# Patient Record
Sex: MALE | Race: WHITE | Employment: OTHER | URBAN - METROPOLITAN AREA
[De-identification: names, ages, dates, MRNs, and addresses within clinical notes are randomized per-mention and may not be internally consistent; named-entity substitution may affect disease eponyms.]

---

## 2017-01-23 PROBLEM — R06.09 DYSPNEA ON EXERTION: Status: ACTIVE | Noted: 2017-01-23

## 2017-01-23 PROBLEM — R07.81 PLEURITIC CHEST PAIN: Status: ACTIVE | Noted: 2017-01-23

## 2017-06-29 PROBLEM — Z91.14 H/O MEDICATION NONCOMPLIANCE: Status: ACTIVE | Noted: 2017-06-29

## 2017-06-29 PROBLEM — I10 ESSENTIAL HYPERTENSION: Status: ACTIVE | Noted: 2017-06-29

## 2017-06-29 PROBLEM — Z91.148 H/O MEDICATION NONCOMPLIANCE: Status: ACTIVE | Noted: 2017-06-29

## 2017-06-29 PROBLEM — I20.0 UNSTABLE ANGINA (HCC): Status: ACTIVE | Noted: 2017-06-29

## 2018-01-12 ENCOUNTER — CARE COORDINATION (OUTPATIENT)
Dept: CARE COORDINATION | Age: 53
End: 2018-01-12

## 2018-01-12 NOTE — CARE COORDINATION
Attempted to reach patient for ed follow up, detailed message left with instructions to return call to care coordination at [phone]

## 2018-04-04 ENCOUNTER — NURSE ONLY (OUTPATIENT)
Dept: NON INVASIVE DIAGNOSTICS | Age: 53
End: 2018-04-04
Payer: MEDICARE

## 2018-04-04 DIAGNOSIS — I47.29 NSVT (NONSUSTAINED VENTRICULAR TACHYCARDIA): Chronic | ICD-10-CM

## 2018-04-04 DIAGNOSIS — Z95.810 DUAL IMPLANTABLE CARDIOVERTER-DEFIBRILLATOR IN SITU: Primary | Chronic | ICD-10-CM

## 2018-04-04 PROCEDURE — 93296 REM INTERROG EVL PM/IDS: CPT | Performed by: INTERNAL MEDICINE

## 2018-04-04 PROCEDURE — 93295 DEV INTERROG REMOTE 1/2/MLT: CPT | Performed by: INTERNAL MEDICINE

## 2018-04-06 ENCOUNTER — TELEPHONE (OUTPATIENT)
Dept: NON INVASIVE DIAGNOSTICS | Age: 53
End: 2018-04-06

## 2018-04-17 ENCOUNTER — HOSPITAL ENCOUNTER (INPATIENT)
Age: 53
LOS: 2 days | Discharge: HOME | DRG: 291 | End: 2018-04-19
Attending: EMERGENCY MEDICINE | Admitting: FAMILY MEDICINE
Payer: MEDICARE

## 2018-04-17 ENCOUNTER — APPOINTMENT (OUTPATIENT)
Dept: GENERAL RADIOLOGY | Age: 53
DRG: 291 | End: 2018-04-17
Payer: MEDICARE

## 2018-04-17 DIAGNOSIS — R07.9 CHEST PAIN, UNSPECIFIED TYPE: Primary | ICD-10-CM

## 2018-04-17 DIAGNOSIS — R06.02 SHORTNESS OF BREATH: ICD-10-CM

## 2018-04-17 DIAGNOSIS — I10 HYPERTENSION, UNSPECIFIED TYPE: ICD-10-CM

## 2018-04-17 LAB
ALBUMIN SERPL-MCNC: 4.4 G/DL (ref 3.5–5.2)
ALP BLD-CCNC: 98 U/L (ref 40–129)
ALT SERPL-CCNC: 28 U/L (ref 0–40)
ANION GAP SERPL CALCULATED.3IONS-SCNC: 13 MMOL/L (ref 7–16)
AST SERPL-CCNC: 24 U/L (ref 0–39)
BASOPHILS ABSOLUTE: 0.06 E9/L (ref 0–0.2)
BASOPHILS RELATIVE PERCENT: 0.8 % (ref 0–2)
BILIRUB SERPL-MCNC: 0.5 MG/DL (ref 0–1.2)
BUN BLDV-MCNC: 17 MG/DL (ref 6–20)
CALCIUM SERPL-MCNC: 8.9 MG/DL (ref 8.6–10.2)
CHLORIDE BLD-SCNC: 100 MMOL/L (ref 98–107)
CO2: 27 MMOL/L (ref 22–29)
CREAT SERPL-MCNC: 1.3 MG/DL (ref 0.7–1.2)
EOSINOPHILS ABSOLUTE: 0.26 E9/L (ref 0.05–0.5)
EOSINOPHILS RELATIVE PERCENT: 3.4 % (ref 0–6)
GFR AFRICAN AMERICAN: >60
GFR NON-AFRICAN AMERICAN: 58 ML/MIN/1.73
GLUCOSE BLD-MCNC: 117 MG/DL (ref 74–109)
HCT VFR BLD CALC: 45.5 % (ref 37–54)
HEMOGLOBIN: 14.3 G/DL (ref 12.5–16.5)
IMMATURE GRANULOCYTES #: 0.04 E9/L
IMMATURE GRANULOCYTES %: 0.5 % (ref 0–5)
LYMPHOCYTES ABSOLUTE: 1.56 E9/L (ref 1.5–4)
LYMPHOCYTES RELATIVE PERCENT: 20.3 % (ref 20–42)
MCH RBC QN AUTO: 25 PG (ref 26–35)
MCHC RBC AUTO-ENTMCNC: 31.4 % (ref 32–34.5)
MCV RBC AUTO: 79.4 FL (ref 80–99.9)
MONOCYTES ABSOLUTE: 0.5 E9/L (ref 0.1–0.95)
MONOCYTES RELATIVE PERCENT: 6.5 % (ref 2–12)
NEUTROPHILS ABSOLUTE: 5.27 E9/L (ref 1.8–7.3)
NEUTROPHILS RELATIVE PERCENT: 68.5 % (ref 43–80)
PDW BLD-RTO: 14 FL (ref 11.5–15)
PLATELET # BLD: 200 E9/L (ref 130–450)
PMV BLD AUTO: 11 FL (ref 7–12)
POTASSIUM SERPL-SCNC: 4.1 MMOL/L (ref 3.5–5)
PRO-BNP: 1312 PG/ML (ref 0–125)
RBC # BLD: 5.73 E12/L (ref 3.8–5.8)
SODIUM BLD-SCNC: 140 MMOL/L (ref 132–146)
TOTAL PROTEIN: 7.4 G/DL (ref 6.4–8.3)
TROPONIN: <0.01 NG/ML (ref 0–0.03)
WBC # BLD: 7.7 E9/L (ref 4.5–11.5)

## 2018-04-17 PROCEDURE — 93005 ELECTROCARDIOGRAM TRACING: CPT | Performed by: NURSE PRACTITIONER

## 2018-04-17 PROCEDURE — 84484 ASSAY OF TROPONIN QUANT: CPT

## 2018-04-17 PROCEDURE — 36415 COLL VENOUS BLD VENIPUNCTURE: CPT

## 2018-04-17 PROCEDURE — 99285 EMERGENCY DEPT VISIT HI MDM: CPT

## 2018-04-17 PROCEDURE — 6360000002 HC RX W HCPCS: Performed by: STUDENT IN AN ORGANIZED HEALTH CARE EDUCATION/TRAINING PROGRAM

## 2018-04-17 PROCEDURE — 85025 COMPLETE CBC W/AUTO DIFF WBC: CPT

## 2018-04-17 PROCEDURE — 80053 COMPREHEN METABOLIC PANEL: CPT

## 2018-04-17 PROCEDURE — 6370000000 HC RX 637 (ALT 250 FOR IP): Performed by: STUDENT IN AN ORGANIZED HEALTH CARE EDUCATION/TRAINING PROGRAM

## 2018-04-17 PROCEDURE — 6370000000 HC RX 637 (ALT 250 FOR IP): Performed by: EMERGENCY MEDICINE

## 2018-04-17 PROCEDURE — 71045 X-RAY EXAM CHEST 1 VIEW: CPT

## 2018-04-17 PROCEDURE — 2140000000 HC CCU INTERMEDIATE R&B

## 2018-04-17 PROCEDURE — 93005 ELECTROCARDIOGRAM TRACING: CPT | Performed by: STUDENT IN AN ORGANIZED HEALTH CARE EDUCATION/TRAINING PROGRAM

## 2018-04-17 PROCEDURE — 2500000003 HC RX 250 WO HCPCS: Performed by: STUDENT IN AN ORGANIZED HEALTH CARE EDUCATION/TRAINING PROGRAM

## 2018-04-17 PROCEDURE — 83880 ASSAY OF NATRIURETIC PEPTIDE: CPT

## 2018-04-17 RX ORDER — FUROSEMIDE 10 MG/ML
40 INJECTION INTRAMUSCULAR; INTRAVENOUS ONCE
Status: COMPLETED | OUTPATIENT
Start: 2018-04-17 | End: 2018-04-17

## 2018-04-17 RX ORDER — METOPROLOL TARTRATE 5 MG/5ML
5 INJECTION INTRAVENOUS ONCE
Status: COMPLETED | OUTPATIENT
Start: 2018-04-17 | End: 2018-04-17

## 2018-04-17 RX ORDER — ASPIRIN 81 MG/1
324 TABLET, CHEWABLE ORAL ONCE
Status: COMPLETED | OUTPATIENT
Start: 2018-04-17 | End: 2018-04-17

## 2018-04-17 RX ORDER — NITROGLYCERIN 0.4 MG/1
0.4 TABLET SUBLINGUAL EVERY 5 MIN PRN
Status: DISCONTINUED | OUTPATIENT
Start: 2018-04-17 | End: 2018-04-19 | Stop reason: HOSPADM

## 2018-04-17 RX ORDER — METOPROLOL TARTRATE 5 MG/5ML
5 INJECTION INTRAVENOUS EVERY 6 HOURS
Status: DISCONTINUED | OUTPATIENT
Start: 2018-04-17 | End: 2018-04-17

## 2018-04-17 RX ADMIN — NITROGLYCERIN 0.5 INCH: 20 OINTMENT TOPICAL at 21:54

## 2018-04-17 RX ADMIN — FUROSEMIDE 40 MG: 10 INJECTION, SOLUTION INTRAMUSCULAR; INTRAVENOUS at 22:02

## 2018-04-17 RX ADMIN — METOROPROLOL TARTRATE 5 MG: 5 INJECTION, SOLUTION INTRAVENOUS at 22:47

## 2018-04-17 RX ADMIN — METOROPROLOL TARTRATE 5 MG: 5 INJECTION, SOLUTION INTRAVENOUS at 23:22

## 2018-04-17 RX ADMIN — NITROGLYCERIN 0.4 MG: 0.4 TABLET SUBLINGUAL at 21:59

## 2018-04-17 RX ADMIN — NITROGLYCERIN 0.4 MG: 0.4 TABLET SUBLINGUAL at 21:54

## 2018-04-17 RX ADMIN — ASPIRIN 81 MG 162 MG: 81 TABLET ORAL at 21:53

## 2018-04-17 ASSESSMENT — ENCOUNTER SYMPTOMS
ABDOMINAL DISTENTION: 0
NAUSEA: 0
COUGH: 0
ABDOMINAL PAIN: 0
CONSTIPATION: 0
CHEST TIGHTNESS: 0
SHORTNESS OF BREATH: 1
VOMITING: 0
BLOOD IN STOOL: 0
DIARRHEA: 0
EYE REDNESS: 0
RHINORRHEA: 0
EYE PAIN: 0
TROUBLE SWALLOWING: 0
BACK PAIN: 0
SINUS PRESSURE: 0
WHEEZING: 0
PHOTOPHOBIA: 0
SORE THROAT: 0

## 2018-04-17 ASSESSMENT — PAIN SCALES - GENERAL
PAINLEVEL_OUTOF10: 5

## 2018-04-17 ASSESSMENT — PAIN DESCRIPTION - FREQUENCY: FREQUENCY: CONTINUOUS

## 2018-04-17 ASSESSMENT — PAIN DESCRIPTION - LOCATION
LOCATION: CHEST
LOCATION: CHEST

## 2018-04-17 ASSESSMENT — PAIN DESCRIPTION - PAIN TYPE
TYPE: ACUTE PAIN

## 2018-04-17 ASSESSMENT — PAIN DESCRIPTION - DESCRIPTORS: DESCRIPTORS: PRESSURE

## 2018-04-17 ASSESSMENT — PAIN DESCRIPTION - ORIENTATION: ORIENTATION: MID;LEFT

## 2018-04-18 PROBLEM — R06.02 SHORTNESS OF BREATH: Status: ACTIVE | Noted: 2017-01-23

## 2018-04-18 LAB
ANION GAP SERPL CALCULATED.3IONS-SCNC: 16 MMOL/L (ref 7–16)
BILIRUBIN URINE: ABNORMAL
BLOOD, URINE: NEGATIVE
BUN BLDV-MCNC: 18 MG/DL (ref 6–20)
CALCIUM SERPL-MCNC: 9 MG/DL (ref 8.6–10.2)
CHLORIDE BLD-SCNC: 99 MMOL/L (ref 98–107)
CLARITY: CLEAR
CO2: 25 MMOL/L (ref 22–29)
COLOR: YELLOW
CREAT SERPL-MCNC: 1.3 MG/DL (ref 0.7–1.2)
GFR AFRICAN AMERICAN: >60
GFR NON-AFRICAN AMERICAN: 58 ML/MIN/1.73
GLUCOSE BLD-MCNC: 97 MG/DL (ref 74–109)
GLUCOSE URINE: NEGATIVE MG/DL
HCT VFR BLD CALC: 43.4 % (ref 37–54)
HEMOGLOBIN: 14.2 G/DL (ref 12.5–16.5)
KETONES, URINE: NEGATIVE MG/DL
LEUKOCYTE ESTERASE, URINE: NEGATIVE
LV EF: 30 %
LVEF MODALITY: NORMAL
MAGNESIUM: 2.2 MG/DL (ref 1.6–2.6)
MCH RBC QN AUTO: 25.5 PG (ref 26–35)
MCHC RBC AUTO-ENTMCNC: 32.7 % (ref 32–34.5)
MCV RBC AUTO: 78.1 FL (ref 80–99.9)
NITRITE, URINE: NEGATIVE
PDW BLD-RTO: 14.2 FL (ref 11.5–15)
PH UA: 5.5 (ref 5–9)
PLATELET # BLD: 196 E9/L (ref 130–450)
PMV BLD AUTO: 11.1 FL (ref 7–12)
POTASSIUM REFLEX MAGNESIUM: 3.5 MMOL/L (ref 3.5–5)
PROTEIN UA: NEGATIVE MG/DL
RBC # BLD: 5.56 E12/L (ref 3.8–5.8)
SODIUM BLD-SCNC: 140 MMOL/L (ref 132–146)
SPECIFIC GRAVITY UA: 1.02 (ref 1–1.03)
TROPONIN: <0.01 NG/ML (ref 0–0.03)
TROPONIN: <0.01 NG/ML (ref 0–0.03)
UROBILINOGEN, URINE: 4 E.U./DL
WBC # BLD: 7.7 E9/L (ref 4.5–11.5)

## 2018-04-18 PROCEDURE — 6360000004 HC RX CONTRAST MEDICATION: Performed by: FAMILY MEDICINE

## 2018-04-18 PROCEDURE — 6360000002 HC RX W HCPCS: Performed by: FAMILY MEDICINE

## 2018-04-18 PROCEDURE — 83735 ASSAY OF MAGNESIUM: CPT

## 2018-04-18 PROCEDURE — 84484 ASSAY OF TROPONIN QUANT: CPT

## 2018-04-18 PROCEDURE — APPSS60 APP SPLIT SHARED TIME 46-60 MINUTES: Performed by: NURSE PRACTITIONER

## 2018-04-18 PROCEDURE — 93005 ELECTROCARDIOGRAM TRACING: CPT | Performed by: FAMILY MEDICINE

## 2018-04-18 PROCEDURE — 99222 1ST HOSP IP/OBS MODERATE 55: CPT | Performed by: INTERNAL MEDICINE

## 2018-04-18 PROCEDURE — 99222 1ST HOSP IP/OBS MODERATE 55: CPT | Performed by: FAMILY MEDICINE

## 2018-04-18 PROCEDURE — 81003 URINALYSIS AUTO W/O SCOPE: CPT

## 2018-04-18 PROCEDURE — 2140000000 HC CCU INTERMEDIATE R&B

## 2018-04-18 PROCEDURE — 2580000003 HC RX 258: Performed by: FAMILY MEDICINE

## 2018-04-18 PROCEDURE — 87088 URINE BACTERIA CULTURE: CPT

## 2018-04-18 PROCEDURE — 85027 COMPLETE CBC AUTOMATED: CPT

## 2018-04-18 PROCEDURE — 36415 COLL VENOUS BLD VENIPUNCTURE: CPT

## 2018-04-18 PROCEDURE — 80048 BASIC METABOLIC PNL TOTAL CA: CPT

## 2018-04-18 PROCEDURE — 93306 TTE W/DOPPLER COMPLETE: CPT

## 2018-04-18 PROCEDURE — 6370000000 HC RX 637 (ALT 250 FOR IP): Performed by: FAMILY MEDICINE

## 2018-04-18 RX ORDER — SODIUM CHLORIDE 0.9 % (FLUSH) 0.9 %
10 SYRINGE (ML) INJECTION PRN
Status: DISCONTINUED | OUTPATIENT
Start: 2018-04-18 | End: 2018-04-19 | Stop reason: HOSPADM

## 2018-04-18 RX ORDER — FLUOXETINE HYDROCHLORIDE 20 MG/1
20 CAPSULE ORAL DAILY
Status: DISCONTINUED | OUTPATIENT
Start: 2018-04-18 | End: 2018-04-19 | Stop reason: HOSPADM

## 2018-04-18 RX ORDER — PANTOPRAZOLE SODIUM 40 MG/1
40 TABLET, DELAYED RELEASE ORAL
Status: DISCONTINUED | OUTPATIENT
Start: 2018-04-18 | End: 2018-04-19 | Stop reason: HOSPADM

## 2018-04-18 RX ORDER — CLOPIDOGREL BISULFATE 75 MG/1
75 TABLET ORAL DAILY
Status: DISCONTINUED | OUTPATIENT
Start: 2018-04-18 | End: 2018-04-19 | Stop reason: HOSPADM

## 2018-04-18 RX ORDER — LISINOPRIL 20 MG/1
40 TABLET ORAL DAILY
Status: DISCONTINUED | OUTPATIENT
Start: 2018-04-18 | End: 2018-04-19 | Stop reason: HOSPADM

## 2018-04-18 RX ORDER — ISOSORBIDE MONONITRATE 30 MG/1
30 TABLET, EXTENDED RELEASE ORAL DAILY
Status: DISCONTINUED | OUTPATIENT
Start: 2018-04-18 | End: 2018-04-19 | Stop reason: HOSPADM

## 2018-04-18 RX ORDER — RANOLAZINE 500 MG/1
500 TABLET, EXTENDED RELEASE ORAL 2 TIMES DAILY
Status: DISCONTINUED | OUTPATIENT
Start: 2018-04-18 | End: 2018-04-19 | Stop reason: HOSPADM

## 2018-04-18 RX ORDER — ATORVASTATIN CALCIUM 40 MG/1
80 TABLET, FILM COATED ORAL DAILY
Status: DISCONTINUED | OUTPATIENT
Start: 2018-04-18 | End: 2018-04-19 | Stop reason: HOSPADM

## 2018-04-18 RX ORDER — TAMSULOSIN HYDROCHLORIDE 0.4 MG/1
0.4 CAPSULE ORAL DAILY
Status: DISCONTINUED | OUTPATIENT
Start: 2018-04-18 | End: 2018-04-19 | Stop reason: HOSPADM

## 2018-04-18 RX ORDER — ACETAMINOPHEN 325 MG/1
650 TABLET ORAL EVERY 4 HOURS PRN
Status: DISCONTINUED | OUTPATIENT
Start: 2018-04-18 | End: 2018-04-19 | Stop reason: HOSPADM

## 2018-04-18 RX ORDER — FUROSEMIDE 10 MG/ML
20 INJECTION INTRAMUSCULAR; INTRAVENOUS DAILY
Status: DISCONTINUED | OUTPATIENT
Start: 2018-04-18 | End: 2018-04-19

## 2018-04-18 RX ORDER — SODIUM CHLORIDE 0.9 % (FLUSH) 0.9 %
10 SYRINGE (ML) INJECTION EVERY 12 HOURS SCHEDULED
Status: DISCONTINUED | OUTPATIENT
Start: 2018-04-18 | End: 2018-04-19 | Stop reason: HOSPADM

## 2018-04-18 RX ORDER — METOPROLOL SUCCINATE 50 MG/1
50 TABLET, EXTENDED RELEASE ORAL 2 TIMES DAILY
Status: DISCONTINUED | OUTPATIENT
Start: 2018-04-18 | End: 2018-04-19 | Stop reason: HOSPADM

## 2018-04-18 RX ORDER — ASPIRIN 81 MG/1
81 TABLET, CHEWABLE ORAL DAILY
Status: DISCONTINUED | OUTPATIENT
Start: 2018-04-18 | End: 2018-04-19 | Stop reason: HOSPADM

## 2018-04-18 RX ADMIN — METOPROLOL SUCCINATE 50 MG: 50 TABLET, FILM COATED, EXTENDED RELEASE ORAL at 08:55

## 2018-04-18 RX ADMIN — FUROSEMIDE 20 MG: 10 INJECTION, SOLUTION INTRAVENOUS at 08:54

## 2018-04-18 RX ADMIN — CLOPIDOGREL 75 MG: 75 TABLET, FILM COATED ORAL at 08:53

## 2018-04-18 RX ADMIN — RANOLAZINE 500 MG: 500 TABLET, FILM COATED, EXTENDED RELEASE ORAL at 08:52

## 2018-04-18 RX ADMIN — METOPROLOL SUCCINATE 50 MG: 50 TABLET, FILM COATED, EXTENDED RELEASE ORAL at 02:24

## 2018-04-18 RX ADMIN — ISOSORBIDE MONONITRATE 30 MG: 30 TABLET ORAL at 08:52

## 2018-04-18 RX ADMIN — ASPIRIN 81 MG 81 MG: 81 TABLET ORAL at 08:53

## 2018-04-18 RX ADMIN — FLUOXETINE HYDROCHLORIDE 20 MG: 20 CAPSULE ORAL at 08:53

## 2018-04-18 RX ADMIN — Medication 10 ML: at 20:36

## 2018-04-18 RX ADMIN — DESMOPRESSIN ACETATE 80 MG: 0.2 TABLET ORAL at 08:53

## 2018-04-18 RX ADMIN — LISINOPRIL 40 MG: 20 TABLET ORAL at 08:54

## 2018-04-18 RX ADMIN — PERFLUTREN 1.65 MG: 6.52 INJECTION, SUSPENSION INTRAVENOUS at 12:10

## 2018-04-18 RX ADMIN — ENOXAPARIN SODIUM 40 MG: 40 INJECTION SUBCUTANEOUS at 08:52

## 2018-04-18 RX ADMIN — RANOLAZINE 500 MG: 500 TABLET, FILM COATED, EXTENDED RELEASE ORAL at 20:35

## 2018-04-18 RX ADMIN — Medication 10 ML: at 08:52

## 2018-04-18 RX ADMIN — METOPROLOL SUCCINATE 50 MG: 50 TABLET, FILM COATED, EXTENDED RELEASE ORAL at 20:35

## 2018-04-18 RX ADMIN — PANTOPRAZOLE SODIUM 40 MG: 40 TABLET, DELAYED RELEASE ORAL at 07:11

## 2018-04-18 RX ADMIN — TAMSULOSIN HYDROCHLORIDE 0.4 MG: 0.4 CAPSULE ORAL at 08:52

## 2018-04-18 RX ADMIN — RANOLAZINE 500 MG: 500 TABLET, FILM COATED, EXTENDED RELEASE ORAL at 02:24

## 2018-04-18 RX ADMIN — METFORMIN HYDROCHLORIDE 1000 MG: 1000 TABLET ORAL at 08:52

## 2018-04-18 ASSESSMENT — PAIN SCALES - GENERAL
PAINLEVEL_OUTOF10: 4
PAINLEVEL_OUTOF10: 2
PAINLEVEL_OUTOF10: 0

## 2018-04-18 ASSESSMENT — PAIN DESCRIPTION - PROGRESSION
CLINICAL_PROGRESSION: GRADUALLY IMPROVING

## 2018-04-18 ASSESSMENT — PAIN DESCRIPTION - FREQUENCY
FREQUENCY: INTERMITTENT
FREQUENCY: INTERMITTENT

## 2018-04-18 ASSESSMENT — ENCOUNTER SYMPTOMS
HEARTBURN: 1
BACK PAIN: 0
ORTHOPNEA: 0
VOMITING: 0
COUGH: 0
SORE THROAT: 0
BLURRED VISION: 0
NAUSEA: 0
ABDOMINAL PAIN: 0
DIARRHEA: 1
SHORTNESS OF BREATH: 1

## 2018-04-18 ASSESSMENT — PAIN DESCRIPTION - LOCATION
LOCATION: CHEST
LOCATION: CHEST

## 2018-04-18 ASSESSMENT — PAIN DESCRIPTION - ORIENTATION
ORIENTATION: LEFT
ORIENTATION: MID

## 2018-04-18 ASSESSMENT — PAIN DESCRIPTION - DESCRIPTORS
DESCRIPTORS: ACHING;DISCOMFORT
DESCRIPTORS: ACHING

## 2018-04-18 ASSESSMENT — PAIN DESCRIPTION - PAIN TYPE
TYPE: ACUTE PAIN
TYPE: ACUTE PAIN

## 2018-04-19 VITALS
HEIGHT: 69 IN | BODY MASS INDEX: 33.31 KG/M2 | DIASTOLIC BLOOD PRESSURE: 75 MMHG | RESPIRATION RATE: 18 BRPM | WEIGHT: 224.9 LBS | OXYGEN SATURATION: 96 % | TEMPERATURE: 96 F | HEART RATE: 65 BPM | SYSTOLIC BLOOD PRESSURE: 138 MMHG

## 2018-04-19 PROBLEM — R06.02 SHORTNESS OF BREATH: Status: RESOLVED | Noted: 2017-01-23 | Resolved: 2018-04-19

## 2018-04-19 LAB
ANION GAP SERPL CALCULATED.3IONS-SCNC: 18 MMOL/L (ref 7–16)
BUN BLDV-MCNC: 22 MG/DL (ref 6–20)
CALCIUM SERPL-MCNC: 9 MG/DL (ref 8.6–10.2)
CHLORIDE BLD-SCNC: 100 MMOL/L (ref 98–107)
CO2: 25 MMOL/L (ref 22–29)
CREAT SERPL-MCNC: 1.5 MG/DL (ref 0.7–1.2)
GFR AFRICAN AMERICAN: 59
GFR NON-AFRICAN AMERICAN: 49 ML/MIN/1.73
GLUCOSE BLD-MCNC: 100 MG/DL (ref 74–109)
HCT VFR BLD CALC: 43.8 % (ref 37–54)
HEMOGLOBIN: 14 G/DL (ref 12.5–16.5)
LACTIC ACID: 1.2 MMOL/L (ref 0.5–2.2)
MCH RBC QN AUTO: 25 PG (ref 26–35)
MCHC RBC AUTO-ENTMCNC: 32 % (ref 32–34.5)
MCV RBC AUTO: 78.4 FL (ref 80–99.9)
PDW BLD-RTO: 14.1 FL (ref 11.5–15)
PLATELET # BLD: 178 E9/L (ref 130–450)
PMV BLD AUTO: 11.1 FL (ref 7–12)
POTASSIUM REFLEX MAGNESIUM: 3.9 MMOL/L (ref 3.5–5)
RBC # BLD: 5.59 E12/L (ref 3.8–5.8)
SODIUM BLD-SCNC: 143 MMOL/L (ref 132–146)
WBC # BLD: 7.3 E9/L (ref 4.5–11.5)

## 2018-04-19 PROCEDURE — 99238 HOSP IP/OBS DSCHRG MGMT 30/<: CPT | Performed by: FAMILY MEDICINE

## 2018-04-19 PROCEDURE — APPSS30 APP SPLIT SHARED TIME 16-30 MINUTES: Performed by: NURSE PRACTITIONER

## 2018-04-19 PROCEDURE — 99232 SBSQ HOSP IP/OBS MODERATE 35: CPT | Performed by: INTERNAL MEDICINE

## 2018-04-19 PROCEDURE — 6370000000 HC RX 637 (ALT 250 FOR IP): Performed by: FAMILY MEDICINE

## 2018-04-19 PROCEDURE — 36415 COLL VENOUS BLD VENIPUNCTURE: CPT

## 2018-04-19 PROCEDURE — 85027 COMPLETE CBC AUTOMATED: CPT

## 2018-04-19 PROCEDURE — 2580000003 HC RX 258: Performed by: FAMILY MEDICINE

## 2018-04-19 PROCEDURE — 83605 ASSAY OF LACTIC ACID: CPT

## 2018-04-19 PROCEDURE — 6360000002 HC RX W HCPCS: Performed by: FAMILY MEDICINE

## 2018-04-19 PROCEDURE — 80048 BASIC METABOLIC PNL TOTAL CA: CPT

## 2018-04-19 RX ORDER — FUROSEMIDE 20 MG/1
20 TABLET ORAL DAILY
Qty: 60 TABLET | Refills: 3 | Status: SHIPPED | OUTPATIENT
Start: 2018-04-20 | End: 2018-06-05 | Stop reason: SDUPTHER

## 2018-04-19 RX ORDER — FUROSEMIDE 20 MG/1
20 TABLET ORAL DAILY
Status: DISCONTINUED | OUTPATIENT
Start: 2018-04-20 | End: 2018-04-19 | Stop reason: HOSPADM

## 2018-04-19 RX ADMIN — ISOSORBIDE MONONITRATE 30 MG: 30 TABLET ORAL at 08:48

## 2018-04-19 RX ADMIN — FLUOXETINE HYDROCHLORIDE 20 MG: 20 CAPSULE ORAL at 08:48

## 2018-04-19 RX ADMIN — FUROSEMIDE 20 MG: 10 INJECTION, SOLUTION INTRAVENOUS at 08:48

## 2018-04-19 RX ADMIN — METFORMIN HYDROCHLORIDE 1000 MG: 1000 TABLET ORAL at 08:48

## 2018-04-19 RX ADMIN — Medication 10 ML: at 08:48

## 2018-04-19 RX ADMIN — RANOLAZINE 500 MG: 500 TABLET, FILM COATED, EXTENDED RELEASE ORAL at 08:48

## 2018-04-19 RX ADMIN — PANTOPRAZOLE SODIUM 40 MG: 40 TABLET, DELAYED RELEASE ORAL at 05:52

## 2018-04-19 RX ADMIN — DESMOPRESSIN ACETATE 80 MG: 0.2 TABLET ORAL at 08:48

## 2018-04-19 RX ADMIN — TAMSULOSIN HYDROCHLORIDE 0.4 MG: 0.4 CAPSULE ORAL at 08:48

## 2018-04-19 RX ADMIN — METOPROLOL SUCCINATE 50 MG: 50 TABLET, FILM COATED, EXTENDED RELEASE ORAL at 08:48

## 2018-04-19 RX ADMIN — CLOPIDOGREL 75 MG: 75 TABLET, FILM COATED ORAL at 08:48

## 2018-04-19 RX ADMIN — ASPIRIN 81 MG 81 MG: 81 TABLET ORAL at 08:48

## 2018-04-19 RX ADMIN — LISINOPRIL 40 MG: 20 TABLET ORAL at 08:48

## 2018-04-19 ASSESSMENT — PAIN SCALES - GENERAL: PAINLEVEL_OUTOF10: 0

## 2018-04-20 ENCOUNTER — CARE COORDINATION (OUTPATIENT)
Dept: CASE MANAGEMENT | Age: 53
End: 2018-04-20

## 2018-04-20 LAB
EKG ATRIAL RATE: 71 BPM
EKG P AXIS: 59 DEGREES
EKG P-R INTERVAL: 208 MS
EKG Q-T INTERVAL: 436 MS
EKG QRS DURATION: 106 MS
EKG QTC CALCULATION (BAZETT): 473 MS
EKG R AXIS: 106 DEGREES
EKG T AXIS: 114 DEGREES
EKG VENTRICULAR RATE: 71 BPM
URINE CULTURE, ROUTINE: NORMAL

## 2018-04-20 PROCEDURE — 93010 ELECTROCARDIOGRAM REPORT: CPT | Performed by: FAMILY MEDICINE

## 2018-04-23 ENCOUNTER — HOSPITAL ENCOUNTER (OUTPATIENT)
Age: 53
Discharge: HOME | End: 2018-04-25
Payer: MEDICARE

## 2018-04-23 ENCOUNTER — OFFICE VISIT (OUTPATIENT)
Dept: FAMILY MEDICINE CLINIC | Age: 53
End: 2018-04-23
Payer: MEDICARE

## 2018-04-23 VITALS
BODY MASS INDEX: 33.18 KG/M2 | WEIGHT: 224 LBS | RESPIRATION RATE: 18 BRPM | OXYGEN SATURATION: 96 % | TEMPERATURE: 98.7 F | SYSTOLIC BLOOD PRESSURE: 126 MMHG | DIASTOLIC BLOOD PRESSURE: 70 MMHG | HEART RATE: 88 BPM | HEIGHT: 69 IN

## 2018-04-23 DIAGNOSIS — S39.011A STRAIN OF ABDOMINAL MUSCLE, INITIAL ENCOUNTER: ICD-10-CM

## 2018-04-23 DIAGNOSIS — E11.9 TYPE 2 DIABETES MELLITUS WITHOUT COMPLICATION, WITHOUT LONG-TERM CURRENT USE OF INSULIN (HCC): ICD-10-CM

## 2018-04-23 DIAGNOSIS — I50.22 CHRONIC SYSTOLIC CHF (CONGESTIVE HEART FAILURE) (HCC): Primary | Chronic | ICD-10-CM

## 2018-04-23 DIAGNOSIS — Z91.14 H/O MEDICATION NONCOMPLIANCE: ICD-10-CM

## 2018-04-23 DIAGNOSIS — G47.9 SLEEP DISTURBANCE: ICD-10-CM

## 2018-04-23 LAB
ANION GAP SERPL CALCULATED.3IONS-SCNC: 17 MMOL/L (ref 7–16)
BUN BLDV-MCNC: 19 MG/DL (ref 6–20)
CALCIUM SERPL-MCNC: 9.2 MG/DL (ref 8.6–10.2)
CHLORIDE BLD-SCNC: 99 MMOL/L (ref 98–107)
CO2: 24 MMOL/L (ref 22–29)
CREAT SERPL-MCNC: 1.4 MG/DL (ref 0.7–1.2)
GFR AFRICAN AMERICAN: >60
GFR NON-AFRICAN AMERICAN: 53 ML/MIN/1.73
GLUCOSE BLD-MCNC: 135 MG/DL (ref 74–109)
HBA1C MFR BLD: 6.2 % (ref 4.8–5.9)
POTASSIUM SERPL-SCNC: 4.1 MMOL/L (ref 3.5–5)
SODIUM BLD-SCNC: 140 MMOL/L (ref 132–146)

## 2018-04-23 PROCEDURE — 36415 COLL VENOUS BLD VENIPUNCTURE: CPT

## 2018-04-23 PROCEDURE — 99495 TRANSJ CARE MGMT MOD F2F 14D: CPT | Performed by: FAMILY MEDICINE

## 2018-04-23 PROCEDURE — 80048 BASIC METABOLIC PNL TOTAL CA: CPT

## 2018-04-23 PROCEDURE — 83036 HEMOGLOBIN GLYCOSYLATED A1C: CPT

## 2018-04-23 PROCEDURE — 99212 OFFICE O/P EST SF 10 MIN: CPT | Performed by: FAMILY MEDICINE

## 2018-04-23 PROCEDURE — 1111F DSCHRG MED/CURRENT MED MERGE: CPT | Performed by: FAMILY MEDICINE

## 2018-04-23 ASSESSMENT — PATIENT HEALTH QUESTIONNAIRE - PHQ9
SUM OF ALL RESPONSES TO PHQ QUESTIONS 1-9: 1
2. FEELING DOWN, DEPRESSED OR HOPELESS: 0
1. LITTLE INTEREST OR PLEASURE IN DOING THINGS: 1
SUM OF ALL RESPONSES TO PHQ9 QUESTIONS 1 & 2: 1

## 2018-04-24 ENCOUNTER — TELEPHONE (OUTPATIENT)
Dept: CARDIOLOGY CLINIC | Age: 53
End: 2018-04-24

## 2018-04-24 ENCOUNTER — TELEPHONE (OUTPATIENT)
Dept: NON INVASIVE DIAGNOSTICS | Age: 53
End: 2018-04-24

## 2018-04-26 ENCOUNTER — OFFICE VISIT (OUTPATIENT)
Dept: CARDIOLOGY CLINIC | Age: 53
End: 2018-04-26
Payer: MEDICARE

## 2018-04-26 VITALS
DIASTOLIC BLOOD PRESSURE: 88 MMHG | HEIGHT: 69 IN | BODY MASS INDEX: 33.18 KG/M2 | RESPIRATION RATE: 12 BRPM | WEIGHT: 224 LBS | HEART RATE: 74 BPM | SYSTOLIC BLOOD PRESSURE: 132 MMHG

## 2018-04-26 DIAGNOSIS — I38 VHD (VALVULAR HEART DISEASE): ICD-10-CM

## 2018-04-26 DIAGNOSIS — I25.10 CORONARY ARTERY DISEASE INVOLVING NATIVE CORONARY ARTERY OF NATIVE HEART WITHOUT ANGINA PECTORIS: Chronic | ICD-10-CM

## 2018-04-26 DIAGNOSIS — Z95.810 DUAL IMPLANTABLE CARDIOVERTER-DEFIBRILLATOR IN SITU: Chronic | ICD-10-CM

## 2018-04-26 DIAGNOSIS — I10 ESSENTIAL HYPERTENSION: Chronic | ICD-10-CM

## 2018-04-26 DIAGNOSIS — E78.2 MIXED HYPERLIPIDEMIA: Chronic | ICD-10-CM

## 2018-04-26 DIAGNOSIS — I50.42 CHRONIC COMBINED SYSTOLIC AND DIASTOLIC CONGESTIVE HEART FAILURE (HCC): Primary | ICD-10-CM

## 2018-04-26 DIAGNOSIS — I47.29 NSVT (NONSUSTAINED VENTRICULAR TACHYCARDIA): Chronic | ICD-10-CM

## 2018-04-26 PROCEDURE — 93000 ELECTROCARDIOGRAM COMPLETE: CPT | Performed by: INTERNAL MEDICINE

## 2018-04-26 PROCEDURE — G8598 ASA/ANTIPLAT THER USED: HCPCS | Performed by: PHYSICIAN ASSISTANT

## 2018-04-26 PROCEDURE — G8417 CALC BMI ABV UP PARAM F/U: HCPCS | Performed by: PHYSICIAN ASSISTANT

## 2018-04-26 PROCEDURE — 99214 OFFICE O/P EST MOD 30 MIN: CPT | Performed by: PHYSICIAN ASSISTANT

## 2018-04-26 PROCEDURE — 4004F PT TOBACCO SCREEN RCVD TLK: CPT | Performed by: PHYSICIAN ASSISTANT

## 2018-04-26 PROCEDURE — 1111F DSCHRG MED/CURRENT MED MERGE: CPT | Performed by: PHYSICIAN ASSISTANT

## 2018-04-26 PROCEDURE — 3017F COLORECTAL CA SCREEN DOC REV: CPT | Performed by: PHYSICIAN ASSISTANT

## 2018-04-26 PROCEDURE — G8427 DOCREV CUR MEDS BY ELIG CLIN: HCPCS | Performed by: PHYSICIAN ASSISTANT

## 2018-04-29 LAB
EKG ATRIAL RATE: 86 BPM
EKG ATRIAL RATE: 96 BPM
EKG P AXIS: 59 DEGREES
EKG P AXIS: 60 DEGREES
EKG P-R INTERVAL: 198 MS
EKG P-R INTERVAL: 200 MS
EKG Q-T INTERVAL: 356 MS
EKG Q-T INTERVAL: 372 MS
EKG QRS DURATION: 104 MS
EKG QRS DURATION: 98 MS
EKG QTC CALCULATION (BAZETT): 445 MS
EKG QTC CALCULATION (BAZETT): 449 MS
EKG R AXIS: -94 DEGREES
EKG R AXIS: 120 DEGREES
EKG T AXIS: 100 DEGREES
EKG T AXIS: 91 DEGREES
EKG VENTRICULAR RATE: 86 BPM
EKG VENTRICULAR RATE: 96 BPM

## 2018-06-05 ENCOUNTER — OFFICE VISIT (OUTPATIENT)
Dept: CARDIOLOGY CLINIC | Age: 53
End: 2018-06-05
Payer: MEDICARE

## 2018-06-05 VITALS
WEIGHT: 221 LBS | HEIGHT: 69 IN | RESPIRATION RATE: 14 BRPM | SYSTOLIC BLOOD PRESSURE: 118 MMHG | BODY MASS INDEX: 32.73 KG/M2 | HEART RATE: 66 BPM | DIASTOLIC BLOOD PRESSURE: 78 MMHG

## 2018-06-05 DIAGNOSIS — Z95.810 DUAL IMPLANTABLE CARDIOVERTER-DEFIBRILLATOR IN SITU: Chronic | ICD-10-CM

## 2018-06-05 DIAGNOSIS — I10 ESSENTIAL HYPERTENSION: Chronic | ICD-10-CM

## 2018-06-05 DIAGNOSIS — I25.10 CORONARY ARTERY DISEASE INVOLVING NATIVE CORONARY ARTERY OF NATIVE HEART WITHOUT ANGINA PECTORIS: Chronic | ICD-10-CM

## 2018-06-05 DIAGNOSIS — I50.42 CHRONIC COMBINED SYSTOLIC AND DIASTOLIC CONGESTIVE HEART FAILURE (HCC): Primary | ICD-10-CM

## 2018-06-05 PROBLEM — J41.0 SIMPLE CHRONIC BRONCHITIS (HCC): Chronic | Status: ACTIVE | Noted: 2018-06-05

## 2018-06-05 PROCEDURE — G8598 ASA/ANTIPLAT THER USED: HCPCS | Performed by: PHYSICIAN ASSISTANT

## 2018-06-05 PROCEDURE — 4004F PT TOBACCO SCREEN RCVD TLK: CPT | Performed by: PHYSICIAN ASSISTANT

## 2018-06-05 PROCEDURE — 93000 ELECTROCARDIOGRAM COMPLETE: CPT | Performed by: INTERNAL MEDICINE

## 2018-06-05 PROCEDURE — G8427 DOCREV CUR MEDS BY ELIG CLIN: HCPCS | Performed by: PHYSICIAN ASSISTANT

## 2018-06-05 PROCEDURE — 3017F COLORECTAL CA SCREEN DOC REV: CPT | Performed by: PHYSICIAN ASSISTANT

## 2018-06-05 PROCEDURE — 99213 OFFICE O/P EST LOW 20 MIN: CPT | Performed by: PHYSICIAN ASSISTANT

## 2018-06-05 PROCEDURE — G8417 CALC BMI ABV UP PARAM F/U: HCPCS | Performed by: PHYSICIAN ASSISTANT

## 2018-06-05 RX ORDER — FUROSEMIDE 20 MG/1
20 TABLET ORAL DAILY
Qty: 90 TABLET | Refills: 3 | Status: SHIPPED | OUTPATIENT
Start: 2018-06-05 | End: 2019-01-15 | Stop reason: DRUGHIGH

## 2018-06-06 ENCOUNTER — TELEPHONE (OUTPATIENT)
Dept: FAMILY MEDICINE CLINIC | Age: 53
End: 2018-06-06

## 2018-06-06 DIAGNOSIS — K21.9 GASTROESOPHAGEAL REFLUX DISEASE WITHOUT ESOPHAGITIS: Chronic | ICD-10-CM

## 2018-06-06 DIAGNOSIS — F32.A DEPRESSION, UNSPECIFIED DEPRESSION TYPE: Chronic | ICD-10-CM

## 2018-06-06 DIAGNOSIS — I25.119 CORONARY ARTERY DISEASE INVOLVING NATIVE CORONARY ARTERY OF NATIVE HEART WITH ANGINA PECTORIS (HCC): ICD-10-CM

## 2018-06-06 DIAGNOSIS — E11.9 TYPE 2 DIABETES MELLITUS WITHOUT COMPLICATION, WITHOUT LONG-TERM CURRENT USE OF INSULIN (HCC): Chronic | ICD-10-CM

## 2018-06-06 DIAGNOSIS — I25.10 CORONARY ARTERY DISEASE INVOLVING NATIVE CORONARY ARTERY OF NATIVE HEART WITHOUT ANGINA PECTORIS: Chronic | ICD-10-CM

## 2018-06-06 DIAGNOSIS — I10 ESSENTIAL HYPERTENSION: ICD-10-CM

## 2018-06-06 DIAGNOSIS — E78.1 HYPERTRIGLYCERIDEMIA: ICD-10-CM

## 2018-06-06 RX ORDER — RANOLAZINE 500 MG/1
500 TABLET, EXTENDED RELEASE ORAL 2 TIMES DAILY
Qty: 180 TABLET | Refills: 1 | Status: SHIPPED | OUTPATIENT
Start: 2018-06-06 | End: 2019-01-15 | Stop reason: SDUPTHER

## 2018-06-06 RX ORDER — LISINOPRIL 40 MG/1
40 TABLET ORAL DAILY
Qty: 90 TABLET | Refills: 1 | Status: SHIPPED | OUTPATIENT
Start: 2018-06-06 | End: 2019-01-15 | Stop reason: SDUPTHER

## 2018-06-06 RX ORDER — CLOPIDOGREL BISULFATE 75 MG/1
75 TABLET ORAL DAILY
Qty: 90 TABLET | Refills: 1 | Status: SHIPPED | OUTPATIENT
Start: 2018-06-06 | End: 2019-01-15 | Stop reason: SDUPTHER

## 2018-06-06 RX ORDER — TAMSULOSIN HYDROCHLORIDE 0.4 MG/1
0.4 CAPSULE ORAL DAILY
Qty: 90 CAPSULE | Refills: 1 | Status: SHIPPED | OUTPATIENT
Start: 2018-06-06 | End: 2019-01-15 | Stop reason: SDUPTHER

## 2018-06-06 RX ORDER — FLUOXETINE HYDROCHLORIDE 20 MG/1
1 CAPSULE ORAL DAILY
Qty: 90 CAPSULE | Refills: 1 | Status: SHIPPED | OUTPATIENT
Start: 2018-06-06 | End: 2018-06-12 | Stop reason: CLARIF

## 2018-06-06 RX ORDER — ATORVASTATIN CALCIUM 80 MG/1
80 TABLET, FILM COATED ORAL DAILY
Qty: 90 TABLET | Refills: 1 | Status: SHIPPED | OUTPATIENT
Start: 2018-06-06 | End: 2019-01-15 | Stop reason: SDUPTHER

## 2018-06-06 RX ORDER — METOPROLOL SUCCINATE 50 MG/1
50 TABLET, EXTENDED RELEASE ORAL 2 TIMES DAILY
Qty: 180 TABLET | Refills: 1 | Status: SHIPPED | OUTPATIENT
Start: 2018-06-06 | End: 2019-01-15 | Stop reason: SDUPTHER

## 2018-06-06 RX ORDER — PANTOPRAZOLE SODIUM 40 MG/1
40 TABLET, DELAYED RELEASE ORAL
Qty: 90 TABLET | Refills: 1 | Status: SHIPPED | OUTPATIENT
Start: 2018-06-06 | End: 2019-01-15 | Stop reason: SDUPTHER

## 2018-06-06 RX ORDER — ISOSORBIDE MONONITRATE 30 MG/1
30 TABLET, EXTENDED RELEASE ORAL DAILY
Qty: 90 TABLET | Refills: 1 | Status: SHIPPED | OUTPATIENT
Start: 2018-06-06 | End: 2019-01-15 | Stop reason: SDUPTHER

## 2018-06-12 ENCOUNTER — OFFICE VISIT (OUTPATIENT)
Dept: NON INVASIVE DIAGNOSTICS | Age: 53
End: 2018-06-12
Payer: MEDICARE

## 2018-06-12 VITALS
HEIGHT: 69 IN | BODY MASS INDEX: 33.36 KG/M2 | RESPIRATION RATE: 18 BRPM | HEART RATE: 64 BPM | SYSTOLIC BLOOD PRESSURE: 136 MMHG | WEIGHT: 225.2 LBS | DIASTOLIC BLOOD PRESSURE: 82 MMHG

## 2018-06-12 DIAGNOSIS — Z95.810 DUAL IMPLANTABLE CARDIOVERTER-DEFIBRILLATOR IN SITU: Chronic | ICD-10-CM

## 2018-06-12 DIAGNOSIS — I50.22 CHRONIC SYSTOLIC CHF (CONGESTIVE HEART FAILURE) (HCC): Chronic | ICD-10-CM

## 2018-06-12 DIAGNOSIS — F32.A DEPRESSION, UNSPECIFIED DEPRESSION TYPE: Primary | Chronic | ICD-10-CM

## 2018-06-12 DIAGNOSIS — I10 ESSENTIAL HYPERTENSION: Chronic | ICD-10-CM

## 2018-06-12 DIAGNOSIS — I47.29 NSVT (NONSUSTAINED VENTRICULAR TACHYCARDIA): Primary | Chronic | ICD-10-CM

## 2018-06-12 PROCEDURE — G8427 DOCREV CUR MEDS BY ELIG CLIN: HCPCS | Performed by: INTERNAL MEDICINE

## 2018-06-12 PROCEDURE — 93283 PRGRMG EVAL IMPLANTABLE DFB: CPT | Performed by: INTERNAL MEDICINE

## 2018-06-12 PROCEDURE — 99213 OFFICE O/P EST LOW 20 MIN: CPT | Performed by: INTERNAL MEDICINE

## 2018-06-12 PROCEDURE — 3017F COLORECTAL CA SCREEN DOC REV: CPT | Performed by: INTERNAL MEDICINE

## 2018-06-12 PROCEDURE — G8598 ASA/ANTIPLAT THER USED: HCPCS | Performed by: INTERNAL MEDICINE

## 2018-06-12 PROCEDURE — G8417 CALC BMI ABV UP PARAM F/U: HCPCS | Performed by: INTERNAL MEDICINE

## 2018-06-12 PROCEDURE — 4004F PT TOBACCO SCREEN RCVD TLK: CPT | Performed by: INTERNAL MEDICINE

## 2018-06-12 RX ORDER — FLUOXETINE 20 MG/1
20 TABLET, FILM COATED ORAL DAILY
Qty: 90 TABLET | Refills: 1 | Status: SHIPPED | OUTPATIENT
Start: 2018-06-12 | End: 2018-08-27 | Stop reason: ALTCHOICE

## 2018-06-12 NOTE — PROGRESS NOTES
overall  LV systolic dysfunction. Ejection [id number]%.      RIGHT FEMORAL ARTERY ANGIOGRAM: There is a 40% stenosis proximal to the end  of the sheath. Good distal runoff. Complete TTE 3/2015:   Findings      Left Ventricle   Mildly dilated left ventricle   Severely reduced left ventricular systolic function   Indeterminate diastolic function   Mid & distal anteroseptal & apical akinesis   Ejection fraction is visually estimated at 30%.  difficult visualization.  contrast used.      Right Ventricle   Normal right ventricular size and function.      Left Atrium   The left atrium is severely dilated.      Right Atrium   Normal right atrium size.      Mitral Valve   Mild mitral annular calcification   Mildly calcified mitral valve leaflets   Trace mitral regurgitation      Tricuspid Valve   Trace tricuspid regurgitation.   Unable to estimate pulmonary systolic pressure.      Aortic Valve   Aortic valve Not well visualized   No hemodynamically significant aortic stenosis is present.   Moderate aortic regurgitation      Pulmonic Valve   The pulmonic valve was not well visualized.      Pericardial Effusion   No evidence of pericardial effusion.      Aorta   Aortic root dimension within normal limits.   Miscellaneous   Inferior Vena Cava not well visualized      Conclusions      Summary   Mildly dilated left ventricle   Severely reduced left ventricular systolic function   The left atrium is severely dilated.   Trace mitral regurgitation   Moderate aortic regurgitation      Signature      ----------------------------------------------------------------   Electronically signed by Nicole Monte DO (Interpreting   physician) on 03/30/2015 10:58 PM   ----------------------------------------------------------------     M-Mode/2D Measurements & Calculations      LV Diastolic         LV Systolic         AV Cusp Separation: 1.9 cmAO Root   Dimension: 5.4 cm    Dimension: 4.5 cm   Dimension: 3.4 cm   LV FS:16.7 %   LV PW , his cardiologist    3. CAD/Chest pain  - history of MI with stents 12/14 and heart cath In 3/15 as above, showed patent stent  - per cardiology; Dr Yassine Boyer   - stress in 2/2017 showed infarct only, no ischemia  - no recent pain    4. History of stroke and FRANCI thrombus  - was on coumadin from 2012 until about 9 months ago- August 2014 TORRES showed no thrombus  - no history of afib that he is aware of or in chart   - no atrial events noted on his ICD at this time  - will monitor closely with LOW threshold to consider [address]     5. HTN  - controlled  - continue current medications     6. Diabetes   Lab Results   Component Value Date    LABA1C 6.2 (H) 04/23/2018     No results found for: EAG  - per PCP     Plan:   1. Continue medications as previously prescribed   2. Continue remote ICD follow up every 91d  3. Follow-up in office in 1 year . Advised patient to call the office regarding any questions or concerns.       Olivia Barrios MD, [facility]  Cardiac Electrophysiology  [address] [facility]  [facility] [place]: [place] [facility]  12:16 PM  6/12/2018

## 2018-08-27 ENCOUNTER — HOSPITAL ENCOUNTER (OUTPATIENT)
Age: 53
Discharge: HOME | End: 2018-08-29
Payer: MEDICARE

## 2018-08-27 ENCOUNTER — OFFICE VISIT (OUTPATIENT)
Dept: FAMILY MEDICINE CLINIC | Age: 53
End: 2018-08-27
Payer: MEDICARE

## 2018-08-27 VITALS
BODY MASS INDEX: 33.67 KG/M2 | WEIGHT: 228 LBS | TEMPERATURE: 97.4 F | HEART RATE: 69 BPM | SYSTOLIC BLOOD PRESSURE: 136 MMHG | OXYGEN SATURATION: 96 % | DIASTOLIC BLOOD PRESSURE: 78 MMHG | RESPIRATION RATE: 16 BRPM

## 2018-08-27 DIAGNOSIS — I25.10 CORONARY ARTERY DISEASE INVOLVING NATIVE CORONARY ARTERY OF NATIVE HEART WITHOUT ANGINA PECTORIS: Chronic | ICD-10-CM

## 2018-08-27 DIAGNOSIS — F33.1 MODERATE EPISODE OF RECURRENT MAJOR DEPRESSIVE DISORDER (HCC): ICD-10-CM

## 2018-08-27 DIAGNOSIS — E11.9 TYPE 2 DIABETES MELLITUS WITHOUT COMPLICATION, WITHOUT LONG-TERM CURRENT USE OF INSULIN (HCC): ICD-10-CM

## 2018-08-27 DIAGNOSIS — E11.9 TYPE 2 DIABETES MELLITUS WITHOUT COMPLICATION, WITHOUT LONG-TERM CURRENT USE OF INSULIN (HCC): Primary | ICD-10-CM

## 2018-08-27 DIAGNOSIS — B35.3 TINEA PEDIS OF BOTH FEET: ICD-10-CM

## 2018-08-27 LAB
CREATININE URINE: 29 MG/DL (ref 40–278)
HBA1C MFR BLD: 5.9 %
MICROALBUMIN UR-MCNC: <12 MG/L
MICROALBUMIN/CREAT UR-RTO: ABNORMAL (ref 0–30)

## 2018-08-27 PROCEDURE — 83036 HEMOGLOBIN GLYCOSYLATED A1C: CPT | Performed by: FAMILY MEDICINE

## 2018-08-27 PROCEDURE — G8417 CALC BMI ABV UP PARAM F/U: HCPCS | Performed by: FAMILY MEDICINE

## 2018-08-27 PROCEDURE — G8598 ASA/ANTIPLAT THER USED: HCPCS | Performed by: FAMILY MEDICINE

## 2018-08-27 PROCEDURE — 2022F DILAT RTA XM EVC RTNOPTHY: CPT | Performed by: FAMILY MEDICINE

## 2018-08-27 PROCEDURE — 99213 OFFICE O/P EST LOW 20 MIN: CPT | Performed by: FAMILY MEDICINE

## 2018-08-27 PROCEDURE — 3044F HG A1C LEVEL LT 7.0%: CPT | Performed by: FAMILY MEDICINE

## 2018-08-27 PROCEDURE — 99212 OFFICE O/P EST SF 10 MIN: CPT | Performed by: FAMILY MEDICINE

## 2018-08-27 PROCEDURE — G0444 DEPRESSION SCREEN ANNUAL: HCPCS | Performed by: FAMILY MEDICINE

## 2018-08-27 PROCEDURE — G8427 DOCREV CUR MEDS BY ELIG CLIN: HCPCS | Performed by: FAMILY MEDICINE

## 2018-08-27 PROCEDURE — 3017F COLORECTAL CA SCREEN DOC REV: CPT | Performed by: FAMILY MEDICINE

## 2018-08-27 PROCEDURE — 4004F PT TOBACCO SCREEN RCVD TLK: CPT | Performed by: FAMILY MEDICINE

## 2018-08-27 PROCEDURE — 82570 ASSAY OF URINE CREATININE: CPT

## 2018-08-27 PROCEDURE — 82044 UR ALBUMIN SEMIQUANTITATIVE: CPT

## 2018-08-27 RX ORDER — CLOTRIMAZOLE 1 %
CREAM (GRAM) TOPICAL
Qty: 60 G | Refills: 1 | Status: SHIPPED | OUTPATIENT
Start: 2018-08-27 | End: 2018-09-03

## 2018-08-27 RX ORDER — ESCITALOPRAM OXALATE 10 MG/1
10 TABLET ORAL DAILY
Qty: 30 TABLET | Refills: 1 | Status: SHIPPED | OUTPATIENT
Start: 2018-08-27 | End: 2018-10-02 | Stop reason: SDUPTHER

## 2018-08-27 ASSESSMENT — PATIENT HEALTH QUESTIONNAIRE - PHQ9
5. POOR APPETITE OR OVEREATING: 3
8. MOVING OR SPEAKING SO SLOWLY THAT OTHER PEOPLE COULD HAVE NOTICED. OR THE OPPOSITE, BEING SO FIGETY OR RESTLESS THAT YOU HAVE BEEN MOVING AROUND A LOT MORE THAN USUAL: 3
6. FEELING BAD ABOUT YOURSELF - OR THAT YOU ARE A FAILURE OR HAVE LET YOURSELF OR YOUR FAMILY DOWN: 1
9. THOUGHTS THAT YOU WOULD BE BETTER OFF DEAD, OR OF HURTING YOURSELF: 0
10. IF YOU CHECKED OFF ANY PROBLEMS, HOW DIFFICULT HAVE THESE PROBLEMS MADE IT FOR YOU TO DO YOUR WORK, TAKE CARE OF THINGS AT HOME, OR GET ALONG WITH OTHER PEOPLE: 1
SUM OF ALL RESPONSES TO PHQ9 QUESTIONS 1 & 2: 6
7. TROUBLE CONCENTRATING ON THINGS, SUCH AS READING THE NEWSPAPER OR WATCHING TELEVISION: 2
2. FEELING DOWN, DEPRESSED OR HOPELESS: 3
SUM OF ALL RESPONSES TO PHQ QUESTIONS 1-9: 21
1. LITTLE INTEREST OR PLEASURE IN DOING THINGS: 3
4. FEELING TIRED OR HAVING LITTLE ENERGY: 3
3. TROUBLE FALLING OR STAYING ASLEEP: 3
SUM OF ALL RESPONSES TO PHQ QUESTIONS 1-9: 21

## 2018-08-27 NOTE — PROGRESS NOTES
CC:  Follow up DM, HTN, CAD, HFrEF     HPI:  47 y.o. male presents for follow up. DM, HF, HTN, CAD. No symptoms or concerns. Taking medications more compliantly. No new dyspnea. No orthopnea. Lip had swollen up a few days ago, but this quickly resolved. Has taken all medications since without recurrence. No new symptoms or concerns. Depression, symptoms are not well controlled on current medication. No SI or HI. No gun in house. Reports flashbacks of [place], especially with loud noises, PTSD symptoms. Mood has been poor, so he has been drinking a lot of alcohol, 5-12 drinks per day, but not every day. Knows he should not be doing this, and believes he can stop, but he feels depressed. Patient Active Problem List    Diagnosis Date Noted    [facility] 06/05/2018    Medical non-compliance     Obesity (BMI 30.0-34. 9)     Ischemic cardiomyopathy     Accelerated hypertension 04/17/2018    Type 2 diabetes mellitus without complication, without long-term current use of insulin ([facility]     Unstable angina ([facility] 06/29/2017    Essential hypertension 06/29/2017    H/O medication noncompliance 06/29/2017    T wave inversion in EKG     Pleuritic chest pain 01/23/2017    Cerebral infarction ([facility] 12/14/2016    Precordial pain     Gastritis and duodenitis 08/07/2015    Reflux esophagitis 08/07/2015    Diverticulosis of colon 08/07/2015    History of MI (myocardial infarction) 08/07/2015    Chronic systolic CHF (congestive heart failure) (HCC) 08/07/2015    Rectal bleeding     Rectal polyp     Gastric mass     NSVT (nonsustained ventricular tachycardia) ([facility] 07/31/2015    Dual implantable cardioverter-defibrillator in situ 04/02/2015    CAD (coronary artery disease) 12/06/2014    Depression 12/10/2013    Cerebrovascular accident (CVA) ([facility] 07/23/2013    ED (erectile dysfunction) 03/26/2012    COPD (chronic obstructive pulmonary disease)     Hyperlipemia     Types: Chew      Comment: occ chewing tobacco, trying to quit    Alcohol use Yes      Comment: occ. ROS:   Review of Systems - as above     Physical Exam:    VS:  Blood pressure 136/78, pulse 69, temperature 97.4 °F (36.3 °C), temperature source Oral, resp. rate 16, weight 228 lb (103.4 kg), SpO2 96 %. General Appearance:  awake, alert, oriented, in no acute distress and well developed, well nourished  Head/face:  NCAT  Eyes:  EOMI and Sclera nonicteric  Neck:  neck- supple, no mass, non-tender  Lungs:  Normal expansion. Clear to auscultation. No rales, rhonchi, or wheezing. Heart:  Heart regular rate and rhythm, soft systolic murmur   Abdomen:  Soft, NT, ND   Extremities: Extremities warm to touch, pink, with no edema. , pulses present in all extremities. Foot exam showed normal sensation to monofilament, equal bilaterally. Good pulses and capillary refill. Skin warm and dry, in good condition overall, but with some scaling and dryness, worse left heel. No lesions or ulcerations. No significant deformity or callus formation. Nails thickened in places, some discoloration, nails long. Psych: appropriate affect, coherent thought processes, no flight of ideas, no delusions or hallucinations apparent, speech not pressured and no suicidal or homicidal ideation or intent. Insight somewhat poor, judgment somewhat poor. Most recent labs and imaging reviewed. Findings include:     Lab Results   Component Value Date    LABA1C 5.9 08/27/2018     No results found for: EAG    Assessments:      Diagnosis Orders   1. Type 2 diabetes mellitus without complication, without long-term current use of insulin ([facility])  POCT glycosylated hemoglobin (Hb A1C)     DIABETES FOOT EXAM    Microalbumin / Creatinine Urine Ratio    CBC    Comprehensive Metabolic Panel    TSH without Reflex    Lipid Panel    EKG 12 Lead   2.  Moderate episode of recurrent major depressive disorder (HCC)  escitalopram (LEXAPRO) 10 MG

## 2018-09-04 ENCOUNTER — HOSPITAL ENCOUNTER (OUTPATIENT)
Age: 53
Discharge: HOME | End: 2018-09-06
Payer: MEDICARE

## 2018-09-04 ENCOUNTER — NURSE ONLY (OUTPATIENT)
Dept: FAMILY MEDICINE CLINIC | Age: 53
End: 2018-09-04
Payer: MEDICARE

## 2018-09-04 DIAGNOSIS — E11.9 TYPE 2 DIABETES MELLITUS WITHOUT COMPLICATION, WITHOUT LONG-TERM CURRENT USE OF INSULIN (HCC): ICD-10-CM

## 2018-09-04 DIAGNOSIS — I10 ESSENTIAL HYPERTENSION: Primary | Chronic | ICD-10-CM

## 2018-09-04 LAB
ALBUMIN SERPL-MCNC: 4.2 G/DL (ref 3.5–5.2)
ALP BLD-CCNC: 90 U/L (ref 40–129)
ALT SERPL-CCNC: 19 U/L (ref 0–40)
ANION GAP SERPL CALCULATED.3IONS-SCNC: 20 MMOL/L (ref 7–16)
AST SERPL-CCNC: 25 U/L (ref 0–39)
BILIRUB SERPL-MCNC: 0.7 MG/DL (ref 0–1.2)
BUN BLDV-MCNC: 17 MG/DL (ref 6–20)
CALCIUM SERPL-MCNC: 9.1 MG/DL (ref 8.6–10.2)
CHLORIDE BLD-SCNC: 102 MMOL/L (ref 98–107)
CHOLESTEROL, TOTAL: 264 MG/DL (ref 0–199)
CO2: 22 MMOL/L (ref 22–29)
CREAT SERPL-MCNC: 1.4 MG/DL (ref 0.7–1.2)
GFR AFRICAN AMERICAN: >60
GFR NON-AFRICAN AMERICAN: 53 ML/MIN/1.73
GLUCOSE BLD-MCNC: 133 MG/DL (ref 74–109)
HCT VFR BLD CALC: 48.9 % (ref 37–54)
HDLC SERPL-MCNC: 36 MG/DL
HEMOGLOBIN: 15.5 G/DL (ref 12.5–16.5)
LDL CHOLESTEROL CALCULATED: 181 MG/DL (ref 0–99)
MCH RBC QN AUTO: 26.1 PG (ref 26–35)
MCHC RBC AUTO-ENTMCNC: 31.7 % (ref 32–34.5)
MCV RBC AUTO: 82.3 FL (ref 80–99.9)
PDW BLD-RTO: 15 FL (ref 11.5–15)
PLATELET # BLD: 172 E9/L (ref 130–450)
PMV BLD AUTO: 11.9 FL (ref 7–12)
POTASSIUM SERPL-SCNC: 4.4 MMOL/L (ref 3.5–5)
RBC # BLD: 5.94 E12/L (ref 3.8–5.8)
SODIUM BLD-SCNC: 144 MMOL/L (ref 132–146)
TOTAL PROTEIN: 7.6 G/DL (ref 6.4–8.3)
TRIGL SERPL-MCNC: 237 MG/DL (ref 0–149)
TSH SERPL DL<=0.05 MIU/L-ACNC: 1.83 UIU/ML (ref 0.27–4.2)
VLDLC SERPL CALC-MCNC: 47 MG/DL
WBC # BLD: 7.6 E9/L (ref 4.5–11.5)

## 2018-09-04 PROCEDURE — 93010 ELECTROCARDIOGRAM REPORT: CPT | Performed by: FAMILY MEDICINE

## 2018-09-04 PROCEDURE — 93005 ELECTROCARDIOGRAM TRACING: CPT | Performed by: FAMILY MEDICINE

## 2018-09-04 PROCEDURE — 85027 COMPLETE CBC AUTOMATED: CPT

## 2018-09-04 PROCEDURE — 84443 ASSAY THYROID STIM HORMONE: CPT

## 2018-09-04 PROCEDURE — 80061 LIPID PANEL: CPT

## 2018-09-04 PROCEDURE — 80053 COMPREHEN METABOLIC PANEL: CPT

## 2018-09-04 PROCEDURE — 36415 COLL VENOUS BLD VENIPUNCTURE: CPT | Performed by: FAMILY MEDICINE

## 2018-09-11 ENCOUNTER — NURSE ONLY (OUTPATIENT)
Dept: NON INVASIVE DIAGNOSTICS | Age: 53
End: 2018-09-11
Payer: MEDICARE

## 2018-09-11 DIAGNOSIS — I25.5 ISCHEMIC CARDIOMYOPATHY: ICD-10-CM

## 2018-09-11 DIAGNOSIS — Z95.810 DUAL IMPLANTABLE CARDIOVERTER-DEFIBRILLATOR IN SITU: Primary | Chronic | ICD-10-CM

## 2018-09-11 DIAGNOSIS — I47.29 NSVT (NONSUSTAINED VENTRICULAR TACHYCARDIA): Chronic | ICD-10-CM

## 2018-09-11 PROCEDURE — 93296 REM INTERROG EVL PM/IDS: CPT | Performed by: INTERNAL MEDICINE

## 2018-09-11 PROCEDURE — 93295 DEV INTERROG REMOTE 1/2/MLT: CPT | Performed by: INTERNAL MEDICINE

## 2018-09-13 ENCOUNTER — OFFICE VISIT (OUTPATIENT)
Dept: CARDIOLOGY CLINIC | Age: 53
End: 2018-09-13
Payer: MEDICARE

## 2018-09-13 VITALS
HEIGHT: 69 IN | DIASTOLIC BLOOD PRESSURE: 82 MMHG | HEART RATE: 89 BPM | WEIGHT: 231.2 LBS | BODY MASS INDEX: 34.24 KG/M2 | SYSTOLIC BLOOD PRESSURE: 154 MMHG | RESPIRATION RATE: 16 BRPM

## 2018-09-13 DIAGNOSIS — I25.5 ISCHEMIC CARDIOMYOPATHY: ICD-10-CM

## 2018-09-13 DIAGNOSIS — I10 HYPERTENSION, UNSPECIFIED TYPE: Primary | Chronic | ICD-10-CM

## 2018-09-13 DIAGNOSIS — I50.22 CHRONIC SYSTOLIC CHF (CONGESTIVE HEART FAILURE) (HCC): ICD-10-CM

## 2018-09-13 PROCEDURE — G8417 CALC BMI ABV UP PARAM F/U: HCPCS | Performed by: INTERNAL MEDICINE

## 2018-09-13 PROCEDURE — G8598 ASA/ANTIPLAT THER USED: HCPCS | Performed by: INTERNAL MEDICINE

## 2018-09-13 PROCEDURE — G8427 DOCREV CUR MEDS BY ELIG CLIN: HCPCS | Performed by: INTERNAL MEDICINE

## 2018-09-13 PROCEDURE — 3017F COLORECTAL CA SCREEN DOC REV: CPT | Performed by: INTERNAL MEDICINE

## 2018-09-13 PROCEDURE — 93000 ELECTROCARDIOGRAM COMPLETE: CPT | Performed by: INTERNAL MEDICINE

## 2018-09-13 PROCEDURE — 99214 OFFICE O/P EST MOD 30 MIN: CPT | Performed by: INTERNAL MEDICINE

## 2018-09-13 PROCEDURE — 4004F PT TOBACCO SCREEN RCVD TLK: CPT | Performed by: INTERNAL MEDICINE

## 2018-09-13 NOTE — PROGRESS NOTES
vessel ischemia and chronic lacunar infarct in the left putamen. CTA of head and neck showed no significant cervical cerebral stenosis. Echo showed no intra-atrial shunt and no evidence for clot. 18. Brief episode of syncope, 05/2013, circa 30 seconds without injury or prodrome. Subsequent Holter monitor showed no significant arrhythmia (Dr. Mary Jane Damon). 19. Probable SARAH. Noncompliant with recommendations for sleep study. 20. Video swallow study. No evidence of aspiration or penetration, 07/2013. 21. TTE, 07/18/2013. LV mildly dilated. Normal EF. No WMA. Normal diastolic function. No atrial dilatation. No hemodynamically significant valvular abnormality. No pericardial effusion. RVSP 26.   22. TORRES, 07/22/2013, [facility]. No hemodynamically significant valvular abnormality. LV normal. Negative bubble study for ASD/PFO. Small thrombus LA appendage demonstrated. [employer] MPS, 10/21/2013. No diagnostic EKG changes. Normal perfusion, EF 72%. No ischemia. Low risk exam.  24. TORRES, 08/26/2014. Normal study, no LA or FRANCI thrombus, no valvulopathy. 25. [facility] admission, 12/06/2014 with acute anterior STEMI. Emergent catheterization demonstrated subtotal occlusion of LAD, treated with 3.5 x 33 mm Alpine XIENCE JOLENE. Residual disease found in distal OM branch of CX with YOLANDA-III flow which will be treated medically. EF 40% with anteroapical WMA. Peak Tn 7.63.  26. Echo, 12/16/2014. Large akinetic segment of apex and adjacent segments with myocardial thinning consistent with scar. ~EF 35-40%. RVSP 27. Tissue Doppler consistent with elevated LA pressure. No hemodynamically significant valvular abnormalities. [facility] visit for pneumonia, 02/02/2015. Treated with antibiotics. 28. Hospitalization with chest pain, 02/09/2015. MI ruled out. [employer] MPS, 02/11/2015. Anteroseptal MI without any reversible ischemia. EF 42%. Medical therapy. 30. Cardiac catheterization, 03/30/2015. [facility] no significant stenosis.  LAD mid vessel stent widely patent. 75% apical stenosis (<2 mm vessel). D1 ostial 40% stenosis. D2 diffuse 50% stenosis. CX mild lumen irregularities. OM1 small vessel. OM2 large bifurcating vessel with anterior branch <2 mm and 20-30% diffuse stenosis. Lateral OM <2 mm with proximal 80% stenosis. RCA mild lumen irregularities. PDA ostial 40-50% stenosis. Medical Rx.  31. Echo, 03/30/2015. Mildly dilated LV, severely reduced LV systolic function. EF 30%. Severe LAE. Trace MR, moderate AR.  32. Insertion of dual chamber implantable cardioverted defibrillator, 04/01/2015 (Edwige Mcdonnell), Dr. Gino Gil. 33. Endoscopy and colonoscopy, 08/07/2015, severe duodenitis, mild to moderate gastritis, small 8-10 mm submucosal mass in the body of the stomach and mild reflux esophagitis. The mass was felt to be benign. Colonoscopy showed rectal polyp 14 cm from the anus, which was resected. 34. Hospitalization, 09/03/2015 with chest pain, MI ruled out. 35. Lexiscan MPS, 09/03/2015:  Dilated LV with estimated EF 41%. Akinesia of the cardiac apex with global hypokinesis of the other walls. Fixed anterior distal inferior and lateral defect. No TID mentioned. Patient managed medically. 39. [facility] myocardial perfusion study, 02/02/2017, dilated left ventricle with ejection fraction 43%, akinesis of the apex with fixed anterior septal and apical defect. No reversible ischemia. No transient cavity dilation.   Medical management continued.       Review of Systems:  HEENT: negative for acute visual and auditory problems  Constitutional: negative for fever and chills, positive for chronic fatigue and exertional dyspnea, and episodic diaphoresis   Respiratory: negative for cough and hemoptysis but patient does note worsening dyspnea and fatigue in the last month  Cardiovascular: see above - patient does have pleuritic chest pain as well as a burning pain in his chest that radiates to his neck  Gastrointestinal: negative for abdominal

## 2018-09-19 ENCOUNTER — OFFICE VISIT (OUTPATIENT)
Dept: PSYCHOLOGY | Age: 53
End: 2018-09-19
Payer: MEDICARE

## 2018-09-19 DIAGNOSIS — F41.9 ANXIETY: ICD-10-CM

## 2018-09-19 DIAGNOSIS — F33.1 MODERATE EPISODE OF RECURRENT MAJOR DEPRESSIVE DISORDER (HCC): Primary | ICD-10-CM

## 2018-09-19 PROCEDURE — 90791 PSYCH DIAGNOSTIC EVALUATION: CPT | Performed by: PSYCHOLOGIST

## 2018-09-19 ASSESSMENT — ANXIETY QUESTIONNAIRES
5. BEING SO RESTLESS THAT IT IS HARD TO SIT STILL: 2-OVER HALF THE DAYS
7. FEELING AFRAID AS IF SOMETHING AWFUL MIGHT HAPPEN: 3-NEARLY EVERY DAY
GAD7 TOTAL SCORE: 18
3. WORRYING TOO MUCH ABOUT DIFFERENT THINGS: 3-NEARLY EVERY DAY
4. TROUBLE RELAXING: 3-NEARLY EVERY DAY
2. NOT BEING ABLE TO STOP OR CONTROL WORRYING: 2-OVER HALF THE DAYS
1. FEELING NERVOUS, ANXIOUS, OR ON EDGE: 2-OVER HALF THE DAYS
6. BECOMING EASILY ANNOYED OR IRRITABLE: 3-NEARLY EVERY DAY

## 2018-09-19 ASSESSMENT — PATIENT HEALTH QUESTIONNAIRE - PHQ9
SUM OF ALL RESPONSES TO PHQ QUESTIONS 1-9: 17
9. THOUGHTS THAT YOU WOULD BE BETTER OFF DEAD, OR OF HURTING YOURSELF: 0
3. TROUBLE FALLING OR STAYING ASLEEP: 2
7. TROUBLE CONCENTRATING ON THINGS, SUCH AS READING THE NEWSPAPER OR WATCHING TELEVISION: 2
5. POOR APPETITE OR OVEREATING: 2
SUM OF ALL RESPONSES TO PHQ QUESTIONS 1-9: 17
6. FEELING BAD ABOUT YOURSELF - OR THAT YOU ARE A FAILURE OR HAVE LET YOURSELF OR YOUR FAMILY DOWN: 2
2. FEELING DOWN, DEPRESSED OR HOPELESS: 3
8. MOVING OR SPEAKING SO SLOWLY THAT OTHER PEOPLE COULD HAVE NOTICED. OR THE OPPOSITE, BEING SO FIGETY OR RESTLESS THAT YOU HAVE BEEN MOVING AROUND A LOT MORE THAN USUAL: 2
1. LITTLE INTEREST OR PLEASURE IN DOING THINGS: 2
SUM OF ALL RESPONSES TO PHQ9 QUESTIONS 1 & 2: 5
4. FEELING TIRED OR HAVING LITTLE ENERGY: 2

## 2018-09-19 NOTE — PROGRESS NOTES
to actual/threatened death, serious injury, or sexual violence: yes  B. Intrusion symptoms characterized by at least one of the followin. Recurrent, involuntary, intrusive distressing memories of event: yes   2. Recurrent distressing dreams related to event: yes   3. Dissociative reactions (e.g., flashbacks): no   4. Intense/prolonged distress when exposed to cues of event: no   5. Marked physiological reactions to cues that represent event: yes  Criteria B met: yes  C. Avoidance symptoms characterized by at least one of followin. Avoidance of distressing memories/thoughts/feelings of event: no   2. Avoidance of reminders that arouse number 1: no  Criteria C met: no  D. Mood and cognition symptoms characterized by at least 2 of the followin. Inability to remember important aspect of event: yno   2. Persistent exaggerated negative beliefs: yes   3. Persistent distorted cognitions about cause or consequent or event: no   4. Persistent negative emotional state: yes   5. Diminished interest/participation in significant activities: yes   6. Detachment or estrangement from others: no   7. Persistent inability to experience positive emotions: no  Criteria D met: yes  E. Arousal/reactivity alterations characterized by at least 2 of the followin. Irritable behavior/angry outbursts: no   2. Reckless or self destructive behavior: no   3. Hypervigilance: yno   4. Exaggerated startle response: yes   5. Problems with concentration: yes   6. Sleep disturbance: yes  Criteria E met: yes/no  F. Duration at least 1 month: yes  G. Significant distress/impairment in functioning: yes  H.  Not attributed to substance or another medical condition: yes  Patient meets all criteria: no          Objective:   Appearance    alert, cooperative  Appetite abnormal: decreased  Sleep disturbance Yes  Fatigue Yes  Loss of pleasure Yes  Impulsive behavior No  Speech    normal rate and normal volume  Mood behavioral health treatment and patient agreed to participate. Diagnosis:  Major depressive disorder; recurrent and moderate  Anxiety (assess panic vs JAS)      Diagnosis Date    Allergic rhinitis     Anticoagulant long-term use     Anxiety     [facility]     CAD (coronary artery disease)     Carotid artery stenosis     CHF (congestive heart failure) ([facility])     COPD (chronic obstructive pulmonary disease) ([facility])     CVA (cerebral infarction) 11/19/2012    Depression     Diverticulitis 2010    Sigmoid abscess, s/p colectomy    Erectile dysfunction     GERD (gastroesophageal reflux disease)     Hyperlipemia     Hypertension     Hyperthyroidism     Ischemic cardiomyopathy     Left atrial thrombus     Obesity     Type 2 diabetes mellitus without complication, without long-term current use of insulin ([facility])     Type II or unspecified type diabetes mellitus without mention of complication, not stated as uncontrolled      Problems related to the social environment      PHQ Scores 8/27/2018 4/23/2018   PHQ2 Score 6 1   PHQ9 Score 21 1     Interpretation of Total Score Depression Severity: 1-4 = Minimal depression, 5-9 = Mild depression, 10-14 = Moderate depression, 15-19 = Moderately severe depression, 20-27 = Severe depression    No flowsheet data found.   JAS-7 TESTING                    Interpretation of Total Score Anxiety Severity: 0-4 Subclinical anxiety, 5-9 = Mild anxiety, 10-14 = Moderate anxiety, 15-21 = Severe anxiety        Current outpatient prescriptions:   Current Outpatient Prescriptions   Medication Sig Dispense Refill    escitalopram (LEXAPRO) 10 MG tablet Take 1 tablet by mouth daily 30 tablet 1    atorvastatin (LIPITOR) 80 MG tablet Take 1 tablet by mouth daily 90 tablet 1    clopidogrel (PLAVIX) 75 MG tablet Take 1 tablet by mouth daily 90 tablet 1    isosorbide mononitrate (IMDUR) 30 MG extended release tablet Take 1 tablet by mouth daily 90 tablet 1    lisinopril monitor PTSD symptoms.     Return to Clinic: 2 weeks      Brandon Anaya, PhD

## 2018-09-28 ENCOUNTER — TELEPHONE (OUTPATIENT)
Dept: NON INVASIVE DIAGNOSTICS | Age: 53
End: 2018-09-28

## 2018-10-01 ENCOUNTER — OFFICE VISIT (OUTPATIENT)
Dept: PSYCHOLOGY | Age: 53
End: 2018-10-01
Payer: MEDICARE

## 2018-10-01 DIAGNOSIS — F33.1 MODERATE EPISODE OF RECURRENT MAJOR DEPRESSIVE DISORDER (HCC): Primary | ICD-10-CM

## 2018-10-01 DIAGNOSIS — F41.9 ANXIETY: ICD-10-CM

## 2018-10-01 PROCEDURE — 90834 PSYTX W PT 45 MINUTES: CPT | Performed by: PSYCHOLOGIST

## 2018-10-01 ASSESSMENT — PATIENT HEALTH QUESTIONNAIRE - PHQ9
3. TROUBLE FALLING OR STAYING ASLEEP: 2
8. MOVING OR SPEAKING SO SLOWLY THAT OTHER PEOPLE COULD HAVE NOTICED. OR THE OPPOSITE, BEING SO FIGETY OR RESTLESS THAT YOU HAVE BEEN MOVING AROUND A LOT MORE THAN USUAL: 1
7. TROUBLE CONCENTRATING ON THINGS, SUCH AS READING THE NEWSPAPER OR WATCHING TELEVISION: 1
SUM OF ALL RESPONSES TO PHQ9 QUESTIONS 1 & 2: 3
2. FEELING DOWN, DEPRESSED OR HOPELESS: 1
5. POOR APPETITE OR OVEREATING: 2
SUM OF ALL RESPONSES TO PHQ QUESTIONS 1-9: 13
SUM OF ALL RESPONSES TO PHQ QUESTIONS 1-9: 13
9. THOUGHTS THAT YOU WOULD BE BETTER OFF DEAD, OR OF HURTING YOURSELF: 0
4. FEELING TIRED OR HAVING LITTLE ENERGY: 2
6. FEELING BAD ABOUT YOURSELF - OR THAT YOU ARE A FAILURE OR HAVE LET YOURSELF OR YOUR FAMILY DOWN: 2
1. LITTLE INTEREST OR PLEASURE IN DOING THINGS: 2

## 2018-10-01 ASSESSMENT — ANXIETY QUESTIONNAIRES
1. FEELING NERVOUS, ANXIOUS, OR ON EDGE: 1-SEVERAL DAYS
3. WORRYING TOO MUCH ABOUT DIFFERENT THINGS: 2-OVER HALF THE DAYS
2. NOT BEING ABLE TO STOP OR CONTROL WORRYING: 2-OVER HALF THE DAYS
4. TROUBLE RELAXING: 1-SEVERAL DAYS
GAD7 TOTAL SCORE: 10
7. FEELING AFRAID AS IF SOMETHING AWFUL MIGHT HAPPEN: 1-SEVERAL DAYS
5. BEING SO RESTLESS THAT IT IS HARD TO SIT STILL: 1-SEVERAL DAYS
6. BECOMING EASILY ANNOYED OR IRRITABLE: 2-OVER HALF THE DAYS

## 2018-10-01 NOTE — PROGRESS NOTES
 Ischemic cardiomyopathy     Left atrial thrombus     Obesity     Type 2 diabetes mellitus without complication, without long-term current use of insulin (HCC)     Type II or unspecified type diabetes mellitus without mention of complication, not stated as uncontrolled      Problems related to the social environment      [facility] Scores 9/19/2018 8/27/2018 4/23/2018   PHQ2 Score 5 6 1   PHQ9 Score 17 21 1     Interpretation of Total Score Depression Severity: 1-4 = Minimal depression, 5-9 = Mild depression, 10-14 = Moderate depression, 15-19 = Moderately severe depression, 20-27 = Severe depression    JAS 7 SCORE 9/19/2018   JAS-7 Total Score 18     JAS-7 TESTING                    Interpretation of Total Score Anxiety Severity: 0-4 Subclinical anxiety, 5-9 = Mild anxiety, 10-14 = Moderate anxiety, 15-21 = Severe anxiety        Current outpatient prescriptions:   Current Outpatient Prescriptions   Medication Sig Dispense Refill    escitalopram (LEXAPRO) 10 MG tablet Take 1 tablet by mouth daily 30 tablet 1    atorvastatin (LIPITOR) 80 MG tablet Take 1 tablet by mouth daily 90 tablet 1    clopidogrel (PLAVIX) 75 MG tablet Take 1 tablet by mouth daily 90 tablet 1    isosorbide mononitrate (IMDUR) 30 MG extended release tablet Take 1 tablet by mouth daily 90 tablet 1    lisinopril (PRINIVIL;ZESTRIL) 40 MG tablet Take 1 tablet by mouth daily 90 tablet 1    metFORMIN (GLUCOPHAGE) 1000 MG tablet Take 1 tablet by mouth 2 times daily (with meals) 180 tablet 1    metoprolol succinate (TOPROL XL) 50 MG extended release tablet Take 1 tablet by mouth 2 times daily 180 tablet 1    pantoprazole (PROTONIX) 40 MG tablet Take 1 tablet by mouth every morning (before breakfast) 90 tablet 1    ranolazine (RANEXA) 500 MG extended release tablet Take 1 tablet by mouth 2 times daily 180 tablet 1    tamsulosin (FLOMAX) 0.4 MG capsule Take 1 capsule by mouth daily 90 capsule 1    furosemide (LASIX) 20 MG tablet Take 1 tablet by mouth daily (Patient taking differently: Take 40 mg by mouth daily ) 90 tablet 3    aspirin 81 MG chewable tablet Take 1 tablet by mouth daily 90 tablet 1    glucose blood VI test strips (FREESTYLE INSULINX TEST) strip 1 each by In Vitro route 3 times daily And as needed for symptoms of hypo/hyperglycemia. 100 each 5    FREESTYLE LANCETS MISC 1 each by Does not apply route 3 times daily And as needed for hypo/hyperglycemia symptoms 100 each 5    Alcohol Swabs PADS 1 each by Does not apply route 3 times daily And as needed to test blood sugars 100 each 5     No current facility-administered medications for this visit. Interventions:  Provided education on the use of medication to treat  insomnia, Discussed various factors related to the development and maintenance of  insomnia (including biological, cognitive, behavioral, and environmental factors), Trained in strategies for increasing balanced thinking, Discussed potential treatments for  insomnia, Discussed self-care (sleep, nutrition, rewarding activities, social support, exercise), Recommended limiting alcohol use or abstaining, Informed patient of the effects of alcohol, Discussed and problem-solved barriers in adhering to behavioral change plan, Motivational Interviewing to increase patient confidence and compliance with adhering to behavioral change plan, Supportive techniques, Emphasized self-care as important for managing overall health, Identified maladaptive thoughts and Reviewed Sleep Hygiene tips including: limiting naps, consistent bed and wake time, relaxing wind down time        Plan/Recommendations:  1. Participate in behavioral health treatment to learn behavioral activation, cognitive restructuring, mindfulness, and exposure to anxiety triggers. Will continue to monitor PTSD symptoms.   2. Sleep behaviors   -limit naps, keep consistent bed and wake time, try relaxing wind down routine, consider melatonin    Return to Clinic: 2 verna Thornton, PhD

## 2018-10-01 NOTE — PROGRESS NOTES
I have personally reviewed the remote ICD interrogation data. Please see the attached report for details. Stable battery and lead parameters. No clinically significant arrhythmias noted  2 Brief NSVT noted for max of 8beats.     - Continue follow up as recommended    Demario Arias MD, [address]  [facility]  [facility] [place]: [place] [facility]  10:15 AM  10/1/2018

## 2018-10-02 ENCOUNTER — OFFICE VISIT (OUTPATIENT)
Dept: FAMILY MEDICINE CLINIC | Age: 53
End: 2018-10-02
Payer: MEDICARE

## 2018-10-02 VITALS
TEMPERATURE: 97.3 F | HEIGHT: 69 IN | WEIGHT: 230 LBS | BODY MASS INDEX: 34.07 KG/M2 | HEART RATE: 84 BPM | DIASTOLIC BLOOD PRESSURE: 67 MMHG | SYSTOLIC BLOOD PRESSURE: 105 MMHG | OXYGEN SATURATION: 96 %

## 2018-10-02 DIAGNOSIS — Z23 NEED FOR INFLUENZA VACCINATION: ICD-10-CM

## 2018-10-02 DIAGNOSIS — F33.1 MODERATE EPISODE OF RECURRENT MAJOR DEPRESSIVE DISORDER (HCC): ICD-10-CM

## 2018-10-02 DIAGNOSIS — E11.9 TYPE 2 DIABETES MELLITUS WITHOUT COMPLICATION, WITHOUT LONG-TERM CURRENT USE OF INSULIN (HCC): Primary | ICD-10-CM

## 2018-10-02 DIAGNOSIS — I25.10 CORONARY ARTERY DISEASE INVOLVING NATIVE CORONARY ARTERY OF NATIVE HEART WITHOUT ANGINA PECTORIS: Chronic | ICD-10-CM

## 2018-10-02 DIAGNOSIS — M53.3 SACROILIAC PAIN: ICD-10-CM

## 2018-10-02 DIAGNOSIS — I25.5 ISCHEMIC CARDIOMYOPATHY: ICD-10-CM

## 2018-10-02 PROBLEM — J41.0 SIMPLE CHRONIC BRONCHITIS (HCC): Chronic | Status: RESOLVED | Noted: 2018-06-05 | Resolved: 2018-10-02

## 2018-10-02 PROCEDURE — 6360000002 HC RX W HCPCS

## 2018-10-02 PROCEDURE — G8417 CALC BMI ABV UP PARAM F/U: HCPCS | Performed by: FAMILY MEDICINE

## 2018-10-02 PROCEDURE — G0008 ADMIN INFLUENZA VIRUS VAC: HCPCS

## 2018-10-02 PROCEDURE — 3017F COLORECTAL CA SCREEN DOC REV: CPT | Performed by: FAMILY MEDICINE

## 2018-10-02 PROCEDURE — 4004F PT TOBACCO SCREEN RCVD TLK: CPT | Performed by: FAMILY MEDICINE

## 2018-10-02 PROCEDURE — G8427 DOCREV CUR MEDS BY ELIG CLIN: HCPCS | Performed by: FAMILY MEDICINE

## 2018-10-02 PROCEDURE — 2022F DILAT RTA XM EVC RTNOPTHY: CPT | Performed by: FAMILY MEDICINE

## 2018-10-02 PROCEDURE — 3044F HG A1C LEVEL LT 7.0%: CPT | Performed by: FAMILY MEDICINE

## 2018-10-02 PROCEDURE — G8598 ASA/ANTIPLAT THER USED: HCPCS | Performed by: FAMILY MEDICINE

## 2018-10-02 PROCEDURE — 99214 OFFICE O/P EST MOD 30 MIN: CPT | Performed by: FAMILY MEDICINE

## 2018-10-02 PROCEDURE — 90686 IIV4 VACC NO PRSV 0.5 ML IM: CPT

## 2018-10-02 PROCEDURE — G8482 FLU IMMUNIZE ORDER/ADMIN: HCPCS | Performed by: FAMILY MEDICINE

## 2018-10-02 PROCEDURE — 99212 OFFICE O/P EST SF 10 MIN: CPT | Performed by: FAMILY MEDICINE

## 2018-10-02 RX ORDER — ESCITALOPRAM OXALATE 10 MG/1
10 TABLET ORAL DAILY
Qty: 90 TABLET | Refills: 0 | Status: SHIPPED | OUTPATIENT
Start: 2018-10-02 | End: 2019-01-15 | Stop reason: SDUPTHER

## 2018-10-02 NOTE — PATIENT INSTRUCTIONS
Patient Education        Sacroiliac Pain: Exercises  Your Care Instructions  Here are some examples of typical rehabilitation exercises for your condition. Start each exercise slowly. Ease off the exercise if you start to have pain. Your doctor or physical therapist will tell you when you can start these exercises and which ones will work best for you. How to do the exercises  Knee-to-chest stretch    1. Do not do the knee-to-chest exercise if it causes or increases back or leg pain. 2. Lie on your back with your knees bent and your feet flat on the floor. You can put a small pillow under your head and neck if it is more comfortable. 3. Grasp your hands under one knee and bring the knee to your chest, keeping the other foot flat on the floor. 4. Keep your lower back pressed to the floor. Hold for at least 15 to 30 seconds. 5. Relax and lower the knee to the starting position. Repeat with the other leg. 6. Repeat 2 to 4 times with each leg. 7. To get more stretch, keep your other leg flat on the floor while pulling your knee to your chest.  Bridging    1. Lie on your back with both knees bent. Your knees should be bent about 90 degrees. 2. Tighten your belly muscles by pulling in your belly button toward your spine. Then push your feet into the floor, squeeze your buttocks, and lift your hips off the floor until your shoulders, hips, and knees are all in a straight line. 3. Hold for about 6 seconds as you continue to breathe normally, and then slowly lower your hips back down to the floor and rest for up to 10 seconds. 4. Repeat 8 to 12 times. Hip extension    1. Get down on your hands and knees on the floor. 2. Keeping your back and neck straight, lift one leg straight out behind you. When you lift your leg, keep your hips level. Don't let your back twist, and don't let your hip drop toward the floor. 3. Hold for 6 seconds. Repeat 8 to 12 times with each leg.   4. If you feel steady and strong when you do this exercise, you can make it more difficult. To do this, when you lift your leg, also lift the opposite arm straight out in front of you. For example, lift the left leg and the right arm at the same time. (This is sometimes called the \"bird dog exercise. \") Hold for 6 seconds, and repeat 8 to 12 times on each side. Clamshell    1. Lie on your side with a pillow under your head. Keep your feet and knees together and your knees bent. 2. Raise your top knee, but keep your feet together. Do not let your hips roll back. Your legs should open up like a clamshell. 3. Hold for 6 seconds. 4. Slowly lower your knee back down. Rest for 10 seconds. 5. Repeat 8 to 12 times. 6. Switch to your other side and repeat steps 1 through 5. Hamstring wall stretch    1. Lie on your back in a doorway, with one leg through the open door. 2. Slide your affected leg up the wall to straighten your knee. You should feel a gentle stretch down the back of your leg. 1. Do not arch your back. 2. Do not bend either knee. 3. Keep one heel touching the floor and the other heel touching the wall. Do not point your toes. 3. Hold the stretch for at least 1 minute to begin. Then try to lengthen the time you hold the stretch to as long as 6 minutes. 4. Switch legs, and repeat steps 1 through 3.  5. Repeat 2 to 4 times. 6. If you do not have a place to do this exercise in a doorway, there is another way to do it:  7. Lie on your back, and bend one knee. 8. Loop a towel under the ball and toes of that foot, and hold the ends of the towel in your hands. 9. Straighten your knee, and slowly pull back on the towel. You should feel a gentle stretch down the back of your leg. 10. Switch legs, and repeat steps 1 through 3.  11. Repeat 2 to 4 times. Lower abdominal strengthening    1. Lie on your back with your knees bent and your feet flat on the floor.   2. Tighten your belly muscles by pulling your belly button in toward your spine.  3. Lift one foot off the floor and bring your knee toward your chest, so that your knee is straight above your hip and your leg is bent like the letter \"L. \"  4. Lift the other knee up to the same position. 5. Lower one leg at a time to the starting position. 6. Keep alternating legs until you have lifted each leg 8 to 12 times. 7. Be sure to keep your belly muscles tight and your back still as you are moving your legs. Be sure to breathe normally. Piriformis stretch    1. Lie on your back with your legs straight. 2. Lift your affected leg, and bend your knee. With your opposite hand, reach across your body, and then gently pull your knee toward your opposite shoulder. 3. Hold the stretch for 15 to 30 seconds. 4. Switch legs and repeat steps 1 through 3.  5. Repeat 2 to 4 times. Follow-up care is a key part of your treatment and safety. Be sure to make and go to all appointments, and call your doctor if you are having problems. It's also a good idea to know your test results and keep a list of the medicines you take. Where can you learn more? Go to [email or web address] org and sign in to your [employer] account. Enter O046 in the [employer] box to learn more about \"Sacroiliac Pain: Exercises. \"     If you do not have an account, please click on the \"Sign Up Now\" link. Current as of: November 29, 2017  Content Version: 11.7  © 3666-9392 "[employer]. Care instructions adapted under license by [facility] AMS [employer] [facility] ([facility]). If you have questions about a medical condition or this instruction, always ask your healthcare professional. [place] [address] any warranty or liability for your use of this information. Patient Education        Sacroiliac Joint Pain: Care Instructions  Your Care Instructions    The sacroiliac joints connect the spine and each side of the pelvis. These joints bear the weight and stress of your torso. This makes them easy to injure. Injury or overuse of these joints may cause low back pain. Stress on these joints can cause joint pain. Sacroiliac joint pain is more common in pregnant women. Certain kinds of arthritis also may cause this type of joint pain. Home treatment may help you feel better. So can avoiding activities that stress your back. Your doctor also may recommend physical therapy. This may include doing exercises and stretches to help with pain. You may also learn to use good posture. Follow-up care is a key part of your treatment and safety. Be sure to make and go to all appointments, and call your doctor if you are having problems. It's also a good idea to know your test results and keep a list of the medicines you take. How can you care for yourself at home? · Ask your doctor about light exercises that may help your back pain. Try to do light activity throughout the day. But make sure to take rests as needed. Find a comfortable position for rest, but don't stay in one position for too long. Avoid activities that cause pain. · To apply heat, put a warm water bottle, a heating pad set on low, or a warm cloth on your back. Do not go to sleep with a heating pad on your skin. · Put ice or a cold pack on your back for 10 to 20 minutes at a time. Put a thin cloth between the ice and your skin. · If the doctor gave you a prescription medicine for pain, take it as prescribed. · If you are not taking a prescription pain medicine, ask your doctor if you can take an over-the-counter pain medicine, such as acetaminophen (Tylenol), ibuprofen (Advil, Motrin), or naproxen (Aleve). Read and follow all instructions on the label. Take pain medicines exactly as directed. · Do not take two or more pain medicines at the same time unless the doctor told you to. Many pain medicines have acetaminophen, which is Tylenol. Too much acetaminophen (Tylenol) can be harmful.   · To prevent future back pain, do exercises to stretch and strengthen your

## 2018-10-02 NOTE — PROGRESS NOTES
Vaccine Information Sheet, \"Influenza - Inactivated\"  given to Debra Haile, or parent/legal guardian of  Debra Haile and verbalized understanding. Patient responses:    Have you ever had a reaction to a flu vaccine? No  Are you able to eat eggs without adverse effects? Yes  Do you have any current illness? No  Have you ever had Guillian Od[place] Syndrome? No    Flu vaccine given per order. Please see immunization tab.

## 2018-10-02 NOTE — PROGRESS NOTES
CC:  Follow up DM    HPI:  47 y.o. male presents for follow up. Right posterior hip pain, buttocks, low back. Radiates to posterior knee. No injury. No recent exertion. Hurting for a couple of weeks. Does not seem to be the hip joint. Has had similar pains intermittently in the past.  Not taking anything over the counter, and does wish to take anything; rarely uses Tylenol 650 mg.       DM. Cholesterol had been very high. Now back to taking the cholesterol medication. Sees eye doctor tomorrow. Taking medications. No symptoms or concerns. Feeling well. HTN, CAD, HFrEF s/p ICD due to ICM, following with cardiology and EPS, stable. No CP or pressure. No edema. No COLLINS, no SOB at rest.  No hypoglycemia symptoms at home. Depression, doing a little better with Lexapro. Seeing Dr. Marvin Jose. Sleep hygiene, working to improve. No SI or HI. Wishes to continue the medication at the current dose. EKG had been stable without QTc prolongation. Flu shot today. Advised Shingles vaccine at pharmacy. Patient Active Problem List    Diagnosis Date Noted    Moderate episode of recurrent major depressive disorder ([facility] [address] 08/27/2018    Simple chronic bronchitis ([address] 06/05/2018    Medical non-compliance     Obesity (BMI 30.0-34. 9)     Ischemic cardiomyopathy     Accelerated hypertension 04/17/2018    Type 2 diabetes mellitus without complication, without long-term current use of insulin ([address]     Unstable angina ([address] 06/29/2017    Essential hypertension 06/29/2017    H/O medication noncompliance 06/29/2017    T wave inversion in EKG     Pleuritic chest pain 01/23/2017    Cerebral infarction ([address] 12/14/2016    Precordial pain     Gastritis and duodenitis 08/07/2015    Reflux esophagitis 08/07/2015    Diverticulosis of colon 08/07/2015    History of MI (myocardial infarction) 08/07/2015    Chronic systolic CHF (congestive heart failure) (HCC) 08/07/2015    Rectal bleeding     Rectal polyp     Gastric mass     NS[facility]onsustained ventricular tachycardia[facility] 07/31/2015    Dual implantable cardioverter-defibrillator in situ 04/02/2015    CAD (coronary artery disease) 12/06/2014    Depression 12/10/2013    Cerebrovascular accident (CVA) ([facility] [address] 07/23/2013    ED (erectile dysfunction) 03/26/2012    COPD (chronic obstructive pulmonary disease)     Hyperlipemia     Hypertension     GERD (gastroesophageal reflux disease) 02/12/2011       Current Outpatient Prescriptions on File Prior to Visit   Medication Sig Dispense Refill    escitalopram (LEXAPRO) 10 MG tablet Take 1 tablet by mouth daily 30 tablet 1    atorvastatin (LIPITOR) 80 MG tablet Take 1 tablet by mouth daily 90 tablet 1    clopidogrel (PLAVIX) 75 MG tablet Take 1 tablet by mouth daily 90 tablet 1    isosorbide mononitrate (IMDUR) 30 MG extended release tablet Take 1 tablet by mouth daily 90 tablet 1    lisinopril (PRINIVIL;ZESTRIL) 40 MG tablet Take 1 tablet by mouth daily 90 tablet 1    metFORMIN (GLUCOPHAGE) 1000 MG tablet Take 1 tablet by mouth 2 times daily (with meals) 180 tablet 1    metoprolol succinate (TOPROL XL) 50 MG extended release tablet Take 1 tablet by mouth 2 times daily 180 tablet 1    pantoprazole (PROTONIX) 40 MG tablet Take 1 tablet by mouth every morning (before breakfast) 90 tablet 1    ranolazine (RANEXA) 500 MG extended release tablet Take 1 tablet by mouth 2 times daily 180 tablet 1    tamsulosin (FLOMAX) 0.4 MG capsule Take 1 capsule by mouth daily 90 capsule 1    furosemide (LASIX) 20 MG tablet Take 1 tablet by mouth daily (Patient taking differently: Take 40 mg by mouth daily ) 90 tablet 3    aspirin 81 MG chewable tablet Take 1 tablet by mouth daily 90 tablet 1    glucose blood VI test strips (FREESTYLE INSULINX TEST) strip 1 each by In Vitro route 3 times daily And as needed for symptoms of hypo/hyperglycemia.  100 each 5    FREESTYLE LANCETS MISC 1 each by Does not apply route 3 times daily And as needed for hypo/hyperglycemia symptoms 100 each 5    Alcohol Swabs PADS 1 each by Does not apply route 3 times daily And as needed to test blood sugars 100 each 5     No current facility-administered medications on file prior to visit. No Known Allergies    Social History   Substance Use Topics    Smoking status: Former Smoker     Years: 30.00     Types: Cigarettes     Quit date: 6/1/2014    Smokeless tobacco: Current User     Types: Chew      Comment: occ chewing tobacco, trying to quit    Alcohol use Yes      Comment: occ. ROS:   Review of Systems - History obtained from the patient  General ROS: negative for - chills or fever  Psychological ROS: negative for - suicidal ideation; depression symptoms improving   Respiratory ROS: no cough, shortness of breath, or wheezing  Cardiovascular ROS: no chest pain or dyspnea on exertion  Gastrointestinal ROS: no abdominal pain, change in bowel habits, or black or bloody stools  Genito-Urinary ROS: no dysuria, trouble voiding, or hematuria    Physical Exam:    VS:  Blood pressure 105/67, pulse 84, temperature 97.3 °F (36.3 °C), temperature source Oral, height 5' 9\" (1.753 m), weight 230 lb (104.3 kg), SpO2 96 %. General Appearance:  awake, alert, oriented, in no acute distress and well developed, well nourished  Head/face:  NCAT  Eyes:  EOMI and Sclera nonicteric  Neck:  neck- supple, no mass, non-tender  Back:  good flexion and extension, motor and sensory appear to be normal, negative SLR test, tenderness at right SI joint. Lungs:  Normal expansion. Clear to auscultation. No rales, rhonchi, or wheezing. Heart:  Heart regular rate and rhythm  Murmur(s)-  2/6 systolic at 2nd left intercostal space, at lower left sternal border  Abdomen:  Soft, NT, ND   Extremities: pulses present in all extremities and trace pedal edema. ROM intact at bilateral hips without pain. Some hamstring tightness on right more than left.     Psych: appropriate affect, coherent thought processes, no flight of ideas, no delusions or hallucinations apparent, speech not pressured, no suicidal or homicidal ideation or intent, appropriate insight and judgment intact    Most recent labs and imaging reviewed. Findings include:     Lab Results   Component Value Date    LABA1C 5.9 08/27/2018     No results found for: EAG      Assessments:      Diagnosis Orders   1. Type 2 diabetes mellitus without complication, without long-term current use of insulin ([facility] [address]     2. Moderate episode of recurrent major depressive disorder (HCC)  escitalopram (LEXAPRO) 10 MG tablet   3. Sacroiliac pain     4. Need for influenza vaccination  INFLUENZA, QUADV, 3 YRS AND OLDER, IM, PF, PREFILL SYR OR SDV, 0.5ML (FLUZONE QUADV, PF)   5. Coronary artery disease involving native coronary artery of native heart without angina pectoris     6. Ischemic cardiomyopathy         Plans:    As Above. Please see Patient Instructions for further counseling and information given. RTO 2 months or sooner prn. Advised on warning signs/symptoms for which to seek care urgently. Supportive measures, stretching. Follow symptoms of SI joint pain. Consider imaging if symptoms persist or worsen. Advised to please be adherent to the treatment plans discussed today, and please call with any questions or concerns, letting the office know of any reasons that the plans may not be followed. The risks of untreated conditions include worsening illness, injury, disability, and possibly, death. Please call if symptoms change in any way, worsen, or fail to completely resolve, as this could necessitate a change to treatment plans. Patient and/or caregiver expressed understanding. Indications and proper use of medication(s) reviewed. Potential side-effects and risks of medication(s) also explained. Patient and/or caregiver was instructed to call if any new symptoms develop prior to next visit.

## 2018-11-13 ENCOUNTER — CARE COORDINATION (OUTPATIENT)
Dept: CARE COORDINATION | Age: 53
End: 2018-11-13

## 2018-11-20 ENCOUNTER — CARE COORDINATION (OUTPATIENT)
Dept: CARE COORDINATION | Age: 53
End: 2018-11-20

## 2018-12-06 ENCOUNTER — CARE COORDINATION (OUTPATIENT)
Dept: CARE COORDINATION | Age: 53
End: 2018-12-06

## 2018-12-06 ASSESSMENT — ENCOUNTER SYMPTOMS: DYSPNEA ASSOCIATED WITH: EXERTION

## 2018-12-06 NOTE — CARE COORDINATION
Ambulatory Care Coordination Note  12/6/2018  CM Risk Score: 4  Jeramy Mortality Risk Score:      ACC: Oleg Griffin RN    Summary Note:   ACC spoke with Fatmata Martines and he is agreeable to care coordination. He states he lives in a home with his ex-wife, she helps him since his stroke. He reports left sided weakness and he does use a straight cane at times. He states he does use a pill keeper but sometimes forgets to take his nightly pills. He does not use oxygen. Fatmata Martines does not check his blood sugars daily because the testing strips are too expensive. He states his new insurance starting in January 2019 will cover testing supplies. He states he checks his blood sugar \"a couple times a month\", last . He denies any hyperglycemia symptoms. He admits he has hypoglycemia symptoms about two times a month. He carries candy with him for such times. He tries to avoid sweets. He denies any chest pain or increase in cough. He admits to shortness of breath with activity. He does not weigh himself daily. ACC reviewed the importance of daily weights in the maintenance of CHF. He reports that his legs do occasionally swell, at which time he will take an extra dose of Lasix. He does not wear compression stockings. He states he sleeps on 2 pillows and is unable to lay flat. He does not have any respiratory medications prescribed. He states he wears a scarf over his mouth in the cold weather. ACC will send diabetes, COPD and CHF zone handouts and will review the zones with Fatmata Martines with next interaction.     Ambulatory Care Coordination Assessment    Care Coordination Protocol  Program Enrollment:  Rising Risk  Referral from Primary Care Provider:  No  Week 1 - Initial Assessment     Do you have all of your prescriptions and are they filled?:  Yes  Barriers to medication adherence:  Complexity of regimen  Are you able to afford your medications?:  Yes  How often do you have trouble taking your medications the way you have been told to take them?:  Sometimes I take them as prescribed. Do you have Home O2 Therapy?:  No      Ability to seek help/take action for Emergent Urgent situations i.e. fire, crime, inclement weather or health crisis. :  Independent  Ability to ambulate to restroom:  Independent  Ability handle personal hygeine needs (bathing/dressing/grooming): Independent  Ability to manage Medications: Independent  Ability to prepare Food Preparation:  Independent  Ability to maintain home (clean home, laundry): Independent  Ability to drive and/or has transportation:  Independent  Ability to do shopping:  Independent  Ability to manage finances: Independent  Is patient able to live independently?:  Yes     Current Housing:  Private Residence        Per the Fall Risk Screening, did the patient have 2 or more falls or 1 fall with injury in the past year?:  No     Frequent urination at night?:  No  Do you use rails/bars?:  No  Do you have a non-slip tub mat?:  Yes     Are you experiencing loss of meaning?:  No  Are you experiencing loss of hope and peace?:  No     Suggested Interventions and Community Resources   Pharmacist:  Not Started   Zone Management Tools: In Process                  Prior to Admission medications    Medication Sig Start Date End Date Taking?  Authorizing Provider   escitalopram (LEXAPRO) 10 MG tablet Take 1 tablet by mouth daily 10/2/18   Sarah Friends, DO   atorvastatin (LIPITOR) 80 MG tablet Take 1 tablet by mouth daily 6/6/18   Sarah Friends, DO   clopidogrel (PLAVIX) 75 MG tablet Take 1 tablet by mouth daily 6/6/18   Sarah Friends, DO   isosorbide mononitrate (IMDUR) 30 MG extended release tablet Take 1 tablet by mouth daily 6/6/18   Sarah Friends, DO   lisinopril (PRINIVIL;ZESTRIL) 40 MG tablet Take 1 tablet by mouth daily 6/6/18   Sarah Friends, DO   metFORMIN (GLUCOPHAGE) 1000 MG tablet Take 1 tablet by mouth 2 times daily (with meals) 6/6/18   Sarah Friends, DO   metoprolol succinate (TOPROL XL) 50 MG extended release tablet Take 1 tablet by mouth 2 times daily 6/6/18   Cooper Sims, DO   pantoprazole (PROTONIX) 40 MG tablet Take 1 tablet by mouth every morning (before breakfast) 6/6/18   Cooper Sims, DO   ranolazine (RANEXA) 500 MG extended release tablet Take 1 tablet by mouth 2 times daily 6/6/18   Cooper Sims, DO   tamsulosin [facility]) 0.4 MG capsule Take 1 capsule by mouth daily 6/6/18   Cooper Sims, DO   furosemide (LASIX) 20 MG tablet Take 1 tablet by mouth daily  Patient taking differently: Take 40 mg by mouth daily  6/5/18   Marvis Leyden, PA   aspirin 81 MG chewable tablet Take 1 tablet by mouth daily 10/6/17   Shavontony Sims, DO   glucose blood VI test strips (FREESTYLE INSULINX TEST) strip 1 each by In Vitro route 3 times daily And as needed for symptoms of hypo/hyperglycemia. 7/3/17   Zuleika Phillips MD   FREESTYLE LANCETS MISC 1 each by Does not apply route 3 times daily And as needed for hypo/hyperglycemia symptoms 7/3/17   Zuleika Phillips MD   Alcohol Swabs PADS 1 each by Does not apply route 3 times daily And as needed to test blood sugars 5/19/16   Cooper Sims, DO       Future Appointments  Date Time Provider Estuardo Arzate   12/12/2018 3:00 PM SCHEDULE, REMOTE CHECK [facility]   1/15/2019 2:00 PM DO Zion Steinberg [facility]   3/14/2019 1:00 PM Brittanie Jackson MD [address] [facility]   6/4/2019 9:00 AM Aida Najera MD [facility]     ,   Diabetes Assessment    Medic Alert ID:  No  Meal Planning:  Avoidance of concentrated sweets   How often do you test your blood sugar?:  No Testing (Comment: should test daily)   Do you have barriers with adherence to non-pharmacologic self-management interventions?  (Nutrition/Exercise/Self-Monitoring):  Yes   Have you ever had to go to the ED for symptoms of low blood sugar?:  No       Symptoms of Low Blood Sugar   Do you have hyperglycemia symptoms?:  No   Do you have hypoglycemia symptoms?:  Yes

## 2018-12-12 ENCOUNTER — NURSE ONLY (OUTPATIENT)
Dept: NON INVASIVE DIAGNOSTICS | Age: 53
End: 2018-12-12
Payer: MEDICARE

## 2018-12-12 DIAGNOSIS — Z95.810 DUAL IMPLANTABLE CARDIOVERTER-DEFIBRILLATOR IN SITU: Primary | Chronic | ICD-10-CM

## 2018-12-12 DIAGNOSIS — I25.5 ISCHEMIC CARDIOMYOPATHY: ICD-10-CM

## 2018-12-12 PROCEDURE — 93295 DEV INTERROG REMOTE 1/2/MLT: CPT | Performed by: INTERNAL MEDICINE

## 2018-12-12 PROCEDURE — 93296 REM INTERROG EVL PM/IDS: CPT | Performed by: INTERNAL MEDICINE

## 2018-12-21 ENCOUNTER — TELEPHONE (OUTPATIENT)
Dept: NON INVASIVE DIAGNOSTICS | Age: 53
End: 2018-12-21

## 2018-12-21 NOTE — TELEPHONE ENCOUNTER
Left message to have patient call office back. We have received your remote transmission. Our staff will contact you if there is anything that needs to be discussed. Your next appointment is 03/13/2019 remote transmission from home.

## 2019-01-02 ENCOUNTER — CARE COORDINATION (OUTPATIENT)
Dept: CARE COORDINATION | Age: 54
End: 2019-01-02

## 2019-01-15 ENCOUNTER — HOSPITAL ENCOUNTER (OUTPATIENT)
Age: 54
Discharge: HOME | End: 2019-01-17
Payer: MEDICARE

## 2019-01-15 ENCOUNTER — OFFICE VISIT (OUTPATIENT)
Dept: FAMILY MEDICINE CLINIC | Age: 54
End: 2019-01-15
Payer: MEDICARE

## 2019-01-15 ENCOUNTER — CARE COORDINATION (OUTPATIENT)
Dept: FAMILY MEDICINE CLINIC | Age: 54
End: 2019-01-15

## 2019-01-15 VITALS
TEMPERATURE: 97.7 F | HEIGHT: 70 IN | RESPIRATION RATE: 16 BRPM | DIASTOLIC BLOOD PRESSURE: 80 MMHG | HEART RATE: 86 BPM | BODY MASS INDEX: 33.79 KG/M2 | SYSTOLIC BLOOD PRESSURE: 125 MMHG | OXYGEN SATURATION: 96 % | WEIGHT: 236 LBS

## 2019-01-15 DIAGNOSIS — J41.0 SIMPLE CHRONIC BRONCHITIS (HCC): Chronic | ICD-10-CM

## 2019-01-15 DIAGNOSIS — I50.22 CHRONIC SYSTOLIC CONGESTIVE HEART FAILURE (HCC): Primary | ICD-10-CM

## 2019-01-15 DIAGNOSIS — I25.5 ISCHEMIC CARDIOMYOPATHY: ICD-10-CM

## 2019-01-15 DIAGNOSIS — E11.9 TYPE 2 DIABETES MELLITUS WITHOUT COMPLICATION, WITHOUT LONG-TERM CURRENT USE OF INSULIN (HCC): ICD-10-CM

## 2019-01-15 DIAGNOSIS — I10 ESSENTIAL HYPERTENSION: ICD-10-CM

## 2019-01-15 DIAGNOSIS — F33.1 MODERATE EPISODE OF RECURRENT MAJOR DEPRESSIVE DISORDER (HCC): ICD-10-CM

## 2019-01-15 DIAGNOSIS — I25.10 CORONARY ARTERY DISEASE INVOLVING NATIVE CORONARY ARTERY OF NATIVE HEART WITHOUT ANGINA PECTORIS: Chronic | ICD-10-CM

## 2019-01-15 DIAGNOSIS — R10.9 FLANK PAIN: ICD-10-CM

## 2019-01-15 DIAGNOSIS — Z12.5 SCREENING FOR PROSTATE CANCER: ICD-10-CM

## 2019-01-15 DIAGNOSIS — G47.33 OBSTRUCTIVE SLEEP APNEA SYNDROME: ICD-10-CM

## 2019-01-15 DIAGNOSIS — R53.83 FATIGUE, UNSPECIFIED TYPE: ICD-10-CM

## 2019-01-15 LAB
ALBUMIN SERPL-MCNC: 4.5 G/DL (ref 3.5–5.2)
ALP BLD-CCNC: 100 U/L (ref 40–129)
ALT SERPL-CCNC: 29 U/L (ref 0–40)
ANION GAP SERPL CALCULATED.3IONS-SCNC: 19 MMOL/L (ref 7–16)
AST SERPL-CCNC: 33 U/L (ref 0–39)
BILIRUB SERPL-MCNC: 0.6 MG/DL (ref 0–1.2)
BILIRUBIN URINE: NEGATIVE
BLOOD, URINE: NEGATIVE
BUN BLDV-MCNC: 30 MG/DL (ref 6–20)
CALCIUM SERPL-MCNC: 9.4 MG/DL (ref 8.6–10.2)
CHLORIDE BLD-SCNC: 98 MMOL/L (ref 98–107)
CHOLESTEROL, TOTAL: 243 MG/DL (ref 0–199)
CLARITY: CLEAR
CO2: 23 MMOL/L (ref 22–29)
COLOR: YELLOW
CREAT SERPL-MCNC: 1.5 MG/DL (ref 0.7–1.2)
CREATININE URINE: 25 MG/DL (ref 40–278)
GFR AFRICAN AMERICAN: 59
GFR NON-AFRICAN AMERICAN: 49 ML/MIN/1.73
GLUCOSE BLD-MCNC: 135 MG/DL (ref 74–99)
GLUCOSE URINE: NEGATIVE MG/DL
HBA1C MFR BLD: 6.8 %
HCT VFR BLD CALC: 54 % (ref 37–54)
HDLC SERPL-MCNC: 31 MG/DL
HEMOGLOBIN: 17.5 G/DL (ref 12.5–16.5)
KETONES, URINE: NEGATIVE MG/DL
LDL CHOLESTEROL CALCULATED: ABNORMAL MG/DL (ref 0–99)
LEUKOCYTE ESTERASE, URINE: NEGATIVE
MCH RBC QN AUTO: 26.8 PG (ref 26–35)
MCHC RBC AUTO-ENTMCNC: 32.4 % (ref 32–34.5)
MCV RBC AUTO: 82.7 FL (ref 80–99.9)
MICROALBUMIN UR-MCNC: <12 MG/L
MICROALBUMIN/CREAT UR-RTO: ABNORMAL (ref 0–30)
NITRITE, URINE: NEGATIVE
PDW BLD-RTO: 14 FL (ref 11.5–15)
PH UA: 6 (ref 5–9)
PLATELET # BLD: 241 E9/L (ref 130–450)
PMV BLD AUTO: 11.1 FL (ref 7–12)
POTASSIUM SERPL-SCNC: 4.3 MMOL/L (ref 3.5–5)
PRO-BNP: 215 PG/ML (ref 0–125)
PROTEIN UA: NEGATIVE MG/DL
RBC # BLD: 6.53 E12/L (ref 3.8–5.8)
SODIUM BLD-SCNC: 140 MMOL/L (ref 132–146)
SPECIFIC GRAVITY UA: <=1.005 (ref 1–1.03)
TOTAL PROTEIN: 8.2 G/DL (ref 6.4–8.3)
TRIGL SERPL-MCNC: 474 MG/DL (ref 0–149)
TSH SERPL DL<=0.05 MIU/L-ACNC: 1.21 UIU/ML (ref 0.27–4.2)
UROBILINOGEN, URINE: 0.2 E.U./DL
VLDLC SERPL CALC-MCNC: ABNORMAL MG/DL
WBC # BLD: 6.9 E9/L (ref 4.5–11.5)

## 2019-01-15 PROCEDURE — 3017F COLORECTAL CA SCREEN DOC REV: CPT | Performed by: FAMILY MEDICINE

## 2019-01-15 PROCEDURE — 83880 ASSAY OF NATRIURETIC PEPTIDE: CPT

## 2019-01-15 PROCEDURE — 36415 COLL VENOUS BLD VENIPUNCTURE: CPT

## 2019-01-15 PROCEDURE — G8598 ASA/ANTIPLAT THER USED: HCPCS | Performed by: FAMILY MEDICINE

## 2019-01-15 PROCEDURE — G8417 CALC BMI ABV UP PARAM F/U: HCPCS | Performed by: FAMILY MEDICINE

## 2019-01-15 PROCEDURE — 87088 URINE BACTERIA CULTURE: CPT

## 2019-01-15 PROCEDURE — 81003 URINALYSIS AUTO W/O SCOPE: CPT

## 2019-01-15 PROCEDURE — 83036 HEMOGLOBIN GLYCOSYLATED A1C: CPT | Performed by: FAMILY MEDICINE

## 2019-01-15 PROCEDURE — 80061 LIPID PANEL: CPT

## 2019-01-15 PROCEDURE — 3044F HG A1C LEVEL LT 7.0%: CPT | Performed by: FAMILY MEDICINE

## 2019-01-15 PROCEDURE — 85027 COMPLETE CBC AUTOMATED: CPT

## 2019-01-15 PROCEDURE — 99212 OFFICE O/P EST SF 10 MIN: CPT | Performed by: FAMILY MEDICINE

## 2019-01-15 PROCEDURE — 99214 OFFICE O/P EST MOD 30 MIN: CPT | Performed by: FAMILY MEDICINE

## 2019-01-15 PROCEDURE — G8482 FLU IMMUNIZE ORDER/ADMIN: HCPCS | Performed by: FAMILY MEDICINE

## 2019-01-15 PROCEDURE — G8926 SPIRO NO PERF OR DOC: HCPCS | Performed by: FAMILY MEDICINE

## 2019-01-15 PROCEDURE — 84443 ASSAY THYROID STIM HORMONE: CPT

## 2019-01-15 PROCEDURE — 3023F SPIROM DOC REV: CPT | Performed by: FAMILY MEDICINE

## 2019-01-15 PROCEDURE — G8427 DOCREV CUR MEDS BY ELIG CLIN: HCPCS | Performed by: FAMILY MEDICINE

## 2019-01-15 PROCEDURE — G0103 PSA SCREENING: HCPCS

## 2019-01-15 PROCEDURE — 4004F PT TOBACCO SCREEN RCVD TLK: CPT | Performed by: FAMILY MEDICINE

## 2019-01-15 PROCEDURE — 36415 COLL VENOUS BLD VENIPUNCTURE: CPT | Performed by: FAMILY MEDICINE

## 2019-01-15 PROCEDURE — 82570 ASSAY OF URINE CREATININE: CPT

## 2019-01-15 PROCEDURE — 82044 UR ALBUMIN SEMIQUANTITATIVE: CPT

## 2019-01-15 PROCEDURE — 2022F DILAT RTA XM EVC RTNOPTHY: CPT | Performed by: FAMILY MEDICINE

## 2019-01-15 PROCEDURE — 80053 COMPREHEN METABOLIC PANEL: CPT

## 2019-01-15 RX ORDER — PANTOPRAZOLE SODIUM 40 MG/1
40 TABLET, DELAYED RELEASE ORAL
Qty: 90 TABLET | Refills: 1 | Status: SHIPPED | OUTPATIENT
Start: 2019-01-15 | End: 2019-11-21 | Stop reason: DRUGHIGH

## 2019-01-15 RX ORDER — RANOLAZINE 500 MG/1
500 TABLET, EXTENDED RELEASE ORAL 2 TIMES DAILY
Qty: 180 TABLET | Refills: 1 | Status: SHIPPED | OUTPATIENT
Start: 2019-01-15 | End: 2019-10-10

## 2019-01-15 RX ORDER — ESCITALOPRAM OXALATE 10 MG/1
10 TABLET ORAL DAILY
Qty: 90 TABLET | Refills: 1 | Status: SHIPPED
Start: 2019-01-15 | End: 2020-04-29 | Stop reason: SDUPTHER

## 2019-01-15 RX ORDER — GLUCOSAMINE HCL/CHONDROITIN SU 500-400 MG
CAPSULE ORAL
Qty: 100 STRIP | Refills: 6 | Status: SHIPPED | OUTPATIENT
Start: 2019-01-15 | End: 2019-10-10

## 2019-01-15 RX ORDER — METOPROLOL SUCCINATE 50 MG/1
50 TABLET, EXTENDED RELEASE ORAL 2 TIMES DAILY
Qty: 180 TABLET | Refills: 1 | Status: SHIPPED | OUTPATIENT
Start: 2019-01-15 | End: 2019-02-20 | Stop reason: SDUPTHER

## 2019-01-15 RX ORDER — FUROSEMIDE 40 MG/1
20 TABLET ORAL 2 TIMES DAILY
COMMUNITY
End: 2019-01-15 | Stop reason: SDUPTHER

## 2019-01-15 RX ORDER — LISINOPRIL 40 MG/1
40 TABLET ORAL DAILY
Qty: 90 TABLET | Refills: 1 | Status: SHIPPED | OUTPATIENT
Start: 2019-01-15 | End: 2019-01-25 | Stop reason: ALTCHOICE

## 2019-01-15 RX ORDER — TAMSULOSIN HYDROCHLORIDE 0.4 MG/1
0.4 CAPSULE ORAL DAILY
Qty: 90 CAPSULE | Refills: 1 | Status: SHIPPED
Start: 2019-01-15 | End: 2020-04-29 | Stop reason: SDUPTHER

## 2019-01-15 RX ORDER — LANCETS 30 GAUGE
EACH MISCELLANEOUS
Qty: 100 EACH | Refills: 5 | Status: SHIPPED | OUTPATIENT
Start: 2019-01-15 | End: 2019-10-10

## 2019-01-15 RX ORDER — FUROSEMIDE 40 MG/1
40 TABLET ORAL 2 TIMES DAILY
Qty: 90 TABLET | Refills: 1 | Status: SHIPPED | OUTPATIENT
Start: 2019-01-15 | End: 2019-01-16 | Stop reason: DRUGHIGH

## 2019-01-15 RX ORDER — CLOPIDOGREL BISULFATE 75 MG/1
75 TABLET ORAL DAILY
Qty: 90 TABLET | Refills: 1 | Status: SHIPPED
Start: 2019-01-15 | End: 2020-04-29 | Stop reason: SDUPTHER

## 2019-01-15 RX ORDER — ISOSORBIDE MONONITRATE 30 MG/1
30 TABLET, EXTENDED RELEASE ORAL DAILY
Qty: 90 TABLET | Refills: 1 | Status: SHIPPED | OUTPATIENT
Start: 2019-01-15 | End: 2019-01-17 | Stop reason: SDUPTHER

## 2019-01-15 RX ORDER — ATORVASTATIN CALCIUM 80 MG/1
80 TABLET, FILM COATED ORAL DAILY
Qty: 90 TABLET | Refills: 1 | Status: ON HOLD | OUTPATIENT
Start: 2019-01-15 | End: 2019-10-11 | Stop reason: HOSPADM

## 2019-01-16 LAB — PROSTATE SPECIFIC ANTIGEN: 5.23 NG/ML (ref 0–4)

## 2019-01-16 RX ORDER — FUROSEMIDE 40 MG/1
20 TABLET ORAL 2 TIMES DAILY
Qty: 90 TABLET | Refills: 1 | Status: SHIPPED
Start: 2019-01-16 | End: 2020-04-29 | Stop reason: SDUPTHER

## 2019-01-17 ENCOUNTER — OFFICE VISIT (OUTPATIENT)
Dept: CARDIOLOGY CLINIC | Age: 54
End: 2019-01-17
Payer: MEDICARE

## 2019-01-17 VITALS
SYSTOLIC BLOOD PRESSURE: 134 MMHG | HEART RATE: 75 BPM | HEIGHT: 70 IN | BODY MASS INDEX: 34.04 KG/M2 | DIASTOLIC BLOOD PRESSURE: 72 MMHG | RESPIRATION RATE: 16 BRPM | WEIGHT: 237.8 LBS

## 2019-01-17 DIAGNOSIS — R97.20 ELEVATED PSA: Primary | ICD-10-CM

## 2019-01-17 DIAGNOSIS — R06.02 SHORTNESS OF BREATH: ICD-10-CM

## 2019-01-17 DIAGNOSIS — R07.89 ATYPICAL CHEST PAIN: ICD-10-CM

## 2019-01-17 DIAGNOSIS — I25.10 CORONARY ARTERY DISEASE INVOLVING NATIVE CORONARY ARTERY OF NATIVE HEART WITHOUT ANGINA PECTORIS: Chronic | ICD-10-CM

## 2019-01-17 DIAGNOSIS — I10 ESSENTIAL HYPERTENSION: Primary | ICD-10-CM

## 2019-01-17 LAB — URINE CULTURE, ROUTINE: NORMAL

## 2019-01-17 PROCEDURE — 93000 ELECTROCARDIOGRAM COMPLETE: CPT | Performed by: INTERNAL MEDICINE

## 2019-01-17 PROCEDURE — 99214 OFFICE O/P EST MOD 30 MIN: CPT | Performed by: INTERNAL MEDICINE

## 2019-01-17 RX ORDER — ISOSORBIDE MONONITRATE 60 MG/1
60 TABLET, EXTENDED RELEASE ORAL DAILY
Qty: 90 TABLET | Refills: 3 | Status: ON HOLD | OUTPATIENT
Start: 2019-01-17 | End: 2019-10-11 | Stop reason: HOSPADM

## 2019-01-24 ENCOUNTER — HOSPITAL ENCOUNTER (OUTPATIENT)
Dept: CARDIOLOGY | Age: 54
Discharge: HOME | End: 2019-01-24
Payer: MEDICARE

## 2019-01-24 VITALS
WEIGHT: 237 LBS | DIASTOLIC BLOOD PRESSURE: 80 MMHG | BODY MASS INDEX: 33.93 KG/M2 | HEART RATE: 73 BPM | HEIGHT: 70 IN | SYSTOLIC BLOOD PRESSURE: 124 MMHG

## 2019-01-24 DIAGNOSIS — R07.2 PRECORDIAL PAIN: Primary | ICD-10-CM

## 2019-01-24 DIAGNOSIS — I25.10 CORONARY ARTERY DISEASE INVOLVING NATIVE CORONARY ARTERY OF NATIVE HEART WITHOUT ANGINA PECTORIS: Chronic | ICD-10-CM

## 2019-01-24 DIAGNOSIS — R06.02 SHORTNESS OF BREATH: ICD-10-CM

## 2019-01-24 PROCEDURE — 2580000003 HC RX 258: Performed by: INTERNAL MEDICINE

## 2019-01-24 PROCEDURE — 93017 CV STRESS TEST TRACING ONLY: CPT

## 2019-01-24 PROCEDURE — 6360000002 HC RX W HCPCS: Performed by: INTERNAL MEDICINE

## 2019-01-24 PROCEDURE — 78452 HT MUSCLE IMAGE SPECT MULT: CPT

## 2019-01-24 PROCEDURE — 3430000000 HC RX DIAGNOSTIC RADIOPHARMACEUTICAL: Performed by: INTERNAL MEDICINE

## 2019-01-24 PROCEDURE — A9500 TC99M SESTAMIBI: HCPCS | Performed by: INTERNAL MEDICINE

## 2019-01-24 RX ORDER — SODIUM CHLORIDE 0.9 % (FLUSH) 0.9 %
10 SYRINGE (ML) INJECTION PRN
Status: DISCONTINUED | OUTPATIENT
Start: 2019-01-24 | End: 2019-01-25 | Stop reason: HOSPADM

## 2019-01-24 RX ADMIN — Medication 10 ML: at 07:17

## 2019-01-24 RX ADMIN — Medication 10.4 MILLICURIE: at 07:17

## 2019-01-24 RX ADMIN — Medication 10 ML: at 08:29

## 2019-01-24 RX ADMIN — REGADENOSON 0.4 MG: 0.08 INJECTION, SOLUTION INTRAVENOUS at 08:28

## 2019-01-24 RX ADMIN — Medication 32.4 MILLICURIE: at 08:27

## 2019-01-24 RX ADMIN — Medication 32.4 MILLICURIE: at 08:28

## 2019-01-24 RX ADMIN — Medication 10 ML: at 08:28

## 2019-01-24 RX ADMIN — Medication 10 ML: at 08:27

## 2019-02-08 ENCOUNTER — CARE COORDINATION (OUTPATIENT)
Dept: FAMILY MEDICINE CLINIC | Age: 54
End: 2019-02-08

## 2019-02-20 ENCOUNTER — OFFICE VISIT (OUTPATIENT)
Dept: CARDIOLOGY CLINIC | Age: 54
End: 2019-02-20
Payer: MEDICARE

## 2019-02-20 VITALS
RESPIRATION RATE: 16 BRPM | HEIGHT: 69 IN | SYSTOLIC BLOOD PRESSURE: 134 MMHG | BODY MASS INDEX: 34.51 KG/M2 | DIASTOLIC BLOOD PRESSURE: 84 MMHG | WEIGHT: 233 LBS | HEART RATE: 80 BPM

## 2019-02-20 DIAGNOSIS — I25.5 ISCHEMIC CARDIOMYOPATHY: ICD-10-CM

## 2019-02-20 DIAGNOSIS — I35.1 NONRHEUMATIC AORTIC VALVE INSUFFICIENCY: ICD-10-CM

## 2019-02-20 DIAGNOSIS — I25.10 CORONARY ARTERY DISEASE INVOLVING NATIVE CORONARY ARTERY OF NATIVE HEART WITHOUT ANGINA PECTORIS: Primary | Chronic | ICD-10-CM

## 2019-02-20 PROBLEM — Z91.14 H/O MEDICATION NONCOMPLIANCE: Status: RESOLVED | Noted: 2017-06-29 | Resolved: 2019-02-20

## 2019-02-20 PROBLEM — Z91.148 H/O MEDICATION NONCOMPLIANCE: Status: RESOLVED | Noted: 2017-06-29 | Resolved: 2019-02-20

## 2019-02-20 PROBLEM — I10 ACCELERATED HYPERTENSION: Status: RESOLVED | Noted: 2018-04-17 | Resolved: 2019-02-20

## 2019-02-20 PROBLEM — I20.0 UNSTABLE ANGINA (HCC): Status: RESOLVED | Noted: 2017-06-29 | Resolved: 2019-02-20

## 2019-02-20 PROCEDURE — 93000 ELECTROCARDIOGRAM COMPLETE: CPT | Performed by: INTERNAL MEDICINE

## 2019-02-20 PROCEDURE — 99214 OFFICE O/P EST MOD 30 MIN: CPT | Performed by: INTERNAL MEDICINE

## 2019-02-20 RX ORDER — METOPROLOL SUCCINATE 50 MG/1
75 TABLET, EXTENDED RELEASE ORAL 2 TIMES DAILY
Qty: 270 TABLET | Refills: 3 | Status: ON HOLD | OUTPATIENT
Start: 2019-02-20 | End: 2019-10-11 | Stop reason: HOSPADM

## 2019-02-25 ENCOUNTER — CARE COORDINATION (OUTPATIENT)
Dept: FAMILY MEDICINE CLINIC | Age: 54
End: 2019-02-25

## 2019-03-08 ENCOUNTER — TELEPHONE (OUTPATIENT)
Dept: CARDIOLOGY CLINIC | Age: 54
End: 2019-03-08

## 2019-03-12 RX ORDER — LISINOPRIL 40 MG/1
40 TABLET ORAL DAILY
COMMUNITY
End: 2020-04-30 | Stop reason: SDUPTHER

## 2019-03-13 ENCOUNTER — NURSE ONLY (OUTPATIENT)
Dept: NON INVASIVE DIAGNOSTICS | Age: 54
End: 2019-03-13
Payer: MEDICARE

## 2019-03-13 ENCOUNTER — CARE COORDINATION (OUTPATIENT)
Dept: CARE COORDINATION | Age: 54
End: 2019-03-13

## 2019-03-13 DIAGNOSIS — I47.29 NSVT (NONSUSTAINED VENTRICULAR TACHYCARDIA): ICD-10-CM

## 2019-03-13 DIAGNOSIS — I25.5 ISCHEMIC CARDIOMYOPATHY: ICD-10-CM

## 2019-03-13 DIAGNOSIS — Z95.810 DUAL IMPLANTABLE CARDIOVERTER-DEFIBRILLATOR IN SITU: Primary | ICD-10-CM

## 2019-03-13 PROCEDURE — 93296 REM INTERROG EVL PM/IDS: CPT | Performed by: INTERNAL MEDICINE

## 2019-03-13 PROCEDURE — 93295 DEV INTERROG REMOTE 1/2/MLT: CPT | Performed by: INTERNAL MEDICINE

## 2019-03-18 ENCOUNTER — OFFICE VISIT (OUTPATIENT)
Dept: FAMILY MEDICINE CLINIC | Age: 54
End: 2019-03-18
Payer: MEDICARE

## 2019-03-18 ENCOUNTER — CARE COORDINATION (OUTPATIENT)
Dept: CARE COORDINATION | Age: 54
End: 2019-03-18

## 2019-03-18 VITALS
SYSTOLIC BLOOD PRESSURE: 138 MMHG | OXYGEN SATURATION: 96 % | RESPIRATION RATE: 12 BRPM | DIASTOLIC BLOOD PRESSURE: 88 MMHG | WEIGHT: 240 LBS | HEART RATE: 81 BPM | HEIGHT: 69 IN | BODY MASS INDEX: 35.55 KG/M2

## 2019-03-18 DIAGNOSIS — I25.10 CORONARY ARTERY DISEASE INVOLVING NATIVE CORONARY ARTERY OF NATIVE HEART WITHOUT ANGINA PECTORIS: ICD-10-CM

## 2019-03-18 DIAGNOSIS — F33.1 MODERATE EPISODE OF RECURRENT MAJOR DEPRESSIVE DISORDER (HCC): Primary | ICD-10-CM

## 2019-03-18 DIAGNOSIS — I50.22 CHRONIC SYSTOLIC CONGESTIVE HEART FAILURE (HCC): ICD-10-CM

## 2019-03-18 DIAGNOSIS — J41.0 SIMPLE CHRONIC BRONCHITIS (HCC): ICD-10-CM

## 2019-03-18 DIAGNOSIS — K21.9 GASTROESOPHAGEAL REFLUX DISEASE WITHOUT ESOPHAGITIS: ICD-10-CM

## 2019-03-18 DIAGNOSIS — E78.2 MIXED HYPERLIPIDEMIA: ICD-10-CM

## 2019-03-18 DIAGNOSIS — E11.9 TYPE 2 DIABETES MELLITUS WITHOUT COMPLICATION, WITHOUT LONG-TERM CURRENT USE OF INSULIN (HCC): ICD-10-CM

## 2019-03-18 PROCEDURE — 99212 OFFICE O/P EST SF 10 MIN: CPT | Performed by: FAMILY MEDICINE

## 2019-03-18 PROCEDURE — 99214 OFFICE O/P EST MOD 30 MIN: CPT | Performed by: FAMILY MEDICINE

## 2019-03-20 ENCOUNTER — TELEPHONE (OUTPATIENT)
Dept: NON INVASIVE DIAGNOSTICS | Age: 54
End: 2019-03-20

## 2019-04-08 ENCOUNTER — CARE COORDINATION (OUTPATIENT)
Dept: CARE COORDINATION | Age: 54
End: 2019-04-08

## 2019-05-07 ENCOUNTER — HOSPITAL ENCOUNTER (OUTPATIENT)
Age: 54
Discharge: HOME | End: 2019-05-09
Payer: MEDICARE

## 2019-05-07 ENCOUNTER — OFFICE VISIT (OUTPATIENT)
Dept: FAMILY MEDICINE CLINIC | Age: 54
End: 2019-05-07
Payer: MEDICARE

## 2019-05-07 ENCOUNTER — CARE COORDINATION (OUTPATIENT)
Dept: CARE COORDINATION | Age: 54
End: 2019-05-07

## 2019-05-07 VITALS
TEMPERATURE: 97.3 F | OXYGEN SATURATION: 97 % | HEART RATE: 93 BPM | SYSTOLIC BLOOD PRESSURE: 130 MMHG | DIASTOLIC BLOOD PRESSURE: 76 MMHG | BODY MASS INDEX: 35.25 KG/M2 | WEIGHT: 238 LBS | HEIGHT: 69 IN

## 2019-05-07 DIAGNOSIS — I50.22 CHRONIC SYSTOLIC CONGESTIVE HEART FAILURE (HCC): ICD-10-CM

## 2019-05-07 DIAGNOSIS — B35.3 TINEA PEDIS OF BOTH FEET: ICD-10-CM

## 2019-05-07 DIAGNOSIS — I25.10 CORONARY ARTERY DISEASE INVOLVING NATIVE CORONARY ARTERY OF NATIVE HEART WITHOUT ANGINA PECTORIS: ICD-10-CM

## 2019-05-07 DIAGNOSIS — E11.9 TYPE 2 DIABETES MELLITUS WITHOUT COMPLICATION, WITHOUT LONG-TERM CURRENT USE OF INSULIN (HCC): ICD-10-CM

## 2019-05-07 DIAGNOSIS — E11.9 TYPE 2 DIABETES MELLITUS WITHOUT COMPLICATION, WITHOUT LONG-TERM CURRENT USE OF INSULIN (HCC): Primary | ICD-10-CM

## 2019-05-07 LAB
ALBUMIN SERPL-MCNC: 4.7 G/DL (ref 3.5–5.2)
ALP BLD-CCNC: 113 U/L (ref 40–129)
ALT SERPL-CCNC: 28 U/L (ref 0–40)
ANION GAP SERPL CALCULATED.3IONS-SCNC: 19 MMOL/L (ref 7–16)
AST SERPL-CCNC: 28 U/L (ref 0–39)
BILIRUB SERPL-MCNC: 0.7 MG/DL (ref 0–1.2)
BUN BLDV-MCNC: 20 MG/DL (ref 6–20)
CALCIUM SERPL-MCNC: 9.4 MG/DL (ref 8.6–10.2)
CHLORIDE BLD-SCNC: 99 MMOL/L (ref 98–107)
CHOLESTEROL, TOTAL: 252 MG/DL (ref 0–199)
CO2: 22 MMOL/L (ref 22–29)
CREAT SERPL-MCNC: 1.3 MG/DL (ref 0.7–1.2)
GFR AFRICAN AMERICAN: >60
GFR NON-AFRICAN AMERICAN: 57 ML/MIN/1.73
GLUCOSE BLD-MCNC: 126 MG/DL (ref 74–99)
HBA1C MFR BLD: 7.7 %
HCT VFR BLD CALC: 51.3 % (ref 37–54)
HDLC SERPL-MCNC: 33 MG/DL
HEMOGLOBIN: 17 G/DL (ref 12.5–16.5)
LDL CHOLESTEROL CALCULATED: 169 MG/DL (ref 0–99)
MCH RBC QN AUTO: 27.4 PG (ref 26–35)
MCHC RBC AUTO-ENTMCNC: 33.1 % (ref 32–34.5)
MCV RBC AUTO: 82.7 FL (ref 80–99.9)
PDW BLD-RTO: 13.8 FL (ref 11.5–15)
PLATELET # BLD: 184 E9/L (ref 130–450)
PMV BLD AUTO: 11.1 FL (ref 7–12)
POTASSIUM SERPL-SCNC: 4.2 MMOL/L (ref 3.5–5)
RBC # BLD: 6.2 E12/L (ref 3.8–5.8)
SODIUM BLD-SCNC: 140 MMOL/L (ref 132–146)
TOTAL PROTEIN: 8 G/DL (ref 6.4–8.3)
TRIGL SERPL-MCNC: 250 MG/DL (ref 0–149)
VLDLC SERPL CALC-MCNC: 50 MG/DL
WBC # BLD: 6.1 E9/L (ref 4.5–11.5)

## 2019-05-07 PROCEDURE — 36415 COLL VENOUS BLD VENIPUNCTURE: CPT

## 2019-05-07 PROCEDURE — 99213 OFFICE O/P EST LOW 20 MIN: CPT | Performed by: FAMILY MEDICINE

## 2019-05-07 PROCEDURE — 85027 COMPLETE CBC AUTOMATED: CPT

## 2019-05-07 PROCEDURE — 80061 LIPID PANEL: CPT

## 2019-05-07 PROCEDURE — 80053 COMPREHEN METABOLIC PANEL: CPT

## 2019-05-07 PROCEDURE — 36415 COLL VENOUS BLD VENIPUNCTURE: CPT | Performed by: FAMILY MEDICINE

## 2019-05-07 PROCEDURE — 99212 OFFICE O/P EST SF 10 MIN: CPT | Performed by: FAMILY MEDICINE

## 2019-05-07 PROCEDURE — 83036 HEMOGLOBIN GLYCOSYLATED A1C: CPT | Performed by: FAMILY MEDICINE

## 2019-05-07 RX ORDER — CLOTRIMAZOLE 1 %
CREAM (GRAM) TOPICAL
Qty: 60 G | Refills: 2 | Status: SHIPPED | OUTPATIENT
Start: 2019-05-07 | End: 2019-05-14

## 2019-05-07 ASSESSMENT — ENCOUNTER SYMPTOMS: DYSPNEA ASSOCIATED WITH: EXERTION

## 2019-05-07 NOTE — PROGRESS NOTES
CC:  Follow up DM. HPI:  46 y.o. male presents for follow up. DM. Trying to eat fewer sweets, but admits he has not been good lately. Not adherent to medications. Sets alarm on phone, but does not keep phone with him. \"See food\" diet. No new symptoms, no new dyspnea, but stable. No edema. No weight gain. CAD, HFrEF. No CP, but has same epigastric pain, improves with belching, worse after mocha iced coffee, which he has been avoiding. Some occasional dyspnea, unchanged. No edema. Some HA. Still tobacco chewing occasionally, but working to quit entirely; this makes heartburn worse. Weight is stable overall. Avoids salt. Patient Active Problem List    Diagnosis Date Noted    Nonrheumatic aortic valve insufficiency 02/20/2019    Moderate episode of recurrent major depressive disorder ([facility] [address] 08/27/2018    Medical non-compliance     Obesity (BMI 30.0-34. 9)     Ischemic cardiomyopathy     Type 2 diabetes mellitus without complication, without long-term current use of insulin ([address]     Essential hypertension 06/29/2017    T wave inversion in EKG     Pleuritic chest pain 01/23/2017    Precordial pain     Gastritis and duodenitis 08/07/2015    Reflux esophagitis 08/07/2015    Diverticulosis of colon 08/07/2015    History of MI (myocardial infarction) 08/07/2015    Chronic systolic congestive heart failure (HCC) 08/07/2015    Rectal bleeding     Rectal polyp     Gastric mass     NSVT (nonsustained ventricular tachycardia) ([address] 07/31/2015    Dual implantable cardioverter-defibrillator in situ 04/02/2015    CAD (coronary artery disease) 12/06/2014    Depression 12/10/2013    Cerebrovascular accident (CVA) ([address] 07/23/2013    ED (erectile dysfunction) 03/26/2012    COPD (chronic obstructive pulmonary disease)     Hyperlipemia     GERD (gastroesophageal reflux disease) 02/12/2011       Current Outpatient Medications on File Prior to Visit   Medication Sig Dispense Refill Smokeless tobacco: Current User     Types: Chew    Tobacco comment: occ chewing tobacco, trying to quit   Substance Use Topics    Alcohol use: Yes     Comment: occ.  Drug use: No       ROS:   Review of Systems - as above     Physical Exam:    VS:  Blood pressure 130/76, pulse 93, temperature 97.3 °F (36.3 °C), temperature source Oral, height 5' 9\" (1.753 m), weight 238 lb (108 kg), SpO2 97 %. General Appearance:  awake, alert, oriented, in no acute distress and well developed, well nourished  Head/face:  NCAT  Eyes:  EOMI and Sclera nonicteric  Lungs:  Normal expansion. Clear to auscultation. No rales, rhonchi, or wheezing. Heart:  Heart regular rate and rhythm  Abdomen:  Soft, NT, ND    Extremities: trace edema bilaterally. Foot exam showed normal sensation to monofilament, equal bilaterally. Good pulses and capillary refill. Skin warm and dry, in good condition. No ulcerations. No significant deformity or callus formation. Dry skin, likely tinea. Most recent labs and imaging reviewed. Findings include:     A1C 7.7 today     Assessments:      Diagnosis Orders   1. Type 2 diabetes mellitus without complication, without long-term current use of insulin ([facility])  POCT glycosylated hemoglobin (Hb A1C)    CBC    Comprehensive Metabolic Panel    Lipid Panel    HM DIABETES FOOT EXAM   2. Chronic systolic congestive heart failure ([address]     3. Coronary artery disease involving native coronary artery of native heart without angina pectoris     4. Tinea pedis of both feet  clotrimazole (LOTRIMIN) 1 % cream       Plans:    As Above. Please see Patient Instructions for further counseling and information given. RTO 3 weeks or sooner prn. AWV at that time. Labs today. Discussed adherence to medications and diet. Patient states he plans to do better. Avoid tobacco.  Avoid caffeine. Consider immunizations in the near future. Consider repeat EGD; will return in about 3 weeks. Discussed, but he declines today, will continue to address in near future. History of gastritis. Avoid tobacco of all types. Advised to please be adherent to the treatment plans discussed today, and please call with any questions or concerns, letting the office know of any reasons that the plans may not be followed. The risks of untreated conditions include worsening illness, injury, disability, and possibly, death. Please call if symptoms change in any way, worsen, or fail to completely resolve, as this could necessitate a change to treatment plans. Patient and/or caregiver expressed understanding. Indications and proper use of medication(s) reviewed. Potential side-effects and risks of medication(s) also explained. Patient and/or caregiver was instructed to call if any new symptoms develop prior to next visit.

## 2019-05-07 NOTE — CARE COORDINATION
Ambulatory Care Coordination Note  5/7/2019  CM Risk Score: 4  Jeramy Mortality Risk Score:      ACC: Richard Fong RN    Summary Note:   ACC met with Thanh Fisher at his PCP visit for diabetes/CHF/COPD follow up. He states he is checking his blood sugars but he is not keeping a blood sugar log. ACC suggested that if he does not want to write down his results he should consider bringing in his monitor so that his PCP could review his readings. He continues to not weigh himself. He admits he is still not taking his pills regularly. ACC again suggested setting a reminder on his phone and obtaining a \"travel\" pill keeper. PLAN  Follow up in 3 weeks at next PCP visit and review blood sugars and medications with next interaction. Care Coordination Interventions    Program Enrollment:  Rising Risk  Referral from Primary Care Provider:  No  Suggested Interventions and Community Resources  Medi Set or Pill Pack:  Declined  Pharmacist:  Declined  Zone Management Tools:  Completed (Comment: CHF/COPD/diabetes)         Goals Addressed                 This Visit's Progress     Conditions and Symptoms   No change     I will schedule office visits, as directed by my provider. I will keep my appointment or reschedule if I have to cancel. I will notify my provider of any barriers to my plan of care. I will follow my Zone Management tool to seek urgent or emergent care. I will notify my provider of any symptoms that indicate a worsening of my condition.     Barriers: lack of motivation and lack of education  Plan for overcoming my barriers: zone handouts, care coordination  Confidence: 6/10  Anticipated Goal Completion Date: 8/1/19           COMPLETED: Self Monitoring   Improving     Self-Monitored Blood Glucose - I will check my blood sugar Fasting blood sugar  Daily Weights - I will weight myself as directed - Daily and write down weights    Patient Reported Weight 5/7/2019 238 lbs    Patient Reported Blood Sugar Vitro Route according to your doctor's instructions. 1/15/19 1/10/20  Alma Rosa Villegas DO   aspirin 81 MG chewable tablet Take 1 tablet by mouth daily 10/6/17   Alma Rosa Villegas DO   FREESTYLE LANCETS MISC 1 each by Does not apply route 3 times daily And as needed for hypo/hyperglycemia symptoms 7/3/17   Tiera Stephens MD   Alcohol Swabs PADS 1 each by Does not apply route 3 times daily And as needed to test blood sugars 5/19/16   Alma Rosa Villegas DO       Future Appointments   Date Time Provider Estuardo Arzate   5/28/2019  2:20 PM Cornelia Booth, DO [facility]   6/12/2019  4:10 PM SCHEDULE, REMOTE CHECK [facility]     ,   Diabetes Assessment    Medic Alert ID:  No  Meal Planning:  Avoidance of concentrated sweets   How often do you test your blood sugar?:  Daily (Comment: does not test regularl y)   Do you have barriers with adherence to non-pharmacologic self-management interventions?  (Nutrition/Exercise/Self-Monitoring):  Yes   Have you ever had to go to the ED for symptoms of low blood sugar?:  No       Do you have hyperglycemia symptoms?:  No   Do you have hypoglycemia symptoms?:  No   Last Blood Sugar Value:  115   Blood Sugar Monitoring Regimen:  Morning Fasting   Blood Sugar Trends:  No Change      ,   Congestive Heart Failure Assessment    Are you currently restricting fluids?:  No Restriction  Do you understand a low sodium diet?:  Yes  Do you understand how to read food labels?:  Yes  How many restaurant meals do you eat per week?:  3-4  Do you salt your food before tasting it?:  No         Symptoms:   CHF associated angina: Neg, CHF associated dyspnea on exertion: Pos, CHF associated fatigue: Neg, CHF associated leg swelling: Neg, CHF associated orthostatic hypotension: Neg, CHF associated PND: Neg, CHF associated shortness of breath: Neg, CHF associated weakness: Neg      Symptom course:  no change  Patient-reported weight (lb):  238 (Comment: office visit)  Weight trend: fluctuating minimally  Salt intake watch compared to last visit:  stable     ,   COPD Assessment    Does the patient understand envrionmental exposure?:  Yes  Is the patient able to verbalize Rescue vs. Long Acting medications?:  Yes  Does the patient have a nebulizer?:  No  Does the patient use a space with inhaled medications?:  No            Symptoms:   None:  Yes      Symptom course:  no change  Breathlessness:  exertion  Increase use of rapid acting/rescue inhaled medications?:  No  Change in chronic cough?:  No/At Baseline  Change in sputum?:  No/At Baseline  Self Monitoring - SaO2:  No      and   General Assessment    Do you have any symptoms that are causing concern?:  No

## 2019-05-28 ENCOUNTER — OFFICE VISIT (OUTPATIENT)
Dept: FAMILY MEDICINE CLINIC | Age: 54
End: 2019-05-28
Payer: MEDICARE

## 2019-05-28 VITALS
WEIGHT: 236 LBS | SYSTOLIC BLOOD PRESSURE: 131 MMHG | BODY MASS INDEX: 34.96 KG/M2 | TEMPERATURE: 97.5 F | HEART RATE: 77 BPM | HEIGHT: 69 IN | DIASTOLIC BLOOD PRESSURE: 66 MMHG | OXYGEN SATURATION: 96 %

## 2019-05-28 DIAGNOSIS — R71.8 OTHER ABNORMALITY OF RED BLOOD CELLS: ICD-10-CM

## 2019-05-28 DIAGNOSIS — D58.2 ELEVATED HEMOGLOBIN (HCC): ICD-10-CM

## 2019-05-28 DIAGNOSIS — Z00.00 ROUTINE GENERAL MEDICAL EXAMINATION AT A HEALTH CARE FACILITY: Primary | ICD-10-CM

## 2019-05-28 DIAGNOSIS — G47.33 OBSTRUCTIVE SLEEP APNEA: ICD-10-CM

## 2019-05-28 PROCEDURE — G0439 PPPS, SUBSEQ VISIT: HCPCS | Performed by: FAMILY MEDICINE

## 2019-05-28 PROCEDURE — 99212 OFFICE O/P EST SF 10 MIN: CPT | Performed by: FAMILY MEDICINE

## 2019-05-28 ASSESSMENT — PATIENT HEALTH QUESTIONNAIRE - PHQ9
SUM OF ALL RESPONSES TO PHQ QUESTIONS 1-9: 2
SUM OF ALL RESPONSES TO PHQ QUESTIONS 1-9: 2

## 2019-05-28 ASSESSMENT — LIFESTYLE VARIABLES
HOW OFTEN DO YOU HAVE A DRINK CONTAINING ALCOHOL: 3
HOW MANY STANDARD DRINKS CONTAINING ALCOHOL DO YOU HAVE ON A TYPICAL DAY: 0
AUDIT-C TOTAL SCORE: 4
HOW OFTEN DO YOU HAVE SIX OR MORE DRINKS ON ONE OCCASION: 1

## 2019-05-28 ASSESSMENT — ANXIETY QUESTIONNAIRES: GAD7 TOTAL SCORE: 0

## 2019-05-28 NOTE — PROGRESS NOTES
Medicare Annual Wellness Visit  Name: Lillie Ha Date: 2019   MRN: [id number] Sex: Male   Age: 46 y.o. Ethnicity: Non-/Non    : 1965 Race: Bibi Valenzuela is here for Medicare AWV    Screenings for behavioral, psychosocial and functional/safety risks, and cognitive dysfunction are all negative except as indicated below. These results, as well as other patient data from the [address] form, are documented in Flowsheets linked to this Encounter. No Known Allergies  Prior to Visit Medications    Medication Sig Taking? Authorizing Provider   lisinopril (PRINIVIL;ZESTRIL) 40 MG tablet Take 40 mg by mouth daily Yes Historical Provider, MD   metoprolol succinate (TOPROL XL) 50 MG extended release tablet Take 1.5 tablets by mouth 2 times daily Yes Kellen Noonan,    isosorbide mononitrate (IMDUR) 60 MG extended release tablet Take 1 tablet by mouth daily Yes Cornelio Oshea MD   blood glucose monitor kit and supplies Test 1 times a day & as needed for symptoms of irregular blood glucose. Dx.  E11.9 Yes Marvin Hatfield, DO   furosemide (LASIX) 40 MG tablet Take 0.5 tablets by mouth 2 times daily Yes Marvin Hatfield DO   atorvastatin (LIPITOR) 80 MG tablet Take 1 tablet by mouth daily Yes Marvin Hatfield, DO   clopidogrel (PLAVIX) 75 MG tablet Take 1 tablet by mouth daily Yes Marvin Hatfield, DO   escitalopram (LEXAPRO) 10 MG tablet Take 1 tablet by mouth daily Yes Marvin Hatfield DO   metFORMIN (GLUCOPHAGE) 1000 MG tablet Take 1 tablet by mouth 2 times daily (with meals) Yes Marvin Hatfield, DO   pantoprazole (PROTONIX) 40 MG tablet Take 1 tablet by mouth every morning (before breakfast) Yes Marvin Hatfield, DO   ranolazine (RANEXA) 500 MG extended release tablet Take 1 tablet by mouth 2 times daily Yes Marvin Hatfield, DO   tamsulosin (FLOMAX) 0.4 MG capsule Take 1 capsule by mouth daily Yes Marvin Hatfield, DO   Lancets MISC Use to test sugars twice daily as diverticulosis, Dr. Cuevas Smoker, [address] [facility] COLONOSCOPY  8/7/2015    sigmoid diverticulosis, rectal polyp bx/cauterized, Dr. Edmond Henry, [address] WITH STENT PLACEMENT  12/6/2014    LAD Tamela Piper 3.5 x 33, Dr. Hina Garg, [address] [facility] CYSTOURETHROSCOPY  1/29/2010    placement bilateral ureteral stents for colon resection, Dr. Zena Clark, [facility], [address] [place] [address],[address] GASTROINTESTINAL ENDOSCOPY  8/7/2015    severe duodenitis, mild to moderate gastritis (bx for H pylori), submucosal benign mass body stomach, mild reflux esophagitis, Dr. Edmond Henry, [facility]     Family History   Problem Relation Age of Onset    High Blood Pressure Father     Heart Disease Father         MI stents    Heart Disease Paternal Uncle     Stroke Sister     No Known Problems Brother     No Known Problems Sister     No Known Problems Brother        CareTeam (Including outside providers/suppliers regularly involved in providing care):   Patient Care Team:  Demi Henley DO as PCP - General (Family Medicine)  Demi Henley DO as PCP - S Attributed Provider  Terri Ramirez MD as Consulting Physician (Neurology)  JOSE Richey as  (Care Coordinator)  Sisi Carroll MD as Consulting Physician (Cardiology)  Asael Em MD as Consulting Physician (Electrophysiology)  Junior Linda RN as Care Coordinator    Wt Readings from Last 3 Encounters:   05/28/19 236 lb (107 kg)   05/07/19 238 lb (108 kg)   03/18/19 240 lb (108.9 kg)     Vitals:    05/28/19 1423 05/28/19 1430   BP: (!) 141/76 131/66   Site: Left Upper Arm Right Upper Arm   Position: Sitting    Cuff Size: Medium Adult    Pulse: 77    Temp: 97.5 °F (36.4 °C)    TempSrc: Oral    SpO2: 96%    Weight: 236 lb (107 kg)    Height: 5' 9\" (1.753 m)      Body mass index is 34.85 kg/m². Based upon direct observation of the patient, evaluation of cognition reveals recent and remote memory intact.   Three word recall intact. Clock drawing normal.      General Appearance: alert and oriented to person, place and time, well developed and well- nourished, in no acute distress  Skin: warm and dry, no rash but sun tanning noted and some erythema of sunburn, mild  Head: normocephalic and atraumatic  Eyes: pupils equal, round, and reactive to light, extraocular eye movements intact, conjunctivae normal  ENT: external ear normal bilaterally, nose without deformity,   Neck: supple and non-tender without mass, no thyromegaly or thyroid nodules, no cervical lymphadenopathy  Pulmonary/Chest: clear to auscultation bilaterally- no wheezes, rales or rhonchi, normal air movement, no respiratory distress  Cardiovascular: normal rate, regular rhythm, normal S1 and S2,   Abdomen: soft, non-tender, mildly-distended, no masses or organomegaly, surgical incisions well healed, scars noted   Extremities: no cyanosis, clubbing or edema  Musculoskeletal: normal range of motion, no joint swelling, deformity or tenderness  Neurologic: no cranial nerve deficit, gait, coordination and speech normal    Patient's complete Health Risk Assessment and screening values have been reviewed and are found in Flowsheets. The following problems were reviewed today and where indicated follow up appointments were made and/or referrals ordered. Positive Risk Factor Screenings with Interventions:     Substance Abuse:  Social History     Tobacco History     Smoking Status  Former Smoker Quit date  6/1/2014 Smoking Frequency  For 30 years Smoking Tobacco Type  Cigarettes    Smokeless Tobacco Use  Current User Smokeless Tobacco Type  Chew    Tobacco Comment  occ chewing tobacco, trying to quit          Alcohol History     Alcohol Use Status  Yes Comment  occ.           Drug Use     Drug Use Status  No          Sexual Activity     Sexually Active  Never               Audit Questionnaire: Screen for Alcohol Misuse  How often do you have a drink containing alcohol?: Two to three times a week  Substance Abuse Interventions:  · Drinks two drinks on a day, once or twice per week. Advised on moderation. General Health:  General  In general, how would you say your health is?: Good  In the past 7 days, have you experienced any of the following? New or Increased Pain, New or Increased Fatigue, Loneliness, Social Isolation, Stress or Anger?: None of These  Do you get the social and emotional support that you need?: Yes  Do you have a Living Will?: (!) No  General Health Risk Interventions:  · No Living Will: patient declined    Health Habits/Nutrition:     Body mass index is 34.85 kg/m². Health Habits/Nutrition Interventions:  · Trying to eat healthier. Avoiding overeating.       Personalized Preventive Plan   Current Health Maintenance Status  Immunization History   Administered Date(s) Administered    Influenza Virus Vaccine 10/11/2013, 09/28/2015    Influenza, MDCK, Preservative free 09/05/2017    Influenza, [place], 3 Years and older, IM (Fluzone 3 yrs and older or Afluria 5 yrs and older) 12/13/2016    Influenza, [place], 3 yrs and older, IM, PF (Fluzone 3 yrs and older or Afluria 5 yrs and older) 10/02/2018    Pneumococcal Polysaccharide ([id number]) 01/05/2015    Tdap (Boostrix, Adacel) 04/01/2013        Health Maintenance   Topic Date Due    Hepatitis B Vaccine (1 of 3 - Risk 3-dose series) 11/24/1983    Shingles Vaccine (1 of 2) 11/24/2014    Diabetic retinal exam  10/04/2019    Diabetic microalbuminuria test  01/15/2020    Diabetic foot exam  05/07/2020    A1C test (Diabetic or Prediabetic)  05/07/2020    Lipid screen  05/07/2020    Potassium monitoring  05/07/2020    Creatinine monitoring  05/07/2020    DTaP/Tdap/Td vaccine (2 - Td) 04/01/2023    Colon cancer screen colonoscopy  08/07/2025    Flu vaccine  Completed    Pneumococcal 0-64 years Vaccine  Completed    Hepatitis C screen  Completed    HIV screen  Completed     Recommendations for Preventive Services Due: see orders and patient instructions/AVS.    Recommended screening schedule for the next 5-10 years is provided to the patient in written form: see Patient Instructions/AVS.    Trying to improve diet. Losing a little weight. Continue healthy lifestyle changes. Discussed cutting down on alcohol use. Continue to avoid tobacco.     Labs and sleep study to evaluate elevated hemoglobin. Hydrate with plain water. Diagnosis Orders   1. Routine general medical examination at a health care facility     2. Elevated hemoglobin (HCC)  CBC WITH AUTO DIFFERENTIAL    IRON AND TIBC    FERRITIN    PERIPHERAL BLOOD SMEAR, PATH REVIEW   3. Obstructive sleep apnea  Baseline Diagnostic Sleep Study   4.  Other abnormality of red blood cells   IRON AND TIBC    FERRITIN

## 2019-05-28 NOTE — PATIENT INSTRUCTIONS
Personalized Preventive Plan for Gisell Cap - 5/28/2019  Medicare offers a range of preventive health benefits. Some of the tests and screenings are paid in full while other may be subject to a deductible, co-insurance, and/or copay. Some of these benefits include a comprehensive review of your medical history including lifestyle, illnesses that may run in your family, and various assessments and screenings as appropriate. After reviewing your medical record and screening and assessments performed today your provider may have ordered immunizations, labs, imaging, and/or referrals for you. A list of these orders (if applicable) as well as your Preventive Care list are included within your After Visit Summary for your review. Other Preventive Recommendations:    · A preventive eye exam performed by an eye specialist is recommended every 1-2 years to screen for glaucoma; cataracts, macular degeneration, and other eye disorders. · A preventive dental visit is recommended every 6 months. · Try to get at least 150 minutes of exercise per week or 10,000 steps per day on a pedometer . · Order or download the FREE \"Exercise & Physical Activity: Your Everyday Guide\" from [employer]. Call 4-952.902.2969 or search [employer] online. · You need 5567-5814 mg of calcium and 2367-9026 IU of vitamin D per day. It is possible to meet your calcium requirement with diet alone, but a vitamin D supplement is usually necessary to meet this goal.  · When exposed to the sun, use a sunscreen that protects against both UVA and UVB radiation with an SPF of 30 or greater. Reapply every 2 to 3 hours or after sweating, drying off with a towel, or swimming. · Always wear a seat belt when traveling in a car. Always wear a helmet when riding a bicycle or motorcycle.

## 2019-05-29 PROBLEM — D58.2 ELEVATED HEMOGLOBIN (HCC): Status: ACTIVE | Noted: 2019-05-29

## 2019-06-12 ENCOUNTER — NURSE ONLY (OUTPATIENT)
Dept: NON INVASIVE DIAGNOSTICS | Age: 54
End: 2019-06-12
Payer: MEDICARE

## 2019-06-12 DIAGNOSIS — Z95.810 DUAL IMPLANTABLE CARDIOVERTER-DEFIBRILLATOR IN SITU: Primary | ICD-10-CM

## 2019-06-12 DIAGNOSIS — I25.5 ISCHEMIC CARDIOMYOPATHY: ICD-10-CM

## 2019-06-12 DIAGNOSIS — I47.29 NSVT (NONSUSTAINED VENTRICULAR TACHYCARDIA): ICD-10-CM

## 2019-06-12 PROCEDURE — 93296 REM INTERROG EVL PM/IDS: CPT | Performed by: INTERNAL MEDICINE

## 2019-06-12 PROCEDURE — 93295 DEV INTERROG REMOTE 1/2/MLT: CPT | Performed by: INTERNAL MEDICINE

## 2019-06-17 ENCOUNTER — CARE COORDINATION (OUTPATIENT)
Dept: CARE COORDINATION | Age: 54
End: 2019-06-17

## 2019-06-26 ENCOUNTER — TELEPHONE (OUTPATIENT)
Dept: NON INVASIVE DIAGNOSTICS | Age: 54
End: 2019-06-26

## 2019-06-26 NOTE — PROGRESS NOTES
See [employer] [place] report. Remote monitoring reviewed over a 90 day period. End of 90 day monitoring period date of service 6-12-19.

## 2019-07-17 ENCOUNTER — CARE COORDINATION (OUTPATIENT)
Dept: CARE COORDINATION | Age: 54
End: 2019-07-17

## 2019-08-27 ENCOUNTER — CARE COORDINATION (OUTPATIENT)
Dept: CARE COORDINATION | Age: 54
End: 2019-08-27

## 2019-09-03 ENCOUNTER — CARE COORDINATION (OUTPATIENT)
Dept: CARE COORDINATION | Age: 54
End: 2019-09-03

## 2019-09-11 ENCOUNTER — NURSE ONLY (OUTPATIENT)
Dept: NON INVASIVE DIAGNOSTICS | Age: 54
End: 2019-09-11
Payer: MEDICARE

## 2019-09-11 DIAGNOSIS — Z95.810 DUAL IMPLANTABLE CARDIOVERTER-DEFIBRILLATOR IN SITU: Primary | ICD-10-CM

## 2019-09-11 DIAGNOSIS — I47.29 NSVT (NONSUSTAINED VENTRICULAR TACHYCARDIA): ICD-10-CM

## 2019-09-11 DIAGNOSIS — I25.5 ISCHEMIC CARDIOMYOPATHY: ICD-10-CM

## 2019-09-11 PROCEDURE — 93295 DEV INTERROG REMOTE 1/2/MLT: CPT | Performed by: INTERNAL MEDICINE

## 2019-09-11 PROCEDURE — 93296 REM INTERROG EVL PM/IDS: CPT | Performed by: INTERNAL MEDICINE

## 2019-09-13 ENCOUNTER — TELEPHONE (OUTPATIENT)
Dept: NON INVASIVE DIAGNOSTICS | Age: 54
End: 2019-09-13

## 2019-10-10 ENCOUNTER — HOSPITAL ENCOUNTER (OUTPATIENT)
Age: 54
Setting detail: OBSERVATION
Discharge: HOME | End: 2019-10-11
Attending: EMERGENCY MEDICINE | Admitting: FAMILY MEDICINE
Payer: MEDICARE

## 2019-10-10 ENCOUNTER — APPOINTMENT (OUTPATIENT)
Dept: GENERAL RADIOLOGY | Age: 54
End: 2019-10-10
Payer: MEDICARE

## 2019-10-10 ENCOUNTER — TELEPHONE (OUTPATIENT)
Dept: FAMILY MEDICINE CLINIC | Age: 54
End: 2019-10-10

## 2019-10-10 DIAGNOSIS — R07.9 ACUTE CHEST PAIN: ICD-10-CM

## 2019-10-10 DIAGNOSIS — J41.0 SIMPLE CHRONIC BRONCHITIS (HCC): Primary | Chronic | ICD-10-CM

## 2019-10-10 LAB
ALBUMIN SERPL-MCNC: 4.4 G/DL (ref 3.5–5.2)
ALP BLD-CCNC: 112 U/L (ref 40–129)
ALT SERPL-CCNC: 24 U/L (ref 0–40)
ANION GAP SERPL CALCULATED.3IONS-SCNC: 9 MMOL/L (ref 7–16)
AST SERPL-CCNC: 20 U/L (ref 0–39)
BASOPHILS ABSOLUTE: 0.05 E9/L (ref 0–0.2)
BASOPHILS RELATIVE PERCENT: 0.7 % (ref 0–2)
BILIRUB SERPL-MCNC: 0.7 MG/DL (ref 0–1.2)
BUN BLDV-MCNC: 26 MG/DL (ref 6–20)
CALCIUM SERPL-MCNC: 9.5 MG/DL (ref 8.6–10.2)
CHLORIDE BLD-SCNC: 101 MMOL/L (ref 98–107)
CO2: 31 MMOL/L (ref 22–29)
CREAT SERPL-MCNC: 1.4 MG/DL (ref 0.7–1.2)
EOSINOPHILS ABSOLUTE: 0.24 E9/L (ref 0.05–0.5)
EOSINOPHILS RELATIVE PERCENT: 3.3 % (ref 0–6)
FILM ARRAY ADENOVIRUS: NORMAL
FILM ARRAY BORDETELLA PERTUSSIS: NORMAL
FILM ARRAY CHLAMYDOPHILIA PNEUMONIAE: NORMAL
FILM ARRAY CORONAVIRUS 229E: NORMAL
FILM ARRAY CORONAVIRUS HKU1: NORMAL
FILM ARRAY CORONAVIRUS NL63: NORMAL
FILM ARRAY CORONAVIRUS OC43: NORMAL
FILM ARRAY INFLUENZA A VIRUS 09H1: NORMAL
FILM ARRAY INFLUENZA A VIRUS H1: NORMAL
FILM ARRAY INFLUENZA A VIRUS H3: NORMAL
FILM ARRAY INFLUENZA A VIRUS: NORMAL
FILM ARRAY INFLUENZA B: NORMAL
FILM ARRAY METAPNEUMOVIRUS: NORMAL
FILM ARRAY MYCOPLASMA PNEUMONIAE: NORMAL
FILM ARRAY PARAINFLUENZA VIRUS 1: NORMAL
FILM ARRAY PARAINFLUENZA VIRUS 2: NORMAL
FILM ARRAY PARAINFLUENZA VIRUS 3: NORMAL
FILM ARRAY PARAINFLUENZA VIRUS 4: NORMAL
FILM ARRAY RESPIRATORY SYNCITIAL VIRUS: NORMAL
FILM ARRAY RHINOVIRUS/ENTEROVIRUS: NORMAL
GFR AFRICAN AMERICAN: >60
GFR NON-AFRICAN AMERICAN: 53 ML/MIN/1.73
GLUCOSE BLD-MCNC: 136 MG/DL (ref 74–99)
HBA1C MFR BLD: 6.6 % (ref 4–5.6)
HCT VFR BLD CALC: 50.4 % (ref 37–54)
HEMOGLOBIN: 15.8 G/DL (ref 12.5–16.5)
IMMATURE GRANULOCYTES #: 0.03 E9/L
IMMATURE GRANULOCYTES %: 0.4 % (ref 0–5)
LYMPHOCYTES ABSOLUTE: 1.5 E9/L (ref 1.5–4)
LYMPHOCYTES RELATIVE PERCENT: 20.9 % (ref 20–42)
MCH RBC QN AUTO: 26.1 PG (ref 26–35)
MCHC RBC AUTO-ENTMCNC: 31.3 % (ref 32–34.5)
MCV RBC AUTO: 83.2 FL (ref 80–99.9)
MONOCYTES ABSOLUTE: 0.54 E9/L (ref 0.1–0.95)
MONOCYTES RELATIVE PERCENT: 7.5 % (ref 2–12)
NEUTROPHILS ABSOLUTE: 4.83 E9/L (ref 1.8–7.3)
NEUTROPHILS RELATIVE PERCENT: 67.2 % (ref 43–80)
PDW BLD-RTO: 13.5 FL (ref 11.5–15)
PLATELET # BLD: 192 E9/L (ref 130–450)
PMV BLD AUTO: 10.7 FL (ref 7–12)
POTASSIUM SERPL-SCNC: 3.8 MMOL/L (ref 3.5–5)
PRO-BNP: 549 PG/ML (ref 0–125)
RBC # BLD: 6.06 E12/L (ref 3.8–5.8)
SODIUM BLD-SCNC: 141 MMOL/L (ref 132–146)
TOTAL PROTEIN: 7.8 G/DL (ref 6.4–8.3)
TROPONIN: <0.01 NG/ML (ref 0–0.03)
WBC # BLD: 7.2 E9/L (ref 4.5–11.5)

## 2019-10-10 PROCEDURE — G0378 HOSPITAL OBSERVATION PER HR: HCPCS

## 2019-10-10 PROCEDURE — 6370000000 HC RX 637 (ALT 250 FOR IP)

## 2019-10-10 PROCEDURE — 2580000003 HC RX 258: Performed by: STUDENT IN AN ORGANIZED HEALTH CARE EDUCATION/TRAINING PROGRAM

## 2019-10-10 PROCEDURE — 84484 ASSAY OF TROPONIN QUANT: CPT

## 2019-10-10 PROCEDURE — 93005 ELECTROCARDIOGRAM TRACING: CPT | Performed by: PHYSICIAN ASSISTANT

## 2019-10-10 PROCEDURE — 87581 M.PNEUMON DNA AMP PROBE: CPT

## 2019-10-10 PROCEDURE — 36415 COLL VENOUS BLD VENIPUNCTURE: CPT

## 2019-10-10 PROCEDURE — 87798 DETECT AGENT NOS DNA AMP: CPT

## 2019-10-10 PROCEDURE — 94760 N-INVAS EAR/PLS OXIMETRY 1: CPT

## 2019-10-10 PROCEDURE — 99285 EMERGENCY DEPT VISIT HI MDM: CPT

## 2019-10-10 PROCEDURE — 6370000000 HC RX 637 (ALT 250 FOR IP): Performed by: EMERGENCY MEDICINE

## 2019-10-10 PROCEDURE — 6370000000 HC RX 637 (ALT 250 FOR IP): Performed by: STUDENT IN AN ORGANIZED HEALTH CARE EDUCATION/TRAINING PROGRAM

## 2019-10-10 PROCEDURE — 83880 ASSAY OF NATRIURETIC PEPTIDE: CPT

## 2019-10-10 PROCEDURE — 83036 HEMOGLOBIN GLYCOSYLATED A1C: CPT

## 2019-10-10 PROCEDURE — 85025 COMPLETE CBC W/AUTO DIFF WBC: CPT

## 2019-10-10 PROCEDURE — 80053 COMPREHEN METABOLIC PANEL: CPT

## 2019-10-10 PROCEDURE — 71046 X-RAY EXAM CHEST 2 VIEWS: CPT

## 2019-10-10 PROCEDURE — 87633 RESP VIRUS 12-25 TARGETS: CPT

## 2019-10-10 PROCEDURE — 87486 CHLMYD PNEUM DNA AMP PROBE: CPT

## 2019-10-10 PROCEDURE — 93005 ELECTROCARDIOGRAM TRACING: CPT | Performed by: EMERGENCY MEDICINE

## 2019-10-10 RX ORDER — SODIUM CHLORIDE 0.9 % (FLUSH) 0.9 %
10 SYRINGE (ML) INJECTION EVERY 12 HOURS SCHEDULED
Status: DISCONTINUED | OUTPATIENT
Start: 2019-10-10 | End: 2019-10-11 | Stop reason: HOSPADM

## 2019-10-10 RX ORDER — ESCITALOPRAM OXALATE 10 MG/1
10 TABLET ORAL DAILY
Status: DISCONTINUED | OUTPATIENT
Start: 2019-10-11 | End: 2019-10-11 | Stop reason: HOSPADM

## 2019-10-10 RX ORDER — ATORVASTATIN CALCIUM 40 MG/1
80 TABLET, FILM COATED ORAL NIGHTLY
Status: DISCONTINUED | OUTPATIENT
Start: 2019-10-10 | End: 2019-10-11 | Stop reason: HOSPADM

## 2019-10-10 RX ORDER — ASPIRIN 81 MG/1
243 TABLET, CHEWABLE ORAL ONCE
Status: COMPLETED | OUTPATIENT
Start: 2019-10-10 | End: 2019-10-10

## 2019-10-10 RX ORDER — ASPIRIN 81 MG/1
81 TABLET, CHEWABLE ORAL DAILY
Status: DISCONTINUED | OUTPATIENT
Start: 2019-10-11 | End: 2019-10-11 | Stop reason: HOSPADM

## 2019-10-10 RX ORDER — ACETAMINOPHEN 325 MG/1
650 TABLET ORAL ONCE
Status: COMPLETED | OUTPATIENT
Start: 2019-10-10 | End: 2019-10-10

## 2019-10-10 RX ORDER — ACETAMINOPHEN 325 MG/1
650 TABLET ORAL EVERY 4 HOURS PRN
Status: DISCONTINUED | OUTPATIENT
Start: 2019-10-10 | End: 2019-10-11 | Stop reason: HOSPADM

## 2019-10-10 RX ORDER — SODIUM CHLORIDE 0.9 % (FLUSH) 0.9 %
10 SYRINGE (ML) INJECTION PRN
Status: DISCONTINUED | OUTPATIENT
Start: 2019-10-10 | End: 2019-10-11 | Stop reason: HOSPADM

## 2019-10-10 RX ORDER — FUROSEMIDE 20 MG/1
20 TABLET ORAL 2 TIMES DAILY
Status: DISCONTINUED | OUTPATIENT
Start: 2019-10-10 | End: 2019-10-11 | Stop reason: HOSPADM

## 2019-10-10 RX ORDER — TAMSULOSIN HYDROCHLORIDE 0.4 MG/1
0.4 CAPSULE ORAL DAILY
Status: DISCONTINUED | OUTPATIENT
Start: 2019-10-11 | End: 2019-10-11 | Stop reason: HOSPADM

## 2019-10-10 RX ORDER — ASPIRIN 81 MG/1
TABLET, CHEWABLE ORAL
Status: COMPLETED
Start: 2019-10-10 | End: 2019-10-10

## 2019-10-10 RX ORDER — PANTOPRAZOLE SODIUM 40 MG/1
40 TABLET, DELAYED RELEASE ORAL
Status: DISCONTINUED | OUTPATIENT
Start: 2019-10-11 | End: 2019-10-11 | Stop reason: HOSPADM

## 2019-10-10 RX ORDER — ISOSORBIDE MONONITRATE 60 MG/1
60 TABLET, EXTENDED RELEASE ORAL DAILY
Status: DISCONTINUED | OUTPATIENT
Start: 2019-10-11 | End: 2019-10-11

## 2019-10-10 RX ORDER — LISINOPRIL 20 MG/1
40 TABLET ORAL DAILY
Status: DISCONTINUED | OUTPATIENT
Start: 2019-10-11 | End: 2019-10-11 | Stop reason: HOSPADM

## 2019-10-10 RX ORDER — CLOPIDOGREL BISULFATE 75 MG/1
75 TABLET ORAL DAILY
Status: DISCONTINUED | OUTPATIENT
Start: 2019-10-11 | End: 2019-10-11 | Stop reason: HOSPADM

## 2019-10-10 RX ORDER — METOPROLOL SUCCINATE 25 MG/1
75 TABLET, EXTENDED RELEASE ORAL 2 TIMES DAILY
Status: DISCONTINUED | OUTPATIENT
Start: 2019-10-10 | End: 2019-10-11 | Stop reason: HOSPADM

## 2019-10-10 RX ADMIN — Medication 10 ML: at 21:21

## 2019-10-10 RX ADMIN — METOPROLOL SUCCINATE 75 MG: 25 TABLET, EXTENDED RELEASE ORAL at 21:21

## 2019-10-10 RX ADMIN — ATORVASTATIN CALCIUM 80 MG: 40 TABLET, FILM COATED ORAL at 21:21

## 2019-10-10 RX ADMIN — ACETAMINOPHEN 650 MG: 325 TABLET, FILM COATED ORAL at 16:09

## 2019-10-10 RX ADMIN — ASPIRIN 81 MG CHEWABLE TABLET 243 MG: 81 TABLET CHEWABLE at 16:09

## 2019-10-10 RX ADMIN — ASPIRIN 81 MG CHEWABLE TABLET 243 MG: 81 TABLET CHEWABLE at 16:13

## 2019-10-10 ASSESSMENT — PAIN DESCRIPTION - LOCATION: LOCATION: CHEST;HEAD

## 2019-10-10 ASSESSMENT — PAIN SCALES - GENERAL
PAINLEVEL_OUTOF10: 0
PAINLEVEL_OUTOF10: 2
PAINLEVEL_OUTOF10: 6
PAINLEVEL_OUTOF10: 0

## 2019-10-10 ASSESSMENT — PAIN DESCRIPTION - PROGRESSION: CLINICAL_PROGRESSION: NOT CHANGED

## 2019-10-10 ASSESSMENT — PAIN DESCRIPTION - FREQUENCY: FREQUENCY: INTERMITTENT

## 2019-10-10 ASSESSMENT — PAIN DESCRIPTION - PAIN TYPE: TYPE: ACUTE PAIN

## 2019-10-11 VITALS
BODY MASS INDEX: 33.6 KG/M2 | SYSTOLIC BLOOD PRESSURE: 131 MMHG | HEIGHT: 70 IN | HEART RATE: 68 BPM | DIASTOLIC BLOOD PRESSURE: 63 MMHG | WEIGHT: 234.7 LBS | RESPIRATION RATE: 20 BRPM | TEMPERATURE: 97.2 F | OXYGEN SATURATION: 97 %

## 2019-10-11 PROBLEM — R07.9 ACUTE CHEST PAIN: Status: RESOLVED | Noted: 2019-10-10 | Resolved: 2019-10-11

## 2019-10-11 LAB
ANION GAP SERPL CALCULATED.3IONS-SCNC: 15 MMOL/L (ref 7–16)
BUN BLDV-MCNC: 24 MG/DL (ref 6–20)
CALCIUM SERPL-MCNC: 9.2 MG/DL (ref 8.6–10.2)
CHLORIDE BLD-SCNC: 99 MMOL/L (ref 98–107)
CHOLESTEROL, TOTAL: 176 MG/DL (ref 0–199)
CO2: 26 MMOL/L (ref 22–29)
CREAT SERPL-MCNC: 1.4 MG/DL (ref 0.7–1.2)
EKG ATRIAL RATE: 60 BPM
EKG ATRIAL RATE: 70 BPM
EKG ATRIAL RATE: 91 BPM
EKG P AXIS: 53 DEGREES
EKG P AXIS: 54 DEGREES
EKG P AXIS: 80 DEGREES
EKG P-R INTERVAL: 206 MS
EKG P-R INTERVAL: 210 MS
EKG P-R INTERVAL: 272 MS
EKG Q-T INTERVAL: 390 MS
EKG Q-T INTERVAL: 430 MS
EKG Q-T INTERVAL: 480 MS
EKG QRS DURATION: 102 MS
EKG QRS DURATION: 104 MS
EKG QRS DURATION: 112 MS
EKG QTC CALCULATION (BAZETT): 464 MS
EKG QTC CALCULATION (BAZETT): 479 MS
EKG QTC CALCULATION (BAZETT): 480 MS
EKG R AXIS: 103 DEGREES
EKG R AXIS: 110 DEGREES
EKG R AXIS: 128 DEGREES
EKG T AXIS: 104 DEGREES
EKG T AXIS: 131 DEGREES
EKG T AXIS: 89 DEGREES
EKG VENTRICULAR RATE: 60 BPM
EKG VENTRICULAR RATE: 70 BPM
EKG VENTRICULAR RATE: 91 BPM
GFR AFRICAN AMERICAN: >60
GFR NON-AFRICAN AMERICAN: 53 ML/MIN/1.73
GLUCOSE BLD-MCNC: 122 MG/DL (ref 74–99)
HCT VFR BLD CALC: 47.4 % (ref 37–54)
HDLC SERPL-MCNC: 28 MG/DL
HEMOGLOBIN: 15.2 G/DL (ref 12.5–16.5)
LDL CHOLESTEROL CALCULATED: 116 MG/DL (ref 0–99)
LV EF: 33 %
LVEF MODALITY: NORMAL
MCH RBC QN AUTO: 27 PG (ref 26–35)
MCHC RBC AUTO-ENTMCNC: 32.1 % (ref 32–34.5)
MCV RBC AUTO: 84.3 FL (ref 80–99.9)
PDW BLD-RTO: 13.7 FL (ref 11.5–15)
PLATELET # BLD: 200 E9/L (ref 130–450)
PMV BLD AUTO: 10.8 FL (ref 7–12)
POTASSIUM REFLEX MAGNESIUM: 3.9 MMOL/L (ref 3.5–5)
RBC # BLD: 5.62 E12/L (ref 3.8–5.8)
SODIUM BLD-SCNC: 140 MMOL/L (ref 132–146)
TRIGL SERPL-MCNC: 159 MG/DL (ref 0–149)
TROPONIN: <0.01 NG/ML (ref 0–0.03)
TROPONIN: <0.01 NG/ML (ref 0–0.03)
VLDLC SERPL CALC-MCNC: 32 MG/DL
WBC # BLD: 7 E9/L (ref 4.5–11.5)

## 2019-10-11 PROCEDURE — 2580000003 HC RX 258: Performed by: STUDENT IN AN ORGANIZED HEALTH CARE EDUCATION/TRAINING PROGRAM

## 2019-10-11 PROCEDURE — 36415 COLL VENOUS BLD VENIPUNCTURE: CPT

## 2019-10-11 PROCEDURE — 6370000000 HC RX 637 (ALT 250 FOR IP): Performed by: STUDENT IN AN ORGANIZED HEALTH CARE EDUCATION/TRAINING PROGRAM

## 2019-10-11 PROCEDURE — APPSS60 APP SPLIT SHARED TIME 46-60 MINUTES: Performed by: CLINICAL NURSE SPECIALIST

## 2019-10-11 PROCEDURE — 93010 ELECTROCARDIOGRAM REPORT: CPT | Performed by: FAMILY MEDICINE

## 2019-10-11 PROCEDURE — 84484 ASSAY OF TROPONIN QUANT: CPT

## 2019-10-11 PROCEDURE — 99219 PR INITIAL OBSERVATION CARE/DAY 50 MINUTES: CPT | Performed by: FAMILY MEDICINE

## 2019-10-11 PROCEDURE — G0378 HOSPITAL OBSERVATION PER HR: HCPCS

## 2019-10-11 PROCEDURE — 6360000004 HC RX CONTRAST MEDICATION

## 2019-10-11 PROCEDURE — 85027 COMPLETE CBC AUTOMATED: CPT

## 2019-10-11 PROCEDURE — 6370000000 HC RX 637 (ALT 250 FOR IP): Performed by: INTERNAL MEDICINE

## 2019-10-11 PROCEDURE — 93010 ELECTROCARDIOGRAM REPORT: CPT | Performed by: INTERNAL MEDICINE

## 2019-10-11 PROCEDURE — 80048 BASIC METABOLIC PNL TOTAL CA: CPT

## 2019-10-11 PROCEDURE — 93005 ELECTROCARDIOGRAM TRACING: CPT | Performed by: STUDENT IN AN ORGANIZED HEALTH CARE EDUCATION/TRAINING PROGRAM

## 2019-10-11 PROCEDURE — 80061 LIPID PANEL: CPT

## 2019-10-11 PROCEDURE — 99215 OFFICE O/P EST HI 40 MIN: CPT | Performed by: INTERNAL MEDICINE

## 2019-10-11 PROCEDURE — 6370000000 HC RX 637 (ALT 250 FOR IP)

## 2019-10-11 PROCEDURE — 93306 TTE W/DOPPLER COMPLETE: CPT

## 2019-10-11 RX ORDER — METOPROLOL SUCCINATE 25 MG/1
75 TABLET, EXTENDED RELEASE ORAL 2 TIMES DAILY
Qty: 30 TABLET | Refills: 3 | Status: SHIPPED | OUTPATIENT
Start: 2019-10-11 | End: 2019-10-18 | Stop reason: SDUPTHER

## 2019-10-11 RX ORDER — HYDRALAZINE HYDROCHLORIDE 25 MG/1
25 TABLET, FILM COATED ORAL EVERY 8 HOURS SCHEDULED
Status: DISCONTINUED | OUTPATIENT
Start: 2019-10-11 | End: 2019-10-11 | Stop reason: HOSPADM

## 2019-10-11 RX ORDER — HYDRALAZINE HYDROCHLORIDE 25 MG/1
25 TABLET, FILM COATED ORAL EVERY 8 HOURS SCHEDULED
Qty: 90 TABLET | Refills: 3 | Status: SHIPPED | OUTPATIENT
Start: 2019-10-11 | End: 2019-12-11

## 2019-10-11 RX ORDER — ATORVASTATIN CALCIUM 80 MG/1
80 TABLET, FILM COATED ORAL NIGHTLY
Qty: 30 TABLET | Refills: 3 | Status: SHIPPED | OUTPATIENT
Start: 2019-10-11 | End: 2020-04-29 | Stop reason: SDUPTHER

## 2019-10-11 RX ORDER — TAMSULOSIN HYDROCHLORIDE 0.4 MG/1
CAPSULE ORAL
Status: DISCONTINUED
Start: 2019-10-11 | End: 2019-10-11 | Stop reason: HOSPADM

## 2019-10-11 RX ORDER — ISOSORBIDE MONONITRATE 30 MG/1
90 TABLET, EXTENDED RELEASE ORAL DAILY
Qty: 30 TABLET | Refills: 3 | Status: SHIPPED | OUTPATIENT
Start: 2019-10-12 | End: 2019-10-18 | Stop reason: SDUPTHER

## 2019-10-11 RX ORDER — SPIRONOLACTONE 25 MG/1
25 TABLET ORAL DAILY
Status: DISCONTINUED | OUTPATIENT
Start: 2019-10-11 | End: 2019-10-11 | Stop reason: HOSPADM

## 2019-10-11 RX ORDER — SPIRONOLACTONE 25 MG/1
25 TABLET ORAL DAILY
Qty: 30 TABLET | Refills: 3 | Status: SHIPPED
Start: 2019-10-11 | End: 2020-04-30 | Stop reason: SDUPTHER

## 2019-10-11 RX ORDER — ASPIRIN 81 MG/1
TABLET, CHEWABLE ORAL
Status: COMPLETED
Start: 2019-10-11 | End: 2019-10-11

## 2019-10-11 RX ADMIN — CLOPIDOGREL 75 MG: 75 TABLET, FILM COATED ORAL at 08:49

## 2019-10-11 RX ADMIN — ISOSORBIDE MONONITRATE 60 MG: 60 TABLET ORAL at 08:49

## 2019-10-11 RX ADMIN — LISINOPRIL 40 MG: 20 TABLET ORAL at 08:49

## 2019-10-11 RX ADMIN — TAMSULOSIN HYDROCHLORIDE 0.4 MG: 0.4 CAPSULE ORAL at 08:52

## 2019-10-11 RX ADMIN — FUROSEMIDE 20 MG: 20 TABLET ORAL at 08:50

## 2019-10-11 RX ADMIN — Medication 10 ML: at 08:53

## 2019-10-11 RX ADMIN — METOPROLOL SUCCINATE 75 MG: 25 TABLET, EXTENDED RELEASE ORAL at 08:49

## 2019-10-11 RX ADMIN — SPIRONOLACTONE 25 MG: 25 TABLET ORAL at 12:37

## 2019-10-11 RX ADMIN — ESCITALOPRAM 10 MG: 10 TABLET, FILM COATED ORAL at 08:49

## 2019-10-11 RX ADMIN — ASPIRIN 81 MG 81 MG: 81 TABLET ORAL at 08:50

## 2019-10-11 ASSESSMENT — PAIN SCALES - GENERAL: PAINLEVEL_OUTOF10: 0

## 2019-10-14 ENCOUNTER — TELEPHONE (OUTPATIENT)
Dept: CARDIOLOGY CLINIC | Age: 54
End: 2019-10-14

## 2019-10-18 ENCOUNTER — HOSPITAL ENCOUNTER (OUTPATIENT)
Age: 54
Discharge: HOME | End: 2019-10-20
Payer: MEDICARE

## 2019-10-18 ENCOUNTER — OFFICE VISIT (OUTPATIENT)
Dept: FAMILY MEDICINE CLINIC | Age: 54
End: 2019-10-18
Payer: MEDICARE

## 2019-10-18 VITALS
WEIGHT: 239 LBS | TEMPERATURE: 97.6 F | HEIGHT: 70 IN | BODY MASS INDEX: 34.22 KG/M2 | OXYGEN SATURATION: 97 % | DIASTOLIC BLOOD PRESSURE: 85 MMHG | SYSTOLIC BLOOD PRESSURE: 157 MMHG | HEART RATE: 80 BPM | RESPIRATION RATE: 18 BRPM

## 2019-10-18 DIAGNOSIS — I10 ESSENTIAL HYPERTENSION: Primary | ICD-10-CM

## 2019-10-18 DIAGNOSIS — Z23 NEED FOR IMMUNIZATION AGAINST INFLUENZA: ICD-10-CM

## 2019-10-18 DIAGNOSIS — N17.9 AKI (ACUTE KIDNEY INJURY) (HCC): ICD-10-CM

## 2019-10-18 DIAGNOSIS — K21.9 GASTROESOPHAGEAL REFLUX DISEASE, ESOPHAGITIS PRESENCE NOT SPECIFIED: ICD-10-CM

## 2019-10-18 LAB
ANION GAP SERPL CALCULATED.3IONS-SCNC: 16 MMOL/L (ref 7–16)
BUN BLDV-MCNC: 21 MG/DL (ref 6–20)
CALCIUM SERPL-MCNC: 9.5 MG/DL (ref 8.6–10.2)
CHLORIDE BLD-SCNC: 100 MMOL/L (ref 98–107)
CO2: 24 MMOL/L (ref 22–29)
CREAT SERPL-MCNC: 1.3 MG/DL (ref 0.7–1.2)
GFR AFRICAN AMERICAN: >60
GFR NON-AFRICAN AMERICAN: 57 ML/MIN/1.73
GLUCOSE BLD-MCNC: 175 MG/DL (ref 74–99)
POTASSIUM SERPL-SCNC: 4.8 MMOL/L (ref 3.5–5)
SODIUM BLD-SCNC: 140 MMOL/L (ref 132–146)

## 2019-10-18 PROCEDURE — 6360000002 HC RX W HCPCS

## 2019-10-18 PROCEDURE — 99212 OFFICE O/P EST SF 10 MIN: CPT | Performed by: STUDENT IN AN ORGANIZED HEALTH CARE EDUCATION/TRAINING PROGRAM

## 2019-10-18 PROCEDURE — G0008 ADMIN INFLUENZA VIRUS VAC: HCPCS

## 2019-10-18 PROCEDURE — 80048 BASIC METABOLIC PNL TOTAL CA: CPT

## 2019-10-18 PROCEDURE — 36415 COLL VENOUS BLD VENIPUNCTURE: CPT | Performed by: FAMILY MEDICINE

## 2019-10-18 PROCEDURE — 99213 OFFICE O/P EST LOW 20 MIN: CPT | Performed by: STUDENT IN AN ORGANIZED HEALTH CARE EDUCATION/TRAINING PROGRAM

## 2019-10-18 PROCEDURE — 90686 IIV4 VACC NO PRSV 0.5 ML IM: CPT

## 2019-10-18 RX ORDER — METOPROLOL SUCCINATE 25 MG/1
75 TABLET, EXTENDED RELEASE ORAL 2 TIMES DAILY
Qty: 270 TABLET | Refills: 3 | Status: SHIPPED | OUTPATIENT
Start: 2019-10-18 | End: 2020-04-29 | Stop reason: SDUPTHER

## 2019-10-18 RX ORDER — ISOSORBIDE MONONITRATE 30 MG/1
90 TABLET, EXTENDED RELEASE ORAL DAILY
Qty: 270 TABLET | Refills: 3 | Status: SHIPPED | OUTPATIENT
Start: 2019-10-18 | End: 2020-04-29 | Stop reason: SDUPTHER

## 2019-10-20 ASSESSMENT — ENCOUNTER SYMPTOMS
CHEST TIGHTNESS: 0
WHEEZING: 0
COLOR CHANGE: 0
ABDOMINAL PAIN: 0
EYE ITCHING: 0
DIARRHEA: 0
RHINORRHEA: 0
BLOOD IN STOOL: 0
EYE PAIN: 0
NAUSEA: 0
CONSTIPATION: 0
SHORTNESS OF BREATH: 0
ABDOMINAL DISTENTION: 0
COUGH: 0
BACK PAIN: 0
VOMITING: 0

## 2019-10-21 ENCOUNTER — TELEPHONE (OUTPATIENT)
Dept: SURGERY | Age: 54
End: 2019-10-21

## 2019-11-19 ENCOUNTER — OFFICE VISIT (OUTPATIENT)
Dept: FAMILY MEDICINE CLINIC | Age: 54
End: 2019-11-19
Payer: MEDICARE

## 2019-11-19 ENCOUNTER — HOSPITAL ENCOUNTER (OUTPATIENT)
Age: 54
Discharge: HOME | End: 2019-11-21
Payer: MEDICARE

## 2019-11-19 VITALS
HEART RATE: 79 BPM | DIASTOLIC BLOOD PRESSURE: 71 MMHG | HEIGHT: 70 IN | OXYGEN SATURATION: 97 % | SYSTOLIC BLOOD PRESSURE: 139 MMHG | BODY MASS INDEX: 34.07 KG/M2 | RESPIRATION RATE: 18 BRPM | WEIGHT: 238 LBS | TEMPERATURE: 97.6 F

## 2019-11-19 DIAGNOSIS — N17.9 AKI (ACUTE KIDNEY INJURY) (HCC): ICD-10-CM

## 2019-11-19 DIAGNOSIS — N52.2 DRUG-INDUCED ERECTILE DYSFUNCTION: ICD-10-CM

## 2019-11-19 DIAGNOSIS — I10 ESSENTIAL HYPERTENSION: Primary | ICD-10-CM

## 2019-11-19 LAB
ANION GAP SERPL CALCULATED.3IONS-SCNC: 14 MMOL/L (ref 7–16)
BUN BLDV-MCNC: 18 MG/DL (ref 6–20)
CALCIUM SERPL-MCNC: 9.7 MG/DL (ref 8.6–10.2)
CHLORIDE BLD-SCNC: 98 MMOL/L (ref 98–107)
CO2: 25 MMOL/L (ref 22–29)
CREAT SERPL-MCNC: 1.3 MG/DL (ref 0.7–1.2)
GFR AFRICAN AMERICAN: >60
GFR NON-AFRICAN AMERICAN: 57 ML/MIN/1.73
GLUCOSE BLD-MCNC: 166 MG/DL (ref 74–99)
POTASSIUM SERPL-SCNC: 4.9 MMOL/L (ref 3.5–5)
SODIUM BLD-SCNC: 137 MMOL/L (ref 132–146)

## 2019-11-19 PROCEDURE — 99212 OFFICE O/P EST SF 10 MIN: CPT | Performed by: STUDENT IN AN ORGANIZED HEALTH CARE EDUCATION/TRAINING PROGRAM

## 2019-11-19 PROCEDURE — 99213 OFFICE O/P EST LOW 20 MIN: CPT | Performed by: STUDENT IN AN ORGANIZED HEALTH CARE EDUCATION/TRAINING PROGRAM

## 2019-11-19 PROCEDURE — 80048 BASIC METABOLIC PNL TOTAL CA: CPT

## 2019-11-19 PROCEDURE — 36415 COLL VENOUS BLD VENIPUNCTURE: CPT | Performed by: FAMILY MEDICINE

## 2019-11-21 ENCOUNTER — OFFICE VISIT (OUTPATIENT)
Dept: SURGERY | Age: 54
End: 2019-11-21
Payer: MEDICARE

## 2019-11-21 VITALS
TEMPERATURE: 97.7 F | HEART RATE: 72 BPM | OXYGEN SATURATION: 97 % | BODY MASS INDEX: 34.22 KG/M2 | RESPIRATION RATE: 16 BRPM | SYSTOLIC BLOOD PRESSURE: 121 MMHG | WEIGHT: 239 LBS | DIASTOLIC BLOOD PRESSURE: 75 MMHG | HEIGHT: 70 IN

## 2019-11-21 DIAGNOSIS — I10 ESSENTIAL HYPERTENSION: Chronic | ICD-10-CM

## 2019-11-21 DIAGNOSIS — K21.9 GASTROESOPHAGEAL REFLUX DISEASE, ESOPHAGITIS PRESENCE NOT SPECIFIED: Primary | Chronic | ICD-10-CM

## 2019-11-21 DIAGNOSIS — E11.9 TYPE 2 DIABETES MELLITUS WITHOUT COMPLICATION, WITHOUT LONG-TERM CURRENT USE OF INSULIN (HCC): Chronic | ICD-10-CM

## 2019-11-21 DIAGNOSIS — E78.2 MIXED HYPERLIPIDEMIA: Chronic | ICD-10-CM

## 2019-11-21 PROBLEM — R07.81 PLEURITIC CHEST PAIN: Status: RESOLVED | Noted: 2017-01-23 | Resolved: 2019-11-21

## 2019-11-21 PROBLEM — I35.1 NONRHEUMATIC AORTIC VALVE INSUFFICIENCY: Chronic | Status: ACTIVE | Noted: 2019-02-20

## 2019-11-21 PROBLEM — B35.3 TINEA PEDIS OF BOTH FEET: Status: RESOLVED | Noted: 2019-05-07 | Resolved: 2019-11-21

## 2019-11-21 PROBLEM — F33.1 MODERATE EPISODE OF RECURRENT MAJOR DEPRESSIVE DISORDER (HCC): Status: RESOLVED | Noted: 2018-08-27 | Resolved: 2019-11-21

## 2019-11-21 PROBLEM — D58.2 ELEVATED HEMOGLOBIN (HCC): Status: RESOLVED | Noted: 2019-05-29 | Resolved: 2019-11-21

## 2019-11-21 PROCEDURE — 99202 OFFICE O/P NEW SF 15 MIN: CPT | Performed by: SURGERY

## 2019-11-21 RX ORDER — PANTOPRAZOLE SODIUM 40 MG/1
40 TABLET, DELAYED RELEASE ORAL 2 TIMES DAILY
Qty: 60 TABLET | Refills: 3 | Status: SHIPPED | OUTPATIENT
Start: 2019-11-21 | End: 2020-04-29 | Stop reason: SDUPTHER

## 2019-11-21 RX ORDER — ALUMINA, MAGNESIA, AND SIMETHICONE 2400; 2400; 240 MG/30ML; MG/30ML; MG/30ML
15 SUSPENSION ORAL 4 TIMES DAILY
Qty: 1000 ML | Refills: 3 | Status: SHIPPED
Start: 2019-11-21 | End: 2020-04-29 | Stop reason: SDUPTHER

## 2019-11-22 ASSESSMENT — ENCOUNTER SYMPTOMS
WHEEZING: 0
ABDOMINAL PAIN: 0
BACK PAIN: 0
SHORTNESS OF BREATH: 0
BLOOD IN STOOL: 0
ABDOMINAL DISTENTION: 0
EYE PAIN: 0
EYE ITCHING: 0
VOMITING: 0
COLOR CHANGE: 0
DIARRHEA: 0
CHEST TIGHTNESS: 0
RHINORRHEA: 0
COUGH: 0
CONSTIPATION: 0
NAUSEA: 0

## 2019-12-09 ENCOUNTER — TELEPHONE (OUTPATIENT)
Dept: SURGERY | Age: 54
End: 2019-12-09

## 2019-12-11 ENCOUNTER — NURSE ONLY (OUTPATIENT)
Dept: NON INVASIVE DIAGNOSTICS | Age: 54
End: 2019-12-11
Payer: MEDICARE

## 2019-12-11 ENCOUNTER — OFFICE VISIT (OUTPATIENT)
Dept: CARDIOLOGY CLINIC | Age: 54
End: 2019-12-11
Payer: MEDICARE

## 2019-12-11 VITALS
HEART RATE: 83 BPM | BODY MASS INDEX: 34.47 KG/M2 | DIASTOLIC BLOOD PRESSURE: 80 MMHG | SYSTOLIC BLOOD PRESSURE: 132 MMHG | RESPIRATION RATE: 18 BRPM | HEIGHT: 70 IN | WEIGHT: 240.8 LBS

## 2019-12-11 DIAGNOSIS — I25.10 CORONARY ARTERY DISEASE INVOLVING NATIVE CORONARY ARTERY OF NATIVE HEART WITHOUT ANGINA PECTORIS: Primary | Chronic | ICD-10-CM

## 2019-12-11 PROCEDURE — 93295 DEV INTERROG REMOTE 1/2/MLT: CPT | Performed by: INTERNAL MEDICINE

## 2019-12-11 PROCEDURE — 93296 REM INTERROG EVL PM/IDS: CPT | Performed by: INTERNAL MEDICINE

## 2019-12-11 PROCEDURE — 99214 OFFICE O/P EST MOD 30 MIN: CPT | Performed by: INTERNAL MEDICINE

## 2019-12-11 PROCEDURE — 93000 ELECTROCARDIOGRAM COMPLETE: CPT | Performed by: INTERNAL MEDICINE

## 2019-12-11 RX ORDER — HYDRALAZINE HYDROCHLORIDE 50 MG/1
50 TABLET, FILM COATED ORAL EVERY 8 HOURS SCHEDULED
Qty: 270 TABLET | Refills: 3 | Status: SHIPPED | OUTPATIENT
Start: 2019-12-11 | End: 2020-04-29 | Stop reason: SDUPTHER

## 2020-01-08 ENCOUNTER — TELEPHONE (OUTPATIENT)
Dept: NON INVASIVE DIAGNOSTICS | Age: 55
End: 2020-01-08

## 2020-01-08 NOTE — PROGRESS NOTES
See [employer] [place] report. Remote monitoring reviewed over a 90 day period. End of 90 day monitoring period date of service 12.11.2019.

## 2020-01-08 NOTE — TELEPHONE ENCOUNTER
I left a message for the patient stating that we received his remote transmission from 12/11/19. Our staff will contact him if there is anything that needs discussed. I reminded him that his next remote is scheduled for 3/11/20 at 10:50.

## 2020-01-09 ENCOUNTER — TELEPHONE (OUTPATIENT)
Dept: SURGERY | Age: 55
End: 2020-01-09

## 2020-03-11 ENCOUNTER — NURSE ONLY (OUTPATIENT)
Dept: NON INVASIVE DIAGNOSTICS | Age: 55
End: 2020-03-11
Payer: MEDICARE

## 2020-03-11 PROCEDURE — 93296 REM INTERROG EVL PM/IDS: CPT | Performed by: INTERNAL MEDICINE

## 2020-03-11 PROCEDURE — 93295 DEV INTERROG REMOTE 1/2/MLT: CPT | Performed by: INTERNAL MEDICINE

## 2020-04-22 ENCOUNTER — TELEPHONE (OUTPATIENT)
Dept: NON INVASIVE DIAGNOSTICS | Age: 55
End: 2020-04-22

## 2020-04-29 RX ORDER — HYDRALAZINE HYDROCHLORIDE 50 MG/1
50 TABLET, FILM COATED ORAL EVERY 8 HOURS SCHEDULED
Qty: 270 TABLET | Refills: 3 | Status: SHIPPED
Start: 2020-04-29 | End: 2021-01-26

## 2020-04-29 RX ORDER — ESCITALOPRAM OXALATE 10 MG/1
10 TABLET ORAL DAILY
Qty: 90 TABLET | Refills: 1 | Status: CANCELLED | OUTPATIENT
Start: 2020-04-29

## 2020-04-29 RX ORDER — CLOPIDOGREL BISULFATE 75 MG/1
75 TABLET ORAL DAILY
Qty: 90 TABLET | Refills: 1 | Status: CANCELLED | OUTPATIENT
Start: 2020-04-29

## 2020-04-29 RX ORDER — ISOSORBIDE MONONITRATE 30 MG/1
90 TABLET, EXTENDED RELEASE ORAL DAILY
Qty: 270 TABLET | Refills: 3 | Status: SHIPPED
Start: 2020-04-29 | End: 2021-03-30 | Stop reason: SDUPTHER

## 2020-04-29 RX ORDER — FUROSEMIDE 40 MG/1
20 TABLET ORAL 2 TIMES DAILY
Qty: 90 TABLET | Refills: 1 | Status: CANCELLED | OUTPATIENT
Start: 2020-04-29

## 2020-04-29 RX ORDER — ATORVASTATIN CALCIUM 80 MG/1
80 TABLET, FILM COATED ORAL NIGHTLY
Qty: 90 TABLET | Refills: 1 | Status: SHIPPED
Start: 2020-04-29 | End: 2021-03-30 | Stop reason: SDUPTHER

## 2020-04-29 RX ORDER — PANTOPRAZOLE SODIUM 40 MG/1
40 TABLET, DELAYED RELEASE ORAL 2 TIMES DAILY
Qty: 60 TABLET | Refills: 1 | Status: SHIPPED
Start: 2020-04-29 | End: 2020-05-01

## 2020-04-29 RX ORDER — METOPROLOL SUCCINATE 25 MG/1
75 TABLET, EXTENDED RELEASE ORAL 2 TIMES DAILY
Qty: 540 TABLET | Refills: 1 | Status: SHIPPED
Start: 2020-04-29 | End: 2021-01-26

## 2020-04-29 NOTE — TELEPHONE ENCOUNTER
Please call him. I refilled his medications as requested. How is he feeling? Please make an appointment with me, either virtual or in person, in the next couple of weeks. Also, he was supposed to follow up with Dr. Rm Calderon to discuss his heartburn symptoms, and he appears to have missed that appointment. I will refill the higher dose of Protonix for him for now, but this is something that we will need to discuss. Thank you!

## 2020-04-30 RX ORDER — LISINOPRIL 40 MG/1
40 TABLET ORAL DAILY
Qty: 90 TABLET | Refills: 1 | Status: SHIPPED
Start: 2020-04-30 | End: 2021-03-30 | Stop reason: SDUPTHER

## 2020-04-30 RX ORDER — SPIRONOLACTONE 25 MG/1
25 TABLET ORAL DAILY
Qty: 90 TABLET | Refills: 1 | Status: SHIPPED
Start: 2020-04-30 | End: 2021-03-30 | Stop reason: SDUPTHER

## 2020-04-30 RX ORDER — ASPIRIN 81 MG/1
81 TABLET, CHEWABLE ORAL DAILY
Qty: 90 TABLET | Refills: 1 | Status: SHIPPED
Start: 2020-04-30 | End: 2021-03-30 | Stop reason: SDUPTHER

## 2020-05-01 ENCOUNTER — CLINICAL DOCUMENTATION (OUTPATIENT)
Dept: SURGERY | Age: 55
End: 2020-05-01

## 2020-05-01 RX ORDER — PANTOPRAZOLE SODIUM 40 MG/1
40 TABLET, DELAYED RELEASE ORAL 2 TIMES DAILY
Qty: 60 TABLET | Refills: 3 | Status: SHIPPED
Start: 2020-05-01 | End: 2021-03-30 | Stop reason: SDUPTHER

## 2020-05-01 RX ORDER — ALUMINA, MAGNESIA, AND SIMETHICONE 2400; 2400; 240 MG/30ML; MG/30ML; MG/30ML
15 SUSPENSION ORAL 4 TIMES DAILY
Qty: 1000 ML | Refills: 3 | Status: SHIPPED
Start: 2020-05-01 | End: 2021-03-30 | Stop reason: ALTCHOICE

## 2020-05-01 NOTE — PROGRESS NOTES
I last saw the patient in the office on 11/21/2019 for recurrent and worsening severe heartburn and indigestion. He was empirically restarted on Prilosec 40 mg po BID and routine doses of antacids QID as well as Heartburn & Indigestion precautions. He was to follow up with me in 6 weeks and if not improving consider repeating EGD. The patient did not follow up with me as instructed. I received a request for refill on his Prilosec and antacids. I did refill these but I also had my Medical Assistant call the patient back and schedule him to follow up with me in 4-6 weeks.     Electronically signed by Marleni Slade MD on 5/1/20 at 11:14 AM EDT

## 2020-05-01 NOTE — TELEPHONE ENCOUNTER
MA notified patient via Mychart of medication refill and when follow up appointment was scheduled.        Electronically signed by Slim Lutz MA on 5/1/20 at 11:34 AM EDT

## 2020-05-28 ENCOUNTER — TELEPHONE (OUTPATIENT)
Dept: SURGERY | Age: 55
End: 2020-05-28

## 2020-06-10 ENCOUNTER — NURSE ONLY (OUTPATIENT)
Dept: NON INVASIVE DIAGNOSTICS | Age: 55
End: 2020-06-10
Payer: MEDICARE

## 2020-06-10 PROCEDURE — 93296 REM INTERROG EVL PM/IDS: CPT | Performed by: INTERNAL MEDICINE

## 2020-06-10 PROCEDURE — 93295 DEV INTERROG REMOTE 1/2/MLT: CPT | Performed by: INTERNAL MEDICINE

## 2020-06-16 ENCOUNTER — TELEPHONE (OUTPATIENT)
Dept: NON INVASIVE DIAGNOSTICS | Age: 55
End: 2020-06-16

## 2020-06-16 NOTE — TELEPHONE ENCOUNTER
Patient declined virtual video visit and requested a virtual phone call visit. Patient scheduled with [facility] on 06/23/2020. We want to confirm that, for purposes of billing, this is a virtual visit with your provider for which we will submit a claim for reimbursement with your insurance company. You will be responsible for any copays, coinsurance amounts or other amounts not covered by your insurance company. If you do not accept this, unfortunately we will not be able to schedule a virtual visit with the provider. Do you accept? Patient accepted and verbalized understanding.

## 2020-06-23 ENCOUNTER — VIRTUAL VISIT (OUTPATIENT)
Dept: NON INVASIVE DIAGNOSTICS | Age: 55
End: 2020-06-23
Payer: MEDICARE

## 2020-06-23 PROCEDURE — 99214 OFFICE O/P EST MOD 30 MIN: CPT | Performed by: INTERNAL MEDICINE

## 2020-06-23 NOTE — PROGRESS NOTES
Take 1 tablet by mouth every 8 hours  Margarie Stack, DO   isosorbide mononitrate (IMDUR) 30 MG extended release tablet Take 3 tablets by mouth daily  Margarie Stack, DO   metoprolol succinate (TOPROL XL) 25 MG extended release tablet Take 3 tablets by mouth 2 times daily  Margarie Stack, DO   clopidogrel (PLAVIX) 75 MG tablet Take 1 tablet by mouth daily  Margarie Stack, DO   escitalopram (LEXAPRO) 10 MG tablet Take 1 tablet by mouth daily  Margarie Stack, DO   metFORMIN (GLUCOPHAGE) 1000 MG tablet Take 1 tablet by mouth 2 times daily (with meals)  Margarie Stack, DO   tamsulosin (FLOMAX) 0.4 MG capsule Take 1 capsule by mouth daily  Margarie Stack, DO   furosemide (LASIX) 40 MG tablet Take 0.5 tablets by mouth 2 times daily  Margarie Stack, DO       Social History     Tobacco Use    Smoking status: Former Smoker     Years: 30.00     Types: Cigarettes     Last attempt to quit: 2014     Years since quittin.0    Smokeless tobacco: Current User     Types: Chew    Tobacco comment: occ chewing tobacco, trying to quit   Substance Use Topics    Alcohol use: Yes     Comment: occ.  Drug use: No        PHYSICAL EXAMINATION:   No physical exam due to virtual visit    Device Reprogramming ( 2018 )  Make/Model: BSCI dual chamber incepta ICD  Mode:  DDD 60/130  Current Rhythm: AsVs  P wave: 13 mV. Impedance: 572 ohms. RV R wave: 18mV. Impedance: 415 ohms. Pacing: A: <1  RV: <1%   Battery Voltage/Longevity: 6.5years  Arrhythmias:NSVT    Reprogramming included:  See below  Overall device function is normal  All device programmable settings were evaluated per above and in the scanned document, along with iterative adjustments (capture thresholds) to assess and select the most appropriate final programming to provide for consistent delivery of the appropriate therapy and to verify function of the device. TTE 10/2019  Summary   The left ventricle is severely dilated.    Mild concentric left ventricular hypertrophy ( with thinning of the   segments of the myocardial infarction ). There is akinesis of the apex, the anteroseptal wall, the apical half of   the interventricular septum and the apical and basal inferior wall )   Ejection fraction is visually estimated at 30-35%. There is doppler evidence of stage III diastolic dysfunction. Micro-bubble contrast injected to enhance left ventricular visualization. Normal right ventricular size and function. Defibrillator wire visualized in right ventricle/right atrium. The left atrium is mildly dilated. Focal calcification mitral valve leaflets. Mild mitral regurgitation. Mild aortic regurgitation. Mild pulmonary hypertension. ASSESSMENT/PLAN:    1. NSVT (nonsustained ventricular tachycardia) ([facility] [address]    2. Ischemic cardiomyopathy    3. Nonrheumatic aortic valve insufficiency    4. Essential hypertension    5. Chronic systolic congestive heart failure ([address]    6. Dual implantable cardioverter-defibrillator in situ    7. History of stroke    8. Obesity (BMI 30.0-34.9)        1. Dual ICD  - implanted for primary prevention  - Normal device function      2. Ischemic Cardiomyopathy  - [place] heart failure Class II  - Complete TTE EF 30%, 10/2019  - continue current GDMT   - EKG NSR, QRS 108ms on 12/2019     3. CAD/Chest pain  - Follow with cardiology     4. History of stroke and FRANCI thrombus  - previously on coumadin    5. HTN  - controlled  - continue current medications  Return for 6 month OV/ 90 day remote monitoring. Maral Sainz is a 53 y.o. male being evaluated by a Virtual Visit/phone/(video visit) encounter to address concerns as mentioned above. A caregiver was present when appropriate. Due to this being a TeleHealth encounter (During [place]-78 public health emergency), evaluation of the following organ systems was limited: Vitals/Constitutional/EENT/Resp/CV/GI//MS/Neuro/Skin/Heme-Lymph-Imm.   Pursuant to the emergency declaration under the

## 2020-09-09 ENCOUNTER — NURSE ONLY (OUTPATIENT)
Dept: NON INVASIVE DIAGNOSTICS | Age: 55
End: 2020-09-09
Payer: MEDICARE

## 2020-09-09 PROCEDURE — 93295 DEV INTERROG REMOTE 1/2/MLT: CPT | Performed by: INTERNAL MEDICINE

## 2020-09-09 PROCEDURE — 93296 REM INTERROG EVL PM/IDS: CPT | Performed by: INTERNAL MEDICINE

## 2020-09-16 NOTE — PROGRESS NOTES
See [facility] [place] report. Remote monitoring reviewed over a 90 day period. End of 90 day monitoring period date of service 9.9.2020.

## 2020-12-09 ENCOUNTER — NURSE ONLY (OUTPATIENT)
Dept: NON INVASIVE DIAGNOSTICS | Age: 55
End: 2020-12-09
Payer: MEDICARE

## 2020-12-09 PROCEDURE — 93295 DEV INTERROG REMOTE 1/2/MLT: CPT | Performed by: INTERNAL MEDICINE

## 2020-12-09 PROCEDURE — 93296 REM INTERROG EVL PM/IDS: CPT | Performed by: INTERNAL MEDICINE

## 2020-12-21 ENCOUNTER — TELEPHONE (OUTPATIENT)
Dept: CARDIOLOGY CLINIC | Age: 55
End: 2020-12-21

## 2020-12-21 NOTE — PROGRESS NOTES
See [facility] [place] report. Remote monitoring reviewed over a 90 day period. End of 90 day monitoring period date of service 12-9-2020.

## 2020-12-21 NOTE — TELEPHONE ENCOUNTER
It looks like he has pinched nerve in his neck, he needs to his PCP for further evaluation and may need referral to see a neurologist.

## 2020-12-21 NOTE — TELEPHONE ENCOUNTER
Patient of Dr. Leonila Peters. Patient called with complaints of left shoulder pain, shooting tingling down left arm into hand, hand is partially numb. Symptoms have been present for about a month. Patient denies any chest pain or shortness of breath. Please advise.

## 2021-01-26 ENCOUNTER — OFFICE VISIT (OUTPATIENT)
Dept: CARDIOLOGY CLINIC | Age: 56
End: 2021-01-26
Payer: MEDICARE

## 2021-01-26 VITALS
RESPIRATION RATE: 16 BRPM | DIASTOLIC BLOOD PRESSURE: 80 MMHG | WEIGHT: 230.8 LBS | HEART RATE: 76 BPM | SYSTOLIC BLOOD PRESSURE: 128 MMHG | BODY MASS INDEX: 34.18 KG/M2 | HEIGHT: 69 IN

## 2021-01-26 DIAGNOSIS — E78.2 MIXED HYPERLIPIDEMIA: Primary | Chronic | ICD-10-CM

## 2021-01-26 DIAGNOSIS — I35.1 NONRHEUMATIC AORTIC VALVE INSUFFICIENCY: ICD-10-CM

## 2021-01-26 PROCEDURE — 99214 OFFICE O/P EST MOD 30 MIN: CPT | Performed by: INTERNAL MEDICINE

## 2021-01-26 PROCEDURE — 93000 ELECTROCARDIOGRAM COMPLETE: CPT | Performed by: INTERNAL MEDICINE

## 2021-01-26 RX ORDER — FUROSEMIDE 20 MG/1
20 TABLET ORAL 2 TIMES DAILY
COMMUNITY
End: 2021-03-30 | Stop reason: SDUPTHER

## 2021-01-26 RX ORDER — METOPROLOL SUCCINATE 50 MG/1
50 TABLET, EXTENDED RELEASE ORAL 2 TIMES DAILY
COMMUNITY
End: 2021-03-30 | Stop reason: SDUPTHER

## 2021-01-26 RX ORDER — HYDRALAZINE HYDROCHLORIDE 50 MG/1
75 TABLET, FILM COATED ORAL EVERY 8 HOURS SCHEDULED
Qty: 405 TABLET | Refills: 3 | Status: SHIPPED
Start: 2021-01-26 | End: 2021-03-30 | Stop reason: SDUPTHER

## 2021-01-26 NOTE — PROGRESS NOTES
Díazrina Good  1965  Date of Service: 1/26/2021    Patient Active Problem List    Diagnosis Date Noted    Nonrheumatic aortic valve insufficiency 02/20/2019     Overview Note:     Moderate       Obesity (BMI 30.0-34. 9)     Ischemic cardiomyopathy      Overview Note:     EF 30%      Type 2 diabetes mellitus without complication, without long-term current use of insulin (HCC)     Essential hypertension 06/29/2017    Diverticulosis of colon 08/07/2015    History of MI (myocardial infarction) 08/07/2015    Chronic systolic congestive heart failure ([facility] [address] 08/07/2015    NSVT (nonsustained ventricular tachycardia) ([facility] [address] 07/31/2015     Overview Note:     12 beats seen on ICD interrogation      Dual implantable cardioverter-defibrillator in situ 04/02/2015     Overview Note:     [employer] ICD Date of implant: 4/1/2015 by Dr May Unger  2. Right ventricular lead parameters are as follows:[employer] Model Endotak Reliance SG Model [id number]. Serial # [id number]  Date of implant: 4/1/2015  3. Right atrial lead parameters are as follows:  Guidant Fineline II Model # [id number]. Serial # [id number]  Date of implant: 4/1/2015      CAD (coronary artery disease) 12/06/2014     Overview Note:     Anterior STEMI 12-14. 3.5 x 33 mm drug-eluting stent to the LAD. 3-15: Cardiac catheterization demonstrates a small less than 2 mm apical LAD 75% stenosis and a small less than 2 mm obtuse marginal with an 80% stenosis. The LAD stent was patent. 9-15: Stress test and straining a fixed defect but no ischemia. 2-17: Stress test demonstrating a fixed defect with no ischemia.       Depression 12/10/2013    History of stroke 07/23/2013    ED (erectile dysfunction) 03/26/2012    COPD (chronic obstructive pulmonary disease)     Hyperlipemia     GERD (gastroesophageal reflux disease) 02/12/2011       Social History     Socioeconomic History    Marital status:      Spouse name: None    Number of children: None    Years of education: None    Highest education level: None   Occupational History    None   Social Needs    Financial resource strain: None    Food insecurity     Worry: None     Inability: None    Transportation needs     Medical: None     Non-medical: None   Tobacco Use    Smoking status: Former Smoker     Years: 30.00     Types: Cigarettes     Quit date: 2014     Years since quittin.6    Smokeless tobacco: Current User     Types: Chew    Tobacco comment: occ chewing tobacco, trying to quit   Substance and Sexual Activity    Alcohol use: Yes     Comment: occ.     Drug use: No    Sexual activity: Never   Lifestyle    Physical activity     Days per week: None     Minutes per session: None    Stress: None   Relationships    Social connections     Talks on phone: None     Gets together: None     Attends Confucianist service: None     Active member of club or organization: None     Attends meetings of clubs or organizations: None     Relationship status: None    Intimate partner violence     Fear of current or ex partner: None     Emotionally abused: None     Physically abused: None     Forced sexual activity: None   Other Topics Concern    None   Social History Narrative    None       Current Outpatient Medications   Medication Sig Dispense Refill    furosemide (LASIX) 20 MG tablet Take 20 mg by mouth 2 times daily      metFORMIN (GLUCOPHAGE) 500 MG tablet Take 500 mg by mouth 2 times daily (with meals)      metoprolol succinate (TOPROL XL) 50 MG extended release tablet Take 50 mg by mouth 2 times daily      pantoprazole (PROTONIX) 40 MG tablet Take 1 tablet by mouth 2 times daily 60 tablet 3    aluminum & magnesium hydroxide-simethicone (MYLANTA) 400-400-40 MG/5ML SUSP Take 15 mLs by mouth 4 times daily 1000 mL 3    spironolactone (ALDACTONE) 25 MG tablet Take 1 tablet by mouth daily 90 tablet 1    lisinopril (PRINIVIL;ZESTRIL) 40 MG tablet Take 1 tablet by mouth daily 90 tablet 1    aspirin 81 MG chewable tablet Take 1 tablet by mouth daily 90 tablet 1    atorvastatin (LIPITOR) 80 MG tablet Take 1 tablet by mouth nightly 90 tablet 1    hydrALAZINE (APRESOLINE) 50 MG tablet Take 1 tablet by mouth every 8 hours 270 tablet 3    isosorbide mononitrate (IMDUR) 30 MG extended release tablet Take 3 tablets by mouth daily 270 tablet 3    clopidogrel (PLAVIX) 75 MG tablet Take 1 tablet by mouth daily 90 tablet 1    escitalopram (LEXAPRO) 10 MG tablet Take 1 tablet by mouth daily 90 tablet 1    metoprolol succinate (TOPROL XL) 25 MG extended release tablet Take 3 tablets by mouth 2 times daily (Patient not taking: Reported on 1/26/2021) 540 tablet 1    metFORMIN (GLUCOPHAGE) 1000 MG tablet Take 1 tablet by mouth 2 times daily (with meals) (Patient not taking: Reported on 1/26/2021) 180 tablet 1    tamsulosin (FLOMAX) 0.4 MG capsule Take 1 capsule by mouth daily (Patient not taking: Reported on 1/26/2021) 90 capsule 1    furosemide (LASIX) 40 MG tablet Take 0.5 tablets by mouth 2 times daily (Patient not taking: Reported on 1/26/2021) 90 tablet 1     No current facility-administered medications for this visit. No Known Allergies    Chief Complaint:  Anya Cardenas is here today for follow up and management/recomendations for CAD, ischemic or adenopathy. History of Present Illness: Anya Cardenas was previously followed by Dr. Lev Farah. He states that He does house work, goes up the stairs, & goes shopping. He denies any chest discomfort. He is a difficult historian. He fatigues and/or gets winded with some activities but then denies dyspnea on exertion with any of these activities. He denies orthopnea/PND, or lower extremity edema. He denies any palpitations or presyncopal symptoms. REVIEW OF SYSTEMS:  As above. Patient does not complain of any fever, chills, nausea, vomiting or diarrhea. No focal, motor or neurological deficits.  No changes in his/her vision, hearing, bowel or bladder

## 2021-03-10 ENCOUNTER — NURSE ONLY (OUTPATIENT)
Dept: NON INVASIVE DIAGNOSTICS | Age: 56
End: 2021-03-10
Payer: MEDICARE

## 2021-03-10 DIAGNOSIS — I25.5 ISCHEMIC CARDIOMYOPATHY: ICD-10-CM

## 2021-03-10 DIAGNOSIS — I47.29 NSVT (NONSUSTAINED VENTRICULAR TACHYCARDIA): ICD-10-CM

## 2021-03-10 DIAGNOSIS — Z95.810 DUAL IMPLANTABLE CARDIOVERTER-DEFIBRILLATOR IN SITU: Primary | ICD-10-CM

## 2021-03-10 PROCEDURE — 93296 REM INTERROG EVL PM/IDS: CPT | Performed by: INTERNAL MEDICINE

## 2021-03-10 PROCEDURE — 93295 DEV INTERROG REMOTE 1/2/MLT: CPT | Performed by: INTERNAL MEDICINE

## 2021-03-15 ENCOUNTER — TELEPHONE (OUTPATIENT)
Dept: CARDIOLOGY | Age: 56
End: 2021-03-15

## 2021-03-15 NOTE — TELEPHONE ENCOUNTER
Left message on voice mail asking patient to call and schedule echocardiogram ordered by Dr. Cem Gross.

## 2021-03-29 ENCOUNTER — IMMUNIZATION (OUTPATIENT)
Dept: PRIMARY CARE CLINIC | Age: 56
End: 2021-03-29
Payer: MEDICARE

## 2021-03-29 PROCEDURE — 91301 COVID-19, MODERNA VACCINE 100MCG/0.5ML DOSE: CPT | Performed by: NURSE PRACTITIONER

## 2021-03-29 PROCEDURE — 0011A COVID-19, MODERNA VACCINE 100MCG/0.5ML DOSE: CPT | Performed by: NURSE PRACTITIONER

## 2021-03-30 ENCOUNTER — OFFICE VISIT (OUTPATIENT)
Dept: FAMILY MEDICINE CLINIC | Age: 56
End: 2021-03-30
Payer: MEDICARE

## 2021-03-30 VITALS
OXYGEN SATURATION: 95 % | WEIGHT: 229 LBS | HEART RATE: 76 BPM | HEIGHT: 69 IN | SYSTOLIC BLOOD PRESSURE: 160 MMHG | BODY MASS INDEX: 33.92 KG/M2 | DIASTOLIC BLOOD PRESSURE: 80 MMHG | TEMPERATURE: 98.2 F

## 2021-03-30 DIAGNOSIS — I25.10 CORONARY ARTERY DISEASE INVOLVING NATIVE CORONARY ARTERY OF NATIVE HEART WITHOUT ANGINA PECTORIS: Chronic | ICD-10-CM

## 2021-03-30 DIAGNOSIS — Z00.00 ROUTINE GENERAL MEDICAL EXAMINATION AT A HEALTH CARE FACILITY: Primary | ICD-10-CM

## 2021-03-30 DIAGNOSIS — I10 ESSENTIAL HYPERTENSION: ICD-10-CM

## 2021-03-30 DIAGNOSIS — I50.22 CHRONIC SYSTOLIC CONGESTIVE HEART FAILURE (HCC): Chronic | ICD-10-CM

## 2021-03-30 DIAGNOSIS — R30.0 DYSURIA: ICD-10-CM

## 2021-03-30 DIAGNOSIS — F33.1 MODERATE EPISODE OF RECURRENT MAJOR DEPRESSIVE DISORDER (HCC): ICD-10-CM

## 2021-03-30 DIAGNOSIS — E11.9 TYPE 2 DIABETES MELLITUS WITHOUT COMPLICATION, WITHOUT LONG-TERM CURRENT USE OF INSULIN (HCC): ICD-10-CM

## 2021-03-30 DIAGNOSIS — Z12.5 ENCOUNTER FOR SCREENING FOR MALIGNANT NEOPLASM OF PROSTATE: ICD-10-CM

## 2021-03-30 PROCEDURE — 99212 OFFICE O/P EST SF 10 MIN: CPT | Performed by: FAMILY MEDICINE

## 2021-03-30 PROCEDURE — G0439 PPPS, SUBSEQ VISIT: HCPCS | Performed by: FAMILY MEDICINE

## 2021-03-30 RX ORDER — HYDRALAZINE HYDROCHLORIDE 50 MG/1
75 TABLET, FILM COATED ORAL EVERY 8 HOURS SCHEDULED
Qty: 405 TABLET | Refills: 0 | Status: SHIPPED
Start: 2021-03-30 | End: 2021-11-09 | Stop reason: SDUPTHER

## 2021-03-30 RX ORDER — FUROSEMIDE 20 MG/1
20 TABLET ORAL 2 TIMES DAILY
Qty: 180 TABLET | Refills: 0 | Status: SHIPPED
Start: 2021-03-30 | End: 2021-11-09 | Stop reason: SDUPTHER

## 2021-03-30 RX ORDER — ISOSORBIDE MONONITRATE 30 MG/1
90 TABLET, EXTENDED RELEASE ORAL DAILY
Qty: 270 TABLET | Refills: 0 | Status: SHIPPED
Start: 2021-03-30 | End: 2021-11-09 | Stop reason: SDUPTHER

## 2021-03-30 RX ORDER — ASPIRIN 81 MG/1
81 TABLET, CHEWABLE ORAL DAILY
Qty: 90 TABLET | Refills: 3 | Status: SHIPPED
Start: 2021-03-30 | End: 2022-01-21

## 2021-03-30 RX ORDER — CLOPIDOGREL BISULFATE 75 MG/1
75 TABLET ORAL DAILY
Qty: 90 TABLET | Refills: 0 | Status: SHIPPED
Start: 2021-03-30 | End: 2021-11-09 | Stop reason: SDUPTHER

## 2021-03-30 RX ORDER — PANTOPRAZOLE SODIUM 40 MG/1
40 TABLET, DELAYED RELEASE ORAL DAILY
Qty: 90 TABLET | Refills: 0 | Status: SHIPPED
Start: 2021-03-30 | End: 2021-11-09 | Stop reason: SDUPTHER

## 2021-03-30 RX ORDER — ATORVASTATIN CALCIUM 80 MG/1
80 TABLET, FILM COATED ORAL NIGHTLY
Qty: 90 TABLET | Refills: 0 | Status: SHIPPED
Start: 2021-03-30 | End: 2021-11-09 | Stop reason: SDUPTHER

## 2021-03-30 RX ORDER — METOPROLOL SUCCINATE 50 MG/1
50 TABLET, EXTENDED RELEASE ORAL 2 TIMES DAILY
Qty: 180 TABLET | Refills: 0 | Status: SHIPPED
Start: 2021-03-30 | End: 2021-11-09 | Stop reason: SDUPTHER

## 2021-03-30 RX ORDER — ESCITALOPRAM OXALATE 10 MG/1
10 TABLET ORAL DAILY
Qty: 90 TABLET | Refills: 0 | Status: SHIPPED
Start: 2021-03-30 | End: 2021-11-09 | Stop reason: SDUPTHER

## 2021-03-30 RX ORDER — LISINOPRIL 40 MG/1
40 TABLET ORAL DAILY
Qty: 90 TABLET | Refills: 0 | Status: SHIPPED
Start: 2021-03-30 | End: 2021-11-09 | Stop reason: SDUPTHER

## 2021-03-30 RX ORDER — SPIRONOLACTONE 25 MG/1
25 TABLET ORAL DAILY
Qty: 90 TABLET | Refills: 0 | Status: SHIPPED
Start: 2021-03-30 | End: 2021-11-09 | Stop reason: SDUPTHER

## 2021-03-30 ASSESSMENT — LIFESTYLE VARIABLES
HOW MANY STANDARD DRINKS CONTAINING ALCOHOL DO YOU HAVE ON A TYPICAL DAY: 0
HOW OFTEN DURING THE LAST YEAR HAVE YOU NEEDED AN ALCOHOLIC DRINK FIRST THING IN THE MORNING TO GET YOURSELF GOING AFTER A NIGHT OF HEAVY DRINKING: 0
AUDIT TOTAL SCORE: 1
HOW OFTEN DURING THE LAST YEAR HAVE YOU FOUND THAT YOU WERE NOT ABLE TO STOP DRINKING ONCE YOU HAD STARTED: 0
HOW OFTEN DURING THE LAST YEAR HAVE YOU FAILED TO DO WHAT WAS NORMALLY EXPECTED FROM YOU BECAUSE OF DRINKING: 0
HAS A RELATIVE, FRIEND, DOCTOR, OR ANOTHER HEALTH PROFESSIONAL EXPRESSED CONCERN ABOUT YOUR DRINKING OR SUGGESTED YOU CUT DOWN: 0
HOW OFTEN DURING THE LAST YEAR HAVE YOU HAD A FEELING OF GUILT OR REMORSE AFTER DRINKING: 0
HOW OFTEN DO YOU HAVE A DRINK CONTAINING ALCOHOL: 1
HOW MANY STANDARD DRINKS CONTAINING ALCOHOL DO YOU HAVE ON A TYPICAL DAY: 0
AUDIT-C TOTAL SCORE: 1
HAVE YOU OR SOMEONE ELSE BEEN INJURED AS A RESULT OF YOUR DRINKING: 0

## 2021-03-30 ASSESSMENT — PATIENT HEALTH QUESTIONNAIRE - PHQ9
9. THOUGHTS THAT YOU WOULD BE BETTER OFF DEAD, OR OF HURTING YOURSELF: 0
3. TROUBLE FALLING OR STAYING ASLEEP: 3
SUM OF ALL RESPONSES TO PHQ9 QUESTIONS 1 & 2: 3
1. LITTLE INTEREST OR PLEASURE IN DOING THINGS: 1
6. FEELING BAD ABOUT YOURSELF - OR THAT YOU ARE A FAILURE OR HAVE LET YOURSELF OR YOUR FAMILY DOWN: 0
SUM OF ALL RESPONSES TO PHQ9 QUESTIONS 1 & 2: 3
SUM OF ALL RESPONSES TO PHQ QUESTIONS 1-9: 3
7. TROUBLE CONCENTRATING ON THINGS, SUCH AS READING THE NEWSPAPER OR WATCHING TELEVISION: 0
4. FEELING TIRED OR HAVING LITTLE ENERGY: 2

## 2021-03-30 NOTE — PROGRESS NOTES
Medicare Annual Wellness Visit  Name: Joseph Munoz Date: 3/30/2021   MRN: [id number] Sex: Male   Age: 63 y.o. Ethnicity: Non-/Non    : 1965 Race: Lindsey Willett is here for Medicare AWV    Screenings for behavioral, psychosocial and functional/safety risks, and cognitive dysfunction are all negative except as indicated below. These results, as well as other patient data from the [address] form, are documented in Flowsheets linked to this Encounter. No Known Allergies    Prior to Visit Medications    Medication Sig Taking?  Authorizing Provider   furosemide (LASIX) 20 MG tablet Take 20 mg by mouth 2 times daily Yes Historical Provider, MD   metFORMIN (GLUCOPHAGE) 500 MG tablet Take 500 mg by mouth 2 times daily (with meals) Yes Historical Provider, MD   metoprolol succinate (TOPROL XL) 50 MG extended release tablet Take 50 mg by mouth 2 times daily Yes Historical Provider, MD   hydrALAZINE (APRESOLINE) 50 MG tablet Take 1.5 tablets by mouth every 8 hours Yes Mason Ocampo,    pantoprazole (PROTONIX) 40 MG tablet Take 1 tablet by mouth 2 times daily Yes Marcy Antonio MD   aluminum & magnesium hydroxide-simethicone (MYLANTA) 400-400-40 MG/5ML SUSP Take 15 mLs by mouth 4 times daily Yes Marcy Antonio MD   spironolactone (ALDACTONE) 25 MG tablet Take 1 tablet by mouth daily Yes Harper Jc DO   lisinopril (PRINIVIL;ZESTRIL) 40 MG tablet Take 1 tablet by mouth daily Yes Harper Jc DO   aspirin 81 MG chewable tablet Take 1 tablet by mouth daily Yes Harper Jc DO   atorvastatin (LIPITOR) 80 MG tablet Take 1 tablet by mouth nightly Yes Harper Jc DO   isosorbide mononitrate (IMDUR) 30 MG extended release tablet Take 3 tablets by mouth daily Yes Harper Jc DO   clopidogrel (PLAVIX) 75 MG tablet Take 1 tablet by mouth daily Yes Harper Jc DO   escitalopram (LEXAPRO) 10 MG tablet Take 1 tablet by mouth daily Yes Harper Jc DO Past Medical History:   Diagnosis Date    Allergic rhinitis     Anticoagulant long-term use     Anxiety     Atypical angina (HCC)     CAD (coronary artery disease)     Carotid artery stenosis     Cerebral artery occlusion with cerebral infarction [facility]) 2012    CHF [facility]congestive heart failure) ([facility])     COPD (chronic obstructive pulmonary disease) ([facility]     CVA (cerebral infarction) 11/19/2012    Depression     Diverticulitis 2010    Sigmoid abscess, s/p colectomy    Erectile dysfunction     GERD (gastroesophageal reflux disease)     [facility] 2014    Hyperlipemia     Hypertension     Ischemic cardiomyopathy     Left atrial thrombus     Obesity     Type 2 diabetes mellitus without complication, without long-term current use of insulin ([facility])     Type II or unspecified type diabetes mellitus without mention of complication, not stated as uncontrolled        Past Surgical History:   Procedure Laterality Date    CARDIAC CATHETERIZATION  3/30/2015    EF 35%, Dr. Mihaela Romero, [address]  4/1/2015    left chest, Dual Chamber-Providajob, Dr. Dallas Landau, [facility]    COLECTOMY  1/29/2010    laparoscopic, converted to open sigmoid colon resection (diverticular disease), Dr. Sean Westbrook, [address] [place] [address] COLONOSCOPY  12/5/2005    diverticulosis, Dr. Sean Westbrook, [address] [place] [address] COLONOSCOPY  9/4/2008    diverticulosis, Dr. Sean Westbrook, [address] [place] [address]  1/28/2010    prior to colon surgery, diverticulosis, Dr. Sean Westbrook, [address] [place] [address]  08/07/2015    sigmoid diverticulosis, rectal polyp bx/cauterized (hyperplastic polyp), Dr. Rae Young, [facility]    CORONARY ANGIOPLASTY WITH STENT PLACEMENT  12/6/2014    LAD PROX [place] 3.5 x 33, Dr. Ben Thomas, [address] [place] [address] CYSTOURETHROSCOPY  1/29/2010    placement bilateral ureteral stents for colon resection, Dr. Ehsan Carmichael, [facility] UPPER GASTROINTESTINAL ENDOSCOPY  8/7/2015    severe duodenitis, mild to moderate gastritis (bx for H pylori), submucosal benign mass body stomach, mild reflux esophagitis, Dr. Haroldine Carrel, [facility]       Family History   Problem Relation Age of Onset    High Blood Pressure Father     Heart Disease Father         MI stents    Heart Disease Paternal Uncle     Stroke Sister     No Known Problems Brother     No Known Problems Sister     No Known Problems Brother        CareTeam (Including outside providers/suppliers regularly involved in providing care):   Patient Care Team:  Wendy Lopez DO as PCP - General (Family Medicine)  Wendy Lopez DO as PCP - [facility] Empaneled Provider  Ranjana Baltazar MD as Consulting Physician (Neurology)  JOSE Conway as  (Care Coordinator)  Branden Celis MD as Consulting Physician (Cardiology)  Nancy Calix MD as Consulting Physician (Electrophysiology)    Wt Readings from Last 3 Encounters:   03/30/21 229 lb (103.9 kg)   01/26/21 230 lb 12.8 oz (104.7 kg)   12/11/19 240 lb 12.8 oz (109.2 kg)     Vitals:    03/30/21 1529   BP: (!) 160/80   Site: Left Upper Arm   Position: Sitting   Cuff Size: Medium Adult   Pulse: 76   Temp: 98.2 °F (36.8 °C)   TempSrc: Temporal   SpO2: 95%   Weight: 229 lb (103.9 kg)   Height: 5' 9\" (1.753 m)     Body mass index is 33.82 kg/m². Presented for Medicare AWV, but has a complex history and has not been seen in more than one year. Has not been taking medications; taking all intermittently, and trying to make them last longer. Has concerns about his copays, though he does have insurance and prescription coverage. States that his Mom is helping to pay for his medications at this time. Has not been to doctor's visits. Has prescription coverage, but has trouble with copays. 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 Offered additional resources, but he declines at this time. DM, HTN, CAD s/p MI, HFrEF, s/p ICD, history of CVA.   Did not take medications today, and has not been taking them as directed. BP higher today. No worsening edema, no CP or SOB at this time. Reports occasional CP that seems to be better with \"popping\" his back/ribs and/or changing positions. Sugars have been higher, but has not been checking them recently. Saw one reading at about 300. Has had occasional pain in pelvis area. No burning with urination, but has discomfort with urination, some nocturia, some frequency. Sometimes has pain into back, but this also seems to be related to activities/muscular. Normal bowel function. Based upon direct observation of the patient, evaluation of cognition reveals recent and remote memory intact. Had Moderna vaccine x 1 yesterday. Needs medication refills. Depression screen positive. Discussed at length. He has not been using Lexapro as prescribed, but plans to do so. No SI or HI, states his belief that \"nothing is worth hurting yourself\", contracts for safety. Depression worse during pandemic, but better now.        General Appearance: alert and oriented to person, place and time, well developed and well- nourished, in no acute distress  Skin: warm and dry, no rash or erythema visible  Head: normocephalic and atraumatic  Eyes: no icterus, conjunctivae normal  Neck: supple and non-tender without mass, no thyromegaly or thyroid nodules, no cervical lymphadenopathy  Pulmonary/Chest: clear to auscultation bilaterally- no wheezes, rales or rhonchi, normal air movement, no respiratory distress  Cardiovascular: normal rate, regular rhythm, normal S1 and S2,; 2/6 systolic murmur at LSB; no rubs, clicks, or gallops; distal pulses intact  Abdomen: soft, minimally tender in lower abdomen diffusely, non-distended, normal bowel sounds, no masses or organomegaly palpable, obese  Extremities: no cyanosis, clubbing or edema  Musculoskeletal: normal range of motion, no joint swelling, deformity or tenderness  Neurologic:gait, coordination and speech normal    Patient's complete Health Risk Assessment and screening values have been reviewed and are found in Flowsheets. The following problems were reviewed today and where indicated follow up appointments were made and/or referrals ordered. Positive Risk Factor Screenings with Interventions:       Depression:  PHQ-2 Score: 3  PHQ-9 Total Score: 9    Severity:1-4 = minimal depression, 5-9 = mild depression, 10-14 = moderate depression, 15-19 = moderately severe depression, 20-27 = severe depression  Depression Interventions:  · Loss of interest in activities with pandemic, but does not feel down from day to day. Has little motivation, but this is improving. Gradually increasing activity. No SI or HI. Has a gun at home, but no concerns that he will ever feel like harming himself or anyone else. Keeps gun locked up. Krystal Gill is a good support. 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 He will resume Lexapro but declines additional treatment at this time. Declines referral for CBT. Substance History:  Social History     Tobacco History     Smoking Status  Former Smoker Quit date  6/1/2014 Smoking Frequency  For 30 years Smoking Tobacco Type  Cigarettes    Smokeless Tobacco Use  Current User Smokeless Tobacco Type  Chew    Tobacco Comment  occ chewing tobacco, trying to quit          Alcohol History     Alcohol Use Status  Yes Comment  occ. Drug Use     Drug Use Status  No          Sexual Activity     Sexually Active  Never               Alcohol Screening: Audit-C Score: 1  Total Score: 1    A score of 8 or more is associated with harmful or hazardous drinking. A score of 13 or more in women, and 15 or more in men, is likely to indicate alcohol dependence.   Substance Abuse Interventions:  · Alcohol misuse/dependence:  patient agrees to limit alcohol intake to a moderate level- no more than 14 drinks/week and 4 drinks per occasion for men, or no more than 7 drinks/week and 3 drinks/occasion for women   · Currently drinks no more than 3 drinks in one day, and only does this very rarely. Advised to limit alcohol use to one drink in a day. General Health and ACP:  General  In general, how would you say your health is?: Good  In the past 7 days, have you experienced any of the following? New or Increased Pain, New or Increased Fatigue, Loneliness, Social Isolation, Stress or Anger?: (!) Social Isolation, Loneliness, New or Increased Pain  Do you get the social and emotional support that you need?: Yes  Do you have a Living Will?: (!) No  Advance Directives     Power of  Living Will ACP-Advance Directive ACP-Power of     Not on File Filed on 09/05/13 Filed Not on File      General Health Risk Interventions:  · Pandemic isolation, but improving. Advised considerations for Living Will, offered resources.       Health Habits/Nutrition:  Health Habits/Nutrition  Do you exercise for at least 20 minutes 2-3 times per week?: (!) No  Have you lost any weight without trying in the past 3 months?: No  Do you eat only one meal per day?: No  Have you seen the dentist within the past year?: (!) No  Body mass index: (!) 33.81  Health Habits/Nutrition Interventions:  · Inadequate physical activity:  patient agrees to increase physical activity as follows: increase walking time   · Dental exam overdue:  patient encouraged to make appointment with his/her dentist       Personalized Preventive Plan   Current Health Maintenance Status  Immunization History   Administered Date(s) Administered    COVID-19, Moderna, PF, 100mcg/0.5mL 03/29/2021    Influenza 10/11/2013    Influenza Virus Vaccine 09/28/2015    Influenza, MDCK, Preservative free 09/05/2017    Influenza, Quadv, IM, (6 mo and older Fluzone, Flulaval, Fluarix and 3 yrs and older Afluria) 12/13/2016    Influenza, Quadv, IM, PF (6 mo and older Fluzone, Flulaval, Fluarix, and 3 yrs and older Afluria) 10/02/2018, 10/18/2019    Pneumococcal Polysaccharide ([id number]) 01/05/2015    Tdap (Boostrix, Adacel) 04/01/2013        Health Maintenance   Topic Date Due    Hepatitis B vaccine (1 of 3 - Risk 3-dose series) Never done    Shingles Vaccine (1 of 2) Never done   [employer] Visit (AWV)  Never done    Diabetic microalbuminuria test  01/15/2020    PSA counseling  01/15/2020    Diabetic foot exam  05/07/2020    Flu vaccine (1) 09/01/2020    A1C test (Diabetic or Prediabetic)  10/10/2020    Lipid screen  10/11/2020    Diabetic retinal exam  10/16/2020    Potassium monitoring  11/19/2020    Creatinine monitoring  11/19/2020    COVID-19 Vaccine (2 - Moderna 2-dose series) 04/26/2021    DTaP/Tdap/Td vaccine (2 - Td) 04/01/2023    Colon cancer screen colonoscopy  08/07/2025    Pneumococcal 0-64 years Vaccine  Completed    Hepatitis C screen  Completed    HIV screen  Completed    Hepatitis A vaccine  Aged Out    Hib vaccine  Aged Out    Meningococcal (ACWY) vaccine  Aged Out     Recommendations for [email or web address] Due: see orders and patient instructions/AVS.  . Recommended screening schedule for the next 5-10 years is provided to the patient in written form: see Patient Instructions/AVS.      Diagnosis Orders   1. Routine general medical examination at a health care facility     2. Chronic systolic congestive heart failure (HCC)  spironolactone (ALDACTONE) 25 MG tablet   3. Coronary artery disease involving native coronary artery of native heart without angina pectoris  lisinopril (PRINIVIL;ZESTRIL) 40 MG tablet    aspirin 81 MG chewable tablet    Lipid Panel   4. Essential hypertension  aspirin 81 MG chewable tablet    isosorbide mononitrate (IMDUR) 30 MG extended release tablet    CBC    Comprehensive Metabolic Panel    TSH without Reflex    Lipid Panel    Hemoglobin A1C   5.  Moderate episode of recurrent major depressive disorder (HCC)  escitalopram (LEXAPRO) 10 MG tablet   6. Type 2 diabetes mellitus without complication, without long-term current use of insulin (HCC)  Lipid Panel    Hemoglobin A1C    Microalbumin / Creatinine Urine Ratio   7. Dysuria  PSA SCREENING    Culture, Urine    Urinalysis with Microscopic   8. Encounter for screening for malignant neoplasm of prostate   PSA SCREENING     Plan to resume home medications and follow BP and glucose carefully. Advised about the needs to take medications as prescribed as well as the risks of nonadherence, up to and including CVA, MI, and death. He understands. Advised on healthy lifestyle measures and avoiding excess carbohydrates and sodium. He will come back tomorrow AM for labs and urine studies. Advised to seek care urgently/emergently for any chest pain or shortness of breath, and seek care with any new or worsening symptoms, or any worsening pelvic pain during evaluation, and he understands. Close follow up in 4 weeks or sooner prn. Will continue to address control of chronic conditions. He agrees.

## 2021-03-30 NOTE — Clinical Note
Please call him to come for labs on Wednesday (3/31) AM if possible. [facility] erased my checkout note, so it does not appear that he scheduled labs for tomorrow. Thank you!

## 2021-03-30 NOTE — PATIENT INSTRUCTIONS
Personalized Preventive Plan for Radha Zuniga - 3/30/2021  Medicare offers a range of preventive health benefits. Some of the tests and screenings are paid in full while other may be subject to a deductible, co-insurance, and/or copay. Some of these benefits include a comprehensive review of your medical history including lifestyle, illnesses that may run in your family, and various assessments and screenings as appropriate. After reviewing your medical record and screening and assessments performed today your provider may have ordered immunizations, labs, imaging, and/or referrals for you. A list of these orders (if applicable) as well as your Preventive Care list are included within your After Visit Summary for your review. Other Preventive Recommendations:    · A preventive eye exam performed by an eye specialist is recommended every 1-2 years to screen for glaucoma; cataracts, macular degeneration, and other eye disorders. · A preventive dental visit is recommended every 6 months. · Try to get at least 150 minutes of exercise per week or 10,000 steps per day on a pedometer . · Order or download the FREE \"Exercise & Physical Activity: Your Everyday Guide\" from [employer]. Call 0-157.361.5028 or search [employer] online. · You need 9076-1701 mg of calcium and 3464-1765 IU of vitamin D per day. It is possible to meet your calcium requirement with diet alone, but a vitamin D supplement is usually necessary to meet this goal.  · When exposed to the sun, use a sunscreen that protects against both UVA and UVB radiation with an SPF of 30 or greater. Reapply every 2 to 3 hours or after sweating, drying off with a towel, or swimming. · Always wear a seat belt when traveling in a car. Always wear a helmet when riding a bicycle or motorcycle.

## 2021-03-31 ENCOUNTER — NURSE ONLY (OUTPATIENT)
Dept: FAMILY MEDICINE CLINIC | Age: 56
End: 2021-03-31
Payer: MEDICARE

## 2021-03-31 DIAGNOSIS — R30.0 DYSURIA: ICD-10-CM

## 2021-03-31 DIAGNOSIS — Z01.89 ROUTINE LAB DRAW: ICD-10-CM

## 2021-03-31 DIAGNOSIS — E11.9 TYPE 2 DIABETES MELLITUS WITHOUT COMPLICATION, WITHOUT LONG-TERM CURRENT USE OF INSULIN (HCC): ICD-10-CM

## 2021-03-31 DIAGNOSIS — Z12.5 ENCOUNTER FOR SCREENING FOR MALIGNANT NEOPLASM OF PROSTATE: ICD-10-CM

## 2021-03-31 DIAGNOSIS — I25.10 CORONARY ARTERY DISEASE INVOLVING NATIVE CORONARY ARTERY OF NATIVE HEART WITHOUT ANGINA PECTORIS: Chronic | ICD-10-CM

## 2021-03-31 DIAGNOSIS — I10 ESSENTIAL HYPERTENSION: ICD-10-CM

## 2021-03-31 LAB
ALBUMIN SERPL-MCNC: 4.8 G/DL (ref 3.5–5.2)
ALP BLD-CCNC: 114 U/L (ref 40–129)
ALT SERPL-CCNC: 20 U/L (ref 0–40)
ANION GAP SERPL CALCULATED.3IONS-SCNC: 17 MMOL/L (ref 7–16)
AST SERPL-CCNC: 17 U/L (ref 0–39)
BACTERIA: ABNORMAL /HPF
BILIRUB SERPL-MCNC: 0.7 MG/DL (ref 0–1.2)
BILIRUBIN URINE: NEGATIVE
BLOOD, URINE: ABNORMAL
BUN BLDV-MCNC: 21 MG/DL (ref 6–20)
CALCIUM SERPL-MCNC: 9 MG/DL (ref 8.6–10.2)
CHLORIDE BLD-SCNC: 101 MMOL/L (ref 98–107)
CHOLESTEROL, TOTAL: 210 MG/DL (ref 0–199)
CLARITY: CLEAR
CO2: 23 MMOL/L (ref 22–29)
COLOR: YELLOW
CREAT SERPL-MCNC: 1.2 MG/DL (ref 0.7–1.2)
CREATININE URINE: 63 MG/DL (ref 40–278)
GFR AFRICAN AMERICAN: >60
GFR NON-AFRICAN AMERICAN: >60 ML/MIN/1.73
GLUCOSE BLD-MCNC: 335 MG/DL (ref 74–99)
GLUCOSE URINE: >=1000 MG/DL
HBA1C MFR BLD: 10.2 % (ref 4–5.6)
HCT VFR BLD CALC: 51.6 % (ref 37–54)
HDLC SERPL-MCNC: 33 MG/DL
HEMOGLOBIN: 16.5 G/DL (ref 12.5–16.5)
KETONES, URINE: NEGATIVE MG/DL
LDL CHOLESTEROL CALCULATED: 132 MG/DL (ref 0–99)
LEUKOCYTE ESTERASE, URINE: NEGATIVE
MCH RBC QN AUTO: 26.6 PG (ref 26–35)
MCHC RBC AUTO-ENTMCNC: 32 % (ref 32–34.5)
MCV RBC AUTO: 83.2 FL (ref 80–99.9)
MICROALBUMIN UR-MCNC: 140.9 MG/L
MICROALBUMIN/CREAT UR-RTO: 223.7 (ref 0–30)
NITRITE, URINE: NEGATIVE
PDW BLD-RTO: 13.4 FL (ref 11.5–15)
PH UA: 5.5 (ref 5–9)
PLATELET # BLD: 166 E9/L (ref 130–450)
PMV BLD AUTO: 11.8 FL (ref 7–12)
POTASSIUM SERPL-SCNC: 4.5 MMOL/L (ref 3.5–5)
PROSTATE SPECIFIC ANTIGEN: 9.2 NG/ML (ref 0–4)
PROTEIN UA: ABNORMAL MG/DL
RBC # BLD: 6.2 E12/L (ref 3.8–5.8)
RBC UA: ABNORMAL /HPF (ref 0–2)
SODIUM BLD-SCNC: 141 MMOL/L (ref 132–146)
SPECIFIC GRAVITY UA: 1.02 (ref 1–1.03)
TOTAL PROTEIN: 7.8 G/DL (ref 6.4–8.3)
TRIGL SERPL-MCNC: 223 MG/DL (ref 0–149)
TSH SERPL DL<=0.05 MIU/L-ACNC: 1.6 UIU/ML (ref 0.27–4.2)
UROBILINOGEN, URINE: 0.2 E.U./DL
VLDLC SERPL CALC-MCNC: 45 MG/DL
WBC # BLD: 5.8 E9/L (ref 4.5–11.5)
WBC UA: ABNORMAL /HPF (ref 0–5)

## 2021-03-31 PROCEDURE — 36415 COLL VENOUS BLD VENIPUNCTURE: CPT | Performed by: FAMILY MEDICINE

## 2021-04-01 ENCOUNTER — TELEPHONE (OUTPATIENT)
Dept: FAMILY MEDICINE CLINIC | Age: 56
End: 2021-04-01

## 2021-04-01 DIAGNOSIS — R97.20 ELEVATED PSA, LESS THAN 10 NG/ML: Primary | ICD-10-CM

## 2021-04-01 NOTE — TELEPHONE ENCOUNTER
Called patient to discuss results. Went to \"[employer]\" . Left message to call us back. Tried to call again 1:45 PM, went to  again. When he calls back, please advise: Your labs showed that your cholesterol and sugar were higher, as expected, having been off of your medications recently. Your sugar level was very high at 335. A1C is now 10.2, which is very high, though it used to be 6.6, which is excellent. You will need to eat a healthier diet in addition to resuming medication, and we may need to discuss additional treatment in the near future. In the meantime, please take your medications as prescribed. Please eat healthy foods, more vegetables, and please drink plenty of plain water. It is very important to stay on your medications. Another important result was your PSA (prostate number), which was also quite high. Did you follow up with the urologist 2 years ago? A PSA that has gone up as much as your level has increased (from 5 to 9 in 2 years) is concerning for something like prostate cancer, although several other conditions can cause the PSA to increase. You need to see a urologist for further evaluation and treatment. I will place the referral back to Dr. Lula Bumpers, to whom I referred you last time, unless you have a different preference for urologist.  You need to see the urologist soon. Please call our office if no one reaches out to you in the next couple of days to schedule an appointment. Thank you!

## 2021-04-02 LAB — URINE CULTURE, ROUTINE: NORMAL

## 2021-04-26 ENCOUNTER — IMMUNIZATION (OUTPATIENT)
Dept: PRIMARY CARE CLINIC | Age: 56
End: 2021-04-26
Payer: MEDICARE

## 2021-04-26 PROCEDURE — 0012A COVID-19, MODERNA VACCINE 100MCG/0.5ML DOSE: CPT | Performed by: NURSE PRACTITIONER

## 2021-04-26 PROCEDURE — 91301 COVID-19, MODERNA VACCINE 100MCG/0.5ML DOSE: CPT | Performed by: NURSE PRACTITIONER

## 2021-05-03 ENCOUNTER — OFFICE VISIT (OUTPATIENT)
Dept: FAMILY MEDICINE CLINIC | Age: 56
End: 2021-05-03
Payer: MEDICARE

## 2021-05-03 VITALS
BODY MASS INDEX: 33.33 KG/M2 | HEIGHT: 69 IN | HEART RATE: 77 BPM | DIASTOLIC BLOOD PRESSURE: 82 MMHG | WEIGHT: 225 LBS | OXYGEN SATURATION: 96 % | TEMPERATURE: 98.2 F | SYSTOLIC BLOOD PRESSURE: 157 MMHG

## 2021-05-03 DIAGNOSIS — E11.9 TYPE 2 DIABETES MELLITUS WITHOUT COMPLICATION, WITHOUT LONG-TERM CURRENT USE OF INSULIN (HCC): Primary | ICD-10-CM

## 2021-05-03 DIAGNOSIS — R97.20 ELEVATED PSA, LESS THAN 10 NG/ML: ICD-10-CM

## 2021-05-03 DIAGNOSIS — I10 ESSENTIAL HYPERTENSION: ICD-10-CM

## 2021-05-03 LAB
CHP ED QC CHECK: NORMAL
GLUCOSE BLD-MCNC: 352 MG/DL

## 2021-05-03 PROCEDURE — 99212 OFFICE O/P EST SF 10 MIN: CPT | Performed by: FAMILY MEDICINE

## 2021-05-03 PROCEDURE — 99214 OFFICE O/P EST MOD 30 MIN: CPT | Performed by: FAMILY MEDICINE

## 2021-05-03 PROCEDURE — 82962 GLUCOSE BLOOD TEST: CPT | Performed by: FAMILY MEDICINE

## 2021-05-03 RX ORDER — BLOOD-GLUCOSE METER
1 KIT MISCELLANEOUS DAILY
Qty: 1 KIT | Refills: 0 | Status: SHIPPED | OUTPATIENT
Start: 2021-05-03 | End: 2021-09-01

## 2021-05-03 RX ORDER — LANCETS
1 EACH MISCELLANEOUS DAILY
Qty: 100 EACH | Refills: 3 | Status: SHIPPED
Start: 2021-05-03 | End: 2021-09-01

## 2021-05-03 NOTE — PATIENT INSTRUCTIONS
Patient Education        Learning About Meal Planning for Diabetes  Why plan your meals? Meal planning can be a key part of managing diabetes. Planning meals and snacks with the right balance of carbohydrate, protein, and fat can help you keep your blood sugar at the target level you set with your doctor. You don't have to eat special foods. You can eat what your family eats, including sweets once in a while. But you do have to pay attention to how often you eat and how much you eat of certain foods. You may want to work with a dietitian or a certified diabetes educator. He or she can give you tips and meal ideas and can answer your questions about meal planning. This health professional can also help you reach a healthy weight if that is one of your goals. What plan is right for you? Your dietitian or diabetes educator may suggest that you start with the plate format or carbohydrate counting. The plate format  The plate format is a simple way to help you manage how you eat. You plan meals by learning how much space each food should take on a plate. Using the plate format helps you spread carbohydrate throughout the day. It can make it easier to keep your blood sugar level within your target range. It also helps you see if you're eating healthy portion sizes. To use the plate format, you put non-starchy vegetables on half your plate. Add meat or meat substitutes on one-quarter of the plate. Put a grain or starchy vegetable (such as brown rice or a potato) on the final quarter of the plate. You can add a small piece of fruit and some low-fat or fat-free milk or yogurt, depending on your carbohydrate goal for each meal.  Here are some tips for using the plate format:  · Make sure that you are not using an oversized plate. A 9-inch plate is best. Many restaurants use larger plates. · Get used to using the plate format at home. Then you can use it when you eat out.   · Write down your questions about using the plate format. Talk to your doctor, a dietitian, or a diabetes educator about your concerns. Carbohydrate counting  With carbohydrate counting, you plan meals based on the amount of carbohydrate in each food. Carbohydrate raises blood sugar higher and more quickly than any other nutrient. It is found in desserts, breads and cereals, and fruit. It's also found in starchy vegetables such as potatoes and corn, grains such as rice and pasta, and milk and yogurt. Spreading carbohydrate throughout the day helps keep your blood sugar levels within your target range. Your daily amount depends on several things, including your weight, how active you are, which diabetes medicines you take, and what your goals are for your blood sugar levels. A registered dietitian or diabetes educator can help you plan how much carbohydrate to include in each meal and snack. A guideline for your daily amount of carbohydrate is:  · 45 to 60 grams at each meal. That's about the same as 3 to 4 carbohydrate servings. · 15 to 20 grams at each snack. That's about the same as 1 carbohydrate serving. The Nutrition Facts label on packaged foods tells you how much carbohydrate is in a serving of the food. First, look at the serving size on the food label. Is that the amount you eat in a serving? All of the nutrition information on a food label is based on that serving size. So if you eat more or less than that, you'll need to adjust the other numbers. Total carbohydrate is the next thing you need to look for on the label. If you count carbohydrate servings, one serving of carbohydrate is 15 grams. For foods that don't come with labels, such as fresh fruits and vegetables, you'll need a guide that lists carbohydrate in these foods. Ask your doctor, dietitian, or diabetes educator about books or other nutrition guides you can use.   If you take insulin, you need to know how many grams of carbohydrate are in a meal. This lets you know how much rapid-acting insulin to take before you eat. If you use an insulin pump, you get a constant rate of insulin during the day. So the pump must be programmed at meals to give you extra insulin to cover the rise in blood sugar after meals. When you know how much carbohydrate you will eat, you can take the right amount of insulin. Or, if you always use the same amount of insulin, you need to make sure that you eat the same amount of carbohydrate at meals. If you need more help to understand carbohydrate counting and food labels, ask your doctor, dietitian, or diabetes educator. How can you plan healthy meals? Here are some tips to get started:  · Plan your meals a week at a time. Don't forget to include snacks too. · Use cookbooks or online recipes to plan several main meals. Plan some quick meals for busy nights. You also can double some recipes that freeze well. Then you can save half for other busy nights when you don't have time to cook. · Make sure you have the ingredients you need for your recipes. If you're running low on basic items, put these items on your shopping list too. · List foods that you use to make breakfasts, lunches, and snacks. List plenty of fruits and vegetables. · Post this list on the refrigerator. Add to it as you think of more things you need. · Take the list to the store to do your weekly shopping. Follow-up care is a key part of your treatment and safety. Be sure to make and go to all appointments, and call your doctor if you are having problems. It's also a good idea to know your test results and keep a list of the medicines you take. Where can you learn more? Go to https://Expensifyaugustineewjuan.Ripwave Total Media System. org and sign in to your Eightfold Logic account. Enter L359 in the [employer] box to learn more about \"Learning About Meal Planning for Diabetes. \"     If you do not have an account, please click on the \"Sign Up Now\" link.   Current as of: August 31, 2020               Content Version: 12.8  © 2552-7765 Healthwise, [employer]. Care instructions adapted under license by [facility] ([facility]). If you have questions about a medical condition or this instruction, always ask your healthcare professional. [address] any warranty or liability for your use of this information. Patient Education        Counting Carbohydrates: Care Instructions  Your Care Instructions     You don't have to eat special foods when you have diabetes. You just have to be careful to eat healthy foods. Carbohydrates (carbs) raise blood sugar higher and quicker than any other nutrient. Carbs are found in desserts, breads and cereals, and fruit. They're also in starchy vegetables. These include potatoes, corn, and grains such as rice and pasta. Carbs are also in milk and yogurt. The more carbs you eat at one time, the higher your blood sugar will rise. Spreading carbs all through the day helps keep your blood sugar levels within your target range. Counting carbs is one of the best ways to keep your blood sugar under control. If you use insulin, counting carbs helps you match the right amount of insulin to the number of grams of carbs in a meal. Then you can change your diet and insulin dose as needed. Testing your blood sugar several times a day can help you learn how carbs affect your blood sugar. A registered dietitian or certified diabetes educator can help you plan meals and snacks. Follow-up care is a key part of your treatment and safety. Be sure to make and go to all appointments, and call your doctor if you are having problems. It's also a good idea to know your test results and keep a list of the medicines you take. How can you care for yourself at home? Know your daily amount of carbohydrates  Your daily amount depends on several things, such as your weight, how active you are, which diabetes medicines you take, and what your goals are for your blood sugar levels.  A registered dietitian or certified diabetes educator can help you plan how many carbs to include in each meal and snack. For most adults, a guideline for the daily amount of carbs is:  · 45 to 60 grams at each meal. That's about the same as 3 to 4 carbohydrate servings. · 15 to 20 grams at each snack. That's about the same as 1 carbohydrate serving. Count carbs  Counting carbs lets you know how much rapid-acting insulin to take before you eat. If you use an insulin pump, you get a constant rate of insulin during the day. So the pump must be programmed at meals. This gives you extra insulin to cover the rise in blood sugar after meals. If you take insulin:  · Learn your own insulin-to-carb ratio. You and your diabetes health professional will figure out the ratio. You can do this by testing your blood sugar after meals. For example, you may need a certain amount of insulin for every 15 grams of carbs. · Add up the carb grams in a meal. Then you can figure out how many units of insulin to take based on your insulin-to-carb ratio. · Exercise lowers blood sugar. You can use less insulin than you would if you were not doing exercise. Keep in mind that timing matters. If you exercise within 1 hour after a meal, your body may need less insulin for that meal than it would if you exercised 3 hours after the meal. Test your blood sugar to find out how exercise affects your need for insulin. If you do or don't take insulin:  · Look at labels on packaged foods. This can tell you how many carbs are in a serving. You can also use guides from the American Diabetes Association. · Be aware of portions, or serving sizes. If a package has two servings and you eat the whole package, you need to double the number of grams of carbohydrate listed for one serving. · Protein, fat, and fiber do not raise blood sugar as much as carbs do.  If you eat a lot of these nutrients in a meal, your blood sugar will rise more slowly than it would otherwise. Eat from all food groups  · Eat at least three meals a day. · Plan meals to include food from all the food groups. The food groups include grains, fruits, dairy, proteins, and vegetables. · Talk to your dietitian or diabetes educator about ways to add limited amounts of sweets into your meal plan. · If you drink alcohol, talk to your doctor. It may not be recommended when you are taking certain diabetes medicines. Where can you learn more? Go to https://Emotte IT.Verical. org and sign in to your IntelligentMDx account. Enter H980 in the [employer] box to learn more about \"Counting Carbohydrates: Care Instructions. \"     If you do not have an account, please click on the \"Sign Up Now\" link. Current as of: August 31, 2020               Content Version: 12.8  © 2006-2021 [email or web address]. Care instructions adapted under license by [facility] ([facility]). If you have questions about a medical condition or this instruction, always ask your healthcare professional. [place] [address] any warranty or liability for your use of this information. Patient Education        Learning About Diabetes and Your Teeth  How does diabetes affect your teeth and gums? When you have diabetes, managing blood sugar levels and taking good care of your teeth and gums are both important. When blood sugar levels are high, there's a greater risk for:  · Gum (periodontal) disease. · Tooth decay. · Fungal infections in the mouth, like thrush. · Dry mouth, or xerostomia (say \"zee-ruh-STO-dion-uh\"). The mouth needs saliva to neutralize the acids in your mouth. These acids can lead to gum disease and tooth decay. Keeping your blood sugar levels in your target range can help prevent problems with the teeth and gums. If you have any problems with your teeth or gums, see your dentist.  How do you care for your teeth and gums when you have diabetes? · Brush your teeth twice a day.   · Floss daily. Make sure to press the floss against your teeth and not your gums. · Check each day for areas where your gums might be red or painful. Be sure to let your dentist know of any sores in your mouth. · See your dentist regularly for professional cleaning of your teeth and to look for gum problems. Many dentists recommend getting checkups twice a year. Remind your dentist that you have diabetes before any work is done. · Don't smoke or use smokeless tobacco. Tobacco use with diabetes can lead to a greater risk of severe gum disease. If you need help quitting, talk to your doctor about stop-smoking programs and medicines. These can increase your chances of quitting for good. Follow-up care is a key part of your treatment and safety. Be sure to make and go to all appointments, and call your doctor if you are having problems. It's also a good idea to know your test results and keep a list of the medicines you take. Where can you learn more? Go to https://Avangate BVpereeseNovatris.amcure. org and sign in to your Lost My Name account. Enter X132 in the Blast Ramp box to learn more about \"Learning About Diabetes and Your Teeth. \"     If you do not have an account, please click on the \"Sign Up Now\" link. Current as of: August 31, 2020               Content Version: 12.8  © 2006-2021 Healthwise, Incorporated. Care instructions adapted under license by [facility] ([facility]). If you have questions about a medical condition or this instruction, always ask your healthcare professional. [place] [address] any warranty or liability for your use of this information.

## 2021-05-03 NOTE — PROGRESS NOTES
CC:  Follow up DM, HTN    HPI:  63 y.o. male presents for follow up. DM, HTN. Taking medications only about half the time at best.  Not checking BP at home. Had glucometer at home that broke more than a month ago. Not taking pills every day. Drinking more water on purpose, no increased thirst.  Home stressors, loss in family, has had a rough time recently. Had coffee, baked beans with extra brown sugar, and iced tea this AM.  Ate a whole \"sh*tload\" of potato chips with cheese dip yesterday. Elevated PSA to 9+. Had been referred back to Dr. Giselle Loomis, as he has owed money to the other urology groups before. Wants to see a Urologist in [facility]. Did not want to see one in [place]. Occasional urinary hesitancy. Some urgency, nocturia. States he paid his bills to Dr. Gennaro richard off of [facility] and would like a referral there. Previous right inguinal hernia repair and has noted prominence of abdomen around umbilicus near previous colectomy scar. No pain or tenderness, nothing feels stuck there or firm. Has occasional twinges in left lower abdomen and wonders if he has a hernia there. Advised to call cardiology to follow up. Has not yet been scheduled for echo. He understands and agrees to do so. Patient Active Problem List    Diagnosis Date Noted    Elevated PSA, less than 10 ng/ml 04/01/2021    Nonrheumatic aortic valve insufficiency 02/20/2019    Obesity (BMI 30.0-34. 9)     Ischemic cardiomyopathy     Type 2 diabetes mellitus without complication, without long-term current use of insulin ([address]     Essential hypertension 06/29/2017    Diverticulosis of colon 08/07/2015    History of MI (myocardial infarction) 08/07/2015    Chronic systolic congestive heart failure ([address] 08/07/2015    NSVT (nonsustained ventricular tachycardia) ([address] 07/31/2015    Dual implantable cardioverter-defibrillator in situ 04/02/2015    CAD (coronary artery disease) 12/06/2014    Depression 12/10/2013    History of stroke 2013    ED (erectile dysfunction) 2012    COPD (chronic obstructive pulmonary disease)     Hyperlipemia     GERD (gastroesophageal reflux disease) 2011       Current Outpatient Medications on File Prior to Visit   Medication Sig Dispense Refill    furosemide (LASIX) 20 MG tablet Take 1 tablet by mouth 2 times daily 180 tablet 0    metFORMIN (GLUCOPHAGE) 500 MG tablet Take 1 tablet by mouth 2 times daily (with meals) 180 tablet 0    metoprolol succinate (TOPROL XL) 50 MG extended release tablet Take 1 tablet by mouth 2 times daily 180 tablet 0    hydrALAZINE (APRESOLINE) 50 MG tablet Take 1.5 tablets by mouth every 8 hours 405 tablet 0    pantoprazole (PROTONIX) 40 MG tablet Take 1 tablet by mouth daily 90 tablet 0    spironolactone (ALDACTONE) 25 MG tablet Take 1 tablet by mouth daily 90 tablet 0    lisinopril (PRINIVIL;ZESTRIL) 40 MG tablet Take 1 tablet by mouth daily 90 tablet 0    aspirin 81 MG chewable tablet Take 1 tablet by mouth daily 90 tablet 3    atorvastatin (LIPITOR) 80 MG tablet Take 1 tablet by mouth nightly 90 tablet 0    isosorbide mononitrate (IMDUR) 30 MG extended release tablet Take 3 tablets by mouth daily 270 tablet 0    clopidogrel (PLAVIX) 75 MG tablet Take 1 tablet by mouth daily 90 tablet 0    escitalopram (LEXAPRO) 10 MG tablet Take 1 tablet by mouth daily 90 tablet 0     No current facility-administered medications on file prior to visit. No Known Allergies    Social History     Tobacco Use    Smoking status: Former Smoker     Years: 30.00     Types: Cigarettes     Quit date: 2014     Years since quittin.9    Smokeless tobacco: Current User     Types: Chew    Tobacco comment: occ chewing tobacco, trying to quit   Substance Use Topics    Alcohol use: Yes     Comment: occ.     Drug use: No       ROS:   Review of Systems - as above     Physical Exam:    VS:  Blood pressure (!) 157/82, pulse 77, temperature 98.2 °F (36.8 °C), temperature source Temporal, height 5' 9\" (1.753 m), weight 225 lb (102.1 kg), SpO2 96 %. Recheck BP similar     General Appearance:  awake, alert, oriented, in no acute distress and well developed, well nourished  Head/face:  NCAT  Eyes:  EOMI and Sclera nonicteric  Neck:  neck- supple, no mass, non-tender  Lungs:  Normal expansion. Clear to auscultation. No rales, rhonchi, or wheezing. Heart:  Heart regular rate and rhythm  Murmur(s)-  2/6 systolic at 2nd left intercostal space, at 2nd right intercostal space, at lower left sternal border  Abdomen:  BS +, soft, ND, NT. Ventral scar noted with some bulging of abdominal wall near upper incision with coughing. No incarcerated hernia or distinct defect palpable. Bilateral lower quadrants nontender with no mass or bulging with coughing or Valsalva. Extremities: Extremities warm to touch, pink, with no edema. and pulses present in all extremities    Most recent labs and imaging reviewed. Findings include:     Results for POC orders placed in visit on 05/03/21   POCT Glucose   Result Value Ref Range    Glucose 352 mg/dL    QC OK? Assessments:      Diagnosis Orders   1. Type 2 diabetes mellitus without complication, without long-term current use of insulin ([facility])  POCT Glucose    blood glucose test strips (ASCENSIA AUTODISC VI;ONE TOUCH ULTRA TEST VI) strip    Accu-Chek Multiclix Lancets MISC    glucose monitoring kit (FREESTYLE) monitoring kit    CBC    Comprehensive Metabolic Panel    Lipid Panel   2. Essential hypertension     3. Elevated PSA, less than 10 ng/ml  External Referral To Urology         Plans:    As Above. Please see Patient Instructions for further counseling and information given. RTO 4 weeks, or sooner as needed for any new, persistent, or worsening symptoms. Labs just before next visit. Ordered glucometer and testing supplies to pharmacy.       Counseled extensively on healthy diet and meal planning for DM. Advised that eating his current diet is very harmful. Advised that not taking his medications every day will not be enough to control his blood pressure and sugars. He understands. Advised to set alarms to take medications on time, which he believes will help him remember his medications. Advised consideration for insulin, which he declines for now. Advised to follow up in 4 weeks, with labs just prior to next visit, and we will need to consider additional treatment at that time if adherent to medications. Possible need for insulin; can also consider SGLT-2 inhibitor. Placed referral to urology per his request and emphasized the need for follow up. He understands. Counseled extensively on plans, follow up, taking medications as prescribed, and following with specialists. He understands. With any change in symptoms or abdominal pain, please seek care. He understands. Advised on need to control DM. Reevaluate abdominal symptoms in the near future. Please be adherent to the treatment plans discussed today, and please call with any questions or concerns, letting the office know of any reasons that the plans may not be followed. The risks of untreated conditions include worsening illness, injury, disability, and possibly, death. Please call if symptoms change in any way, worsen, or fail to completely resolve, as this could necessitate a change to treatment plans. Patient expressed understanding. Indications and proper use of medication(s) reviewed. Potential side-effects and risks of medication(s) also explained. Patient was instructed to call if any new symptoms develop prior to next visit.

## 2021-05-11 ENCOUNTER — TELEPHONE (OUTPATIENT)
Dept: FAMILY MEDICINE CLINIC | Age: 56
End: 2021-05-11

## 2021-05-11 NOTE — TELEPHONE ENCOUNTER
Thank you! Please call Mr. Jed Alexandra and let him know. He needs to call their office to schedule. As we discussed last time, this is very important. He may have prostate cancer, and he needs further evaluation. Thank you!

## 2021-05-11 NOTE — TELEPHONE ENCOUNTER
[facility] called in stating they have called pt 4x and with no luck. At this time they are recording this as unable to contact pt. No more calls will be made.

## 2021-05-18 ENCOUNTER — APPOINTMENT (OUTPATIENT)
Dept: CT IMAGING | Age: 56
End: 2021-05-18
Payer: MEDICARE

## 2021-05-18 ENCOUNTER — APPOINTMENT (OUTPATIENT)
Dept: GENERAL RADIOLOGY | Age: 56
End: 2021-05-18
Payer: MEDICARE

## 2021-05-18 ENCOUNTER — TELEPHONE (OUTPATIENT)
Dept: ADMINISTRATIVE | Age: 56
End: 2021-05-18

## 2021-05-18 ENCOUNTER — NURSE TRIAGE (OUTPATIENT)
Dept: OTHER | Facility: CLINIC | Age: 56
End: 2021-05-18

## 2021-05-18 ENCOUNTER — HOSPITAL ENCOUNTER (EMERGENCY)
Age: 56
Discharge: HOME | End: 2021-05-18
Attending: EMERGENCY MEDICINE
Payer: MEDICARE

## 2021-05-18 VITALS
BODY MASS INDEX: 32.58 KG/M2 | HEIGHT: 69 IN | DIASTOLIC BLOOD PRESSURE: 94 MMHG | HEART RATE: 77 BPM | OXYGEN SATURATION: 96 % | RESPIRATION RATE: 18 BRPM | TEMPERATURE: 97.6 F | SYSTOLIC BLOOD PRESSURE: 178 MMHG | WEIGHT: 220 LBS

## 2021-05-18 DIAGNOSIS — S50.311A ABRASION OF RIGHT ELBOW, INITIAL ENCOUNTER: ICD-10-CM

## 2021-05-18 DIAGNOSIS — S06.0X1A CONCUSSION WITH LOSS OF CONSCIOUSNESS OF 30 MINUTES OR LESS, INITIAL ENCOUNTER: Primary | ICD-10-CM

## 2021-05-18 DIAGNOSIS — S40.011A CONTUSION OF MULTIPLE SITES OF RIGHT SHOULDER AND UPPER ARM, INITIAL ENCOUNTER: ICD-10-CM

## 2021-05-18 DIAGNOSIS — S16.1XXA STRAIN OF NECK MUSCLE, INITIAL ENCOUNTER: ICD-10-CM

## 2021-05-18 DIAGNOSIS — S40.021A CONTUSION OF MULTIPLE SITES OF RIGHT SHOULDER AND UPPER ARM, INITIAL ENCOUNTER: ICD-10-CM

## 2021-05-18 PROCEDURE — 90471 IMMUNIZATION ADMIN: CPT | Performed by: EMERGENCY MEDICINE

## 2021-05-18 PROCEDURE — 70450 CT HEAD/BRAIN W/O DYE: CPT

## 2021-05-18 PROCEDURE — 71046 X-RAY EXAM CHEST 2 VIEWS: CPT

## 2021-05-18 PROCEDURE — 99284 EMERGENCY DEPT VISIT MOD MDM: CPT

## 2021-05-18 PROCEDURE — 72125 CT NECK SPINE W/O DYE: CPT

## 2021-05-18 PROCEDURE — 90715 TDAP VACCINE 7 YRS/> IM: CPT | Performed by: EMERGENCY MEDICINE

## 2021-05-18 PROCEDURE — 73030 X-RAY EXAM OF SHOULDER: CPT

## 2021-05-18 PROCEDURE — 73070 X-RAY EXAM OF ELBOW: CPT

## 2021-05-18 PROCEDURE — 6370000000 HC RX 637 (ALT 250 FOR IP): Performed by: EMERGENCY MEDICINE

## 2021-05-18 PROCEDURE — 71250 CT THORAX DX C-: CPT

## 2021-05-18 PROCEDURE — 6360000002 HC RX W HCPCS: Performed by: EMERGENCY MEDICINE

## 2021-05-18 RX ORDER — HYDRALAZINE HYDROCHLORIDE 25 MG/1
50 TABLET, FILM COATED ORAL ONCE
Status: COMPLETED | OUTPATIENT
Start: 2021-05-18 | End: 2021-05-18

## 2021-05-18 RX ORDER — DIAPER,BRIEF,INFANT-TODD,DISP
EACH MISCELLANEOUS ONCE
Status: COMPLETED | OUTPATIENT
Start: 2021-05-18 | End: 2021-05-18

## 2021-05-18 RX ORDER — M-VIT,TX,IRON,MINS/CALC/FOLIC 27MG-0.4MG
1 TABLET ORAL DAILY
COMMUNITY
End: 2021-11-10 | Stop reason: SDUPTHER

## 2021-05-18 RX ORDER — OXYCODONE HYDROCHLORIDE AND ACETAMINOPHEN 5; 325 MG/1; MG/1
1 TABLET ORAL EVERY 6 HOURS PRN
Qty: 12 TABLET | Refills: 0 | Status: SHIPPED | OUTPATIENT
Start: 2021-05-18 | End: 2021-05-21

## 2021-05-18 RX ORDER — OXYCODONE HYDROCHLORIDE AND ACETAMINOPHEN 5; 325 MG/1; MG/1
2 TABLET ORAL ONCE
Status: COMPLETED | OUTPATIENT
Start: 2021-05-18 | End: 2021-05-18

## 2021-05-18 RX ORDER — METOPROLOL TARTRATE 50 MG/1
50 TABLET, FILM COATED ORAL ONCE
Status: COMPLETED | OUTPATIENT
Start: 2021-05-18 | End: 2021-05-18

## 2021-05-18 RX ADMIN — HYDRALAZINE HYDROCHLORIDE 50 MG: 25 TABLET, FILM COATED ORAL at 19:41

## 2021-05-18 RX ADMIN — METOPROLOL TARTRATE 50 MG: 50 TABLET, FILM COATED ORAL at 19:41

## 2021-05-18 RX ADMIN — OXYCODONE HYDROCHLORIDE AND ACETAMINOPHEN 2 TABLET: 5; 325 TABLET ORAL at 18:19

## 2021-05-18 RX ADMIN — BACITRACIN ZINC: 500 OINTMENT TOPICAL at 19:41

## 2021-05-18 RX ADMIN — TETANUS TOXOID, REDUCED DIPHTHERIA TOXOID AND ACELLULAR PERTUSSIS VACCINE, ADSORBED 0.5 ML: 5; 2.5; 8; 8; 2.5 SUSPENSION INTRAMUSCULAR at 18:19

## 2021-05-18 ASSESSMENT — PAIN DESCRIPTION - FREQUENCY
FREQUENCY: CONTINUOUS
FREQUENCY: CONTINUOUS

## 2021-05-18 ASSESSMENT — PAIN DESCRIPTION - LOCATION
LOCATION: CHEST
LOCATION: NECK;SHOULDER;HEAD

## 2021-05-18 ASSESSMENT — PAIN DESCRIPTION - ORIENTATION: ORIENTATION: RIGHT

## 2021-05-18 ASSESSMENT — PAIN SCALES - GENERAL
PAINLEVEL_OUTOF10: 1
PAINLEVEL_OUTOF10: 7

## 2021-05-18 ASSESSMENT — PAIN DESCRIPTION - ONSET: ONSET: SUDDEN

## 2021-05-18 ASSESSMENT — PAIN DESCRIPTION - PROGRESSION: CLINICAL_PROGRESSION: GRADUALLY IMPROVING

## 2021-05-18 NOTE — TELEPHONE ENCOUNTER
Reason for Disposition   SEVERE chest pain    Answer Assessment - Initial Assessment Questions  1. MECHANISM: \"How did the injury happen? \"      Branch fell, hit the ladder and Pt fell of ladder, does not remember what happened or if lost consciousness. 2. ONSET: \"When did the injury happen? \" (Minutes or hours ago)  15-20 minutes ago. 3. LOCATION: \"Where on the chest is the injury located? \"      R Chest, R Shoulder, Upper Back are included. 4. APPEARANCE: \"What does the injury look like? \"      None on chest, bleeding from R elbow. 5. BLEEDING: \"Is there any bleeding now? If so, ask: How long has it been bleeding? \"      Bleeding R Elbow for 20 minutes. 6. SEVERITY: \"Any difficulty with breathing? \"     Yes.     7. SIZE: For cuts, bruises, or swelling, ask: \"How large is it? \" (e.g., inches or centimeters)  R Elbow cut, unsure of how large it is. Looks like a rug burn, pt states. 8. PAIN: \"Is there pain? \" If so, ask: \"How bad is the pain? \"   (e.g., Scale 1-10; or mild, moderate, severe)  Sharp pain that is 7 or 8/10.       9. TETANUS: For any breaks in the skin, ask: \"When was the last tetanus booster? \"  Unsure. 10. PREGNANCY: \"Is there any chance you are pregnant? \" \"When was your last menstrual period? \"        N/A    Protocols used: CHEST INJURY-ADULT-AH    Received call from Joseluis Schultz at pre-service center [facility]) [place] [employer]. Brief description of triage: Chest injury, see above. After intial assessment questions, Pt became frustrated and handed phone off to his girlfriend. Triage indicates for patient to go to the ER now. Writer instructed girlfriend to take him now, to the closest ER. If any symptoms were to grow worse or any symptoms appeared that seemed life threatening, to call 911. She verbalized understanding and stated she would take him immediately.     Care advice provided, patient verbalizes understanding; denies any other questions or concerns;

## 2021-05-18 NOTE — ED PROVIDER NOTES
HPI:  5/18/21,   Time: 6:55 PM EDT         Livier Liu is a 63 y.o. male presenting to the ED for a fall off a ladder with associated loss of consciousness complaining of pain in his chest elbow neck shoulder, beginning several hours ago. The complaint has been persistent, moderate in severity, and worsened by changing position, movement of his right arm. .  Patient denies abdominal pain he denies paresthesias weakness numbness. Patient is compliant with his Plavix therapy. ROS:   Pertinent positives and negatives are stated within HPI, all other systems reviewed and are negative.  --------------------------------------------- PAST HISTORY ---------------------------------------------  Past Medical History:  has a past medical history of Allergic rhinitis, Anticoagulant long-term use, Anxiety, Atypical angina ([address] CAD (coronary artery disease), Carotid artery stenosis, Cerebral artery occlusion with cerebral infarction ([address] CHF (congestive heart failure) ([address] COPD (chronic obstructive pulmonary disease) ([address] CVA (cerebral infarction), Depression, Diverticulitis, Erectile dysfunction, GERD (gastroesophageal reflux disease), Heart attack ([address] Hyperlipemia, Hypertension, Ischemic cardiomyopathy, Left atrial thrombus, Obesity, Type 2 diabetes mellitus without complication, without long-term current use of insulin ([address] and Type II or unspecified type diabetes mellitus without mention of complication, not stated as uncontrolled. Past Surgical History:  has a past surgical history that includes Inguinal hernia repair (1990); Colonoscopy (12/5/2005); Colonoscopy (9/4/2008); Colonoscopy (1/28/2010); cystourethroscopy (1/29/2010); colectomy (1/29/2010); Coronary angioplasty with stent (12/6/2014); Cardiac catheterization (3/30/2015); Cardiac defibrillator placement (4/1/2015); Upper gastrointestinal endoscopy (8/7/2015); and Colonoscopy (08/07/2015).     Social History:  reports that he quit smoking about 6 years ago. His smoking use included cigarettes. He quit after 30.00 years of use. His smokeless tobacco use includes chew. He reports current alcohol use. He reports that he does not use drugs. Family History: family history includes Heart Disease in his father and paternal uncle; High Blood Pressure in his father; No Known Problems in his brother, brother, and sister; Stroke in his sister. The patients home medications have been reviewed. Allergies: Patient has no known allergies. -------------------------------------------------- RESULTS -------------------------------------------------  All laboratory and radiology results have been personally reviewed by myself   LABS:  No results found for this visit on 05/18/21. RADIOLOGY:  Interpreted by Radiologist.  Lacinda Door   Final Result   1. Subtle opacities located in posterior right upper lobe and medial right   lower lobe could indicate lung contusion, atelectasis, or pneumonia. 2.  No evidence of displaced right or left-sided rib fracture. CT HEAD WO CONTRAST   Final Result   No acute intracranial abnormality. There is age-appropriate atrophy with small vessel ischemic disease, old   lacunar CVAs in the left centrum semiovale and right bones      CT cervical spine      There is no acute displaced fracture in the cervical spine. There is diffuse   degenerative changes from C3-T1 with osteophytes and multilevel disc bulges. The prevertebral soft tissues are normal.      Impression      No acute displaced fracture in the cervical spine. CT CERVICAL SPINE WO CONTRAST   Final Result   No acute intracranial abnormality. There is age-appropriate atrophy with small vessel ischemic disease, old   lacunar CVAs in the left centrum semiovale and right bones      CT cervical spine      There is no acute displaced fracture in the cervical spine.   There is diffuse   degenerative changes from C3-T1 with osteophytes and multilevel disc bulges. The prevertebral soft tissues are normal.      Impression      No acute displaced fracture in the cervical spine. XR SHOULDER RIGHT (MIN 2 VIEWS)   Final Result   No acute abnormality. XR ELBOW RIGHT (2 VIEWS)   Final Result   No fracture or dislocation. XR CHEST (2 VW)   Final Result   No acute process. ------------------------- NURSING NOTES AND VITALS REVIEWED ---------------------------   The nursing notes within the ED encounter and vital signs as below have been reviewed. BP (!) 178/94   Pulse 77   Temp 97.6 °F (36.4 °C) (Temporal)   Resp 18   Ht 5' 9\" (1.753 m)   Wt 220 lb (99.8 kg)   SpO2 96%   BMI 32.49 kg/m²   Oxygen Saturation Interpretation: Normal      ---------------------------------------------------PHYSICAL EXAM--------------------------------------      Constitutional/General: Alert and oriented x3, well appearing, non toxic in NAD  Head: NC/AT  Eyes: PERRL, EOMI  Mouth: Oropharynx clear, handling secretions, no trismus  Neck: Supple, full ROM, no meningeal signs right lateral neck tender to palpation  Pulmonary: Lungs clear to auscultation bilaterally, no wheezes, rales, or rhonchi. Not in respiratory distress  Cardiovascular:  Regular rate and rhythm, no murmurs, gallops, or rubs. 2+ distal pulses  Abdomen: Soft, non tender, non distended,   Extremities: Moves all extremities x 4.  Warm and well perfused right shoulder tender to palpation right elbow tender to palpation with abrasion noted on the elbow  Skin: warm and dry without rash  Neurologic: GCS 15, cranial nerves intact 5+ strength normal gait  Psych: Normal Affect      ------------------------------ ED COURSE/MEDICAL DECISION MAKING----------------------  Medications   oxyCODONE-acetaminophen (PERCOCET) 5-325 MG per tablet 2 tablet (2 tablets Oral Given 5/18/21 1819)   Tetanus-Diphth-Acell Pertussis (BOOSTRIX) injection 0.5 mL (0.5 mLs Intramuscular Given 5/18/21 1819) bacitracin zinc ointment ( Topical Given 5/18/21 1941)   hydrALAZINE (APRESOLINE) tablet 50 mg (50 mg Oral Given 5/18/21 1941)   metoprolol tartrate (LOPRESSOR) tablet 50 mg (50 mg Oral Given 5/18/21 1941)         Medical Decision Making:    Patient was medicated for pain imaging was performed. He was given a tetanus shot wound care was carried out to his elbow where there is an abrasion that did not require suturing. It was improved after a dose of Percocet and he was discharged with a prescription ~follow-up with his PCP return if he has increased signs or symptoms    Counseling: The emergency provider has spoken with the patient and discussed todays results, in addition to providing specific details for the plan of care and counseling regarding the diagnosis and prognosis. Questions are answered at this time and they are agreeable with the plan.      --------------------------------- IMPRESSION AND DISPOSITION ---------------------------------    IMPRESSION  1. Concussion with loss of consciousness of 30 minutes or less, initial encounter    2. Strain of neck muscle, initial encounter    3. Contusion of multiple sites of right shoulder and upper arm, initial encounter    4.  Abrasion of right elbow, initial encounter        DISPOSITION  Disposition: Discharge to home  Patient condition is stable                  Deni Rodriguez MD  05/18/21 2014

## 2021-05-24 ENCOUNTER — OFFICE VISIT (OUTPATIENT)
Dept: FAMILY MEDICINE CLINIC | Age: 56
End: 2021-05-24
Payer: MEDICARE

## 2021-05-24 VITALS
WEIGHT: 225 LBS | SYSTOLIC BLOOD PRESSURE: 138 MMHG | HEART RATE: 67 BPM | HEIGHT: 69 IN | TEMPERATURE: 96.4 F | DIASTOLIC BLOOD PRESSURE: 69 MMHG | BODY MASS INDEX: 33.33 KG/M2 | OXYGEN SATURATION: 97 %
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
## 2021-05-24 DIAGNOSIS — E11.9 TYPE 2 DIABETES MELLITUS WITHOUT COMPLICATION, WITHOUT LONG-TERM CURRENT USE OF INSULIN (HCC): Primary | ICD-10-CM

## 2021-05-24 LAB
ALBUMIN SERPL-MCNC: 4.6 G/DL (ref 3.5–5.2)
ALP BLD-CCNC: 98 U/L (ref 40–129)
ALT SERPL-CCNC: 22 U/L (ref 0–40)
ANION GAP SERPL CALCULATED.3IONS-SCNC: 11 MMOL/L (ref 7–16)
AST SERPL-CCNC: 24 U/L (ref 0–39)
BASOPHILS ABSOLUTE: 0.05 E9/L (ref 0–0.2)
BASOPHILS RELATIVE PERCENT: 0.8 % (ref 0–2)
BILIRUB SERPL-MCNC: 0.7 MG/DL (ref 0–1.2)
BUN BLDV-MCNC: 23 MG/DL (ref 6–20)
CALCIUM SERPL-MCNC: 10 MG/DL (ref 8.6–10.2)
CHLORIDE BLD-SCNC: 99 MMOL/L (ref 98–107)
CHOLESTEROL, TOTAL: 213 MG/DL (ref 0–199)
CO2: 29 MMOL/L (ref 22–29)
CREAT SERPL-MCNC: 1.1 MG/DL (ref 0.7–1.2)
EOSINOPHILS ABSOLUTE: 0.29 E9/L (ref 0.05–0.5)
EOSINOPHILS RELATIVE PERCENT: 4.4 % (ref 0–6)
GFR AFRICAN AMERICAN: >60
GFR NON-AFRICAN AMERICAN: >60 ML/MIN/1.73
GLUCOSE BLD-MCNC: 284 MG/DL (ref 74–99)
HCT VFR BLD CALC: 50.5 % (ref 37–54)
HDLC SERPL-MCNC: 33 MG/DL
HEMOGLOBIN: 16.4 G/DL (ref 12.5–16.5)
IMMATURE GRANULOCYTES #: 0.05 E9/L
IMMATURE GRANULOCYTES %: 0.8 % (ref 0–5)
LDL CHOLESTEROL CALCULATED: 129 MG/DL (ref 0–99)
LYMPHOCYTES ABSOLUTE: 1.48 E9/L (ref 1.5–4)
LYMPHOCYTES RELATIVE PERCENT: 22.7 % (ref 20–42)
MCH RBC QN AUTO: 26.9 PG (ref 26–35)
MCHC RBC AUTO-ENTMCNC: 32.5 % (ref 32–34.5)
MCV RBC AUTO: 82.8 FL (ref 80–99.9)
MONOCYTES ABSOLUTE: 0.45 E9/L (ref 0.1–0.95)
MONOCYTES RELATIVE PERCENT: 6.9 % (ref 2–12)
NEUTROPHILS ABSOLUTE: 4.21 E9/L (ref 1.8–7.3)
NEUTROPHILS RELATIVE PERCENT: 64.4 % (ref 43–80)
PDW BLD-RTO: 13.2 FL (ref 11.5–15)
PLATELET # BLD: 190 E9/L (ref 130–450)
PMV BLD AUTO: 11.5 FL (ref 7–12)
POTASSIUM SERPL-SCNC: 5 MMOL/L (ref 3.5–5)
RBC # BLD: 6.1 E12/L (ref 3.8–5.8)
SODIUM BLD-SCNC: 139 MMOL/L (ref 132–146)
TOTAL PROTEIN: 7.6 G/DL (ref 6.4–8.3)
TRIGL SERPL-MCNC: 253 MG/DL (ref 0–149)
VLDLC SERPL CALC-MCNC: 51 MG/DL
WBC # BLD: 6.5 E9/L (ref 4.5–11.5)

## 2021-05-24 PROCEDURE — 99212 OFFICE O/P EST SF 10 MIN: CPT | Performed by: STUDENT IN AN ORGANIZED HEALTH CARE EDUCATION/TRAINING PROGRAM

## 2021-05-24 PROCEDURE — 36415 COLL VENOUS BLD VENIPUNCTURE: CPT | Performed by: STUDENT IN AN ORGANIZED HEALTH CARE EDUCATION/TRAINING PROGRAM

## 2021-05-24 PROCEDURE — 99213 OFFICE O/P EST LOW 20 MIN: CPT | Performed by: STUDENT IN AN ORGANIZED HEALTH CARE EDUCATION/TRAINING PROGRAM

## 2021-05-24 NOTE — PROGRESS NOTES
[facility]  FAMILY MEDICINE RESIDENCY PROGRAM  DATE OF VISIT : 2021    Patient : Kevin Naranjo   Age : 63 y.o.  : 1965   MRN : [id number]   ______________________________________________________________________      Assessment & Plan :     Diagnosis Orders   1. Type 2 diabetes mellitus without complication, without long-term current use of insulin ([facility]     2. Fall, subsequent encounter         Status post fall-continue to monitor  Continue present management for arthralgia due to fall   Add Trulicity  Labs today    Chief Complaint :   Chief Complaint   Patient presents with   [facility] Fall     fell off a 15 ft ladder       HPI : Kevin Naranjo is 63 y.o. male who presented to the clinic today for ER follow-up from fall from 15 foot ladder, he was told he got a concussion, he feels okay aside from pain on the right side of the rib shoulder and hip he is using NSAIDs. Normal urination no blood. No nausea no vomiting no diarrhea no headache no amnesia no focal neurological deficit.   He does have diabetes last A1c 10.2 he is on Metformin 500 twice daily          Past Medical History :  Past Medical History:   Diagnosis Date    Allergic rhinitis     Anticoagulant long-term use     Anxiety     Atypical angina (HCC)     CAD (coronary artery disease)     Carotid artery stenosis     Cerebral artery occlusion with cerebral infarction [facility])     CHF (congestive heart failure) (HCC)     COPD (chronic obstructive pulmonary disease) ([facility]     CVA (cerebral infarction) 2012    Depression     Diverticulitis 2010    Sigmoid abscess, s/p colectomy    Erectile dysfunction     GERD (gastroesophageal reflux disease)     Heart attack ([facility] 2014    Hyperlipemia     Hypertension     Ischemic cardiomyopathy     Left atrial thrombus     Obesity     Type 2 diabetes mellitus without complication, without long-term current use of insulin ([facility])     Type II or unspecified type diabetes edema.   MSK slight tenderness to palpation right posterior rib no knee tenderness to palpation  ______________________________________________________________________        Marisa Spann MD

## 2021-05-24 NOTE — PROGRESS NOTES
S: 63 y.o. male here for ER f/u for fall from 15 foot ladder. Got concussion. Today feels ok aside from pain on R side on rib, shoulder, hip. Using nsaids, normal urination w/o blood. DM. Lab Results   Component Value Date    LABA1C 10.2 (H) 03/31/2021   metformin 500 bid    O: VS: /69 (Site: Right Upper Arm)   Pulse 67   Temp 96.4 °F (35.8 °C) (Temporal)   Ht 5' 9\" (1.753 m)   Wt 225 lb (102.1 kg)   SpO2 97%   BMI 33.23 kg/m²    General: NAD, alert and interacting appropriately. CV:  RRR, no gallops, rubs, or murmurs    Resp: CTAB   MSK: slight ttp R posterior rib. No knee ttp. Impression: ER f/u for fall, concussion. Plan:   CPM arthralgia for recent fall and concussion  Add trulicity  Labs. Attending Physician Statement  I have discussed the case, including pertinent history and exam findings with the resident. I agree with the documented assessment and plan.

## 2021-06-15 ENCOUNTER — OFFICE VISIT (OUTPATIENT)
Dept: FAMILY MEDICINE CLINIC | Age: 56
End: 2021-06-15
Payer: MEDICARE

## 2021-06-15 VITALS
SYSTOLIC BLOOD PRESSURE: 126 MMHG | WEIGHT: 222 LBS | RESPIRATION RATE: 20 BRPM | DIASTOLIC BLOOD PRESSURE: 74 MMHG | OXYGEN SATURATION: 98 % | HEART RATE: 96 BPM | HEIGHT: 69 IN | TEMPERATURE: 97 F | BODY MASS INDEX: 32.88 KG/M2

## 2021-06-15 DIAGNOSIS — B35.3 TINEA PEDIS OF BOTH FEET: ICD-10-CM
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
## 2021-06-15 DIAGNOSIS — E11.9 TYPE 2 DIABETES MELLITUS WITHOUT COMPLICATION, WITHOUT LONG-TERM CURRENT USE OF INSULIN (HCC): Primary | ICD-10-CM

## 2021-06-15 LAB
CHP ED QC CHECK: NORMAL
GLUCOSE BLD-MCNC: 155 MG/DL

## 2021-06-15 PROCEDURE — 82962 GLUCOSE BLOOD TEST: CPT | Performed by: FAMILY MEDICINE

## 2021-06-15 PROCEDURE — 99214 OFFICE O/P EST MOD 30 MIN: CPT | Performed by: FAMILY MEDICINE

## 2021-06-15 PROCEDURE — 99212 OFFICE O/P EST SF 10 MIN: CPT | Performed by: FAMILY MEDICINE

## 2021-06-15 RX ORDER — CLOTRIMAZOLE 1 %
CREAM (GRAM) TOPICAL
Qty: 60 G | Refills: 1 | Status: SHIPPED | OUTPATIENT
Start: 2021-06-15 | End: 2021-06-22

## 2021-06-15 SDOH — ECONOMIC STABILITY: FOOD INSECURITY: WITHIN THE PAST 12 MONTHS, YOU WORRIED THAT YOUR FOOD WOULD RUN OUT BEFORE YOU GOT MONEY TO BUY MORE.: OFTEN TRUE

## 2021-06-15 SDOH — ECONOMIC STABILITY: FOOD INSECURITY: WITHIN THE PAST 12 MONTHS, THE FOOD YOU BOUGHT JUST DIDN'T LAST AND YOU DIDN'T HAVE MONEY TO GET MORE.: OFTEN TRUE

## 2021-06-15 ASSESSMENT — SOCIAL DETERMINANTS OF HEALTH (SDOH): HOW HARD IS IT FOR YOU TO PAY FOR THE VERY BASICS LIKE FOOD, HOUSING, MEDICAL CARE, AND HEATING?: HARD

## 2021-06-15 NOTE — PATIENT INSTRUCTIONS
Make sure to see your eye doctor at least once a year to evaluate possible diabetic complications. Let us know when you have seen your eye doctor so that we can put it into your chart. Please talk to your pharmacist about receiving a vaccine called Shingrix. This vaccine is recommended for all adults over the age of 48 to help prevent a painful disease called Shingles. It is a 2-dose series given 2-6 months apart. This vaccine is available at most pharmacies without a prescription from a physician. Be sure that the vaccine is covered by your insurance before receiving it. Please contact me with any questions or concerns. Thank you!

## 2021-06-15 NOTE — PROGRESS NOTES
CC:  DM    HPI:  63 y.o. male presents for follow up. S/p fall. XRs normal.  Still in pain, but improving. Biofreeze. Warm showers. ROM exercises. Doing better overall. IS at home; trying to use this. DM. Has not been testing glucose. 155 in office. Ate PB bread, coffee. Trying to do better. Not always remembering meds in evenings. Again advised to set an alarm, and he agrees. Has noticed a bump on left ear he would like to have checked; present for months, no growth or change. Patient Active Problem List    Diagnosis Date Noted    Elevated PSA, less than 10 ng/ml 04/01/2021    Nonrheumatic aortic valve insufficiency 02/20/2019    Obesity (BMI 30.0-34. 9)     Ischemic cardiomyopathy     Type 2 diabetes mellitus without complication, without long-term current use of insulin ([facility]     Essential hypertension 06/29/2017    Diverticulosis of colon 08/07/2015    History of MI (myocardial infarction) 08/07/2015    Chronic systolic congestive heart failure ([facility] 08/07/2015    NSVT (nonsustained ventricular tachycardia) ([facility] 07/31/2015    Dual implantable cardioverter-defibrillator in situ 04/02/2015    CAD (coronary artery disease) 12/06/2014    Depression 12/10/2013    History of stroke 07/23/2013    ED (erectile dysfunction) 03/26/2012    COPD (chronic obstructive pulmonary disease)     Hyperlipemia     GERD (gastroesophageal reflux disease) 02/12/2011       Current Outpatient Medications on File Prior to Visit   Medication Sig Dispense Refill    Dulaglutide 0.75 MG/0.5ML SOPN Inject 0.75 mg into the skin once a week 4 pen 2    Multiple Vitamins-Minerals (THERAPEUTIC MULTIVITAMIN-MINERALS) tablet Take 1 tablet by mouth daily      blood glucose test strips (ASCENSIA AUTODISC VI;ONE TOUCH ULTRA TEST VI) strip 1 each by In Vitro route daily To test blood glucose and as needed for symptoms of high or low glucose 100 each 5    Accu-Chek Multiclix Lancets MISC 1 each by Does not apply route daily To test glucose. Use as needed for symptoms of high or low glucose. 100 each 3    glucose monitoring kit (FREESTYLE) monitoring kit 1 kit by Does not apply route daily To test glucose and as needed for symptoms of high or low glucose 1 kit 0    furosemide (LASIX) 20 MG tablet Take 1 tablet by mouth 2 times daily 180 tablet 0    metFORMIN (GLUCOPHAGE) 500 MG tablet Take 1 tablet by mouth 2 times daily (with meals) 180 tablet 0    metoprolol succinate (TOPROL XL) 50 MG extended release tablet Take 1 tablet by mouth 2 times daily 180 tablet 0    hydrALAZINE (APRESOLINE) 50 MG tablet Take 1.5 tablets by mouth every 8 hours 405 tablet 0    pantoprazole (PROTONIX) 40 MG tablet Take 1 tablet by mouth daily 90 tablet 0    spironolactone (ALDACTONE) 25 MG tablet Take 1 tablet by mouth daily 90 tablet 0    lisinopril (PRINIVIL;ZESTRIL) 40 MG tablet Take 1 tablet by mouth daily 90 tablet 0    aspirin 81 MG chewable tablet Take 1 tablet by mouth daily 90 tablet 3    atorvastatin (LIPITOR) 80 MG tablet Take 1 tablet by mouth nightly 90 tablet 0    isosorbide mononitrate (IMDUR) 30 MG extended release tablet Take 3 tablets by mouth daily 270 tablet 0    clopidogrel (PLAVIX) 75 MG tablet Take 1 tablet by mouth daily 90 tablet 0    escitalopram (LEXAPRO) 10 MG tablet Take 1 tablet by mouth daily 90 tablet 0     No current facility-administered medications on file prior to visit. No Known Allergies    Social History     Tobacco Use    Smoking status: Former Smoker     Packs/day: 1.00     Years: 30.00     Pack years: 30.00     Types: Cigarettes     Quit date: 2014     Years since quittin.0    Smokeless tobacco: Current User     Types: Chew    Tobacco comment: occ chewing tobacco, trying to quit   Vaping Use    Vaping Use: Never used   Substance Use Topics    Alcohol use: Yes     Comment: occ.     Drug use: No       ROS:   Review of Systems - as above     Physical Exam:    VS: Blood pressure 126/74, pulse 96, temperature 97 °F (36.1 °C), resp. rate 20, height 5' 9\" (1.753 m), weight 222 lb (100.7 kg), SpO2 98 %. General Appearance:  awake, alert, oriented, in no acute distress and well developed, well nourished  Head/face:  NCAT  Eyes:  No icterus, EOMI  Skin:  Bilateral ears without skin lesions. Somewhat more prominent cartilage under skin of left pinna, about 1 mm and firm, skin mobile over the area. Neck:  neck- supple, no mass, non-tender  Lungs:  Normal expansion. Clear to auscultation. No rales, rhonchi, or wheezing. Heart:  Heart regular rate and rhythm  Abdomen:  Soft, NT, ND   Extremities: Extremities warm to touch, pink, with no edema. and pulses present in all extremities. Right shoulder, chest wall without tenderness. Foot exam showed normal sensation to monofilament, equal bilaterally. Good pulses and capillary refill. Skin warm and dry, in good condition, some scaling between toes and plantar aspect. No lesions or ulcerations. No significant deformity or callus formation. Most recent labs and imaging reviewed. Findings include:     Results for POC orders placed in visit on 06/15/21   POCT Glucose   Result Value Ref Range    Glucose 155 mg/dL    QC OK? pass        Assessments:      Diagnosis Orders   1. Type 2 diabetes mellitus without complication, without long-term current use of insulin ([facility])  POCT Glucose     DIABETES FOOT EXAM   2. Fall, subsequent encounter     3. Tinea pedis of both feet  clotrimazole (LOTRIMIN) 1 % cream       Plans:    As Above. Please see Patient Instructions for further counseling and information given. RTO 3 months, or sooner as needed for any new, persistent, or worsening symptoms. Advised topical clotrimazole, and follow for symptoms. Use IS, continue supportive measures. Patient has not yet scheduled with urology due to concerns about a bill.  He will call the office again and make a payment towards his bill so that he can be seen. Again discussed the importance of following up, and he understands. Advised that currently he does not appear to have any significant skin lesions or lesions on his ears. However, protect the area from sun. Call with any new symptoms or with any growing or changing area, and he agrees. Please be adherent to the treatment plans discussed today, and please call with any questions or concerns, letting the office know of any reasons that the plans may not be followed. The risks of untreated conditions include worsening illness, injury, disability, and possibly, death. Please call if symptoms change in any way, worsen, or fail to completely resolve, as this could necessitate a change to treatment plans. Patient expressed understanding. Indications and proper use of medication(s) reviewed. Potential side-effects and risks of medication(s) also explained. Patient was instructed to call if any new symptoms develop prior to next visit. Spent 32 minutes in preparation, with patient, and in documentation on this encounter on this date.

## 2021-07-13 ENCOUNTER — OFFICE VISIT (OUTPATIENT)
Dept: FAMILY MEDICINE CLINIC | Age: 56
End: 2021-07-13
Payer: MEDICARE

## 2021-07-13 VITALS
SYSTOLIC BLOOD PRESSURE: 168 MMHG | DIASTOLIC BLOOD PRESSURE: 110 MMHG | HEART RATE: 104 BPM | HEIGHT: 69 IN | BODY MASS INDEX: 33.33 KG/M2 | TEMPERATURE: 96.6 F | OXYGEN SATURATION: 96 % | WEIGHT: 225 LBS

## 2021-07-13 DIAGNOSIS — R06.02 SHORTNESS OF BREATH: Primary | ICD-10-CM

## 2021-07-13 DIAGNOSIS — I10 ESSENTIAL HYPERTENSION: ICD-10-CM

## 2021-07-13 DIAGNOSIS — E11.9 TYPE 2 DIABETES MELLITUS WITHOUT COMPLICATION, WITHOUT LONG-TERM CURRENT USE OF INSULIN (HCC): ICD-10-CM

## 2021-07-13 PROCEDURE — 99213 OFFICE O/P EST LOW 20 MIN: CPT | Performed by: FAMILY MEDICINE

## 2021-07-13 PROCEDURE — 99212 OFFICE O/P EST SF 10 MIN: CPT | Performed by: FAMILY MEDICINE

## 2021-07-13 ASSESSMENT — ENCOUNTER SYMPTOMS
ABDOMINAL PAIN: 0
NAUSEA: 0
DIARRHEA: 0
COUGH: 0
CHEST TIGHTNESS: 0
WHEEZING: 0
SHORTNESS OF BREATH: 1
VOMITING: 0

## 2021-07-13 NOTE — PROGRESS NOTES
S: 63 y.o. male here with dyspnea which started last night while he was sitting upright watching tv. Hx of HFrEF, COPD, and CAD. He took 20 mg lasix night which helped. He took 40 mg this afternoon and this helped. He fell 2 months ago and went to the ER was told that he has contusions. Pain with breathing in deeply. Admits he only took his lasix today and has been eating chips more lately. Denies chest pain. O: VS: BP (!) 168/110 (Site: Left Upper Arm)   Pulse 104   Temp 96.6 °F (35.9 °C) (Temporal)   Ht 5' 9\" (1.753 m)   Wt 225 lb (102.1 kg)   SpO2 96%   BMI 33.23 kg/m²    General: NAD   CV:  RRR   Resp: CTAB no R/R/W   Abd:  Soft, nontender, no masses    Ext:  no C/C; tr edema b/l   nonttp  Impression/Plan:   1. Dyspnea-differential is broad. Wells score 1.5 -low risk for pe. CXR. Take extra 20 mg of lasix daily for the next 3 days  Should get echo. 2. HTN, uncontrolled-advised taking meds regularly  3. DM2-uncontrolled  rto in 3 days  Attending Physician Statement  I have discussed the case, including pertinent history and exam findings with the resident. I also have seen the patient and performed key portions of the examination. I agree with the documented assessment and plan.         Jazmyn Spann MD

## 2021-07-13 NOTE — PROGRESS NOTES
[facility]  Department of Family Medicine  Family Medicine Residency Program      Patient:  Romulo Kim 63 y.o. male     Date of Service: 7/13/21      Chiefcomplaint:   Chief Complaint   Patient presents with    Shortness of Breath         History of Present Illness   The patient is a 63 y.o. male with past medical history of CAD, CHF, COPD. Presenting to the clinic for same-day visit for shortness of breath. Patient notes SOP began yesterday evening while watching a movie. Patient was sitting upright and not laying down. Does not recall a possible precipitating factor. At that time he took his night dose of usual Lasix 20 mg. Patient stated later that night he woke from bed to use the bathroom though felt increasing shortness of breath. This morning patient stated he felt slightly improved, took double dose of Lasix in the afternoon which resulted in further improvement in his shortness of breath. Is followed outpatient by cardiology for history of CHF, last seen 01/2021. Most recent echo from 2019 placing EF at 30 to 35%. Patient also stated approximately 2 months ago he fell approximately 10 feet from a ladder. At that time CT chest noted contusions versus atelectasis versus pneumonia in the right lung. He is ambulatory, no recent surgeries, is not bedbound. Not having pain in the leg when walking. Has recently increased intake of junk food including chips. Otherwise denies dizziness or lightheadedness, headaches. Does endorse right chest pain with deep inspiration of one episode.       Past Medical History:      Diagnosis Date    Allergic rhinitis     Anticoagulant long-term use     Anxiety     Atypical angina (HCC)     CAD (coronary artery disease)     Carotid artery stenosis     Cerebral artery occlusion with cerebral infarction [facility]) 2012    CHF (congestive heart failure) (HCC)     [facility]     CVA (cerebral infarction) 11/19/2012    Depression     Diverticulitis 2010    Sigmoid abscess, s/p colectomy    Erectile dysfunction     GERD (gastroesophageal reflux disease)     Heart attack ([address] 2014    Hyperlipemia     Hypertension     Ischemic cardiomyopathy     Left atrial thrombus     Obesity     Type 2 diabetes mellitus without complication, without long-term current use of insulin (HCC)     Type II or unspecified type diabetes mellitus without mention of complication, not stated as uncontrolled        Past Surgical History:        Procedure Laterality Date    CARDIAC CATHETERIZATION  3/30/2015    EF 35%, Dr. Carolynn Sheppard, [address]  4/1/2015    left chest, Dual Chamber-[employer], Dr. Shruti Tyson, [address] [place] [address], [address]  1/29/2010    laparoscopic, converted to open sigmoid colon resection (diverticular disease), Dr. Theodore Moser, [address] [place] [address] COLONOSCOPY  12/5/2005    diverticulosis, Dr. Theodore Moser, [address] [place] [address]  9/4/2008    diverticulosis, Dr. Theodore Moser, [address] [place] [address]  1/28/2010    prior to colon surgery, diverticulosis, Dr. Theodore Moser, [address] [place] [address]  08/07/2015    sigmoid diverticulosis, rectal polyp bx/cauterized (hyperplastic polyp), Dr. Indira Carmona, [address] [place] [address]  12/6/2014    LAD PROX [place]pine 3.5 x 33, Dr. Ruddy Kunz, [address] [place] [address] CYSTOURETHROSCOPY  1/29/2010    placement bilateral ureteral stents for colon resection, Dr. Vega [facility], [address] [place] [address],[address] GASTROINTESTINAL ENDOSCOPY  8/7/2015    severe duodenitis, mild to moderate gastritis (bx for H pylori), submucosal benign mass body stomach, mild reflux esophagitis, Dr. Indira Carmona, [facility]       Allergies:    Patient has no known allergies.     Social History:   Social History     Socioeconomic History    Marital status:      Spouse name: Not on file    Number of children: Not on file    Years of education: Not on file    Highest education level: visual disturbance. Respiratory: Positive for shortness of breath. Negative for cough, chest tightness and wheezing. Cardiovascular: Negative for chest pain. Gastrointestinal: Negative for abdominal pain, diarrhea, nausea and vomiting. Neurological: Negative for dizziness, tremors, syncope, weakness and light-headedness. Medication List:    Current Outpatient Medications   Medication Sig Dispense Refill    Dulaglutide 0.75 MG/0.5ML SOPN Inject 0.75 mg into the skin once a week 4 pen 2    Multiple Vitamins-Minerals (THERAPEUTIC MULTIVITAMIN-MINERALS) tablet Take 1 tablet by mouth daily      blood glucose test strips (ASCENSIA AUTODISC VI;ONE TOUCH ULTRA TEST VI) strip 1 each by In Vitro route daily To test blood glucose and as needed for symptoms of high or low glucose 100 each 5    Accu-Chek Multiclix Lancets MISC 1 each by Does not apply route daily To test glucose. Use as needed for symptoms of high or low glucose.  100 each 3    glucose monitoring kit (FREESTYLE) monitoring kit 1 kit by Does not apply route daily To test glucose and as needed for symptoms of high or low glucose 1 kit 0    metFORMIN (GLUCOPHAGE) 500 MG tablet Take 1 tablet by mouth 2 times daily (with meals) 180 tablet 0    metoprolol succinate (TOPROL XL) 50 MG extended release tablet Take 1 tablet by mouth 2 times daily 180 tablet 0    hydrALAZINE (APRESOLINE) 50 MG tablet Take 1.5 tablets by mouth every 8 hours 405 tablet 0    pantoprazole (PROTONIX) 40 MG tablet Take 1 tablet by mouth daily 90 tablet 0    spironolactone (ALDACTONE) 25 MG tablet Take 1 tablet by mouth daily 90 tablet 0    lisinopril (PRINIVIL;ZESTRIL) 40 MG tablet Take 1 tablet by mouth daily 90 tablet 0    aspirin 81 MG chewable tablet Take 1 tablet by mouth daily 90 tablet 3    atorvastatin (LIPITOR) 80 MG tablet Take 1 tablet by mouth nightly 90 tablet 0    isosorbide mononitrate (IMDUR) 30 MG extended release tablet Take 3 tablets by mouth daily 270 tablet 0    clopidogrel (PLAVIX) 75 MG tablet Take 1 tablet by mouth daily 90 tablet 0    escitalopram (LEXAPRO) 10 MG tablet Take 1 tablet by mouth daily 90 tablet 0    furosemide (LASIX) 20 MG tablet Take 1 tablet by mouth 2 times daily 180 tablet 0     No current facility-administered medications for this visit. Physical Exam   Physical Exam  Constitutional:       Appearance: He is well-developed. HENT:      Head: Normocephalic. Right Ear: External ear normal.      Left Ear: External ear normal.   Eyes:      Conjunctiva/sclera: Conjunctivae normal.      Pupils: Pupils are equal, round, and reactive to light. Cardiovascular:      Rate and Rhythm: Normal rate and regular rhythm. Heart sounds: Normal heart sounds. No murmur heard. Pulmonary:      Effort: Pulmonary effort is normal. No respiratory distress. Breath sounds: Normal breath sounds. No wheezing or rales. Comments: Minimal crackles at bilateral lung bases  Abdominal:      General: There is no distension. Palpations: Abdomen is soft. Tenderness: There is no abdominal tenderness. There is no guarding or rebound. Musculoskeletal:         General: Normal range of motion. Cervical back: Normal range of motion. Comments: Trace pedal edema. Skin:     General: Skin is warm. Findings: No erythema. Neurological:      Mental Status: He is alert and oriented to person, place, and time. Psychiatric:         Behavior: Behavior normal.         Vitals:    07/13/21 1507   BP: (!) 168/110   Pulse:    Temp:    SpO2:          Assessment and Plan     1. Shortness of breath  -Broad differential including PE versus fluid collection versus worsening of possible contusion noted on prior CT scan versus fluid overload in setting of CHF. -Maintaining 97% saturation on room air while conversing and ambulating.  -Is not bedbound/immobile/recent surgery. Lower suspicion for PE. Wells score low risk.

## 2021-07-14 ENCOUNTER — HOSPITAL ENCOUNTER (OUTPATIENT)
Dept: GENERAL RADIOLOGY | Age: 56
Discharge: HOME | End: 2021-07-16
Payer: MEDICARE

## 2021-07-14 ENCOUNTER — HOSPITAL ENCOUNTER (OUTPATIENT)
Age: 56
Discharge: HOME | End: 2021-07-16
Payer: MEDICARE

## 2021-07-14 ENCOUNTER — HOSPITAL ENCOUNTER (OUTPATIENT)
Age: 56
Discharge: HOME | End: 2021-07-14
Payer: MEDICARE

## 2021-07-14 DIAGNOSIS — R06.02 SHORTNESS OF BREATH: ICD-10-CM

## 2021-07-14 LAB
ALBUMIN SERPL-MCNC: 4.4 G/DL (ref 3.5–5.2)
ALP BLD-CCNC: 98 U/L (ref 40–129)
ALT SERPL-CCNC: 41 U/L (ref 0–40)
ANION GAP SERPL CALCULATED.3IONS-SCNC: 14 MMOL/L (ref 7–16)
AST SERPL-CCNC: 28 U/L (ref 0–39)
BASOPHILS ABSOLUTE: 0.09 E9/L (ref 0–0.2)
BASOPHILS RELATIVE PERCENT: 1.3 % (ref 0–2)
BILIRUB SERPL-MCNC: 1.6 MG/DL (ref 0–1.2)
BUN BLDV-MCNC: 28 MG/DL (ref 6–20)
CALCIUM SERPL-MCNC: 8.8 MG/DL (ref 8.6–10.2)
CHLORIDE BLD-SCNC: 100 MMOL/L (ref 98–107)
CO2: 26 MMOL/L (ref 22–29)
CREAT SERPL-MCNC: 1.3 MG/DL (ref 0.7–1.2)
EOSINOPHILS ABSOLUTE: 0.32 E9/L (ref 0.05–0.5)
EOSINOPHILS RELATIVE PERCENT: 4.6 % (ref 0–6)
GFR AFRICAN AMERICAN: >60
GFR NON-AFRICAN AMERICAN: 57 ML/MIN/1.73
GLUCOSE BLD-MCNC: 228 MG/DL (ref 74–99)
HCT VFR BLD CALC: 46.2 % (ref 37–54)
HEMOGLOBIN: 15.4 G/DL (ref 12.5–16.5)
IMMATURE GRANULOCYTES #: 0.07 E9/L
IMMATURE GRANULOCYTES %: 1 % (ref 0–5)
LYMPHOCYTES ABSOLUTE: 1.35 E9/L (ref 1.5–4)
LYMPHOCYTES RELATIVE PERCENT: 19.2 % (ref 20–42)
MAGNESIUM: 2 MG/DL (ref 1.6–2.6)
MCH RBC QN AUTO: 27.1 PG (ref 26–35)
MCHC RBC AUTO-ENTMCNC: 33.3 % (ref 32–34.5)
MCV RBC AUTO: 81.2 FL (ref 80–99.9)
MONOCYTES ABSOLUTE: 0.47 E9/L (ref 0.1–0.95)
MONOCYTES RELATIVE PERCENT: 6.7 % (ref 2–12)
NEUTROPHILS ABSOLUTE: 4.72 E9/L (ref 1.8–7.3)
NEUTROPHILS RELATIVE PERCENT: 67.2 % (ref 43–80)
PDW BLD-RTO: 14.1 FL (ref 11.5–15)
PLATELET # BLD: 191 E9/L (ref 130–450)
PMV BLD AUTO: 11.1 FL (ref 7–12)
POTASSIUM SERPL-SCNC: 3.8 MMOL/L (ref 3.5–5)
RBC # BLD: 5.69 E12/L (ref 3.8–5.8)
SODIUM BLD-SCNC: 140 MMOL/L (ref 132–146)
TOTAL PROTEIN: 7.4 G/DL (ref 6.4–8.3)
WBC # BLD: 7 E9/L (ref 4.5–11.5)

## 2021-07-14 PROCEDURE — 36415 COLL VENOUS BLD VENIPUNCTURE: CPT

## 2021-07-14 PROCEDURE — 85025 COMPLETE CBC W/AUTO DIFF WBC: CPT

## 2021-07-14 PROCEDURE — 80053 COMPREHEN METABOLIC PANEL: CPT

## 2021-07-14 PROCEDURE — 71046 X-RAY EXAM CHEST 2 VIEWS: CPT

## 2021-07-14 PROCEDURE — 83735 ASSAY OF MAGNESIUM: CPT

## 2021-07-16 ENCOUNTER — OFFICE VISIT (OUTPATIENT)
Dept: FAMILY MEDICINE CLINIC | Age: 56
End: 2021-07-16
Payer: MEDICARE

## 2021-07-16 VITALS
WEIGHT: 226 LBS | TEMPERATURE: 96.8 F | DIASTOLIC BLOOD PRESSURE: 70 MMHG | OXYGEN SATURATION: 95 % | HEART RATE: 95 BPM | HEIGHT: 69 IN | SYSTOLIC BLOOD PRESSURE: 130 MMHG | RESPIRATION RATE: 18 BRPM | BODY MASS INDEX: 33.47 KG/M2

## 2021-07-16 DIAGNOSIS — R06.02 SOB (SHORTNESS OF BREATH): ICD-10-CM

## 2021-07-16 PROCEDURE — 99213 OFFICE O/P EST LOW 20 MIN: CPT | Performed by: FAMILY MEDICINE

## 2021-07-16 PROCEDURE — 99212 OFFICE O/P EST SF 10 MIN: CPT | Performed by: FAMILY MEDICINE

## 2021-07-16 ASSESSMENT — LIFESTYLE VARIABLES
HOW MANY STANDARD DRINKS CONTAINING ALCOHOL DO YOU HAVE ON A TYPICAL DAY: 1 OR 2
HOW OFTEN DO YOU HAVE A DRINK CONTAINING ALCOHOL: 2-4 TIMES A MONTH

## 2021-07-16 NOTE — PATIENT INSTRUCTIONS
Patient Education        Stopping Smokeless Tobacco Use: Care Instructions  Your Care Instructions     Smokeless tobacco comes in many forms, such as snuff and chewing tobacco:  · Snuff is finely ground tobacco sold in cans or pouches. Most of the time, snuff is used by putting a \"pinch\" or \"dip\" between the lower lip or cheek and the gum. · Chewing tobacco is sold as loose leaves, plugs, or twists. It is chewed or placed between the cheek and the gum or teeth. There are plenty of reasons to stop using smokeless tobacco. These products are harmful. They are not risk-free alternatives to smoking. Smokeless tobacco contains nicotine, which is addicting. Though using smokeless tobacco is less harmful than smoking cigarettes, it can cause serious health problems, such as:  · White patches or red sores in your mouth that can turn into mouth cancer involving the lip, tongue, or cheek. · Tooth loss and other dental problems. · Gum disease. Your gums may pull away from your teeth and not grow back. People who use smokeless tobacco crave the nicotine in it. Giving up smokeless tobacco is much harder than simply changing a habit. Your body has to stop craving the nicotine. It is hard to quit, but you can do it. Many tools are available for people who want to quit using smokeless tobacco. You may find that combining tools works best for you. There are several steps to quitting. First you get ready to quit. Then you get support to help you. After that, you learn new skills and behaviors to quit. For many people, a necessary step is getting and using medicine. Your doctor will help you set up the plan that best meets your needs. You may want to attend a tobacco cessation program. When you choose a program, look for one that has proven success. Ask your doctor for ideas.  You will greatly increase your chances of success if you take medicine as well as get counseling or join a cessation program.  Some of the changes you feel when you first quit smokeless tobacco are uncomfortable. Your body will miss the nicotine at first, and you may feel short-tempered and grumpy. You may have trouble sleeping or concentrating. Medicine can help you deal with these symptoms. You may struggle with changing your habits and rituals. The last step is the tricky one: Be prepared for the urge to use smokeless tobacco to continue for a time. This is a lot to deal with, but keep at it. You will feel better. Follow-up care is a key part of your treatment and safety. Be sure to make and go to all appointments, and call your doctor if you are having problems. It's also a good idea to know your test results and keep a list of the medicines you take. How can you care for yourself at home? · Ask your family, friends, and coworkers for support. You have a better chance of quitting if you have help and support. · Join a support group for people who are trying to quit using smokeless tobacco.  · Set a quit date. Pick your date carefully so that it is not right in the middle of a big deadline or stressful time. After you quit, do not use smokeless tobacco even once. Get rid of all spit cups, cans, and pouches after your last use. Clean your house and your clothes so that they do not smell of tobacco.  · Learn how to be a non-user. Think about ways you can avoid those things that make you reach for tobacco.  ? Learn some ways to deal with cravings, like calling a friend or going for a walk. Cravings often pass. ? Avoid situations that put you at greatest risk for using smokeless tobacco. For some people, it is hard to spend time with friends without dipping or chewing. For others, they might skip a coffee break with coworkers who smoke or use smokeless tobacco.  ? Change your daily routine. Take a different route to work, or eat a meal in a different place. · Cut down on stress.  Calm yourself or release tension by doing an activity you enjoy, such as reading a book, taking a hot bath, or gardening. · Talk to your doctor or pharmacist about nicotine replacement therapy. You still get nicotine, but you do not use tobacco. Nicotine replacement products help you slowly reduce the amount of nicotine you need. Many of these products are available over the counter. They include nicotine patches, gum, lozenges, and inhalers. · Ask your doctor about bupropion (Wellbutrin) or varenicline (Chantix), which are prescription medicines. They do not contain nicotine. They help you by reducing withdrawal symptoms, such as stress and anxiety. · Get regular exercise. Having healthy habits will help your body move past its craving for nicotine. · Be prepared to keep trying. Most people are not successful the first few times they try to quit. Do not get mad at yourself if you use tobacco again. Make a list of things you learned, and think about when you want to try again, such as next week, next month, or next year. Where can you learn more? Go to https://[employer] Smartphones.[employer]. org and sign in to your [employer] account. Enter B075 in the Starteed box to learn more about \"Stopping Smokeless Tobacco Use: Care Instructions. \"     If you do not have an account, please click on the \"Sign Up Now\" link. Current as of: February 11, 2021               Content Version: 12.9  © 4273-5744 Healthwise, Incorporated. Care instructions adapted under license by [facility] ([facility]). If you have questions about a medical condition or this instruction, always ask your healthcare professional. [place] [address] any warranty or liability for your use of this information.

## 2021-07-16 NOTE — PROGRESS NOTES
79-year-old male with a history of CHF, CAD. Followed by cardiology, last seen January 2021. Following up on a same-day visit from 7/13. Same-day visit with was for acute onset shortness of breath, no known precipitating factor per patient, at that time patient had no chest pain, diaphoresis, anxiety, lightheadedness dizziness. No history of CHF, echo 2 years ago places EF at 30 to 35%. Has not recently followed up with CHF clinic. Patient noted he becameshort of breath while sitting upright in a chair. Also noted he took an extra dose of Lasix the night before office visit which she noted improved his symptoms. Blood work was drawn including CBC, CMP, mag and x-rays obtained. X-rays negative for any acute findings, lab work WNL. CMP reflected most likely slight dehydration in regards to creatinine/GFR. At the time patient was also advised to take 60 mg of Lasix X 3 days and follow-up Friday 7/16. Patient noted he did not take these extra Lasix dosages. But noted his symptoms did improve. Today patient states he feels significantly better. He denies any chest pain, worsening shortness of breath, lightheadedness or dizziness. Noted he is back to his baseline prior to the office visit previously. He has no other acute complaints today. Blood pressure 130/70, pulse 95, temperature 96.8 °F (36 °C), temperature source Temporal, resp. rate 18, height 5' 9\" (1.753 m), weight 226 lb (102.5 kg), SpO2 95 %. HEENT WNL     Heart regular    Lungs clear    abd non-tender      No edema    Pulses intact   No acute distress, lungs clear to auscultation bilaterally, no crackles or wheezes noted. No crackles at bilateral bases. A/P  Shortness of breath-significant improvement in symptoms on this visit. We will continue with current dosage of Lasix until further evaluated by cardiology and PCP. Follow-up with CHF clinic  Follow-up with cardiology.       Attending Physician Statement  I have discussed the case, including pertinent history and exam findings with the resident. I agree with the documented assessment and plan.

## 2021-07-16 NOTE — PROGRESS NOTES
[facility]  Department of Family Medicine  Family Medicine Residency Program      Patient:  Sonali Gonzalez 63 y.o. male     Date of Service: 7/16/21      Chiefcomplaint:   Chief Complaint   Patient presents with    Results         History of Present Illness   The patient is a 63 y.o. male with a past medical history of CHF, CAD. Following up on same day visit 7/13. Original same-day visit was for shortness of breath which began at rest and sitting upright. Otherwise patient did not recall an inciting factor, he did not have any chest pain, diaphoresis or anxiety associated with the shortness of breath. Stated he had taken his normal Lasix dose which did improve his symptoms the night before office visit 7/13. Following day patient noted he took an extra dose of Lasix with his usual which further improved his symptoms. At that time his saturation was 97% on room air conversing and ambulating. Today patient stated he feels significant improvement since last visit. His shortness of breath has since improved and he is currently not having any chest pain, lightheadedness or dizziness or headaches. Last visit patient was advised to take 60 mg of Lasix for 3 days. He said he did not take the extra Lasix dose as he stated he felt dehydrated. CBC, CMP, mag and x-ray was obtained on same day visit. Electrolytes WNL, x-ray was negative for acute process.   Past Medical History:      Diagnosis Date    Allergic rhinitis     Anticoagulant long-term use     Anxiety     Atypical angina (HCC)     CAD (coronary artery disease)     Carotid artery stenosis     Cerebral artery occlusion with cerebral infarction [facility]) 2012    CHF (congestive heart failure) (HCC)     COPD (chronic [facility] disease) ([facility] [address]     CVA (cerebral infarction) 11/19/2012    Depression     Diverticulitis 2010    Sigmoid abscess, s/p colectomy    Erectile dysfunction     GERD (gastroesophageal reflux disease)     Heart attack ([facility] [address] [facility] 2014    Hyperlipemia     Hypertension     Ischemic cardiomyopathy     Left atrial thrombus     Obesity     Type 2 diabetes mellitus without complication, without long-term current use of insulin (HCC)     Type II or unspecified type diabetes mellitus without mention of complication, not stated as uncontrolled        Past Surgical History:        Procedure Laterality Date    CARDIAC CATHETERIZATION  3/30/2015    EF 35%, Dr. Jeni Angel, [address]  4/1/2015    left chest, Dual Chamber-[employer], Dr. Logan Flores, [address] [facility],  [address]  1/29/2010    laparoscopic, converted to open sigmoid colon resection (diverticular disease), Dr. Jose Carlos Dale, [facility] COLONOSCOPY  12/5/2005    diverticulosis, Dr. Jose Carlos Dale, [address] [place] [address]  9/4/2008    diverticulosis, Dr. Jose Carlos Dale, [address] [place] [address]  1/28/2010    prior to colon surgery, diverticulosis, Dr. Jose Carlos Dale, [address] [place] [address]  08/07/2015    sigmoid diverticulosis, rectal polyp bx/cauterized (hyperplastic polyp), Dr. Kirk, [address] [facility] [address]  12/6/2014    LAD PROX [place] 3.5 x 33, Dr. Yvonne Cadet, [address] [facility] CYSTOURETHROSCOPY  1/29/2010    placement bilateral ureteral stents for colon resection, Dr. Feng Richards, [facility], [address] [place] [address],[address] GASTROINTESTINAL ENDOSCOPY  8/7/2015    severe duodenitis, mild to moderate gastritis (bx for H pylori), submucosal benign mass body stomach, mild reflux esophagitis, Dr. Kirk, [facility]       Allergies:    Patient has no known allergies.     Social History:   Social History     Socioeconomic History    Marital status:      Spouse name: Not on file    Number of children: Not on file    Years of education: Not on file    Highest education level: Not on file   Occupational History    Not on file   Tobacco Use    Smoking status: Former Smoker     Packs/day: 1.00     Years: 30.00     Pack years: 30.00 MULTIVITAMIN-MINERALS) tablet Take 1 tablet by mouth daily      blood glucose test strips (ASCENSIA AUTODISC VI;ONE TOUCH ULTRA TEST VI) strip 1 each by In Vitro route daily To test blood glucose and as needed for symptoms of high or low glucose 100 each 5    Accu-Chek Multiclix Lancets MISC 1 each by Does not apply route daily To test glucose. Use as needed for symptoms of high or low glucose. 100 each 3    glucose monitoring kit (FREESTYLE) monitoring kit 1 kit by Does not apply route daily To test glucose and as needed for symptoms of high or low glucose 1 kit 0    furosemide (LASIX) 20 MG tablet Take 1 tablet by mouth 2 times daily 180 tablet 0    metFORMIN (GLUCOPHAGE) 500 MG tablet Take 1 tablet by mouth 2 times daily (with meals) 180 tablet 0    metoprolol succinate (TOPROL XL) 50 MG extended release tablet Take 1 tablet by mouth 2 times daily 180 tablet 0    hydrALAZINE (APRESOLINE) 50 MG tablet Take 1.5 tablets by mouth every 8 hours 405 tablet 0    pantoprazole (PROTONIX) 40 MG tablet Take 1 tablet by mouth daily 90 tablet 0    spironolactone (ALDACTONE) 25 MG tablet Take 1 tablet by mouth daily 90 tablet 0    lisinopril (PRINIVIL;ZESTRIL) 40 MG tablet Take 1 tablet by mouth daily 90 tablet 0    aspirin 81 MG chewable tablet Take 1 tablet by mouth daily 90 tablet 3    atorvastatin (LIPITOR) 80 MG tablet Take 1 tablet by mouth nightly 90 tablet 0    isosorbide mononitrate (IMDUR) 30 MG extended release tablet Take 3 tablets by mouth daily 270 tablet 0    clopidogrel (PLAVIX) 75 MG tablet Take 1 tablet by mouth daily 90 tablet 0    escitalopram (LEXAPRO) 10 MG tablet Take 1 tablet by mouth daily 90 tablet 0     No current facility-administered medications for this visit. Physical Exam   Physical Exam  Constitutional:       Appearance: He is well-developed. HENT:      Head: Normocephalic.       Right Ear: External ear normal.      Left Ear: External ear normal.   Eyes: Conjunctiva/sclera: Conjunctivae normal.      Pupils: Pupils are equal, round, and reactive to light. Cardiovascular:      Rate and Rhythm: Normal rate and regular rhythm. Heart sounds: Normal heart sounds. No murmur heard. Pulmonary:      Effort: Pulmonary effort is normal. No respiratory distress. Breath sounds: Normal breath sounds. No wheezing or rales. Abdominal:      General: There is no distension. Palpations: Abdomen is soft. Tenderness: There is no abdominal tenderness. There is no guarding or rebound. Musculoskeletal:         General: Normal range of motion. Cervical back: Normal range of motion. Skin:     General: Skin is warm. Findings: No erythema. Neurological:      Mental Status: He is alert and oriented to person, place, and time. Psychiatric:         Behavior: Behavior normal.         Vitals:    07/16/21 1336   BP: 130/70   Pulse: 95   Resp: 18   Temp: 96.8 °F (36 °C)   SpO2: 95%         Assessment and Plan     1. SOB (shortness of breath)  -Significant improvement in symptoms since previously examined in office. Notes he is back to baseline. Continue with current Lasix dosage. Was advised to take 60 mg X 3 days though patient did not. May trial in future if symptoms recur and cardiac ischemic episode is ruled out.  -Follow-up with cardiology soon if able. -Obtain echo as soon as possible, previously been ordered, needs to be completed. -Advised if similar episode reoccurs to proceed directly to ER as cardiac ischemia is also possibility and needs to be ruled out ASAP. -Low suspicion for PE as is ambulatory, no recent surgeries, no prior history of DVT/PE per patient.  -Follow-up in CHF clinic, will send referral if required. RTO 1 month or sooner if symptoms do not resolve or recur.   Case discussed with Dr. Stacey Vera

## 2021-08-17 ENCOUNTER — OFFICE VISIT (OUTPATIENT)
Dept: FAMILY MEDICINE CLINIC | Age: 56
End: 2021-08-17
Payer: MEDICARE

## 2021-08-17 VITALS
HEIGHT: 69 IN | DIASTOLIC BLOOD PRESSURE: 88 MMHG | BODY MASS INDEX: 32.88 KG/M2 | OXYGEN SATURATION: 98 % | SYSTOLIC BLOOD PRESSURE: 128 MMHG | RESPIRATION RATE: 16 BRPM | WEIGHT: 222 LBS | HEART RATE: 91 BPM | TEMPERATURE: 97.4 F

## 2021-08-17 DIAGNOSIS — R06.09 DYSPNEA ON EXERTION: ICD-10-CM

## 2021-08-17 DIAGNOSIS — I50.20 HFREF (HEART FAILURE WITH REDUCED EJECTION FRACTION) (HCC): ICD-10-CM

## 2021-08-17 DIAGNOSIS — R97.20 ELEVATED PSA, LESS THAN 10 NG/ML: ICD-10-CM

## 2021-08-17 DIAGNOSIS — G47.33 OSA (OBSTRUCTIVE SLEEP APNEA): ICD-10-CM

## 2021-08-17 DIAGNOSIS — E11.9 TYPE 2 DIABETES MELLITUS WITHOUT COMPLICATION, WITHOUT LONG-TERM CURRENT USE OF INSULIN (HCC): Primary | ICD-10-CM

## 2021-08-17 LAB — HBA1C MFR BLD: 10 %

## 2021-08-17 PROCEDURE — 99214 OFFICE O/P EST MOD 30 MIN: CPT | Performed by: FAMILY MEDICINE

## 2021-08-17 PROCEDURE — 83036 HEMOGLOBIN GLYCOSYLATED A1C: CPT | Performed by: FAMILY MEDICINE

## 2021-08-17 PROCEDURE — 99212 OFFICE O/P EST SF 10 MIN: CPT | Performed by: FAMILY MEDICINE

## 2021-08-17 ASSESSMENT — LIFESTYLE VARIABLES: HOW OFTEN DO YOU HAVE A DRINK CONTAINING ALCOHOL: NEVER

## 2021-08-17 NOTE — PROGRESS NOTES
CC:  Follow up DM    HPI:  63 y.o. male presents for follow up. Dyspnea. Still having dyspnea daily. Worsened by lying down flat, as overnight he wakes gasping for air, and by exertion. Has never yet had sleep study as ordered. Sometimes feels short of breath at rest sitting up. No new edema. Weight is stable overall. Did not tolerate higher doses of Lasix, stating he felt dehydrated. Sometimes better with sitting up. Fatigued daily. Rare palpitations, not associated with dyspnea. Drank some dill pickle juice yesterday, though he knows he should not. No chest pain or pressure. DM. Eating [employer] pumpkins recently. Some soda, various types of [employer] (not diet). Chocolate milk. Mocha iced coffee with cream.  Had not been taking dulaglutide shots, but resumed this a couple of days ago. Eating \"junk\" for the past week, he states. Does not drink plain water. Has not eaten any vegetables lately, but occasionally he will eat canned green beans. Has not been taking metformin consistently. Constipation recently, but believes this is due to his diet, UTD on colon screening. Elevated PSA. States he has not yet seen urology. Again, advised on the risks, including prostate CA, and emphasized his need to be evaluated by urology. He agrees to call for an appointment. ED, about one year, does not wish to pursue treatment at this time. No discharge, no redness, no swelling. Patient Active Problem List    Diagnosis Date Noted    Elevated PSA, less than 10 ng/ml 04/01/2021    Nonrheumatic aortic valve insufficiency 02/20/2019    Obesity (BMI 30.0-34. 9)     Ischemic cardiomyopathy     Type 2 diabetes mellitus without complication, without long-term current use of insulin ([address]     Essential hypertension 06/29/2017    Diverticulosis of colon 08/07/2015    History of MI (myocardial infarction) 08/07/2015    Chronic systolic congestive heart failure ([address] 08/07/2015    NSVT (nonsustained ventricular tachycardia) [address] [address] 07/31/2015    Dual implantable cardioverter-defibrillator in situ 04/02/2015    CAD (coronary artery disease) 12/06/2014    Depression 12/10/2013    History of stroke 07/23/2013    ED (erectile dysfunction) 03/26/2012    COPD (chronic obstructive pulmonary disease)     Hyperlipemia     GERD (gastroesophageal reflux disease) 02/12/2011       Current Outpatient Medications on File Prior to Visit   Medication Sig Dispense Refill    Dulaglutide 0.75 MG/0.5ML SOPN Inject 0.75 mg into the skin once a week 4 pen 2    Multiple Vitamins-Minerals (THERAPEUTIC MULTIVITAMIN-MINERALS) tablet Take 1 tablet by mouth daily      blood glucose test strips (ASCENSIA AUTODISC VI;ONE TOUCH ULTRA TEST VI) strip 1 each by In Vitro route daily To test blood glucose and as needed for symptoms of high or low glucose 100 each 5    Accu-Chek Multiclix Lancets MISC 1 each by Does not apply route daily To test glucose. Use as needed for symptoms of high or low glucose.  100 each 3    glucose monitoring kit (FREESTYLE) monitoring kit 1 kit by Does not apply route daily To test glucose and as needed for symptoms of high or low glucose 1 kit 0    furosemide (LASIX) 20 MG tablet Take 1 tablet by mouth 2 times daily 180 tablet 0    metFORMIN (GLUCOPHAGE) 500 MG tablet Take 1 tablet by mouth 2 times daily (with meals) 180 tablet 0    metoprolol succinate (TOPROL XL) 50 MG extended release tablet Take 1 tablet by mouth 2 times daily 180 tablet 0    hydrALAZINE (APRESOLINE) 50 MG tablet Take 1.5 tablets by mouth every 8 hours 405 tablet 0    pantoprazole (PROTONIX) 40 MG tablet Take 1 tablet by mouth daily 90 tablet 0    spironolactone (ALDACTONE) 25 MG tablet Take 1 tablet by mouth daily 90 tablet 0    lisinopril (PRINIVIL;ZESTRIL) 40 MG tablet Take 1 tablet by mouth daily 90 tablet 0    aspirin 81 MG chewable tablet Take 1 tablet by mouth daily 90 tablet 3    atorvastatin (LIPITOR) 80 MG tablet Take 1 tablet by mouth nightly 90 tablet 0    isosorbide mononitrate (IMDUR) 30 MG extended release tablet Take 3 tablets by mouth daily 270 tablet 0    clopidogrel (PLAVIX) 75 MG tablet Take 1 tablet by mouth daily 90 tablet 0    escitalopram (LEXAPRO) 10 MG tablet Take 1 tablet by mouth daily 90 tablet 0     No current facility-administered medications on file prior to visit. No Known Allergies    Social History     Tobacco Use    Smoking status: Former Smoker     Packs/day: 1.00     Years: 30.00     Pack years: 30.00     Types: Cigarettes     Quit date: 2014     Years since quittin.2    Smokeless tobacco: Current User     Types: Chew    Tobacco comment: occ chewing tobacco, trying to quit   Vaping Use    Vaping Use: Never used   Substance Use Topics    Alcohol use: Yes     Comment: occ.  Drug use: No       ROS:   Review of Systems - as above     Physical Exam:    VS:  Blood pressure 128/88, pulse 91, temperature 97.4 °F (36.3 °C), temperature source Oral, resp. rate 16, height 5' 9\" (1.753 m), weight 222 lb (100.7 kg), SpO2 98 %. Weight is stable. General Appearance:  awake, alert, oriented, in no acute distress and well developed, well nourished  Head/face:  NCAT  Eyes:  EOMI and Sclera nonicteric  Neck:  neck- supple, no mass, non-tender  Lungs:  Normal expansion. Clear to auscultation. No rales, rhonchi, or wheezing. Unlabored at rest.    Heart:  Heart regular rate and rhythm with soft systolic murmur. ICD in place. Abdomen:  Softly distended, nontender   Extremities: Extremities warm to touch, pink, with no edema. and pulses present in all extremities    Most recent labs and imaging reviewed. Findings include:     Results for POC orders placed in visit on 21   POCT glycosylated hemoglobin (Hb A1C)   Result Value Ref Range    Hemoglobin A1C 10.0 %       Assessments:      Diagnosis Orders   1.  Type 2 diabetes mellitus without complication, without long-term current use of insulin (HCC)  POCT glycosylated hemoglobin (Hb A1C)    Comprehensive Metabolic Panel    TSH without Reflex    Lipid Panel    BRAIN NATRIURETIC PEPTIDE   2. Dyspnea on exertion  Priscilla Zambrano, DO, Cardiology, Ana M    Echocardiogram complete   3. HFrEF (heart failure with reduced ejection fraction) ([facility] [address]  [address], [address] [facility], DO, Cardiology, Ana M    Echocardiogram complete    Comprehensive Metabolic Panel    TSH without Reflex    Lipid Panel    BRAIN NATRIURETIC PEPTIDE   4. SARAH (obstructive sleep apnea)  Baseline Diagnostic Sleep Study   5. Elevated PSA, less than 10 ng/ml         Plans:    As Above. Please see Patient Instructions for further counseling and information given. Will place referral back to cardiology. Will also place order for echo again, as he did not yet have this performed. No current evidence for fluid overload today, but follow closely, continue Lasix. Modify diet. We discussed that his lifestyle choices are dangerous, and that his DM and heart disease could be far better controlled if he took his medications as prescribed and made some simple changes to his diet and daily activities. Offered resources. Advised extensively against sweetened beverages, excess carbohydrates, and fatty/fried foods. Discussed sodium content of canned vegetables (at least rinse, but fresh/frozed are best). No pickle juice. Encouraged increased fruits and vegetables. Advised that he take his medications consistently and call with any questions, symptoms, or concerns in the meantime. Will start with cardiology evaluation and echo. Had recent normal CXR. However, also has a past smoking history, and would consider PFTs in the near future as well. Patient advised. Never has had a sleep study as previously ordered. Advised good sleep hygiene. Will reorder sleep study as well.      RTO 4 weeks, or sooner as needed for any new, persistent, or worsening

## 2021-08-17 NOTE — PATIENT INSTRUCTIONS
Patient Education        Learning About Meal Planning for Diabetes  Why plan your meals? Meal planning can be a key part of managing diabetes. Planning meals and snacks with the right balance of carbohydrate, protein, and fat can help you keep your blood sugar at the target level you set with your doctor. You don't have to eat special foods. You can eat what your family eats, including sweets once in a while. But you do have to pay attention to how often you eat and how much you eat of certain foods. You may want to work with a dietitian or a certified diabetes educator. He or she can give you tips and meal ideas and can answer your questions about meal planning. This health professional can also help you reach a healthy weight if that is one of your goals. What plan is right for you? Your dietitian or diabetes educator may suggest that you start with the plate format or carbohydrate counting. The plate format  The plate format is a simple way to help you manage how you eat. You plan meals by learning how much space each food should take on a plate. Using the plate format helps you spread carbohydrate throughout the day. It can make it easier to keep your blood sugar level within your target range. It also helps you see if you're eating healthy portion sizes. To use the plate format, you put non-starchy vegetables on half your plate. Add meat or meat substitutes on one-quarter of the plate. Put a grain or starchy vegetable (such as brown rice or a potato) on the final quarter of the plate. You can add a small piece of fruit and some low-fat or fat-free milk or yogurt, depending on your carbohydrate goal for each meal.  Here are some tips for using the plate format:  · Make sure that you are not using an oversized plate. A 9-inch plate is best. Many restaurants use larger plates. · Get used to using the plate format at home. Then you can use it when you eat out.   · Write down your questions about using the plate format. Talk to your doctor, a dietitian, or a diabetes educator about your concerns. Carbohydrate counting  With carbohydrate counting, you plan meals based on the amount of carbohydrate in each food. Carbohydrate raises blood sugar higher and more quickly than any other nutrient. It is found in desserts, breads and cereals, and fruit. It's also found in starchy vegetables such as potatoes and corn, grains such as rice and pasta, and milk and yogurt. Spreading carbohydrate throughout the day helps keep your blood sugar levels within your target range. Your daily amount depends on several things, including your weight, how active you are, which diabetes medicines you take, and what your goals are for your blood sugar levels. A registered dietitian or diabetes educator can help you plan how much carbohydrate to include in each meal and snack. A guideline for your daily amount of carbohydrate is:  · 45 to 60 grams at each meal. That's about the same as 3 to 4 carbohydrate servings. · 15 to 20 grams at each snack. That's about the same as 1 carbohydrate serving. The Nutrition Facts label on packaged foods tells you how much carbohydrate is in a serving of the food. First, look at the serving size on the food label. Is that the amount you eat in a serving? All of the nutrition information on a food label is based on that serving size. So if you eat more or less than that, you'll need to adjust the other numbers. Total carbohydrate is the next thing you need to look for on the label. If you count carbohydrate servings, one serving of carbohydrate is 15 grams. For foods that don't come with labels, such as fresh fruits and vegetables, you'll need a guide that lists carbohydrate in these foods. Ask your doctor, dietitian, or diabetes educator about books or other nutrition guides you can use.   If you take insulin, you need to know how many grams of carbohydrate are in a meal. This lets you know how much no-sugar-added yogurt. ? Starchy vegetables have 15 grams of carbs in a serving. A serving is ½ cup of mashed potatoes or sweet potato; 1 cup winter squash; ½ of a small baked potato; ½ cup of cooked beans; or ½ cup cooked corn or green peas. · Learn how much carbs to eat each day and at each meal. A dietitian or CDE can teach you how to keep track of the amount of carbs you eat. This is called carbohydrate counting. · If you are not sure how to count carbohydrate grams, use the Plate Method to plan meals. It is a good, quick way to make sure that you have a balanced meal. It also helps you spread carbs throughout the day. ? Divide your plate by types of foods. Put non-starchy vegetables on half the plate, meat or other protein food on one-quarter of the plate, and a grain or starchy vegetable in the final quarter of the plate. To this you can add a small piece of fruit and 1 cup of milk or yogurt, depending on how many carbs you are supposed to eat at a meal.  · Try to eat about the same amount of carbs at each meal. Do not \"save up\" your daily allowance of carbs to eat at one meal.  · Proteins have very little or no carbs per serving. Examples of proteins are beef, chicken, turkey, fish, eggs, tofu, cheese, cottage cheese, and peanut butter. A serving size of meat is 3 ounces, which is about the size of a deck of cards. Examples of meat substitute serving sizes (equal to 1 ounce of meat) are 1/4 cup of cottage cheese, 1 egg, 1 tablespoon of peanut butter, and ½ cup of tofu. How can you eat out and still eat healthy? · Learn to estimate the serving sizes of foods that have carbohydrate. If you measure food at home, it will be easier to estimate the amount in a serving of restaurant food. · If the meal you order has too much carbohydrate (such as potatoes, corn, or baked beans), ask to have a low-carbohydrate food instead. Ask for a salad or green vegetables.   · If you use insulin, check your blood sugar before and after eating out to help you plan how much to eat in the future. · If you eat more carbohydrate at a meal than you had planned, take a walk or do other exercise. This will help lower your blood sugar. What are some tips for eating healthy? · Limit saturated fat, such as the fat from meat and dairy products. This is a healthy choice because people who have diabetes are at higher risk of heart disease. So choose lean cuts of meat and nonfat or low-fat dairy products. Use olive or canola oil instead of butter or shortening when cooking. · Don't skip meals. Your blood sugar may drop too low if you skip meals and take insulin or certain medicines for diabetes. · Check with your doctor before you drink alcohol. Alcohol can cause your blood sugar to drop too low. Alcohol can also cause a bad reaction if you take certain diabetes medicines. Follow-up care is a key part of your treatment and safety. Be sure to make and go to all appointments, and call your doctor if you are having problems. It's also a good idea to know your test results and keep a list of the medicines you take. Where can you learn more? Go to https://[employer]. org and sign in to your [employer] account. Enter R213 in the [facility] box to learn more about \"Learning About Carbohydrate (Carb) Counting and Eating Out When You Have Diabetes. \"     If you do not have an account, please click on the \"Sign Up Now\" link. Current as of: August 31, 2020               Content Version: 12.9  © 2006-2021 Healthwise, Incorporated. Care instructions adapted under license by [facility] ([facility]). If you have questions about a medical condition or this instruction, always ask your healthcare professional. [place] [address] any warranty or liability for your use of this information.

## 2021-08-18 ENCOUNTER — TELEPHONE (OUTPATIENT)
Dept: ADMINISTRATIVE | Age: 56
End: 2021-08-18

## 2021-08-18 NOTE — TELEPHONE ENCOUNTER
Spoke with patient. Patient has complaints of shortness of breath at rest and with exertion, worse on lying down. Patient denies any chest pain, LE edema or weight gain. Symptoms have been present for a couple of weeks and he does get some relief after taking his Lasix. Please advise.

## 2021-08-18 NOTE — TELEPHONE ENCOUNTER
It looks like he is on Lasix 20 mg twice daily. Increase that to 40 mg twice daily for the 3 days. Then go back to 20 mg. It looks like she already ordered an echo. I agree. Make sure it is for me to read. Have him come in next available.

## 2021-08-18 NOTE — TELEPHONE ENCOUNTER
Referral in the workque on dr. Tiffanie Ventura pt per Dr. Autumn COLLINS/heart failure - last saw [facility] in [address] on: 1/26/21 was due in Jan 2022 for a yearly. Scheduling advise please.

## 2021-08-19 ENCOUNTER — NURSE ONLY (OUTPATIENT)
Dept: FAMILY MEDICINE CLINIC | Age: 56
End: 2021-08-19
Payer: MEDICARE

## 2021-08-19 DIAGNOSIS — E11.9 TYPE 2 DIABETES MELLITUS WITHOUT COMPLICATION, WITHOUT LONG-TERM CURRENT USE OF INSULIN (HCC): Chronic | ICD-10-CM

## 2021-08-19 DIAGNOSIS — I50.22 CHRONIC SYSTOLIC CONGESTIVE HEART FAILURE (HCC): Chronic | ICD-10-CM

## 2021-08-19 PROCEDURE — 36415 COLL VENOUS BLD VENIPUNCTURE: CPT | Performed by: FAMILY MEDICINE

## 2021-08-19 NOTE — TELEPHONE ENCOUNTER
Patient notified of Dr. Mandeep Davis recommendations. Message left for Yousuf Hernandez in echo department to make sure echo is assigned to Dr. Cony Mantilla. Patient was driving and stated he would call me back to schedule his F/U.

## 2021-08-20 ENCOUNTER — TELEPHONE (OUTPATIENT)
Dept: CARDIOLOGY | Age: 56
End: 2021-08-20

## 2021-08-20 NOTE — TELEPHONE ENCOUNTER
Patient called to schedule an Echo. First available date/time was 9/1/21 @ 7:15. Patient is seeing Dr Mary Solorzano on 8/30. Patient said he was ok coming 2 different days. I told pt that I will let Dr's coordinator know, just in case Dr wants to him to have echo on same as visit.  Thank you  Electronically signed by Ian eMza on 8/20/2021 at 10:04 AM

## 2021-09-01 ENCOUNTER — OFFICE VISIT (OUTPATIENT)
Dept: CARDIOLOGY CLINIC | Age: 56
End: 2021-09-01
Payer: MEDICARE

## 2021-09-01 ENCOUNTER — HOSPITAL ENCOUNTER (OUTPATIENT)
Dept: CARDIOLOGY | Age: 56
Discharge: HOME | End: 2021-09-01
Payer: MEDICARE

## 2021-09-01 VITALS
BODY MASS INDEX: 32.73 KG/M2 | DIASTOLIC BLOOD PRESSURE: 62 MMHG | HEART RATE: 74 BPM | SYSTOLIC BLOOD PRESSURE: 108 MMHG | WEIGHT: 221 LBS | RESPIRATION RATE: 18 BRPM | HEIGHT: 69 IN

## 2021-09-01 DIAGNOSIS — I25.10 CORONARY ARTERY DISEASE INVOLVING NATIVE CORONARY ARTERY OF NATIVE HEART WITHOUT ANGINA PECTORIS: Primary | ICD-10-CM

## 2021-09-01 DIAGNOSIS — I50.20 HFREF (HEART FAILURE WITH REDUCED EJECTION FRACTION) (HCC): ICD-10-CM

## 2021-09-01 DIAGNOSIS — R06.09 DYSPNEA ON EXERTION: ICD-10-CM

## 2021-09-01 LAB
LV EF: 30 %
LVEF MODALITY: NORMAL

## 2021-09-01 PROCEDURE — 93000 ELECTROCARDIOGRAM COMPLETE: CPT | Performed by: INTERNAL MEDICINE

## 2021-09-01 PROCEDURE — 99214 OFFICE O/P EST MOD 30 MIN: CPT | Performed by: INTERNAL MEDICINE

## 2021-09-01 PROCEDURE — 93306 TTE W/DOPPLER COMPLETE: CPT

## 2021-09-01 PROCEDURE — 6360000004 HC RX CONTRAST MEDICATION: Performed by: INTERNAL MEDICINE

## 2021-09-01 PROCEDURE — 2580000003 HC RX 258: Performed by: INTERNAL MEDICINE

## 2021-09-01 RX ORDER — SODIUM CHLORIDE 0.9 % (FLUSH) 0.9 %
10 SYRINGE (ML) INJECTION PRN
Status: DISCONTINUED | OUTPATIENT
Start: 2021-09-01 | End: 2021-09-02 | Stop reason: HOSPADM

## 2021-09-01 RX ADMIN — PERFLUTREN 1.1 MG: 6.52 INJECTION, SUSPENSION INTRAVENOUS at 08:03

## 2021-09-01 RX ADMIN — SODIUM CHLORIDE, PRESERVATIVE FREE 10 ML: 5 INJECTION INTRAVENOUS at 08:06

## 2021-09-01 NOTE — PROGRESS NOTES
Aura Vyas  1965  Date of Service: 9/1/2021    Patient Active Problem List    Diagnosis Date Noted    Elevated PSA, less than 10 ng/ml 04/01/2021    Nonrheumatic aortic valve insufficiency 02/20/2019     Overview Note:     Moderate       Obesity (BMI 30.0-34. 9)     Ischemic cardiomyopathy      Overview Note:     EF 30%      Type 2 diabetes mellitus without complication, without long-term current use of insulin (HCC)     Essential hypertension 06/29/2017    Diverticulosis of colon 08/07/2015    History of MI (myocardial infarction) 08/07/2015    Chronic systolic congestive heart failure ([facility] [address] 08/07/2015    NSVT (nonsustained ventricular tachycardia) ([facility] [address] 07/31/2015     Overview Note:     12 beats seen on ICD interrogation      Dual implantable cardioverter-defibrillator in situ 04/02/2015     Overview Note:     [employer] ICD Date of implant: 4/1/2015 by Dr Evgeny Shepherd  2. Right ventricular lead parameters are as follows:[employer] Model Endotak Reliance SG Model [id number]. Serial # [id number]  Date of implant: 4/1/2015  3. Right atrial lead parameters are as follows:  Guidant Fineline II Model # [id number]. Serial # [id number]  Date of implant: 4/1/2015      CAD (coronary artery disease) 12/06/2014     Overview Note:     Anterior STEMI 12-14. 3.5 x 33 mm drug-eluting stent to the LAD. 3-15: Cardiac catheterization demonstrates a small less than 2 mm apical LAD 75% stenosis and a small less than 2 mm obtuse marginal with an 80% stenosis. The LAD stent was patent. 9-15: Stress test and straining a fixed defect but no ischemia. 2-17: Stress test demonstrating a fixed defect with no ischemia.       Depression 12/10/2013    History of stroke 07/23/2013    ED (erectile dysfunction) 03/26/2012    COPD (chronic obstructive pulmonary disease)     Hyperlipemia     GERD (gastroesophageal reflux disease) 02/12/2011       Social History     Socioeconomic History    Marital status:      Spouse name: None    Number of children: None    Years of education: None    Highest education level: None   Occupational History    None   Tobacco Use    Smoking status: Former Smoker     Packs/day: 1.00     Years: 30.00     Pack years: 30.00     Types: Cigarettes     Quit date: 2014     Years since quittin.2    Smokeless tobacco: Current User     Types: Chew    Tobacco comment: occ chewing tobacco, trying to quit   Vaping Use    Vaping Use: Never used   Substance and Sexual Activity    Alcohol use: Yes     Comment: occ.  Drug use: No    Sexual activity: Never   Other Topics Concern    None   Social History Narrative    None     Social Determinants of Health     Financial Resource Strain: High Risk    Difficulty of Paying Living Expenses: Hard   Food Insecurity: Food Insecurity Present    Worried About Running Out of Food in the Last Year: Often true    Justin of Food in the Last Year: Often true   Transportation Needs:     Lack of Transportation (Medical):      Lack of Transportation (Non-Medical):    Physical Activity:     Days of Exercise per Week:     Minutes of Exercise per Session:    Stress:     Feeling of Stress :    Social Connections:     Frequency of Communication with Friends and Family:     Frequency of Social Gatherings with Friends and Family:     Attends [employer] Services:     Active Member of Clubs or Organizations:     Attends Club or Organization Meetings:     Marital Status:    Intimate Partner Violence:     Fear of Current or Ex-Partner:     Emotionally Abused:     Physically Abused:     Sexually Abused:        Current Outpatient Medications   Medication Sig Dispense Refill    Dulaglutide 0.75 MG/0.5ML SOPN Inject 0.75 mg into the skin once a week 4 pen 2    Multiple Vitamins-Minerals (THERAPEUTIC MULTIVITAMIN-MINERALS) tablet Take 1 tablet by mouth daily      furosemide (LASIX) 20 MG tablet Take 1 tablet by mouth 2 times daily 180 tablet 0    metFORMIN (GLUCOPHAGE) 500 MG tablet Take 1 tablet by mouth 2 times daily (with meals) 180 tablet 0    metoprolol succinate (TOPROL XL) 50 MG extended release tablet Take 1 tablet by mouth 2 times daily 180 tablet 0    hydrALAZINE (APRESOLINE) 50 MG tablet Take 1.5 tablets by mouth every 8 hours 405 tablet 0    pantoprazole (PROTONIX) 40 MG tablet Take 1 tablet by mouth daily 90 tablet 0    spironolactone (ALDACTONE) 25 MG tablet Take 1 tablet by mouth daily 90 tablet 0    lisinopril (PRINIVIL;ZESTRIL) 40 MG tablet Take 1 tablet by mouth daily 90 tablet 0    aspirin 81 MG chewable tablet Take 1 tablet by mouth daily 90 tablet 3    atorvastatin (LIPITOR) 80 MG tablet Take 1 tablet by mouth nightly 90 tablet 0    isosorbide mononitrate (IMDUR) 30 MG extended release tablet Take 3 tablets by mouth daily 270 tablet 0    clopidogrel (PLAVIX) 75 MG tablet Take 1 tablet by mouth daily 90 tablet 0    escitalopram (LEXAPRO) 10 MG tablet Take 1 tablet by mouth daily 90 tablet 0     No current facility-administered medications for this visit. Facility-Administered Medications Ordered in Other Visits   Medication Dose Route Frequency Provider Last Rate Last Admin    sodium chloride flush 0.9 % injection 10 mL  10 mL IntraVENous PRN Rebeca Pickering, DO   10 mL at 09/01/21 0806        No Known Allergies    Chief Complaint:  Raul Modi is here today for follow up and management/recomendations for CAD, ischemic or adenopathy. History of Present Illness: Raul Modi was previously followed by Dr. Amaury Laura. He states that He does house work, goes up the stairs, & goes shopping. He had shortness of breath 2 weeks ago. I had him increase his Lasix to 40 mg twice daily for 3 days. He states that that resolved the symptoms. He also states that he had chest discomfort while going up a hill 1 week ago. This has not recurred. He denies orthopnea/PND, or lower extremity edema.   He denies any palpitations or presyncopal symptoms. REVIEW OF SYSTEMS:  As above. Patient does not complain of any fever, chills, nausea, vomiting or diarrhea. No focal, motor or neurological deficits. No changes in his/her vision, hearing, bowel or bladder habits. He is not known to have a history of thyroid problems. No recent nose bleeds. PHYSICAL EXAM:  Vitals:    09/01/21 0820   BP: 108/62   Pulse: 74   Resp: 18   Weight: 221 lb (100.2 kg)   Height: 5' 9\" (1.753 m)       GENERAL:  He is alert and oriented x 3, communicates well, in no distress. NECK:  No masses, trachea is mid position. Supple, full ROM, no JVD or bruits. No palpable thyromegaly or lymphadenopathy. HEART: Distant. Regular rate and rhythm. Normal S1 and S2. There is an S4 gallop and a II/VI systolic ejection murmur. LUNGS: Poor air movement. Clear to auscultation bilaterally. No use of accessory muscles. symmetrical excursion. ABDOMEN: Obese. Soft, non-tender. Normal bowel sounds. EXTREMITIES:  Full ROM x 4. No bilateral lower extremity edema. Good distal pulses. EYES:  Extraocular muscles intact. PERRL. Normal lids & conjunctiva. ENT:  Nares are clear & not bleeding. Moist mucosa. Normal lips formation. No external masses   NEURO: no tremors, full ROM x 4, EOMI. SKIN:  Warm, dry and intact. Normal turgor. EKG: Sinus rhythm, 74 bpm, nl axis, nonspecific ST - T wave changes. Assessment:   1. Coronary artery disease as outlined above. 2. Atypical chest discomfort. 3. Ischemic Cardiomyopathy as outlined above. EF 30%. 4. Shortness of breath 2 weeks ago. Improved with the increased Lasix dose. Euvolemic on today's exam.  5. Moderate mitral regurgitation  6. Mild aortic regurgitation. 7. Diabetes  8. Hypertension  9. Hypercholesterolemia  10. COPD   6. CVA        Recommendations:  1. Continue to follow the cholesterol with Dr. Otto Cons. 2. Echocardiogram.  3. I had him increase his Lasix to 40 twice daily for 3 days.   He is

## 2022-01-20 ENCOUNTER — NURSE TRIAGE (OUTPATIENT)
Dept: OTHER | Facility: CLINIC | Age: 57
End: 2022-01-20

## 2022-01-20 ENCOUNTER — APPOINTMENT (OUTPATIENT)
Dept: GENERAL RADIOLOGY | Age: 57
DRG: 291 | End: 2022-01-20
Payer: MEDICARE

## 2022-01-20 ENCOUNTER — OFFICE VISIT (OUTPATIENT)
Dept: FAMILY MEDICINE CLINIC | Age: 57
DRG: 291 | End: 2022-01-20
Payer: MEDICARE

## 2022-01-20 VITALS
TEMPERATURE: 97 F | DIASTOLIC BLOOD PRESSURE: 108 MMHG | HEIGHT: 69 IN | HEART RATE: 108 BPM | BODY MASS INDEX: 35.1 KG/M2 | OXYGEN SATURATION: 98 % | SYSTOLIC BLOOD PRESSURE: 158 MMHG | WEIGHT: 237 LBS | RESPIRATION RATE: 24 BRPM

## 2022-01-20 DIAGNOSIS — K21.9 GASTROESOPHAGEAL REFLUX DISEASE, UNSPECIFIED WHETHER ESOPHAGITIS PRESENT: ICD-10-CM

## 2022-01-20 DIAGNOSIS — I50.22 CHRONIC SYSTOLIC CONGESTIVE HEART FAILURE (HCC): Primary | ICD-10-CM

## 2022-01-20 DIAGNOSIS — I25.10 CORONARY ARTERY DISEASE INVOLVING NATIVE CORONARY ARTERY OF NATIVE HEART WITHOUT ANGINA PECTORIS: Chronic | ICD-10-CM

## 2022-01-20 DIAGNOSIS — I10 ESSENTIAL HYPERTENSION: ICD-10-CM

## 2022-01-20 DIAGNOSIS — E11.9 TYPE 2 DIABETES MELLITUS WITHOUT COMPLICATION, WITHOUT LONG-TERM CURRENT USE OF INSULIN (HCC): ICD-10-CM

## 2022-01-20 LAB
ALBUMIN SERPL-MCNC: 3.9 G/DL (ref 3.5–5.2)
ALP BLD-CCNC: 130 U/L (ref 40–129)
ALT SERPL-CCNC: 15 U/L (ref 0–40)
ANION GAP SERPL CALCULATED.3IONS-SCNC: 12 MMOL/L (ref 7–16)
AST SERPL-CCNC: 20 U/L (ref 0–39)
BILIRUB SERPL-MCNC: 1.1 MG/DL (ref 0–1.2)
BUN BLDV-MCNC: 20 MG/DL (ref 6–20)
CALCIUM SERPL-MCNC: 8.9 MG/DL (ref 8.6–10.2)
CHLORIDE BLD-SCNC: 101 MMOL/L (ref 98–107)
CO2: 28 MMOL/L (ref 22–29)
CREAT SERPL-MCNC: 1.4 MG/DL (ref 0.7–1.2)
GFR AFRICAN AMERICAN: >60
GFR NON-AFRICAN AMERICAN: 52 ML/MIN/1.73
GLUCOSE BLD-MCNC: 114 MG/DL (ref 74–99)
HCT VFR BLD CALC: 46.3 % (ref 37–54)
HEMOGLOBIN: 13.8 G/DL (ref 12.5–16.5)
MCH RBC QN AUTO: 23.7 PG (ref 26–35)
MCHC RBC AUTO-ENTMCNC: 29.8 % (ref 32–34.5)
MCV RBC AUTO: 79.4 FL (ref 80–99.9)
PDW BLD-RTO: 15.9 FL (ref 11.5–15)
PLATELET # BLD: 235 E9/L (ref 130–450)
PMV BLD AUTO: 10.5 FL (ref 7–12)
POTASSIUM SERPL-SCNC: 3.8 MMOL/L (ref 3.5–5)
PRO-BNP: 2664 PG/ML (ref 0–125)
RBC # BLD: 5.83 E12/L (ref 3.8–5.8)
SARS-COV-2, NAAT: NOT DETECTED
SODIUM BLD-SCNC: 141 MMOL/L (ref 132–146)
TOTAL PROTEIN: 7.1 G/DL (ref 6.4–8.3)
TROPONIN, HIGH SENSITIVITY: 32 NG/L (ref 0–11)
WBC # BLD: 7.4 E9/L (ref 4.5–11.5)

## 2022-01-20 PROCEDURE — 96374 THER/PROPH/DIAG INJ IV PUSH: CPT

## 2022-01-20 PROCEDURE — 87635 SARS-COV-2 COVID-19 AMP PRB: CPT

## 2022-01-20 PROCEDURE — 85027 COMPLETE CBC AUTOMATED: CPT

## 2022-01-20 PROCEDURE — 83880 ASSAY OF NATRIURETIC PEPTIDE: CPT

## 2022-01-20 PROCEDURE — 71046 X-RAY EXAM CHEST 2 VIEWS: CPT

## 2022-01-20 PROCEDURE — 80053 COMPREHEN METABOLIC PANEL: CPT

## 2022-01-20 PROCEDURE — 84484 ASSAY OF TROPONIN QUANT: CPT

## 2022-01-20 PROCEDURE — 3051F HG A1C>EQUAL 7.0%<8.0%: CPT | Performed by: STUDENT IN AN ORGANIZED HEALTH CARE EDUCATION/TRAINING PROGRAM

## 2022-01-20 PROCEDURE — 99215 OFFICE O/P EST HI 40 MIN: CPT | Performed by: STUDENT IN AN ORGANIZED HEALTH CARE EDUCATION/TRAINING PROGRAM

## 2022-01-20 PROCEDURE — 99282 EMERGENCY DEPT VISIT SF MDM: CPT

## 2022-01-20 PROCEDURE — 99212 OFFICE O/P EST SF 10 MIN: CPT | Performed by: STUDENT IN AN ORGANIZED HEALTH CARE EDUCATION/TRAINING PROGRAM

## 2022-01-20 PROCEDURE — 93005 ELECTROCARDIOGRAM TRACING: CPT | Performed by: NURSE PRACTITIONER

## 2022-01-20 RX ORDER — ISOSORBIDE MONONITRATE 30 MG/1
90 TABLET, EXTENDED RELEASE ORAL DAILY
Qty: 90 TABLET | Refills: 1 | Status: ON HOLD
Start: 2022-01-20 | End: 2022-03-27 | Stop reason: HOSPADM

## 2022-01-20 RX ORDER — HYDRALAZINE HYDROCHLORIDE 50 MG/1
50 TABLET, FILM COATED ORAL EVERY 8 HOURS SCHEDULED
Qty: 90 TABLET | Refills: 1 | Status: ON HOLD
Start: 2022-01-20 | End: 2022-03-27 | Stop reason: HOSPADM

## 2022-01-20 RX ORDER — LISINOPRIL 40 MG/1
40 TABLET ORAL DAILY
Qty: 90 TABLET | Refills: 1 | Status: ON HOLD
Start: 2022-01-20 | End: 2022-03-27 | Stop reason: HOSPADM

## 2022-01-20 RX ORDER — CLOPIDOGREL BISULFATE 75 MG/1
75 TABLET ORAL DAILY
Qty: 90 TABLET | Refills: 0 | Status: SHIPPED
Start: 2022-01-20 | End: 2022-03-29 | Stop reason: SDUPTHER

## 2022-01-20 RX ORDER — PANTOPRAZOLE SODIUM 40 MG/1
40 TABLET, DELAYED RELEASE ORAL DAILY
Qty: 30 TABLET | Refills: 1 | Status: SHIPPED
Start: 2022-01-20 | End: 2022-03-29 | Stop reason: SDUPTHER

## 2022-01-20 RX ORDER — FUROSEMIDE 20 MG/1
20 TABLET ORAL 2 TIMES DAILY
Qty: 60 TABLET | Refills: 1 | Status: ON HOLD
Start: 2022-01-20 | End: 2022-03-27 | Stop reason: HOSPADM

## 2022-01-20 RX ORDER — SPIRONOLACTONE 25 MG/1
25 TABLET ORAL DAILY
Qty: 30 TABLET | Refills: 1 | Status: SHIPPED
Start: 2022-01-20 | End: 2022-01-31 | Stop reason: SDUPTHER

## 2022-01-20 RX ORDER — METOPROLOL SUCCINATE 50 MG/1
50 TABLET, EXTENDED RELEASE ORAL 2 TIMES DAILY
Qty: 60 TABLET | Refills: 1 | Status: ON HOLD
Start: 2022-01-20 | End: 2022-03-27 | Stop reason: HOSPADM

## 2022-01-20 ASSESSMENT — LIFESTYLE VARIABLES: HOW OFTEN DO YOU HAVE A DRINK CONTAINING ALCOHOL: NEVER

## 2022-01-20 NOTE — TELEPHONE ENCOUNTER
Received call from [facility] with [employer]. Subjective: Caller states \"I have been sleeping in my recliner because I can't breath when I lay flat\"     Current Symptoms: Bilateral feet and leg swelling, Swelling up to knee but not past knee, pitting    Onset: 10 days ago; gradual    Pain Severity: 7/10; \"pins and needles\"; intermittent    Temperature: Denies      What has been tried: 20mg lasix bid    Recommended disposition: Seen today in the office. Triage RN advised patient that if they cannot be seen in the office today to go to an [address] [place] [address] Ave. Care advice provided, patient verbalizes understanding; denies any other questions or concerns; instructed to call back for any new or worsening symptoms. Writer provided warm transfer to [facility] for appointment scheduling     Attention Provider: Thank you for allowing me to participate in the care of your patient. The patient was connected to triage in response to information provided to the ECC/PSC. Please do not respond through this encounter as the response is not directed to a shared pool.       Reason for Disposition   MODERATE swelling of both ankles (e.g., swelling extends up to the knees) AND new-onset or worsening    Protocols used: LEG SWELLING AND EDEMA-ADULT-OH

## 2022-01-20 NOTE — PROGRESS NOTES
[facility] MEDICINE RESIDENCY PROGRAM  DATE OF VISIT : 2022    Patient : Janina Valencia   Age : 61 y.o.  : 1965   MRN : [id number]   ______________________________________________________________________    Chief Complaint :   Chief Complaint   Patient presents with    Shortness of Breath     weight gain    Cough    Leg Swelling       HPI : Janina Valencia is 61 y.o. male who presented to the clinic today for SOB, cough, and leg swelling. Patient reports increased SOB with weight gain over the past 3-4 months, but especially since New Years. Reports he cannot lie flat anymore to sleep, but uses a recliner and also elevates feet. Takes lasix 20 mg BID. Patient also has fatigue. Also reports abdominal bloating and RLQ pain. He has been eating canned soups, ramen noodles, and other processed foods. HTN-Did take BP meds this morning, denies missed doses. Denies chest pain, palpitations, headaches, dizziness. Needs medication refills today.     Past Medical History :  Past Medical History:   Diagnosis Date    Allergic rhinitis     Anticoagulant long-term use     Anxiety     [facility]typical angina ([facility]     CAD (coronary artery disease)     Carotid artery stenosis     Cerebral artery occlusion with cerebral infarction [facility])     CHF (congestive heart failure) ([facility])     COPD (chronic obstructive pulmonary disease) ([facility]     CVA (cerebral infarction) 2012    Depression     Diverticulitis 2010    Sigmoid abscess, s/p colectomy    Erectile dysfunction     GERD (gastroesophageal reflux disease)     Heart attack ([facility] 2014    Hyperlipemia     Hypertension     Ischemic cardiomyopathy     Left atrial thrombus     Obesity     Type 2 diabetes mellitus without complication, without long-term current use of insulin (HCC)     Type II or unspecified type diabetes mellitus without mention of complication, not stated as uncontrolled      Past Surgical History:   Procedure Laterality Date    CARDIAC CATHETERIZATION  3/30/2015    EF 35%, Dr. Frank Cruz, [address]  4/1/2015    left chest, Dual Chamber-[employer], Dr. Dwight Dior, [address], [address]  1/29/2010    laparoscopic, converted to open sigmoid colon resection (diverticular disease), Dr. Magda Marquez, [address] [place] [address] COLONOSCOPY  12/5/2005    diverticulosis, Dr. Magda Marquez, [address] [place] [address] COLONOSCOPY  9/4/2008    diverticulosis, Dr. Magda Marquez, [address] [place] [address] COLONOSCOPY  1/28/2010    prior to colon surgery, diverticulosis, Dr. Magda Marquez, [address] [place] [address] COLONOSCOPY  08/07/2015    sigmoid diverticulosis, rectal polyp bx/cauterized (hyperplastic polyp), Dr. Cristopher Phillips, [facility]  12/6/2014    LAD [address] 3.5 x 33, Dr. Lion Henry, [address] [place] [address] CYSTOURETHROSCOPY  1/29/2010    placement bilateral ureteral stents for colon resection, Dr. Agustín Arevalo, [facility], [address] [place] [address],[address] GASTROINTESTINAL ENDOSCOPY  8/7/2015    severe duodenitis, mild to moderate gastritis (bx for H pylori), submucosal benign mass body stomach, mild reflux esophagitis, Dr. Cristopher Phillips, [facility]       Allergies :   No Known Allergies    Medication List :    Current Outpatient Medications   Medication Sig Dispense Refill    clopidogrel (PLAVIX) 75 MG tablet Take 1 tablet by mouth daily 90 tablet 0    furosemide (LASIX) 20 MG tablet Take 1 tablet by mouth 2 times daily 60 tablet 1    hydrALAZINE (APRESOLINE) 50 MG tablet Take 1 tablet by mouth every 8 hours 90 tablet 1    isosorbide mononitrate (IMDUR) 30 MG extended release tablet Take 3 tablets by mouth daily 90 tablet 1    lisinopril (PRINIVIL;ZESTRIL) 40 MG tablet Take 1 tablet by mouth daily 90 tablet 1    metFORMIN (GLUCOPHAGE) 500 MG tablet Take 1 tablet by mouth 2 times daily (with meals) 60 tablet 1    metoprolol succinate (TOPROL XL) 50 MG extended release tablet Take 1 tablet by mouth 2 times daily 60 tablet 1    pantoprazole (PROTONIX) 40 MG tablet Take 1 tablet by mouth daily 30 tablet 1    spironolactone (ALDACTONE) 25 MG tablet Take 1 tablet by mouth daily 30 tablet 1    atorvastatin (LIPITOR) 80 MG tablet Take 1 tablet by mouth nightly 90 tablet 0    [START ON 1/24/2022] mupirocin (BACTROBAN) 2 % ointment Apply topically 3 times daily. 2 each 1    aspirin 81 MG EC tablet Take 81 mg by mouth daily       No current facility-administered medications for this visit.        ______________________________________________________________________    Physical Exam :    Vitals: BP (!) 158/108 (Site: Right Upper Arm, Position: Sitting, Cuff Size: Medium Adult)   Pulse 108   Temp 97 °F (36.1 °C) (Temporal)   Resp 24   Ht 5' 9\" (1.753 m)   Wt 237 lb (107.5 kg)   SpO2 98%   BMI 35.00 kg/m²   General Appearance: Awake, alert, oriented, in mild distress  HEENT: NCAT, no pallor or icterus  Neck: Symmetrical, trachea midline. Mild JVD appreciated  Chest wall/Lung: Clear to auscultations, but increased respiratory distress with longer conversations. No crackles appreciated  Heart: RRR, normal S1 and S2, no murmurs, rubs or gallops  Abdomen: Distended, no pitting appreciated, RLQ tenderness  Extremities: Extremities normal, atraumatic, no cyanosis, clubbing; 1+ pitting edema to mid-calf B/L. Neurologic: Alert&Oriented x3. No focal motor deficits detected   Psychiatric: Normal mood. Normal affect. Normal behavior  ______________________________________________________________________    Assessment & Plan :    1. Chronic systolic congestive heart failure (HCC)  · Patient instructed to go ED for suspected CHF exacerbation  · Refill:  - furosemide (LASIX) 20 MG tablet; Take 1 tablet by mouth 2 times daily  Dispense: 60 tablet; Refill: 1  - spironolactone (ALDACTONE) 25 MG tablet; Take 1 tablet by mouth daily  Dispense: 30 tablet; Refill: 1    2.  Essential hypertension  · Not well controlled today, may be due to suspected CHF exacerbation   · Refill:  - hydrALAZINE (APRESOLINE) 50 MG tablet; Take 1 tablet by mouth every 8 hours  Dispense: 90 tablet; Refill: 1  - isosorbide mononitrate (IMDUR) 30 MG extended release tablet; Take 3 tablets by mouth daily  Dispense: 90 tablet; Refill: 1  - metoprolol succinate (TOPROL XL) 50 MG extended release tablet; Take 1 tablet by mouth 2 times daily  Dispense: 60 tablet; Refill: 1    3. Coronary artery disease involving native coronary artery of native heart without angina pectoris  · Refill:  - clopidogrel (PLAVIX) 75 MG tablet; Take 1 tablet by mouth daily  Dispense: 90 tablet; Refill: 0  - lisinopril (PRINIVIL;ZESTRIL) 40 MG tablet; Take 1 tablet by mouth daily  Dispense: 90 tablet; Refill: 1    4. Type 2 diabetes mellitus without complication, without long-term current use of insulin (HCC)  · Refill:  - metFORMIN (GLUCOPHAGE) 500 MG tablet; Take 1 tablet by mouth 2 times daily (with meals)  Dispense: 60 tablet; Refill: 1    5. Gastroesophageal reflux disease, unspecified whether esophagitis present  · Refill:  - pantoprazole (PROTONIX) 40 MG tablet; Take 1 tablet by mouth daily  Dispense: 30 tablet; Refill: 1      Additional plan and future considerations:       Return to Office: Return after hospital stay.     Eleanor Lesches, DO   Case discussed with Dr. Yareli Ballesteros

## 2022-01-20 NOTE — ED TRIAGE NOTES
Department of Emergency Medicine  FIRST PROVIDER TRIAGE NOTE             Independent MLP           1/20/22  4:45 PM EST    Date of Encounter: 1/20/22   MRN: [id number]      HPI: Zafar Hollins is a 61 y.o. male who presents to the ED for Leg Swelling (sent over from the clinic states he is retaining water and states intermittent sob. )      ROS: Negative for cp or sob. PE: Gen Appearance/Constitutional: alert  Musculoskeletal: moves all extremities x 4     Initial Plan of Care: All treatment areas with department are currently occupied. Plan to order/Initiate the following while awaiting opening in ED: labs and EKG.     Initial Plan of Care: Initiate Treatment-Testing, Proceed toTreatment Area When Bed Available for ED Attending/MLP to Continue Care    Electronically signed by PAUL Vidales CNP   DD: 1/20/22

## 2022-01-20 NOTE — PROGRESS NOTES
Iwona Lima 61 y.o. male  here for evaluation of shortness of breath, weight gain, cough, leg swelling. PMH: Known history of HFrEF. Gradual onset x3 to 4 months with sitting significant enough approximately 3 weeks ago to prompt medical evaluation. Now requires elevation of both head and legs in order to sleep. Reports compliance with his medications, which include diuretics, ACE inhibitor, multiple antihypertensive agents, agents for CAD and diabetes. Patient reports compliance with all medications, including today's dosing. However, BP is elevated. Denies fever or chills. ROS: Remarkable for cough that is sometimes productive. Weight gain of 16 pounds in 3 months, reported to be unintentional.  Admits to high sodium diet. O: VS: BP (!) 158/108 (Site: Right Upper Arm, Position: Sitting, Cuff Size: Medium Adult)   Pulse 108   Temp 97 °F (36.1 °C) (Temporal)   Resp 24   Ht 5' 9\" (1.753 m)   Wt 237 lb (107.5 kg)   SpO2 98%   BMI 35.00 kg/m²    General: Mild respiratory distress. Neck: remarkable for the presence of JVD   CV:  RRR, no gallops, rubs, or murmurs   Resp: CTAB no R/R/W   Abd: Distended , mild RLQ tenderness r, no masses    Ext: Bilateral lower extremity edema bilaterally with 1-2+ pitting to mid calf    Assessment / Plan:      Nicolasa Davison was seen today for shortness of breath, cough and leg swelling. Diagnoses and all orders for this visit:    1. Chronic systolic congestive heart failure (HCC)  · Patient instructed to go ED for suspected CHF exacerbation  · Refill:  - furosemide (LASIX) 20 MG tablet; Take 1 tablet by mouth 2 times daily  Dispense: 60 tablet; Refill: 1  - spironolactone (ALDACTONE) 25 MG tablet; Take 1 tablet by mouth daily  Dispense: 30 tablet; Refill: 1     2. Essential hypertension  · Not well controlled today, may be due to suspected CHF exacerbation   · Refill:  - hydrALAZINE (APRESOLINE) 50 MG tablet;  Take 1 tablet by mouth every 8 hours  Dispense: 90 tablet; Refill: 1  - isosorbide mononitrate (IMDUR) 30 MG extended release tablet; Take 3 tablets by mouth daily  Dispense: 90 tablet; Refill: 1  - metoprolol succinate (TOPROL XL) 50 MG extended release tablet; Take 1 tablet by mouth 2 times daily  Dispense: 60 tablet; Refill: 1     3. Coronary artery disease involving native coronary artery of native heart without angina pectoris  · Refill:  - clopidogrel (PLAVIX) 75 MG tablet; Take 1 tablet by mouth daily  Dispense: 90 tablet; Refill: 0  - lisinopril (PRINIVIL;ZESTRIL) 40 MG tablet; Take 1 tablet by mouth daily  Dispense: 90 tablet; Refill: 1     4. Type 2 diabetes mellitus without complication, without long-term current use of insulin (HCC)  · Refill:  - metFORMIN (GLUCOPHAGE) 500 MG tablet; Take 1 tablet by mouth 2 times daily (with meals)  Dispense: 60 tablet; Refill: 1     5. Gastroesophageal reflux disease, unspecified whether esophagitis present  · Refill:  - pantoprazole (PROTONIX) 40 MG tablet; Take 1 tablet by mouth daily  Dispense: 30 tablet; Refill: 1        Additional plan and future considerations:        Return to Office: Return after hospital stay. Assessment/Plan:  1 HFrEF: Acute on chronic in exacerbation. Based on current sets of symptoms, including worsening shortness of breath, blood pressure, tachycardia, and intermittent desaturation, coupled with need to closely monitor renal functions as he is treated, admission to the hospital has been recommended. Patient went to the emergency room for further assessment and admission. Return after hospital stay. Return after hospital stay. Attending Physician Statement  I have discussed the case, including pertinent history and exam findings with the resident. I also have seen the patient and performed key portions of the examination. I agree with the documented assessment and plan.          Kristy Ingram MD

## 2022-01-20 NOTE — TELEPHONE ENCOUNTER
Please call Mr. Saima Prajapati. He can be added on acutely in our office today, and he should call his cardiologist as well. If unable to be seen today, he will need to present to nearest ED or urgent care. Please seek care emergently with new or worsening symptoms. Thank you!

## 2022-01-20 NOTE — PATIENT INSTRUCTIONS
Patient Education        Low Sodium Diet (2,000 Milligram): Care Instructions  Overview     Limiting sodium can be an important part of managing some health problems. The most common source of sodium is salt. People get most of the salt in their diet from canned, prepared, and packaged foods. Fast food and restaurant meals also are very high in sodium. Your doctor will probably limit your sodium to less than 2,000 milligrams (mg) a day. This limit counts all the sodium in prepared and packaged foods and any salt you add to your food. Follow-up care is a key part of your treatment and safety. Be sure to make and go to all appointments, and call your doctor if you are having problems. It's also a good idea to know your test results and keep a list of the medicines you take. How can you care for yourself at home? Read food labels  · Read labels on cans and food packages. The labels tell you how much sodium is in each serving. Make sure that you look at the serving size. If you eat more than the serving size, you have eaten more sodium. · Food labels also tell you the Percent Daily Value for sodium. Choose products with low Percent Daily Values for sodium. · Be aware that sodium can come in forms other than salt, including monosodium glutamate (MSG), sodium citrate, and sodium bicarbonate (baking soda). MSG is often added to Asian food. When you eat out, you can sometimes ask for food without MSG or added salt. Buy low-sodium foods  · Buy foods that are labeled \"unsalted\" (no salt added), \"sodium-free\" (less than 5 mg of sodium per serving), or \"low-sodium\" (140 mg or less of sodium per serving). Foods labeled \"reduced-sodium\" and \"light sodium\" may still have too much sodium. Be sure to read the label to see how much sodium you are getting. · Buy fresh vegetables, or frozen vegetables without added sauces. Buy low-sodium versions of canned vegetables, soups, and other canned goods.   Prepare low-sodium meals  · Cut back on the amount of salt you use in cooking. This will help you adjust to the taste. Do not add salt after cooking. One teaspoon of salt has about 2,300 mg of sodium. · Take the salt shaker off the table. · Flavor your food with garlic, lemon juice, onion, vinegar, herbs, and spices. Do not use soy sauce, lite soy sauce, steak sauce, onion salt, garlic salt, celery salt, or ketchup on your food. · Use low-sodium salad dressings, sauces, and ketchup. Or make your own salad dressings and sauces without adding salt. · Use less salt (or none) when recipes call for it. You can often use half the salt a recipe calls for without losing flavor. Other foods such as rice, pasta, and grains do not need added salt. · Rinse canned vegetables, and cook them in fresh water. This removes somebut not allof the salt. · Avoid water that is naturally high in sodium or that has been treated with water softeners, which add sodium. If you buy bottled water, read the label and choose a sodium-free brand. Avoid high-sodium foods  · Avoid eating:  ? Smoked, cured, salted, and canned meat, fish, and poultry. ? Ham, cardona, hot dogs, and luncheon meats. ? Regular, hard, and processed cheese and regular peanut butter. ? Crackers with salted tops, and other salted snack foods such as pretzels, chips, and salted popcorn. ? Frozen prepared meals, unless labeled low-sodium. ? Canned and dried soups, broths, and bouillon, unless labeled sodium-free or low-sodium. ? Canned vegetables, unless labeled sodium-free or low-sodium. ? [place] fries, pizza, tacos, and other fast foods. ? Pickles, olives, ketchup, and other condiments, especially soy sauce, unless labeled sodium-free or low-sodium. Where can you learn more? Go to https://[email or web address].[email or web address] org and sign in to your [employer] account. Enter A460 in the [employer] box to learn more about \"Low Sodium Diet (2,000 Milligram): Care Instructions. \" If you do not have an account, please click on the \"Sign Up Now\" link. Current as of: September 8, 2021               Content Version: 13.1  © 2006-2021 [employer]wise[employer]. Care instructions adapted under license by [facility] ([facility]). If you have questions about a medical condition or this instruction, always ask your healthcare professional. [address] any warranty or liability for your use of this information.

## 2022-01-21 ENCOUNTER — HOSPITAL ENCOUNTER (INPATIENT)
Age: 57
LOS: 2 days | Discharge: HOME | DRG: 291 | End: 2022-01-23
Attending: EMERGENCY MEDICINE | Admitting: FAMILY MEDICINE
Payer: MEDICARE

## 2022-01-21 DIAGNOSIS — I50.9 CONGESTIVE HEART FAILURE, UNSPECIFIED HF CHRONICITY, UNSPECIFIED HEART FAILURE TYPE (HCC): Primary | ICD-10-CM

## 2022-01-21 LAB
EKG ATRIAL RATE: 100 BPM
EKG P AXIS: 62 DEGREES
EKG P-R INTERVAL: 204 MS
EKG Q-T INTERVAL: 376 MS
EKG QRS DURATION: 108 MS
EKG QTC CALCULATION (BAZETT): 485 MS
EKG R AXIS: 98 DEGREES
EKG T AXIS: -89 DEGREES
EKG VENTRICULAR RATE: 100 BPM
HBA1C MFR BLD: 7.4 % (ref 4–5.6)
METER GLUCOSE: 118 MG/DL (ref 74–99)
METER GLUCOSE: 122 MG/DL (ref 74–99)
METER GLUCOSE: 173 MG/DL (ref 74–99)
METER GLUCOSE: 88 MG/DL (ref 74–99)
PRO-BNP: 1783 PG/ML (ref 0–125)
TROPONIN, HIGH SENSITIVITY: 33 NG/L (ref 0–11)

## 2022-01-21 PROCEDURE — 2580000003 HC RX 258: Performed by: STUDENT IN AN ORGANIZED HEALTH CARE EDUCATION/TRAINING PROGRAM

## 2022-01-21 PROCEDURE — 96376 TX/PRO/DX INJ SAME DRUG ADON: CPT

## 2022-01-21 PROCEDURE — 99222 1ST HOSP IP/OBS MODERATE 55: CPT | Performed by: FAMILY MEDICINE

## 2022-01-21 PROCEDURE — 2060000000 HC ICU INTERMEDIATE R&B

## 2022-01-21 PROCEDURE — 83036 HEMOGLOBIN GLYCOSYLATED A1C: CPT

## 2022-01-21 PROCEDURE — 6370000000 HC RX 637 (ALT 250 FOR IP): Performed by: STUDENT IN AN ORGANIZED HEALTH CARE EDUCATION/TRAINING PROGRAM

## 2022-01-21 PROCEDURE — 96374 THER/PROPH/DIAG INJ IV PUSH: CPT

## 2022-01-21 PROCEDURE — 84484 ASSAY OF TROPONIN QUANT: CPT

## 2022-01-21 PROCEDURE — 83880 ASSAY OF NATRIURETIC PEPTIDE: CPT

## 2022-01-21 PROCEDURE — 6360000002 HC RX W HCPCS: Performed by: FAMILY MEDICINE

## 2022-01-21 PROCEDURE — 6360000002 HC RX W HCPCS: Performed by: EMERGENCY MEDICINE

## 2022-01-21 PROCEDURE — 82962 GLUCOSE BLOOD TEST: CPT

## 2022-01-21 PROCEDURE — G0378 HOSPITAL OBSERVATION PER HR: HCPCS

## 2022-01-21 PROCEDURE — 93010 ELECTROCARDIOGRAM REPORT: CPT | Performed by: INTERNAL MEDICINE

## 2022-01-21 RX ORDER — NICOTINE POLACRILEX 4 MG
15 LOZENGE BUCCAL PRN
Status: DISCONTINUED | OUTPATIENT
Start: 2022-01-21 | End: 2022-01-23 | Stop reason: HOSPADM

## 2022-01-21 RX ORDER — HYDRALAZINE HYDROCHLORIDE 25 MG/1
50 TABLET, FILM COATED ORAL EVERY 8 HOURS SCHEDULED
Status: DISCONTINUED | OUTPATIENT
Start: 2022-01-21 | End: 2022-01-23 | Stop reason: HOSPADM

## 2022-01-21 RX ORDER — PANTOPRAZOLE SODIUM 40 MG/1
40 TABLET, DELAYED RELEASE ORAL DAILY
Status: DISCONTINUED | OUTPATIENT
Start: 2022-01-21 | End: 2022-01-23 | Stop reason: HOSPADM

## 2022-01-21 RX ORDER — ASPIRIN 81 MG/1
81 TABLET, CHEWABLE ORAL DAILY
Status: DISCONTINUED | OUTPATIENT
Start: 2022-01-21 | End: 2022-01-23 | Stop reason: HOSPADM

## 2022-01-21 RX ORDER — FUROSEMIDE 10 MG/ML
40 INJECTION INTRAMUSCULAR; INTRAVENOUS DAILY
Status: DISCONTINUED | OUTPATIENT
Start: 2022-01-21 | End: 2022-01-23 | Stop reason: HOSPADM

## 2022-01-21 RX ORDER — POLYETHYLENE GLYCOL 3350 17 G/17G
17 POWDER, FOR SOLUTION ORAL DAILY PRN
Status: DISCONTINUED | OUTPATIENT
Start: 2022-01-21 | End: 2022-01-23 | Stop reason: HOSPADM

## 2022-01-21 RX ORDER — SPIRONOLACTONE 25 MG/1
25 TABLET ORAL DAILY
Status: DISCONTINUED | OUTPATIENT
Start: 2022-01-21 | End: 2022-01-23 | Stop reason: HOSPADM

## 2022-01-21 RX ORDER — METOPROLOL SUCCINATE 50 MG/1
50 TABLET, EXTENDED RELEASE ORAL 2 TIMES DAILY
Status: DISCONTINUED | OUTPATIENT
Start: 2022-01-21 | End: 2022-01-23 | Stop reason: HOSPADM

## 2022-01-21 RX ORDER — ACETAMINOPHEN 325 MG/1
650 TABLET ORAL EVERY 6 HOURS PRN
Status: DISCONTINUED | OUTPATIENT
Start: 2022-01-21 | End: 2022-01-23 | Stop reason: HOSPADM

## 2022-01-21 RX ORDER — CLOPIDOGREL BISULFATE 75 MG/1
75 TABLET ORAL DAILY
Status: DISCONTINUED | OUTPATIENT
Start: 2022-01-21 | End: 2022-01-23 | Stop reason: HOSPADM

## 2022-01-21 RX ORDER — FUROSEMIDE 20 MG/1
20 TABLET ORAL 2 TIMES DAILY
Status: DISCONTINUED | OUTPATIENT
Start: 2022-01-21 | End: 2022-01-23 | Stop reason: HOSPADM

## 2022-01-21 RX ORDER — ISOSORBIDE MONONITRATE 30 MG/1
90 TABLET, EXTENDED RELEASE ORAL DAILY
Status: DISCONTINUED | OUTPATIENT
Start: 2022-01-21 | End: 2022-01-23 | Stop reason: HOSPADM

## 2022-01-21 RX ORDER — ASPIRIN 81 MG/1
81 TABLET ORAL DAILY
COMMUNITY
End: 2022-03-29 | Stop reason: SDUPTHER

## 2022-01-21 RX ORDER — DEXTROSE MONOHYDRATE 25 G/50ML
12.5 INJECTION, SOLUTION INTRAVENOUS PRN
Status: DISCONTINUED | OUTPATIENT
Start: 2022-01-21 | End: 2022-01-23 | Stop reason: HOSPADM

## 2022-01-21 RX ORDER — ONDANSETRON 2 MG/ML
4 INJECTION INTRAMUSCULAR; INTRAVENOUS EVERY 6 HOURS PRN
Status: DISCONTINUED | OUTPATIENT
Start: 2022-01-21 | End: 2022-01-23 | Stop reason: HOSPADM

## 2022-01-21 RX ORDER — DEXTROSE MONOHYDRATE 50 MG/ML
100 INJECTION, SOLUTION INTRAVENOUS PRN
Status: DISCONTINUED | OUTPATIENT
Start: 2022-01-21 | End: 2022-01-23 | Stop reason: HOSPADM

## 2022-01-21 RX ORDER — SODIUM CHLORIDE 9 MG/ML
25 INJECTION, SOLUTION INTRAVENOUS PRN
Status: DISCONTINUED | OUTPATIENT
Start: 2022-01-21 | End: 2022-01-23 | Stop reason: HOSPADM

## 2022-01-21 RX ORDER — SODIUM CHLORIDE 0.9 % (FLUSH) 0.9 %
5-40 SYRINGE (ML) INJECTION PRN
Status: DISCONTINUED | OUTPATIENT
Start: 2022-01-21 | End: 2022-01-23 | Stop reason: HOSPADM

## 2022-01-21 RX ORDER — LISINOPRIL 20 MG/1
40 TABLET ORAL DAILY
Status: DISCONTINUED | OUTPATIENT
Start: 2022-01-21 | End: 2022-01-23 | Stop reason: HOSPADM

## 2022-01-21 RX ORDER — ONDANSETRON 4 MG/1
4 TABLET, ORALLY DISINTEGRATING ORAL EVERY 8 HOURS PRN
Status: DISCONTINUED | OUTPATIENT
Start: 2022-01-21 | End: 2022-01-23 | Stop reason: HOSPADM

## 2022-01-21 RX ORDER — SODIUM CHLORIDE 0.9 % (FLUSH) 0.9 %
5-40 SYRINGE (ML) INJECTION EVERY 12 HOURS SCHEDULED
Status: DISCONTINUED | OUTPATIENT
Start: 2022-01-21 | End: 2022-01-23 | Stop reason: HOSPADM

## 2022-01-21 RX ORDER — ACETAMINOPHEN 650 MG/1
650 SUPPOSITORY RECTAL EVERY 6 HOURS PRN
Status: DISCONTINUED | OUTPATIENT
Start: 2022-01-21 | End: 2022-01-23 | Stop reason: HOSPADM

## 2022-01-21 RX ORDER — ATORVASTATIN CALCIUM 40 MG/1
80 TABLET, FILM COATED ORAL NIGHTLY
Status: DISCONTINUED | OUTPATIENT
Start: 2022-01-21 | End: 2022-01-23 | Stop reason: HOSPADM

## 2022-01-21 RX ORDER — FUROSEMIDE 10 MG/ML
40 INJECTION INTRAMUSCULAR; INTRAVENOUS ONCE
Status: COMPLETED | OUTPATIENT
Start: 2022-01-21 | End: 2022-01-21

## 2022-01-21 RX ADMIN — HYDRALAZINE HYDROCHLORIDE 50 MG: 25 TABLET, FILM COATED ORAL at 20:59

## 2022-01-21 RX ADMIN — FUROSEMIDE 40 MG: 10 INJECTION, SOLUTION INTRAMUSCULAR; INTRAVENOUS at 09:55

## 2022-01-21 RX ADMIN — FUROSEMIDE 40 MG: 10 INJECTION, SOLUTION INTRAMUSCULAR; INTRAVENOUS at 03:20

## 2022-01-21 RX ADMIN — ISOSORBIDE MONONITRATE 90 MG: 30 TABLET, EXTENDED RELEASE ORAL at 14:36

## 2022-01-21 RX ADMIN — PANTOPRAZOLE SODIUM 40 MG: 40 TABLET, DELAYED RELEASE ORAL at 14:35

## 2022-01-21 RX ADMIN — Medication 10 ML: at 09:55

## 2022-01-21 RX ADMIN — METOPROLOL SUCCINATE 50 MG: 50 TABLET, EXTENDED RELEASE ORAL at 23:00

## 2022-01-21 RX ADMIN — METOPROLOL SUCCINATE 50 MG: 50 TABLET, EXTENDED RELEASE ORAL at 14:35

## 2022-01-21 RX ADMIN — ASPIRIN 81 MG 81 MG: 81 TABLET ORAL at 14:38

## 2022-01-21 RX ADMIN — LISINOPRIL 40 MG: 20 TABLET ORAL at 14:36

## 2022-01-21 RX ADMIN — ATORVASTATIN CALCIUM 80 MG: 40 TABLET, FILM COATED ORAL at 20:59

## 2022-01-21 RX ADMIN — INSULIN LISPRO 1 UNITS: 100 INJECTION, SOLUTION INTRAVENOUS; SUBCUTANEOUS at 13:07

## 2022-01-21 RX ADMIN — SPIRONOLACTONE 25 MG: 25 TABLET, FILM COATED ORAL at 16:45

## 2022-01-21 RX ADMIN — HYDRALAZINE HYDROCHLORIDE 50 MG: 25 TABLET, FILM COATED ORAL at 14:36

## 2022-01-21 RX ADMIN — CLOPIDOGREL BISULFATE 75 MG: 75 TABLET ORAL at 14:36

## 2022-01-21 ASSESSMENT — PAIN SCALES - GENERAL: PAINLEVEL_OUTOF10: 0

## 2022-01-21 NOTE — PROGRESS NOTES
[facility]  Family Medicine Attending    S: 61 y.o. male admitted for SOB, bilateral leg swelling and 12 lb weight gain. Known co-morbidities of of CHF, COPD, CAD, DM2,   NSVT with defibrillator, Hx MI, Hx stroke, depression, tobacco abuse, among others. This AM in ED says he is breathing easier after receiving Lasix. O: VS- Blood pressure (!) 136/91, pulse 97, temperature 97.7 °F (36.5 °C), temperature source Oral, resp. rate 16, weight 237 lb (107.5 kg), SpO2 98 %. Exam is as noted by resident. In NAD  Speaking in full sentences  Neck supple, no bruit  Heart regular rhythm, no murmur or gallop  Lungs clear to auscultation  Abdomen supple, no masses or tenderness  Leg edema improved, no tenderness    Impressions: Active Problems:    Heart failure (HCC)  Recent 12 pound weight gain  Had ran out of Trulicity    Patient Active Problem List   Diagnosis    GERD (gastroesophageal reflux disease)    COPD (chronic obstructive pulmonary disease)    Hyperlipemia    ED (erectile dysfunction)    History of stroke    Depression    CAD (coronary artery disease)    Dual implantable cardioverter-defibrillator in situ    NSVT (nonsustained ventricular tachycardia) (HCC)    Diverticulosis of colon    History of MI (myocardial infarction)    Chronic systolic congestive heart failure (HCC)    Essential hypertension    Type 2 diabetes mellitus without complication, without long-term current use of insulin (HCC)    Obesity (BMI 30.0-34. 9)    Ischemic cardiomyopathy    Nonrheumatic aortic valve insufficiency    Elevated PSA, less than 10 ng/ml    Heart failure (HCC)       BNP trending down    Plan:     Continued diuresis   Resumption of home medications  Daily weights  Following renal function     Attending Physician Statement  I have reviewed the chart, including any radiology or labs, and seen the patient with the resident(s).   I personally reviewed and performed key elements of the history and exam.  I agree with the assessment, plan and orders as documented by the resident. Please refer to the resident note for additional information.       Kenya Drummond MD

## 2022-01-21 NOTE — ED PROVIDER NOTES
HPI:  1/21/22, Time: 3:04 AM DRAKE Espinoza is a 61 y.o. male presenting to the ED for edema shortness of breath, beginning days ago. The complaint has been persistent, moderate in severity, and worsened by nothing. Patient was sent here from CHF clinic. Patient has increased edema and shortness of breath. Patient was sent from [facility]. Patient reporting increased weight gain over 10 pounds over the last month. Patient also reporting shortness of breath with exertion and reporting orthopnea. Patient reporting no vomiting or diarrhea he reports some mild lower abdominal pain. Patient reports no loss of appetite. Patient reporting no fever chills he has had some cough. Patient does have history of coronary disease he has a history of COPD history of diabetes. Patient reporting no chest pain. ROS:   Pertinent positives and negatives are stated within HPI, all other systems reviewed and are negative.  --------------------------------------------- PAST HISTORY ---------------------------------------------  Past Medical History:  has a past medical history of Allergic rhinitis, Anticoagulant long-term use, Anxiety, Atypical angina ([address] CAD (coronary artery disease), Carotid artery stenosis, Cerebral artery occlusion with cerebral infarction ([address] CHF (congestive heart failure) ([address] COPD (chronic obstructive pulmonary disease) ([address] CVA (cerebral infarction), Depression, Diverticulitis, Erectile dysfunction, GERD (gastroesophageal reflux disease), Heart attack ([address] Hyperlipemia, Hypertension, Ischemic cardiomyopathy, Left atrial thrombus, Obesity, Type 2 diabetes mellitus without complication, without long-term current use of insulin ([address] and Type II or unspecified type diabetes mellitus without mention of complication, not stated as uncontrolled. Past Surgical History:  has a past surgical history that includes Inguinal hernia repair (1990);  Colonoscopy (12/5/2005); Colonoscopy (9/4/2008); Colonoscopy (1/28/2010); cystourethroscopy (1/29/2010); colectomy (1/29/2010); Coronary angioplasty with stent (12/6/2014); Cardiac catheterization (3/30/2015); Cardiac defibrillator placement (4/1/2015); Upper gastrointestinal endoscopy (8/7/2015); and Colonoscopy (08/07/2015). Social History:  reports that he quit smoking about 7 years ago. His smoking use included cigarettes. He has a 30.00 pack-year smoking history. His smokeless tobacco use includes chew. He reports current alcohol use. He reports that he does not use drugs. Family History: family history includes Heart Disease in his father and paternal uncle; High Blood Pressure in his father; No Known Problems in his brother, brother, and sister; Stroke in his sister. The patients home medications have been reviewed. Allergies: Patient has no known allergies. ---------------------------------------------------PHYSICAL EXAM--------------------------------------    Constitutional/General: Alert and oriented x3, well appearing, non toxic in NAD  Head: Normocephalic and atraumatic  Eyes: PERRL, EOMI  Mouth: Oropharynx clear, handling secretions, no trismus  Neck: Supple, full ROM, non tender to palpation in the midline, no stridor, no crepitus, no meningeal signs  Pulmonary: Lungs clear to auscultation bilaterally, no wheezes, rales, or rhonchi. Not in respiratory distress  Cardiovascular:  Regular rate. Regular rhythm. No murmurs, gallops, or rubs. 2+ distal pulses  Chest: no chest wall tenderness  Abdomen: Soft. Non tender. Non distended. +BS. No rebound, guarding, or rigidity. No pulsatile masses appreciated. Musculoskeletal: Moves all extremities x 4. Warm and well perfused, no clubbing, cyanosis, edema lower extremities capillary refill <3 seconds  Skin: warm and dry. No rashes.    Neurologic: GCS 15, CN 2-12 grossly intact, no focal deficits, symmetric strength 5/5 in the upper and lower extremities bilaterally  Psych: Normal Affect    -------------------------------------------------- RESULTS -------------------------------------------------  I have personally reviewed all laboratory and imaging results for this patient. Results are listed below.      LABS:  Results for orders placed or performed during the hospital encounter of 01/21/22   COVID-19, Rapid    Specimen: Nasopharyngeal Swab   Result Value Ref Range    SARS-CoV-2, NAAT Not Detected Not Detected   CBC   Result Value Ref Range    WBC 7.4 4.5 - 11.5 E9/L    RBC 5.83 (H) 3.80 - 5.80 E12/L    Hemoglobin 13.8 12.5 - 16.5 g/dL    Hematocrit 46.3 37.0 - 54.0 %    MCV 79.4 (L) 80.0 - 99.9 fL    MCH 23.7 (L) 26.0 - 35.0 pg    MCHC 29.8 (L) 32.0 - 34.5 %    RDW 15.9 (H) 11.5 - 15.0 fL    Platelets 349 735 - 515 E9/L    MPV 10.5 7.0 - 12.0 fL   Comprehensive Metabolic Panel   Result Value Ref Range    Sodium 141 132 - 146 mmol/L    Potassium 3.8 3.5 - 5.0 mmol/L    Chloride 101 98 - 107 mmol/L    CO2 28 22 - 29 mmol/L    Anion Gap 12 7 - 16 mmol/L    Glucose 114 (H) 74 - 99 mg/dL    BUN 20 6 - 20 mg/dL    CREATININE 1.4 (H) 0.7 - 1.2 mg/dL    GFR Non-African American 52 >=60 mL/min/1.73    GFR African American >60     Calcium 8.9 8.6 - 10.2 mg/dL    Total Protein 7.1 6.4 - 8.3 g/dL    Albumin 3.9 3.5 - 5.2 g/dL    Total Bilirubin 1.1 0.0 - 1.2 mg/dL    Alkaline Phosphatase 130 (H) 40 - 129 U/L    ALT 15 0 - 40 U/L    AST 20 0 - 39 U/L   Troponin   Result Value Ref Range    Troponin, High Sensitivity 32 (H) 0 - 11 ng/L   Brain Natriuretic Peptide   Result Value Ref Range    Pro-BNP 2,664 (H) 0 - 125 pg/mL   Troponin   Result Value Ref Range    Troponin, High Sensitivity 33 (H) 0 - 11 ng/L   EKG 12 Lead   Result Value Ref Range    Ventricular Rate 100 BPM    Atrial Rate 100 BPM    P-R Interval 204 ms    QRS Duration 108 ms    Q-T Interval 376 ms    QTc Calculation (Bazett) 485 ms    P Axis 62 degrees    R Axis 98 degrees    T Axis -89 degrees RADIOLOGY:  Interpreted by Radiologist.  XR CHEST (2 VW)   Final Result   1. Cardiomegaly. There is no evidence of failure or pneumonia. EKG:  This EKG is signed and interpreted by me. Rate: 100  Rhythm: Sinus  Interpretation: non-specific EKG  Comparison: stable as compared to patient's most recent EKG        ------------------------- NURSING NOTES AND VITALS REVIEWED ---------------------------   The nursing notes within the ED encounter and vital signs as below have been reviewed by myself. /89   Pulse 98   Temp 97.7 °F (36.5 °C) (Oral)   Resp 20   Wt 237 lb (107.5 kg)   SpO2 96%   BMI 35.00 kg/m²   Oxygen Saturation Interpretation: Normal    The patients available past medical records and past encounters were reviewed. ------------------------------ ED COURSE/MEDICAL DECISION MAKING----------------------  Medications   furosemide (LASIX) injection 40 mg (has no administration in time range)             Medical Decision Making:      Patient presenting here because of his exertional dyspnea as well as orthopnea. Patient reporting no chest pain does report shortness of breath though patient reporting no weakness or dizziness. Patient was seen at [facility] sent here for further evaluation and treatment. Patient labs noted reviewed chest x-ray also noted. Patient ordered IV diuretics patient was sent here for admission. Re-Evaluations:             Re-evaluation. Patients symptoms show no change  Patient made aware of findings and plan. Consultations:             Family practice    Critical Care: This patient's ED course included: a personal history and physicial eaxmination    This patient has remained hemodynamically stable during their ED course. Counseling:    The emergency provider has spoken with the patient and discussed todays results, in addition to providing specific details for the plan of care and counseling regarding the diagnosis and prognosis. Questions are answered at this time and they are agreeable with the plan.       --------------------------------- IMPRESSION AND DISPOSITION ---------------------------------    IMPRESSION  1. Congestive heart failure, unspecified HF chronicity, unspecified heart failure type ([facility]        DISPOSITION  Disposition: Admit to telemetry  Patient condition is stable        NOTE: This report was transcribed using voice recognition software.  Every effort was made to ensure accuracy; however, inadvertent computerized transcription errors may be present          Pretty Queen MD  01/21/22 [id number]

## 2022-01-21 NOTE — PROGRESS NOTES
[facility]  Family Medicine Attending    S: 61 y.o. male admitted for SOB, bilateral leg swelling and 12 lb weight gain. Known co-morbidities of of CHF, COPD, CAD, DM2,   NSVT with defibrillator, Hx MI, Hx stroke, depression, tobacco abuse, among others. This AM in ED says he is breathing easier after receiving Lasix. O: VS- Blood pressure (!) 132/90, pulse 96, temperature 97.7 °F (36.5 °C), temperature source Oral, resp. rate 16, weight 237 lb (107.5 kg), SpO2 98 %. Exam is as noted by resident. In NAD  Speaking in full sentences  Neck supple, no bruit  Heart regular rhythm, no murmur or gallop  Lungs clear to auscultation  Abdomen supple, no masses or tenderness  Leg edema improved, no tenderness    Impressions: Active Problems:    Heart failure (HCC)  Recent 12 pound weight gain  Had ran out of Trulicity    Patient Active Problem List   Diagnosis    GERD (gastroesophageal reflux disease)    COPD (chronic obstructive pulmonary disease)    Hyperlipemia    ED (erectile dysfunction)    History of stroke    Depression    CAD (coronary artery disease)    Dual implantable cardioverter-defibrillator in situ    NSVT (nonsustained ventricular tachycardia) (HCC)    Diverticulosis of colon    History of MI (myocardial infarction)    Chronic systolic congestive heart failure (HCC)    Essential hypertension    Type 2 diabetes mellitus without complication, without long-term current use of insulin (HCC)    Obesity (BMI 30.0-34. 9)    Ischemic cardiomyopathy    Nonrheumatic aortic valve insufficiency    Elevated PSA, less than 10 ng/ml    Heart failure (HCC)       BNP trending down    Plan:     Continued diuresis   Resumption of home medications  Daily weights  Following renal function     Attending Physician Statement  I have reviewed the chart, including any radiology or labs, and seen the patient with the resident(s).   I personally reviewed and performed key elements of the history and exam.  I agree with the assessment, plan and orders as documented by the resident. Please refer to the resident note for additional information.       Oneda Mcburney, MD

## 2022-01-21 NOTE — H&P
[facility] Resident Inpatient  History and Physical      CC: SOB    HPI: History obtained from patient. Patient is oriented to time, place, person . Debra Mcneill is a 61 y.o. male with a PMH of CAD, CHF (EF: 30% on 9/2021), CVA, depression, GERD, hypertension, hyperlipidemia, type II diabetes without long-term use insulin, and COPD who presents to ED for Leg Swelling (sent over from the clinic states he is retaining water and states intermittent sob. )    Patient presented to PCPs office for medication refills and shortness of breath. At that time patient reported an overall 12 pound weight gain over the past month without changes to diet and had notice increased lower extremity swelling. He had experience worsening shortness of breath for the past week and a half and has needed to sleep in a recliner due to shortness of breath. On arrival to ED patient denied having chest pain, abdominal pain, nausea or emesis. Patient reported continued to have shortness of breath, IV Lasix given at that time    ED Course: The patient remained hemodynamically stable. Labs CBC: Unremarkable, BMP: Creatinine 1.4, glucose 114. proBNP: 2664 (previously 734). Troponins 32, 33.  COVID-negative. EKG normal sinus rhythm  Imaging chest x-ray: Cardiomegaly  EKG: normal sinus rhythm, unchanged from previous tracings. Patient was given IV Lasix. Pt admitted for CHF exacerbation.      PMH:  has a past medical history of Allergic rhinitis, Anticoagulant long-term use, Anxiety, Atypical angina (HCC), CAD (coronary artery disease), Carotid artery stenosis, Cerebral artery occlusion with cerebral infarction ([address] CHF (congestive heart failure) ([address] COPD (chronic obstructive pulmonary disease) ([address] CVA (cerebral infarction), Depression, Diverticulitis, Erectile dysfunction, GERD (gastroesophageal reflux disease), Heart attack ([address] Hyperlipemia, Hypertension, Ischemic cardiomyopathy, Left atrial thrombus, Obesity, Type 2 diabetes mellitus without complication, without long-term current use of insulin ([facility] and Type II or unspecified type diabetes mellitus without mention of complication, not stated as uncontrolled. PSH:  has a past surgical history that includes Inguinal hernia repair (1990); Colonoscopy (12/5/2005); Colonoscopy (9/4/2008); Colonoscopy (1/28/2010); cystourethroscopy (1/29/2010); colectomy (1/29/2010); Coronary angioplasty with stent (12/6/2014); Cardiac catheterization (3/30/2015); Cardiac defibrillator placement (4/1/2015); Upper gastrointestinal endoscopy (8/7/2015); and Colonoscopy (08/07/2015). FH: family history includes Heart Disease in his father and paternal uncle; High Blood Pressure in his father; No Known Problems in his brother, brother, and sister; Stroke in his sister. Social:  reports that he quit smoking about 7 years ago. His smoking use included cigarettes. He has a 30.00 pack-year smoking history. His smokeless tobacco use includes chew. He reports current alcohol use. He reports that he does not use drugs. Allergies: No Known Allergies     Home Medications:   No current facility-administered medications on file prior to encounter.      Current Outpatient Medications on File Prior to Encounter   Medication Sig Dispense Refill    clopidogrel (PLAVIX) 75 MG tablet Take 1 tablet by mouth daily 90 tablet 0    Dulaglutide 0.75 MG/0.5ML SOPN Inject 0.75 mg into the skin once a week 4 pen 2    furosemide (LASIX) 20 MG tablet Take 1 tablet by mouth 2 times daily 60 tablet 1    hydrALAZINE (APRESOLINE) 50 MG tablet Take 1 tablet by mouth every 8 hours 90 tablet 1    isosorbide mononitrate (IMDUR) 30 MG extended release tablet Take 3 tablets by mouth daily 90 tablet 1    lisinopril (PRINIVIL;ZESTRIL) 40 MG tablet Take 1 tablet by mouth daily 90 tablet 1    metFORMIN (GLUCOPHAGE) 500 MG tablet Take 1 tablet by mouth 2 times daily (with meals) 60 tablet 1    metoprolol succinate (TOPROL XL) 50 MG extended release tablet Take 1 tablet by mouth 2 times daily 60 tablet 1    pantoprazole (PROTONIX) 40 MG tablet Take 1 tablet by mouth daily 30 tablet 1    spironolactone (ALDACTONE) 25 MG tablet Take 1 tablet by mouth daily 30 tablet 1    atorvastatin (LIPITOR) 80 MG tablet Take 1 tablet by mouth nightly 90 tablet 0    aspirin 81 MG chewable tablet Take 1 tablet by mouth daily 90 tablet 3       ROS:  Const: No fever, chills, night sweats, no recent unexplained weight gain/loss  HEENT: No blurred vision, double vision; no URI symptoms  Resp: SOB. No cough, no sputum, no pleuritic chest pain,   Cardio: No chest pain, no exertional dyspnea, no PND, no orthopnea, no palpitation, leg swelling. GI: No dysphagia, no reflux; no abdominal pain, no n/v; no c/d. No hematochezia    : No dysuria, no frequency, hesitancy; no hematuria  MSK: no joint pain, no myalgia, no change in ROM  Neuro: no focal weakness, no slurred speech, no double vision, no numbness or tingling in extremities  Endo: no heat/cold intolerance, no polyphagia, polydipsia or polyuria  Hem: no increased bleeding, no bruising, no lymphadenopathy  Skin: no skin changes  Psych: no depressed mood, no suicidal ideation    PE:  Blood pressure 134/89, pulse 98, temperature 97.7 °F (36.5 °C), temperature source Oral, resp. rate 20, weight 237 lb (107.5 kg), SpO2 96 %. General: Alert, cooperative, no acute distress. HEENT: Normocephalic, atraumatic. PERRL,    Neck: Supple, symmetrical, trachea midline, no JVD. Chest: No tenderness or deformity, full & symmetric excursion  Lung: Clear to auscultation bilaterally,  respirations unlabored. No rales/wheezing/rubs  Heart: RRR, S1 and S2 normal, no murmur, rub or gallop. DP pulses 2/4  Abdomen: SNTND, no masses, no organomegaly, no guarding, rebound or rigidity. Genital/Rectal: deferred  Extremities:  2+ edema up to mid shine.  Distal pulses equal bilaterally  Skin: Skin color, texture, turgor normal, no rashes or lesions  Musculoskeletal: No joint swelling, no muscle tenderness. Normal ROM in extremities. Neurologic: Alert & Oriented; Normal and symmetric strength in UEs and LEs; Sensation intact  Psychiatric: appropriate affect. Intact judgment and insight.      Labs:   Results for orders placed or performed during the hospital encounter of 01/21/22   COVID-19, Rapid    Specimen: Nasopharyngeal Swab   Result Value Ref Range    SARS-CoV-2, NAAT Not Detected Not Detected   CBC   Result Value Ref Range    WBC 7.4 4.5 - 11.5 E9/L    RBC 5.83 (H) 3.80 - 5.80 E12/L    Hemoglobin 13.8 12.5 - 16.5 g/dL    Hematocrit 46.3 37.0 - 54.0 %    MCV 79.4 (L) 80.0 - 99.9 fL    MCH 23.7 (L) 26.0 - 35.0 pg    MCHC 29.8 (L) 32.0 - 34.5 %    RDW 15.9 (H) 11.5 - 15.0 fL    Platelets 040 367 - 268 E9/L    MPV 10.5 7.0 - 12.0 fL   Comprehensive Metabolic Panel   Result Value Ref Range    Sodium 141 132 - 146 mmol/L    Potassium 3.8 3.5 - 5.0 mmol/L    Chloride 101 98 - 107 mmol/L    CO2 28 22 - 29 mmol/L    Anion Gap 12 7 - 16 mmol/L    Glucose 114 (H) 74 - 99 mg/dL    BUN 20 6 - 20 mg/dL    CREATININE 1.4 (H) 0.7 - 1.2 mg/dL    GFR Non-African American 52 >=60 mL/min/1.73    GFR African American >60     Calcium 8.9 8.6 - 10.2 mg/dL    Total Protein 7.1 6.4 - 8.3 g/dL    Albumin 3.9 3.5 - 5.2 g/dL    Total Bilirubin 1.1 0.0 - 1.2 mg/dL    Alkaline Phosphatase 130 (H) 40 - 129 U/L    ALT 15 0 - 40 U/L    AST 20 0 - 39 U/L   Troponin   Result Value Ref Range    Troponin, High Sensitivity 32 (H) 0 - 11 ng/L   Brain Natriuretic Peptide   Result Value Ref Range    Pro-BNP 2,664 (H) 0 - 125 pg/mL   Troponin   Result Value Ref Range    Troponin, High Sensitivity 33 (H) 0 - 11 ng/L   EKG 12 Lead   Result Value Ref Range    Ventricular Rate 100 BPM    Atrial Rate 100 BPM    P-R Interval 204 ms    QRS Duration 108 ms    Q-T Interval 376 ms    QTc Calculation (Bazett) 485 ms    P Axis 62 degrees    R Axis 98 degrees    T Axis -89 degrees       Imaging:  XR CHEST (2 VW)   Final Result   1. Cardiomegaly. There is no evidence of failure or pneumonia. Assessment and Plan  Active Problems:    Heart failure ([address]  Resolved Problems:    * No resolved hospital problems. *    CHF exacerbation  -Worsening shortness of breath over the past week and a half. Reports overall 12 pound unintentional weight gain.  -Chest x-ray: Cardiomegaly  -Physical examination unremarkable, saturating well on room air  -IV 40 mg Lasix given in the ED  -will monitor urine output. -Given ALEX, will hold off on giving additional IV Lasix until repeat BMP  -Repeat BMP daily  -Continue home medications atorvastatin, 20 mg Lasix twice daily, hydralazine, spironolactone, lisinopril, Imdur and metoprolol  -Repeat proBNP in a.m. History of CVA  -Continue home dose Plavix and aspirin    CAD  -Denies any chest pain at this time. -EKG: Normal sinus rhythm  -Continue home dose atorvastatin, lisinopril, and spironolactone    Type 2 diabetes  -Reports taking metformin and Trulicity. Has been out of Trulicity for over 3 weeks  - will place on low-dose sliding scale with meals  -Repeat hemoglobin A1c  -POCT glucose with meals and bedtime.     GERD  -Continue Protonix      DVT / GI prophylaxis: PCDs and Protonix    Dispo -med/surg      Electronically signed by Jenny Ferris MD on 1/21/22 at 4:01 AM EST  This case was discussed with attending physician: Dr. Graciela Napier

## 2022-01-21 NOTE — ED NOTES
Bed: HE  Expected date:   Expected time:   Means of arrival:   Comments:  Triage     Betty Wells, CHRISTINA  01/21/22 [id number]

## 2022-01-21 NOTE — ED NOTES
Pt at pivot desk wanting to leave, spoke with NP abnormal high bnp, and troponin, pt most likely to be admitted but still waiting for er bed to become available. Pt aware of possible admission and abnormal labs, pt stated \" you guys have till 3am, if not he's out of here\" again this nurse encouraging pt to remain here at hospital and working on trying to get er bed, apologizing for wait.      Maira Cruz, KELLY  43/53/88 [id number]

## 2022-01-22 LAB
ANION GAP SERPL CALCULATED.3IONS-SCNC: 11 MMOL/L (ref 7–16)
BASOPHILS ABSOLUTE: 0.06 E9/L (ref 0–0.2)
BASOPHILS RELATIVE PERCENT: 0.9 % (ref 0–2)
BUN BLDV-MCNC: 23 MG/DL (ref 6–20)
CALCIUM SERPL-MCNC: 8.4 MG/DL (ref 8.6–10.2)
CHLORIDE BLD-SCNC: 101 MMOL/L (ref 98–107)
CO2: 26 MMOL/L (ref 22–29)
CREAT SERPL-MCNC: 1.5 MG/DL (ref 0.7–1.2)
EOSINOPHILS ABSOLUTE: 0.25 E9/L (ref 0.05–0.5)
EOSINOPHILS RELATIVE PERCENT: 3.7 % (ref 0–6)
GFR AFRICAN AMERICAN: 58
GFR NON-AFRICAN AMERICAN: 48 ML/MIN/1.73
GLUCOSE BLD-MCNC: 170 MG/DL (ref 74–99)
HCT VFR BLD CALC: 41.9 % (ref 37–54)
HEMOGLOBIN: 12.6 G/DL (ref 12.5–16.5)
IMMATURE GRANULOCYTES #: 0.02 E9/L
IMMATURE GRANULOCYTES %: 0.3 % (ref 0–5)
LYMPHOCYTES ABSOLUTE: 1.36 E9/L (ref 1.5–4)
LYMPHOCYTES RELATIVE PERCENT: 20.2 % (ref 20–42)
MCH RBC QN AUTO: 24 PG (ref 26–35)
MCHC RBC AUTO-ENTMCNC: 30.1 % (ref 32–34.5)
MCV RBC AUTO: 79.7 FL (ref 80–99.9)
METER GLUCOSE: 113 MG/DL (ref 74–99)
METER GLUCOSE: 126 MG/DL (ref 74–99)
METER GLUCOSE: 162 MG/DL (ref 74–99)
METER GLUCOSE: 276 MG/DL (ref 74–99)
MONOCYTES ABSOLUTE: 0.6 E9/L (ref 0.1–0.95)
MONOCYTES RELATIVE PERCENT: 8.9 % (ref 2–12)
NEUTROPHILS ABSOLUTE: 4.45 E9/L (ref 1.8–7.3)
NEUTROPHILS RELATIVE PERCENT: 66 % (ref 43–80)
PDW BLD-RTO: 15.9 FL (ref 11.5–15)
PLATELET # BLD: 224 E9/L (ref 130–450)
PMV BLD AUTO: 10.5 FL (ref 7–12)
POTASSIUM REFLEX MAGNESIUM: 3.8 MMOL/L (ref 3.5–5)
RBC # BLD: 5.26 E12/L (ref 3.8–5.8)
SODIUM BLD-SCNC: 138 MMOL/L (ref 132–146)
WBC # BLD: 6.7 E9/L (ref 4.5–11.5)

## 2022-01-22 PROCEDURE — G0378 HOSPITAL OBSERVATION PER HR: HCPCS

## 2022-01-22 PROCEDURE — 6360000002 HC RX W HCPCS: Performed by: FAMILY MEDICINE

## 2022-01-22 PROCEDURE — 85025 COMPLETE CBC W/AUTO DIFF WBC: CPT

## 2022-01-22 PROCEDURE — 6370000000 HC RX 637 (ALT 250 FOR IP): Performed by: STUDENT IN AN ORGANIZED HEALTH CARE EDUCATION/TRAINING PROGRAM

## 2022-01-22 PROCEDURE — 2580000003 HC RX 258: Performed by: STUDENT IN AN ORGANIZED HEALTH CARE EDUCATION/TRAINING PROGRAM

## 2022-01-22 PROCEDURE — 36415 COLL VENOUS BLD VENIPUNCTURE: CPT

## 2022-01-22 PROCEDURE — 99232 SBSQ HOSP IP/OBS MODERATE 35: CPT | Performed by: FAMILY MEDICINE

## 2022-01-22 PROCEDURE — 2060000000 HC ICU INTERMEDIATE R&B

## 2022-01-22 PROCEDURE — 82962 GLUCOSE BLOOD TEST: CPT

## 2022-01-22 PROCEDURE — 80048 BASIC METABOLIC PNL TOTAL CA: CPT

## 2022-01-22 PROCEDURE — 6370000000 HC RX 637 (ALT 250 FOR IP)

## 2022-01-22 PROCEDURE — 96376 TX/PRO/DX INJ SAME DRUG ADON: CPT

## 2022-01-22 RX ORDER — DIAPER,BRIEF,INFANT-TODD,DISP
EACH MISCELLANEOUS
Status: COMPLETED
Start: 2022-01-22 | End: 2022-01-22

## 2022-01-22 RX ORDER — POTASSIUM CHLORIDE 20 MEQ/1
20 TABLET, EXTENDED RELEASE ORAL ONCE
Status: COMPLETED | OUTPATIENT
Start: 2022-01-22 | End: 2022-01-22

## 2022-01-22 RX ADMIN — ISOSORBIDE MONONITRATE 90 MG: 30 TABLET, EXTENDED RELEASE ORAL at 09:37

## 2022-01-22 RX ADMIN — METOPROLOL SUCCINATE 50 MG: 50 TABLET, EXTENDED RELEASE ORAL at 09:38

## 2022-01-22 RX ADMIN — Medication 10 ML: at 22:29

## 2022-01-22 RX ADMIN — HYDRALAZINE HYDROCHLORIDE 50 MG: 25 TABLET, FILM COATED ORAL at 22:31

## 2022-01-22 RX ADMIN — BACITRACIN ZINC: 500 OINTMENT TOPICAL at 13:19

## 2022-01-22 RX ADMIN — CLOPIDOGREL BISULFATE 75 MG: 75 TABLET ORAL at 09:38

## 2022-01-22 RX ADMIN — Medication 10 ML: at 09:38

## 2022-01-22 RX ADMIN — ATORVASTATIN CALCIUM 80 MG: 40 TABLET, FILM COATED ORAL at 22:28

## 2022-01-22 RX ADMIN — HYDRALAZINE HYDROCHLORIDE 50 MG: 25 TABLET, FILM COATED ORAL at 16:12

## 2022-01-22 RX ADMIN — PANTOPRAZOLE SODIUM 40 MG: 40 TABLET, DELAYED RELEASE ORAL at 06:58

## 2022-01-22 RX ADMIN — LISINOPRIL 40 MG: 20 TABLET ORAL at 16:13

## 2022-01-22 RX ADMIN — MUPIROCIN: 20 OINTMENT TOPICAL at 16:11

## 2022-01-22 RX ADMIN — POTASSIUM CHLORIDE 20 MEQ: 1500 TABLET, EXTENDED RELEASE ORAL at 13:18

## 2022-01-22 RX ADMIN — INSULIN LISPRO 1 UNITS: 100 INJECTION, SOLUTION INTRAVENOUS; SUBCUTANEOUS at 22:28

## 2022-01-22 RX ADMIN — ASPIRIN 81 MG 81 MG: 81 TABLET ORAL at 09:38

## 2022-01-22 RX ADMIN — INSULIN LISPRO 3 UNITS: 100 INJECTION, SOLUTION INTRAVENOUS; SUBCUTANEOUS at 13:19

## 2022-01-22 RX ADMIN — FUROSEMIDE 40 MG: 10 INJECTION, SOLUTION INTRAMUSCULAR; INTRAVENOUS at 09:37

## 2022-01-22 RX ADMIN — SPIRONOLACTONE 25 MG: 25 TABLET, FILM COATED ORAL at 09:38

## 2022-01-22 ASSESSMENT — PAIN SCALES - GENERAL
PAINLEVEL_OUTOF10: 0
PAINLEVEL_OUTOF10: 0

## 2022-01-22 NOTE — PROGRESS NOTES
[facility] Inpatient   Resident Progress Note    S:  Hospital day: 1   Brief Synopsis: Matthias Young is a 61year old man who presented for shortness of breath and leg swelling. Overnight patients states he had intermittent chest tightness and difficulty lying flat to sleep. He wants to go home. Feels breathing is better. Denying cough fevers chills n/v. Noticed redness and itching at area where socks are onset 1 days ago. Cont meds:    sodium chloride      dextrose       Scheduled meds:    atorvastatin  80 mg Oral Nightly    clopidogrel  75 mg Oral Daily    aspirin  81 mg Oral Daily    hydrALAZINE  50 mg Oral 3 times per day    isosorbide mononitrate  90 mg Oral Daily    lisinopril  40 mg Oral Daily    metoprolol succinate  50 mg Oral BID    pantoprazole  40 mg Oral Daily    spironolactone  25 mg Oral Daily    sodium chloride flush  5-40 mL IntraVENous 2 times per day    [Held by provider] furosemide  20 mg Oral BID    insulin lispro  0-6 Units SubCUTAneous TID WC    insulin lispro  0-3 Units SubCUTAneous Nightly    furosemide  40 mg IntraVENous Daily    influenza virus vaccine  0.5 mL IntraMUSCular Prior to discharge     PRN meds: sodium chloride flush, sodium chloride, ondansetron **OR** ondansetron, polyethylene glycol, acetaminophen **OR** acetaminophen, glucose, dextrose, glucagon (rDNA), dextrose     I reviewed the patient's past medical and surgical history, Medications and Allergies. O:  BP 99/62   Pulse 79   Temp 98.4 °F (36.9 °C) (Oral)   Resp 18   Ht 5' 9\" (1.753 m)   Wt 234 lb 5.6 oz (106.3 kg)   SpO2 95%   BMI 34.61 kg/m²   24 hour I&O: No intake/output data recorded. No intake/output data recorded. Physical Exam  Cardiovascular:      Rate and Rhythm: Normal rate and regular rhythm. Heart sounds: No murmur heard. No friction rub. No gallop. Pulmonary:      Effort: Pulmonary effort is normal.      Breath sounds: No wheezing or rales. Abdominal:      General: Abdomen is flat. Bowel sounds are normal.      Tenderness: There is no abdominal tenderness. Musculoskeletal:      Comments: Trace edema   Skin:     Capillary Refill: Capillary refill takes less than 2 seconds. Comments: Erythematous lesions on lower shin   Neurological:      Mental Status: He is alert. Labs:  Na/K/Cl/CO2:  138/3.8/101/26 (01/22 0450)  BUN/Cr/glu/ALT/AST/amyl/lip:  23/1.5/--/--/--/--/-- (01/22 0450)  WBC/Hgb/Hct/Plts:  6.7/12.6/41.9/224 (01/22 0450)  estimated creatinine clearance is 65 mL/min (A) (based on SCr of 1.5 mg/dL (H)). Other pertinent labs as noted below    Radiology:  XR CHEST (2 VW)   Final Result   1. Cardiomegaly. There is no evidence of failure or pneumonia. A/P:  Active Problems:    Heart failure ([address]  Resolved Problems:    * No resolved hospital problems.  *    CHF Exacerbation  Lasix 40 mg  Improvement on the above  CXR negative for acute process at this time    Type 2 Diabetes A1C: 7.4  LDSS goal 140-180 mg/dl  Transition to metformin soon  Monitor BG today    Lesions on LE   Irritant Dermatitis vs fluid leak from leg swelling  Bactroban  Dressing  Compression stocking    Dispo: PossibleD/C tomorrow needs sleep study at d/c  GI/DVT ppx: Lovenox/Protonix  Diet: General- Low Sodium        Electronically signed by Tristan Lauren MD  PGY-3 on 1/22/2022 at 6:58 AM  This case was discussed with attending physician: Dr. Badillo Signs

## 2022-01-22 NOTE — PROGRESS NOTES
[facility]  Family Medicine Attending    S: 61 y.o. male admitted for SOB, bilateral leg swelling and 12 lb weight gain. Known comorbidities of of CHF, COPD, CAD, DM2, NSVT with defibrillator, Hx MI, Hx stroke, depression, tobacco abuse, among others. Today, states his breathing is improving. Still with orthopnea and PND, happening chronically. Discussed the sleep study that had been recommended, and he stated, \"I am not going to do that. \" Has been eating canned foods, canned soups and pastas, and ramen noodles with seasoning lately. He knows that these are high in sodium and that he should have been avoiding these foods. Has had worsening symptoms for the past month but did not seek care. Itchy rash bilateral ankles. Had been scratching. Wearing socks since Thursday, new socks, had not washed them yet. O: VS- Blood pressure 111/84, pulse 85, temperature 98.4 °F (36.9 °C), temperature source Oral, resp. rate 18, height 5' 9\" (1.753 m), weight 234 lb 5.6 oz (106.3 kg), SpO2 96 %. Exam is as noted by resident. In NAD  Speaking in full sentences  Neck supple, no bruit  Heart regular rhythm   Lungs clear to auscultation, somewhat coarse   Abdomen distended, nontender   Leg edema improving, 2+ to mid shin, with erythematous papules and excoriations circumferentially at sock line at both ankles. No secondary infection. Some scab formation. Impressions:    Active Problems:    Heart failure (HCC)  Recent 12 pound weight gain  Had ran out of Trulicity    Patient Active Problem List   Diagnosis    GERD (gastroesophageal reflux disease)    COPD (chronic obstructive pulmonary disease)    Hyperlipemia    ED (erectile dysfunction)    History of stroke    Depression    CAD (coronary artery disease)    Dual implantable cardioverter-defibrillator in situ    NS[facility]onsustained ventricular tachycardia) ([facility])    Diverticulosis of colon    History of MI (myocardial infarction)    Chronic systolic congestive heart failure (HCC)    Essential hypertension    Type 2 diabetes mellitus without complication, without long-term current use of insulin (HCC)    Obesity (BMI 30.0-34. 9)    Ischemic cardiomyopathy    Nonrheumatic aortic valve insufficiency    Elevated PSA, less than 10 ng/ml    Heart failure (HCC)         Plan:     Continued diuresis    Resumption of home medications  Daily weights  Following renal function and potassium, supplement as necessary  Compression stockings to lower extremities. Bactroban topically, supportive care. Follow rash symptoms. Will need close outpatient follow up with PCP and cardiology. Will need outpatient sleep study; discussed this again. Counseled extensively about dietary and lifestyle factors. He has been more adherent to medications lately. Follow glucose. Attending Physician Statement  I have reviewed the chart, including any radiology or labs, and seen the patient with the resident(s). I personally reviewed and performed key elements of the history and exam.  I agree with the assessment, plan and orders as documented by the resident. Please refer to the resident note for additional information.       Duran Ortiz, DO

## 2022-01-22 NOTE — PLAN OF CARE
Problem: Falls - Risk of:  Goal: Will remain free from falls  Description: Will remain free from falls  1/22/2022 0139 by Ericka Cueva RN  Outcome: Met This Shift  1/21/2022 1816 by Mark Marshall RN  Outcome: Ongoing  Goal: Absence of physical injury  Description: Absence of physical injury  1/22/2022 0139 by Ericka Cueva RN  Outcome: Met This Shift  1/21/2022 1816 by Mark Marshall RN  Outcome: Ongoing     Problem: Breathing Pattern - Ineffective:  Goal: Ability to achieve and maintain a regular respiratory rate will improve  Description: Ability to achieve and maintain a regular respiratory rate will improve  1/22/2022 0139 by Ericka Cueva RN  Outcome: Met This Shift  1/21/2022 1816 by Mark Marshall RN  Outcome: Ongoing

## 2022-01-23 VITALS
HEIGHT: 69 IN | SYSTOLIC BLOOD PRESSURE: 129 MMHG | WEIGHT: 234.35 LBS | DIASTOLIC BLOOD PRESSURE: 94 MMHG | HEART RATE: 87 BPM | TEMPERATURE: 98.5 F | RESPIRATION RATE: 20 BRPM | OXYGEN SATURATION: 98 % | BODY MASS INDEX: 34.71 KG/M2

## 2022-01-23 PROBLEM — M79.89 LEG SWELLING: Status: ACTIVE | Noted: 2022-01-23

## 2022-01-23 PROBLEM — N17.9 AKI (ACUTE KIDNEY INJURY) (HCC): Status: ACTIVE | Noted: 2022-01-23

## 2022-01-23 LAB
ANION GAP SERPL CALCULATED.3IONS-SCNC: 13 MMOL/L (ref 7–16)
BASOPHILS ABSOLUTE: 0.12 E9/L (ref 0–0.2)
BASOPHILS RELATIVE PERCENT: 1.3 % (ref 0–2)
BUN BLDV-MCNC: 27 MG/DL (ref 6–20)
CALCIUM SERPL-MCNC: 9.1 MG/DL (ref 8.6–10.2)
CHLORIDE BLD-SCNC: 98 MMOL/L (ref 98–107)
CO2: 25 MMOL/L (ref 22–29)
CREAT SERPL-MCNC: 1.5 MG/DL (ref 0.7–1.2)
EOSINOPHILS ABSOLUTE: 0.28 E9/L (ref 0.05–0.5)
EOSINOPHILS RELATIVE PERCENT: 3.1 % (ref 0–6)
GFR AFRICAN AMERICAN: 58
GFR NON-AFRICAN AMERICAN: 48 ML/MIN/1.73
GLUCOSE BLD-MCNC: 106 MG/DL (ref 74–99)
HCT VFR BLD CALC: 46.1 % (ref 37–54)
HEMOGLOBIN: 13.7 G/DL (ref 12.5–16.5)
IMMATURE GRANULOCYTES #: 0.04 E9/L
IMMATURE GRANULOCYTES %: 0.4 % (ref 0–5)
LYMPHOCYTES ABSOLUTE: 1.98 E9/L (ref 1.5–4)
LYMPHOCYTES RELATIVE PERCENT: 22.2 % (ref 20–42)
MCH RBC QN AUTO: 23.6 PG (ref 26–35)
MCHC RBC AUTO-ENTMCNC: 29.7 % (ref 32–34.5)
MCV RBC AUTO: 79.5 FL (ref 80–99.9)
METER GLUCOSE: 123 MG/DL (ref 74–99)
METER GLUCOSE: 146 MG/DL (ref 74–99)
MONOCYTES ABSOLUTE: 0.71 E9/L (ref 0.1–0.95)
MONOCYTES RELATIVE PERCENT: 8 % (ref 2–12)
NEUTROPHILS ABSOLUTE: 5.77 E9/L (ref 1.8–7.3)
NEUTROPHILS RELATIVE PERCENT: 65 % (ref 43–80)
PDW BLD-RTO: 15.9 FL (ref 11.5–15)
PLATELET # BLD: 262 E9/L (ref 130–450)
PMV BLD AUTO: 11.3 FL (ref 7–12)
POTASSIUM REFLEX MAGNESIUM: 3.6 MMOL/L (ref 3.5–5)
RBC # BLD: 5.8 E12/L (ref 3.8–5.8)
SODIUM BLD-SCNC: 136 MMOL/L (ref 132–146)
WBC # BLD: 8.9 E9/L (ref 4.5–11.5)

## 2022-01-23 PROCEDURE — 6370000000 HC RX 637 (ALT 250 FOR IP): Performed by: STUDENT IN AN ORGANIZED HEALTH CARE EDUCATION/TRAINING PROGRAM

## 2022-01-23 PROCEDURE — 6360000002 HC RX W HCPCS: Performed by: FAMILY MEDICINE

## 2022-01-23 PROCEDURE — 36415 COLL VENOUS BLD VENIPUNCTURE: CPT

## 2022-01-23 PROCEDURE — G0378 HOSPITAL OBSERVATION PER HR: HCPCS

## 2022-01-23 PROCEDURE — 82962 GLUCOSE BLOOD TEST: CPT

## 2022-01-23 PROCEDURE — 85025 COMPLETE CBC W/AUTO DIFF WBC: CPT

## 2022-01-23 PROCEDURE — 96376 TX/PRO/DX INJ SAME DRUG ADON: CPT

## 2022-01-23 PROCEDURE — 99239 HOSP IP/OBS DSCHRG MGMT >30: CPT | Performed by: FAMILY MEDICINE

## 2022-01-23 PROCEDURE — 80048 BASIC METABOLIC PNL TOTAL CA: CPT

## 2022-01-23 PROCEDURE — 2580000003 HC RX 258: Performed by: STUDENT IN AN ORGANIZED HEALTH CARE EDUCATION/TRAINING PROGRAM

## 2022-01-23 RX ORDER — POTASSIUM CHLORIDE 20 MEQ/1
20 TABLET, EXTENDED RELEASE ORAL ONCE
Status: COMPLETED | OUTPATIENT
Start: 2022-01-23 | End: 2022-01-23

## 2022-01-23 RX ADMIN — PANTOPRAZOLE SODIUM 40 MG: 40 TABLET, DELAYED RELEASE ORAL at 09:13

## 2022-01-23 RX ADMIN — Medication 10 ML: at 09:36

## 2022-01-23 RX ADMIN — SPIRONOLACTONE 25 MG: 25 TABLET, FILM COATED ORAL at 09:36

## 2022-01-23 RX ADMIN — HYDRALAZINE HYDROCHLORIDE 50 MG: 25 TABLET, FILM COATED ORAL at 06:55

## 2022-01-23 RX ADMIN — CLOPIDOGREL BISULFATE 75 MG: 75 TABLET ORAL at 09:36

## 2022-01-23 RX ADMIN — ASPIRIN 81 MG 81 MG: 81 TABLET ORAL at 09:36

## 2022-01-23 RX ADMIN — ISOSORBIDE MONONITRATE 90 MG: 30 TABLET, EXTENDED RELEASE ORAL at 09:36

## 2022-01-23 RX ADMIN — METOPROLOL SUCCINATE 50 MG: 50 TABLET, EXTENDED RELEASE ORAL at 09:36

## 2022-01-23 RX ADMIN — LISINOPRIL 40 MG: 20 TABLET ORAL at 09:36

## 2022-01-23 RX ADMIN — MUPIROCIN: 20 OINTMENT TOPICAL at 09:36

## 2022-01-23 RX ADMIN — POTASSIUM CHLORIDE 20 MEQ: 1500 TABLET, EXTENDED RELEASE ORAL at 09:36

## 2022-01-23 RX ADMIN — FUROSEMIDE 40 MG: 10 INJECTION, SOLUTION INTRAMUSCULAR; INTRAVENOUS at 09:35

## 2022-01-23 ASSESSMENT — PAIN SCALES - GENERAL
PAINLEVEL_OUTOF10: 0
PAINLEVEL_OUTOF10: 0

## 2022-01-23 NOTE — DISCHARGE SUMMARY
Discharge Summary    Mini Guerrero  :  1965  MRN:  [id number]    Admit date:  2022  Discharge date:   2022      Admitting Physician:  Maria Del Rosario Miguel MD    Discharge Diagnoses:      · Leg Swelling  · ALEX  · Congestive Heart Failure    Admission Condition:  fair    Discharged Condition:  good    Hospital Course: Mini Guerrero is a 61 y.o. male with a PMH of CAD, CHF (EF: 30% on 2021), CVA, depression, GERD, hypertension, hyperlipidemia, type II diabetes without long-term use insulin, and COPD who presents to ED for Leg Swelling (was also sent over from clinic states he is retaining water and states intermittent sob). Had a 12 lb weight gain and lab work-up showed elevated pro-BNP congruent with CHF. CHF was managed with diuresis, home dose was increased inpatient and I/O was monitored. Progressed well on this treatment and leg swelling improved. Put out >1 L during the admission. Electrolytes were closely monitor. On day of d/c decision was made to send patient on home dose diuretic. ALEX developed as a result of diuresis and repeat CMP will need to be done out-patient. It is likely after sending pt home on home dose of lasix (lower dose), kidney functioning will improve. Patient developed skin erythema around lower ankles and it is likely 2/2 volume overload from the edema in his legs. Treated with bacitracin and leg elevation. This did improve it. Was also given compression stocking but did not wear them. Type 2 Diabetes was managed with a SS. Metformin was held. At time of d/c GFR was still >40 and patient was okay to go home on Metformin 500 mg BID. Out-patient consideration can be made to add SGLT-2 to patients diabetic treatment. Lifestyle modifications like low salt intake, weight loss and also being worked up for sleep apnea were discussed with patient. He understands this can prevent future hospitalizations. Hospital Follow-Up Items to be done:  -BMP Repeat.  Consider adding SGLT-2 inhibitor. Monitor for improvement of leg swelling. Refer patient for a Sleep Study- SARAH. Discharge Medications:         Medication List      START taking these medications    mupirocin 2 % ointment  Commonly known as: BACTROBAN  Apply topically 3 times daily.   Start taking on: January 24, 2022        CONTINUE taking these medications    aspirin 81 MG EC tablet     atorvastatin 80 MG tablet  Commonly known as: LIPITOR  Take 1 tablet by mouth nightly     clopidogrel 75 MG tablet  Commonly known as: PLAVIX  Take 1 tablet by mouth daily     furosemide 20 MG tablet  Commonly known as: LASIX  Take 1 tablet by mouth 2 times daily     hydrALAZINE 50 MG tablet  Commonly known as: APRESOLINE  Take 1 tablet by mouth every 8 hours     isosorbide mononitrate 30 MG extended release tablet  Commonly known as: IMDUR  Take 3 tablets by mouth daily     lisinopril 40 MG tablet  Commonly known as: PRINIVIL;ZESTRIL  Take 1 tablet by mouth daily     metFORMIN 500 MG tablet  Commonly known as: GLUCOPHAGE  Take 1 tablet by mouth 2 times daily (with meals)     metoprolol succinate 50 MG extended release tablet  Commonly known as: TOPROL XL  Take 1 tablet by mouth 2 times daily     pantoprazole 40 MG tablet  Commonly known as: PROTONIX  Take 1 tablet by mouth daily     spironolactone 25 MG tablet  Commonly known as: ALDACTONE  Take 1 tablet by mouth daily        STOP taking these medications    Dulaglutide 0.75 MG/0.5ML Sopn     therapeutic multivitamin-minerals tablet           Where to Get Your Medications      These medications were sent to [place] [id number], [facility] [address] [facility] [address] [facility], [address] [place] [address] Road    Phone: [phone]   · mupirocin 2 % ointment         Consults:  none    Significant Diagnostic Studies:      Labs:  Na/K/Cl/CO2:  136/3.6/98/25 (01/23 0423)  BUN/Cr/glu/ALT/AST/amyl/lip:  27/1.5/--/--/--/--/-- (01/23 0423)  WBC/Hgb/Hct/Plts: 8. 9/13.7/46.1/262 (01/23 0423)  [unfilled]  estimated creatinine clearance is 65 mL/min (A) (based on SCr of 1.5 mg/dL (H)). New Imaging:  XR CHEST (2 VW)    Result Date: 1/20/2022  EXAMINATION: TWO XRAY VIEWS OF THE CHEST 1/20/2022 4:01 pm COMPARISON: 07/14/2021 HISTORY: ORDERING SYSTEM PROVIDED HISTORY: dyspnea TECHNOLOGIST PROVIDED HISTORY: Reason for exam:->dyspnea What reading provider will be dictating this exam?->CRC FINDINGS: The heart is enlarged. There is a dual lead cardiac pacer on the left The lungs are clear. There is no focal infiltrate or effusion. 1. Cardiomegaly. There is no evidence of failure or pneumonia. Treatments:   Diuresis     Discharge Exam:  Gen: well appearing  HEENT: NCAT  CVS: normal S1 S2 no MRG  Lungs: clear no wheezes rhales rhonchi  Extremity: 1+ pitting edema up to shin. Erythema around lower ankles. Disposition:   home    Future Appointments   Date Time Provider Estuardo Arzate   1/24/2022 11:00 AM [facility]       More than 30 minutes was spent in preparation of this patient's discharge including, but not limited to, examination, preparation of documents, prescription preparation, counseling and coordination.     Signed:  Cassidy Guan MD  1/23/2022, 11:06 AM

## 2022-01-23 NOTE — PROGRESS NOTES
[facility]  Family Medicine Attending    S: 61 y.o. male admitted for SOB, bilateral leg swelling and 12 lb weight gain. Known comorbidities of of CHF, COPD, CAD, DM2, NSVT with defibrillator, Hx MI, Hx stroke, depression, tobacco abuse, among others. 1/22/22, states his breathing is improving. Still with orthopnea and PND, happening chronically. Discussed the sleep study that had been recommended, and he stated, \"I am not going to do that. \" Has been eating canned foods, canned soups and pastas, and ramen noodles with seasoning lately. He knows that these are high in sodium and that he should have been avoiding these foods. Has had worsening symptoms for the past month but did not seek care. Itchy rash bilateral ankles. Had been scratching. Wearing socks since Thursday, new socks, had not washed them yet. 1/23/22, feeling better overall. Difficult to sleep in the hospital.  Feels ready to go home. Still with LE edema, improving, and orthopnea has improved. No new symptoms or concerns. Has not put on compression stockings yet. Rash is improving. Using topical ointment. Having BMs. No CP or SOB. O: VS- Blood pressure (!) 129/94, pulse 87, temperature 98.5 °F (36.9 °C), temperature source Oral, resp. rate 20, height 5' 9\" (1.753 m), weight 234 lb 5.6 oz (106.3 kg), SpO2 98 %. Exam is as noted by resident. In NAD  Speaking in full sentences  Neck supple   Heart regular rhythm   Lungs clear to auscultation   Abdomen softly distended, nontender   Leg edema improving, 1-2+ to mid shin, with erythematous papules and excoriations circumferentially at sock line at both ankles. No secondary infection. Some scab formation. Less erythematous today. No induration. Impressions:    Active Problems:    Heart failure (HCC)  Recent 12 pound weight gain  Had ran out of Trulicity    Patient Active Problem List   Diagnosis    GERD (gastroesophageal reflux disease)    COPD (chronic obstructive pulmonary disease)    Hyperlipemia    ED (erectile dysfunction)    History of stroke    Depression    CAD (coronary artery disease)    Dual implantable cardioverter-defibrillator in situ    NSVT (nonsustained ventricular tachycardia) (HCC)    Diverticulosis of colon    History of MI (myocardial infarction)    Chronic systolic congestive heart failure (HCC)    Essential hypertension    Type 2 diabetes mellitus without complication, without long-term current use of insulin (HCC)    Obesity (BMI 30.0-34. 9)    Ischemic cardiomyopathy    Nonrheumatic aortic valve insufficiency    Elevated PSA, less than 10 ng/ml    Heart failure ([facility])    Leg swelling    ALEX (acute kidney injury) ([facility] [address]         Plan:     Continued diuresis. Resumption of home medications. Following renal function and potassium, supplement as necessary. Compression stockings to lower extremities. Bactroban topically, supportive care. Follow rash symptoms, improving. Will need close outpatient follow up with PCP and cardiology. Will need outpatient sleep study; discussed this again. Consider adding Jardiance outpatient; will need close follow up. In the meantime, advised that he will be back in acute heart failure unless his diet changes, and he understands. He plans to get some vegetables and healthier foods and avoid canned foods, especially soups, and prepared foods. He understands. Advised on the need to elevate the legs (avoid sleeping in recliner) and use compression stockings. Continued supportive care. Call with new/worsening symptoms, questions, or concerns, and follow up in 1-2 days in our office. Labs at that time as well. Counseled extensively about dietary and lifestyle factors. He has been more adherent to medications lately. Follow glucose.        Attending Physician Statement  I have reviewed the chart, including any radiology or labs, and seen the patient with the resident(s). I personally reviewed and performed key elements of the history and exam.  I agree with the assessment, plan and orders as documented by the resident. Please refer to the resident note for additional information.       Elina Linton, DO

## 2022-01-23 NOTE — PROGRESS NOTES
[facility] Inpatient   Resident Progress Note    S:  Hospital day: 2   Brief Synopsis: Vaibhav Daily is a 61year old man who presented for shortness of breath and leg swelling. Overnight patients states he had intermittent chest tightness and difficulty lying flat to sleep. He wants to go home. Feels breathing is better. Denying cough fevers chills n/v. Feeling improvement in lesion on ankles/lower legs with bacitracin. Continues to have swelling of legs. Did not wear compression stockings. Cont meds:    sodium chloride      dextrose       Scheduled meds:    mupirocin   Topical Daily    atorvastatin  80 mg Oral Nightly    clopidogrel  75 mg Oral Daily    aspirin  81 mg Oral Daily    hydrALAZINE  50 mg Oral 3 times per day    isosorbide mononitrate  90 mg Oral Daily    lisinopril  40 mg Oral Daily    metoprolol succinate  50 mg Oral BID    pantoprazole  40 mg Oral Daily    spironolactone  25 mg Oral Daily    sodium chloride flush  5-40 mL IntraVENous 2 times per day    [Held by provider] furosemide  20 mg Oral BID    insulin lispro  0-6 Units SubCUTAneous TID     insulin lispro  0-3 Units SubCUTAneous Nightly    furosemide  40 mg IntraVENous Daily    influenza virus vaccine  0.5 mL IntraMUSCular Prior to discharge     PRN meds: sodium chloride flush, sodium chloride, ondansetron **OR** ondansetron, polyethylene glycol, acetaminophen **OR** acetaminophen, glucose, dextrose, glucagon (rDNA), dextrose     I reviewed the patient's past medical and surgical history, Medications and Allergies. O:  /79   Pulse 85   Temp 98 °F (36.7 °C) (Oral)   Resp 20   Ht 5' 9\" (1.753 m)   Wt 234 lb 5.6 oz (106.3 kg)   SpO2 96%   BMI 34.61 kg/m²   24 hour I&O: I/O last 3 completed shifts: In: 1320 [P.O.:1320]  Out: 2250 [Urine:2250]  No intake/output data recorded. Physical Exam  Cardiovascular:      Rate and Rhythm: Normal rate and regular rhythm. Heart sounds:  No murmur heard. No friction rub. No gallop. Pulmonary:      Effort: Pulmonary effort is normal.      Breath sounds: No wheezing or rales. Abdominal:      General: Abdomen is flat. Bowel sounds are normal.      Tenderness: There is no abdominal tenderness. Musculoskeletal:      Comments: Trace edema   Skin:     Capillary Refill: Capillary refill takes less than 2 seconds. Comments: Erythematous lesions on lower shin   Neurological:      Mental Status: He is alert. Labs:  Na/K/Cl/CO2:  136/3.6/98/25 (01/23 0423)  BUN/Cr/glu/ALT/AST/amyl/lip:  27/1.5/--/--/--/--/-- (01/23 0423)  WBC/Hgb/Hct/Plts:  8.9/13.7/46.1/262 (01/23 0423)  estimated creatinine clearance is 65 mL/min (A) (based on SCr of 1.5 mg/dL (H)). Other pertinent labs as noted below    Radiology:  XR CHEST (2 VW)   Final Result   1. Cardiomegaly. There is no evidence of failure or pneumonia. A/P:  Active Problems:    Heart failure ([address]  Resolved Problems:    * No resolved hospital problems.  *    CHF Exacerbation  Lasix 40 mg  Improvement on the above  CXR negative for acute process at this time    Type 2 Diabetes A1C: 7.4  LDSS goal 140-180 mg/dl  Transition to metformin soon  Monitor BG today    ALEX 2/2 to diuresis  Cr: 1.5 today stable  Continue to monitor  Likely will improve after lasix is stopped    Lesions on LE   Irritant Dermatitis vs fluid leak from leg swelling  Bactroban  Dressing  Compression stocking    Dispo: PossibleD/C tomorrow needs sleep study at d/c  GI/DVT ppx: Lovenox/Protonix  Diet: General- Low Sodium        Electronically signed by Jordon Knox MD  PGY-3 on 1/23/2022 at 7:14 AM  This case was discussed with attending physician: Dr. Malik Berrios

## 2022-01-24 ENCOUNTER — CARE COORDINATION (OUTPATIENT)
Dept: CASE MANAGEMENT | Age: 57
End: 2022-01-24

## 2022-01-25 ENCOUNTER — CARE COORDINATION (OUTPATIENT)
Dept: CASE MANAGEMENT | Age: 57
End: 2022-01-25

## 2022-01-25 NOTE — CARE COORDINATION
[facility] Transitions Initial Follow Up Call    Call within 2 business days of discharge: Yes    Patient: Cullen Wagner Patient : 1965   MRN: [id number]  Reason for Admission: HF/ALEX  Discharge Date: 22 RARS: Readmission Risk Score: 9.5 ( )      Last Discharge [facility]       Complaint Diagnosis Description Type Department Provider    22 Leg Swelling Congestive heart failure, unspecified HF chronicity, unspecified heart failure type [facility]) ED to Hosp-Admission (Discharged) (ADMITTED) [facility] Debi Cardona MD; Thanh Munoz... Second attempt made today 22 to contact patient for TCM/hospital discharge (low risk) follow up. Left message on home/mobile number requesting a return call back to CTN and provided contact information. CTN signing off if no return call.       Ele Santana APRN

## 2022-01-25 NOTE — CARE COORDINATION
[facility] Transitions Initial Follow Up Call    Call within 2 business days of discharge: Yes    Patient: Nahum Sharp Patient : 1965   MRN: [id number]  Reason for Admission: CHF   Discharge Date: 22 RARS: Readmission Risk Score: 9.5 ( )      Last Discharge [facility]       Complaint Diagnosis Description Type Department Provider    22 Leg Swelling Congestive heart failure, unspecified HF chronicity, unspecified heart failure type [facility]) ED to Hosp-Admission (Discharged) (ADMITTED) [facility] Jose Alexander MD; Burnard Pallas... Second and final attempt to reach the patient for initial Care Transition call post hospital discharge. Message left with CTN's contact information requesting return phone call. Will sign off.      Follow Up  Future Appointments   Date Time Provider Estuardo Arzate   2022  1:40 PM DO James Aguirre [facility]       Abigail oLza RN

## 2022-01-26 NOTE — TELEPHONE ENCOUNTER
Thank you! Station [address], please try to reach the patient as well to find out how he is feeling. He has an upcoming appointment scheduled this week as well. Thanks!

## 2022-01-28 ENCOUNTER — OFFICE VISIT (OUTPATIENT)
Dept: FAMILY MEDICINE CLINIC | Age: 57
End: 2022-01-28
Payer: MEDICARE

## 2022-01-28 VITALS
HEART RATE: 110 BPM | TEMPERATURE: 97.3 F | OXYGEN SATURATION: 97 % | SYSTOLIC BLOOD PRESSURE: 142 MMHG | WEIGHT: 233 LBS | DIASTOLIC BLOOD PRESSURE: 85 MMHG | RESPIRATION RATE: 18 BRPM | BODY MASS INDEX: 34.51 KG/M2 | HEIGHT: 69 IN

## 2022-01-28 DIAGNOSIS — I50.22 CHRONIC SYSTOLIC CONGESTIVE HEART FAILURE (HCC): Primary | ICD-10-CM

## 2022-01-28 DIAGNOSIS — J30.2 SEASONAL ALLERGIC RHINITIS, UNSPECIFIED TRIGGER: ICD-10-CM

## 2022-01-28 PROCEDURE — 99212 OFFICE O/P EST SF 10 MIN: CPT | Performed by: STUDENT IN AN ORGANIZED HEALTH CARE EDUCATION/TRAINING PROGRAM

## 2022-01-28 PROCEDURE — 1111F DSCHRG MED/CURRENT MED MERGE: CPT | Performed by: STUDENT IN AN ORGANIZED HEALTH CARE EDUCATION/TRAINING PROGRAM

## 2022-01-28 PROCEDURE — 99213 OFFICE O/P EST LOW 20 MIN: CPT | Performed by: STUDENT IN AN ORGANIZED HEALTH CARE EDUCATION/TRAINING PROGRAM

## 2022-01-28 RX ORDER — FLUTICASONE PROPIONATE 50 MCG
2 SPRAY, SUSPENSION (ML) NASAL DAILY
Qty: 48 G | Refills: 1 | Status: SHIPPED
Start: 2022-01-28 | End: 2022-03-29 | Stop reason: SDUPTHER

## 2022-01-28 RX ORDER — LORATADINE 10 MG/1
10 TABLET ORAL DAILY
Qty: 30 TABLET | Refills: 3 | Status: CANCELLED | OUTPATIENT
Start: 2022-01-28

## 2022-01-28 NOTE — PROGRESS NOTES
S: 61 y.o. male here for 50-year-old male presents for follow-up of acute on chronic CHF exacerbation. He had presented to the clinic a couple weeks ago, was in acute exacerbation with symptoms of weight gain, shortness of breath, orthopnea, leg swelling. He was sent to the hospital admitted, had increased diuretics during that time and then was discharged on Lasix 20 mg twice daily. Prior to that he was on Lasix 10 mg twice daily. He reports minimal improvement since then, but still is having shortness of breath, mild leg swelling. He states that he can sleep in his bed again, and is using 2 pillows which he used prior. However, the 2 pillows are new worse or little bit taller than the previous pillows he was using. He has followed with cardiology in the past, but has never been to the heart failure clinic. He states he does not want to go back to the hospital.  Yoselyn Little well. Last creatinine was 1.5 elevated above baseline of about 1.3  O: VS: BP (!) 142/85 (Site: Right Upper Arm, Position: Sitting, Cuff Size: Medium Adult)   Pulse 110   Temp 97.3 °F (36.3 °C) (Temporal)   Resp 18   Ht 5' 9\" (1.753 m)   Wt 233 lb (105.7 kg)   SpO2 97%   BMI 34.41 kg/m²    General: NAD, alert and interacting appropriately. CV: RRR, no gallops, rubs, or murmurs    Resp: Visibly short of breath, especially with increased conversation. Decreased airflow throughout, mild crackles appreciated at lung bases. Abd:  Soft, nontender, mild distention   Ext: Trace edema to calves bilaterally    Impression: CHF, [facility] hospital follow-up  Plan:   Patient refuses to go back to emergency room. Take an extra 20 mg of Lasix daily x3-day. Follow-up on Monday. Go to the ED for any worsening of symptoms. After follow-up on Monday, plan to increase spironolactone to 50 mg daily. Check CMP on Monday    Attending Physician Statement  I have discussed the case, including pertinent history and exam findings with the resident.   I agree with the documented assessment and plan.

## 2022-01-28 NOTE — PROGRESS NOTES
daily  Started by: Matias Nielsen DO        CONTINUE taking these medications    aspirin 81 MG EC tablet     atorvastatin 80 MG tablet  Commonly known as: LIPITOR  Take 1 tablet by mouth nightly     clopidogrel 75 MG tablet  Commonly known as: PLAVIX  Take 1 tablet by mouth daily     furosemide 20 MG tablet  Commonly known as: LASIX  Take 1 tablet by mouth 2 times daily     hydrALAZINE 50 MG tablet  Commonly known as: APRESOLINE  Take 1 tablet by mouth every 8 hours     isosorbide mononitrate 30 MG extended release tablet  Commonly known as: IMDUR  Take 3 tablets by mouth daily     lisinopril 40 MG tablet  Commonly known as: PRINIVIL;ZESTRIL  Take 1 tablet by mouth daily     metFORMIN 500 MG tablet  Commonly known as: GLUCOPHAGE  Take 1 tablet by mouth 2 times daily (with meals)     metoprolol succinate 50 MG extended release tablet  Commonly known as: TOPROL XL  Take 1 tablet by mouth 2 times daily     mupirocin 2 % ointment  Commonly known as: BACTROBAN  Apply topically 3 times daily. pantoprazole 40 MG tablet  Commonly known as: PROTONIX  Take 1 tablet by mouth daily     spironolactone 25 MG tablet  Commonly known as: ALDACTONE  Take 1 tablet by mouth daily           Where to Get Your Medications      These medications were sent to [facility], [address] [facility], [address] [place] [address] Road    Phone: [phone]   · fluticasone 50 MCG/ACT nasal spray           Medications marked \"taking\" at this time  Outpatient Medications Marked as Taking for the 1/28/22 encounter (Office Visit) with Matias Nielsen DO   Medication Sig Dispense Refill    fluticasone (FLONASE) 50 MCG/ACT nasal spray 2 sprays by Each Nostril route daily 48 g 1    mupirocin (BACTROBAN) 2 % ointment Apply topically 3 times daily.  2 each 1    aspirin 81 MG EC tablet Take 81 mg by mouth daily      clopidogrel (PLAVIX) 75 MG tablet Take 1 tablet by mouth daily 90 tablet 0  furosemide (LASIX) 20 MG tablet Take 1 tablet by mouth 2 times daily 60 tablet 1    hydrALAZINE (APRESOLINE) 50 MG tablet Take 1 tablet by mouth every 8 hours 90 tablet 1    isosorbide mononitrate (IMDUR) 30 MG extended release tablet Take 3 tablets by mouth daily 90 tablet 1    lisinopril (PRINIVIL;ZESTRIL) 40 MG tablet Take 1 tablet by mouth daily 90 tablet 1    metFORMIN (GLUCOPHAGE) 500 MG tablet Take 1 tablet by mouth 2 times daily (with meals) 60 tablet 1    metoprolol succinate (TOPROL XL) 50 MG extended release tablet Take 1 tablet by mouth 2 times daily 60 tablet 1    pantoprazole (PROTONIX) 40 MG tablet Take 1 tablet by mouth daily 30 tablet 1    spironolactone (ALDACTONE) 25 MG tablet Take 1 tablet by mouth daily 30 tablet 1    atorvastatin (LIPITOR) 80 MG tablet Take 1 tablet by mouth nightly 90 tablet 0        Medications patient taking as of now reconciled against medications ordered at time of hospital discharge: Yes    Chief Complaint   Patient presents with    Follow-Up from Hospital       History of Present illness - Follow up of Hospital diagnosis(es):   He had presented to the clinic a couple weeks ago, was in acute exacerbation with symptoms of weight gain, shortness of breath, orthopnea, leg swelling. Inpatient course: Discharge summary reviewed- see chart. Interval history/Current status:     He was sent to the hospital admitted, had increased diuretics during that time and then was discharged on Lasix 20 mg twice daily. Prior to that he was on Lasix 10 mg twice daily. He reports minimal improvement since then, but still is having shortness of breath, mild leg swelling. He states that he can sleep in his bed again, and is using 2 pillows which he used prior. However, the 2 pillows are new worse or little bit taller than the previous pillows he was using. He has followed with cardiology in the past, but has never been to the heart failure clinic.   He states he does not want to go back to the hospital.  Wyatt Richards well. Last creatinine was 1.5 elevated above baseline of about 1.3    Vitals:    01/28/22 1346   BP: (!) 142/85   Site: Right Upper Arm   Position: Sitting   Cuff Size: Medium Adult   Pulse: 110   Resp: 18   Temp: 97.3 °F (36.3 °C)   TempSrc: Temporal   SpO2: 97%   Weight: 233 lb (105.7 kg)   Height: 5' 9\" (1.753 m)     Body mass index is 34.41 kg/m². Wt Readings from Last 3 Encounters:   01/28/22 233 lb (105.7 kg)   01/21/22 234 lb 5.6 oz (106.3 kg)   01/20/22 237 lb (107.5 kg)     BP Readings from Last 3 Encounters:   01/28/22 (!) 142/85   01/23/22 (!) 129/94   01/20/22 (!) 158/108      Physical Exam :    Vitals: BP (!) 142/85 (Site: Right Upper Arm, Position: Sitting, Cuff Size: Medium Adult)   Pulse 110   Temp 97.3 °F (36.3 °C) (Temporal)   Resp 18   Ht 5' 9\" (1.753 m)   Wt 233 lb (105.7 kg)   SpO2 97%   BMI 34.41 kg/m²   General Appearance: Awake, alert, oriented, and in mild distress  HEENT: NCAT, no pallor or icterus  Neck: Symmetrical, trachea midline. Chest wall/Lung: Visibly short of breath, especially with increased conversation. Decreased airflow throughout, mild crackles appreciated at lung bases. Heart: RRR, normal S1 and S2, no murmurs, rubs or gallops  Abdomen: SNT, mild distension  Extremities: Extremities normal, atraumatic, no cyanosis, clubbing; trace bilateral edema. Neurologic: Alert&Oriented x3. No focal motor deficits detected   Psychiatric: Normal mood. Normal affect. Normal behavior    Assessment/Plan:  1. Chronic systolic congestive heart failure (HCC)  · Refused to go back to hospital  · Take 60 mg lasix for next 3 days, then return to 40 mg daily.  Also plan to increase spironolactone to 50 mg daily once increased lasix dose is completed  · MUST follow up on Monday  · Also referred to heart failure clinic  - [address] MED LIST    2. Seasonal allergic rhinitis, unspecified trigger  - fluticasone (FLONASE) 50 MCG/ACT nasal spray; 2 sprays by Each Nostril route daily  Dispense: 48 g; Refill: 1        Medical Decision Making: moderate complexity    Return to Office: Return in 3 days (on 1/31/2022) for acute chf exacerbation, attempted home treatment due to refusal to return to hospital.    Emily Camejo DO   Case discussed with Dr. Madhav Guillory

## 2022-01-31 ENCOUNTER — OFFICE VISIT (OUTPATIENT)
Dept: FAMILY MEDICINE CLINIC | Age: 57
End: 2022-01-31
Payer: MEDICARE

## 2022-01-31 ENCOUNTER — TELEPHONE (OUTPATIENT)
Dept: FAMILY MEDICINE CLINIC | Age: 57
End: 2022-01-31

## 2022-01-31 ENCOUNTER — TELEPHONE (OUTPATIENT)
Dept: OTHER | Age: 57
End: 2022-01-31

## 2022-01-31 VITALS
WEIGHT: 232 LBS | HEART RATE: 105 BPM | OXYGEN SATURATION: 99 % | BODY MASS INDEX: 34.36 KG/M2 | SYSTOLIC BLOOD PRESSURE: 124 MMHG | TEMPERATURE: 97.4 F | HEIGHT: 69 IN | RESPIRATION RATE: 24 BRPM | DIASTOLIC BLOOD PRESSURE: 88 MMHG

## 2022-01-31 DIAGNOSIS — I50.22 CHRONIC SYSTOLIC CONGESTIVE HEART FAILURE (HCC): ICD-10-CM

## 2022-01-31 DIAGNOSIS — I50.22 CHRONIC SYSTOLIC CONGESTIVE HEART FAILURE (HCC): Primary | ICD-10-CM

## 2022-01-31 DIAGNOSIS — R06.09 DYSPNEA ON EXERTION: ICD-10-CM

## 2022-01-31 PROBLEM — H52.4 PRESBYOPIA: Status: ACTIVE | Noted: 2022-01-31

## 2022-01-31 PROBLEM — H52.223 REGULAR ASTIGMATISM, BILATERAL: Status: ACTIVE | Noted: 2022-01-31

## 2022-01-31 PROBLEM — H25.13 AGE-RELATED NUCLEAR CATARACT, BILATERAL: Status: ACTIVE | Noted: 2022-01-31

## 2022-01-31 LAB
ANION GAP SERPL CALCULATED.3IONS-SCNC: 7 MMOL/L (ref 7–16)
BUN BLDV-MCNC: 23 MG/DL (ref 6–20)
CALCIUM SERPL-MCNC: 9.3 MG/DL (ref 8.6–10.2)
CHLORIDE BLD-SCNC: 101 MMOL/L (ref 98–107)
CO2: 32 MMOL/L (ref 22–29)
CREAT SERPL-MCNC: 1.2 MG/DL (ref 0.7–1.2)
GFR AFRICAN AMERICAN: >60
GFR NON-AFRICAN AMERICAN: >60 ML/MIN/1.73
GLUCOSE BLD-MCNC: 167 MG/DL (ref 74–99)
POTASSIUM SERPL-SCNC: 4.6 MMOL/L (ref 3.5–5)
SODIUM BLD-SCNC: 140 MMOL/L (ref 132–146)

## 2022-01-31 PROCEDURE — 99212 OFFICE O/P EST SF 10 MIN: CPT | Performed by: FAMILY MEDICINE

## 2022-01-31 PROCEDURE — 36415 COLL VENOUS BLD VENIPUNCTURE: CPT | Performed by: FAMILY MEDICINE

## 2022-01-31 PROCEDURE — 99213 OFFICE O/P EST LOW 20 MIN: CPT | Performed by: FAMILY MEDICINE

## 2022-01-31 RX ORDER — SPIRONOLACTONE 50 MG/1
50 TABLET, FILM COATED ORAL DAILY
Qty: 90 TABLET | Refills: 1 | Status: SHIPPED
Start: 2022-01-31 | End: 2022-05-31 | Stop reason: SDUPTHER

## 2022-01-31 ASSESSMENT — ENCOUNTER SYMPTOMS
COUGH: 0
DIARRHEA: 0
ABDOMINAL PAIN: 0
SHORTNESS OF BREATH: 1
CONSTIPATION: 0
NAUSEA: 0
VOMITING: 0
WHEEZING: 0

## 2022-01-31 NOTE — TELEPHONE ENCOUNTER
Please clarify, as Mr. [place] follows with Dr. Mannie Campuzano. He is established with a cardiologist.  Please let me know of any concerns or questions, or if that status has changed. Thank you!

## 2022-01-31 NOTE — PROGRESS NOTES
S: 61 y.o. male here for HF. Was supposed to take 60 lasix for 3 days then increase aldactone. Pt did not do these, but did increase lasix to 40. sxs improved. Still some lopez. Referred to chf clinic. O: VS: /88 (Site: Left Upper Arm, Position: Sitting, Cuff Size: Medium Adult)   Pulse 105   Temp 97.4 °F (36.3 °C) (Oral)   Resp 24   Ht 5' 9\" (1.753 m)   Wt 232 lb (105.2 kg)   SpO2 99%   BMI 34.26 kg/m²    General: NAD, alert and interacting appropriately. CV:  RRR, no gallops, rubs, or murmurs    Resp: L basilar crackles. Ext:  Trace ble edema    Impression: HF exac, recovering but still sob  Plan:   CXR, bmp  Increase aldactone. Attending Physician Statement  I have discussed the case, including pertinent history and exam findings with the resident. I agree with the documented assessment and plan.

## 2022-01-31 NOTE — PROGRESS NOTES
[facility] MEDICINE RESIDENCY PROGRAM  DATE OF VISIT : 2022    Patient : Adelaide Webb   Age : 61 y.o.  : 1965   MRN : [id number]   ______________________________________________________________________    Chief Complaint :   Chief Complaint   Patient presents with    Check-Up     CHF       HPI : Adelaide Webb is 61 y.o. male who presented to the clinic today for CHF follow-up. Patient seen on  for hospital follow-up following admission for CHF exacerbation. At the time he was instructed to take 60mg of Lasix (instead of the 20mg) for three daysand start spironolactone 50mg daily once the 3 days of lasix finished. Initially he was recommended to return to hospital but patient refused. He was also referred to CHF clinic. Breathing has improved since. Patient has only been taking 40mg of lasix and has not increased his spironolactone to 50mg. CHF: EF 30%- ECHO 2021. Lasix 20mg daily, Spironolactone 25mg daily, metoprolol 50mg BID. I reviewed the patient's past medications, allergies and past medical history during this visit.     Past Medical History :        Past Medical History:   Diagnosis Date    Allergic rhinitis     Anticoagulant long-term use     Anxiety     Atypical angina (HCC)     CAD (coronary artery disease)     Carotid artery stenosis     Cerebral artery occlusion with cerebral infarction [facility])     CH[facility]congestive [facility] failure) [facility]     COPD (chronic obstructive pulmonary disease) ([facility])     CVA (cerebral infarction) 2012    Depression     Diverticulitis 2010    Sigmoid abscess, s/p colectomy    Erectile dysfunction     GERD (gastroesophageal reflux disease)     Heart attack ([facility] [address] 2014    Hyperlipemia     Hypertension     Ischemic cardiomyopathy     Left atrial thrombus     Obesity     Type 2 diabetes mellitus without complication, without long-term current use of insulin ([facility])     Type II or unspecified type diabetes mellitus without mention of complication, not stated as uncontrolled      Past Surgical History:   Procedure Laterality Date    CARDIAC CATHETERIZATION  3/30/2015    EF 35%, Dr. Jose Raul Stallworth, [address]  4/1/2015    left chest, Dual [employer], Dr. Yesika Du, [facility], [address]  1/29/2010    laparoscopic, converted to open sigmoid colon resection (diverticular disease), Dr. Geremias Yeh, [address] [place] [address] COLONOSCOPY  12/5/2005    diverticulosis, Dr. Geremias Yeh, [address] [place] [address] COLONOSCOPY  9/4/2008    diverticulosis, Dr. Geremias Yeh, [facility] COLONOSCOPY  1/28/2010    prior to colon surgery, diverticulosis, Dr. Geremias Yeh, [address] [facility] COLONOSCOPY  08/07/2015    sigmoid diverticulosis, rectal polyp bx/cauterized (hyperplastic polyp), Dr. Frank Coronel, [address] [place] [address]  12/6/2014    LAD [address] 3.5 x 33, Dr. Jaky Kohli, [address] [place] [address] CYSTOURETHROSCOPY  1/29/2010    placement bilateral ureteral stents for colon resection, Dr. Alton Diaz, [facility], [address] [place] [address],[address] GASTROINTESTINAL ENDOSCOPY  8/7/2015    severe duodenitis, mild to moderate gastritis (bx for H pylori), submucosal benign mass body stomach, mild reflux esophagitis, Dr. Frank Coronel, [facility]       Social History :  Social History     Tobacco History     Smoking Status  Former Smoker Quit date  6/1/2014 Smoking Frequency  1 pack/day for 30 years (27 pk yrs) Smoking Tobacco Type  Cigarettes    Smokeless Tobacco Use  Current User Smokeless Tobacco Type  Chew    Tobacco Comment  occ chewing tobacco, trying to quit          Alcohol History     Alcohol Use Status  Yes Comment  occ.           Drug Use     Drug Use Status  No          Sexual Activity     Sexually Active  Never                 Allergies :   No Known Allergies    Medication List :    Current Outpatient Medications   Medication Sig Dispense Refill    spironolactone (ALDACTONE) 50 MG tablet Take 1 tablet by mouth daily 90 tablet 1    appearance. Cardiovascular:      Rate and Rhythm: Normal rate and regular rhythm. Pulses: Normal pulses. Heart sounds: Normal heart sounds. No murmur heard. Pulmonary:      Effort: Pulmonary effort is normal. No respiratory distress. Breath sounds: Normal breath sounds. No wheezing. Abdominal:      General: Bowel sounds are normal. There is no distension. Palpations: Abdomen is soft. Tenderness: There is no abdominal tenderness. Musculoskeletal:      Right lower leg: Edema present. Left lower leg: Edema present. Comments: Trace edema   Neurological:      Mental Status: He is alert.         ___________________    Assessment & Plan :    1. Chronic systolic congestive heart failure (HCC)  - increase spironolactone to 58LL  - BASIC METABOLIC PANEL; Future  - XR CHEST STANDARD (2 VW); Future  - spironolactone (ALDACTONE) 50 MG tablet; Take 1 tablet by mouth daily  Dispense: 90 tablet; Refill: 1  - [facility] (Primary Care Only)    2. Dyspnea on exertion  - XR CHEST STANDARD (2 VW); Future  - [facility] (Primary Care Only)      Educational materials and/or home exercises printed for patient's review and were included in patient instructions on his/her After Visit Summary and given to patient at the end of visit. Counseled regarding above diagnosis, including possible risks and complications,  especially if left uncontrolled. Counseled regarding the possible side effects, risks, benefits and alternatives to treatment; patient and/or guardian verbalizes understanding, agrees, feels comfortable with and wishes to proceed with above treatment plan. Advised patient to call with any new medication issues, and read all Rx info from pharmacy to assure aware of all possible risks and side effects of medication before taking. Reviewed age and gender appropriate health screening exams and vaccinations.   Advised patient regarding importance of keeping up with recommended health maintenance and to schedule as soon as possible if overdue, as this is important in assessing for undiagnosed pathology, especially cancer, as well as protecting against potentially harmful/life threatening disease. Patient and/or guardian verbalizes understanding and agrees with above counseling, assessment and plan. All questions answered    Additional plan and future considerations:   RTO in 1 week     Return to Office: Return in about 1 week (around 2/7/2022) for CHF.     Stephania Espinoza MD   Case discussed with Dr. Vinicio Ibarra

## 2022-01-31 NOTE — TELEPHONE ENCOUNTER
Juliann Kirk called in from the CHF Clinic and they are asking if you would follow Ambrocio Philip while here is there as he does not have a cardiologist. She states that it would be for medication orders, blood work and anything abnormal.  She is asking that we call her back at [phone].     Please advise  Thank you

## 2022-02-01 ENCOUNTER — HOSPITAL ENCOUNTER (OUTPATIENT)
Dept: GENERAL RADIOLOGY | Age: 57
Discharge: HOME | End: 2022-02-03
Payer: MEDICARE

## 2022-02-01 ENCOUNTER — HOSPITAL ENCOUNTER (OUTPATIENT)
Age: 57
Discharge: HOME | End: 2022-02-03
Payer: MEDICARE

## 2022-02-01 ENCOUNTER — CARE COORDINATION (OUTPATIENT)
Dept: CARE COORDINATION | Age: 57
End: 2022-02-01

## 2022-02-01 DIAGNOSIS — I50.22 CHRONIC SYSTOLIC CONGESTIVE HEART FAILURE (HCC): ICD-10-CM

## 2022-02-01 DIAGNOSIS — R06.09 DYSPNEA ON EXERTION: ICD-10-CM

## 2022-02-01 PROCEDURE — 71046 X-RAY EXAM CHEST 2 VIEWS: CPT

## 2022-02-02 NOTE — TELEPHONE ENCOUNTER
Spoke with CHF clinic Nurse. She will check with Dr. Princess Dobbins  to see if he will sign for rders. Per nurse typically the [address] who sends the referral signs the orders.

## 2022-02-04 ENCOUNTER — CARE COORDINATION (OUTPATIENT)
Dept: CARE COORDINATION | Age: 57
End: 2022-02-04

## 2022-02-04 ENCOUNTER — TELEPHONE (OUTPATIENT)
Dept: OTHER | Age: 57
End: 2022-02-04

## 2022-02-04 NOTE — CARE COORDINATION
[facility] made a 2nd attempt to contact Margo Yuen for [facility] referral with [employer].  There was no answer.  left with name/role/and call back number.     [facility] will make 1 final outreach attempt

## 2022-02-07 ENCOUNTER — CARE COORDINATION (OUTPATIENT)
Dept: CARE COORDINATION | Age: 57
End: 2022-02-07

## 2022-02-07 NOTE — CARE COORDINATION
[facility] made a final outreach call to [facility]. There was no answer. [facility] has made 3 unsuccessful call attempts to [facility].   [facility] will sign off today due to inability to reach [facility]

## 2022-02-09 DIAGNOSIS — E11.9 TYPE 2 DIABETES MELLITUS WITHOUT COMPLICATION, WITHOUT LONG-TERM CURRENT USE OF INSULIN (HCC): ICD-10-CM

## 2022-02-09 DIAGNOSIS — I25.10 CORONARY ARTERY DISEASE INVOLVING NATIVE CORONARY ARTERY OF NATIVE HEART WITHOUT ANGINA PECTORIS: Primary | ICD-10-CM

## 2022-02-09 NOTE — TELEPHONE ENCOUNTER
[facility] CLINICAL PHARMACY: ADHERENCE REVIEW & 80 DAY SUPPLY  Identified care gap per [place]: fills at [facility]: Diabetes adherence    Last Visit: 1/31/22    ASSESSMENT  ACE/ARB ADHERENCE    Per Reconciled Dispense Report:  lisinopril (PRINIVIL;ZESTRIL) 40 MG last filled on 1/20/22 for 90 day supply. BP Readings from Last 3 Encounters:   01/31/22 124/88   01/28/22 (!) 142/85   01/23/22 (!) 129/94     Estimated Creatinine Clearance: 81 mL/min (based on SCr of 1.2 mg/dL). DIABETES ADHERENCE    Insurance Records claims through 1.23.22 (2021 [facility] = FAILED)   METFORMIN  MG TABLET last filled on 1/20/22 for 30 day supply. Next refill due: 2/19/22    [facility]    Per Reconciled Dispense Report:   last filled on 1/20/22 for 30 day supply. Per Carrie Mobley at [facility]:  (Had been getting 90DS in the past) 1 RF #60 remain    Lab Results   Component Value Date    LABA1C 7.4 (H) 01/21/2022    LABA1C 10.0 08/17/2021    LABA1C 10.2 (H) 03/31/2021     NOTE A1c <9%    STATIN ADHERENCE    Per Reconciled Dispense Report:  ATORVASTATIN 80mg last filled on 3/30/21 for 90 day supply. Per  Pharmacy:    has not been refilled since March. Has new script for 90DS on profile.     Lab Results   Component Value Date    CHOL 172 08/19/2021    TRIG 220 (H) 08/19/2021    HDL 25 08/19/2021    LDLCALC 103 (H) 08/19/2021     ALT   Date Value Ref Range Status   01/20/2022 15 0 - 40 U/L Final     AST   Date Value Ref Range Status   01/20/2022 20 0 - 39 U/L Final     The 10-year ASCVD risk score (Mirna Matthews., et al., 2013) is: 20.1%    Values used to calculate the score:      Age: 61 years      Sex: Male      Is Non- : No      Diabetic: Yes      Tobacco smoker: No      Systolic Blood Pressure: 500 mmHg      Is BP treated: Yes      HDL Cholesterol: 25 mg/dL      Total Cholesterol: 172 mg/dL     PLAN  The following are interventions that have been identified:   - Patient overdue refilling atorvastatin and active on home medication list.   - Patient eligible for 90 day supply of metformin    -metformin for 90 day supply  -verify how patient is taking atorvastatin. No future appointments. Margaret Lopez CPhT.    [facility] free: [phone]

## 2022-02-11 NOTE — TELEPHONE ENCOUNTER
Reached patient regarding 90 day supply and adherence. Would like 90 day supply on metformin 500mg BID    Discussed statin adherence at great length. Patient states he forgets to take it at night. Discussed moving it to morning with his first metformin. Admits he \"just doesn't like to take it\" denies any myalgia or other S/E. Will route to PharmD to facilitate refill auth for metformin. Will follow up with patient/pharmacy to inactive and  .

## 2022-02-14 RX ORDER — ATORVASTATIN CALCIUM 80 MG/1
80 TABLET, FILM COATED ORAL NIGHTLY
Qty: 90 TABLET | Refills: 1 | Status: SHIPPED
Start: 2022-02-14 | End: 2022-06-07 | Stop reason: SDUPTHER

## 2022-02-14 NOTE — TELEPHONE ENCOUNTER
Verified new scripts were received by Brianna Hancock again in pharmacy. She inactivated 30 day scripts to insure a 90 day fill next time.     For Edwin Vasquez in place:  No   Recommendation Provided To: Provider: 1 via Note to Provider, Patient/Caregiver: 1 via Telephone and Pharmacy: 1   Intervention Detail: Adherence Monitorin and Refill(s) Provided   Gap Closed?: Yes    Intervention Accepted By: Provider: 1, Patient/Caregiver: 1 and Pharmacy: 1   Time Spent (min): 15

## 2022-03-07 ENCOUNTER — CARE COORDINATION (OUTPATIENT)
Dept: CARE COORDINATION | Age: 57
End: 2022-03-07

## 2022-03-07 NOTE — CARE COORDINATION
Attempted to reach patient by telephone. Left HIPAA compliant message requesting a return call. Will send unable to reach letter in [address] Will attempt to reach patient again.

## 2022-03-15 ENCOUNTER — CARE COORDINATION (OUTPATIENT)
Dept: CARE COORDINATION | Age: 57
End: 2022-03-15

## 2022-03-15 NOTE — CARE COORDINATION
Attempted to reach patient by telephone. Left HIPAA compliant message requesting a return call. If no response, unable to contact X3 attempts. No further outreach.

## 2022-03-21 ENCOUNTER — HOSPITAL ENCOUNTER (INPATIENT)
Age: 57
LOS: 6 days | Discharge: HOME | DRG: 291 | End: 2022-03-27
Attending: FAMILY MEDICINE | Admitting: FAMILY MEDICINE
Payer: MEDICARE

## 2022-03-21 ENCOUNTER — APPOINTMENT (OUTPATIENT)
Dept: GENERAL RADIOLOGY | Age: 57
DRG: 291 | End: 2022-03-21
Attending: FAMILY MEDICINE
Payer: MEDICARE

## 2022-03-21 ENCOUNTER — OFFICE VISIT (OUTPATIENT)
Dept: FAMILY MEDICINE CLINIC | Age: 57
DRG: 291 | End: 2022-03-21
Attending: FAMILY MEDICINE
Payer: MEDICARE

## 2022-03-21 VITALS
SYSTOLIC BLOOD PRESSURE: 149 MMHG | HEIGHT: 69 IN | DIASTOLIC BLOOD PRESSURE: 100 MMHG | RESPIRATION RATE: 20 BRPM | OXYGEN SATURATION: 98 % | WEIGHT: 245.7 LBS | BODY MASS INDEX: 36.39 KG/M2 | HEART RATE: 108 BPM | TEMPERATURE: 97.3 F

## 2022-03-21 DIAGNOSIS — F33.1 MODERATE EPISODE OF RECURRENT MAJOR DEPRESSIVE DISORDER (HCC): ICD-10-CM

## 2022-03-21 DIAGNOSIS — I10 ESSENTIAL HYPERTENSION: ICD-10-CM

## 2022-03-21 DIAGNOSIS — I50.21 ACUTE HFREF (HEART FAILURE WITH REDUCED EJECTION FRACTION) (HCC): Primary | ICD-10-CM

## 2022-03-21 DIAGNOSIS — R06.09 DYSPNEA ON EXERTION: ICD-10-CM

## 2022-03-21 DIAGNOSIS — E11.9 TYPE 2 DIABETES MELLITUS WITHOUT COMPLICATION, WITHOUT LONG-TERM CURRENT USE OF INSULIN (HCC): ICD-10-CM

## 2022-03-21 PROBLEM — I50.23 ACUTE ON CHRONIC SYSTOLIC HEART FAILURE (HCC): Status: ACTIVE | Noted: 2022-03-21

## 2022-03-21 LAB
ALBUMIN SERPL-MCNC: 3.8 G/DL (ref 3.5–5.2)
ALP BLD-CCNC: 177 U/L (ref 40–129)
ALT SERPL-CCNC: 17 U/L (ref 0–40)
ANION GAP SERPL CALCULATED.3IONS-SCNC: 15 MMOL/L (ref 7–16)
ANISOCYTOSIS: ABNORMAL
AST SERPL-CCNC: 30 U/L (ref 0–39)
BACTERIA: ABNORMAL /HPF
BASOPHILS ABSOLUTE: 0 E9/L (ref 0–0.2)
BASOPHILS RELATIVE PERCENT: 1.2 % (ref 0–2)
BILIRUB SERPL-MCNC: 1.6 MG/DL (ref 0–1.2)
BILIRUBIN URINE: ABNORMAL
BLOOD, URINE: ABNORMAL
BUN BLDV-MCNC: 21 MG/DL (ref 6–20)
BURR CELLS: ABNORMAL
CALCIUM SERPL-MCNC: 9.5 MG/DL (ref 8.6–10.2)
CHLORIDE BLD-SCNC: 96 MMOL/L (ref 98–107)
CHP ED QC CHECK: NORMAL
CLARITY: CLEAR
CO2: 27 MMOL/L (ref 22–29)
COLOR: ABNORMAL
CREAT SERPL-MCNC: 1.3 MG/DL (ref 0.7–1.2)
EOSINOPHILS ABSOLUTE: 0.13 E9/L (ref 0.05–0.5)
EOSINOPHILS RELATIVE PERCENT: 1.7 % (ref 0–6)
GFR AFRICAN AMERICAN: >60
GFR NON-AFRICAN AMERICAN: 57 ML/MIN/1.73
GLUCOSE BLD-MCNC: 112 MG/DL (ref 74–99)
GLUCOSE BLD-MCNC: 161 MG/DL
GLUCOSE URINE: NEGATIVE MG/DL
HCT VFR BLD CALC: 51.2 % (ref 37–54)
HEMOGLOBIN: 15.5 G/DL (ref 12.5–16.5)
KETONES, URINE: NEGATIVE MG/DL
LEUKOCYTE ESTERASE, URINE: NEGATIVE
LYMPHOCYTES ABSOLUTE: 1.58 E9/L (ref 1.5–4)
LYMPHOCYTES RELATIVE PERCENT: 20.9 % (ref 20–42)
MAGNESIUM: 2.1 MG/DL (ref 1.6–2.6)
MCH RBC QN AUTO: 22.6 PG (ref 26–35)
MCHC RBC AUTO-ENTMCNC: 30.3 % (ref 32–34.5)
MCV RBC AUTO: 74.7 FL (ref 80–99.9)
MONOCYTES ABSOLUTE: 0.68 E9/L (ref 0.1–0.95)
MONOCYTES RELATIVE PERCENT: 8.7 % (ref 2–12)
NEUTROPHILS ABSOLUTE: 5.18 E9/L (ref 1.8–7.3)
NEUTROPHILS RELATIVE PERCENT: 68.7 % (ref 43–80)
NITRITE, URINE: NEGATIVE
OVALOCYTES: ABNORMAL
PDW BLD-RTO: 20.2 FL (ref 11.5–15)
PH UA: 5.5 (ref 5–9)
PLATELET # BLD: 243 E9/L (ref 130–450)
PMV BLD AUTO: 10.5 FL (ref 7–12)
POIKILOCYTES: ABNORMAL
POTASSIUM SERPL-SCNC: 3.7 MMOL/L (ref 3.5–5)
PRO-BNP: 4476 PG/ML (ref 0–125)
PROTEIN UA: >=300 MG/DL
RBC # BLD: 6.85 E12/L (ref 3.8–5.8)
RBC UA: ABNORMAL /HPF (ref 0–2)
SODIUM BLD-SCNC: 138 MMOL/L (ref 132–146)
SPECIFIC GRAVITY UA: >=1.03 (ref 1–1.03)
TARGET CELLS: ABNORMAL
TEAR DROP CELLS: ABNORMAL
TOTAL PROTEIN: 7.5 G/DL (ref 6.4–8.3)
TROPONIN, HIGH SENSITIVITY: 44 NG/L (ref 0–11)
TROPONIN, HIGH SENSITIVITY: 45 NG/L (ref 0–11)
UROBILINOGEN, URINE: 2 E.U./DL
WBC # BLD: 7.5 E9/L (ref 4.5–11.5)
WBC UA: ABNORMAL /HPF (ref 0–5)

## 2022-03-21 PROCEDURE — 6360000002 HC RX W HCPCS: Performed by: STUDENT IN AN ORGANIZED HEALTH CARE EDUCATION/TRAINING PROGRAM

## 2022-03-21 PROCEDURE — 82962 GLUCOSE BLOOD TEST: CPT | Performed by: FAMILY MEDICINE

## 2022-03-21 PROCEDURE — 87088 URINE BACTERIA CULTURE: CPT

## 2022-03-21 PROCEDURE — 36415 COLL VENOUS BLD VENIPUNCTURE: CPT

## 2022-03-21 PROCEDURE — 83880 ASSAY OF NATRIURETIC PEPTIDE: CPT

## 2022-03-21 PROCEDURE — 80053 COMPREHEN METABOLIC PANEL: CPT

## 2022-03-21 PROCEDURE — 2060000000 HC ICU INTERMEDIATE R&B

## 2022-03-21 PROCEDURE — 84484 ASSAY OF TROPONIN QUANT: CPT

## 2022-03-21 PROCEDURE — 99214 OFFICE O/P EST MOD 30 MIN: CPT | Performed by: FAMILY MEDICINE

## 2022-03-21 PROCEDURE — 6370000000 HC RX 637 (ALT 250 FOR IP): Performed by: STUDENT IN AN ORGANIZED HEALTH CARE EDUCATION/TRAINING PROGRAM

## 2022-03-21 PROCEDURE — 85025 COMPLETE CBC W/AUTO DIFF WBC: CPT

## 2022-03-21 PROCEDURE — 83735 ASSAY OF MAGNESIUM: CPT

## 2022-03-21 PROCEDURE — 93005 ELECTROCARDIOGRAM TRACING: CPT

## 2022-03-21 PROCEDURE — 3051F HG A1C>EQUAL 7.0%<8.0%: CPT | Performed by: FAMILY MEDICINE

## 2022-03-21 PROCEDURE — 2580000003 HC RX 258: Performed by: STUDENT IN AN ORGANIZED HEALTH CARE EDUCATION/TRAINING PROGRAM

## 2022-03-21 PROCEDURE — 81001 URINALYSIS AUTO W/SCOPE: CPT

## 2022-03-21 PROCEDURE — 99212 OFFICE O/P EST SF 10 MIN: CPT | Performed by: FAMILY MEDICINE

## 2022-03-21 PROCEDURE — 71046 X-RAY EXAM CHEST 2 VIEWS: CPT

## 2022-03-21 RX ORDER — LISINOPRIL 20 MG/1
40 TABLET ORAL DAILY
Status: CANCELLED | OUTPATIENT
Start: 2022-03-21

## 2022-03-21 RX ORDER — ISOSORBIDE MONONITRATE 30 MG/1
90 TABLET, EXTENDED RELEASE ORAL DAILY
Status: CANCELLED | OUTPATIENT
Start: 2022-03-21

## 2022-03-21 RX ORDER — CLOPIDOGREL BISULFATE 75 MG/1
75 TABLET ORAL DAILY
Status: CANCELLED | OUTPATIENT
Start: 2022-03-21

## 2022-03-21 RX ORDER — HYDRALAZINE HYDROCHLORIDE 50 MG/1
50 TABLET, FILM COATED ORAL EVERY 8 HOURS SCHEDULED
Status: DISCONTINUED | OUTPATIENT
Start: 2022-03-21 | End: 2022-03-27 | Stop reason: HOSPADM

## 2022-03-21 RX ORDER — ISOSORBIDE MONONITRATE 30 MG/1
90 TABLET, EXTENDED RELEASE ORAL DAILY
Status: DISCONTINUED | OUTPATIENT
Start: 2022-03-21 | End: 2022-03-27 | Stop reason: HOSPADM

## 2022-03-21 RX ORDER — ACETAMINOPHEN 325 MG/1
650 TABLET ORAL EVERY 6 HOURS PRN
Status: DISCONTINUED | OUTPATIENT
Start: 2022-03-21 | End: 2022-03-27 | Stop reason: HOSPADM

## 2022-03-21 RX ORDER — SODIUM CHLORIDE 0.9 % (FLUSH) 0.9 %
10 SYRINGE (ML) INJECTION PRN
Status: CANCELLED | OUTPATIENT
Start: 2022-03-21

## 2022-03-21 RX ORDER — ACETAMINOPHEN 650 MG/1
650 SUPPOSITORY RECTAL EVERY 6 HOURS PRN
Status: CANCELLED | OUTPATIENT
Start: 2022-03-21

## 2022-03-21 RX ORDER — METOPROLOL SUCCINATE 50 MG/1
50 TABLET, EXTENDED RELEASE ORAL 2 TIMES DAILY
Status: CANCELLED | OUTPATIENT
Start: 2022-03-21

## 2022-03-21 RX ORDER — CLOPIDOGREL BISULFATE 75 MG/1
75 TABLET ORAL DAILY
Status: DISCONTINUED | OUTPATIENT
Start: 2022-03-21 | End: 2022-03-27 | Stop reason: HOSPADM

## 2022-03-21 RX ORDER — HYDRALAZINE HYDROCHLORIDE 50 MG/1
50 TABLET, FILM COATED ORAL EVERY 8 HOURS SCHEDULED
Status: CANCELLED | OUTPATIENT
Start: 2022-03-21

## 2022-03-21 RX ORDER — SODIUM CHLORIDE 0.9 % (FLUSH) 0.9 %
10 SYRINGE (ML) INJECTION EVERY 12 HOURS SCHEDULED
Status: CANCELLED | OUTPATIENT
Start: 2022-03-21

## 2022-03-21 RX ORDER — ONDANSETRON 4 MG/1
4 TABLET, ORALLY DISINTEGRATING ORAL EVERY 8 HOURS PRN
Status: CANCELLED | OUTPATIENT
Start: 2022-03-21

## 2022-03-21 RX ORDER — FUROSEMIDE 10 MG/ML
40 INJECTION INTRAMUSCULAR; INTRAVENOUS ONCE
Status: CANCELLED | OUTPATIENT
Start: 2022-03-21 | End: 2022-03-21

## 2022-03-21 RX ORDER — ATORVASTATIN CALCIUM 40 MG/1
80 TABLET, FILM COATED ORAL NIGHTLY
Status: CANCELLED | OUTPATIENT
Start: 2022-03-21

## 2022-03-21 RX ORDER — FUROSEMIDE 10 MG/ML
40 INJECTION INTRAMUSCULAR; INTRAVENOUS DAILY
Status: DISCONTINUED | OUTPATIENT
Start: 2022-03-21 | End: 2022-03-22

## 2022-03-21 RX ORDER — SPIRONOLACTONE 25 MG/1
50 TABLET ORAL DAILY
Status: CANCELLED | OUTPATIENT
Start: 2022-03-21

## 2022-03-21 RX ORDER — ASPIRIN 81 MG/1
81 TABLET ORAL DAILY
Status: DISCONTINUED | OUTPATIENT
Start: 2022-03-21 | End: 2022-03-27 | Stop reason: HOSPADM

## 2022-03-21 RX ORDER — SODIUM CHLORIDE 9 MG/ML
25 INJECTION, SOLUTION INTRAVENOUS PRN
Status: CANCELLED | OUTPATIENT
Start: 2022-03-21

## 2022-03-21 RX ORDER — PANTOPRAZOLE SODIUM 40 MG/1
40 TABLET, DELAYED RELEASE ORAL DAILY
Status: DISCONTINUED | OUTPATIENT
Start: 2022-03-21 | End: 2022-03-27 | Stop reason: HOSPADM

## 2022-03-21 RX ORDER — FUROSEMIDE 20 MG/1
20 TABLET ORAL 2 TIMES DAILY
Status: CANCELLED | OUTPATIENT
Start: 2022-03-21

## 2022-03-21 RX ORDER — POLYETHYLENE GLYCOL 3350 17 G/17G
17 POWDER, FOR SOLUTION ORAL DAILY PRN
Status: DISCONTINUED | OUTPATIENT
Start: 2022-03-21 | End: 2022-03-21

## 2022-03-21 RX ORDER — ONDANSETRON 2 MG/ML
4 INJECTION INTRAMUSCULAR; INTRAVENOUS EVERY 6 HOURS PRN
Status: DISCONTINUED | OUTPATIENT
Start: 2022-03-21 | End: 2022-03-25

## 2022-03-21 RX ORDER — ONDANSETRON 2 MG/ML
4 INJECTION INTRAMUSCULAR; INTRAVENOUS EVERY 6 HOURS PRN
Status: CANCELLED | OUTPATIENT
Start: 2022-03-21

## 2022-03-21 RX ORDER — POLYETHYLENE GLYCOL 3350 17 G/17G
17 POWDER, FOR SOLUTION ORAL DAILY
Status: DISCONTINUED | OUTPATIENT
Start: 2022-03-21 | End: 2022-03-27 | Stop reason: HOSPADM

## 2022-03-21 RX ORDER — ATORVASTATIN CALCIUM 40 MG/1
80 TABLET, FILM COATED ORAL NIGHTLY
Status: DISCONTINUED | OUTPATIENT
Start: 2022-03-21 | End: 2022-03-27 | Stop reason: HOSPADM

## 2022-03-21 RX ORDER — ACETAMINOPHEN 650 MG/1
650 SUPPOSITORY RECTAL EVERY 6 HOURS PRN
Status: DISCONTINUED | OUTPATIENT
Start: 2022-03-21 | End: 2022-03-27 | Stop reason: HOSPADM

## 2022-03-21 RX ORDER — LISINOPRIL 20 MG/1
40 TABLET ORAL DAILY
Status: DISCONTINUED | OUTPATIENT
Start: 2022-03-21 | End: 2022-03-27 | Stop reason: HOSPADM

## 2022-03-21 RX ORDER — METOPROLOL SUCCINATE 50 MG/1
50 TABLET, EXTENDED RELEASE ORAL 2 TIMES DAILY
Status: DISCONTINUED | OUTPATIENT
Start: 2022-03-21 | End: 2022-03-24

## 2022-03-21 RX ORDER — PANTOPRAZOLE SODIUM 40 MG/1
40 TABLET, DELAYED RELEASE ORAL DAILY
Status: CANCELLED | OUTPATIENT
Start: 2022-03-21

## 2022-03-21 RX ORDER — SPIRONOLACTONE 25 MG/1
50 TABLET ORAL DAILY
Status: DISCONTINUED | OUTPATIENT
Start: 2022-03-21 | End: 2022-03-27 | Stop reason: HOSPADM

## 2022-03-21 RX ORDER — ONDANSETRON 4 MG/1
4 TABLET, ORALLY DISINTEGRATING ORAL EVERY 8 HOURS PRN
Status: DISCONTINUED | OUTPATIENT
Start: 2022-03-21 | End: 2022-03-25

## 2022-03-21 RX ORDER — ASPIRIN 81 MG/1
81 TABLET ORAL DAILY
Status: CANCELLED | OUTPATIENT
Start: 2022-03-21

## 2022-03-21 RX ORDER — ACETAMINOPHEN 325 MG/1
650 TABLET ORAL EVERY 6 HOURS PRN
Status: CANCELLED | OUTPATIENT
Start: 2022-03-21

## 2022-03-21 RX ORDER — SODIUM CHLORIDE 0.9 % (FLUSH) 0.9 %
5-40 SYRINGE (ML) INJECTION EVERY 12 HOURS SCHEDULED
Status: DISCONTINUED | OUTPATIENT
Start: 2022-03-21 | End: 2022-03-27 | Stop reason: HOSPADM

## 2022-03-21 RX ORDER — SENNA AND DOCUSATE SODIUM 50; 8.6 MG/1; MG/1
2 TABLET, FILM COATED ORAL ONCE
Status: CANCELLED | OUTPATIENT
Start: 2022-03-21 | End: 2022-03-21

## 2022-03-21 RX ORDER — SODIUM CHLORIDE 9 MG/ML
25 INJECTION, SOLUTION INTRAVENOUS PRN
Status: DISCONTINUED | OUTPATIENT
Start: 2022-03-21 | End: 2022-03-27 | Stop reason: HOSPADM

## 2022-03-21 RX ORDER — SODIUM CHLORIDE 0.9 % (FLUSH) 0.9 %
5-40 SYRINGE (ML) INJECTION PRN
Status: DISCONTINUED | OUTPATIENT
Start: 2022-03-21 | End: 2022-03-27 | Stop reason: HOSPADM

## 2022-03-21 RX ADMIN — LISINOPRIL 40 MG: 20 TABLET ORAL at 18:16

## 2022-03-21 RX ADMIN — PANTOPRAZOLE SODIUM 40 MG: 40 TABLET, DELAYED RELEASE ORAL at 18:16

## 2022-03-21 RX ADMIN — FUROSEMIDE 40 MG: 10 INJECTION, SOLUTION INTRAMUSCULAR; INTRAVENOUS at 18:17

## 2022-03-21 RX ADMIN — Medication 10 ML: at 21:20

## 2022-03-21 RX ADMIN — SPIRONOLACTONE 50 MG: 25 TABLET ORAL at 18:15

## 2022-03-21 RX ADMIN — ACETAMINOPHEN 650 MG: 325 TABLET ORAL at 21:24

## 2022-03-21 RX ADMIN — ATORVASTATIN CALCIUM 80 MG: 40 TABLET, FILM COATED ORAL at 21:20

## 2022-03-21 RX ADMIN — POLYETHYLENE GLYCOL 3350 17 G: 17 POWDER, FOR SOLUTION ORAL at 18:17

## 2022-03-21 RX ADMIN — HYDRALAZINE HYDROCHLORIDE 50 MG: 50 TABLET, FILM COATED ORAL at 21:20

## 2022-03-21 RX ADMIN — METOPROLOL SUCCINATE 50 MG: 50 TABLET, EXTENDED RELEASE ORAL at 18:23

## 2022-03-21 RX ADMIN — CLOPIDOGREL BISULFATE 75 MG: 75 TABLET ORAL at 18:16

## 2022-03-21 RX ADMIN — ASPIRIN 81 MG: 81 TABLET, COATED ORAL at 18:17

## 2022-03-21 RX ADMIN — ISOSORBIDE MONONITRATE 90 MG: 30 TABLET, EXTENDED RELEASE ORAL at 18:15

## 2022-03-21 ASSESSMENT — PAIN SCALES - GENERAL
PAINLEVEL_OUTOF10: 0

## 2022-03-21 NOTE — H&P
[facility] - Family Medicine Resident Inpatient  History and Physical    CC: worsening sob, leg swelling     HPI: History obtained from patient. Terri Cochran is a 61 y.o. male with a PMH of CHF EF 30% 9/2021, CAD, CVA, GERD, hypertension, hyperlipidemia, insulin-dependent type 2 diabetes, and COPD who presented from clinic due to concern for CHF exacerbation. States he has been having worsening shortness of breath and leg swelling for the past 2 weeks. Occasionally misses his medications, 2-3 times per week. Has been unable to sleep due to difficulty breathing while laying flat. Did not take his medications yesterday. Noticed that he gained 20 pounds in the past few weeks. Denies chest pain, abdominal pain, fever, chills, vomiting. Has been feeling nauseous and constipated for the past 2 weeks. Had 1 episode of black stool, but attributed to eating a chocolatey clear. States that he was not urinating much yesterday. Feels like he has incomplete emptying of his bladder. Denies dysuria, hematuria but darker urine. Was directly admitted from the [facility]. PMH:  has a past medical history of Allergic rhinitis, Anticoagulant long-term use, Anxiety, Atypical angina (HCC), CAD (coronary artery disease), Carotid artery stenosis, Cerebral artery occlusion with cerebral infarction ([address] CHF (congestive heart failure) ([address] COPD (chronic obstructive pulmonary disease) ([address] CVA (cerebral infarction), Depression, Diverticulitis, Erectile dysfunction, GERD (gastroesophageal reflux disease), Heart attack ([address] Hx of blood clots, Hyperlipemia, Hypertension, Ischemic cardiomyopathy, Left atrial thrombus, Obesity, Type 2 diabetes mellitus without complication, without long-term current use of insulin ([address] and Type II or unspecified type diabetes mellitus without mention of complication, not stated as uncontrolled.     PSH:  has a past surgical history that includes Inguinal hernia repair (1990); (TOPROL XL) 50 MG extended release tablet Take 1 tablet by mouth 2 times daily 60 tablet 1    pantoprazole (PROTONIX) 40 MG tablet Take 1 tablet by mouth daily 30 tablet 1       ROS:  Const: No fever, chills, night sweats, +unexplained weight gain  HEENT: No blurred vision, double vision; no URI symptoms  Resp: +cough, no sputum, no pleuritic chest pain, no sob  Cardio: No chest pain, no exertional dyspnea, +PND, no orthopnea, no palpitation, +leg swelling. GI: No dysphagia, no reflux; no abdominal pain, + nausea, no vomiting; +constipation. No hematochezia    : No dysuria, no frequency, hesitancy; no hematuria  MSK: no joint pain, no myalgia, no change in ROM  Neuro: no focal weakness, no slurred speech, no double vision, no numbness or tingling in extremities  Endo: no heat/cold intolerance, no polyphagia, polydipsia or polyuria  Hem: no increased bleeding, no bruising, no lymphadenopathy  Skin: no skin changes  Psych: no depressed mood, no suicidal ideation    PE:  There were no vitals filed for this visit. General: Alert, cooperative, no acute distress. HEENT: Normocephalic, atraumatic.conjunctiva/corneas clear, EOM's intact, no pallor or icterus. Neck: Supple, symmetrical, trachea midline, no cervical LAD. No carotid bruit or JVD  Chest: No tenderness or deformity, full & symmetric excursion  Lung: Clear to auscultation bilaterally,  respirations unlabored. No rales/wheezing/rubs  Heart: RRR, S1 and S2 normal, no murmur, rub or gallop. Abdomen: tense, NTND, no masses, no organomegaly, no guarding, rebound or rigidity. Extremities:  Extremities normal, atraumatic, no cyanosis. B/L LE edema, nonpitting  Skin: Skin color, texture, turgor normal, no rashes or lesions  Musculoskeletal: No joint swelling, no muscle tenderness. Normal ROM in extremities.    Lymph nodes: no lymph node enlargement appreciated  Neurologic: Alert & Oriented    Labs:   No results found for this visit on 03/21/22. Imaging:  No results found. Assessment and Plan  Principal Problem:    Acute on chronic systolic heart failure (HCC)  Active Problems:    GERD (gastroesophageal reflux disease)    COPD (chronic obstructive pulmonary disease)    Hyperlipemia    History of stroke    Depression    CAD (coronary artery disease)    Dual implantable cardioverter-defibrillator in situ    History of MI (myocardial infarction)    Chronic systolic congestive heart failure ([facility] [address]    Essential hypertension    Type 2 diabetes mellitus without complication, without long-term current use of insulin (HCC)    CHF, acute on chronic (HCC)    Leg swelling  Resolved Problems:    * No resolved hospital problems. *    Acute on chronic decompensated HFrEF  - pt presented with worsening shortness of breath and leg swelling x2 weeks  - likely precipitated by uncontrolled HTN, lifestyle changes, medication non-compliance  - home medications Lasix 20 mg daily and spironolactone 50 mg daily, continue spironolactone   -CXR, EKG, troponin ordered  -Pro-BNP ordered, last level 1/2022~1700  - Start IV lasix 40 mg daily   - Last echo 9/2021 with EF 30%, repeat due to no complete resolution since last exacerbation at that time   - Strict I&O, daily weights  - CMP to check renal function and electrolytes.  If worsening renal function and elevated k, hold spironolactone   - Keep K>4, Mg>2    Incomplete bladder emptying   - may be 2/2 to UTI vs BPH  vs obstruction   - PSA 9.20 3/2021, did not follow up with urology OP   - UA, urine culture ordered   - Bladder scan and check PVR    Constipation  -We will start on bowel regimen, MiraLAX daily   -With one episode of dark stool, will order FOBT and monitor hemoglobin    Type 2 diabetes, non-insulin-dependent  -On Metformin, hold  -CMP, will order LDSS if needed     Hypertension  -Uncontrolled  -Continue home medications, hydralazine, lisinopril, metoprolol  -Hold home lisinopril if worsening renal function    GERD  -Continue Protonix    History of CVA  -Continue Plavix, aspirin        DVT / GI prophylaxis: Plavix/ASA and Protonix    Dispo - admit to intermediate care     Electronically signed by Meghan Orona MD on 3/21/2022 at 2:39 PM.  This case was discussed with attending physician, Dr. Lauren Stern

## 2022-03-21 NOTE — PROGRESS NOTES
CC:  Shortness of breath     HPI:  61 y.o. male presents with increasing dyspnea with minimal exertion, orthopnea, weight gain of about 20 pounds, and swelling of bilateral legs. Symptoms ongoing since previous visit and hospitalization. No better since last time. Nothing new. Eating high salt foods that he knows he should not eat, pizza, ramen, canned soup. No new CP or pressure. Abdominal pain, 3 weeks, lower aspect. Has been constipated, dry, pebble like stools, but passing stool and gas. Skin changes on legs from swelling, some skin break down, weeping at times. Does not like to wear compression stockings. Taking Lasix 40 in AM, 20-40 in PM.  Taking spironolactone sometimes. Did not take medications today. Did not sleep last night at all. Missing medications 2-3 days per week, morning or night. Did not keep appointment at HF clinic. Refuses to go to ED. History of HFrEF, HTN, CAD, nonadherence. Worsening depression. Stopped Lexapro. Living alone. Has dog, which keeps him busy, and gives him purpose. His SO moved out; they have been bickering a lot, but she keeps in touch. Recognizes that he needs treatment for depression. Lacks motivation. Has not been keeping house clean. Not really doing anything. Incomplete urinary emptying. Has not gone to see urology. Not fully emptying bladder. No blood in stool or urine. No new symptoms. Patient Active Problem List    Diagnosis Date Noted    Age-related nuclear cataract, bilateral 01/31/2022    Presbyopia 01/31/2022    Regular astigmatism, bilateral 01/31/2022    Leg swelling 01/23/2022    ALEX (acute kidney injury) ([address] 01/23/2022    Heart failure ([facility] 01/21/2022    Elevated PSA, less than 10 ng/ml 04/01/2021    Nonrheumatic aortic valve insufficiency 02/20/2019    Obesity (BMI 30.0-34. 9)     Ischemic cardiomyopathy     Type 2 diabetes mellitus without complication, without long-term current use of insulin ([address]  Essential hypertension 06/29/2017    Diverticulosis of colon 08/07/2015    History of MI (myocardial infarction) 08/07/2015    Chronic systolic congestive heart failure ([facility] [address] 08/07/2015    NSVT (nonsustained ventricular tachycardia) ([facility]) 07/31/2015    Dual implantable cardioverter-defibrillator in situ 04/02/2015    CAD (coronary artery disease) 12/06/2014    Depression 12/10/2013    History of stroke 07/23/2013    ED (erectile dysfunction) 03/26/2012    COPD (chronic obstructive pulmonary disease)     Hyperlipemia     GERD (gastroesophageal reflux disease) 02/12/2011       Current Outpatient Medications on File Prior to Visit   Medication Sig Dispense Refill    metFORMIN (GLUCOPHAGE) 500 MG tablet Take 1 tablet by mouth 2 times daily (with meals) 180 tablet 1    atorvastatin (LIPITOR) 80 MG tablet Take 1 tablet by mouth nightly 90 tablet 1    spironolactone (ALDACTONE) 50 MG tablet Take 1 tablet by mouth daily 90 tablet 1    aspirin 81 MG EC tablet Take 81 mg by mouth daily      clopidogrel (PLAVIX) 75 MG tablet Take 1 tablet by mouth daily 90 tablet 0    furosemide (LASIX) 20 MG tablet Take 1 tablet by mouth 2 times daily 60 tablet 1    hydrALAZINE (APRESOLINE) 50 MG tablet Take 1 tablet by mouth every 8 hours 90 tablet 1    isosorbide mononitrate (IMDUR) 30 MG extended release tablet Take 3 tablets by mouth daily 90 tablet 1    lisinopril (PRINIVIL;ZESTRIL) 40 MG tablet Take 1 tablet by mouth daily 90 tablet 1    metoprolol succinate (TOPROL XL) 50 MG extended release tablet Take 1 tablet by mouth 2 times daily 60 tablet 1    pantoprazole (PROTONIX) 40 MG tablet Take 1 tablet by mouth daily 30 tablet 1    fluticasone (FLONASE) 50 MCG/ACT nasal spray 2 sprays by Each Nostril route daily (Patient not taking: Reported on 3/21/2022) 48 g 1     No current facility-administered medications on file prior to visit.        No Known Allergies    Social History     Tobacco Use    Smoking status: Former Smoker     Packs/day: 1.00     Years: 30.00     Pack years: 30.00     Types: Cigarettes     Quit date: 2014     Years since quittin.8    Smokeless tobacco: Current User     Types: Chew    Tobacco comment: occ chewing tobacco, trying to quit   Vaping Use    Vaping Use: Never used   Substance Use Topics    Alcohol use: Yes     Comment: occ.  Drug use: No       ROS:   Review of Systems - as above     Physical Exam:    VS:  Blood pressure (!) 149/100, pulse 108, temperature 97.3 °F (36.3 °C), temperature source Temporal, resp. rate 20, height 5' 9\" (1.753 m), weight 245 lb 11.2 oz (111.4 kg), SpO2 98 %. General Appearance:  Awake, alert, appears uncomfortable. Respirations mildly labored with speech and with any movement. Skin:  Bilateral LE with some weeping anterior shins, left more so. Head/face:  NCAT  Eyes:  EOMI and Sclera nonicteric  Neck:  neck- supple, no mass, non-tender  Lungs:  Decreased at bases, coarse, few rales bibasilar   Heart:  RRR, tachy, S3 gallop  Abdomen:  Distended, tympanic, midline scars noted, mildly tender diffusely   Extremities: Tight 3-4+ edema bilaterally. Weeping lesions anterior shin   Psych: somewhat flat affect. No SI or HI. Results for POC orders placed in visit on 22   POCT Glucose   Result Value Ref Range    Glucose 161 mg/dL    QC OK? Assessments:      Diagnosis Orders   1. Acute HFrEF (heart failure with reduced ejection fraction) ([address]     2. Type 2 diabetes mellitus without complication, without long-term current use of insulin ([facility])  POCT Glucose       Plans:    As Above. Inpatient admission. Patient refused to go to ED. Directly admitted to intermediate unit, taken by private transport ([facility]). Arrived safely. Plan to diurese. CXR, trops, EKG, pro-BNP, CBC with diff, CMP, TSH, A1C, U/A with micro, Urine CX, PVR. Needs bowel regimen. Strict I/O, daily weights.   Plan to resume depression treatment prior to discharge when stabilized. Hold metformin while admitted, ISS, glucose checks. Recheck Echo; symptoms gradually worsening over past several months. Advised again on need for adherence to treatments. Patient has not been taking medications consistently or avoiding the high-sodium foods that he knows to avoid. Will plan to manage depression and continue to address the barriers to adherence. Please see Patient Instructions for further counseling and information given. RTO within one week of discharge from hospital, or sooner as needed for any new, persistent, or worsening symptoms. Please be adherent to the treatment plans discussed today, and please call with any questions or concerns, letting the office know of any reasons that the plans may not be followed. The risks of untreated conditions include worsening illness, injury, disability, and possibly, death. Please call if symptoms change in any way, worsen, or fail to completely resolve, as this could necessitate a change to treatment plans. Patient expressed understanding. Indications and proper use of medication(s) reviewed. Potential side-effects and risks of medication(s) also explained. Patient was instructed to call if any new symptoms develop prior to next visit.

## 2022-03-21 NOTE — PLAN OF CARE
Problem:  Activity Intolerance:  Goal: Ability to tolerate increased activity will improve  Description: Ability to tolerate increased activity will improve  Outcome: Not Met This Shift     Problem: Fluid Volume - Excess:  Goal: Control of fluid volume excess will improve  Description: Control of fluid volume excess will improve  Outcome: Not Met This Shift

## 2022-03-22 ENCOUNTER — APPOINTMENT (OUTPATIENT)
Dept: ULTRASOUND IMAGING | Age: 57
DRG: 291 | End: 2022-03-22
Attending: FAMILY MEDICINE
Payer: MEDICARE

## 2022-03-22 LAB
ALBUMIN SERPL-MCNC: 2.9 G/DL (ref 3.5–5.2)
ALP BLD-CCNC: 146 U/L (ref 40–129)
ALT SERPL-CCNC: 14 U/L (ref 0–40)
ANION GAP SERPL CALCULATED.3IONS-SCNC: 18 MMOL/L (ref 7–16)
AST SERPL-CCNC: 26 U/L (ref 0–39)
BASOPHILS ABSOLUTE: 0.08 E9/L (ref 0–0.2)
BASOPHILS RELATIVE PERCENT: 1.2 % (ref 0–2)
BILIRUB SERPL-MCNC: 1.6 MG/DL (ref 0–1.2)
BUN BLDV-MCNC: 22 MG/DL (ref 6–20)
CALCIUM SERPL-MCNC: 8.4 MG/DL (ref 8.6–10.2)
CHLORIDE BLD-SCNC: 98 MMOL/L (ref 98–107)
CO2: 26 MMOL/L (ref 22–29)
CREAT SERPL-MCNC: 1.3 MG/DL (ref 0.7–1.2)
EOSINOPHILS ABSOLUTE: 0.27 E9/L (ref 0.05–0.5)
EOSINOPHILS RELATIVE PERCENT: 4 % (ref 0–6)
GFR AFRICAN AMERICAN: >60
GFR NON-AFRICAN AMERICAN: 57 ML/MIN/1.73
GLUCOSE BLD-MCNC: 94 MG/DL (ref 74–99)
HCT VFR BLD CALC: 42.8 % (ref 37–54)
HEMOGLOBIN: 13.4 G/DL (ref 12.5–16.5)
IMMATURE GRANULOCYTES #: 0.01 E9/L
IMMATURE GRANULOCYTES %: 0.1 % (ref 0–5)
LYMPHOCYTES ABSOLUTE: 1.71 E9/L (ref 1.5–4)
LYMPHOCYTES RELATIVE PERCENT: 25.1 % (ref 20–42)
MAGNESIUM: 1.9 MG/DL (ref 1.6–2.6)
MCH RBC QN AUTO: 22.9 PG (ref 26–35)
MCHC RBC AUTO-ENTMCNC: 31.3 % (ref 32–34.5)
MCV RBC AUTO: 73.3 FL (ref 80–99.9)
MONOCYTES ABSOLUTE: 0.56 E9/L (ref 0.1–0.95)
MONOCYTES RELATIVE PERCENT: 8.2 % (ref 2–12)
NEUTROPHILS ABSOLUTE: 4.17 E9/L (ref 1.8–7.3)
NEUTROPHILS RELATIVE PERCENT: 61.4 % (ref 43–80)
PDW BLD-RTO: 19.9 FL (ref 11.5–15)
PLATELET # BLD: 212 E9/L (ref 130–450)
PMV BLD AUTO: 10.9 FL (ref 7–12)
POTASSIUM SERPL-SCNC: 3.6 MMOL/L (ref 3.5–5)
RBC # BLD: 5.84 E12/L (ref 3.8–5.8)
SODIUM BLD-SCNC: 142 MMOL/L (ref 132–146)
TOTAL PROTEIN: 5.9 G/DL (ref 6.4–8.3)
WBC # BLD: 6.8 E9/L (ref 4.5–11.5)

## 2022-03-22 PROCEDURE — 6360000002 HC RX W HCPCS: Performed by: STUDENT IN AN ORGANIZED HEALTH CARE EDUCATION/TRAINING PROGRAM

## 2022-03-22 PROCEDURE — 2580000003 HC RX 258: Performed by: STUDENT IN AN ORGANIZED HEALTH CARE EDUCATION/TRAINING PROGRAM

## 2022-03-22 PROCEDURE — APPSS60 APP SPLIT SHARED TIME 46-60 MINUTES: Performed by: PHYSICIAN ASSISTANT

## 2022-03-22 PROCEDURE — 76775 US EXAM ABDO BACK WALL LIM: CPT

## 2022-03-22 PROCEDURE — 99223 1ST HOSP IP/OBS HIGH 75: CPT | Performed by: FAMILY MEDICINE

## 2022-03-22 PROCEDURE — 85025 COMPLETE CBC W/AUTO DIFF WBC: CPT

## 2022-03-22 PROCEDURE — 36415 COLL VENOUS BLD VENIPUNCTURE: CPT

## 2022-03-22 PROCEDURE — 99223 1ST HOSP IP/OBS HIGH 75: CPT | Performed by: INTERNAL MEDICINE

## 2022-03-22 PROCEDURE — 6370000000 HC RX 637 (ALT 250 FOR IP): Performed by: STUDENT IN AN ORGANIZED HEALTH CARE EDUCATION/TRAINING PROGRAM

## 2022-03-22 PROCEDURE — 80053 COMPREHEN METABOLIC PANEL: CPT

## 2022-03-22 PROCEDURE — 2060000000 HC ICU INTERMEDIATE R&B

## 2022-03-22 PROCEDURE — 83735 ASSAY OF MAGNESIUM: CPT

## 2022-03-22 RX ORDER — FUROSEMIDE 10 MG/ML
40 INJECTION INTRAMUSCULAR; INTRAVENOUS 2 TIMES DAILY
Status: DISCONTINUED | OUTPATIENT
Start: 2022-03-22 | End: 2022-03-25

## 2022-03-22 RX ORDER — IPRATROPIUM BROMIDE AND ALBUTEROL SULFATE 2.5; .5 MG/3ML; MG/3ML
1 SOLUTION RESPIRATORY (INHALATION) EVERY 4 HOURS PRN
Status: DISCONTINUED | OUTPATIENT
Start: 2022-03-22 | End: 2022-03-27 | Stop reason: HOSPADM

## 2022-03-22 RX ADMIN — ISOSORBIDE MONONITRATE 90 MG: 30 TABLET, EXTENDED RELEASE ORAL at 08:25

## 2022-03-22 RX ADMIN — BISACODYL 5 MG: 5 TABLET, COATED ORAL at 11:38

## 2022-03-22 RX ADMIN — HYDRALAZINE HYDROCHLORIDE 50 MG: 50 TABLET, FILM COATED ORAL at 14:13

## 2022-03-22 RX ADMIN — LISINOPRIL 40 MG: 20 TABLET ORAL at 08:25

## 2022-03-22 RX ADMIN — ATORVASTATIN CALCIUM 80 MG: 40 TABLET, FILM COATED ORAL at 21:31

## 2022-03-22 RX ADMIN — Medication 10 ML: at 08:27

## 2022-03-22 RX ADMIN — ASPIRIN 81 MG: 81 TABLET, COATED ORAL at 08:36

## 2022-03-22 RX ADMIN — METOPROLOL SUCCINATE 50 MG: 50 TABLET, EXTENDED RELEASE ORAL at 17:16

## 2022-03-22 RX ADMIN — SPIRONOLACTONE 50 MG: 25 TABLET ORAL at 08:26

## 2022-03-22 RX ADMIN — Medication 10 ML: at 21:32

## 2022-03-22 RX ADMIN — FUROSEMIDE 40 MG: 10 INJECTION, SOLUTION INTRAMUSCULAR; INTRAVENOUS at 17:16

## 2022-03-22 RX ADMIN — PANTOPRAZOLE SODIUM 40 MG: 40 TABLET, DELAYED RELEASE ORAL at 08:35

## 2022-03-22 RX ADMIN — POLYETHYLENE GLYCOL 3350 17 G: 17 POWDER, FOR SOLUTION ORAL at 08:26

## 2022-03-22 RX ADMIN — FUROSEMIDE 40 MG: 10 INJECTION, SOLUTION INTRAMUSCULAR; INTRAVENOUS at 08:26

## 2022-03-22 RX ADMIN — HYDRALAZINE HYDROCHLORIDE 50 MG: 50 TABLET, FILM COATED ORAL at 08:25

## 2022-03-22 RX ADMIN — METOPROLOL SUCCINATE 50 MG: 50 TABLET, EXTENDED RELEASE ORAL at 08:26

## 2022-03-22 RX ADMIN — CLOPIDOGREL BISULFATE 75 MG: 75 TABLET ORAL at 08:36

## 2022-03-22 ASSESSMENT — PAIN SCALES - GENERAL
PAINLEVEL_OUTOF10: 0

## 2022-03-22 NOTE — PROGRESS NOTES
and confirmed student and/or resident history and exam with changes as indicated above. I agree with the assessment, plan and orders as documented by the resident. Please refer to the resident and/or student note for additional information.       Stevenson Lombard, MD

## 2022-03-22 NOTE — PROGRESS NOTES
[facility] Inpatient   Resident Progress Note    S:  Hospital day: 1   Brief Synopsis: Vaibhav Daily is a 61 y.o. male with a PMH of CHF EF 30% 9/2021, CAD, CVA, GERD, hypertension, hyperlipidemia, insulin-dependent type 2 diabetes, and COPD who presented from clinic due to concern for CHF exacerbation. States he has been having worsening shortness of breath, leg swelling, and 20 lb weight gain for the past 2 weeks. Admitted for CHF exacerbation. No acute events overnight. SOB improving. Denies CP, abdominal pain. Has not had a BM yet. Cont meds:    sodium chloride       Scheduled meds:    aspirin  81 mg Oral Daily    atorvastatin  80 mg Oral Nightly    clopidogrel  75 mg Oral Daily    hydrALAZINE  50 mg Oral 3 times per day    isosorbide mononitrate  90 mg Oral Daily    lisinopril  40 mg Oral Daily    metoprolol succinate  50 mg Oral BID    pantoprazole  40 mg Oral Daily    spironolactone  50 mg Oral Daily    sodium chloride flush  5-40 mL IntraVENous 2 times per day    polyethylene glycol  17 g Oral Daily    furosemide  40 mg IntraVENous Daily     PRN meds: sodium chloride flush, sodium chloride, ondansetron **OR** ondansetron, acetaminophen **OR** acetaminophen, perflutren lipid microspheres     I reviewed the patient's past medical and surgical history, Medications and Allergies. O:  BP (!) 146/93   Pulse 99   Temp 98.1 °F (36.7 °C) (Oral)   Resp 22   Ht 5' 9\" (1.753 m)   Wt 246 lb 6.4 oz (111.8 kg)   SpO2 97%   BMI 36.39 kg/m²   24 hour I&O: I/O last 3 completed shifts:  In: -   Out: 100 [Urine:100]  I/O this shift: In: 480 [P.O.:480]  Out: 1850 [Urine:1850]        Physical Exam  Constitutional:       Appearance: Normal appearance. HENT:      Nose: Nose normal.      Mouth/Throat:      Mouth: Mucous membranes are moist.      Pharynx: Oropharynx is clear. Eyes:      Extraocular Movements: Extraocular movements intact.       Conjunctiva/sclera: Conjunctivae normal.   Cardiovascular:      Rate and Rhythm: Normal rate and regular rhythm. Pulses: Normal pulses. Heart sounds: Normal heart sounds. No murmur heard. Pulmonary:      Effort: Pulmonary effort is normal. No respiratory distress. Breath sounds: No wheezing or rales. Comments: LLL breath sounds diminished   Abdominal:      General: Abdomen is flat. Bowel sounds are normal.      Tenderness: There is no abdominal tenderness. There is no guarding or rebound. Comments: tense   Musculoskeletal:         General: Swelling (B/L LE edema, nonpitting ) present. Normal range of motion. Skin:     General: Skin is warm and dry. Capillary Refill: Capillary refill takes 2 to 3 seconds. Neurological:      General: No focal deficit present. Mental Status: He is alert and oriented to person, place, and time. Labs:  Na/K/Cl/CO2:  142/3.6/98/26 (03/22 0409)  BUN/Cr/glu/ALT/AST/amyl/lip:  22/1.3/--/14/26/--/-- (03/22 0409)  WBC/Hgb/Hct/Plts:  6.8/13.4/42.8/212 (03/22 0409)  estimated creatinine clearance is 77 mL/min (A) (based on SCr of 1.3 mg/dL (H)). Other pertinent labs as noted below    Radiology:  XR CHEST (2 VW)   Final Result   Left pleural effusion and/or infiltrate.       Cardiomegaly         US URINARY BLADDER LIMITED    (Results Pending)         Resident Assessment and Plan     Acute on chronic decompensated HFrEF  - pt presented with worsening shortness of breath and leg swelling x2 weeks  - likely precipitated by uncontrolled HTN, lifestyle changes, medication non-compliance  - home medications Lasix 20 mg daily and spironolactone 50 mg daily, continue spironolactone   -CXR showed L pleural effusion   - EKG NSR   - troponin 45, 44   -Pro-BNP 4476, last level 1/2022~1700  - increase IV lasix 40 mg to twice daily   - Last echo 9/2021 with EF 30%, repeat due to no complete resolution since last exacerbation at that time   - UO -1.9L in 24h  - Strict I&O, daily weights  - renal function normal, continue to monitor   - Keep K>4, Mg>2     Incomplete bladder emptying   - may be 2/2 to UTI vs BPH  vs obstruction   - PSA 9.20 3/2021, did not follow up with urology OP   - UA positive for >300 protein, moderate bili,  Urobilinogen   - urine culture NGTD   - Bladder scan and check PVR     Constipation  -MiraLAX daily   -With one episode of dark stool, will order FOBT and monitor hemoglobin  - Continue      Type 2 diabetes, non-insulin-dependent  -On Metformin, hold  -CMP, will order LDSS if needed      Hypertension  -Uncontrolled  -Continue home medications, hydralazine, lisinopril, metoprolol  -Hold home lisinopril if worsening renal function     GERD  -Continue Protonix     History of CVA  -Continue Plavix, aspirin     PT/OT evaluation: none   DVT prophylaxis: plavix  GI prophylaxis: protonix   Disposition: continue current management   Diet: low sodium         Electronically signed by Boo Jones MD on 3/22/2022 at 6:44 AM  Attending physician: Dr. Elo Leong

## 2022-03-22 NOTE — PLAN OF CARE
Patient's chart updated to reflect:      . - HF care plan, HF education points and HF discharge instructions.  -Orders: 2 gram sodium diet, daily weights, I/O.  -PCP and cardiology follow up appointments to be scheduled within 7 days of hospital discharge. -CHF education session will be provided to the patient prior to hospital discharge.     Macario Mustafa RN RN, BSN  Heart Failure Navigator

## 2022-03-22 NOTE — CONSULTS
Inpatient Cardiology Consultation      Reason for Consult: CHF    Consulting Physician: Dr Jordon Nelson    Requesting Physician:  Dr Albino Rowell    Date of Consultation: 3/22/2022    HISTORY OF PRESENT ILLNESS:   Mr Estella Bowen is a 62 yo male who follows with Dr Leia Dodge, last seen 9/1/2021 in the office. Past medical history includes CAD with anterior STEMI 2014 s/p JOLENE-LAD, ICM/HFrEF (EF 30% TTE 9/2021), s/p ICD (BS implant 2015), HTN, HLD, T2DM, obesity, depression, previous tobacco abuse / COPD, GERD, right CVA 2012 with residual gait instability -- uses a cane PRN. Office visit 9/2021 : discussed stress test; patient deferred. Discussed changing to [facility]; financial limitations. Presented to [facility] 3/21/2022 via direct admit from PCP office for SOB/peripheral edema   VS: 97.3 - 108-20-96%RA-153/99   Labs:  WBC 7.5, H&H 15.5/51.2, plt 243. K+ 3.7, Bun/Scr 21/1.3, glucose 112   Troponin 44   proBNP 4476     CXR left pleural effusion     He was direct admit to a telemetry monitoring floor with acute decompensated HFrEF. He was started on IV Lasix BID; net - 1.1L since admission. Cardiology was asked to see the patient for further recommendations and management of CHF. He states that about 2 weeks ago he ate Ramen noodles and canned soup -- and since then he has had progressive SOB / COLLINS and orthopnea/PND with BL peripheral edema. He denies any CP, palpitations, LD/dizziness, falls or LOC. He states that when he takes his pulse ox at home his HR has been fast.     Please note: past medical records were reviewed per electronic medical record (EMR) - see detailed reports under Past Medical/ Surgical History. Past Medical History:    1. Obesity. 2. GERD. 3. Hyperlipidemia. 4. Type II diabetes mellitus. 5. Hypertension. 6. COPD. 7. ED.  8. Allergic rhinitis. 9. Cigarette abuse with 45 pack years exposure. Smoked 1.5 packs per day and stopped smoking, 07/2014.   10. No history MI, CHF, CKD.  11. Hernia repair , partial colectomy for diverticulitis . 12. Family history. Father  of MI age 48. Sister had a stroke at age 61.   15. No known drug allergies. 14. Noncompliant with medications. 15. Echo, 2009. Borderline LVH. EF normal. No significant valvular abnormality. 16. Exercise MPS, 2011 for atypical chest pain. Normal.  17. [address] [address] [facility] [address], 2012 with dysarthria and left sided weakness consistent with right CVA. H/H 17.5/49. Systolic 158 and Hb M3Q 6.9 with mild troponin elevation. Lupus anticoagulant not detected (negative cardiolipin). CT head/brain without contrast showed age indeterminate lacunar infarct left basal ganglia. No acute territorial infarct evident. Brain MRI without contrast showed acute infarct in the right paramedial [place]. Mild small vessel ischemia and chronic lacunar infarct in the left putamen. CTA of head and neck showed no significant cervical cerebral stenosis. Echo showed no intra-atrial shunt and no evidence for clot. 18. Brief episode of syncope, 2013, circa 30 seconds without injury or prodrome. Subsequent Holter monitor showed no significant arrhythmia (Dr. Isael Sheets). 19. Probable SARAH. Noncompliant with recommendations for sleep study. 20. Video swallow study. No evidence of aspiration or penetration, 2013. 21. TTE, 2013. LV mildly dilated. Normal EF. No WMA. Normal diastolic function. No atrial dilatation. No hemodynamically significant valvular abnormality. No pericardial effusion. RVSP 26.   22. TORRES, 2013, [facility]. No hemodynamically significant valvular abnormality. LV normal. Negative bubble study for ASD/PFO. Small thrombus LA appendage demonstrated. [address] MPS, 10/21/2013. No diagnostic EKG changes. Normal perfusion, EF 72%. No ischemia. Low risk exam.  24. TORRES, 2014. Normal study, no LA or FRANCI thrombus, no valvulopathy. 25. [facility] admission, 2014 with acute anterior STEMI.  Emergent catheterization demonstrated subtotal occlusion of LAD, treated with 3.5 x 33 mm Alpine XIENCE JOLENE. Residual disease found in distal OM branch of CX with YOLANDA-III flow which will be treated medically. EF 40% with anteroapical WMA. Peak Tn 7.63.  26. Echo, 12/16/2014. Large akinetic segment of apex and adjacent segments with myocardial thinning consistent with scar. ~EF 35-40%. RVSP 27. Tissue Doppler consistent with elevated LA pressure. No hemodynamically significant valvular abnormalities. [address] [place] [address] visit for pneumonia, 02/02/2015. Treated with antibiotics. 28. Hospitalization with chest pain, 02/09/2015. MI ruled out. [facility] MPS, 02/11/2015. Anteroseptal MI without any reversible ischemia. EF 42%. Medical therapy. 30. Cardiac catheterization, 03/30/2015. [facility] no significant stenosis. LAD mid vessel stent widely patent. 75% apical stenosis (<2 mm vessel). D1 ostial 40% stenosis. D2 diffuse 50% stenosis. CX mild lumen irregularities. OM1 small vessel. OM2 large bifurcating vessel with anterior branch <2 mm and 20-30% diffuse stenosis. Lateral OM <2 mm with proximal 80% stenosis. RCA mild lumen irregularities. PDA ostial 40-50% stenosis. Medical Rx.  31. Echo, 03/30/2015. Mildly dilated LV, severely reduced LV systolic function. EF 30%. Severe LAE. Trace MR, moderate AR.  32. Insertion of dual chamber implantable cardioverted defibrillator, 04/01/2015 ([facility]), Dr. Milka Frederick. 33. Device interrogation 9/13/2019 showed 0% atrial pacing, 0% RV pacing with multiple episodes of NSVT  33. Endoscopy and colonoscopy, 08/07/2015, severe duodenitis, mild to moderate gastritis, small 8-10 mm submucosal mass in the body of the stomach and mild reflux esophagitis.  The mass was felt to be benign.  Colonoscopy showed rectal polyp 14 cm from the anus, which was resected.    34. Hospitalization, 09/03/2015 with chest pain, MI ruled out.   35. Lexiscan MPS, 09/03/2015:  Dilated LV with estimated EF 41%. Florian Flower of the cardiac apex with global hypokinesis of the other walls.  Fixed anterior distal inferior and lateral defect.  No TID mentioned.  Patient managed medically.    36. Lexiscan myocardial perfusion study, 02/02/2017, dilated left ventricle with ejection fraction 43%, akinesis of the apex with fixed anterior septal and apical defect. No reversible ischemia. No transient cavity dilation.  Medical management continued.    37. Lexiscan myocardial perfusion study, 01/25/2019, large fixed defect in the mid and distal anterior and apical wall suggestive of prior MI with akinesis of the mid and distal anterior and apical wall. EF 25%. 38. Chronic kidney disease  44. TTE 9/[address] [facility]): apical akinesis. EF 30%e  severely reduced left ventricular systolic function. Stage III diastolic dysfunction. Mildly reduced right ventricle systolic function. Mild posteriorly directed  mitral regurgitation. Moderate aortic regurgitation. Medications Prior to admit:  Prior to Admission medications    Medication Sig Start Date End Date Taking?  Authorizing Provider   metFORMIN (GLUCOPHAGE) 500 MG tablet Take 1 tablet by mouth 2 times daily (with meals) 2/14/22   Elver Kraft, DO   atorvastatin (LIPITOR) 80 MG tablet Take 1 tablet by mouth nightly 2/14/22   Elver Kraft, DO   spironolactone (ALDACTONE) 50 MG tablet Take 1 tablet by mouth daily 1/31/22   Vicenta Runner, MD   fluticasone [facility]) 50 MCG/ACT nasal spray 2 sprays by Each Nostril route daily 1/28/22   Jayson Large, DO   aspirin 81 MG EC tablet Take 81 mg by mouth daily    Historical Provider, MD   clopidogrel (PLAVIX) 75 MG tablet Take 1 tablet by mouth daily 1/20/22   Jayson Large, DO   furosemide (LASIX) 20 MG tablet Take 1 tablet by mouth 2 times daily 1/20/22   Jayson Large, DO   hydrALAZINE (APRESOLINE) 50 MG tablet Take 1 tablet by mouth every 8 hours 1/20/22   Harriet Mackay, DO   isosorbide mononitrate (IMDUR) 30 MG extended release tablet Take 3 tablets by mouth daily 1/20/22   Jayson Large, DO   lisinopril (PRINIVIL;ZESTRIL) 40 MG tablet Take 1 tablet by mouth daily 1/20/22   Sameul Deer Rech, DO   metoprolol succinate (TOPROL XL) 50 MG extended release tablet Take 1 tablet by mouth 2 times daily 1/20/22   Jayson Large, DO   pantoprazole (PROTONIX) 40 MG tablet Take 1 tablet by mouth daily 1/20/22   Jayson Large, DO       Current Medications:    Current Facility-Administered Medications: bisacodyl (DULCOLAX) EC tablet 5 mg, 5 mg, Oral, Daily  furosemide (LASIX) injection 40 mg, 40 mg, IntraVENous, BID  aspirin EC tablet 81 mg, 81 mg, Oral, Daily  atorvastatin (LIPITOR) tablet 80 mg, 80 mg, Oral, Nightly  clopidogrel (PLAVIX) tablet 75 mg, 75 mg, Oral, Daily  hydrALAZINE (APRESOLINE) tablet 50 mg, 50 mg, Oral, 3 times per day  isosorbide mononitrate (IMDUR) extended release tablet 90 mg, 90 mg, Oral, Daily  lisinopril (PRINIVIL;ZESTRIL) tablet 40 mg, 40 mg, Oral, Daily  metoprolol succinate (TOPROL XL) extended release tablet 50 mg, 50 mg, Oral, BID  pantoprazole (PROTONIX) tablet 40 mg, 40 mg, Oral, Daily  spironolactone (ALDACTONE) tablet 50 mg, 50 mg, Oral, Daily  sodium chloride flush 0.9 % injection 5-40 mL, 5-40 mL, IntraVENous, 2 times per day  sodium chloride flush 0.9 % injection 5-40 mL, 5-40 mL, IntraVENous, PRN  0.9 % sodium chloride infusion, 25 mL, IntraVENous, PRN  ondansetron (ZOFRAN-ODT) disintegrating tablet 4 mg, 4 mg, Oral, Q8H PRN **OR** ondansetron (ZOFRAN) injection 4 mg, 4 mg, IntraVENous, Q6H PRN  acetaminophen (TYLENOL) tablet 650 mg, 650 mg, Oral, Q6H PRN **OR** acetaminophen (TYLENOL) suppository 650 mg, 650 mg, Rectal, Q6H PRN  polyethylene glycol (GLYCOLAX) packet 17 g, 17 g, Oral, Daily  perflutren lipid microspheres (DEFINITY) injection 1.65 mg, 1.5 mL, IntraVENous, ONCE PRN    Allergies:  Patient has no known allergies. Social History:    Lives independently -- completes ADL without CP/SOB.  Steps to the basement with laundry room. Does not require supplemental O2. Uses a cane for ambulation assistance PRN for gait instability following his CVA 2012. Previous tobacco abuse, quit 2015  Social alcohol use -- 2-6 beers with friends 2 x month   Denies illicit drug use   Full code     Family History: This information was not obtained at this time as it is found noncontributory secondary to the patients advanced age. REVIEW OF SYSTEMS:     · Constitutional: Denies fevers, chills, night sweats, and fatigue  · HEENT: Denies headaches, nose bleeds, and blurred vision,oral pain, abscess or lesion. · Musculoskeletal: Denies falls, pain to BLE with ambulation and edema to BLE. · Neurological: Denies dizziness and lightheadedness, numbness and tingling  · Cardiovascular: Denies chest pain, palpitations, and feelings of heart racing. · Respiratory: + orthopnea and PND, cough, COLLINS  · Gastrointestinal: Denies heartburn, nausea/vomiting, diarrhea and constipation, black/bloody, and tarry stools. · Genitourinary: Denies dysuria and hematuria  · Hematologic: Denies excessive bruising or bleeding  · Lymphatic: Denies lumps and bumps to neck, axilla, breast, and groin      PHYSICAL EXAM:   /89   Pulse 92   Temp 97.4 °F (36.3 °C) (Oral)   Resp 18   Ht 5' 9\" (1.753 m)   Wt 246 lb 6.4 oz (111.8 kg)   SpO2 97%   BMI 36.39 kg/m²   CONST:  Well developed,  male who appears his stated age. Awake, alert, cooperative, no apparent distress  HEENT:   Head- Normocephalic, atraumatic   Eyes- Conjunctivae pink, anicteric  Throat- Oral mucosa pink and moist  Neck-  No stridor, trachea midline, no jugular venous distention. CHEST: Chest symmetrical and non-tender to palpation. RESPIRATORY: diminished breath sounds bilaterally   CARDIOVASCULAR:     No carotid bruit noted bilaterally   Heart Ausculation- Regular rate and rhythm, no murmur. PV:  2+ BL lower extremity edema entending above knees to mid thigh.   No varicosities. ABDOMEN: Soft, non-tender to light palpation. Bowel sounds present. MS: Good muscle strength and tone. No atrophy or abnormal movements. : Deferred   SKIN: Warm and dry no statis dermatitis or ulcers   NEURO / PSYCH: Oriented to person, place and time. Speech clear and appropriate. Follows all commands. Pleasant affect     DATA:    ECG: ST  PVCs   Tele strips:  ST / SR     Diagnostic:      Intake/Output Summary (Last 24 hours) at 3/22/2022 1144  Last data filed at 3/22/2022 0629  Gross per 24 hour   Intake 480 ml   Output 1950 ml   Net -1470 ml       Labs:   CBC:   Recent Labs     03/21/22  1500 03/22/22  0409   WBC 7.5 6.8   HGB 15.5 13.4   HCT 51.2 42.8    212     BMP:   Recent Labs     03/21/22  1500 03/22/22  0409    142   K 3.7 3.6   CO2 27 26   BUN 21* 22*   CREATININE 1.3* 1.3*   LABGLOM 57 57   CALCIUM 9.5 8.4*     Mag:   Recent Labs     03/21/22  1500 03/22/22  0409   MG 2.1 1.9     HgA1c:   Lab Results   Component Value Date    LABA1C 7.4 (H) 01/21/2022     FASTING LIPID PANEL:  Lab Results   Component Value Date    CHOL 172 08/19/2021    HDL 25 08/19/2021    LDLCALC 103 08/19/2021    TRIG 220 08/19/2021     LIVER PROFILE:  Recent Labs     03/21/22  1500 03/22/22  0409   AST 30 26   ALT 17 14   LABALBU 3.8 2.9*       Assessment and Plan per Dr Logan Acharya. Electronically signed by Louise Melvinma on 3/22/2022 at 11:44 AM    The above documentation has been prepared under my direction and personally reviewed by me in its entirety. I confirm that the note above accurately reflects all work, treatment, procedures, and medical decision making performed by me. The patient's history was independently obtained. The patient was independently examined. Electrocardiogram, prior and present cardiovascular assessment, and laboratory studies were reviewed.     The patient is a 54-year-old white male known to [facility] cardiology with primary cardiovascular care provided by [facility]. He has a known history of coronary atherosclerosis with an acute ST elevation anterior myocardial infarction in December, 2014 with urgent coronary angiography demonstrating subtotal occlusion of the left anterior descending with subsequent coronary intervention and angiographically insignificant disease of remaining coronary distributions with an anterior regional wall motion abnormality and ejection fraction of approximately 40% with subsequent echocardiographic assessment demonstrating apical and periapical akinesis with moderate left ventricular systolic dysfunction estimated ejection fraction of 35 to 40% with no significant valvular disease. Repeat angiography in March, 2015 demonstrated patency of his intracoronary stent with 70% stenosis of the apical portion of the left anterior descending and angiographically insignificant disease of remaining components of the left coronary distribution with the exception of an 80% stenosis of a small circumflex marginal branch not capable of intervention a 40 to 50% ostial right coronary artery stenosis. Follow-up echocardiographic assessment demonstrated no improvement of left ventricular systolic function with the subsequent implantation of an implantable cardioverter defibrillator. Additionally has a history of hypertension, diabetes, hyperlipidemia, chronic obstructive lung disease and a remote right hemispheric cerebrovascular accident with mild dysarthria and left hemiparesis in addition to that of moderate obesity with associated obstructive sleep apnea and noncompliance with the use of nocturnal CPAP.   His most recent objective assessment included that of a gated vasodilator myocardial perfusion imaging study of January, 2019 demonstrating a large fixed mid and distal anterior and apical perfusion abnormality with associated regional wall motion abnormalities and a post-rest ejection fraction of 25% an echocardiogram in September, 2021 demonstrating apical akinesis with severe left ventricular systolic dysfunction estimated ejection fraction of 30% with stage III diastolic dysfunction and mild right ventricular dysfunction with mild posterior directed mitral regurgitation moderate aortic insufficiency. He remains active with functional capabilities of approximately 4-5 METs with no additional ischemic symptomatology and reported compensation from a volume standpoint as well as the absence of arrhythmia related symptoms in the past 2 weeks when due to economic limitations, dietary indiscretion with significant increases of his sodium intake occurred with the development of progressive dyspnea, orthopnea and progressive lower extremity edema. At the time of his hospitalization, resting electrocardiogram reviewed at the time of evaluation demonstrated evidence of sinus tachycardia with right axis deviation, and intraventricular conduction delay and delayed precordial transition zone x-ray again reviewed demonstrated evidence of cardiomegaly with mild interstitial changes and a small to moderate left pleural effusion. Additional laboratory studies demonstrated elevation of high-sensitivity troponin level in a pattern not suggestive of an acute coronary syndrome and mildly elevated compared to that of 2 months earlier and a proBNP level 4475 pg/mL significantly increased from that previously documented. Most recent device interrogation of earlier this month demonstrated minimal pacing and no significant arrhythmia related events. At the time of evaluation, the patient's medications and allergies were reviewed as well as that of his past medical history and review of systems as documented. On examination, he presently appears in no discomfort nor distress and is hemodynamically stable vital signs as documented. Jugular venous pressure is approximately 7-8 cm with no identified carotid bruits.   Lung fields demonstrate minimal basilar rales with mildly diminished breath sounds in both lung bases. Cardiac examination still for a regular rate and rhythm with no gallop rhythm or cardiac murmur. A benign abdominal examination is present with exception of mild obesity and moderate bilateral pretibial and pedal edema are present. Diagnostic Assessment and Plan: On a clinical basis, the patient presents with evidence of acute superimposed upon chronic combined systolic and diastolic heart failure in part precipitated by dietary indiscretion in addition to chronic noncompliance with management of his obstructive sleep apnea. At present this is been extensively discussed inclusive of needs of appropriate dietary sodium restriction in the future to reduce risk of recurrent decompensations. Presently, aggressive fluid mobilization will be necessary with needs of careful monitoring of his volume status as well as that of renal function and electrolytes. No additional cardiovascular evaluation is presently indicated with additional ongoing needs of appropriate lifestyle modification to achieve weight reduction to benefit diastolic cardiac performance and assist in management of obstructive sleep apnea in addition to that of appropriate compliance with the use of nocturnal CPAP. Ongoing aggressive risk factor modification of blood pressure, diabetes and serum lipids will remain essential to reducing risk of future atherosclerotic development. Thank you for allowing me to participate in your patient's care. Please feel free to contact me if you have any questions or concerns. Joie Campo.  Syed Golden, [address] [facility]

## 2022-03-22 NOTE — PLAN OF CARE
Problem: SAFETY  Goal: Free from accidental physical injury  Description: Free from falls  Outcome: Met This Shift     Problem: PAIN  Goal: Patient's pain/discomfort is manageable  Outcome: Met This Shift     Problem: SKIN INTEGRITY  Goal: Skin integrity is maintained or improved  Outcome: Met This Shift     Problem:  Activity Intolerance:  Goal: Ability to tolerate increased activity will improve  Description: Ability to tolerate increased activity will improve  Outcome: Ongoing     Problem: Fluid Volume - Excess:  Goal: Control of fluid volume excess will improve  Description: Control of fluid volume excess will improve  Outcome: Ongoing     Problem: OXYGENATION/RESPIRATORY FUNCTION  Goal: Patient will maintain patent airway  Outcome: Met This Shift     Problem: HEMODYNAMIC STATUS  Goal: Patient has stable vital signs and fluid balance  Outcome: Ongoing

## 2022-03-22 NOTE — CARE COORDINATION
Patient from home, lives alone. PCP is Dr. Fly Whitmore was in for outpatient appointment and sent here from the office. His car is currently in the outpatient parking lot. He does plan to drive himself home when released. He uses a cane for ambulation. No current homecare. Plan is home with no needs when released. For questions I can be reached at [phone].  [place], [place]

## 2022-03-23 LAB
ADENOVIRUS BY PCR: NOT DETECTED
ALBUMIN SERPL-MCNC: 3.3 G/DL (ref 3.5–5.2)
ALP BLD-CCNC: 160 U/L (ref 40–129)
ALT SERPL-CCNC: 15 U/L (ref 0–40)
ANION GAP SERPL CALCULATED.3IONS-SCNC: 12 MMOL/L (ref 7–16)
ANISOCYTOSIS: ABNORMAL
AST SERPL-CCNC: 25 U/L (ref 0–39)
BASOPHILS ABSOLUTE: 0.04 E9/L (ref 0–0.2)
BASOPHILS RELATIVE PERCENT: 0.5 % (ref 0–2)
BILIRUB SERPL-MCNC: 1.1 MG/DL (ref 0–1.2)
BORDETELLA PARAPERTUSSIS BY PCR: NOT DETECTED
BORDETELLA PERTUSSIS BY PCR: NOT DETECTED
BUN BLDV-MCNC: 26 MG/DL (ref 6–20)
BURR CELLS: ABNORMAL
CALCIUM SERPL-MCNC: 8.7 MG/DL (ref 8.6–10.2)
CHLAMYDOPHILIA PNEUMONIAE BY PCR: NOT DETECTED
CHLORIDE BLD-SCNC: 99 MMOL/L (ref 98–107)
CO2: 29 MMOL/L (ref 22–29)
CORONAVIRUS 229E BY PCR: NOT DETECTED
CORONAVIRUS HKU1 BY PCR: NOT DETECTED
CORONAVIRUS NL63 BY PCR: NOT DETECTED
CORONAVIRUS OC43 BY PCR: NOT DETECTED
CREAT SERPL-MCNC: 1.6 MG/DL (ref 0.7–1.2)
EOSINOPHILS ABSOLUTE: 0.19 E9/L (ref 0.05–0.5)
EOSINOPHILS RELATIVE PERCENT: 2.4 % (ref 0–6)
GFR AFRICAN AMERICAN: 54
GFR NON-AFRICAN AMERICAN: 45 ML/MIN/1.73
GLUCOSE BLD-MCNC: 134 MG/DL (ref 74–99)
HCT VFR BLD CALC: 45.5 % (ref 37–54)
HEMOGLOBIN: 13.8 G/DL (ref 12.5–16.5)
HUMAN METAPNEUMOVIRUS BY PCR: NOT DETECTED
HUMAN RHINOVIRUS/ENTEROVIRUS BY PCR: NOT DETECTED
IMMATURE GRANULOCYTES #: 0.02 E9/L
IMMATURE GRANULOCYTES %: 0.3 % (ref 0–5)
INFLUENZA A BY PCR: NOT DETECTED
INFLUENZA B BY PCR: NOT DETECTED
LYMPHOCYTES ABSOLUTE: 1.38 E9/L (ref 1.5–4)
LYMPHOCYTES RELATIVE PERCENT: 17.8 % (ref 20–42)
MAGNESIUM: 1.9 MG/DL (ref 1.6–2.6)
MCH RBC QN AUTO: 22.5 PG (ref 26–35)
MCHC RBC AUTO-ENTMCNC: 30.3 % (ref 32–34.5)
MCV RBC AUTO: 74.2 FL (ref 80–99.9)
MONOCYTES ABSOLUTE: 0.53 E9/L (ref 0.1–0.95)
MONOCYTES RELATIVE PERCENT: 6.8 % (ref 2–12)
MYCOPLASMA PNEUMONIAE BY PCR: NOT DETECTED
NEUTROPHILS ABSOLUTE: 5.61 E9/L (ref 1.8–7.3)
NEUTROPHILS RELATIVE PERCENT: 72.2 % (ref 43–80)
OVALOCYTES: ABNORMAL
PARAINFLUENZA VIRUS 1 BY PCR: NOT DETECTED
PARAINFLUENZA VIRUS 2 BY PCR: NOT DETECTED
PARAINFLUENZA VIRUS 3 BY PCR: NOT DETECTED
PARAINFLUENZA VIRUS 4 BY PCR: NOT DETECTED
PDW BLD-RTO: 20.6 FL (ref 11.5–15)
PLATELET # BLD: 216 E9/L (ref 130–450)
PMV BLD AUTO: 10.6 FL (ref 7–12)
POIKILOCYTES: ABNORMAL
POLYCHROMASIA: ABNORMAL
POTASSIUM SERPL-SCNC: 3.7 MMOL/L (ref 3.5–5)
PRO-BNP: 2352 PG/ML (ref 0–125)
RBC # BLD: 6.13 E12/L (ref 3.8–5.8)
RESPIRATORY SYNCYTIAL VIRUS BY PCR: NOT DETECTED
SARS-COV-2, PCR: NOT DETECTED
SMUDGE CELLS: ABNORMAL
SODIUM BLD-SCNC: 140 MMOL/L (ref 132–146)
SPHEROCYTES: ABNORMAL
TOTAL PROTEIN: 6.3 G/DL (ref 6.4–8.3)
TROPONIN, HIGH SENSITIVITY: 38 NG/L (ref 0–11)
URINE CULTURE, ROUTINE: NORMAL
WBC # BLD: 7.8 E9/L (ref 4.5–11.5)

## 2022-03-23 PROCEDURE — 99232 SBSQ HOSP IP/OBS MODERATE 35: CPT | Performed by: FAMILY MEDICINE

## 2022-03-23 PROCEDURE — 85025 COMPLETE CBC W/AUTO DIFF WBC: CPT

## 2022-03-23 PROCEDURE — 6370000000 HC RX 637 (ALT 250 FOR IP): Performed by: STUDENT IN AN ORGANIZED HEALTH CARE EDUCATION/TRAINING PROGRAM

## 2022-03-23 PROCEDURE — 83735 ASSAY OF MAGNESIUM: CPT

## 2022-03-23 PROCEDURE — 36415 COLL VENOUS BLD VENIPUNCTURE: CPT

## 2022-03-23 PROCEDURE — 2580000003 HC RX 258: Performed by: STUDENT IN AN ORGANIZED HEALTH CARE EDUCATION/TRAINING PROGRAM

## 2022-03-23 PROCEDURE — 99233 SBSQ HOSP IP/OBS HIGH 50: CPT | Performed by: INTERNAL MEDICINE

## 2022-03-23 PROCEDURE — 94640 AIRWAY INHALATION TREATMENT: CPT

## 2022-03-23 PROCEDURE — 84484 ASSAY OF TROPONIN QUANT: CPT

## 2022-03-23 PROCEDURE — 6360000002 HC RX W HCPCS: Performed by: STUDENT IN AN ORGANIZED HEALTH CARE EDUCATION/TRAINING PROGRAM

## 2022-03-23 PROCEDURE — 2060000000 HC ICU INTERMEDIATE R&B

## 2022-03-23 PROCEDURE — 80053 COMPREHEN METABOLIC PANEL: CPT

## 2022-03-23 PROCEDURE — 83880 ASSAY OF NATRIURETIC PEPTIDE: CPT

## 2022-03-23 PROCEDURE — 0202U NFCT DS 22 TRGT SARS-COV-2: CPT

## 2022-03-23 RX ADMIN — FUROSEMIDE 40 MG: 10 INJECTION, SOLUTION INTRAMUSCULAR; INTRAVENOUS at 18:55

## 2022-03-23 RX ADMIN — HYDRALAZINE HYDROCHLORIDE 50 MG: 50 TABLET, FILM COATED ORAL at 21:00

## 2022-03-23 RX ADMIN — FUROSEMIDE 40 MG: 10 INJECTION, SOLUTION INTRAMUSCULAR; INTRAVENOUS at 08:54

## 2022-03-23 RX ADMIN — ACETAMINOPHEN 650 MG: 325 TABLET ORAL at 23:13

## 2022-03-23 RX ADMIN — Medication 10 ML: at 21:00

## 2022-03-23 RX ADMIN — ASPIRIN 81 MG: 81 TABLET, COATED ORAL at 08:54

## 2022-03-23 RX ADMIN — ATORVASTATIN CALCIUM 80 MG: 40 TABLET, FILM COATED ORAL at 21:00

## 2022-03-23 RX ADMIN — POLYETHYLENE GLYCOL 3350 17 G: 17 POWDER, FOR SOLUTION ORAL at 08:54

## 2022-03-23 RX ADMIN — METOPROLOL SUCCINATE 50 MG: 50 TABLET, EXTENDED RELEASE ORAL at 08:54

## 2022-03-23 RX ADMIN — CLOPIDOGREL BISULFATE 75 MG: 75 TABLET ORAL at 08:54

## 2022-03-23 RX ADMIN — METOPROLOL SUCCINATE 50 MG: 50 TABLET, EXTENDED RELEASE ORAL at 18:55

## 2022-03-23 RX ADMIN — IPRATROPIUM BROMIDE AND ALBUTEROL SULFATE 1 AMPULE: .5; 3 SOLUTION RESPIRATORY (INHALATION) at 00:13

## 2022-03-23 RX ADMIN — BISACODYL 5 MG: 5 TABLET, COATED ORAL at 08:54

## 2022-03-23 RX ADMIN — Medication 10 ML: at 09:00

## 2022-03-23 RX ADMIN — HYDRALAZINE HYDROCHLORIDE 50 MG: 50 TABLET, FILM COATED ORAL at 15:32

## 2022-03-23 RX ADMIN — PANTOPRAZOLE SODIUM 40 MG: 40 TABLET, DELAYED RELEASE ORAL at 06:33

## 2022-03-23 RX ADMIN — ISOSORBIDE MONONITRATE 90 MG: 30 TABLET, EXTENDED RELEASE ORAL at 08:54

## 2022-03-23 RX ADMIN — HYDRALAZINE HYDROCHLORIDE 50 MG: 50 TABLET, FILM COATED ORAL at 06:33

## 2022-03-23 RX ADMIN — SPIRONOLACTONE 50 MG: 25 TABLET ORAL at 08:54

## 2022-03-23 ASSESSMENT — PAIN SCALES - GENERAL
PAINLEVEL_OUTOF10: 0

## 2022-03-23 ASSESSMENT — EJECTION FRACTION
EF_VALUE: 30%
EF_SOURCE: 2D ECHO

## 2022-03-23 NOTE — CONSULTS
Patient currently admitted with diagnosis of Systolic heart failure. Patient was sitting in bed alert and oriented during the consultation. He was engaged and asked appropriate questions throughout the education session. He is agreeable to scheduling with his PCP, Cardiology APRN, and CHF clinic. Aura Laura states he fills his pills for the week, forgets doses, offered connection with [facility] pharmacy for increased med compliance, he declines. He states that he has been eating many high sodium food items, he states he knows what he should be watching, he just wasn't doing it. Encouraged patient to read every label of food he consumes as hidden sodium is a trigger for fluid overload. He is agreeable to daily weights upon discharge and has a working scale in the home. Future Appointments   Date Time Provider Estuardo Arzate   3/29/2022 10:40 AM [facility]   4/8/2022 11:00 AM PAUL Mcmillan - CNP [facility] CARD[facility]            We reviewed the introduction to Heart Failure, the HF zones, signs and symptoms to report on day 1 of onset, medications, medication compliance, the importance of obtaining daily weights, following a low sodium diet, reading food labels for the sodium content, keeping physician appointments, and smoking cessation. We discussed writing / tracking daily weights on a calendar / log because a 5 pound gain in 1 week can sneak up if you are not tracking it. You can also take your charted weights to your doctor appointments to be reviewed. Contributing risk factors for Heart Failure are identified as overwhelmed with chronic disease management. I advised patient they can reduce the risk for Heart Failure exacerbations by modifying / controlling the risk factors.  We discussed self-managed care which includes the following:  to take medications as prescribed, report any intolerable side effects of medications to the cardiologist / doctor, do not just stop taking the medication; follow a cardiac heart healthy / low sodium diet; weigh yourself daily, exercise regularly- per doctor recommendation and not to smoke or use an excess amount of alcohol. We discussed calling the cardiologist / doctor with a weight gain of 3 pounds in one day or a total of 5 pounds or more in one week. Also, if you should have a significant weight loss of 3# or more in one day to call the doctor, they may need to decrease or hold the diuretic dose. On days you feels nauseated and not eating / drinking, having emesis or diarrhea,  informed to call the cardiologist  / doctor, they may need to decrease or hold diuretic to avoid dehydration. I stressed the importance of informing their cardiologist the first day of onset of any of the signs and symptoms in the \"Yellow Zone\" of the HF Zones. Patient verbalizes understanding. Greater than 30 minutes was spent educating patient. The Heart Failure Booklet given to the patient with additional patient education addressing:  · What is Heart Failure? · Things You Can Do to Live Well with HF  · How to Take Your Medications  · How to Eat Less Salt  · Exercising Well with Heart Failure  · Signs and symptoms of HF to report  · Weight Yourself Each Day  · Home Self Management- activity, weight tracking, taking medications as prescribed, meals /diet planning (sodium and fluid restriction), how to read food labels, keeping physician follow ups, smoking cessation, follow the Heart Failure Zones  · The Heart Failure zones  · Sodium content pamphlet  · What foods I should avoid tip sheet    Instructed  to call 911 if you have any of the following symptoms:    ·    Struggling to breathe unrelieved with rest,  ·    Having chest pain, confusion or can't think clearly  ·    Have confusion or cant think clearly      The patient is ordered:  Diet: ADULT DIET; Regular;  Low Sodium (2 gm)   Sodium controlled diet Yes  Fluid restriction daily ordered (fluid restriction recommended if patient is hyponatremic and/or diuretic is initiated or increased) No  FR:   Daily Weights:   Patient Vitals for the past 96 hrs (Last 3 readings):   Weight   22 0621 244 lb 6.4 oz (110.9 kg)   22 1245 246 lb 6.4 oz (111.8 kg)     I/O:     Intake/Output Summary (Last 24 hours) at 3/23/2022 1114  Last data filed at 3/23/2022 1790  Gross per 24 hour   Intake 720 ml   Output 1150 ml   Net -430 ml            Shereen Harper, RN BSN, RN  Heart Failure Navigator        CONGESTIVE HEART FAILURE (CHF) AHA GUIDELINES  (Must be completed for Primary Diagnosis CHF or History of CHF)    Discharge Plan:  I placed the Heart Failure Home Instructions in patient's discharge instructions. Per Heart Failure GWTG, the patient should have a follow-up appointment made within 7 days of discharge.     New Diagnosis No    ECHO Results most recent:  Lab Results   Component Value Date    LVEF 30 2021                                        Social History     Tobacco Use   Smoking Status Former Smoker    Packs/day: 1.00    Years: 30.00    Pack years: 30.00    Types: Cigarettes    Quit date: 2014    Years since quittin.8   Smokeless Tobacco Current User    Types: Chew   Tobacco Comment    occ chewing tobacco, trying to quit        Immunization History   Administered Date(s) Administered    COVID-19, Moderna, Primary or Immunocompromised, PF, 100mcg/0.5mL 2021, 2021    Influenza 10/11/2013    Influenza Virus Vaccine 2015    Influenza, MDCK, Preservative free 2017    Influenza, Quadv, IM, (6 mo and older Fluzone, Flulaval, Fluarix and 3 yrs and older Afluria) 2016    Influenza, Quadv, IM, PF (6 mo and older Fluzone, Flulaval, Fluarix, and 3 yrs and older Afluria) 10/02/2018, 10/18/2019    Pneumococcal Polysaccharide ([id number]) 2015    Tdap (Boostrix, Adacel) 2013, 2021          Angiotensin-Converting-Enzyme (ACE) inhibitor ordered:  [x] Yes  [] No (specify contraindication):    [] Contraindicated  [] Hypotensive patient who was at immediate risk of cardiogenic shock  [] Hospitalized patient who experienced marked azotemia  [] Other Contraindications  [] Not Eligible  [] Not Tolerant  [] Patient Reason  [] System Reason  [] Other Reason    Angiotensin II receptor blockers (ARB) ordered:  [] Yes  [x] No (specify contraindication):    [] Contraindicated  [] Hypotensive patient who was at immediate risk of cardiogenic shock  [] Hospitalized patient who experienced marked azotemia  [] Other Contraindications    ARNI - Angiotensin Receptor Neprilysin Inhibitor ordered:  [] Yes  [x] No (specify contraindication):    [] ACE inhibitor use within the prior 36 hours  [] Allergy  [] Hyperkalemia  [] Hypotension  [] Renal dysfunction defined as creatinine > 2.5 mg/dL in men or > 2.0 mg/dL in women  [] Other Contraindications  [] Not Eligible  [] Not Tolerant  [] Patient Reason  []System Reason  []Other Reason      Beta Blocker (Carvedilol, Metoprolol Succinate, or Bisoprolol) ordered:    [x] Yes  [] No (specify contraindication):    [] Contraindicated  [] Asthma  [] Fluid Overload  [] Low Blood Pressure  [] Patient recently treated with an intravenous positive inotropic agent  [] Other Contraindications  [] Not Eligible  [] Not Tolerant  [] Patient Reason  [] System Reason    SGLT2 Inhibitor ordered:  [] Yes  [x] No (specify contraindication):    [] Contraindicated  [] Patient currently on dialysis  [] Ketoacidosis  [] Known hypersensitivity to the medication  [] Type I diabetes (not approved for use in patients with Type I diabetes due to increased risk of ketoacidosis)  [] Other Contraindications  [] Not Eligible  [] Not Tolerant  [] Patient Reason  [x] System Reason  [] Other Reason    Aldosterone Antagonist ordered:  [x] Yes  [] No (specify contraindication):    [] Contraindicated  [] Allergy due to aldosterone receptor antagonist  [] Hyperkalemia  [] Renal dysfunction defined as creatinine >2.5 mg/dL in men or >2.0 mg/dL in women.   [] Other contraindications  [] Not Eligible  [] Not Tolerant  [] Patient Reason  [] System Reason  [] Other Reason

## 2022-03-23 NOTE — PROGRESS NOTES
[facility] Inpatient   Resident Progress Note    S:  Hospital day: 2   Brief Synopsis: Jayme Cueto is a 61 y.o. male with a PMH of CHF EF 30% 9/2021, CAD, CVA, GERD, hypertension, hyperlipidemia, insulin-dependent type 2 diabetes, and COPD who presented from clinic due to concern for CHF exacerbation. States he has been having worsening shortness of breath, leg swelling, and 20 lb weight gain for the past 2 weeks. Admitted for CHF exacerbation. Overnight, worsening SOB and smelling ammonia, but was saturating well and was not in acute distress. Was given duoneb and placed on 2L NC. Was unable to sleep laying on back. Denies CP. Minimal abdominal pain. BM x2 yesterday, soft no dark or bloody stool. LE edema not improving. Cont meds:    sodium chloride       Scheduled meds:    bisacodyl  5 mg Oral Daily    furosemide  40 mg IntraVENous BID    aspirin  81 mg Oral Daily    atorvastatin  80 mg Oral Nightly    clopidogrel  75 mg Oral Daily    hydrALAZINE  50 mg Oral 3 times per day    isosorbide mononitrate  90 mg Oral Daily    [Held by provider] lisinopril  40 mg Oral Daily    metoprolol succinate  50 mg Oral BID    pantoprazole  40 mg Oral Daily    spironolactone  50 mg Oral Daily    sodium chloride flush  5-40 mL IntraVENous 2 times per day    polyethylene glycol  17 g Oral Daily     PRN meds: ipratropium-albuterol, sodium chloride flush, sodium chloride, ondansetron **OR** ondansetron, acetaminophen **OR** acetaminophen, perflutren lipid microspheres     I reviewed the patient's past medical and surgical history, Medications and Allergies. O:  BP (!) 136/99   Pulse 90   Temp 98.1 °F (36.7 °C) (Oral)   Resp 20   Ht 5' 9\" (1.753 m)   Wt 244 lb 6.4 oz (110.9 kg)   SpO2 98%   BMI 36.09 kg/m²   24 hour I&O: I/O last 3 completed shifts: In: 1200 [P.O.:1200]  Out: 3000 [Urine:3000]  No intake/output data recorded.         Physical Exam  Constitutional:       Appearance: Normal appearance. HENT:      Nose: Nose normal.      Mouth/Throat:      Mouth: Mucous membranes are moist.      Pharynx: Oropharynx is clear. Eyes:      Extraocular Movements: Extraocular movements intact. Conjunctiva/sclera: Conjunctivae normal.   Cardiovascular:      Rate and Rhythm: Normal rate and regular rhythm. Pulses: Normal pulses. Heart sounds: Normal heart sounds. No murmur heard. Pulmonary:      Effort: Pulmonary effort is normal. No respiratory distress. Breath sounds: No wheezing or rales. Comments: LLL breath sounds diminished   Abdominal:      General: Abdomen is flat. Bowel sounds are normal.      Tenderness: There is no abdominal tenderness. There is no guarding or rebound. Comments: tense   Musculoskeletal:         General: Swelling (B/L LE edema, nonpitting ) present. Normal range of motion. Skin:     General: Skin is warm and dry. Capillary Refill: Capillary refill takes 2 to 3 seconds. Neurological:      General: No focal deficit present. Mental Status: He is alert and oriented to person, place, and time. Labs:  Na/K/Cl/CO2:  140/3.7/99/29 (03/23 0421)  BUN/Cr/glu/ALT/AST/amyl/lip:  26/1.6/--/15/25/--/-- (03/23 0421)  WBC/Hgb/Hct/Plts:  7.8/13.8/45.5/216 (03/23 0421)  estimated creatinine clearance is 63 mL/min (A) (based on SCr of 1.6 mg/dL (H)). Other pertinent labs as noted below    Radiology:  US URINARY BLADDER LIMITED   Final Result   Unremarkable ultrasound of the bladder. XR CHEST (2 VW)   Final Result   Left pleural effusion and/or infiltrate.       Cardiomegaly               Resident Assessment and Plan     Acute on chronic decompensated HFrEF  - pt presented with worsening shortness of breath and leg swelling x2 weeks  - likely precipitated by uncontrolled HTN, lifestyle changes, medication non-compliance  - home medications Lasix 20 mg daily and spironolactone 50 mg daily, continue spironolactone   -CXR 3/21 showed L pleural effusion   - EKG 3/21 sinus tachy w PVC     -Pro-BNP and troponin trending down   - Continue IV lasix 40 mg to twice daily   - Last echo 9/2021 with EF 30%, repeat due to no complete resolution since last exacerbation at that time   - UO -1.1L in 24h  - Strict I&O  - daily weights, down 2 lbs  - Cardio consulted: Continue with aggressive diuresis and risk factor modification  - Creatinine 1.6, increased from yesterday, likely due to diuresis, continue to monitor   - Consider nephro consult if no improvement   - Keep K>4, Mg>2  - given shortness of breath, ordered resp panel, Respiratory negative      Incomplete bladder emptying   - may be 2/2 to UTI vs BPH  vs obstruction   - PSA 9.20 3/2021, did not follow up with urology OP   - UA positive for >300 protein, moderate bili,  Urobilinogen   - urine culture NGTD  - UO adequate right now likely 2/2 to aggressive diuresis     - Bladder scan normal, no PVR  - Will need to follow up with urology OP for elevated PSA and bladder symptoms      Constipation- resolved   -MiraLAX daily   -With one episode of dark stool, will order FOBT and monitor hemoglobin  - Continue current management      Type 2 diabetes, non-insulin-dependent  -On Metformin, hold  - Monitor glucose, will order LDSS if needed      Hypertension  -Uncontrolled  -Continue home medications, hydralazine  - Cardio consulted: metoprolol increased to 25 mg BID, Hold lisinopril due to worsening renal function.    - Will need sleep study OP for SARAH     GERD  -Continue Protonix     History of CVA  -Continue Plavix, aspirin          PT/OT evaluation: none   DVT prophylaxis: plavix  GI prophylaxis: protonix   Disposition: continue current management   Diet: low sodium         Electronically signed by Boo Jones MD on 3/23/2022 at 7:30 AM  Attending physician: Dr. Elo Leong

## 2022-03-23 NOTE — PROGRESS NOTES
[facility]  Family Medicine Attending    S: 61 y.o. male with PMH of COPD, CAD, HTN, HLD, DM, depression and chronic CHF who presented to office with increasing SOB and swelling. Admits to missing some doses of his medications over past week. Admitted for evaluation and treatment of CHF, acute on chronic    Today, minimal improvement. Still dyspneic when lying. Given nasal O2 for symptoms despite normal pulse ox    O: VS- Blood pressure (!) 136/99, pulse 90, temperature 98.1 °F (36.7 °C), temperature source Oral, resp. rate 20, height 5' 9\" (1.753 m), weight 244 lb 6.4 oz (110.9 kg), SpO2 98 %. Exam is as noted by resident with the following changes, additions or corrections:  Awake, alert. Lying flat with no obvious distress  Heart- rapid, no obvious murmur or gallop now  Lungs- decreased BS at left base with some diffuse crackles  Ext- 3+ edema, tense    I/O = -1900 since admission    Impressions:   Principal Problem:    Acute on chronic systolic heart failure (HCC)  Active Problems:    GERD (gastroesophageal reflux disease)    COPD (chronic obstructive pulmonary disease)    Hyperlipemia    History of stroke    Depression    CAD (coronary artery disease)    Dual implantable cardioverter-defibrillator in situ    History of MI (myocardial infarction)    Chronic systolic congestive heart failure ([facility] [address]    Essential hypertension    Type 2 diabetes mellitus without complication, without long-term current use of insulin (HCC)    CHF, acute on chronic (HCC)    Leg swelling  Resolved Problems:    * No resolved hospital problems. *      Plan:   Continue IV diuresis   Monitor I/O, urine output (some urinary complaints, ? BPH)   Consult cardiology, recs appreciated   Follow lytes, kidney functions, recheck BNP, troponin   Hold metformin, monitor sugars     Attending Physician Statement  I have reviewed the chart and seen the patient with the resident(s).   I personally reviewed images, EKG's and similar tests, if present. I personally reviewed and performed key elements of the history and exam.  I have reviewed and confirmed student and/or resident history and exam with changes as indicated above. I agree with the assessment, plan and orders as documented by the resident. Please refer to the resident and/or student note for additional information.       Thomas Parker MD

## 2022-03-23 NOTE — CONSULTS
Nutrition Education    · Verbally reviewed information with Patient  · Educated on heart failure, low sodium, & diabetes diet; reviewed sodium containing foods, carbohydrate containing foods, and healthy fats. Pt engaged in education. · Written educational materials provided. · Contact name and number provided.     Electronically signed by Freddy Streeter RD on 3/23/22 at 12:37 PM EDT    Contact: [phone]

## 2022-03-23 NOTE — PROGRESS NOTES
Patient c/o SOB , sat 98% on RA . Lungs are diminished LLL (cxr show pleural effusion ) . Patient also states that he smells ammonia when taking a deep breath.  Family med resident notified, orders received

## 2022-03-23 NOTE — PROGRESS NOTES
INPATIENT CARDIOLOGY FOLLOW-UP    Name: Terri Cochran    Age: 61 y.o. Date of Admission: 3/21/2022 12:14 PM    Date of Service: 3/23/2022    Chief Complaint: Follow-up for coronary atherosclerosis, ischemic cardiomyopathy, chronic obstructive lung disease, hypertension, moderate obesity, obstructive sleep apnea    Interim History: The patient relates persistent dyspnea and orthopnea in spite of his fluid mobilization with noted increases of serum creatinine levels borderline for indicative of acute kidney injury. He otherwise remains hemodynamically stable. Review of Systems: The remainder of a complete multisystem review including consitutional, central nervous, respiratory, circulatory, gastrointestinal, genitourinary, endocrinologic, hematologic, musculoskeletal and psychiatric are negative. Problem List:  Patient Active Problem List   Diagnosis    GERD (gastroesophageal reflux disease)    COPD (chronic obstructive pulmonary disease)    Hyperlipemia    ED (erectile dysfunction)    History of stroke    Depression    CAD (coronary artery disease)    Dual implantable cardioverter-defibrillator in situ    [facility]onsustained [facility]cardia) [facility]    Diverticulosis of colon    History of MI (myocardial infarction)    Chronic systolic congestive heart failure ([facility] [address]    Essential hypertension    Type 2 diabetes mellitus without complication, without long-term current use of insulin ([facility])    Obesity (BMI 30.0-34. 9)    Ischemic cardiomyopathy    Nonrheumatic aortic valve insufficiency    Elevated PSA, less than 10 ng/ml    CHF, acute on chronic ([facility])    Leg swelling    ALEX (acute kidney injury) ([address]    Age-related nuclear cataract, bilateral    Presbyopia    Regular astigmatism, bilateral    Acute on chronic systolic heart failure (HCC)       Allergies:  No Known Allergies    Current Medications:  Current Facility-Administered Medications   Medication Dose Route Frequency Provider Last Rate Last Admin    bisacodyl (DULCOLAX) EC tablet 5 mg  5 mg Oral Daily Henrene Halsted, MD   5 mg at 03/22/22 1138    furosemide (LASIX) injection 40 mg  40 mg IntraVENous BID Henrene Halsted, MD   40 mg at 03/22/22 1716    ipratropium-albuterol (DUONEB) nebulizer solution 1 ampule  1 ampule Inhalation Q4H PRN Dex Christinas, DO   1 ampule at 03/23/22 0013    aspirin EC tablet 81 mg  81 mg Oral Daily Henrene Halsted, MD   81 mg at 03/22/22 0836    atorvastatin (LIPITOR) tablet 80 mg  80 mg Oral Nightly Henrene Halsted, MD   80 mg at 03/22/22 2131    clopidogrel (PLAVIX) tablet 75 mg  75 mg Oral Daily Henrene Halsted, MD   75 mg at 03/22/22 0836    hydrALAZINE (APRESOLINE) tablet 50 mg  50 mg Oral 3 times per day Henrene Halsted, MD   50 mg at 03/23/22 6225    isosorbide mononitrate (IMDUR) extended release tablet 90 mg  90 mg Oral Daily Henrene Halsted, MD   90 mg at 03/22/22 0825    [Held by provider] lisinopril (PRINIVIL;ZESTRIL) tablet 40 mg  40 mg Oral Daily Henrene Halsted, MD   40 mg at 03/22/22 0825    metoprolol succinate (TOPROL XL) extended release tablet 50 mg  50 mg Oral BID Henrene Halsted, MD   50 mg at 03/22/22 1716    pantoprazole (PROTONIX) tablet 40 mg  40 mg Oral Daily Henrene Halsted, MD   40 mg at 03/23/22 7222    spironolactone (ALDACTONE) tablet 50 mg  50 mg Oral Daily Henrene Halsted, MD   50 mg at 03/22/22 5153    sodium chloride flush 0.9 % injection 5-40 mL  5-40 mL IntraVENous 2 times per day Henrene Halsted, MD   10 mL at 03/22/22 2132    sodium chloride flush 0.9 % injection 5-40 mL  5-40 mL IntraVENous PRN Henrene Halsted, MD        0.9 % sodium chloride infusion  25 mL IntraVENous PRN Henrene Halsted, MD        ondansetron (ZOFRAN-ODT) disintegrating tablet 4 mg  4 mg Oral Q8H PRN Henrene Halsted, MD        Or    ondansetron [facility]) injection 4 mg  4 mg IntraVENous Q6H PRN Henrene Halsted, MD        acetaminophen (TYLENOL) tablet 650 mg  650 mg Oral Q6H PRN Henrene Halsted, MD   650 mg at 03/21/22 2124 Or    acetaminophen (TYLENOL) suppository 650 mg  650 mg Rectal Q6H PRN Lilly Plunkett MD        polyethylene glycol (GLYCOLAX) packet 17 g  17 g Oral Daily Lilly Plunkett MD   17 g at 03/22/22 7570    perflutren lipid microspheres (DEFINITY) injection 1.65 mg  1.5 mL IntraVENous ONCE PRN Lilly Plunkett MD          sodium chloride         Physical Exam:  /86   Pulse 91   Temp 97.5 °F (36.4 °C) (Oral)   Resp 20   Ht 5' 9\" (1.753 m)   Wt 244 lb 6.4 oz (110.9 kg)   SpO2 97%   BMI 36.09 kg/m²   Weight change: -2 lb (-0.907 kg)  Wt Readings from Last 3 Encounters:   03/23/22 244 lb 6.4 oz (110.9 kg)   03/21/22 245 lb 11.2 oz (111.4 kg)   01/31/22 232 lb (105.2 kg)     The patient is awake, alert and in no discomfort or distress. No gross musculoskeletal deformity is present. No significant skin or nail changes are present. Gross examination of head, eyes, nose and throat are negative. Jugular venous pressure is normal and no carotid bruits are present. Normal respiratory effort is noted with no accessory muscle usage present. Lung fields are clear to ascultation. Cardiac examination is notable for a regular rate and rhythm with no palpable thrill. No gallop rhythm or cardiac murmur are identified. A benign abdominal examination is present with no masses or organomegaly. Intact pulses are present throughout all extremities and no peripheral edema is present. No focal neurologic deficits are present. Intake/Output:    Intake/Output Summary (Last 24 hours) at 3/23/2022 0825  Last data filed at 3/23/2022 0634  Gross per 24 hour   Intake 720 ml   Output 1150 ml   Net -430 ml     No intake/output data recorded. Laboratory Tests:  Lab Results   Component Value Date    CREATININE 1.6 (H) 03/23/2022    BUN 26 (H) 03/23/2022     03/23/2022    K 3.7 03/23/2022    CL 99 03/23/2022    CO2 29 03/23/2022     No results for input(s): CKTOTAL, CKMB in the last 72 hours.     Invalid input(s): TROPONONI  No results found for: BNP  Lab Results   Component Value Date    WBC 7.8 03/23/2022    RBC 6.13 03/23/2022    HGB 13.8 03/23/2022    HCT 45.5 03/23/2022    MCV 74.2 03/23/2022    MCH 22.5 03/23/2022    MCHC 30.3 03/23/2022    RDW 20.6 03/23/2022     03/23/2022    MPV 10.6 03/23/2022     Recent Labs     03/21/22  1500 03/22/22  0409 03/23/22  0421   ALKPHOS 177* 146* 160*   ALT 17 14 15   AST 30 26 25   PROT 7.5 5.9* 6.3*   BILITOT 1.6* 1.6* 1.1   LABALBU 3.8 2.9* 3.3*     Lab Results   Component Value Date    MG 1.9 03/23/2022     Lab Results   Component Value Date    PROTIME 11.5 06/29/2017    PROTIME 32.6 08/05/2014    INR 1.1 06/29/2017     Lab Results   Component Value Date    TSH 2.770 08/19/2021     No components found for: CHLPL  Lab Results   Component Value Date    TRIG 220 (H) 08/19/2021    TRIG 253 (H) 05/24/2021    TRIG 223 (H) 03/31/2021     Lab Results   Component Value Date    HDL 25 08/19/2021    HDL 33 05/24/2021    HDL 33 03/31/2021     Lab Results   Component Value Date    LDLCALC 103 (H) 08/19/2021    LDLCALC 129 (H) 05/24/2021    LDLCALC 132 (H) 03/31/2021       Cardiac Tests:  Telemetry findings reviewed: sinus rhythm, no new tachy/bradyarrhythmias overnight      ASSESSMENT / PLAN: On a clinical basis, the patient continues to manifest evidence of volume overload and with a somewhat limited response to diuretic management over the past 24 hours. In light of needs of additional fluid mobilization as well as that of borderline acute kidney injury following diuresis, his angiotensin-converting enzyme inhibitor will be withheld until his volume status has further stabilized in attempt to reduce risk of adverse consequences. In addition, based on present heart rate and blood pressure, further modification of his beta-blocker dosage may be possible following additional fluid mobilization.   Once his volume status is further stabilized, optimum management related to his present preload and afterload reduction will be made. Continued careful monitoring of his volume status in addition to that of renal function electrolytes will be necessary with appropriate nutritional support essential to fluid mobilization as well as that of reducing his risk of progressive debilitation. The importance of CPAP and management of the diastolic component of his heart failure has been reinforced with potential needs of a repeat sleep assessment and attempt to obtain an appropriate unit. Addition, appropriate lifestyle modification to achieve weight reduction will benefit diastolic cardiac performance as well as assisting in management of his obstructive sleep apnea. Ongoing aggressive risk factor modification of blood pressure, diabetes and serum lipids will be essential to reducing risk of future atherosclerotic development. Note: This report was completed utilizing computer voice recognition software. Every effort has been made to ensure accuracy, however; inadvertent computerized transcription errors may be present. Ligia Garcia.  Rona Vasquez, [facility]

## 2022-03-23 NOTE — CONSULTS
Behavioral Health Inpatient Consultation   [facility] Family Medicine    Reason for consult:  Depression, low motivation, poor compliance. Pt seen with Kale Hernández MD PGY-1. Background:  Admitted for CHF exacerbation. Psychotherapy Intervention:  Pt reports ongoing problems with depression. Denies SI/HI. States he lives alone, which makes adhering to healthy diet difficult. Relationship with girlfriend ended recently. Stated he has been more irritable. Stopped antidepressants. Diet includes pizza, steak salad, pasta with vegetables, baked potato, hamburger, ramen noodles. Discussed motivation to change. Stated he is motivated by improving his health (I.e., reducing fluid overload, leg swelling). Discussed his ideas for reducing salt intake. Recommendations/Plan:  Will continue to follow during hospitalization. He has received psychotherapy services at [facility] in the past (Dr. Quincy Hernández), willing to consider initiation of services outpatient with this provider.       Electronically signed by Grant Dill, PhD

## 2022-03-24 LAB
ALBUMIN SERPL-MCNC: 3.6 G/DL (ref 3.5–5.2)
ALP BLD-CCNC: 185 U/L (ref 40–129)
ALT SERPL-CCNC: 18 U/L (ref 0–40)
ANION GAP SERPL CALCULATED.3IONS-SCNC: 13 MMOL/L (ref 7–16)
ANISOCYTOSIS: ABNORMAL
AST SERPL-CCNC: 29 U/L (ref 0–39)
ATYPICAL LYMPHOCYTE RELATIVE PERCENT: 0.9 % (ref 0–4)
BASOPHILS ABSOLUTE: 0.13 E9/L (ref 0–0.2)
BASOPHILS RELATIVE PERCENT: 1.7 % (ref 0–2)
BILIRUB SERPL-MCNC: 1 MG/DL (ref 0–1.2)
BUN BLDV-MCNC: 26 MG/DL (ref 6–20)
CALCIUM SERPL-MCNC: 8.8 MG/DL (ref 8.6–10.2)
CHLORIDE BLD-SCNC: 98 MMOL/L (ref 98–107)
CHLORIDE URINE RANDOM: <20 MMOL/L
CO2: 29 MMOL/L (ref 22–29)
CREAT SERPL-MCNC: 1.4 MG/DL (ref 0.7–1.2)
CREATININE URINE: 92 MG/DL (ref 40–278)
EOSINOPHILS ABSOLUTE: 0.2 E9/L (ref 0.05–0.5)
EOSINOPHILS RELATIVE PERCENT: 2.6 % (ref 0–6)
GFR AFRICAN AMERICAN: >60
GFR NON-AFRICAN AMERICAN: 52 ML/MIN/1.73
GLUCOSE BLD-MCNC: 103 MG/DL (ref 74–99)
HCT VFR BLD CALC: 48.1 % (ref 37–54)
HEMOGLOBIN: 14.7 G/DL (ref 12.5–16.5)
LYMPHOCYTES ABSOLUTE: 1.52 E9/L (ref 1.5–4)
LYMPHOCYTES RELATIVE PERCENT: 19.1 % (ref 20–42)
MAGNESIUM: 2.1 MG/DL (ref 1.6–2.6)
MCH RBC QN AUTO: 22.5 PG (ref 26–35)
MCHC RBC AUTO-ENTMCNC: 30.6 % (ref 32–34.5)
MCV RBC AUTO: 73.7 FL (ref 80–99.9)
MONOCYTES ABSOLUTE: 0.3 E9/L (ref 0.1–0.95)
MONOCYTES RELATIVE PERCENT: 3.5 % (ref 2–12)
NEUTROPHILS ABSOLUTE: 5.47 E9/L (ref 1.8–7.3)
NEUTROPHILS RELATIVE PERCENT: 72.2 % (ref 43–80)
OVALOCYTES: ABNORMAL
PDW BLD-RTO: 20.8 FL (ref 11.5–15)
PLATELET # BLD: 239 E9/L (ref 130–450)
PMV BLD AUTO: 10.7 FL (ref 7–12)
POIKILOCYTES: ABNORMAL
POLYCHROMASIA: ABNORMAL
POTASSIUM SERPL-SCNC: 3.8 MMOL/L (ref 3.5–5)
PROTEIN PROTEIN: 36 MG/DL (ref 0–12)
PROTEIN/CREAT RATIO: 0.4
PROTEIN/CREAT RATIO: 0.4 (ref 0–0.2)
RBC # BLD: 6.53 E12/L (ref 3.8–5.8)
SODIUM BLD-SCNC: 140 MMOL/L (ref 132–146)
SODIUM URINE: 35 MMOL/L
TEAR DROP CELLS: ABNORMAL
TOTAL PROTEIN: 6.9 G/DL (ref 6.4–8.3)
WBC # BLD: 7.6 E9/L (ref 4.5–11.5)

## 2022-03-24 PROCEDURE — 85025 COMPLETE CBC W/AUTO DIFF WBC: CPT

## 2022-03-24 PROCEDURE — 6370000000 HC RX 637 (ALT 250 FOR IP): Performed by: INTERNAL MEDICINE

## 2022-03-24 PROCEDURE — 80053 COMPREHEN METABOLIC PANEL: CPT

## 2022-03-24 PROCEDURE — 99232 SBSQ HOSP IP/OBS MODERATE 35: CPT | Performed by: FAMILY MEDICINE

## 2022-03-24 PROCEDURE — 84156 ASSAY OF PROTEIN URINE: CPT

## 2022-03-24 PROCEDURE — 2580000003 HC RX 258: Performed by: STUDENT IN AN ORGANIZED HEALTH CARE EDUCATION/TRAINING PROGRAM

## 2022-03-24 PROCEDURE — 82570 ASSAY OF URINE CREATININE: CPT

## 2022-03-24 PROCEDURE — 82436 ASSAY OF URINE CHLORIDE: CPT

## 2022-03-24 PROCEDURE — 83735 ASSAY OF MAGNESIUM: CPT

## 2022-03-24 PROCEDURE — 2060000000 HC ICU INTERMEDIATE R&B

## 2022-03-24 PROCEDURE — 6360000002 HC RX W HCPCS: Performed by: STUDENT IN AN ORGANIZED HEALTH CARE EDUCATION/TRAINING PROGRAM

## 2022-03-24 PROCEDURE — 99233 SBSQ HOSP IP/OBS HIGH 50: CPT | Performed by: INTERNAL MEDICINE

## 2022-03-24 PROCEDURE — 6370000000 HC RX 637 (ALT 250 FOR IP): Performed by: STUDENT IN AN ORGANIZED HEALTH CARE EDUCATION/TRAINING PROGRAM

## 2022-03-24 PROCEDURE — 84300 ASSAY OF URINE SODIUM: CPT

## 2022-03-24 PROCEDURE — 36415 COLL VENOUS BLD VENIPUNCTURE: CPT

## 2022-03-24 RX ADMIN — Medication 10 ML: at 21:38

## 2022-03-24 RX ADMIN — SPIRONOLACTONE 50 MG: 25 TABLET ORAL at 08:15

## 2022-03-24 RX ADMIN — ISOSORBIDE MONONITRATE 90 MG: 30 TABLET, EXTENDED RELEASE ORAL at 08:15

## 2022-03-24 RX ADMIN — CLOPIDOGREL BISULFATE 75 MG: 75 TABLET ORAL at 08:15

## 2022-03-24 RX ADMIN — ACETAMINOPHEN 650 MG: 325 TABLET ORAL at 21:38

## 2022-03-24 RX ADMIN — BISACODYL 5 MG: 5 TABLET, COATED ORAL at 08:15

## 2022-03-24 RX ADMIN — Medication 10 ML: at 08:19

## 2022-03-24 RX ADMIN — ATORVASTATIN CALCIUM 80 MG: 40 TABLET, FILM COATED ORAL at 21:37

## 2022-03-24 RX ADMIN — ASPIRIN 81 MG: 81 TABLET, COATED ORAL at 08:14

## 2022-03-24 RX ADMIN — METOPROLOL SUCCINATE 75 MG: 50 TABLET, EXTENDED RELEASE ORAL at 18:27

## 2022-03-24 RX ADMIN — FUROSEMIDE 40 MG: 10 INJECTION, SOLUTION INTRAMUSCULAR; INTRAVENOUS at 08:15

## 2022-03-24 RX ADMIN — METOPROLOL SUCCINATE 75 MG: 50 TABLET, EXTENDED RELEASE ORAL at 08:36

## 2022-03-24 RX ADMIN — FUROSEMIDE 40 MG: 10 INJECTION, SOLUTION INTRAMUSCULAR; INTRAVENOUS at 18:27

## 2022-03-24 ASSESSMENT — PAIN SCALES - GENERAL
PAINLEVEL_OUTOF10: 0

## 2022-03-24 NOTE — CARE COORDINATION
IV lasix BID continues. He remains on room air. Plan remains home when released, he drove himself to outpatient family medicine appointment and car remains in their parking lot. For questions I can be reached at [phone].  Columba Garcia [facility]

## 2022-03-24 NOTE — PLAN OF CARE
Problem: SAFETY  Goal: Free from accidental physical injury  Description: Free from falls  Outcome: Met This Shift     Problem: PAIN  Goal: Patient's pain/discomfort is manageable  Outcome: Met This Shift     Problem: SKIN INTEGRITY  Goal: Skin integrity is maintained or improved  Outcome: Met This Shift     Problem:  Activity Intolerance:  Goal: Ability to tolerate increased activity will improve  Description: Ability to tolerate increased activity will improve  Outcome: Met This Shift     Problem: Fluid Volume - Excess:  Goal: Control of fluid volume excess will improve  Description: Control of fluid volume excess will improve  Outcome: Met This Shift     Problem: OXYGENATION/RESPIRATORY FUNCTION  Goal: Patient will maintain patent airway  Outcome: Met This Shift     Problem: HEMODYNAMIC STATUS  Goal: Patient has stable vital signs and fluid balance  Outcome: Met This Shift

## 2022-03-24 NOTE — PROGRESS NOTES
[address] [facility]  Family Medicine Attending    S: 61 y.o. male with PMH of COPD, CAD, HTN, HLD, DM, depression and chronic CHF who presented to office with increasing SOB and swelling. Admits to missing some doses of his medications over past week. Admitted for evaluation and treatment of CHF, acute on chronic    Today, slightly improved. O: VS- Blood pressure (!) 128/97, pulse 86, temperature 98.1 °F (36.7 °C), temperature source Oral, resp. rate 16, height 5' 9\" (1.753 m), weight 243 lb 9.6 oz (110.5 kg), SpO2 95 %. Exam is as noted by resident with the following changes, additions or corrections:  Awake, alert. Lying flat with no obvious distress  Heart- rapid, no obvious murmur or gallop now  Lungs- decreased BS at left base with some diffuse crackles  Ext- 2+ edema, tense    Weight down 3 pounds    Impressions:   Principal Problem:    Acute on chronic systolic heart failure (HCC)  Active Problems:    GERD (gastroesophageal reflux disease)    COPD (chronic obstructive pulmonary disease)    Hyperlipemia    History of stroke    Depression    CAD (coronary artery disease)    Dual implantable cardioverter-defibrillator in situ    History of MI (myocardial infarction)    Chronic systolic congestive heart failure ([facility] [address]    Essential hypertension    Type 2 diabetes mellitus without complication, without long-term current use of insulin (HCC)    CHF, acute on chronic (HCC)    Leg swelling  Resolved Problems:    * No resolved hospital problems. *      Plan:   Continue IV diuresis   Monitor I/O, urine output    Consult cardiology, recs appreciated   Follow lytes, kidney functions, recheck BNP, troponin   Still only gradual diuresis and slight improvement in symptoms   Will consult nephrology for assistance with diuresis/CKD   Hold metformin, monitor sugars     Attending Physician Statement  I have reviewed the chart and seen the patient with the resident(s).   I personally reviewed images, EKG's and similar tests, if present. I personally reviewed and performed key elements of the history and exam.  I have reviewed and confirmed student and/or resident history and exam with changes as indicated above. I agree with the assessment, plan and orders as documented by the resident. Please refer to the resident and/or student note for additional information.       Fallon Knapp MD

## 2022-03-24 NOTE — PROGRESS NOTES
[facility] Inpatient   Resident Progress Note    S:  Hospital day: 3   Brief Synopsis: Terri Cochran is a 61 y.o. male with a PMH of CHF EF 30% 9/2021, CAD, CVA, GERD, hypertension, hyperlipidemia, insulin-dependent type 2 diabetes, and COPD who presented from clinic due to concern for CHF exacerbation. States he has been having worsening shortness of breath, leg swelling, and 20 lb weight gain for the past 2 weeks. Admitted for CHF exacerbation. No acute events overnight. Denies CP, SOB. Endorses left lower abdominal pain and continues to have LE edema. Cont meds:    sodium chloride       Scheduled meds:    bisacodyl  5 mg Oral Daily    furosemide  40 mg IntraVENous BID    aspirin  81 mg Oral Daily    atorvastatin  80 mg Oral Nightly    clopidogrel  75 mg Oral Daily    hydrALAZINE  50 mg Oral 3 times per day    isosorbide mononitrate  90 mg Oral Daily    [Held by provider] lisinopril  40 mg Oral Daily    metoprolol succinate  50 mg Oral BID    pantoprazole  40 mg Oral Daily    spironolactone  50 mg Oral Daily    sodium chloride flush  5-40 mL IntraVENous 2 times per day    polyethylene glycol  17 g Oral Daily     PRN meds: ipratropium-albuterol, sodium chloride flush, sodium chloride, ondansetron **OR** ondansetron, acetaminophen **OR** acetaminophen, perflutren lipid microspheres     I reviewed the patient's past medical and surgical history, Medications and Allergies. O:  /76   Pulse 85   Temp 97.9 °F (36.6 °C) (Oral)   Resp 16   Ht 5' 9\" (1.753 m)   Wt 243 lb 9.6 oz (110.5 kg)   SpO2 97%   BMI 35.97 kg/m²   24 hour I&O: I/O last 3 completed shifts: In: 1600 [P.O.:1600]  Out: 1850 [Urine:1850]  I/O this shift:  In: 240 [P.O.:240]  Out: 900 [Urine:900]        Physical Exam  Constitutional:       Appearance: Normal appearance. HENT:      Nose: Nose normal.      Mouth/Throat:      Mouth: Mucous membranes are moist.      Pharynx: Oropharynx is clear.    Eyes: Extraocular Movements: Extraocular movements intact. Conjunctiva/sclera: Conjunctivae normal.   Cardiovascular:      Rate and Rhythm: Normal rate and regular rhythm. Pulses: Normal pulses. Heart sounds: Normal heart sounds. No murmur heard. Pulmonary:      Effort: Pulmonary effort is normal. No respiratory distress. Breath sounds: No wheezing or rales. Comments: LLL breath sounds diminished   Abdominal:      General: Abdomen is flat. Bowel sounds are normal.      Tenderness: There is no abdominal tenderness. There is no guarding or rebound. Comments: tense   Musculoskeletal:         General: Swelling (B/L LE edema, nonpitting ) present. Normal range of motion. Skin:     General: Skin is warm and dry. Capillary Refill: Capillary refill takes 2 to 3 seconds. Neurological:      General: No focal deficit present. Mental Status: He is alert and oriented to person, place, and time. Labs:  Na/K/Cl/CO2:  140/3.8/98/29 (03/24 8273)  BUN/Cr/glu/ALT/AST/amyl/lip:  26/1.4/--/18/29/--/-- (03/24 9956)  WBC/Hgb/Hct/Plts:  7.6/14.7/48.1/239 (03/24 9729)  estimated creatinine clearance is 71 mL/min (A) (based on SCr of 1.4 mg/dL (H)). Other pertinent labs as noted below    Radiology:  US URINARY BLADDER LIMITED   Final Result   Unremarkable ultrasound of the bladder. XR CHEST (2 VW)   Final Result   Left pleural effusion and/or infiltrate.       Cardiomegaly               Resident Assessment and Plan     Acute on chronic decompensated HFrEF  - pt presented with worsening shortness of breath and leg swelling x2 weeks  - likely precipitated by uncontrolled HTN, lifestyle changes, medication non-compliance  - home medications Lasix 20 mg daily and spironolactone 50 mg daily, continue spironolactone   -CXR 3/21 showed L pleural effusion   - EKG 3/21 sinus tachy w PVC     -Pro-BNP and troponin trending down   - Continue IV lasix 40 mg to twice daily   - Last echo

## 2022-03-24 NOTE — PROGRESS NOTES
INPATIENT CARDIOLOGY FOLLOW-UP    Name: Cintia Ambrocio    Age: 61 y.o. Date of Admission: 3/21/2022 12:14 PM    Date of Service: 3/24/2022    Chief Complaint: Follow-up for coronary atherosclerosis, ischemic cardiomyopathy, acute superimposed upon chronic combined systolic and diastolic heart failure chronic obstructive lung disease, hypertension, moderate obesity, obstructive sleep apnea    Interim History: The patient presently relates gradual subjective improvement and was sleeping comfortably supine at time of room entry. Based on maintained intake and output limited fluid mobilization has occurred with renal function and electrolytes stable. Review of Systems: The remainder of a complete multisystem review including consitutional, central nervous, respiratory, circulatory, gastrointestinal, genitourinary, endocrinologic, hematologic, musculoskeletal and psychiatric are negative. Problem List:  Patient Active Problem List   Diagnosis    GERD (gastroesophageal reflux disease)    COPD (chronic obstructive pulmonary disease)    Hyperlipemia    ED (erectile dysfunction)    History of stroke    Depression    CAD (coronary artery disease)    Dual implantable cardioverter-defibrillator in situ    [facility]onsustained [facility] tachycardia) [facility]    Diverticulosis of colon    History of MI (myocardial infarction)    Chronic systolic congestive heart failure ([facility]    Essential hypertension    Type 2 diabetes mellitus without complication, without long-term current use of insulin ([facility])    Obesity (BMI 30.0-34. 9)    Ischemic cardiomyopathy    Nonrheumatic aortic valve insufficiency    Elevated PSA, less than 10 ng/ml    CHF, acute on chronic ([facility])    Leg swelling    ALEX (acute kidney injury) ([facility] [facility]    Age-related nuclear cataract, bilateral    Presbyopia    Regular astigmatism, bilateral    Acute on chronic systolic heart failure (HCC)       Allergies:  No Known Allergies    Current Medications:  Current Facility-Administered Medications   Medication Dose Route Frequency Provider Last Rate Last Admin    bisacodyl (DULCOLAX) EC tablet 5 mg  5 mg Oral Daily Kane Haji MD   5 mg at 03/23/22 0854    furosemide (LASIX) injection 40 mg  40 mg IntraVENous BID Kane Haji MD   40 mg at 03/23/22 1855    ipratropium-albuterol (DUONEB) nebulizer solution 1 ampule  1 ampule Inhalation Q4H PRN Shima Tucker DO   1 ampule at 03/23/22 0013    aspirin EC tablet 81 mg  81 mg Oral Daily Kane Haji MD   81 mg at 03/23/22 0854    atorvastatin (LIPITOR) tablet 80 mg  80 mg Oral Nightly Kane Haji MD   80 mg at 03/23/22 2100    clopidogrel (PLAVIX) tablet 75 mg  75 mg Oral Daily Kane Haji MD   75 mg at 03/23/22 0854    hydrALAZINE (APRESOLINE) tablet 50 mg  50 mg Oral 3 times per day Kane Haji MD   50 mg at 03/23/22 2100    isosorbide mononitrate (IMDUR) extended release tablet 90 mg  90 mg Oral Daily aKne Haji MD   90 mg at 03/23/22 0854    [Held by provider] lisinopril (PRINIVIL;ZESTRIL) tablet 40 mg  40 mg Oral Daily Kane Haji MD   40 mg at 03/22/22 0825    metoprolol succinate (TOPROL XL) extended release tablet 50 mg  50 mg Oral BID Kane Haji MD   50 mg at 03/23/22 1855    pantoprazole (PROTONIX) tablet 40 mg  40 mg Oral Daily Kane Haji MD   40 mg at 03/23/22 6731    spironolactone (ALDACTONE) tablet 50 mg  50 mg Oral Daily Kane Haji MD   50 mg at 03/23/22 0854    sodium chloride flush 0.9 % injection 5-40 mL  5-40 mL IntraVENous 2 times per day Kane Haji MD   10 mL at 03/23/22 2100    sodium chloride flush 0.9 % injection 5-40 mL  5-40 mL IntraVENous PRN Kane Haji MD        0.9 % sodium chloride infusion  25 mL IntraVENous PRN Kane Haji MD        ondansetron (ZOFRAN-ODT) disintegrating tablet 4 mg  4 mg Oral Q8H PRN Kane Haji MD        Or    ondansetron [facility]) injection 4 mg  4 mg IntraVENous Q6H PRN Kane Haji MD       [facility] acetaminophen (TYLENOL) tablet 650 mg  650 mg Oral Q6H PRN Jamar Warren MD   650 mg at 03/23/22 2313    Or    acetaminophen (TYLENOL) suppository 650 mg  650 mg Rectal Q6H PRN Jamar Warren MD        polyethylene glycol (GLYCOLAX) packet 17 g  17 g Oral Daily Jamar Warren MD   17 g at 03/23/22 0854    perflutren lipid microspheres (DEFINITY) injection 1.65 mg  1.5 mL IntraVENous ONCE PRN Jamar Warren MD          sodium chloride         Physical Exam:  /76   Pulse 85   Temp 97.9 °F (36.6 °C) (Oral)   Resp 16   Ht 5' 9\" (1.753 m)   Wt 243 lb 9.6 oz (110.5 kg)   SpO2 97%   BMI 35.97 kg/m²   Weight change: -12.8 oz (-0.363 kg)  Wt Readings from Last 3 Encounters:   03/24/22 243 lb 9.6 oz (110.5 kg)   03/21/22 245 lb 11.2 oz (111.4 kg)   01/31/22 232 lb (105.2 kg)     The patient is awake, alert and in no discomfort or distress. No gross musculoskeletal deformity is present. No significant skin or nail changes are present. Gross examination of head, eyes, nose and throat are negative. Jugular venous pressure is normal and no carotid bruits are present. Normal respiratory effort is noted with no accessory muscle usage present. Lung fields are clear to ascultation. Cardiac examination is notable for a regular rate and rhythm with no palpable thrill. No gallop rhythm or cardiac murmur are identified. A benign abdominal examination is present with no masses or organomegaly and mild presacral edema. Intact pulses are present throughout all extremities and moderate pretibial and pedal is present. No focal neurologic deficits are present. Intake/Output:    Intake/Output Summary (Last 24 hours) at 3/24/2022 0808  Last data filed at 3/24/2022 0622  Gross per 24 hour   Intake 720 ml   Output 1700 ml   Net -980 ml     No intake/output data recorded.     Laboratory Tests:  Lab Results   Component Value Date    CREATININE 1.4 (H) 03/24/2022    BUN 26 (H) 03/24/2022     03/24/2022    K 3.8 03/24/2022    CL 98 03/24/2022    CO2 29 03/24/2022     No results for input(s): CKTOTAL, CKMB in the last 72 hours. Invalid input(s): TROPONONI  No results found for: BNP  Lab Results   Component Value Date    WBC 7.6 03/24/2022    RBC 6.53 03/24/2022    HGB 14.7 03/24/2022    HCT 48.1 03/24/2022    MCV 73.7 03/24/2022    MCH 22.5 03/24/2022    MCHC 30.6 03/24/2022    RDW 20.8 03/24/2022     03/24/2022    MPV 10.7 03/24/2022     Recent Labs     03/22/22  0409 03/23/22  0421 03/24/22  0419   ALKPHOS 146* 160* 185*   ALT 14 15 18   AST 26 25 29   PROT 5.9* 6.3* 6.9   BILITOT 1.6* 1.1 1.0   LABALBU 2.9* 3.3* 3.6     Lab Results   Component Value Date    MG 2.1 03/24/2022     Lab Results   Component Value Date    PROTIME 11.5 06/29/2017    PROTIME 32.6 08/05/2014    INR 1.1 06/29/2017     Lab Results   Component Value Date    TSH 2.770 08/19/2021     No components found for: CHLPL  Lab Results   Component Value Date    TRIG 220 (H) 08/19/2021    TRIG 253 (H) 05/24/2021    TRIG 223 (H) 03/31/2021     Lab Results   Component Value Date    HDL 25 08/19/2021    HDL 33 05/24/2021    HDL 33 03/31/2021     Lab Results   Component Value Date    LDLCALC 103 (H) 08/19/2021    LDLCALC 129 (H) 05/24/2021    LDLCALC 132 (H) 03/31/2021       Cardiac Tests:  Telemetry findings reviewed: sinus rhythm with occasional ventricular ectopy, no new tachy/bradyarrhythmias overnight      ASSESSMENT / PLAN: On a clinical basis, the patient presently appears subjectively improved with persistent evidence of volume overload but slight improvement of his lower extremity edema. It is questionable if his intake and output is entirely accurate limiting its accuracy and assistance with volume management and with weights only slightly decreased.   His renal function and electrolytes remain stable with a decrease of his serum creatinine level and spite of additional diuresis with continued plans of maintaining his present diuretic regimen and withholding of

## 2022-03-25 ENCOUNTER — APPOINTMENT (OUTPATIENT)
Dept: GENERAL RADIOLOGY | Age: 57
DRG: 291 | End: 2022-03-25
Attending: FAMILY MEDICINE
Payer: MEDICARE

## 2022-03-25 LAB
ALBUMIN SERPL-MCNC: 3.8 G/DL (ref 3.5–5.2)
ALP BLD-CCNC: 194 U/L (ref 40–129)
ALT SERPL-CCNC: 20 U/L (ref 0–40)
ANION GAP SERPL CALCULATED.3IONS-SCNC: 13 MMOL/L (ref 7–16)
ANISOCYTOSIS: ABNORMAL
AST SERPL-CCNC: 34 U/L (ref 0–39)
BASOPHILS ABSOLUTE: 0.07 E9/L (ref 0–0.2)
BASOPHILS RELATIVE PERCENT: 0.9 % (ref 0–2)
BILIRUB SERPL-MCNC: 1.3 MG/DL (ref 0–1.2)
BUN BLDV-MCNC: 28 MG/DL (ref 6–20)
BURR CELLS: ABNORMAL
CALCIUM SERPL-MCNC: 9.2 MG/DL (ref 8.6–10.2)
CHLORIDE BLD-SCNC: 96 MMOL/L (ref 98–107)
CO2: 29 MMOL/L (ref 22–29)
CREAT SERPL-MCNC: 1.5 MG/DL (ref 0.7–1.2)
EKG ATRIAL RATE: 109 BPM
EKG P AXIS: 57 DEGREES
EKG P-R INTERVAL: 194 MS
EKG Q-T INTERVAL: 364 MS
EKG QRS DURATION: 108 MS
EKG QTC CALCULATION (BAZETT): 490 MS
EKG R AXIS: 97 DEGREES
EKG T AXIS: -26 DEGREES
EKG VENTRICULAR RATE: 109 BPM
EOSINOPHILS ABSOLUTE: 0.17 E9/L (ref 0.05–0.5)
EOSINOPHILS RELATIVE PERCENT: 2.2 % (ref 0–6)
GFR AFRICAN AMERICAN: 58
GFR NON-AFRICAN AMERICAN: 48 ML/MIN/1.73
GLUCOSE BLD-MCNC: 107 MG/DL (ref 74–99)
HCT VFR BLD CALC: 51.5 % (ref 37–54)
HEMOGLOBIN: 15.4 G/DL (ref 12.5–16.5)
IMMATURE GRANULOCYTES #: 0.02 E9/L
IMMATURE GRANULOCYTES %: 0.3 % (ref 0–5)
LYMPHOCYTES ABSOLUTE: 1.62 E9/L (ref 1.5–4)
LYMPHOCYTES RELATIVE PERCENT: 21 % (ref 20–42)
MAGNESIUM: 2.2 MG/DL (ref 1.6–2.6)
MCH RBC QN AUTO: 22.4 PG (ref 26–35)
MCHC RBC AUTO-ENTMCNC: 29.9 % (ref 32–34.5)
MCV RBC AUTO: 74.9 FL (ref 80–99.9)
MONOCYTES ABSOLUTE: 0.72 E9/L (ref 0.1–0.95)
MONOCYTES RELATIVE PERCENT: 9.4 % (ref 2–12)
NEUTROPHILS ABSOLUTE: 5.1 E9/L (ref 1.8–7.3)
NEUTROPHILS RELATIVE PERCENT: 66.2 % (ref 43–80)
PDW BLD-RTO: 20.7 FL (ref 11.5–15)
PLATELET # BLD: 235 E9/L (ref 130–450)
PMV BLD AUTO: 10.8 FL (ref 7–12)
POIKILOCYTES: ABNORMAL
POTASSIUM SERPL-SCNC: 4 MMOL/L (ref 3.5–5)
RBC # BLD: 6.88 E12/L (ref 3.8–5.8)
SODIUM BLD-SCNC: 138 MMOL/L (ref 132–146)
TOTAL PROTEIN: 7.2 G/DL (ref 6.4–8.3)
WBC # BLD: 7.7 E9/L (ref 4.5–11.5)

## 2022-03-25 PROCEDURE — 2060000000 HC ICU INTERMEDIATE R&B

## 2022-03-25 PROCEDURE — 36415 COLL VENOUS BLD VENIPUNCTURE: CPT

## 2022-03-25 PROCEDURE — 80053 COMPREHEN METABOLIC PANEL: CPT

## 2022-03-25 PROCEDURE — 85025 COMPLETE CBC W/AUTO DIFF WBC: CPT

## 2022-03-25 PROCEDURE — 6370000000 HC RX 637 (ALT 250 FOR IP): Performed by: INTERNAL MEDICINE

## 2022-03-25 PROCEDURE — 6370000000 HC RX 637 (ALT 250 FOR IP): Performed by: STUDENT IN AN ORGANIZED HEALTH CARE EDUCATION/TRAINING PROGRAM

## 2022-03-25 PROCEDURE — 2580000003 HC RX 258: Performed by: STUDENT IN AN ORGANIZED HEALTH CARE EDUCATION/TRAINING PROGRAM

## 2022-03-25 PROCEDURE — 71046 X-RAY EXAM CHEST 2 VIEWS: CPT

## 2022-03-25 PROCEDURE — 99232 SBSQ HOSP IP/OBS MODERATE 35: CPT | Performed by: FAMILY MEDICINE

## 2022-03-25 PROCEDURE — 6360000002 HC RX W HCPCS: Performed by: STUDENT IN AN ORGANIZED HEALTH CARE EDUCATION/TRAINING PROGRAM

## 2022-03-25 PROCEDURE — 99233 SBSQ HOSP IP/OBS HIGH 50: CPT | Performed by: INTERNAL MEDICINE

## 2022-03-25 PROCEDURE — 83735 ASSAY OF MAGNESIUM: CPT

## 2022-03-25 RX ORDER — FUROSEMIDE 40 MG/1
40 TABLET ORAL 3 TIMES DAILY
Status: DISCONTINUED | OUTPATIENT
Start: 2022-03-25 | End: 2022-03-27 | Stop reason: HOSPADM

## 2022-03-25 RX ORDER — LANOLIN ALCOHOL/MO/W.PET/CERES
3 CREAM (GRAM) TOPICAL NIGHTLY PRN
Status: DISCONTINUED | OUTPATIENT
Start: 2022-03-25 | End: 2022-03-27 | Stop reason: HOSPADM

## 2022-03-25 RX ADMIN — ISOSORBIDE MONONITRATE 90 MG: 30 TABLET, EXTENDED RELEASE ORAL at 09:30

## 2022-03-25 RX ADMIN — ASPIRIN 81 MG: 81 TABLET, COATED ORAL at 09:30

## 2022-03-25 RX ADMIN — FUROSEMIDE 40 MG: 40 TABLET ORAL at 14:32

## 2022-03-25 RX ADMIN — ATORVASTATIN CALCIUM 80 MG: 40 TABLET, FILM COATED ORAL at 22:00

## 2022-03-25 RX ADMIN — Medication 10 ML: at 09:35

## 2022-03-25 RX ADMIN — CLOPIDOGREL BISULFATE 75 MG: 75 TABLET ORAL at 09:32

## 2022-03-25 RX ADMIN — METOPROLOL SUCCINATE 75 MG: 50 TABLET, EXTENDED RELEASE ORAL at 18:10

## 2022-03-25 RX ADMIN — Medication 10 ML: at 22:00

## 2022-03-25 RX ADMIN — PANTOPRAZOLE SODIUM 40 MG: 40 TABLET, DELAYED RELEASE ORAL at 06:24

## 2022-03-25 RX ADMIN — BISACODYL 5 MG: 5 TABLET, COATED ORAL at 09:31

## 2022-03-25 RX ADMIN — FUROSEMIDE 40 MG: 10 INJECTION, SOLUTION INTRAMUSCULAR; INTRAVENOUS at 09:33

## 2022-03-25 RX ADMIN — METOPROLOL SUCCINATE 75 MG: 50 TABLET, EXTENDED RELEASE ORAL at 09:32

## 2022-03-25 RX ADMIN — SPIRONOLACTONE 50 MG: 25 TABLET ORAL at 09:32

## 2022-03-25 RX ADMIN — FUROSEMIDE 40 MG: 40 TABLET ORAL at 22:00

## 2022-03-25 ASSESSMENT — PAIN SCALES - GENERAL
PAINLEVEL_OUTOF10: 0

## 2022-03-25 NOTE — CONSULTS
[facility] MD Greg Reynolds MD Juan Postal, MD Asuncion Fiddler, MD Verlan Nigh, CNP [place], MARGARITA  Consultation  Patient's Name: Bisi Olivera  9:22 PM  3/24/2022    Nephrologist: Greg Wetzel MD    Reason for Consult: CHF  Requesting Physician:  Olvin Green DO    Chief Complaint: Dyspnea, swelling    History Obtained From: Patient, chart    History of Present Ilness:    Mr. Nanette Melo is a pleasant 60-year-old gentleman with known coronary artery disease, ischemic cardiomyopathy, known systolic and diastolic heart failure, COPD, former smoker having quit 7 years ago, hypertension, SARAH with known also generally poor adherence to recommendations as regards management of his CHF, COPD and SARAH. Presented with a several day history, itself superimposed on a several week history, progressive lower extremity edema, abdominal edema, dyspnea. As of 2 days prior to presentation was unable to walk \"5 feet\" without becoming dyspneic. He notes orthopnea and PND. Some abdominal discomfort due to the distention. No chest pain or palpitations. He does have a cough, intermittently productive of clear at times thick \"phlegm,\" without hemoptysis. He has been wheezing, as well. Some erythema distally though no calor or dolor in his distal lower extremities they are quite uncomfortable due to edema. No headache or lightheadedness. He does note some urinary hesitancy but does feel that he empties his bladder completely. He notes retrograde ejaculation at times, as well. He has been treated with IV Lasix, currently at 40 mg twice a day, with an adequate diuretic response and he has noted improvement in his lower extremity swelling, as well as his dyspnea. Wheezing has improved with appropriate intervention. Tells me he plans on wearing CPAP tonight for treatment of his SARAH. Baseline creatinine 1.3 to 1.5 mg/dL.   Creatinine presentation is 1.6 mg/dL, improving to 1.4 g/dL as of today. Over the course of today he has made on average a little more than 100 cc/h of urine.       Past Medical History:   Diagnosis Date    Allergic rhinitis     Anticoagulant long-term use     Anxiety     Atypical angina (HCC)     CAD (coronary artery disease)     Carotid artery stenosis     Cerebral artery occlusion with cerebral infarction [facility]) 2012    CH[facility]congestive [facility] failure) [facility])     COPD (chronic obstructive pulmonary disease) ([facility] [facility]     CVA (cerebral infarction) 11/19/2012    Depression     Diverticulitis 2010    Sigmoid abscess, s/p colectomy    Erectile dysfunction     GERD (gastroesophageal reflux disease)     Heart attack ([facility] [address] 2014    Hx of blood clots     Hyperlipemia     Hypertension     Ischemic cardiomyopathy     Left atrial thrombus     Obesity     Type 2 diabetes mellitus without complication, without long-term current use of insulin ([facility])     Type II or unspecified type diabetes mellitus without mention of complication, not stated as uncontrolled        Past Surgical History:   Procedure Laterality Date    ABDOMEN SURGERY      CARDIAC CATHETERIZATION  3/30/2015    EF 35%, Dr. Mannie Campuzano, [address]  4/1/2015    left chest, Dual [employer], Dr. Dahlia Whyte, [facility]    COLECTOMY  1/29/2010    laparoscopic, converted to open sigmoid colon resection (diverticular disease), Dr. Chen Rush, [address] [place] [address] COLONOSCOPY  12/5/2005    diverticulosis, Dr. Chen Rush, [address] [place] [address] COLONOSCOPY  9/4/2008    diverticulosis, Dr. Chen Rush, [address] [place] [address]  1/28/2010    prior to colon surgery, diverticulosis, Dr. Chen Rush, [address] [place] [address]  08/07/2015    sigmoid diverticulosis, rectal polyp bx/cauterized (hyperplastic polyp), Dr. Katty Ha, [facility]    CORONARY ANGIOPLASTY WITH STENT PLACEMENT  12/6/2014    LAD PROX [place] 3.5 x 33, Dr. Mery Douglas, [address] [place] [address] CYSTOURETHROSCOPY  1/29/2010    placement bilateral ureteral stents for colon resection, Dr. Aba Bella, [facility]    PACEMAKER PLACEMENT      pacer defib    UPPER GASTROINTESTINAL ENDOSCOPY  8/7/2015    severe duodenitis, mild to moderate gastritis (bx for H pylori), submucosal benign mass body stomach, mild reflux esophagitis, Dr. Jennifer Walker, [facility]    VASCULAR SURGERY         Family History   Problem Relation Age of Onset    High Blood Pressure Father     Heart Disease Father         MI stents    Heart Disease Paternal Uncle     Stroke Sister     No Known Problems Brother     No Known Problems Sister     No Known Problems Brother         reports that he quit smoking about 7 years ago. His smoking use included cigarettes. He has a 30.00 pack-year smoking history. His smokeless tobacco use includes chew. He reports current alcohol use. He reports that he does not use drugs. Allergies:  Patient has no known allergies.     Current Medications:    metoprolol succinate (TOPROL XL) extended release tablet 75 mg, BID  bisacodyl (DULCOLAX) EC tablet 5 mg, Daily  furosemide (LASIX) injection 40 mg, BID  ipratropium-albuterol (DUONEB) nebulizer solution 1 ampule, Q4H PRN  aspirin EC tablet 81 mg, Daily  atorvastatin (LIPITOR) tablet 80 mg, Nightly  clopidogrel (PLAVIX) tablet 75 mg, Daily  [Held by provider] hydrALAZINE (APRESOLINE) tablet 50 mg, 3 times per day  isosorbide mononitrate (IMDUR) extended release tablet 90 mg, Daily  [Held by provider] lisinopril (PRINIVIL;ZESTRIL) tablet 40 mg, Daily  pantoprazole (PROTONIX) tablet 40 mg, Daily  spironolactone (ALDACTONE) tablet 50 mg, Daily  sodium chloride flush 0.9 % injection 5-40 mL, 2 times per day  sodium chloride flush 0.9 % injection 5-40 mL, PRN  0.9 % sodium chloride infusion, PRN  ondansetron (ZOFRAN-ODT) disintegrating tablet 4 mg, Q8H PRN   Or  ondansetron (ZOFRAN) injection 4 mg, Q6H PRN  acetaminophen (TYLENOL) tablet 650 mg, Q6H PRN   Or  acetaminophen (TYLENOL) suppository 650 mg, Q6H PRN  polyethylene glycol (GLYCOLAX) packet 17 g, Daily  perflutren lipid microspheres (DEFINITY) injection 1.65 mg, ONCE PRN        Review of Systems:   Pertinent items are noted in HPI. Otherwise unremarkable. Denies NSAIDs. No recent course of antimicrobials. No other new medications. Physical exam:   /89   Pulse 89   Temp 97.9 °F (36.6 °C) (Oral)   Resp 18   Ht 5' 9\" (1.753 m)   Wt 243 lb 9.6 oz (110.5 kg)   SpO2 97%   BMI 35.97 kg/m²   Age-appropriate obese white gentleman in no apparent distress  NC/AT EOMI sclera conjunctive are clear and anicteric mucous membranes are moist  Neck soft supple trachea midline no JVD at 60 degrees.   No bruit  Bilateral basilar crackles, relatively mild, very mildly prolonged expiratory phase though no wheeze  Regular rhythm normal S1 and S2 2/6 holosystolic murmur heard throughout the precordium  Abdomen soft nontender mildly diffusely distended normal active bowel sounds  2+ distal lower extremity pitting edema  Mild erythema distally but no other rash or lesion on visible portions of integument  Pulses 1-2+ x4  Moves all 4 extremities  Cranial nerves II through XII grossly intact  Awake, alert, interactive and appropriate    Data:   Labs:  CBC with Differential:    Lab Results   Component Value Date    WBC 7.6 03/24/2022    RBC 6.53 03/24/2022    HGB 14.7 03/24/2022    HCT 48.1 03/24/2022     03/24/2022    MCV 73.7 03/24/2022    MCH 22.5 03/24/2022    MCHC 30.6 03/24/2022    RDW 20.8 03/24/2022    SEGSPCT 57 09/04/2013    LYMPHOPCT 19.1 03/24/2022    MONOPCT 3.5 03/24/2022    BASOPCT 1.7 03/24/2022    MONOSABS 0.30 03/24/2022    LYMPHSABS 1.52 03/24/2022    EOSABS 0.20 03/24/2022    BASOSABS 0.13 03/24/2022     CMP:    Lab Results   Component Value Date     03/24/2022    K 3.8 03/24/2022    K 3.6 01/23/2022    CL 98 03/24/2022    CO2 29 03/24/2022    BUN 26 03/24/2022    CREATININE 1.4 03/24/2022    GFRAA >60 03/24/2022    LABGLOM 52 03/24/2022 GLUCOSE 103 03/24/2022    GLUCOSE 129 04/23/2012    PROT 6.9 03/24/2022    LABALBU 3.6 03/24/2022    LABALBU 4.3 04/16/2012    CALCIUM 8.8 03/24/2022    BILITOT 1.0 03/24/2022    ALKPHOS 185 03/24/2022    AST 29 03/24/2022    ALT 18 03/24/2022     Ionized Calcium:  No results found for: IONCA  Magnesium:    Lab Results   Component Value Date    MG 2.1 03/24/2022     Phosphorus:    Lab Results   Component Value Date    PHOS 3.4 05/18/2016     U/A:    Lab Results   Component Value Date    COLORU DARK YELLOW 03/21/2022    PHUR 5.5 03/21/2022    WBCUA NONE 03/21/2022    RBCUA 1-3 03/21/2022    RBCUA 0-1 01/20/2014    BACTERIA NONE SEEN 03/21/2022    CLARITYU Clear 03/21/2022    SPECGRAV >=1.030 03/21/2022    LEUKOCYTESUR Negative 03/21/2022    UROBILINOGEN 2.0 03/21/2022    BILIRUBINUR MODERATE 03/21/2022    BLOODU TRACE-LYSED 03/21/2022    GLUCOSEU Negative 03/21/2022    AMORPHOUS FEW 01/20/2014     Microalbumen/Creatinine ratio:  No components found for: RUCREAT  Iron Saturation:  No components found for: PERCENTFE  TIBC:    Lab Results   Component Value Date    TIBC 345 03/10/2014     FERRITIN:    Lab Results   Component Value Date    FERRITIN 263 03/10/2014        Imaging:  US URINARY BLADDER LIMITED   Final Result   Unremarkable ultrasound of the bladder. XR CHEST (2 VW)   Final Result   Left pleural effusion and/or infiltrate. Cardiomegaly             Assessment  1. Chronic kidney disease stage IIIa, baseline creatinine 1.3 to 1.5 mg/dL. Currently at baseline. 2. Acute exacerbation of chronic systolic and diastolic CHF due to ischemic cardiomyopathy. Poor adherence to multiple aspects of his heart failure COPD regimen including lack of fluid restriction, lack of dietary sodium restriction, frequent missing of oral Lasix doses at home among other as well as not consistently using his CPAP have contributed to the exacerbation. Discussed with him.   Diuretic response to IV Lasix so far here has been adequate. Recommendations  1. Continue current IV loop therapy  2. Continue fluid strict  3. Continue low-sodium diet  4. Continue CPAP  5. Follow labs and UO  6. Continue supportive care      Thank you for the opportunity to participate in the care of your pleasant patient. We look forward to following along with you.         Electronically signed by Eugenie Martini MD on 3/24/2022

## 2022-03-25 NOTE — PROGRESS NOTES
Physician Progress Note      Josh MEADE  CSN #:                  [id number]  :                       1965  ADMIT DATE:       3/21/2022 12:14 PM  [address] [place] DATE:  RESPONDING  PROVIDER #:        Queenie Stanton DO          QUERY TEXT:    Patient admitted with worsening sob, leg swelling. Noted documentation of   Acute on chronic systolic heart failure per H&P 3/21 and acute superimposed   upon chronic combined systolic and diastolic heart failure per 3/22 Cardiology   consult. If possible, please document in progress notes and discharge summary if you   are evaluating and /or treating any of the following: The medical record reflects the following:  Risk Factors: CAD with anterior STEMI  s/p JOLENE-LAD, ICM/HFrEF (EF 30% TTE   2021), s/p ICD (BS implant ), HTN, HLD, T2DM, obesity, depression,   previous tobacco abuse / COPD, GERD, right CVA  with residual gait   instability  Clinical Indicators: 3/22 Cardiology consult- \"echocardiogram in  demonstrating apical akinesis with severe left ventricular systolic   dysfunction estimated ejection fraction of 30% with stage III diastolic   dysfunction and mild right ventricular dysfunction with mild posterior   directed mitral regurgitation moderate aortic insufficiency. He was direct   admit to a telemetry monitoring floor with acute decompensated HFrEF. He was   started on IV Lasix BID; net - 1.1L since admission.  Diagnostic assessment and   plan: patient presents with evidence of acute superimpose  Cardiomegaly  Treatment: IV lasix, CXR, serial labs, monitoring, Strict I&O, daily weights,   compression stockings, EKG, consults    Thank you,  Gale Carranza RN  Clinical   [phone]  Options provided:  -- Acute on chronic systolic heart failure confirmed and Acute on chronic   combined systolic and diastolic heart failure ruled out  -- Acute on chronic combined systolic and diastolic heart failure confirmed   and Acute on chronic systolic heart failure ruled out  -- Other - I will add my own diagnosis  -- Disagree - Not applicable / Not valid  -- Disagree - Clinically unable to determine / Unknown  -- Refer to Clinical Documentation Reviewer    PROVIDER RESPONSE TEXT:    After study, Acute on chronic combined systolic and diastolic heart failure   confirmed and Acute on chronic systolic heart failure ruled out.     Query created by: Alexa Swanson on 3/25/2022 1:06 PM      Electronically signed by:  Kali Milian DO 3/25/2022 2:09 PM

## 2022-03-25 NOTE — PROGRESS NOTES
[address] [facility]  Family Medicine Attending    S: 61 y.o. male with PMH of COPD, CAD, HTN, HLD, DM, depression and chronic CHF who presented to office with increasing SOB and swelling. Admits to missing some doses of his medications over past week. Admitted for evaluation and treatment of CHF, acute on chronic    Today, slightly improved again w/ cont diuresis. MOOSE. Reports trying to watch PO liquid intake. O: VS- Blood pressure (!) 143/97, pulse 78, temperature 96.7 °F (35.9 °C), temperature source Axillary, resp. rate 18, height 5' 9\" (1.753 m), weight 241 lb 12.8 oz (109.7 kg), SpO2 94 %. Exam is as noted by resident with the following changes, additions or corrections:  Awake, alert. Sitting up in bed with no obvious distress  Heart- rapid, no obvious murmur or gallop now  Lungs-clear with no obvious rales. Ext- 2+ edema, less tense today, compression stockings on bilat. Weight down 5 pounds since admit (down 2 overnight)     Impressions:   Principal Problem:    Acute on chronic systolic heart failure (HCC)  Active Problems:    GERD (gastroesophageal reflux disease)    COPD (chronic obstructive pulmonary disease)    Hyperlipemia    History of stroke    Depression    CAD (coronary artery disease)    Dual implantable cardioverter-defibrillator in situ    History of MI (myocardial infarction)    Chronic systolic congestive heart failure ([facility] [address]    Essential hypertension    Type 2 diabetes mellitus without complication, without long-term current use of insulin (HCC)    CHF, acute on chronic (HCC)    Leg swelling  Resolved Problems:    * No resolved hospital problems.  *      Plan:   Continue IV diuresis   Monitor I/O, urine output    Recs appreciated from Cardio/Neph   Follow lytes, kidney functions, recheck BNP as needed   Still only gradual diuresis and slight improvement in symptoms   Hold metformin, monitor sugars     Attending Physician Statement  I have reviewed the chart and seen the patient with the resident(s). I personally reviewed images, EKG's and similar tests, if present. I personally reviewed and performed key elements of the history and exam.  I have reviewed and confirmed student and/or resident history and exam with changes as indicated above. I agree with the assessment, plan and orders as documented by the resident. Please refer to the resident and/or student note for additional information.       Sherin Hewitt MD

## 2022-03-25 NOTE — PROGRESS NOTES
[facility] Roberto A. Graylin Jakob, MD Cristy Lennox, MD Ledell Poag, MD Carleton Morita, MD Anniece Maywood, LUIS MIGUEL Latham, MARGARITA  Progress Note    3/25/2022    SUBJECTIVE:   (-) c/o's  (-) sob/lopez/cp/palp Appetite ok  No new complaints  PROBLEM LIST:    Principal Problem:    Acute on chronic systolic heart failure (HCC)  Active Problems:    GERD (gastroesophageal reflux disease)    COPD (chronic obstructive pulmonary disease)    Hyperlipemia    History of stroke    Depression    CAD (coronary artery disease)    Dual implantable cardioverter-defibrillator in situ    History of MI (myocardial infarction)    Chronic systolic congestive heart failure ([facility] [address]    Essential hypertension    Type 2 diabetes mellitus without complication, without long-term current use of insulin ([facility])    CHF, acute on chronic ([facility])    Leg swelling  Resolved Problems:    * No resolved hospital problems. *         DIET:    ADULT DIET; Regular;  Low Sodium (2 gm)     MEDS (scheduled):    furosemide  40 mg Oral TID    metoprolol succinate  75 mg Oral BID    bisacodyl  5 mg Oral Daily    aspirin  81 mg Oral Daily    atorvastatin  80 mg Oral Nightly    clopidogrel  75 mg Oral Daily    [Held by provider] hydrALAZINE  50 mg Oral 3 times per day    isosorbide mononitrate  90 mg Oral Daily    [Held by provider] lisinopril  40 mg Oral Daily    pantoprazole  40 mg Oral Daily    spironolactone  50 mg Oral Daily    sodium chloride flush  5-40 mL IntraVENous 2 times per day    polyethylene glycol  17 g Oral Daily       MEDS (infusions):   sodium chloride         MEDS (prn):  melatonin, ipratropium-albuterol, sodium chloride flush, sodium chloride, ondansetron **OR** ondansetron, acetaminophen **OR** acetaminophen, perflutren lipid microspheres    PHYSICAL EXAM:     Patient Vitals for the past 24 hrs:   BP Temp Temp src Pulse Resp SpO2 Weight   03/25/22 1131 (!) 143/97 96.7 °F (35.9 °C) Axillary 78 18 94 % -- 03/25/22 0945 -- -- -- 87 -- 95 % --   03/25/22 0848 (!) 137/92 97.8 °F (36.6 °C) Oral 83 20 -- --   03/25/22 0615 -- -- -- -- -- -- 241 lb 12.8 oz (109.7 kg)   03/24/22 2130 (!) 136/97 98 °F (36.7 °C) Oral 85 20 97 % --   @      Intake/Output Summary (Last 24 hours) at 3/25/2022 1439  Last data filed at 3/25/2022 1438  Gross per 24 hour   Intake 975 ml   Output 1275 ml   Net -300 ml         Wt Readings from Last 3 Encounters:   03/25/22 241 lb 12.8 oz (109.7 kg)   03/21/22 245 lb 11.2 oz (111.4 kg)   01/31/22 232 lb (105.2 kg)       Constitutional:  in no acute distress  HEENT: NC/AT, EOMI, sclera and conjunctiva are clear and anicteric, mucus membranes moist  Neck: Trachea midline, no JVD  Cardiovascular: S1, S2 regular rhythm, no murmur,or rub  Respiratory:  No crackles, no wheeze  Gastrointestinal:  Soft, nontender, nondistended, NABS  Ext: no edema, feet warm  Skin: dry, no rash  Neuro: awake, alert, interactive      DATA:    Recent Labs     03/23/22  0421 03/24/22  0419 03/25/22  0436   WBC 7.8 7.6 7.7   HGB 13.8 14.7 15.4   HCT 45.5 48.1 51.5   MCV 74.2* 73.7* 74.9*    239 235     Recent Labs     03/23/22  0421 03/24/22  0419 03/25/22  0436    140 138   K 3.7 3.8 4.0   CL 99 98 96*   CO2 29 29 29   MG 1.9 2.1 2.2   BUN 26* 26* 28*   CREATININE 1.6* 1.4* 1.5*   ALT 15 18 20   AST 25 29 34   BILITOT 1.1 1.0 1.3*   ALKPHOS 160* 185* 194*       Lab Results   Component Value Date    LABPROT 0.4 (H) 03/24/2022    LABPROT 0.4 03/24/2022       ASSESSMENT / RECOMMENDATIONS:    Assessment  1. Chronic kidney disease stage IIIa, baseline creatinine 1.3 to 1.5 mg/dL. Currently at baseline.     2. Acute exacerbation of chronic systolic and diastolic CHF due to ischemic cardiomyopathy.   Poor adherence to multiple aspects of his heart failure COPD regimen including lack of fluid restriction, lack of dietary sodium restriction, frequent missing of oral Lasix doses at home among other as well as not consistently using his CPAP have contributed to the exacerbation. Discussed with him. Diuretic response to IV Lasix so far here has been adequate.     Recommendations  1. Continue current IV loop therapy , will change furosemide to 3 times a day rather than bid , for persistent swelling. 2. Continue fluid strict  3. Continue low-sodium diet  4. Continue CPAP  5. Follow labs and UO  6.  Continue supportive care    Electronically signed by Marques Calabrese MD on 3/25/2022 at 2:39 PM

## 2022-03-25 NOTE — CARE COORDINATION
3/25/2022 - Cardiology and nephrology following. IV lasix twice daily continued. Discharge plan continues to return home when medically stable- his car is in the [facility] center parking lot and will drive himself home. SW/CM will follow.

## 2022-03-25 NOTE — PROGRESS NOTES
Pt reports after taking lasix he became nauseous and vomited with some dry heaves and at that time had some bowel incontinent. He reports some purple coloring in his face. Vitals are stable. Pt educated to not flush after vomit and notify RN when becoming nauseous.

## 2022-03-25 NOTE — PROGRESS NOTES
INPATIENT CARDIOLOGY FOLLOW-UP    Name: Cintia Ambrocio    Age: 61 y.o. Date of Admission: 3/21/2022 12:14 PM    Date of Service: 3/25/2022    Chief Complaint: Follow-up for coronary atherosclerosis, ischemic cardiomyopathy, acute superimposed upon chronic combined systolic and diastolic heart failure, chronic obstructive lung disease, hypertension, moderate obesity, obstructive sleep apnea    Interim History: The patient continues to relate subjective improvement with ongoing fluid mobilization and stable renal function with persistent evidence of volume overload. He is hemodynamically tolerated adjustment of his medical regimen without difficulty. Interim assessment and recommendations of the nephrology service have been reviewed. Review of Systems: The remainder of a complete multisystem review including consitutional, central nervous, respiratory, circulatory, gastrointestinal, genitourinary, endocrinologic, hematologic, musculoskeletal and psychiatric are negative. Problem List:  Patient Active Problem List   Diagnosis    GERD (gastroesophageal reflux disease)    COPD (chronic obstructive pulmonary disease)    Hyperlipemia    ED (erectile dysfunction)    History of stroke    Depression    CAD (coronary artery disease)    Dual implantable cardioverter-defibrillator in situ    [facility]onsustained ventricular tachycardia) ([facility]    Diverticulosis of colon    History of MI (myocardial infarction)    Chronic systolic congestive heart failure ([facility] [address]    Essential hypertension    Type 2 diabetes mellitus without complication, without long-term current use of insulin ([facility])    Obesity (BMI 30.0-34. 9)    Ischemic cardiomyopathy    Nonrheumatic aortic valve insufficiency    Elevated PSA, less than 10 ng/ml    CHF, acute on chronic ([facility])    Leg swelling    ALEX (acute kidney injury) ([address]    Age-related nuclear cataract, bilateral    Presbyopia    Regular astigmatism, bilateral    Acute on chronic systolic heart failure (HCC)       Allergies:  No Known Allergies    Current Medications:  Current Facility-Administered Medications   Medication Dose Route Frequency Provider Last Rate Last Admin    metoprolol succinate (TOPROL XL) extended release tablet 75 mg  75 mg Oral BID Reese Collins MD   75 mg at 03/24/22 1827    bisacodyl (DULCOLAX) EC tablet 5 mg  5 mg Oral Daily Addis Hendricks MD   5 mg at 03/24/22 0815    furosemide (LASIX) injection 40 mg  40 mg IntraVENous BID Addis Hendricks MD   40 mg at 03/24/22 1827    ipratropium-albuterol (DUONEB) nebulizer solution 1 ampule  1 ampule Inhalation Q4H PRN Melda Beecham, DO   1 ampule at 03/23/22 0013    aspirin EC tablet 81 mg  81 mg Oral Daily Addis Hendricks MD   81 mg at 03/24/22 0814    atorvastatin (LIPITOR) tablet 80 mg  80 mg Oral Nightly Addis Hendricks MD   80 mg at 03/24/22 2137    clopidogrel (PLAVIX) tablet 75 mg  75 mg Oral Daily Addis Hendricks MD   75 mg at 03/24/22 0815    [Held by provider] hydrALAZINE (APRESOLINE) tablet 50 mg  50 mg Oral 3 times per day Addis Hendricks MD   50 mg at 03/23/22 2100    isosorbide mononitrate (IMDUR) extended release tablet 90 mg  90 mg Oral Daily Addis Hendricks MD   90 mg at 03/24/22 0815    [Held by provider] lisinopril (PRINIVIL;ZESTRIL) tablet 40 mg  40 mg Oral Daily Addis Hendricks MD   40 mg at 03/22/22 0825    pantoprazole (PROTONIX) tablet 40 mg  40 mg Oral Daily Addis Hendricks MD   40 mg at 03/25/22 9104    spironolactone (ALDACTONE) tablet 50 mg  50 mg Oral Daily Addis Hendricks MD   50 mg at 03/24/22 0815    sodium chloride flush 0.9 % injection 5-40 mL  5-40 mL IntraVENous 2 times per day Addis Hendricks MD   10 mL at 03/24/22 2138    sodium chloride flush 0.9 % injection 5-40 mL  5-40 mL IntraVENous PRN Addis Hendricks MD        0.9 % sodium chloride infusion  25 mL IntraVENous PRN Addis Hendricks MD        ondansetron (ZOFRAN-ODT) disintegrating tablet 4 mg  4 mg Oral Q8H PRN Addis Hendricks MD Or    ondansetron (ZOFRAN) injection 4 mg  4 mg IntraVENous Q6H PRN Donal Singer MD        acetaminophen (TYLENOL) tablet 650 mg  650 mg Oral Q6H PRN Donal Singer MD   650 mg at 03/24/22 2138    Or    acetaminophen (TYLENOL) suppository 650 mg  650 mg Rectal Q6H PRN Donal Singer MD        polyethylene glycol (GLYCOLAX) packet 17 g  17 g Oral Daily Donal Singer MD   17 g at 03/23/22 0854    perflutren lipid microspheres (DEFINITY) injection 1.65 mg  1.5 mL IntraVENous ONCE PRN Donal Singer MD          sodium chloride         Physical Exam:  BP (!) 136/97   Pulse 85   Temp 98 °F (36.7 °C) (Oral)   Resp 20   Ht 5' 9\" (1.753 m)   Wt 241 lb 12.8 oz (109.7 kg)   SpO2 97%   BMI 35.71 kg/m²   Weight change: -1 lb 12.8 oz (-0.816 kg)  Wt Readings from Last 3 Encounters:   03/25/22 241 lb 12.8 oz (109.7 kg)   03/21/22 245 lb 11.2 oz (111.4 kg)   01/31/22 232 lb (105.2 kg)     The patient is awake, alert and in no discomfort or distress. No gross musculoskeletal deformity is present. No significant skin or nail changes are present. Gross examination of head, eyes, nose and throat are negative. Jugular venous pressure is normal and no carotid bruits are present. Normal respiratory effort is noted with no accessory muscle usage present. Lung fields are clear to ascultation. Cardiac examination is notable for a regular rate and rhythm with no palpable thrill. No gallop rhythm or cardiac murmur are identified. A benign abdominal examination is present with no masses or organomegaly and with mild residual presacral edema. Intact pulses are present throughout all extremities and persistent mild pretibial and pedal edema is present in spite of compression stockings. No focal neurologic deficits are present.     Intake/Output:    Intake/Output Summary (Last 24 hours) at 3/25/2022 0762  Last data filed at 3/25/2022 0626  Gross per 24 hour   Intake 970 ml   Output 2025 ml   Net -1055 ml     No intake/output data recorded. Laboratory Tests:  Lab Results   Component Value Date    CREATININE 1.5 (H) 03/25/2022    BUN 28 (H) 03/25/2022     03/25/2022    K 4.0 03/25/2022    CL 96 (L) 03/25/2022    CO2 29 03/25/2022     No results for input(s): CKTOTAL, CKMB in the last 72 hours. Invalid input(s): TROPONONI  No results found for: BNP  Lab Results   Component Value Date    WBC 7.7 03/25/2022    RBC 6.88 03/25/2022    HGB 15.4 03/25/2022    HCT 51.5 03/25/2022    MCV 74.9 03/25/2022    MCH 22.4 03/25/2022    MCHC 29.9 03/25/2022    RDW 20.7 03/25/2022     03/25/2022    MPV 10.8 03/25/2022     Recent Labs     03/23/22  0421 03/24/22  0419 03/25/22  0436   ALKPHOS 160* 185* 194*   ALT 15 18 20   AST 25 29 34   PROT 6.3* 6.9 7.2   BILITOT 1.1 1.0 1.3*   LABALBU 3.3* 3.6 3.8     Lab Results   Component Value Date    MG 2.2 03/25/2022     Lab Results   Component Value Date    PROTIME 11.5 06/29/2017    PROTIME 32.6 08/05/2014    INR 1.1 06/29/2017     Lab Results   Component Value Date    TSH 2.770 08/19/2021     No components found for: CHLPL  Lab Results   Component Value Date    TRIG 220 (H) 08/19/2021    TRIG 253 (H) 05/24/2021    TRIG 223 (H) 03/31/2021     Lab Results   Component Value Date    HDL 25 08/19/2021    HDL 33 05/24/2021    HDL 33 03/31/2021     Lab Results   Component Value Date    LDLCALC 103 (H) 08/19/2021    LDLCALC 129 (H) 05/24/2021    LDLCALC 132 (H) 03/31/2021       Cardiac Tests:  Telemetry findings reviewed: sinus rhythm, no new tachy/bradyarrhythmias overnight      ASSESSMENT / PLAN: On a clinical basis, the patient remains compensated from a cardiovascular standpoint with ongoing fluid mobilization and stable renal function but a minimal rise of serum creatinine levels. On the basis of persistent volume overload continued intravenous diuretics will be maintained with present ongoing withholding of his angiotensin-converting enzyme inhibitor to reduce risk of acute kidney injury.   He has tolerated modification of his beta-blocker dosage without difficulty and with adequate blood pressure and heart rate to permit further dose modification following the achievement of a steady state. Continued careful monitoring of his volume status as well as that of his renal function and electrolytes will remain essential to ongoing management. His needs of appropriate compliance with dietary restriction in addition to that of the combination of weight reduction and appropriate use of nocturnal CPAP have been reinforced. Ongoing aggressive risk factor modification of blood pressure, diabetes and serum lipids will remain essential to reducing risk of future atherosclerotic development. Additional management will be deferred to both primary care and the nephrology service. Note: This report was completed utilizing computer voice recognition software. Every effort has been made to ensure accuracy, however; inadvertent computerized transcription errors may be present. Arellano .  Jordon Nelson, [id number] [facility]

## 2022-03-25 NOTE — PROGRESS NOTES
[facility] Inpatient   Resident Progress Note    S:  Hospital day: 4   Brief Synopsis: Zafar Hollins is a 61 y.o. male with a PMH of CHF EF 30% 9/2021, CAD, CVA, GERD, hypertension, hyperlipidemia, insulin-dependent type 2 diabetes, and COPD who presented from clinic due to concern for CHF exacerbation. States he has been having worsening shortness of breath, leg swelling, and 20 lb weight gain for the past 2 weeks. Admitted for CHF exacerbation. No acute events overnight. Denies CP, SOB. Endorses left lower abdominal pain. LE edema improving with compression stockings. Was not able to sleep last night due to discomfort in legs. Cont meds:    sodium chloride       Scheduled meds:    metoprolol succinate  75 mg Oral BID    bisacodyl  5 mg Oral Daily    furosemide  40 mg IntraVENous BID    aspirin  81 mg Oral Daily    atorvastatin  80 mg Oral Nightly    clopidogrel  75 mg Oral Daily    [Held by provider] hydrALAZINE  50 mg Oral 3 times per day    isosorbide mononitrate  90 mg Oral Daily    [Held by provider] lisinopril  40 mg Oral Daily    pantoprazole  40 mg Oral Daily    spironolactone  50 mg Oral Daily    sodium chloride flush  5-40 mL IntraVENous 2 times per day    polyethylene glycol  17 g Oral Daily     PRN meds: ipratropium-albuterol, sodium chloride flush, sodium chloride, ondansetron **OR** ondansetron, acetaminophen **OR** acetaminophen, perflutren lipid microspheres     I reviewed the patient's past medical and surgical history, Medications and Allergies. O:  BP (!) 136/97   Pulse 85   Temp 98 °F (36.7 °C) (Oral)   Resp 20   Ht 5' 9\" (1.753 m)   Wt 241 lb 12.8 oz (109.7 kg)   SpO2 97%   BMI 35.71 kg/m²   24 hour I&O: I/O last 3 completed shifts: In: 65 [P.O.:1600; I.V.:10]  Out: 3025 [Urine:3025]  I/O this shift: In: 480 [P.O.:480]  Out: 700 [Urine:700]        Physical Exam  Constitutional:       Appearance: Normal appearance.    HENT:      Nose: Nose normal.      Mouth/Throat:      Mouth: Mucous membranes are moist.      Pharynx: Oropharynx is clear. Eyes:      Extraocular Movements: Extraocular movements intact. Conjunctiva/sclera: Conjunctivae normal.   Cardiovascular:      Rate and Rhythm: Normal rate and regular rhythm. Pulses: Normal pulses. Heart sounds: Normal heart sounds. No murmur heard. Pulmonary:      Effort: Pulmonary effort is normal. No respiratory distress. Breath sounds: No wheezing or rales. Comments: LLL breath sounds diminished   Abdominal:      General: Abdomen is flat. Bowel sounds are normal.      Palpations: Abdomen is soft. Tenderness: There is no abdominal tenderness. There is no guarding or rebound. Musculoskeletal:         General: Swelling (B/L LE edema, pitting, 1+, compression stockings on) present. Normal range of motion. Skin:     General: Skin is warm and dry. Capillary Refill: Capillary refill takes 2 to 3 seconds. Neurological:      General: No focal deficit present. Mental Status: He is alert and oriented to person, place, and time. Labs:  Na/K/Cl/CO2:  138/4.0/96/29 (03/25 2686)  BUN/Cr/glu/ALT/AST/amyl/lip:  28/1.5/--/20/34/--/-- (03/25 6056)  WBC/Hgb/Hct/Plts:  7.6/14.7/48.1/239 (03/24 5159)  estimated creatinine clearance is 66 mL/min (A) (based on SCr of 1.5 mg/dL (H)). Other pertinent labs as noted below    Radiology:  US URINARY BLADDER LIMITED   Final Result   Unremarkable ultrasound of the bladder. XR CHEST (2 VW)   Final Result   Left pleural effusion and/or infiltrate.       Cardiomegaly               Resident Assessment and Plan     Acute on chronic decompensated HFrEF  - pt presented with worsening shortness of breath and leg swelling x2 weeks  - likely precipitated by uncontrolled HTN, lifestyle changes, medication non-compliance  - home medications Lasix 20 mg daily and spironolactone 50 mg daily, continue spironolactone   -CXR 3/21 showed L pleural effusion   - EKG 3/21 sinus tachy w PVC     -Pro-BNP and troponin trending down   - Continue IV lasix 40 mg to twice daily   - Last echo 9/2021 with EF 30%, consider repeat due to no complete resolution since last exacerbation at that time   - UO -2L in 24h, Strict I&O   - daily weights, down 5lbs  - Respiratory panel negative   - Cardio consulted: Continue with aggressive diuresis and risk factor modification  - Creatinine 1.5, trending down   - nephro consulted: continue with diuresis, fluid restriction    Incomplete bladder emptying - resolved  - may be 2/2 to UTI vs BPH  vs obstruction   - PSA 9.20 3/2021, did not follow up with urology OP   - UA positive for >300 protein, moderate bili,  Urobilinogen   - urine culture NGTD  - UO adequate right now likely 2/2 to aggressive diuresis     - Bladder scan normal, no PVR  - Will need to follow up with urology OP for elevated PSA and bladder symptoms      Type 2 diabetes, non-insulin-dependent  -On Metformin, hold  - Monitor glucose, will order LDSS if needed      Hypertension  -Uncontrolled  -Continue home medications, hydralazine  - Cardio consulted: continue metoprolol 25 mg BID, Hold lisinopril due to worsening renal function.    - Will need sleep study OP for SARAH     GERD  -Continue Protonix     History of CVA  -Continue Plavix, aspirin          PT/OT evaluation: none   DVT prophylaxis: plavix  GI prophylaxis: protonix   Disposition: continue current management   Diet: low sodium          Electronically signed by Boo Jones MD on 3/25/2022 at 6:58 AM  Attending physician: Dr. Elo Leong

## 2022-03-26 LAB
ACANTHOCYTES: ABNORMAL
ALBUMIN SERPL-MCNC: 3.5 G/DL (ref 3.5–5.2)
ALP BLD-CCNC: 186 U/L (ref 40–129)
ALT SERPL-CCNC: 30 U/L (ref 0–40)
ANION GAP SERPL CALCULATED.3IONS-SCNC: 16 MMOL/L (ref 7–16)
ANISOCYTOSIS: ABNORMAL
AST SERPL-CCNC: 50 U/L (ref 0–39)
BASOPHILS ABSOLUTE: 0.11 E9/L (ref 0–0.2)
BASOPHILS RELATIVE PERCENT: 1.7 % (ref 0–2)
BILIRUB SERPL-MCNC: 1.4 MG/DL (ref 0–1.2)
BUN BLDV-MCNC: 32 MG/DL (ref 6–20)
BURR CELLS: ABNORMAL
CALCIUM SERPL-MCNC: 9.1 MG/DL (ref 8.6–10.2)
CHLORIDE BLD-SCNC: 94 MMOL/L (ref 98–107)
CO2: 25 MMOL/L (ref 22–29)
CREAT SERPL-MCNC: 1.6 MG/DL (ref 0.7–1.2)
EOSINOPHILS ABSOLUTE: 0.06 E9/L (ref 0.05–0.5)
EOSINOPHILS RELATIVE PERCENT: 0.9 % (ref 0–6)
GFR AFRICAN AMERICAN: 54
GFR NON-AFRICAN AMERICAN: 45 ML/MIN/1.73
GLUCOSE BLD-MCNC: 120 MG/DL (ref 74–99)
HCT VFR BLD CALC: 50.3 % (ref 37–54)
HEMOGLOBIN: 15.2 G/DL (ref 12.5–16.5)
LYMPHOCYTES ABSOLUTE: 0.91 E9/L (ref 1.5–4)
LYMPHOCYTES RELATIVE PERCENT: 13.9 % (ref 20–42)
MCH RBC QN AUTO: 22.4 PG (ref 26–35)
MCHC RBC AUTO-ENTMCNC: 30.2 % (ref 32–34.5)
MCV RBC AUTO: 74 FL (ref 80–99.9)
METER GLUCOSE: 132 MG/DL (ref 74–99)
MONOCYTES ABSOLUTE: 0.26 E9/L (ref 0.1–0.95)
MONOCYTES RELATIVE PERCENT: 4.4 % (ref 2–12)
NEUTROPHILS ABSOLUTE: 5.14 E9/L (ref 1.8–7.3)
NEUTROPHILS RELATIVE PERCENT: 79.1 % (ref 43–80)
OVALOCYTES: ABNORMAL
PDW BLD-RTO: 20.7 FL (ref 11.5–15)
PLATELET # BLD: 223 E9/L (ref 130–450)
PMV BLD AUTO: 10.9 FL (ref 7–12)
POIKILOCYTES: ABNORMAL
POLYCHROMASIA: ABNORMAL
POTASSIUM SERPL-SCNC: 4.4 MMOL/L (ref 3.5–5)
PRO-BNP: 3940 PG/ML (ref 0–125)
RBC # BLD: 6.8 E12/L (ref 3.8–5.8)
SODIUM BLD-SCNC: 135 MMOL/L (ref 132–146)
TOTAL PROTEIN: 6.6 G/DL (ref 6.4–8.3)
WBC # BLD: 6.5 E9/L (ref 4.5–11.5)

## 2022-03-26 PROCEDURE — 80053 COMPREHEN METABOLIC PANEL: CPT

## 2022-03-26 PROCEDURE — 99233 SBSQ HOSP IP/OBS HIGH 50: CPT | Performed by: INTERNAL MEDICINE

## 2022-03-26 PROCEDURE — 2060000000 HC ICU INTERMEDIATE R&B

## 2022-03-26 PROCEDURE — 85025 COMPLETE CBC W/AUTO DIFF WBC: CPT

## 2022-03-26 PROCEDURE — 83880 ASSAY OF NATRIURETIC PEPTIDE: CPT

## 2022-03-26 PROCEDURE — 6370000000 HC RX 637 (ALT 250 FOR IP): Performed by: INTERNAL MEDICINE

## 2022-03-26 PROCEDURE — 82962 GLUCOSE BLOOD TEST: CPT

## 2022-03-26 PROCEDURE — 36415 COLL VENOUS BLD VENIPUNCTURE: CPT

## 2022-03-26 PROCEDURE — 2580000003 HC RX 258: Performed by: STUDENT IN AN ORGANIZED HEALTH CARE EDUCATION/TRAINING PROGRAM

## 2022-03-26 PROCEDURE — 6370000000 HC RX 637 (ALT 250 FOR IP): Performed by: STUDENT IN AN ORGANIZED HEALTH CARE EDUCATION/TRAINING PROGRAM

## 2022-03-26 PROCEDURE — 99232 SBSQ HOSP IP/OBS MODERATE 35: CPT | Performed by: FAMILY MEDICINE

## 2022-03-26 RX ADMIN — Medication 3 MG: at 20:38

## 2022-03-26 RX ADMIN — BISACODYL 5 MG: 5 TABLET, COATED ORAL at 08:59

## 2022-03-26 RX ADMIN — FUROSEMIDE 40 MG: 40 TABLET ORAL at 08:59

## 2022-03-26 RX ADMIN — SPIRONOLACTONE 50 MG: 25 TABLET ORAL at 08:59

## 2022-03-26 RX ADMIN — ACETAMINOPHEN 650 MG: 325 TABLET ORAL at 23:54

## 2022-03-26 RX ADMIN — METOPROLOL SUCCINATE 75 MG: 50 TABLET, EXTENDED RELEASE ORAL at 18:26

## 2022-03-26 RX ADMIN — Medication 10 ML: at 20:38

## 2022-03-26 RX ADMIN — CLOPIDOGREL BISULFATE 75 MG: 75 TABLET ORAL at 08:59

## 2022-03-26 RX ADMIN — PANTOPRAZOLE SODIUM 40 MG: 40 TABLET, DELAYED RELEASE ORAL at 08:59

## 2022-03-26 RX ADMIN — METOPROLOL SUCCINATE 75 MG: 50 TABLET, EXTENDED RELEASE ORAL at 08:59

## 2022-03-26 RX ADMIN — FUROSEMIDE 40 MG: 40 TABLET ORAL at 15:37

## 2022-03-26 RX ADMIN — ISOSORBIDE MONONITRATE 90 MG: 30 TABLET, EXTENDED RELEASE ORAL at 08:58

## 2022-03-26 RX ADMIN — ATORVASTATIN CALCIUM 80 MG: 40 TABLET, FILM COATED ORAL at 20:37

## 2022-03-26 RX ADMIN — FUROSEMIDE 40 MG: 40 TABLET ORAL at 20:38

## 2022-03-26 RX ADMIN — ASPIRIN 81 MG: 81 TABLET, COATED ORAL at 08:59

## 2022-03-26 RX ADMIN — Medication 10 ML: at 09:00

## 2022-03-26 ASSESSMENT — PAIN SCALES - GENERAL: PAINLEVEL_OUTOF10: 3

## 2022-03-26 NOTE — PROGRESS NOTES
[facility] Marcelino HICKMAN [facility], MD Bharat Rooney, MD Edison Taveras, MD Carmen Gardner, MD Chas Welch, CNP   Shaina Latham, MARGARITA  Progress Note    3/26/2022    SUBJECTIVE:   3/25: (-) c/o's  (-) sob/lopez/cp/palp Appetite ok  No new complaints  3/26: \"Sleepy. \"  Denies other complaint besides feeling tired and wanted to get more sleep. Denies dyspnea at rest on room air. Good appetite and intake. Blood glucose level 132 on point-of-care check    PROBLEM LIST:    Principal Problem:    Acute on chronic systolic heart failure (HCC)  Active Problems:    GERD (gastroesophageal reflux disease)    COPD (chronic obstructive pulmonary disease)    Hyperlipemia    History of stroke    Depression    CAD (coronary artery disease)    Dual implantable cardioverter-defibrillator in situ    History of MI (myocardial infarction)    Chronic systolic congestive heart failure ([address]    Essential hypertension    Type 2 diabetes mellitus without complication, without long-term current use of insulin ([facility])    CHF, acute on chronic ([facility])    Leg swelling  Resolved Problems:    * No resolved hospital problems. *         DIET:    ADULT DIET; Regular;  Low Sodium (2 gm)     MEDS (scheduled):    furosemide  40 mg Oral TID    metoprolol succinate  75 mg Oral BID    bisacodyl  5 mg Oral Daily    aspirin  81 mg Oral Daily    atorvastatin  80 mg Oral Nightly    clopidogrel  75 mg Oral Daily    [Held by provider] hydrALAZINE  50 mg Oral 3 times per day    isosorbide mononitrate  90 mg Oral Daily    [Held by provider] lisinopril  40 mg Oral Daily    pantoprazole  40 mg Oral Daily    spironolactone  50 mg Oral Daily    sodium chloride flush  5-40 mL IntraVENous 2 times per day    polyethylene glycol  17 g Oral Daily       MEDS (infusions):   sodium chloride         MEDS (prn):  melatonin, trimethobenzamide, ipratropium-albuterol, sodium chloride flush, sodium chloride, acetaminophen **OR** acetaminophen, perflutren lipid microspheres    PHYSICAL EXAM:     Patient Vitals for the past 24 hrs:   BP Pulse Resp SpO2   03/26/22 0739 (!) 138/95 81 19 94 %   03/25/22 2145 (!) 124/95 79 18 98 %   03/25/22 1520 -- 80 -- --   03/25/22 1500 (!) 135/93 85 18 97 %   @      Intake/Output Summary (Last 24 hours) at 3/26/2022 1242  Last data filed at 3/25/2022 1438  Gross per 24 hour   Intake 180 ml   Output 275 ml   Net -95 ml         Wt Readings from Last 3 Encounters:   03/25/22 241 lb 12.8 oz (109.7 kg)   03/21/22 245 lb 11.2 oz (111.4 kg)   01/31/22 232 lb (105.2 kg)       Constitutional:  in no acute distress  HEENT: NC/AT, EOMI, sclera and conjunctiva are clear and anicteric, mucus membranes moist  Neck: Trachea midline, no JVD  Cardiovascular: S1, S2 regular rhythm, no murmur,or rub  Respiratory: Poor air entry bilateral at the bases, few scattered crackles above these regions crackles, no wheeze  Gastrointestinal:  Soft, nontender, nondistended, NABS  Ext: Scant lower extremity edema, feet warm  Skin: dry, no rash  Neuro: awake, alert, interactive      DATA:    Recent Labs     03/24/22 0419 03/25/22  0436 03/26/22  0459   WBC 7.6 7.7 6.5   HGB 14.7 15.4 15.2   HCT 48.1 51.5 50.3   MCV 73.7* 74.9* 74.0*    235 223     Recent Labs     03/24/22  0419 03/25/22  0436 03/26/22  0459    138 135   K 3.8 4.0 4.4   CL 98 96* 94*   CO2 29 29 25   MG 2.1 2.2  --    BUN 26* 28* 32*   CREATININE 1.4* 1.5* 1.6*   ALT 18 20 30   AST 29 34 50*   BILITOT 1.0 1.3* 1.4*   ALKPHOS 185* 194* 186*       Lab Results   Component Value Date    LABPROT 0.4 (H) 03/24/2022    LABPROT 0.4 03/24/2022          Assessment  1. Chronic kidney disease stage IIIa, baseline creatinine 1.3 to 1.5 mg/dL. Currently at baseline.     2. Acute exacerbation of chronic systolic and diastolic CHF due to ischemic cardiomyopathy.   Poor adherence to multiple aspects of his heart failure COPD regimen including lack of fluid restriction, lack of dietary sodium restriction, frequent missing of oral Lasix doses at home among other as well as not consistently using his CPAP have contributed to the exacerbation. Discussed with him. Diuretic response to IV Lasix so far here has been adequate.     Recommendations  1. Continue current IV loop therapy , currently furosemide at 3 times a day for persistent swelling --probably decrease tomorrow  2. Continue fluid strict  3. Continue low-sodium diet  4. Continue CPAP  5. Follow labs and UO  6.  Continue supportive care    Electronically signed by Josette Rajput MD on 3/26/2022

## 2022-03-26 NOTE — PROGRESS NOTES
INPATIENT CARDIOLOGY FOLLOW-UP    Name: Isidro Smith    Age: 61 y.o. Date of Admission: 3/21/2022 12:14 PM    Date of Service: 3/26/2022    Chief Complaint: Follow-up for coronary atherosclerosis, ischemic cardiomyopathy, acute superimposed upon chronic combined systolic and diastolic heart failure, chronic obstructive lung disease, hypertension, chronic kidney disease, moderate obesity, obstructive sleep apnea    Interim History: The patient remains symptomatically unchanged with persistent presacral and lower extremity edema and continued incomplete maintenance of intake and output. Most recent assessment by the nephrology service and alteration of his diuretic management have been reviewed with present renal function and electrolytes essentially unchanged and persistent elevation of proBNP levels. Review of Systems: The remainder of a complete multisystem review including consitutional, central nervous, respiratory, circulatory, gastrointestinal, genitourinary, endocrinologic, hematologic, musculoskeletal and psychiatric are negative. Problem List:  Patient Active Problem List   Diagnosis    GERD (gastroesophageal reflux disease)    COPD (chronic obstructive pulmonary disease)    Hyperlipemia    ED (erectile dysfunction)    History of stroke    Depression    CAD (coronary artery disease)    Dual implantable cardioverter-defibrillator in situ    [facility]onsustained [facility] tachycardia[facility]    Diverticulosis of colon    History of MI (myocardial infarction)    Chronic systolic congestive heart failure ([address]    Essential hypertension    Type 2 diabetes mellitus without complication, without long-term current use of insulin ([facility])    Obesity (BMI 30.0-34. 9)    Ischemic cardiomyopathy    Nonrheumatic aortic valve insufficiency    Elevated PSA, less than 10 ng/ml    CHF, acute on chronic (HCC)    Leg swelling    ALEX (acute kidney injury) ([address]    Age-related nuclear cataract, bilateral    Presbyopia    Regular astigmatism, bilateral    Acute on chronic systolic heart failure (HCC)       Allergies:  No Known Allergies    Current Medications:  Current Facility-Administered Medications   Medication Dose Route Frequency Provider Last Rate Last Admin    melatonin tablet 3 mg  3 mg Oral Nightly PRN Albino Rowell MD        furosemide (LASIX) tablet 40 mg  40 mg Oral TID Ingrid Fang MD   40 mg at 03/25/22 2200    trimethobenzamide (TIGAN) injection 200 mg  200 mg IntraMUSCular Q6H PRN Ismael Quick DO        metoprolol succinate (TOPROL XL) extended release tablet 75 mg  75 mg Oral BID Shen Shields MD   75 mg at 03/25/22 1810    bisacodyl (DULCOLAX) EC tablet 5 mg  5 mg Oral Daily Albino Rowell MD   5 mg at 03/25/22 0931    ipratropium-albuterol (DUONEB) nebulizer solution 1 ampule  1 ampule Inhalation Q4H PRN Ismael Quick DO   1 ampule at 03/23/22 0013    aspirin EC tablet 81 mg  81 mg Oral Daily Albino Rowell MD   81 mg at 03/25/22 0930    atorvastatin (LIPITOR) tablet 80 mg  80 mg Oral Nightly Albino Rowell MD   80 mg at 03/25/22 2200    clopidogrel (PLAVIX) tablet 75 mg  75 mg Oral Daily Albino Rowell MD   75 mg at 03/25/22 0932    [Held by provider] hydrALAZINE (APRESOLINE) tablet 50 mg  50 mg Oral 3 times per day Albino Rowell MD   50 mg at 03/23/22 2100    isosorbide mononitrate (IMDUR) extended release tablet 90 mg  90 mg Oral Daily Albino Rowell MD   90 mg at 03/25/22 0930    [Held by provider] lisinopril (PRINIVIL;ZESTRIL) tablet 40 mg  40 mg Oral Daily Albino Rowell MD   40 mg at 03/22/22 0825    pantoprazole (PROTONIX) tablet 40 mg  40 mg Oral Daily Albino Rowell MD   40 mg at 03/25/22 7102    spironolactone (ALDACTONE) tablet 50 mg  50 mg Oral Daily Albino Rowell MD   50 mg at 03/25/22 0932    sodium chloride flush 0.9 % injection 5-40 mL  5-40 mL IntraVENous 2 times per day Albino Rowell MD   10 mL at 03/25/22 2200    sodium chloride flush 0.9 % injection 5-40 mL  5-40 mL IntraVENous PRN Lamount Cranker, MD        0.9 % sodium chloride infusion  25 mL IntraVENous PRN Lamount Cranker, MD        acetaminophen (TYLENOL) tablet 650 mg  650 mg Oral Q6H PRN Lamount Cranker, MD   650 mg at 03/24/22 2138    Or    acetaminophen (TYLENOL) suppository 650 mg  650 mg Rectal Q6H PRN Lamount Cranker, MD        polyethylene glycol (GLYCOLAX) packet 17 g  17 g Oral Daily Lamount Cranker, MD   17 g at 03/23/22 0854    perflutren lipid microspheres (DEFINITY) injection 1.65 mg  1.5 mL IntraVENous ONCE PRN Lamount Cranker, MD          sodium chloride         Physical Exam:  BP (!) 124/95   Pulse 79   Temp 96.7 °F (35.9 °C) (Axillary)   Resp 18   Ht 5' 9\" (1.753 m)   Wt 241 lb 12.8 oz (109.7 kg)   SpO2 98%   BMI 35.71 kg/m²   Weight change: Wt Readings from Last 3 Encounters:   03/25/22 241 lb 12.8 oz (109.7 kg)   03/21/22 245 lb 11.2 oz (111.4 kg)   01/31/22 232 lb (105.2 kg)     The patient is awake, alert and in no discomfort or distress. No gross musculoskeletal deformity is present. No significant skin or nail changes are present. Gross examination of head, eyes, nose and throat are negative. Jugular venous pressure is normal and no carotid bruits are present. Normal respiratory effort is noted with no accessory muscle usage present. Lung fields are clear to ascultation. Cardiac examination is notable for a regular rate and rhythm with no palpable thrill. No gallop rhythm or cardiac murmur are identified. A benign abdominal examination is present with the exception of obesity and no masses or organomegaly with minimal residual presacral edema. Intact pulses are present throughout all extremities and persistent mild to moderate pretibial and pedal edema is present. No focal neurologic deficits are present.     Intake/Output:    Intake/Output Summary (Last 24 hours) at 3/26/2022 0733  Last data filed at 3/25/2022 1438  Gross per 24 hour   Intake 495 ml   Output 850 ml   Net -355 ml     No intake/output data recorded. Laboratory Tests:  Lab Results   Component Value Date    CREATININE 1.6 (H) 03/26/2022    BUN 32 (H) 03/26/2022     03/26/2022    K 4.4 03/26/2022    CL 94 (L) 03/26/2022    CO2 25 03/26/2022     No results for input(s): CKTOTAL, CKMB in the last 72 hours. Invalid input(s): TROPONONI  No results found for: BNP  Lab Results   Component Value Date    WBC 6.5 03/26/2022    RBC 6.80 03/26/2022    HGB 15.2 03/26/2022    HCT 50.3 03/26/2022    MCV 74.0 03/26/2022    MCH 22.4 03/26/2022    MCHC 30.2 03/26/2022    RDW 20.7 03/26/2022     03/26/2022    MPV 10.9 03/26/2022     Recent Labs     03/24/22  0419 03/25/22  0436 03/26/22  0459   ALKPHOS 185* 194* 186*   ALT 18 20 30   AST 29 34 50*   PROT 6.9 7.2 6.6   BILITOT 1.0 1.3* 1.4*   LABALBU 3.6 3.8 3.5     Lab Results   Component Value Date    MG 2.2 03/25/2022     Lab Results   Component Value Date    PROTIME 11.5 06/29/2017    PROTIME 32.6 08/05/2014    INR 1.1 06/29/2017     Lab Results   Component Value Date    TSH 2.770 08/19/2021     No components found for: CHLPL  Lab Results   Component Value Date    TRIG 220 (H) 08/19/2021    TRIG 253 (H) 05/24/2021    TRIG 223 (H) 03/31/2021     Lab Results   Component Value Date    HDL 25 08/19/2021    HDL 33 05/24/2021    HDL 33 03/31/2021     Lab Results   Component Value Date    LDLCALC 103 (H) 08/19/2021    LDLCALC 129 (H) 05/24/2021    LDLCALC 132 (H) 03/31/2021       Cardiac Tests:  Telemetry findings reviewed: sinus rhythm with occasional ventricular ectopy, no new tachy/bradyarrhythmias overnight      ASSESSMENT / PLAN: On a clinical basis, the patient remains compensated for cardiovascular standpoint with persistent evidence of volume overload and modification of his diuretics by the nephrology service in the face of persistent incomplete maintenance of intake and output limiting adequate assessment of his response to present therapy.   He remains normotensive with improvement of heart rates following modification of his beta-blocker and with present stable renal function and serum creatinine levels above that of his baseline and with persistent elevation of proBNP levels suggesting needs of additional fluid mobilization needs to withhold his angiotensin-converting enzyme inhibitor. Continued aggressive medical management will be necessary in the face of his known systolic dysfunction to optimize cardiac performance. As previously outlined appropriate needs of compliance with the combination of medical management, dietary restriction and the appropriate use of nocturnal CPAP will be essential to heart failure management including its diastolic component. Ongoing aggressive risk factor modification of blood pressure, diabetes and serum lipids will remain essential to reducing risk of future atherosclerotic development. Note: This report was completed utilizing computer voice recognition software. Every effort has been made to ensure accuracy, however; inadvertent computerized transcription errors may be present. Christoper Duane.  Carly Kimble, [address] [facility]

## 2022-03-26 NOTE — PROGRESS NOTES
[facility]   Resident Progress Note    S:  Hospital day: 5   Brief Synopsis: Cintia Ambrocio is a 61 y.o. male with a PMH of CHF EF 30% 9/2021, CAD, CVA, GERD, hypertension, hyperlipidemia, insulin-dependent type 2 diabetes, and COPD who presented from clinic due to concern for CHF exacerbation. States he has been having worsening shortness of breath, leg swelling, and 20 lb weight gain for the past 2 weeks. Admitted for CHF exacerbation. Patient seen and examined this AM. Switched to PO lasix yesterday, tolerating well. Still with some nausea and dry heaves  Patient would like to go home  Lower extremity edema improving. Cont meds:    sodium chloride       Scheduled meds:    furosemide  40 mg Oral TID    metoprolol succinate  75 mg Oral BID    bisacodyl  5 mg Oral Daily    aspirin  81 mg Oral Daily    atorvastatin  80 mg Oral Nightly    clopidogrel  75 mg Oral Daily    [Held by provider] hydrALAZINE  50 mg Oral 3 times per day    isosorbide mononitrate  90 mg Oral Daily    [Held by provider] lisinopril  40 mg Oral Daily    pantoprazole  40 mg Oral Daily    spironolactone  50 mg Oral Daily    sodium chloride flush  5-40 mL IntraVENous 2 times per day    polyethylene glycol  17 g Oral Daily     PRN meds: melatonin, trimethobenzamide, ipratropium-albuterol, sodium chloride flush, sodium chloride, acetaminophen **OR** acetaminophen, perflutren lipid microspheres     I reviewed the patient's past medical and surgical history, Medications and Allergies. O:  BP (!) 138/95   Pulse 81   Temp 96.7 °F (35.9 °C) (Axillary)   Resp 19   Ht 5' 9\" (1.753 m)   Wt 241 lb 12.8 oz (109.7 kg)   SpO2 94%   BMI 35.71 kg/m²   24 hour I&O: I/O last 3 completed shifts: In: 975 [P.O.:975]  Out: 1550 [Urine:1550]  No intake/output data recorded. Physical Exam  Constitutional:       Appearance: Normal appearance.    HENT:      Nose: Nose normal.      Mouth/Throat:      Mouth: Mucous membranes are moist.      Pharynx: Oropharynx is clear. Eyes:      Extraocular Movements: Extraocular movements intact. Conjunctiva/sclera: Conjunctivae normal.   Cardiovascular:      Rate and Rhythm: Normal rate and regular rhythm. Pulses: Normal pulses. Heart sounds: Normal heart sounds. No murmur heard. Pulmonary:      Effort: Pulmonary effort is normal. No respiratory distress. Breath sounds: No wheezing or rales. Comments: LLL breath sounds diminished   Abdominal:      General: Abdomen is flat. Bowel sounds are normal.      Palpations: Abdomen is soft. Tenderness: There is no abdominal tenderness. There is no guarding or rebound. Musculoskeletal:         General: Swelling (B/L LE edema, pitting, 1+, compression stockings on) present. Normal range of motion. Skin:     General: Skin is warm and dry. Capillary Refill: Capillary refill takes 2 to 3 seconds. Neurological:      General: No focal deficit present. Mental Status: He is alert and oriented to person, place, and time. Labs:  Na/K/Cl/CO2:  135/4.4/94/25 (03/26 0459)  BUN/Cr/glu/ALT/AST/amyl/lip:  32/1.6/--/30/50/--/-- (03/26 0459)  WBC/Hgb/Hct/Plts:  6.5/15.2/50.3/223 (03/26 0459)  estimated creatinine clearance is 62 mL/min (A) (based on SCr of 1.6 mg/dL (H)). Other pertinent labs as noted below    Radiology:  XR CHEST (2 VW)   Final Result   No acute process. Cardiomegaly. US URINARY BLADDER LIMITED   Final Result   Unremarkable ultrasound of the bladder. XR CHEST (2 VW)   Final Result   Left pleural effusion and/or infiltrate.       Cardiomegaly               Resident Assessment and Plan     Acute on chronic decompensated HFrEF  - pt presented with worsening shortness of breath and leg swelling x2 weeks  - likely precipitated by uncontrolled HTN, lifestyle changes, medication non-compliance  - home medications Lasix 20 mg daily and spironolactone 50 mg daily, continue spironolactone   -CXR 3/21 showed L pleural effusion   - EKG 3/21 sinus tachy w PVC     -Pro-BNP and troponin trending down   - Continue IV lasix 40 mg to twice daily   - Last echo 9/2021 with EF 30%, consider repeat due to no complete resolution since last exacerbation at that time   - I/O last 3 completed shifts: In: 975 [P.O.:975]  Out: 1550 [Urine:1550]  No intake/output data recorded. Overall output close to 4 L  - daily weights, down 5lbs  - Respiratory panel negative   - Cardio consulted: Continue with aggressive diuresis and risk factor modification  - Creatinine 1.6, stable  - nephro consulted: continue with diuresis, fluid restriction   - change to lasix PO TID , monitor    Type 2 diabetes, non-insulin-dependent  - On Metformin, hold  - Monitor glucose, will order LDSS if needed     Hypertension  - improving  -Continue home medications, hydralazine  - Cardio consulted: continue metoprolol 25 mg BID, Hold lisinopril due to worsening renal function.    - Will need sleep study OP for SARAH    GERD  -Continue Protonix     History of CVA  -Continue Plavix, aspirin     Incomplete bladder emptying - resolved  - may be 2/2 to UTI vs BPH  vs obstruction   - PSA 9.20 3/2021, did not follow up with urology OP   - UA positive for >300 protein, moderate bili,  Urobilinogen   - urine culture NGTD  - UO adequate right now likely 2/2 to aggressive diuresis     - Bladder scan normal, no PVR  - Will need to follow up with urology OP for elevated PSA and bladder symptoms     PT/OT evaluation: none   DVT prophylaxis: plavix  GI prophylaxis: protonix   Disposition: continue current management   Diet: low sodium , fluid restriction     Electronically signed by Soledad Wilhelm MD on 3/26/2022 at 8:00 AM  Attending physician: Dr. Skye Barrera

## 2022-03-26 NOTE — PROGRESS NOTES
[address] [facility]  Family Medicine Attending    S: 61 y.o. male with PMH of COPD, CAD, HTN, HLD, DM, depression and chronic CHF who presented to office with increasing SOB and swelling. Admits to missing some doses of his medications over past week. Admitted for evaluation and treatment of CHF, acute on chronic  Feels better today and wishes to go home. Has been losing about 2 pounds per day on Lasix    O: VS- Blood pressure (!) 138/95, pulse 81, temperature 96.7 °F (35.9 °C), temperature source Axillary, resp. rate 19, height 5' 9\" (1.753 m), weight 241 lb 12.8 oz (109.7 kg), SpO2 94 %. Exam is as noted by resident   Awake, alert. Sitting up in bed with no obvious distress  Heart-regular rhythm, no murmur or gallop  Lungs-clear to auscultation anteriorly and posteriorly  Abdomen supple, no masses, tenderness organomegaly  Ext- 2+ edema, less tense today, compression stockings on bilat. Impressions:   Principal Problem:    Acute on chronic systolic heart failure (HCC)  Active Problems:    GERD (gastroesophageal reflux disease)    COPD (chronic obstructive pulmonary disease)    Hyperlipemia    History of stroke    Depression    CAD (coronary artery disease)    Dual implantable cardioverter-defibrillator in situ    History of MI (myocardial infarction)    Chronic systolic congestive heart failure ([facility]    Essential hypertension    Type 2 diabetes mellitus without complication, without long-term current use of insulin (HCC)    CHF, acute on chronic (HCC)    Leg swelling        Plan:   Diuresis   Monitor I/O, urine output    Recs appreciated from Cardio/Neph   Follow lytes, kidney functions, recheck BNP as needed   Monitor sugars              Would consider discharge if okay with nephrology     Attending Physician Statement  I have reviewed the chart and seen the patient with the resident(s). I personally reviewed images, EKG's and similar tests, if present.   I personally reviewed and performed key elements of the history and exam.  I have reviewed and confirmed student and/or resident history and exam with changes as indicated above. I agree with the assessment, plan and orders as documented by the resident. Please refer to the resident and/or student note for additional information.       Carley Geller MD

## 2022-03-27 VITALS
RESPIRATION RATE: 15 BRPM | OXYGEN SATURATION: 99 % | HEART RATE: 80 BPM | DIASTOLIC BLOOD PRESSURE: 94 MMHG | SYSTOLIC BLOOD PRESSURE: 137 MMHG | HEIGHT: 69 IN | TEMPERATURE: 96.7 F | WEIGHT: 241.8 LBS | BODY MASS INDEX: 35.81 KG/M2

## 2022-03-27 PROBLEM — I50.23 ACUTE ON CHRONIC SYSTOLIC HEART FAILURE (HCC): Status: RESOLVED | Noted: 2022-03-21 | Resolved: 2022-03-27

## 2022-03-27 LAB
ACANTHOCYTES: ABNORMAL
ALBUMIN SERPL-MCNC: 3.4 G/DL (ref 3.5–5.2)
ALP BLD-CCNC: 198 U/L (ref 40–129)
ALT SERPL-CCNC: 52 U/L (ref 0–40)
ANION GAP SERPL CALCULATED.3IONS-SCNC: 14 MMOL/L (ref 7–16)
ANISOCYTOSIS: ABNORMAL
AST SERPL-CCNC: 72 U/L (ref 0–39)
BASOPHILS ABSOLUTE: 0.08 E9/L (ref 0–0.2)
BASOPHILS RELATIVE PERCENT: 1.1 % (ref 0–2)
BILIRUB SERPL-MCNC: 1.3 MG/DL (ref 0–1.2)
BUN BLDV-MCNC: 33 MG/DL (ref 6–20)
BURR CELLS: ABNORMAL
CALCIUM SERPL-MCNC: 9.2 MG/DL (ref 8.6–10.2)
CHLORIDE BLD-SCNC: 97 MMOL/L (ref 98–107)
CO2: 27 MMOL/L (ref 22–29)
CREAT SERPL-MCNC: 1.6 MG/DL (ref 0.7–1.2)
EOSINOPHILS ABSOLUTE: 0.15 E9/L (ref 0.05–0.5)
EOSINOPHILS RELATIVE PERCENT: 2 % (ref 0–6)
GFR AFRICAN AMERICAN: 54
GFR NON-AFRICAN AMERICAN: 45 ML/MIN/1.73
GLUCOSE BLD-MCNC: 113 MG/DL (ref 74–99)
HCT VFR BLD CALC: 48.6 % (ref 37–54)
HEMOGLOBIN: 14.9 G/DL (ref 12.5–16.5)
HYPOCHROMIA: ABNORMAL
IMMATURE GRANULOCYTES #: 0.04 E9/L
IMMATURE GRANULOCYTES %: 0.5 % (ref 0–5)
LYMPHOCYTES ABSOLUTE: 1.75 E9/L (ref 1.5–4)
LYMPHOCYTES RELATIVE PERCENT: 23.3 % (ref 20–42)
MAGNESIUM: 2.1 MG/DL (ref 1.6–2.6)
MCH RBC QN AUTO: 22.6 PG (ref 26–35)
MCHC RBC AUTO-ENTMCNC: 30.7 % (ref 32–34.5)
MCV RBC AUTO: 73.6 FL (ref 80–99.9)
MONOCYTES ABSOLUTE: 0.71 E9/L (ref 0.1–0.95)
MONOCYTES RELATIVE PERCENT: 9.4 % (ref 2–12)
NEUTROPHILS ABSOLUTE: 4.79 E9/L (ref 1.8–7.3)
NEUTROPHILS RELATIVE PERCENT: 63.7 % (ref 43–80)
OVALOCYTES: ABNORMAL
PDW BLD-RTO: 20.6 FL (ref 11.5–15)
PHOSPHORUS: 3.7 MG/DL (ref 2.5–4.5)
PLATELET # BLD: 246 E9/L (ref 130–450)
PMV BLD AUTO: 11 FL (ref 7–12)
POIKILOCYTES: ABNORMAL
POLYCHROMASIA: ABNORMAL
POTASSIUM SERPL-SCNC: 4.1 MMOL/L (ref 3.5–5)
RBC # BLD: 6.6 E12/L (ref 3.8–5.8)
SCHISTOCYTES: ABNORMAL
SODIUM BLD-SCNC: 138 MMOL/L (ref 132–146)
SPHEROCYTES: ABNORMAL
TEAR DROP CELLS: ABNORMAL
TOTAL PROTEIN: 6.8 G/DL (ref 6.4–8.3)
WBC # BLD: 7.5 E9/L (ref 4.5–11.5)

## 2022-03-27 PROCEDURE — 99233 SBSQ HOSP IP/OBS HIGH 50: CPT | Performed by: INTERNAL MEDICINE

## 2022-03-27 PROCEDURE — 6370000000 HC RX 637 (ALT 250 FOR IP): Performed by: STUDENT IN AN ORGANIZED HEALTH CARE EDUCATION/TRAINING PROGRAM

## 2022-03-27 PROCEDURE — 6370000000 HC RX 637 (ALT 250 FOR IP): Performed by: INTERNAL MEDICINE

## 2022-03-27 PROCEDURE — 2580000003 HC RX 258: Performed by: STUDENT IN AN ORGANIZED HEALTH CARE EDUCATION/TRAINING PROGRAM

## 2022-03-27 PROCEDURE — 83735 ASSAY OF MAGNESIUM: CPT

## 2022-03-27 PROCEDURE — 36415 COLL VENOUS BLD VENIPUNCTURE: CPT

## 2022-03-27 PROCEDURE — 80053 COMPREHEN METABOLIC PANEL: CPT

## 2022-03-27 PROCEDURE — 84100 ASSAY OF PHOSPHORUS: CPT

## 2022-03-27 PROCEDURE — 85025 COMPLETE CBC W/AUTO DIFF WBC: CPT

## 2022-03-27 PROCEDURE — 6360000002 HC RX W HCPCS: Performed by: STUDENT IN AN ORGANIZED HEALTH CARE EDUCATION/TRAINING PROGRAM

## 2022-03-27 PROCEDURE — 99232 SBSQ HOSP IP/OBS MODERATE 35: CPT | Performed by: FAMILY MEDICINE

## 2022-03-27 RX ORDER — METOPROLOL SUCCINATE 100 MG/1
100 TABLET, EXTENDED RELEASE ORAL 2 TIMES DAILY
Qty: 30 TABLET | Refills: 3 | Status: SHIPPED | OUTPATIENT
Start: 2022-03-27 | End: 2022-05-31 | Stop reason: SDUPTHER

## 2022-03-27 RX ORDER — FUROSEMIDE 40 MG/1
40 TABLET ORAL 2 TIMES DAILY
Qty: 60 TABLET | Refills: 0 | Status: SHIPPED | OUTPATIENT
Start: 2022-03-27 | End: 2022-04-25

## 2022-03-27 RX ORDER — ISOSORBIDE MONONITRATE 30 MG/1
90 TABLET, EXTENDED RELEASE ORAL DAILY
Qty: 30 TABLET | Refills: 3 | Status: SHIPPED | OUTPATIENT
Start: 2022-03-28 | End: 2022-03-29

## 2022-03-27 RX ORDER — METOPROLOL SUCCINATE 100 MG/1
100 TABLET, EXTENDED RELEASE ORAL 2 TIMES DAILY
Status: DISCONTINUED | OUTPATIENT
Start: 2022-03-27 | End: 2022-03-27 | Stop reason: HOSPADM

## 2022-03-27 RX ADMIN — BISACODYL 5 MG: 5 TABLET, COATED ORAL at 09:24

## 2022-03-27 RX ADMIN — SPIRONOLACTONE 50 MG: 25 TABLET ORAL at 09:24

## 2022-03-27 RX ADMIN — TRIMETHOBENZAMIDE HYDROCHLORIDE 200 MG: 100 INJECTION INTRAMUSCULAR at 09:22

## 2022-03-27 RX ADMIN — CLOPIDOGREL BISULFATE 75 MG: 75 TABLET ORAL at 09:24

## 2022-03-27 RX ADMIN — ASPIRIN 81 MG: 81 TABLET, COATED ORAL at 09:24

## 2022-03-27 RX ADMIN — FUROSEMIDE 40 MG: 40 TABLET ORAL at 09:23

## 2022-03-27 RX ADMIN — PANTOPRAZOLE SODIUM 40 MG: 40 TABLET, DELAYED RELEASE ORAL at 09:24

## 2022-03-27 RX ADMIN — METOPROLOL SUCCINATE 100 MG: 100 TABLET, EXTENDED RELEASE ORAL at 09:24

## 2022-03-27 RX ADMIN — ISOSORBIDE MONONITRATE 90 MG: 30 TABLET, EXTENDED RELEASE ORAL at 09:22

## 2022-03-27 RX ADMIN — Medication 10 ML: at 09:28

## 2022-03-27 NOTE — PROGRESS NOTES
[address] [facility]  Family Medicine Attending    S: 61 y.o. male with PMH of COPD, CAD, HTN, HLD, DM, depression and chronic CHF who presented to office with increasing SOB and swelling. Admits to missing some doses of his medications over past week. Admitted for evaluation and treatment of CHF, acute on chronic  Feels better today and wishes to go home. Has been losing about 2 pounds per day on Lasix    O: VS- Blood pressure (!) 137/94, pulse 80, temperature 96.7 °F (35.9 °C), temperature source Axillary, resp. rate 15, height 5' 9\" (1.753 m), weight 241 lb 12.8 oz (109.7 kg), SpO2 99 %. Exam is as noted by resident   Awake, alert. Sitting up in bed with no obvious distress  Heart-regular rhythm, no murmur or gallop  Lungs-clear to auscultation anteriorly and posteriorly  Abdomen supple, no masses, tenderness organomegaly  Ext- 2+ edema, less tense today, compression stockings on bilat. Impressions:   Principal Problem:    Acute on chronic systolic heart failure (HCC)  Active Problems:    GERD (gastroesophageal reflux disease)    COPD (chronic obstructive pulmonary disease)    Hyperlipemia    History of stroke    Depression    CAD (coronary artery disease)    Dual implantable cardioverter-defibrillator in situ    History of MI (myocardial infarction)    Chronic systolic congestive heart failure ([facility]    Essential hypertension    Type 2 diabetes mellitus without complication, without long-term current use of insulin (HCC)    CHF, acute on chronic (HCC)    Leg swelling        Plan:   Diuresis   Monitor I/O, urine output    Recs appreciated from Cardio/Neph   Follow lytes, kidney functions, recheck BNP as needed   Monitor sugars              Would consider discharge if okay with nephrology               PO Lasix               To follow in outpt CHF clinic     Attending Physician Statement  I have reviewed the chart and seen the patient with the resident(s).   I personally reviewed images, EKG's and similar tests, if present. I personally reviewed and performed key elements of the history and exam.  I have reviewed and confirmed student and/or resident history and exam with changes as indicated above. I agree with the assessment, plan and orders as documented by the resident. Please refer to the resident and/or student note for additional information.       Kenya Drummond MD

## 2022-03-27 NOTE — PROGRESS NOTES
INPATIENT CARDIOLOGY FOLLOW-UP    Name: Cintia Ambrocio    Age: 61 y.o. Date of Admission: 3/21/2022 12:14 PM    Date of Service: 3/27/2022    Chief Complaint: Follow-up for coronary atherosclerosis, ischemic cardiomyopathy, acute superimposed upon chronic combined systolic and diastolic heart failure, chronic obstructive lung disease, hypertension, chronic kidney disease, moderate obesity, obstructive sleep apnea    Interim History: The patient remains compensated with limited maintenance of intake and output continue to limit adequate assessment of diuretic response. He remains hemodynamically stable and with no change of renal function or electrolytes. Review of Systems: The remainder of a complete multisystem review including consitutional, central nervous, respiratory, circulatory, gastrointestinal, genitourinary, endocrinologic, hematologic, musculoskeletal and psychiatric are negative. Problem List:  Patient Active Problem List   Diagnosis    GERD (gastroesophageal reflux disease)    COPD (chronic obstructive pulmonary disease)    Hyperlipemia    ED (erectile dysfunction)    History of stroke    Depression    CAD (coronary artery disease)    Dual implantable cardioverter-defibrillator in situ    NS[facility]onsustained ventricular tachycardia) ([facility]    Diverticulosis of colon    History of MI (myocardial infarction)    Chronic systolic congestive heart failure [facility] [address]    Essential hypertension    Type 2 diabetes mellitus without complication, without long-term current use of insulin ([facility])    Obesity (BMI 30.0-34. 9)    Ischemic cardiomyopathy    Nonrheumatic aortic valve insufficiency    Elevated PSA, less than 10 ng/ml    CHF, acute on chronic ([facility])    Leg swelling    ALEX (acute kidney injury) ([facility] [address]    Age-related nuclear cataract, bilateral    Presbyopia    Regular astigmatism, bilateral    Acute on chronic systolic heart failure ([facility])       Allergies:  No Known Allergies    Current Medications:  Current Facility-Administered Medications   Medication Dose Route Frequency Provider Last Rate Last Admin    melatonin tablet 3 mg  3 mg Oral Nightly PRN Lilly Plunkett MD   3 mg at 03/26/22 2038    furosemide (LASIX) tablet 40 mg  40 mg Oral TID Melony Verma MD   40 mg at 03/26/22 2038    trimethobenzamide (TIGAN) injection 200 mg  200 mg IntraMUSCular Q6H PRN Eugenie Joaquim, DO        metoprolol succinate (TOPROL XL) extended release tablet 75 mg  75 mg Oral BID Lazaro Smith MD   75 mg at 03/26/22 1826    bisacodyl (DULCOLAX) EC tablet 5 mg  5 mg Oral Daily Lilly Plunkett MD   5 mg at 03/26/22 0859    ipratropium-albuterol (DUONEB) nebulizer solution 1 ampule  1 ampule Inhalation Q4H PRN Eugenie Joaquim, DO   1 ampule at 03/23/22 0013    aspirin EC tablet 81 mg  81 mg Oral Daily Lilly Plunkett MD   81 mg at 03/26/22 0859    atorvastatin (LIPITOR) tablet 80 mg  80 mg Oral Nightly Lilly Plunkett MD   80 mg at 03/26/22 2037    clopidogrel (PLAVIX) tablet 75 mg  75 mg Oral Daily Lilly Plunkett MD   75 mg at 03/26/22 0859    [Held by provider] hydrALAZINE (APRESOLINE) tablet 50 mg  50 mg Oral 3 times per day Lilly Plunkett MD   50 mg at 03/23/22 2100    isosorbide mononitrate (IMDUR) extended release tablet 90 mg  90 mg Oral Daily Lilly Plunkett MD   90 mg at 03/26/22 0858    [Held by provider] lisinopril (PRINIVIL;ZESTRIL) tablet 40 mg  40 mg Oral Daily Lilly Plunkett MD   40 mg at 03/22/22 0825    pantoprazole (PROTONIX) tablet 40 mg  40 mg Oral Daily Lilly Plunkett MD   40 mg at 03/26/22 0859    spironolactone (ALDACTONE) tablet 50 mg  50 mg Oral Daily Lilly Plunkett MD   50 mg at 03/26/22 0859    sodium chloride flush 0.9 % injection 5-40 mL  5-40 mL IntraVENous 2 times per day Lilly Plunkett MD   10 mL at 03/26/22 2038    sodium chloride flush 0.9 % injection 5-40 mL  5-40 mL IntraVENous PRN Lilly Plunkett MD        0.9 % sodium chloride infusion  25 mL IntraVENous PRN Efrem Avery MD        acetaminophen (TYLENOL) tablet 650 mg  650 mg Oral Q6H PRN Efrem Avery MD   650 mg at 03/26/22 2354    Or    acetaminophen (TYLENOL) suppository 650 mg  650 mg Rectal Q6H PRN Efrem Avery MD        polyethylene glycol (GLYCOLAX) packet 17 g  17 g Oral Daily Efrem Avery MD   17 g at 03/23/22 0854    perflutren lipid microspheres (DEFINITY) injection 1.65 mg  1.5 mL IntraVENous ONCE PRN Efrem Avery MD          sodium chloride         Physical Exam:  BP (!) 142/98   Pulse 76   Temp 96.7 °F (35.9 °C) (Axillary)   Resp 18   Ht 5' 9\" (1.753 m)   Wt 241 lb 12.8 oz (109.7 kg)   SpO2 97%   BMI 35.71 kg/m²   Weight change: Wt Readings from Last 3 Encounters:   03/25/22 241 lb 12.8 oz (109.7 kg)   03/21/22 245 lb 11.2 oz (111.4 kg)   01/31/22 232 lb (105.2 kg)     The patient is awake, alert and in no discomfort or distress. No gross musculoskeletal deformity is present. No significant skin or nail changes are present. Gross examination of head, eyes, nose and throat are negative. Jugular venous pressure is normal and no carotid bruits are present. Normal respiratory effort is noted with no accessory muscle usage present. Lung fields are clear to ascultation. Cardiac examination is notable for a regular rate and rhythm with no palpable thrill. No gallop rhythm or cardiac murmur are identified. A benign abdominal examination is present with no masses or organomegaly. Intact pulses are present throughout all extremities and mild pretibial and pedal edema is present. No focal neurologic deficits are present. Intake/Output:    Intake/Output Summary (Last 24 hours) at 3/27/2022 0725  Last data filed at 3/26/2022 1513  Gross per 24 hour   Intake --   Output 400 ml   Net -400 ml     No intake/output data recorded.     Laboratory Tests:  Lab Results   Component Value Date    CREATININE 1.6 (H) 03/27/2022    BUN 33 (H) 03/27/2022     03/27/2022    K 4.1 03/27/2022    CL 97 (L) 03/27/2022    CO2 27 03/27/2022     No results for input(s): CKTOTAL, CKMB in the last 72 hours. Invalid input(s): TROPONONI  No results found for: BNP  Lab Results   Component Value Date    WBC 7.5 03/27/2022    RBC 6.60 03/27/2022    HGB 14.9 03/27/2022    HCT 48.6 03/27/2022    MCV 73.6 03/27/2022    MCH 22.6 03/27/2022    MCHC 30.7 03/27/2022    RDW 20.6 03/27/2022     03/27/2022    MPV 11.0 03/27/2022     Recent Labs     03/25/22  0436 03/26/22  0459 03/27/22  0443   ALKPHOS 194* 186* 198*   ALT 20 30 52*   AST 34 50* 72*   PROT 7.2 6.6 6.8   BILITOT 1.3* 1.4* 1.3*   LABALBU 3.8 3.5 3.4*     Lab Results   Component Value Date    MG 2.1 03/27/2022     Lab Results   Component Value Date    PROTIME 11.5 06/29/2017    PROTIME 32.6 08/05/2014    INR 1.1 06/29/2017     Lab Results   Component Value Date    TSH 2.770 08/19/2021     No components found for: CHLPL  Lab Results   Component Value Date    TRIG 220 (H) 08/19/2021    TRIG 253 (H) 05/24/2021    TRIG 223 (H) 03/31/2021     Lab Results   Component Value Date    HDL 25 08/19/2021    HDL 33 05/24/2021    HDL 33 03/31/2021     Lab Results   Component Value Date    LDLCALC 103 (H) 08/19/2021    LDLCALC 129 (H) 05/24/2021    LDLCALC 132 (H) 03/31/2021       Cardiac Tests:  Telemetry findings reviewed: sinus rhythm with occasional ventricular ectopy, no new tachy/bradyarrhythmias overnight  Chest X-ray: A repeat chest x-ray reviewed time evaluation demonstrates evidence of cardiomegaly with mild chronic interstitial changes and volume loss and/or a small left pleural effusion      ASSESSMENT / PLAN: On a clinical basis, the patient remains compensated from cardiovascular standpoint with the persistent lack of maintenance of adequate intake and output limiting ability to adequately assess his response to therapy but radiographic imaging demonstrating improvement of interstitial infiltrates with persistent volume loss and/or a left pleural effusion.   Renal function and electrolytes remain stable with serum creatinines persisting above that of his baseline. Persistent stage I hypertension is noted with adequate heart rates to permit further titration of his beta-blocker and at present in view of persistent diminishing volume overload and needs of ongoing fluid mobilization as well as that of his renal function with holding of his angiotensin-converting enzyme inhibitor. Additional recommendations for volume management will be deferred to the nephrology service with recommendation of adjustment of oral diuretic dose to that of at most twice daily dosing prior to discharge to optimize compliance. Continued appropriate lifestyle modification to achieve weight reduction will be necessary to assist diastolic components of heart failure management as well as assist in management of obstructive sleep apnea with ongoing needs of resumption of compliance with the use of nocturnal CPAP both to assist diastolic heart failure management as well as that of reducing risk of atrial arrhythmias. Ongoing aggressive risk factor modification of blood pressure and serum lipids will remain essential to reducing risk of future atherosclerotic development. The patient appears to be approaching maximum hospital benefit with needs of close monitoring of his clinical status following discharge inclusive of benefits of enrollment at the heart failure clinic to assess compliance and management as well as to reduce risk of recurrent hospitalization. Note: This report was completed utilizing computer voice recognition software. Every effort has been made to ensure accuracy, however; inadvertent computerized transcription errors may be present. Rafat Woo.  Mendel Bold, [address] [facility]

## 2022-03-27 NOTE — PROGRESS NOTES
[facility] Inpatient   Resident Progress Note    S:  Hospital day: 6   Brief Synopsis: Cintia Ambrocio is a 61 y.o. male with a PMH of CHF EF 30% 9/2021, CAD, CVA, GERD, hypertension, hyperlipidemia, insulin-dependent type 2 diabetes, and COPD who presented from clinic due to concern for CHF exacerbation. States he has been having worsening shortness of breath, leg swelling, and 20 lb weight gain for the past 2 weeks. Admitted for CHF exacerbation. Patient seen and examined this AM. Switched to PO lasix yesterday, tolerating well. Patient states that hospital food is what is making him nauseous otherwise he feels all right  Patient would like to go home at this time  Feels that right lower extremity edema has been improving, but left lower extremity edema has been slow to improve. Of note patient has had history of stroke affecting the left lower extremity. Cont meds:    sodium chloride       Scheduled meds:    metoprolol succinate  100 mg Oral BID    furosemide  40 mg Oral TID    bisacodyl  5 mg Oral Daily    aspirin  81 mg Oral Daily    atorvastatin  80 mg Oral Nightly    clopidogrel  75 mg Oral Daily    [Held by provider] hydrALAZINE  50 mg Oral 3 times per day    isosorbide mononitrate  90 mg Oral Daily    [Held by provider] lisinopril  40 mg Oral Daily    pantoprazole  40 mg Oral Daily    spironolactone  50 mg Oral Daily    sodium chloride flush  5-40 mL IntraVENous 2 times per day    polyethylene glycol  17 g Oral Daily     PRN meds: melatonin, trimethobenzamide, ipratropium-albuterol, sodium chloride flush, sodium chloride, acetaminophen **OR** acetaminophen, perflutren lipid microspheres     I reviewed the patient's past medical and surgical history, Medications and Allergies.     O:  BP (!) 137/94   Pulse 80   Temp 96.7 °F (35.9 °C) (Axillary)   Resp 15   Ht 5' 9\" (1.753 m)   Wt 241 lb 12.8 oz (109.7 kg)   SpO2 99%   BMI 35.71 kg/m²   24 hour I&O: I/O last 3 completed shifts:  In: -   Out: 400 [Urine:400]  No intake/output data recorded. Physical Exam  Constitutional:       Appearance: Normal appearance. HENT:      Nose: Nose normal.      Mouth/Throat:      Mouth: Mucous membranes are moist.      Pharynx: Oropharynx is clear. Cardiovascular:      Rate and Rhythm: Normal rate and regular rhythm. Pulses: Normal pulses. Heart sounds: Normal heart sounds. No murmur heard. Pulmonary:      Effort: Pulmonary effort is normal. No respiratory distress. Breath sounds: No wheezing or rales. Comments: LLL breath sounds diminished   Abdominal:      General: Abdomen is flat. Bowel sounds are normal.      Palpations: Abdomen is soft. Tenderness: There is no abdominal tenderness. There is no guarding or rebound. Musculoskeletal:         General: Normal range of motion. Right lower leg: Edema present. Left lower leg: Edema present. Comments: Bilateral lower extremity edema left > right  Compression stockings off today   Skin:     General: Skin is warm and dry. Capillary Refill: Capillary refill takes 2 to 3 seconds. Neurological:      General: No focal deficit present. Mental Status: He is alert and oriented to person, place, and time. Psychiatric:         Mood and Affect: Mood is anxious. Labs:  Na/K/Cl/CO2:  138/4.1/97/27 (03/27 8615)  BUN/Cr/glu/ALT/AST/amyl/lip:  33/1.6/--/52/72/--/-- (03/27 0443)  WBC/Hgb/Hct/Plts:  7.5/14.9/48.6/246 (03/27 0443)  estimated creatinine clearance is 62 mL/min (A) (based on SCr of 1.6 mg/dL (H)). Other pertinent labs as noted below    Radiology:  XR CHEST (2 VW)   Final Result   No acute process. Cardiomegaly. US URINARY BLADDER LIMITED   Final Result   Unremarkable ultrasound of the bladder. XR CHEST (2 VW)   Final Result   Left pleural effusion and/or infiltrate.       Cardiomegaly               Resident Assessment and Plan     Acute on chronic decompensated HFrEF, improving  Home medications Lasix 20 mg daily and spironolactone 50 mg daily  Chest x-ray 3/21 showing left-sided pleural effusion, EKG 3/21 showing sinus tachycardia with PVCs  proBNP and troponin trending down  Last echo 9/2021 with an ejection fraction of 30%, consider repeat at this time  Patient was started on Lasix 40 mg IV, switched to 40 mg p.o. 3 times daily per nephrology at this time  Home dose parental lactone continue  Overall patient has lost approximately 4.3 L  Cardiology consulted; appreciate recs-continue with aggressive diuresis, risk factor modification. Patient needs to follow-up with heart failure clinic outpatient  Patient's creatinine is currently stable at 1.6  Nephrology consulted; continue with diuresis, fluid restriction  Respiratory panel negative     Type 2 diabetes, non-insulin-dependent  On Metformin at home, hold for now  Sugar stable while inpatient  Can consider LDSS  if needed    Hypertension, improving  Continue home medications, hydralazine  Cardio consulted: continue metoprolol 25 mg BID, Hold lisinopril due to worsening renal function. Will need sleep study OP for SARAH    GERD  History of CVA  Continue home medications including Protonix, Plavix, aspirin     Resolved problems:  1. Incomplete bladder emptying-likely secondary to BPH VS UTI VS obstruction. PSA 9.2 03/2021. Urine culture negative. Urine output adequate right now with aggressive diuresis. Bladder scan was normal, no PVR.   Patient will require outpatient follow-up with urology to assess for PSA and obstructive symptoms    PT/OT evaluation: none   DVT prophylaxis: plavix  GI prophylaxis: protonix   Disposition: Dc today  Diet: low sodium , fluid restriction     Electronically signed by Archana Pickens MD on 3/27/2022 at 11:59 AM  Attending physician: Dr. Hillary Rome

## 2022-03-28 ENCOUNTER — CARE COORDINATION (OUTPATIENT)
Dept: CASE MANAGEMENT | Age: 57
End: 2022-03-28

## 2022-03-28 ENCOUNTER — TELEPHONE (OUTPATIENT)
Dept: CARDIOLOGY CLINIC | Age: 57
End: 2022-03-28

## 2022-03-28 NOTE — CARE COORDINATION
[facility] Transitions Initial Follow Up Call    Call within 2 business days of discharge: Yes    Patient: Bisi Olivera Patient : 1965   MRN: [id number]  Reason for Admission: There are no discharge diagnoses documented for the most recent discharge. Discharge Date: 3/27/22 RARS: Readmission Risk Score: 18.8 ( )      Last Discharge [facility]       Complaint Diagnosis Description Type Department Provider    3/21/22   Admission (Discharged) [facility] Deion Lemus DO        Attempted to reach the patient for initial Care Transition call post hospital discharge. Message left with CTN's contact information requesting return phone call. Will attempt outreach again.          Follow Up  Future Appointments   Date Time Provider Estuardo Arzate   3/29/2022 10:40 AM MD Gabriella Perla [facility]   3/30/2022  8:00 AM PAUL Amin - CNP [facility]   2022  9:45 AM SE CHF ROOM 1 [facility] CHF Judge Limon, CHRISTINA

## 2022-03-28 NOTE — TELEPHONE ENCOUNTER
MD Josiah Chavarria sorry             Previous Messages       ----- Message -----   From: Marlena Graff   Sent: 3/28/2022   7:54 AM EDT   To: Santiago Sierra MD     No patient information attached.   ----- Message -----   From: Santiago Sierra MD   Sent: 3/27/2022  12:21 PM EDT   To: Marlena Graff     This is a patient of . Bridgett Wangwig[facility] with nonischemic cardiomyopathy and intermittent noncompliance admitted with heart failure. [facility] has been consistent to primary care regarding discharge and needs evaluated this week I know Allison Golden is in the Cath Lab this week so would attempt to see if Anai Mc could see as well as would benefit from referral to the heart failure clinic

## 2022-03-28 NOTE — TELEPHONE ENCOUNTER
Left message for patient to call the office to move his appt with Thomas Zhou NP from 4/8/22 to 3/30/22 at 8:00 a.m. Patient already scheduled at [address] [place] [address] as well.

## 2022-03-29 ENCOUNTER — OFFICE VISIT (OUTPATIENT)
Dept: FAMILY MEDICINE CLINIC | Age: 57
End: 2022-03-29
Payer: MEDICARE

## 2022-03-29 ENCOUNTER — CARE COORDINATION (OUTPATIENT)
Dept: CASE MANAGEMENT | Age: 57
End: 2022-03-29

## 2022-03-29 VITALS
DIASTOLIC BLOOD PRESSURE: 83 MMHG | HEART RATE: 91 BPM | OXYGEN SATURATION: 99 % | HEIGHT: 69 IN | SYSTOLIC BLOOD PRESSURE: 132 MMHG | WEIGHT: 239 LBS | BODY MASS INDEX: 35.4 KG/M2 | TEMPERATURE: 97.2 F

## 2022-03-29 DIAGNOSIS — E11.9 TYPE 2 DIABETES MELLITUS WITHOUT COMPLICATION, WITHOUT LONG-TERM CURRENT USE OF INSULIN (HCC): ICD-10-CM

## 2022-03-29 DIAGNOSIS — K21.9 GASTROESOPHAGEAL REFLUX DISEASE, UNSPECIFIED WHETHER ESOPHAGITIS PRESENT: ICD-10-CM

## 2022-03-29 DIAGNOSIS — J30.2 SEASONAL ALLERGIC RHINITIS, UNSPECIFIED TRIGGER: ICD-10-CM

## 2022-03-29 DIAGNOSIS — I50.22 CHRONIC SYSTOLIC CONGESTIVE HEART FAILURE (HCC): Primary | ICD-10-CM

## 2022-03-29 DIAGNOSIS — I50.22 CHRONIC SYSTOLIC CONGESTIVE HEART FAILURE (HCC): ICD-10-CM

## 2022-03-29 DIAGNOSIS — R97.20 ELEVATED PSA, LESS THAN 10 NG/ML: ICD-10-CM

## 2022-03-29 DIAGNOSIS — I25.10 CORONARY ARTERY DISEASE INVOLVING NATIVE CORONARY ARTERY OF NATIVE HEART WITHOUT ANGINA PECTORIS: Chronic | ICD-10-CM

## 2022-03-29 LAB
ANION GAP SERPL CALCULATED.3IONS-SCNC: 14 MMOL/L (ref 7–16)
BUN BLDV-MCNC: 30 MG/DL (ref 6–20)
CALCIUM SERPL-MCNC: 9.3 MG/DL (ref 8.6–10.2)
CHLORIDE BLD-SCNC: 100 MMOL/L (ref 98–107)
CO2: 27 MMOL/L (ref 22–29)
CREAT SERPL-MCNC: 1.4 MG/DL (ref 0.7–1.2)
GFR AFRICAN AMERICAN: >60
GFR NON-AFRICAN AMERICAN: 52 ML/MIN/1.73
GLUCOSE BLD-MCNC: 95 MG/DL (ref 74–99)
POTASSIUM SERPL-SCNC: 4.3 MMOL/L (ref 3.5–5)
PRO-BNP: 3154 PG/ML (ref 0–125)
SODIUM BLD-SCNC: 141 MMOL/L (ref 132–146)

## 2022-03-29 PROCEDURE — 36415 COLL VENOUS BLD VENIPUNCTURE: CPT | Performed by: STUDENT IN AN ORGANIZED HEALTH CARE EDUCATION/TRAINING PROGRAM

## 2022-03-29 PROCEDURE — 1111F DSCHRG MED/CURRENT MED MERGE: CPT | Performed by: STUDENT IN AN ORGANIZED HEALTH CARE EDUCATION/TRAINING PROGRAM

## 2022-03-29 PROCEDURE — 99495 TRANSJ CARE MGMT MOD F2F 14D: CPT | Performed by: STUDENT IN AN ORGANIZED HEALTH CARE EDUCATION/TRAINING PROGRAM

## 2022-03-29 PROCEDURE — 99212 OFFICE O/P EST SF 10 MIN: CPT | Performed by: STUDENT IN AN ORGANIZED HEALTH CARE EDUCATION/TRAINING PROGRAM

## 2022-03-29 RX ORDER — ASPIRIN 81 MG/1
81 TABLET ORAL DAILY
Qty: 90 TABLET | Refills: 1 | Status: SHIPPED
Start: 2022-03-29 | End: 2022-05-31 | Stop reason: SDUPTHER

## 2022-03-29 RX ORDER — ISOSORBIDE MONONITRATE 30 MG/1
90 TABLET, EXTENDED RELEASE ORAL DAILY
Qty: 270 TABLET | Refills: 0 | Status: SHIPPED
Start: 2022-03-29 | End: 2022-05-31 | Stop reason: SDUPTHER

## 2022-03-29 RX ORDER — CLOPIDOGREL BISULFATE 75 MG/1
75 TABLET ORAL DAILY
Qty: 90 TABLET | Refills: 0 | Status: SHIPPED
Start: 2022-03-29 | End: 2022-05-31 | Stop reason: SDUPTHER

## 2022-03-29 RX ORDER — ESCITALOPRAM OXALATE 10 MG/1
10 TABLET ORAL DAILY
Qty: 30 TABLET | Refills: 2 | Status: SHIPPED
Start: 2022-03-29 | End: 2022-04-12

## 2022-03-29 RX ORDER — PANTOPRAZOLE SODIUM 40 MG/1
40 TABLET, DELAYED RELEASE ORAL DAILY
Qty: 30 TABLET | Refills: 1 | Status: SHIPPED
Start: 2022-03-29 | End: 2022-05-31 | Stop reason: SDUPTHER

## 2022-03-29 RX ORDER — FLUTICASONE PROPIONATE 50 MCG
2 SPRAY, SUSPENSION (ML) NASAL DAILY
Qty: 48 G | Refills: 1 | Status: SHIPPED
Start: 2022-03-29 | End: 2022-05-31 | Stop reason: SDUPTHER

## 2022-03-29 NOTE — CARE COORDINATION
[facility] Transitions Initial Follow Up Call    Call within 2 business days of discharge: Yes    Patient: Nishi Lucas Patient : 1965   MRN: [id number]  Reason for Admission: There are no discharge diagnoses documented for the most recent discharge. Discharge Date: 3/27/22 RARS: Readmission Risk Score: 18.8 ( )      Last Discharge [facility]       Complaint Diagnosis Description Type Department Provider    3/21/22   Admission (Discharged) [facility] Pablito Araya DO           2nd attempt to reach the patient for initial Care Transition call post hospital discharge. Message left with CTN's contact information requesting return phone call. No further outreaches will be attempted. If no return call by the end of today, will resolve CT episode and CTN to sign off. Pt is scheduled for a HFU today at his pcp's office with Dr. Nora Hidalgo.       Follow Up  Future Appointments   Date Time Provider Estuardo Arzate   3/29/2022 10:40 AM MD Zia Estes [facility]   3/30/2022  8:00 AM PAUL Richard - CNP [facility] [facility]   2022  9:45 AM SE CHF ROOM 1 [facility] CHF John Haque RN

## 2022-03-29 NOTE — PROGRESS NOTES
Post-Discharge Transitional Care  Follow Up      Vida Goss   YOB: 1965    Date of Office Visit:  3/29/2022  Date of Hospital Admission: 3/21/22  Date of Hospital Discharge: 3/27/22  Risk of hospital readmission (high >=14%. Medium >=10%) :Readmission Risk Score: 18.8 ( )      Care management risk score Rising risk (score 2-5) and Complex Care (Scores >=6): 4     Non face to face  following discharge, date last encounter closed (first attempt may have been earlier): 3/28/2022 11:44 AM    Call initiated 2 business days of discharge: Yes    ASSESSMENT/PLAN:   Coronary artery disease involving native coronary artery of native heart without angina pectoris  The following orders have not been finalized:  -     clopidogrel (PLAVIX) 75 MG tablet  Gastroesophageal reflux disease, unspecified whether esophagitis present  The following orders have not been finalized:  -     pantoprazole (PROTONIX) 40 MG tablet  Seasonal allergic rhinitis, unspecified trigger  The following orders have not been finalized:  -     fluticasone (FLONASE) 50 MCG/ACT nasal spray      Medical Decision Making: moderate complexity  No follow-ups on file. On this date 3/29/2022 I have spent 30 minutes reviewing previous notes, test results and face to face with the patient discussing the diagnosis and importance of compliance with the treatment plan as well as documenting on the day of the visit. Subjective:   HPI:  Follow up of Hospital problems/diagnosis(es):     Inpatient course: Discharge summary reviewed- see chart. Interval history/Current status: Patient with history of hypothyroid, CAD, COPD, depression, CKD, diabetes who presented today for hospital discharge follow-up. Patient was admitted for acute CHF exacerbation and discharged on Sunday. Patient reports he is doing well, losing weight and leg swelling is improving. Taking his medication regularly and trying to cut down salt diet.   Patient report fatigue and tired. Patient says he is not able to sleep because of anxiety. However patient was on Lexapro before and was discontinued by himself. Plan was to start Lexapro when he is a stable and today he is agreed to start medication again. Denies any suicidal or homicidal ideation. Also has appointment with cardiology tomorrow and heart failure clinic in the next week. Denies any worsening shortness of breath, chest pain, extremity weakness, blurry vision, hematemesis, fever or chills.     Reviewed inpatient and discharge medication during this visit. Patient is currently on Aldactone, Lasix, metoprolol, aspirin, Plavix, Lipitor. As per cardiology recommendation plan was continue to hold on ACE inhibitor until patient is stabilized. Discharge patient creatinine was 1.6, baseline is 1.3. Denies any urinary difficulty. During hospitalization patient PVR was normal.  Plan was outpatient follow-up to urology.       Patient Active Problem List   Diagnosis    GERD (gastroesophageal reflux disease)    COPD (chronic obstructive pulmonary disease)    Hyperlipemia    ED (erectile dysfunction)    History of stroke    Depression    CAD (coronary artery disease)    Dual implantable cardioverter-defibrillator in situ    NSVT [facility]nonsustained ventricular tachycardia) [facility]    Diverticulosis of colon    History of MI (myocardial infarction)    Chronic systolic congestive heart failure ([address]    Essential hypertension    Type 2 diabetes mellitus without complication, without long-term current use of insulin ([facility])    Obesity (BMI 30.0-34. 9)    Ischemic cardiomyopathy    Nonrheumatic aortic valve insufficiency    Elevated PSA, less than 10 ng/ml    CHF, acute on chronic ([facility])    Leg swelling    ALEX (acute kidney injury) ([address]    Age-related nuclear cataract, bilateral    Presbyopia    Regular astigmatism, bilateral       Medications listed as ordered at the time of discharge from hospital     Medication List Accurate as of March 29, 2022 11:06 AM. If you have any questions, ask your nurse or doctor.             CONTINUE taking these medications    aspirin 81 MG EC tablet     atorvastatin 80 MG tablet  Commonly known as: LIPITOR  Take 1 tablet by mouth nightly     clopidogrel 75 MG tablet  Commonly known as: PLAVIX  Take 1 tablet by mouth daily     fluticasone 50 MCG/ACT nasal spray  Commonly known as: FLONASE  2 sprays by Each Nostril route daily     furosemide 40 MG tablet  Commonly known as: LASIX  Take 1 tablet by mouth 2 times daily     isosorbide mononitrate 30 MG extended release tablet  Commonly known as: IMDUR  Take 3 tablets by mouth daily     metFORMIN 500 MG tablet  Commonly known as: GLUCOPHAGE  Take 1 tablet by mouth 2 times daily (with meals)     metoprolol succinate 100 MG extended release tablet  Commonly known as: TOPROL XL  Take 1 tablet by mouth 2 times daily     pantoprazole 40 MG tablet  Commonly known as: PROTONIX  Take 1 tablet by mouth daily     spironolactone 50 MG tablet  Commonly known as: ALDACTONE  Take 1 tablet by mouth daily              Medications marked \"taking\" at this time  Outpatient Medications Marked as Taking for the 3/29/22 encounter (Office Visit) with Deep Radford MD   Medication Sig Dispense Refill    furosemide (LASIX) 40 MG tablet Take 1 tablet by mouth 2 times daily 60 tablet 0    metoprolol succinate (TOPROL XL) 100 MG extended release tablet Take 1 tablet by mouth 2 times daily 30 tablet 3    isosorbide mononitrate (IMDUR) 30 MG extended release tablet Take 3 tablets by mouth daily 30 tablet 3    metFORMIN (GLUCOPHAGE) 500 MG tablet Take 1 tablet by mouth 2 times daily (with meals) 180 tablet 1    atorvastatin (LIPITOR) 80 MG tablet Take 1 tablet by mouth nightly 90 tablet 1    spironolactone (ALDACTONE) 50 MG tablet Take 1 tablet by mouth daily 90 tablet 1    fluticasone (FLONASE) 50 MCG/ACT nasal spray 2 sprays by Each Nostril route daily 48 g 1    aspirin 81 MG EC tablet Take 81 mg by mouth daily      clopidogrel (PLAVIX) 75 MG tablet Take 1 tablet by mouth daily 90 tablet 0    pantoprazole (PROTONIX) 40 MG tablet Take 1 tablet by mouth daily 30 tablet 1        A comprehensive review of systems was negative except for what was noted in the HPI. Objective:    /83 (Site: Right Upper Arm, Position: Sitting, Cuff Size: Large Adult)   Pulse 91   Temp 97.2 °F (36.2 °C) (Temporal)   Ht 5' 9\" (1.753 m)   Wt 239 lb (108.4 kg)   SpO2 99%   BMI 35.29 kg/m²   General Appearance: alert and oriented to person, place and time, well-developed and well-nourished, in no acute distress  Neck: neck supple and non tender without mass, no thyromegaly or thyroid nodules, no cervical lymphadenopathy   Pulmonary/Chest: clear to auscultation bilaterally- no wheezes, rales or rhonchi, normal air movement, no respiratory distress  Cardiovascular: normal rate, normal S1 and S2, no gallops, intact distal pulses and no carotid bruits  Abdomen: soft, non-tender, non-distended, normal bowel sounds, no masses or organomegaly and non-distended  Extremities: 2+ bilateral pitting edema      Neurologic: gait and coordination normal and speech normal    Assessment and plan:     Medications patient taking as of now reconciled against medications ordered at time of hospital discharge: Yes    Stable discharge follow-up:   [place] medication reconciliation done. As per cardiology we will continue to hold ACE inhibitor and repeat BMP today. Advised patient to follow-up with cardiology and congestive heart failure clinic. To new current medication.     Depression with anxiety: Patient is willing to restart Lexapro. Will start on Lexapro 10 mg and have follow-up in 1 month.        Follow-up in 1 month with PCP for depression anxiety. An electronic signature was used to authenticate this note.   --Bre Vides MD

## 2022-03-29 NOTE — PROGRESS NOTES
Patient with history of hypothyroid, CAD, COPD, depression, CKD, diabetes who presented today for hospital discharge follow-up. Patient was admitted for acute CHF exacerbation and discharged on Sunday. Patient reports he is doing well, losing weight and leg swelling is improving. Taking his medication regularly and trying to cut down salt diet. Patient report fatigue and tired. Patient says he is not able to sleep because of anxiety. However patient was on Lexapro before and was discontinued by himself. Plan was to start Lexapro when he is a stable and today he is agreed to start medication again. Denies any suicidal or homicidal ideation. Also has appointment with cardiology tomorrow and heart failure clinic in the next week. Denies any worsening shortness of breath, chest pain, extremity weakness, blurry vision, hematemesis, fever or chills. Reviewed inpatient and discharge medication during this visit. Patient is currently on Aldactone, Lasix, metoprolol, aspirin, Plavix, Lipitor. As per cardiology recommendation plan was continue to hold on ACE inhibitor until patient is stabilized. Discharge patient creatinine was 1.6, baseline is 1.3. Denies any urinary difficulty. During hospitalization patient PVR was normal.  Plan was outpatient follow-up to urology. Blood pressure 132/83, pulse 91, temperature 97.2 °F (36.2 °C), temperature source Temporal, height 5' 9\" (1.753 m), weight 239 lb (108.4 kg), SpO2 99 %. HEENT WNL     Heart regular    Lungs clear    abd non-tender      2+ bilateral pitting edema    Pulses intact       Assessment and plan:  Stable discharge follow-up: [place] medication reconciliation done. As per cardiology we will continue to hold ACE inhibitor and repeat BMP today. Advised patient to follow-up with cardiology and congestive heart failure clinic. To new current medication. Depression with anxiety: Patient is willing to restart Lexapro.   Will start on Lexapro 10 mg and have follow-up in 1 month. Follow-up in 1 month with PCP for depression anxiety. Attending Physician Statement  I have discussed the case, including pertinent history and exam findings with the resident. I also have seen the patient and performed key portions of the examination. I agree with the documented assessment and plan.

## 2022-03-30 ENCOUNTER — OFFICE VISIT (OUTPATIENT)
Dept: CARDIOLOGY CLINIC | Age: 57
End: 2022-03-30
Payer: MEDICARE

## 2022-03-30 VITALS
HEIGHT: 69 IN | BODY MASS INDEX: 34.96 KG/M2 | HEART RATE: 115 BPM | WEIGHT: 236 LBS | RESPIRATION RATE: 28 BRPM | OXYGEN SATURATION: 94 % | SYSTOLIC BLOOD PRESSURE: 132 MMHG | DIASTOLIC BLOOD PRESSURE: 80 MMHG

## 2022-03-30 DIAGNOSIS — G47.33 OSA (OBSTRUCTIVE SLEEP APNEA): ICD-10-CM

## 2022-03-30 DIAGNOSIS — I50.22 CHRONIC SYSTOLIC CONGESTIVE HEART FAILURE (HCC): ICD-10-CM

## 2022-03-30 DIAGNOSIS — I50.20 HFREF (HEART FAILURE WITH REDUCED EJECTION FRACTION) (HCC): Primary | ICD-10-CM

## 2022-03-30 PROCEDURE — 99214 OFFICE O/P EST MOD 30 MIN: CPT | Performed by: NURSE PRACTITIONER

## 2022-03-30 PROCEDURE — 93000 ELECTROCARDIOGRAM COMPLETE: CPT | Performed by: INTERNAL MEDICINE

## 2022-03-30 ASSESSMENT — EJECTION FRACTION
EF_SOURCE: 2D ECHO
EF_VALUE: 30-35%

## 2022-03-30 NOTE — PROGRESS NOTES
[facility] Cardiology  Office Visit         Reason for Visit: Heart Failure    Primary Cardiologist: Dr. Elizabeth Brito         History of Present Illness:     Mr. Karlo Su is a 61year old male with a PMHx of CAD with anterior STEMI 2014 s/p JOLENE-LAD, ICM/HFrEF (EF 30% TTE 9/2021), s/p ICD (BS implant 2015), HTN, HLD, T2DM, obesity, depression, previous tobacco abuse / COPD, GERD, right CVA 2012 with residual gait instability -- uses a cane PRN. He recently presented to the emergency room with complaints of increased shortness of breath and peripheral edema. He is admitted to the hospital for acute heart failure in the setting of medical noncompliance with his medications and a high sodium diet (Ramen noodles and canned soup). He was IV diuresed and during hospitalization given mild ALEX lisinopril was held, otherwise he was maintained on GDMT. Subjectively improved and although still retaining fluid, was diuresing well on oral diuretics and eager for discharge. He presents today in hospital follow-up, since discharge from the hospital he has inconsistently been taking his cardiac medications. This morning he states he has only taking oral furosemide and none of his other medication. Yesterday he had only taking oral furosemide once instead of twice daily dosing. His weight is down 3 pounds since discharge from the hospital however he has ongoing shortness of breath, COLLINS, orthopnea, abdominal bloating and lower extremity edema. His main complaint while in office today is ongoing shortness of breath and fatigue. He states he was tested for sleep apnea many years ago however is not currently treated. He is attempting to monitor the sodium in his diet (however discussed that he is using a range seasoning, query if this is actually low sodium) and stay well-hydrated. He reports an excellent urinary response to oral furosemide when taking the medication.     He reports dyspnea with exertion, shortness of breath, or decline in overall functional capacity. He reports orthopnea, PND, nocturnal cough or hemoptysis. He reports abdominal distention or bloating, early satiety, denies anorexia/change in appetite, unintentional weight loss. He does lower extremity edema. He denies exertional lightheadedness. He denies palpitations, syncope or near syncope. Review of systems is negative for chest pain, pressure, discomfort. When ambulating on an incline, He does not leg claudication. History is negative for neurological symptoms including transient loss of vision, asymmetric weakness, aphasia, dysphasia, numbness, tingling. Patient Active Problem List    Diagnosis Date Noted    Age-related nuclear cataract, bilateral 01/31/2022    Presbyopia 01/31/2022    Regular astigmatism, bilateral 01/31/2022    Leg swelling 01/23/2022    ALEX (acute kidney injury) ([facility] [address] 01/23/2022    CHF, acute on chronic ([facility] [address] 01/21/2022    Elevated PSA, less than 10 ng/ml 04/01/2021    Nonrheumatic aortic valve insufficiency 02/20/2019     Moderate       Obesity (BMI 30.0-34. 9)     Ischemic cardiomyopathy      EF 30%      Type 2 diabetes mellitus without complication, without long-term current use of insulin ([address]     Essential hypertension 06/29/2017    Diverticulosis of colon 08/07/2015    History of MI (myocardial infarction) 08/07/2015    Chronic systolic congestive heart failure ([facility] [address] 08/07/2015    NSVT (nonsustained ventricular tachycardia) ([facility] [address] 07/31/2015     12 beats seen on ICD interrogation      Dual implantable cardioverter-defibrillator in situ 04/02/2015     [employer] ICD Date of implant: 4/1/2015 by Dr Patricia Calle  2. Right ventricular lead parameters are as follows:[employer] Model Endotak Reliance SG Model [id number]. Serial # [id number]  Date of implant: 4/1/2015  3. Right atrial lead parameters are as follows:  Guidant Fineline II Model # [id number].  Serial # [id number]  Date of implant: 4/1/2015      CAD (coronary artery disease) 2014     Anterior STEMI 12-14. 3.5 x 33 mm drug-eluting stent to the LAD. 3-15: Cardiac catheterization demonstrates a small less than 2 mm apical LAD 75% stenosis and a small less than 2 mm obtuse marginal with an 80% stenosis. The LAD stent was patent. 9-15: Stress test and straining a fixed defect but no ischemia. 2-17: Stress test demonstrating a fixed defect with no ischemia.  Depression 12/10/2013    History of stroke 2013    ED (erectile dysfunction) 2012    COPD (chronic obstructive pulmonary disease)     Hyperlipemia     GERD (gastroesophageal reflux disease) 2011     Past Medical History:    1. Obesity. 2. GERD. 3. Hyperlipidemia. 4. Type II diabetes mellitus. 5. Hypertension. 6. COPD. 7. ED.  8. Allergic rhinitis. 9. Cigarette abuse with 45 pack years exposure. Smoked 1.5 packs per day and stopped smoking, 2014. 10. No history MI, CHF, CKD. 11. Hernia repair , partial colectomy for diverticulitis . 12. Family history. Father  of MI age 48. Sister had a stroke at age 61.   15. No known drug allergies. 14. Noncompliant with medications. 15. Echo, 2009. Borderline LVH. EF normal. No significant valvular abnormality. 16. Exercise MPS, 2011 for atypical chest pain. Normal.  17. Hospitalized in [facility], [address], 2012 with dysarthria and left sided weakness consistent with right CVA. H/H 17.5/49. Systolic 385 and Hb C0E 6.9 with mild troponin elevation. Lupus anticoagulant not detected (negative cardiolipin). CT head/brain without contrast showed age indeterminate lacunar infarct left basal ganglia. No acute territorial infarct evident. Brain MRI without contrast showed acute infarct in the right paramedial trenton. Mild small vessel ischemia and chronic lacunar infarct in the left putamen. CTA of head and neck showed no significant cervical cerebral stenosis. Echo showed no intra-atrial shunt and no evidence for clot.    18. Brief episode of syncope, 05/2013, circa 30 seconds without injury or prodrome. Subsequent Holter monitor showed no significant arrhythmia (Dr. Badillo Signs). 19. Probable SARAH. Noncompliant with recommendations for sleep study. 20. Video swallow study. No evidence of aspiration or penetration, 07/2013. 21. TTE, 07/18/2013. LV mildly dilated. Normal EF. No WMA. Normal diastolic function. No atrial dilatation. No hemodynamically significant valvular abnormality. No pericardial effusion. RVSP 26.   22. TORRES, 07/22/2013, [facility]. No hemodynamically significant valvular abnormality. LV normal. Negative bubble study for ASD/PFO. Small thrombus LA appendage demonstrated. [address] MPS, 10/21/2013. No diagnostic EKG changes. Normal perfusion, EF 72%. No ischemia. Low risk exam.  24. TORRES, 08/26/2014. Normal study, no LA or FRANCI thrombus, no valvulopathy. 25. [facility] admission, 12/06/2014 with acute anterior STEMI. Emergent catheterization demonstrated subtotal occlusion of LAD, treated with 3.5 x 33 mm Alpine XIENCE JOLENE. Residual disease found in distal OM branch of CX with YOLANDA-III flow which will be treated medically. EF 40% with anteroapical WMA. Peak Tn 7.63.  26. Echo, 12/16/2014. Large akinetic segment of apex and adjacent segments with myocardial thinning consistent with scar. ~EF 35-40%. RVSP 27. Tissue Doppler consistent with elevated LA pressure. No hemodynamically significant valvular abnormalities. [facility] visit for pneumonia, 02/02/2015. Treated with antibiotics. 28. Hospitalization with chest pain, 02/09/2015. MI ruled out. [address] MPS, 02/11/2015. Anteroseptal MI without any reversible ischemia. EF 42%. Medical therapy. 30. Cardiac catheterization, 03/30/2015. [facility] no significant stenosis. LAD mid vessel stent widely patent. 75% apical stenosis (<2 mm vessel). D1 ostial 40% stenosis. D2 diffuse 50% stenosis. CX mild lumen irregularities. OM1 small vessel.  OM2 large bifurcating vessel with anterior branch <2 mm and 20-30% diffuse stenosis. Lateral OM <2 mm with proximal 80% stenosis. RCA mild lumen irregularities. PDA ostial 40-50% stenosis. Medical Rx.  31. Echo, 03/30/2015. Mildly dilated LV, severely reduced LV systolic function. EF 30%. Severe LAE. Trace MR, moderate AR.  32. Insertion of dual chamber implantable cardioverted defibrillator, 04/01/2015 (Linnea Pena), Dr. Elaina Bynum. 33. Device interrogation 9/13/2019 showed 0% atrial pacing, 0% RV pacing with multiple episodes of NSVT  33. Endoscopy and colonoscopy, 08/07/2015, severe duodenitis, mild to moderate gastritis, small 8-10 mm submucosal mass in the body of the stomach and mild reflux esophagitis.  The mass was felt to be benign.  Colonoscopy showed rectal polyp 14 cm from the anus, which was resected.    34. Hospitalization, 09/03/2015 with chest pain, MI ruled out. 28. Lexiscan MPS, 09/03/2015:  Dilated LV with estimated EF 41%. Ruma [place] of the cardiac apex with global hypokinesis of the other walls.  Fixed anterior distal inferior and lateral defect.  No TID mentioned.  Patient managed medically.    36. Lexiscan myocardial perfusion study, 02/02/2017, dilated left ventricle with ejection fraction 43%, akinesis of the apex with fixed anterior septal and apical defect. No reversible ischemia. No transient cavity dilation.  Medical management continued.    37. Lexiscan myocardial perfusion study, 01/25/2019, large fixed defect in the mid and distal anterior and apical wall suggestive of prior MI with akinesis of the mid and distal anterior and apical wall. EF 25%. 38. Chronic kidney disease  44. TTE 9/12021 Obrien Hanson): apical akinesis.   EF 30%e  severely reduced left ventricular systolic function.  Stage III diastolic dysfunction. Sree Jennifer reduced right ventricle systolic function.  Mild posteriorly directed  mitral regurgitation.  Moderate aortic regurgitation. Social History:    Lives independently -- completes ADL without CP/SOB.  Steps to the basement with laundry room. Does not require supplemental O2. Uses a cane for ambulation assistance PRN for gait instability following his CVA 2012.      Previous tobacco abuse, quit 2015  Social alcohol use -- 2-6 beers with friends 2 x month   Denies illicit drug use   Full code      Family History: This information was not obtained at this time as it is found noncontributory secondary to the patients advanced age.      Past Medical History:   Diagnosis Date    Allergic rhinitis     Anticoagulant long-term use     Anxiety     [facility]typical angina ([facility])     CAD (coronary artery disease)     Carotid artery stenosis     Cerebral artery occlusion with cerebral infarction [facility]) 2012    CHF (congestive heart failure) ([facility])     COPD (chronic obstructive pulmonary disease) ([facility] [address]     CVA (cerebral infarction) 11/19/2012    Depression     Diverticulitis 2010    Sigmoid abscess, s/p colectomy    Erectile dysfunction     GERD (gastroesophageal reflux disease)     Heart attack ([facility] [address] 2014    Hx of blood clots     Hyperlipemia     Hypertension     Ischemic cardiomyopathy     Left atrial thrombus     Obesity     Type 2 diabetes mellitus without complication, without long-term current use of insulin ([facility])     Type II or unspecified type diabetes mellitus without mention of complication, not stated as uncontrolled        Past Surgical History:   Procedure Laterality Date    ABDOMEN SURGERY      CARDIAC CATHETERIZATION  3/30/2015    EF 35%, Dr. Kat Castellanos, [address]  4/1/2015    left chest, Dual [facility]-[employer] [facility], Dr. Mark Wilcox, [facility]    COLECTOMY  1/29/2010    laparoscopic, converted to open sigmoid colon resection (diverticular disease), Dr. Allyssa Leon, [address] [place] [address] COLONOSCOPY  12/5/2005    diverticulosis, Dr. Allyssa Leon, [address] [place] [address] COLONOSCOPY  9/4/2008    diverticulosis, Dr. Allyssa Leon, [address] [place] [address]  1/28/2010    prior to colon surgery, diverticulosis, Dr. Allyssa Leon, [address] [place] [address] COLONOSCOPY  08/07/2015    sigmoid diverticulosis, rectal polyp bx/cauterized (hyperplastic polyp), Dr. Krystina Mackey, [address] WITH STENT PLACEMENT  12/6/2014    [address] 3.5 x 33, Dr. Sary Gutierrez, [facility] CYSTOURETHROSCOPY  1/29/2010    placement bilateral ureteral stents for colon resection, Dr. Donna Keith, [facility], [address]      pacer defib    UPPER GASTROINTESTINAL ENDOSCOPY  8/7/2015    severe duodenitis, mild to moderate gastritis (bx for H pylori), submucosal benign mass body stomach, mild reflux esophagitis, Dr. Krystina Mackey, [facility] VASCULAR SURGERY         No Known Allergies      Outpatient Medications Marked as Taking for the 3/30/22 encounter (Office Visit) with PAUL Mccullough CNP   Medication Sig Dispense Refill    clopidogrel (PLAVIX) 75 MG tablet Take 1 tablet by mouth daily 90 tablet 0    pantoprazole (PROTONIX) 40 MG tablet Take 1 tablet by mouth daily 30 tablet 1    metFORMIN (GLUCOPHAGE) 500 MG tablet Take 1 tablet by mouth 2 times daily (with meals) 180 tablet 1    isosorbide mononitrate (IMDUR) 30 MG extended release tablet Take 3 tablets by mouth daily 270 tablet 0    aspirin 81 MG EC tablet Take 1 tablet by mouth daily 90 tablet 1    escitalopram (LEXAPRO) 10 MG tablet Take 1 tablet by mouth daily 30 tablet 2    furosemide (LASIX) 40 MG tablet Take 1 tablet by mouth 2 times daily 60 tablet 0    atorvastatin (LIPITOR) 80 MG tablet Take 1 tablet by mouth nightly 90 tablet 1    spironolactone (ALDACTONE) 50 MG tablet Take 1 tablet by mouth daily 90 tablet 1         Guideline directed medical/device therapy:  ARNI/ACE I/ARB: No  Beta blocker:   Yes  Aldosterone antagonist:  Yes  ICD/CRT-P/-D:  ICD  QRS interval on recent ECG (personally reviewed/interpreted): <120 ms  Percentage RV pacing (personally reviewed/interpreted): %/NA          Review of Systems:   Cardiac: As per HPI  General: No fever, chills, rigors  Pulmonary: As per HPI  HEENT: No visual disturbances, difficult swallowing  GI: No nausea, vomiting, abdominal pain  : No dysuria or hematuria  Endocrine: No thyroid disease or diabetes  Musculoskeletal: ROCK x 4, no focal motor deficits  Skin: Intact, no rashes  Neuro/Psych: No headache or seizures          Weights: Wt Readings from Last 3 Encounters:   03/30/22 236 lb (107 kg)   03/29/22 239 lb (108.4 kg)   03/25/22 241 lb 12.8 oz (109.7 kg)             Physical Examination:     /80   Pulse 115   Resp 28   Ht 5' 9\" (1.753 m)   Wt 236 lb (107 kg)   SpO2 94%   BMI 34.85 kg/m²     CONSTITUTIONAL: Alert and oriented times 3, no acute distress and cooperative to examination with proper mood and affect. SKIN: Skin color, texture, turgor normal. No rashes or lesions. LYMPH: no cervical nodes, no inguinal nodes  HEENT: Head is normocephalic, atraumatic. EOMI, PERRLA. NECK: Supple, symmetrical, trachea midline, no adenopathy, thyroid symmetric, not enlarged and no tenderness, skin normal. +JVD/HJR  CHEST/LUNGS: chest symmetric with normal A/P diameter, normal respiratory rate and rhythm, lungs diminished bases otherwise clear to auscultation without wheezes, rales or rhonchi. No accessory muscle use. Scars None   CARDIOVASCULAR: Heart sounds are normal.  Tachycardic regular rate and rhythm without murmur, gallop or rub. Normal S1 and S2. . Carotid and femoral pulses 2+/4 and equal bilaterally. ABDOMEN: Morbidly obese/distended. No and Laparoscopic scar(s) present. Normal bowel sounds. No bruits. soft, nondistended, no masses or organomegaly. no evidence of hernia. Percussion: Normal without hepatosplenomegally. Tenderness: absent. RECTAL: deferred, not clinically indicated  NEUROLOGIC: There are no focalizing motor or sensory deficits. CN II-XII are grossly intact. Zhang Freud EXTREMITIES: no cyanosis, no clubbing. 2+ bilateral lower extremity edema.           All the following diagnostics were personally reviewed and interpreted by me. LAB DATA:     3/26/2022 04:59 3/27/2022 04:43 3/29/2022 12:00   Sodium 135 138 141   Potassium 4.4 4.1 4.3   Chloride 94 (L) 97 (L) 100   CO2 25 27 27   BUN,BUNPL 32 (H) 33 (H) 30 (H)   Creatinine 1.6 (H) 1.6 (H) 1.4 (H)   Anion Gap 16 14 14   GFR Non- 45 45 52   GFR  54 54 >60   Magnesium  2.1    GLUCOSE, FASTING, (H) 113 (H) 95   CALCIUM, SERUM, 937990 9.1 9.2 9.3   Phosphorus  3.7    Total Protein 6.6 6.8    Meter Glucose      Pro-BNP 3,940 (H)  3,154 (H)   Albumin 3.5 3.4 (L)    Alk Phos 186 (H) 198 (H)    ALT 30 52 (H)    AST 50 (H) 72 (H)    Bilirubin 1.4 (H) 1.3 (H)    WBC 6.5 7.5    RBC 6.80 (H) 6.60 (H)    Hemoglobin Quant 15.2 14.9    Hematocrit 50.3 48.6    MCV 74.0 (L) 73.6 (L)    MCH 22.4 (L) 22.6 (L)    MCHC 30.2 (L) 30.7 (L)    MPV 10.9 11.0    RDW 20.7 (H) 20.6 (H)    Platelet Count 080 586        IMAGING:    CXR (3/25/2022)  FINDINGS:  The lungs are without acute focal process.  There is no effusion or  pneumothorax.  Cardiomegaly.  The osseous structures are without acute  process.  Left-sided transvenous pacer device. Impression  No acute process. Cardiomegaly.       CARDIAC TESTING:    [facility] (4/2015)   HEMODYNAMICS:   Opening aortic [id number] with a mean of95. Left ventricular systolic [id number]. [id number]. No significant gradient across the aortic valve. ANGIOGRAPHIC RESULTS:   Left main: No angiographically significant stenosis. LAD: Mid stent widely patent with good distal runoff. Apical 75% stenosis. This is less than a 2.0-mm vessel at this level. D-1: Small vessel. Ostial 40% stenosis. D-2: Small vessel. Proximal diffuse 50% stenosis. Circumflex: Mild diffuse luminal irregularities. OM-1: Small vessel. OM-2:  Large, bifurcating vessel.   A more anterior bifurcating branch is a   slightly less than 2-mm vessel and has mild diffuse luminal irregularities up   to 20% to 30% stenosis diffusely. The more lateral branch has a proximal   diffuse 80% stenosis. This also is slightly less than a 2.0-mm vessel. Right coronary artery: Tortuous vessel. Dominant vessel. Mild diffuse luminal irregularities up to 20% stenosis. The 1st PDA branch has an ostial 40% to 50% stenosis. LEFT VENTRICULOGRAM:  Mid and distal anterior near akinesis. Apical and inferoapical akinesis and dyskinesis. Dilated left ventricle. Moderately severe overall   LV systolic dysfunction. Ejection [id number]%. RIGHT FEMORAL ARTERY ANGIOGRAM: There is a 40% stenosis proximal to the end   of the sheath. Good distal runoff. NM Stress (1/2019)  Gated SPECT left ventricular ejection fraction was calculated to be 25%, with normal myocardial thickening and wall motion and akinesis of the Mid and distal anterior and apical wall. Impression:    1. ECG during the infusion did not change. 2. The myocardial perfusion imaging was abnormal. The abnormality was a a large sized fixed defect in the Mid and distal anterior and apical wall suggestive of a prior MI  3. Overall left ventricular systolic function was abnormal with regional wall motion abnormalities. 4. Intermediate risk general pharmacologic stress test.    TTE (10/11/2019)   Summary   The left ventricle is severely dilated. Mild concentric left ventricular hypertrophy ( with thinning of the   segments of the myocardial infarction ). There is akinesis of the apex, the anteroseptal wall, the apical half of   the interventricular septum and the apical and basal inferior wall )   Ejection fraction is visually estimated at 30-35%. There is doppler evidence of stage III diastolic dysfunction. Micro-bubble contrast injected to enhance left ventricular visualization. Normal right ventricular size and function. Defibrillator wire visualized in right ventricle/right atrium. The left atrium is mildly dilated. Focal calcification mitral valve leaflets.  Mild mitral regurgitation. Mild aortic regurgitation. Mild pulmonary hypertension. Aortic root is sclerotic and calcified. TTE (9/1/2021)   Summary:   apical akinesis. Severely reduced left ventricular systolic function. Stage III diastolic dysfunction. Mildly reduced right ventricle systolic function. EF 30%   Mild posteriorly directed mitral regurgitation. Moderate aortic regurgitation. EKG  Sinus Tachycardia   Left atrial enlargement. Decreasing R-wave progression   Nonspecific ST/T changes      ASSESSMENT:  1. Acute on chronic HFrEF  2. ACC stage C / NYHA class III  3. Hypervolemic  4. Ischemic cardiomyopathy  5. LVEF 30%, LVEDD 5.6  6. CAD s/p anterior STEMI (12/2014) s/p JOLENE LAD. Moderate disease (2015) medical therapy. 7. ICD in situ  8. NSVT  9. HTN  10. CKD  11. T2DM  12. HLD - on statin therapy  13. Obesity  14. Probable SARAH  15. COPD with former tobacco abuse (Quit 2014)  16. Hx CVA (2012)  17. Hx medical noncompliance       PLAN:  1. Make sure you take your medication as soon as you get home. 2. Please make sure that you are taking lasix twice daily     3. Go to CHF clinic Friday and at least weekly, maybe twice weekly for the next couple weeks. Once we reviewed CHF clinic visit will continue GDMT titration as tolerated. 4. Referral for sleep study    5. Follow up with Dr. Andreas Oshea in 1 month    6. Weigh yourself daily    -Stay Hydrated    -Diet should sodium restricted to 2 grams    -Again watch your daily weight trends and if you gain water weight please follow below instructions.    -If you gain 3-5 pounds in 2-3 days OR notice that you are retaining fluid in anyway just like you did before then take an extra dose of your water pill (furosemide/Lasix) every day until you lose the weight or feel better.     -If you notice that you have taken more than 2 extra doses in 1 week then please call and let us know.     -If at any time you feel that you are retaining fluid, your medications are not working, or you feel ill in anyway, then please call us for either same day appointment or the next day, and for instructions. Our goal is to keep you out of the emergency room and the hospital and we have ways to do it. You just need to call us in a timely manner.     -If you become sick for other reasons, and notice that you are not urinating as much, the urine is very dark, you have significant diarrhea or vomiting, then please DO NOT take your water pill and CALL US immediately.     Elvia MILLER-CNP  [facility]

## 2022-03-30 NOTE — DISCHARGE SUMMARY
Nephrology recommended continued fluid mobilization as well, fluid restriction diet and continued IV diuretic therapy. Psychology was consulted and recommended initiation of services for depression after discharge from hospital.   During patient's course, he improved with IV lasix. He was slowly transitioned to po lasix. The patient's course was otherwise uneventful. He progressed well. Sravan Castrejon He was tolerating a carb control, low sodium diet with no nausea or vomiting, and was in a suitable condition for discharge to home.     Discharge plan:  - continue lasix 40 mg BID   - Nephrology follow up within 2 weeks from discharge  - continue increased dose Toprol  mg BID  - Cardiology follow up, set up with CHF clinic  - stop lisinopril   - continue aldactone, imdur    Discharge Medications:         Medication List      CHANGE how you take these medications    furosemide 40 MG tablet  Commonly known as: LASIX  Take 1 tablet by mouth 2 times daily  What changed:   · medication strength  · how much to take     metoprolol succinate 100 MG extended release tablet  Commonly known as: TOPROL XL  Take 1 tablet by mouth 2 times daily  What changed:   · medication strength  · how much to take        CONTINUE taking these medications    atorvastatin 80 MG tablet  Commonly known as: LIPITOR  Take 1 tablet by mouth nightly     spironolactone 50 MG tablet  Commonly known as: ALDACTONE  Take 1 tablet by mouth daily        STOP taking these medications    hydrALAZINE 50 MG tablet  Commonly known as: APRESOLINE     isosorbide mononitrate 30 MG extended release tablet  Commonly known as: IMDUR     lisinopril 40 MG tablet  Commonly known as: PRINIVIL;ZESTRIL     metFORMIN 500 MG tablet  Commonly known as: GLUCOPHAGE           Where to Get Your Medications      These medications were sent to [facility] [address]  [place] [place] [address] Perez, [address]    Phone: [phone]   · furosemide 40 MG tablet  · metoprolol succinate 100 MG extended release tablet         Consults:  cardiology, nephrology and psychology, CHF clinic    Significant Diagnostic Studies:      Labs:  Na/K/Cl/CO2:  141/4.3/100/27 (03/29 1200)  BUN/Cr/glu/ALT/AST/amyl/lip:  30/1.4/--/--/--/--/-- (03/29 1200)     [unfilled]  estimated creatinine clearance is 71 mL/min (A) (based on SCr of 1.4 mg/dL (H)). New Imaging:  XR CHEST (2 VW)    Result Date: 3/25/2022  EXAMINATION: TWO XRAY VIEWS OF THE CHEST 3/25/2022 6:28 pm COMPARISON: 03/21/2022 HISTORY: ORDERING SYSTEM PROVIDED HISTORY: chf TECHNOLOGIST PROVIDED HISTORY: Reason for exam:->chf What reading provider will be dictating this exam?->CRC FINDINGS: The lungs are without acute focal process. There is no effusion or pneumothorax. Cardiomegaly. The osseous structures are without acute process. Left-sided transvenous pacer device. No acute process. Cardiomegaly. XR CHEST (2 VW)    Result Date: 3/21/2022  EXAMINATION: TWO XRAY VIEWS OF THE CHEST 3/21/2022 4:21 pm COMPARISON: 02/01/2022 HISTORY: ORDERING SYSTEM PROVIDED HISTORY: sob TECHNOLOGIST PROVIDED HISTORY: Reason for exam:->sob What reading provider will be dictating this exam?->CRC FINDINGS: Left pleural effusion and/or infiltrate. No pneumothorax. Cardiomegaly. Left-sided transvenous pacer device. The osseous structures are without acute process. Left pleural effusion and/or infiltrate. Cardiomegaly     US URINARY BLADDER LIMITED    Result Date: 3/22/2022  EXAMINATION: ULTRASOUND OF THE URINARY BLADDER 3/22/2022 COMPARISON: None. HISTORY: ORDERING SYSTEM PROVIDED HISTORY: incomplete emptying TECHNOLOGIST PROVIDED HISTORY: Reason for exam:->incomplete emptying What reading provider will be dictating this exam?->CRC FINDINGS: Bladder is unremarkable in appearance. No significant postvoid residual. Bilateral ureteral jets identified. Unremarkable ultrasound of the bladder. Treatments:   cardiac meds: metoprolol, imdur, furosemide    Discharge Exam:  Physical Exam  Constitutional:       Appearance: Normal appearance. HENT:      Nose: Nose normal.      Mouth/Throat:      Mouth: Mucous membranes are moist.      Pharynx: Oropharynx is clear. Cardiovascular:      Rate and Rhythm: Normal rate and regular rhythm. Pulses: Normal pulses. Heart sounds: Normal heart sounds. No murmur heard. Pulmonary:      Effort: Pulmonary effort is normal. No respiratory distress. Breath sounds: No wheezing or rales. Comments: LLL breath sounds diminished   Abdominal:      General: Abdomen is flat. Bowel sounds are normal.      Palpations: Abdomen is soft. Tenderness: There is no abdominal tenderness. There is no guarding or rebound. Musculoskeletal:         General: Normal range of motion. Right lower leg: Edema present. Left lower leg: Edema present. Comments: Bilateral lower extremity edema left > right  Compression stockings off today   Skin:     General: Skin is warm and dry. Capillary Refill: Capillary refill takes 2 to 3 seconds. Neurological:      General: No focal deficit present. Mental Status: He is alert and oriented to person, place, and time. Psychiatric:         Mood and Affect: Mood is anxious. Disposition:   home    Future Appointments   Date Time Provider Estuardo Arzate   4/1/2022 10:30 AM [facility] ROOM 1 [facility]   4/12/2022  9:45 AM [facility] CHF ROOM 1 [facility]   5/3/2022  3:40 PM DO Zion Green [facility]       More than 30 minutes was spent in preparation of this patient's discharge including, but not limited to, examination, preparation of documents, prescription preparation, counseling and coordination.     Signed:  Elroy Klein MD  3/30/2022, 4:05 PM

## 2022-04-01 ENCOUNTER — HOSPITAL ENCOUNTER (OUTPATIENT)
Dept: OTHER | Age: 57
Setting detail: THERAPIES SERIES
Discharge: HOME | End: 2022-04-01
Payer: MEDICARE

## 2022-04-01 ENCOUNTER — TELEPHONE (OUTPATIENT)
Dept: FAMILY MEDICINE CLINIC | Age: 57
End: 2022-04-01

## 2022-04-01 VITALS
DIASTOLIC BLOOD PRESSURE: 80 MMHG | BODY MASS INDEX: 33.82 KG/M2 | RESPIRATION RATE: 20 BRPM | OXYGEN SATURATION: 97 % | SYSTOLIC BLOOD PRESSURE: 160 MMHG | HEART RATE: 116 BPM | WEIGHT: 229 LBS

## 2022-04-01 LAB
ANION GAP SERPL CALCULATED.3IONS-SCNC: 12 MMOL/L (ref 7–16)
BUN BLDV-MCNC: 23 MG/DL (ref 6–20)
CALCIUM SERPL-MCNC: 8.9 MG/DL (ref 8.6–10.2)
CHLORIDE BLD-SCNC: 102 MMOL/L (ref 98–107)
CO2: 25 MMOL/L (ref 22–29)
CREAT SERPL-MCNC: 1.2 MG/DL (ref 0.7–1.2)
GFR AFRICAN AMERICAN: >60
GFR NON-AFRICAN AMERICAN: >60 ML/MIN/1.73
GLUCOSE BLD-MCNC: 197 MG/DL (ref 74–99)
POTASSIUM SERPL-SCNC: 4.2 MMOL/L (ref 3.5–5)
PRO-BNP: 5147 PG/ML (ref 0–125)
SODIUM BLD-SCNC: 139 MMOL/L (ref 132–146)

## 2022-04-01 PROCEDURE — 83880 ASSAY OF NATRIURETIC PEPTIDE: CPT

## 2022-04-01 PROCEDURE — 99214 OFFICE O/P EST MOD 30 MIN: CPT

## 2022-04-01 PROCEDURE — 96374 THER/PROPH/DIAG INJ IV PUSH: CPT

## 2022-04-01 PROCEDURE — 80048 BASIC METABOLIC PNL TOTAL CA: CPT

## 2022-04-01 PROCEDURE — 2580000003 HC RX 258: Performed by: NURSE PRACTITIONER

## 2022-04-01 PROCEDURE — 6360000002 HC RX W HCPCS: Performed by: NURSE PRACTITIONER

## 2022-04-01 RX ORDER — FUROSEMIDE 10 MG/ML
40 INJECTION INTRAMUSCULAR; INTRAVENOUS ONCE
Status: COMPLETED | OUTPATIENT
Start: 2022-04-01 | End: 2022-04-01

## 2022-04-01 RX ORDER — SODIUM CHLORIDE 0.9 % (FLUSH) 0.9 %
10 SYRINGE (ML) INJECTION 2 TIMES DAILY
Status: DISCONTINUED | OUTPATIENT
Start: 2022-04-01 | End: 2022-04-02 | Stop reason: HOSPADM

## 2022-04-01 RX ORDER — BUMETANIDE 0.25 MG/ML
2 INJECTION, SOLUTION INTRAMUSCULAR; INTRAVENOUS ONCE
Status: DISCONTINUED | OUTPATIENT
Start: 2022-04-01 | End: 2022-04-01

## 2022-04-01 RX ADMIN — FUROSEMIDE 40 MG: 10 INJECTION, SOLUTION INTRAMUSCULAR; INTRAVENOUS at 11:04

## 2022-04-01 RX ADMIN — Medication 10 ML: at 11:04

## 2022-04-01 NOTE — PROGRESS NOTES
Congestive Heart Failure [address]         Dawit Murray   1965          Referring Provider: FRANCO RANDLE [facility] NP  Primary Care Physician:   DR. KNIGHT  Cardiologist: DR. Watson Gamez  Nephrologist:N/A      History of Present Illness:     Dawit Murray is a 61 y.o. male with a history of HFrEF, most recent EF 30-35% ON 10/11/2019. Patient Story:    He does  have dyspnea with exertion, shortness of breath, or decline in overall functional capacity. He does have orthopnea, PND, nocturnal cough or hemoptysis. He does not have abdominal distention or bloating, early satiety, anorexia/change in appetite. He does has a good urinary response to  oral diuretic. He does have  lower extremity edema. He denies lightheadedness, dizziness. He denies palpitations, syncope or near syncope. He does not complain of chest pain, pressure, discomfort. No Known Allergies      No outpatient medications have been marked as taking for the 4/1/22 encounter [facility] HOSPITAL Encounter) with [facility] ROOM 1. Prior to Visit Medications    Medication Sig Taking?  Authorizing Provider   clopidogrel (PLAVIX) 75 MG tablet Take 1 tablet by mouth daily  Taylor Ann MD   pantoprazole (PROTONIX) 40 MG tablet Take 1 tablet by mouth daily  Taylor Ann MD   fluticasone (FLONASE) 50 MCG/ACT nasal spray 2 sprays by Each Nostril route daily  Patient not taking: Reported on 3/30/2022  Taylor Ann MD   metFORMIN (GLUCOPHAGE) 500 MG tablet Take 1 tablet by mouth 2 times daily (with meals)  Taylor Ann MD   isosorbide mononitrate (IMDUR) 30 MG extended release tablet Take 3 tablets by mouth daily  Taylor Ann MD   aspirin 81 MG EC tablet Take 1 tablet by mouth daily  Taylor Ann MD   escitalopram (LEXAPRO) 10 MG tablet Take 1 tablet by mouth daily  Taylor Ann MD   furosemide (LASIX) 40 MG tablet Take 1 tablet by mouth 2 times daily  Christian Khan MD   metoprolol succinate (TOPROL XL) 100 MG extended release tablet Take 1 tablet by mouth 2 times daily  Patient taking differently: Take 100 mg by mouth 2 times daily Took 50 mg last night . Instructed patient to take 100 mg BID  Wes Martines MD   atorvastatin (LIPITOR) 80 MG tablet Take 1 tablet by mouth nightly  Isadora Alatorre DO   spironolactone (ALDACTONE) 50 MG tablet Take 1 tablet by mouth daily  Sheldon Marquez MD           Guideline directed medical:  ARNI/ACE I/ARB: No  Beta blocker:   Yes  Aldosterone antagonist:  No        Physical Examination:     BP (!) 160/80 Comment: PT DID NOT TAKE MEDICATIONS  Pulse 116   Resp 20   Wt 229 lb (103.9 kg)   SpO2 97%   BMI 33.82 kg/m²     Assessment  Charting Type: Shift assessment    Neurological  Level of Consciousness: Alert (0)         HEENT  HEENT (WDL): Within Defined Limits    Respiratory  Respiratory Pattern: Regular  Respiratory Depth: Normal  Respiratory Quality/Effort: Dyspnea with exertion  Chest Assessment: Chest expansion symmetrical  L Breath Sounds: Fine Crackles,Diminished  R Breath Sounds: Diminished,Clear              Cardiac  Cardiac Rhythm: Sinus tachy    Rhythm Interpretation  Pulse: 116         Gastrointestinal  Abdominal (WDL): Within Defined Limits               Peripheral Vascular  Peripheral Vascular (WDL): Exceptions to WDL  Edema: Right lower extremity,Left lower extremity  RLE Edema: Pitting,+1,+2  LLE Edema: Pitting,+3,+2    Skin Color/Condition  Skin Color: Appropriate for ethnicity  Skin Condition/Temp: Warm,Swollen                                            Pulse: 116                     LAB DATA:    Last 3 BMP      Sodium (mmol/L)   Date Value   03/29/2022 141   03/27/2022 138   03/26/2022 135     Potassium (mmol/L)   Date Value   03/29/2022 4.3   03/27/2022 4.1   03/26/2022 4.4     Potassium reflex Magnesium (mmol/L)   Date Value   01/23/2022 3.6   01/22/2022 3.8   10/11/2019 3.9     Chloride (mmol/L)   Date Value   03/29/2022 100   03/27/2022 97 (L)   03/26/2022 94 (L)     CO2 (mmol/L)   Date Value   03/29/2022 27   03/27/2022 27   03/26/2022 25     BUN (mg/dL)   Date Value   03/29/2022 30 (H)   03/27/2022 33 (H)   03/26/2022 32 (H)     Glucose (mg/dL)   Date Value   03/29/2022 95   03/27/2022 113 (H)   03/26/2022 120 (H)   04/23/2012 129 (H)   04/16/2012 163 (H)     Calcium (mg/dL)   Date Value   03/29/2022 9.3   03/27/2022 9.2   03/26/2022 9.1       Last 3 BNP       Pro-BNP (pg/mL)   Date Value   03/29/2022 3,154 (H)   03/26/2022 3,940 (H)   03/23/2022 2,352 (H)          CBC: No results for input(s): WBC, HGB, PLT in the last 72 hours. BMP:    Recent Labs     03/29/22  1200      K 4.3      CO2 27   BUN 30*   CREATININE 1.4*   GLUCOSE 95     Hepatic: No results for input(s): AST, ALT, ALB, BILITOT, ALKPHOS in the last 72 hours. Troponin: No results for input(s): TROPONINI in the last 72 hours. BNP: No results for input(s): BNP in the last 72 hours. Lipids: No results for input(s): CHOL, HDL in the last 72 hours. Invalid input(s): LDLCALCU  INR: No results for input(s): INR in the last 72 hours.         WEIGHTS:    Wt Readings from Last 3 Encounters:   04/01/22 229 lb (103.9 kg)   03/30/22 236 lb (107 kg)   03/29/22 239 lb (108.4 kg)         TELEMETRY:  Cardiac Regularity: Regular  Cardiac Rhythm/Interpretation: ST        ASSESSMENT:  Ivone Harley is hypervolemic with weight gain     Interventions completed this visit:  IV diuretics given yes, LASIX 40 MG IV FOR C/O SHORTNESS OF BREATH  Lab work obtained yes, BNP/BMP   Reviewed currently prescribed medications with patient, educated on importance of compliance and answered any questions regarding their medication  Educated on signs and symptoms of HF  Educated on low sodium diet    PLAN:  Scheduled to follow up in CHF clinic on   Future Appointments   Date Time Provider Estuardo Arzate   4/12/2022  9:45 AM [facility] CHF ROOM 1 [facility]   4/21/2022  9:30 AM [facility] CHF ROOM 1 [facility]   5/3/2022  3:40 DO Zion Casper [facility]     Given clinic phone number and aware of signs and symptoms to call with any HF change in symptoms. CHF TEACHING GIVEN, CARING FOR YOUR HEART, ZONE PAMPHLET, DAILY WEIGHTS, LOW SODIUM DIET. PT VERBALIZED UNDERSTANDING.  PT. ADMITS TO TAKING METOPROLOL ONLY 50 MG DAILY INSTEAD  MG BID AND LASIX  40MG QD INSTEAD OF BID. STRESSED IMPORTANCE TO PT OF FOLLOWING PRESCRIBED MEDS AND EXPLAINED PURPOSE OF MEDICATIONS, PT VERBALIZED UNDERSTANDING. MEDICATION LIST GIVEN TO PT. AND PILL BOX GIVEN TO PT.  PT. CONFIRMED WILL START TAKING MEDS AS ORDERED.

## 2022-04-01 NOTE — TELEPHONE ENCOUNTER
Fifi Spencer called in and is asking if you could please give him a letter for a handicap placard. He states that he is having problems walking far distances, getting short of breath and his blood pressure has been going up.     Thank you

## 2022-04-01 NOTE — TELEPHONE ENCOUNTER
Please let him know that I have written a letter for a handicap placard that he may . While we can provide this temporarily to help him, he really needs to take his medications as prescribed and follow up in our office to help his symptoms improve. Please make an appointment to follow up in our office in the next 2-3 weeks with any available physician, and please let us know of any questions or concerns.   Thank you!  (Letter signed at [facility])

## 2022-04-04 ENCOUNTER — HOSPITAL ENCOUNTER (OUTPATIENT)
Dept: OTHER | Age: 57
Setting detail: THERAPIES SERIES
Discharge: HOME | End: 2022-04-04
Payer: MEDICARE

## 2022-04-04 VITALS
DIASTOLIC BLOOD PRESSURE: 93 MMHG | WEIGHT: 223.7 LBS | RESPIRATION RATE: 18 BRPM | HEART RATE: 86 BPM | BODY MASS INDEX: 33.03 KG/M2 | SYSTOLIC BLOOD PRESSURE: 142 MMHG | OXYGEN SATURATION: 98 %

## 2022-04-04 LAB
ANION GAP SERPL CALCULATED.3IONS-SCNC: 12 MMOL/L (ref 7–16)
BUN BLDV-MCNC: 33 MG/DL (ref 6–20)
CALCIUM SERPL-MCNC: 9.3 MG/DL (ref 8.6–10.2)
CHLORIDE BLD-SCNC: 99 MMOL/L (ref 98–107)
CO2: 25 MMOL/L (ref 22–29)
CREAT SERPL-MCNC: 1.4 MG/DL (ref 0.7–1.2)
GFR AFRICAN AMERICAN: >60
GFR NON-AFRICAN AMERICAN: 52 ML/MIN/1.73
GLUCOSE BLD-MCNC: 183 MG/DL (ref 74–99)
POTASSIUM SERPL-SCNC: 4.3 MMOL/L (ref 3.5–5)
PRO-BNP: 4039 PG/ML (ref 0–125)
SODIUM BLD-SCNC: 136 MMOL/L (ref 132–146)

## 2022-04-04 PROCEDURE — 99204 OFFICE O/P NEW MOD 45 MIN: CPT

## 2022-04-04 PROCEDURE — 80048 BASIC METABOLIC PNL TOTAL CA: CPT

## 2022-04-04 PROCEDURE — 83880 ASSAY OF NATRIURETIC PEPTIDE: CPT

## 2022-04-04 PROCEDURE — 36415 COLL VENOUS BLD VENIPUNCTURE: CPT

## 2022-04-04 PROCEDURE — 96374 THER/PROPH/DIAG INJ IV PUSH: CPT

## 2022-04-04 RX ORDER — SODIUM CHLORIDE 0.9 % (FLUSH) 0.9 %
10 SYRINGE (ML) INJECTION 2 TIMES DAILY
Status: DISCONTINUED | OUTPATIENT
Start: 2022-04-04 | End: 2022-04-04

## 2022-04-04 RX ORDER — FUROSEMIDE 10 MG/ML
40 INJECTION INTRAMUSCULAR; INTRAVENOUS ONCE
Status: DISCONTINUED | OUTPATIENT
Start: 2022-04-04 | End: 2022-04-04

## 2022-04-04 NOTE — TELEPHONE ENCOUNTER
Spoke with patient, he is having nausea and Dry heaving  since taking Lexapro. Patient taking this medication with a small meal (beagle twist) every morning. States that even when he drinks water after taking Lexapro  he gets very nauseated. Sheduled patient for 4/18/22 1100 with Dr. Win Sparks. Please advise.

## 2022-04-04 NOTE — PROGRESS NOTES
Congestive Heart Failure [address]         José Antonio Andrews   1965          Referring Provider: FRANCO RANDLE [facility] NP  Primary Care Physician:   DR. KNIGHT  Cardiologist: DR. Santina Cowden  Nephrologist:N/A      History of Present Illness:     José Antonio Andrews is a 61 y.o. male with a history of HFrEF, most recent EF 30-35% ON 10/11/2019. Patient Story:    He does  have dyspnea with exertion, shortness of breath, or decline in overall functional capacity. He does have orthopnea, PND, nocturnal cough or hemoptysis. He does not have abdominal distention or bloating, early satiety, anorexia/change in appetite. He does has a good urinary response to  oral diuretic. He does have  lower extremity edema. He denies lightheadedness, dizziness. He denies palpitations, syncope or near syncope. He does not complain of chest pain, pressure, discomfort. No Known Allergies      No outpatient medications have been marked as taking for the 4/4/22 encounter [facility] HOSPITAL Encounter) with [facility] ROOM 1. Prior to Visit Medications    Medication Sig Taking?  Authorizing Provider   clopidogrel (PLAVIX) 75 MG tablet Take 1 tablet by mouth daily  Grace Gipson MD   pantoprazole (PROTONIX) 40 MG tablet Take 1 tablet by mouth daily  Grace Gipson MD   fluticasone (FLONASE) 50 MCG/ACT nasal spray 2 sprays by Each Nostril route daily  Grace Gipson MD   metFORMIN (GLUCOPHAGE) 500 MG tablet Take 1 tablet by mouth 2 times daily (with meals)  Grace Gipson MD   isosorbide mononitrate (IMDUR) 30 MG extended release tablet Take 3 tablets by mouth daily  Grace Gipson MD   aspirin 81 MG EC tablet Take 1 tablet by mouth daily  Grace Gipson MD   escitalopram (LEXAPRO) 10 MG tablet Take 1 tablet by mouth daily  Grace Gipson MD   furosemide (LASIX) 40 MG tablet Take 1 tablet by mouth 2 times daily  Nayla Young MD   metoprolol succinate (TOPROL XL) 100 MG extended release tablet Take 1 tablet by mouth 2 times daily  Patient taking differently: Take 100 mg by mouth 2 times daily Took 50 mg last night . Instructed patient to take 100 mg BID  Melvin Serrano MD   atorvastatin (LIPITOR) 80 MG tablet Take 1 tablet by mouth nightly  [place]ia Lala DO   spironolactone (ALDACTONE) 50 MG tablet Take 1 tablet by mouth daily  Livia Balderas MD           Guideline directed medical:  ARNI/ACE I/ARB: No  Beta blocker:   Yes  Aldosterone antagonist:  No        Physical Examination:     BP (!) 142/93   Pulse 86   Resp 18   Wt 223 lb 11.2 oz (101.5 kg)   SpO2 98%   BMI 33.03 kg/m²     Assessment  Charting Type: Shift assessment    Neurological  Level of Consciousness: Alert (0)         HEENT  HEENT (WDL): Within Defined Limits    Respiratory  Respiratory Pattern: Regular  Respiratory Depth: Normal  Respiratory Quality/Effort: Dyspnea with exertion  Chest Assessment: Chest expansion symmetrical  L Breath Sounds: Fine Crackles,Diminished  R Breath Sounds: Diminished,Clear              Cardiac  Cardiac Rhythm: Sinus tachy    Rhythm Interpretation  Pulse: 86         Gastrointestinal  Abdominal (WDL): Within Defined Limits               Peripheral Vascular  Peripheral Vascular (WDL): Exceptions to WDL  Edema: Right lower extremity,Left lower extremity  RLE Edema: Pitting,+1,+2  LLE Edema: Pitting,+3,+2                                                 Pulse: 86                     LAB DATA:    Last 3 BMP      Sodium (mmol/L)   Date Value   04/01/2022 139   03/29/2022 141   03/27/2022 138     Potassium (mmol/L)   Date Value   04/01/2022 4.2   03/29/2022 4.3   03/27/2022 4.1     Potassium reflex Magnesium (mmol/L)   Date Value   01/23/2022 3.6   01/22/2022 3.8   10/11/2019 3.9     Chloride (mmol/L)   Date Value   04/01/2022 102   03/29/2022 100   03/27/2022 97 (L)     CO2 (mmol/L)   Date Value   04/01/2022 25   03/29/2022 27   03/27/2022 27     BUN (mg/dL)   Date Value   04/01/2022 23 (H)   03/29/2022 30 (H)   03/27/2022 33 (H) Glucose (mg/dL)   Date Value   04/01/2022 197 (H)   03/29/2022 95   03/27/2022 113 (H)   04/23/2012 129 (H)   04/16/2012 163 (H)     Calcium (mg/dL)   Date Value   04/01/2022 8.9   03/29/2022 9.3   03/27/2022 9.2       Last 3 BNP       Pro-BNP (pg/mL)   Date Value   04/01/2022 5,147 (H)   03/29/2022 3,154 (H)   03/26/2022 3,940 (H)          CBC: No results for input(s): WBC, HGB, PLT in the last 72 hours. BMP:    Recent Labs     04/01/22  1120      K 4.2      CO2 25   BUN 23*   CREATININE 1.2   GLUCOSE 197*     Hepatic: No results for input(s): AST, ALT, ALB, BILITOT, ALKPHOS in the last 72 hours. Troponin: No results for input(s): TROPONINI in the last 72 hours. BNP: No results for input(s): BNP in the last 72 hours. Lipids: No results for input(s): CHOL, HDL in the last 72 hours. Invalid input(s): LDLCALCU  INR: No results for input(s): INR in the last 72 hours.         WEIGHTS:    Wt Readings from Last 3 Encounters:   04/04/22 223 lb 11.2 oz (101.5 kg)   04/01/22 229 lb (103.9 kg)   03/30/22 236 lb (107 kg)         TELEMETRY:  Cardiac Regularity: Regular  Cardiac Rhythm/Interpretation: ST        ASSESSMENT:  Juan C Hartmann is hypervolemic with weight gain     Interventions completed this visit:  IV diuretics given No  Lab work obtained yes, BNP/BMP   Reviewed currently prescribed medications with patient, educated on importance of compliance and answered any questions regarding their medication  Educated on signs and symptoms of HF  Educated on low sodium diet    PLAN:  Scheduled to follow up in CHF clinic on   Future Appointments   Date Time Provider Estuardo Arzate   4/12/2022  9:45 AM [facility] CHF ROOM 1 [facility]   4/18/2022 11:00 AM MD Dirk Mixon [facility]   4/21/2022  9:30 AM [facility] CHF ROOM 1 [facility]  [place]   5/3/2022  3:40 PM DO Zion Wilkerson [facility]     Given clinic phone number and aware of signs and symptoms to call with any HF change in symptoms.    Weight down 6 lbs

## 2022-04-04 NOTE — TELEPHONE ENCOUNTER
Please let him know that he can stop the Lexapro at this time. Please seek care urgently if his symptoms are not controlled by stopping the Lexapro. He may need to be seen in our office earlier than 4/18 for this (acutely in the next day or two) or sooner in the nearest ED for severe or worsening symptoms. Thank you!

## 2022-04-05 NOTE — RESULT ENCOUNTER NOTE
Labs and CHF clinic note reviewed  Vitals at CHF clinic: /93   Pulse 86    Current GDMT:  Toprol 100 mg BID  Spironolactone 50 mg daily     Please have him start Entresto 24/26 mg twice daily   Follow up at the CHF clinic in 1 week as scheduled     Thank you

## 2022-04-06 ENCOUNTER — TELEPHONE (OUTPATIENT)
Dept: CARDIOLOGY CLINIC | Age: 57
End: 2022-04-06

## 2022-04-06 DIAGNOSIS — Z79.899 NEW MEDICATION ADDED: ICD-10-CM

## 2022-04-06 DIAGNOSIS — I50.22 CHRONIC SYSTOLIC CONGESTIVE HEART FAILURE (HCC): Primary | ICD-10-CM

## 2022-04-06 NOTE — TELEPHONE ENCOUNTER
Thank you! The patient also reported that he felt sick after taking Lexapro, and he wished to stop the medication. Station B, please reach out to Mr. Yuri Laura. Have his symptoms improved since stopping Lexapro? Please offer an appointment in our office to discuss additional treatment options. Thank you!

## 2022-04-06 NOTE — TELEPHONE ENCOUNTER
----- Message from PAUL Young CNP sent at 4/5/2022  9:17 AM EDT -----  Labs and CHF clinic note reviewed  Vitals at CHF clinic: /93   Pulse 86    Current GDMT:  Toprol 100 mg BID  Spironolactone 50 mg daily     Please have him start Entresto 24/26 mg twice daily   Follow up at the CHF clinic in 1 week as scheduled     Thank you

## 2022-04-06 NOTE — TELEPHONE ENCOUNTER
He says his insurance wont cover it. Lisinopril was what he was on in past when entreso wasn't covered. Called JAIRO lo 2:10 PM   $42 for entresto  Lisinopril 40mg daily is on his med list. Will remove this med. Recalled Jaky Husbands [phone] (home)  he will take entersto as prescribed. He is not taking any lisinopril at present and knows not to take it. He also notes he will talk to PCP. Self stopped lexapro due to being on plavix and asa and potential to bleed. He can discuss lexapro with PCP .

## 2022-04-12 ENCOUNTER — HOSPITAL ENCOUNTER (OUTPATIENT)
Dept: OTHER | Age: 57
Setting detail: THERAPIES SERIES
Discharge: HOME | End: 2022-04-12
Payer: MEDICARE

## 2022-04-12 VITALS
HEART RATE: 69 BPM | BODY MASS INDEX: 32.64 KG/M2 | SYSTOLIC BLOOD PRESSURE: 128 MMHG | DIASTOLIC BLOOD PRESSURE: 69 MMHG | RESPIRATION RATE: 18 BRPM | OXYGEN SATURATION: 96 % | WEIGHT: 221 LBS

## 2022-04-12 LAB
ANION GAP SERPL CALCULATED.3IONS-SCNC: 10 MMOL/L (ref 7–16)
BUN BLDV-MCNC: 22 MG/DL (ref 6–20)
CALCIUM SERPL-MCNC: 9 MG/DL (ref 8.6–10.2)
CHLORIDE BLD-SCNC: 102 MMOL/L (ref 98–107)
CO2: 30 MMOL/L (ref 22–29)
CREAT SERPL-MCNC: 1.3 MG/DL (ref 0.7–1.2)
GFR AFRICAN AMERICAN: >60
GFR NON-AFRICAN AMERICAN: 57 ML/MIN/1.73
GLUCOSE BLD-MCNC: 125 MG/DL (ref 74–99)
POTASSIUM SERPL-SCNC: 3.6 MMOL/L (ref 3.5–5)
PRO-BNP: 3017 PG/ML (ref 0–125)
SODIUM BLD-SCNC: 142 MMOL/L (ref 132–146)

## 2022-04-12 PROCEDURE — 36415 COLL VENOUS BLD VENIPUNCTURE: CPT

## 2022-04-12 PROCEDURE — 83880 ASSAY OF NATRIURETIC PEPTIDE: CPT

## 2022-04-12 PROCEDURE — 99204 OFFICE O/P NEW MOD 45 MIN: CPT

## 2022-04-12 PROCEDURE — 80048 BASIC METABOLIC PNL TOTAL CA: CPT

## 2022-04-12 NOTE — PROGRESS NOTES
Congestive Heart Failure [address]         Angelica Aguilar   1965          Referring Provider: FRANCO RANDLE [facility] NP  Primary Care Physician:   DR. KNIGHT  Cardiologist: DR. Chandrakant Hurd  Nephrologist:N/A      History of Present Illness:     Angelica Aguilar is a 61 y.o. male with a history of HFrEF, most recent EF 30-35% ON 10/11/2019. Patient Story:    He does  have dyspnea with exertion, shortness of breath, or decline in overall functional capacity. He does have orthopnea, PND, nocturnal cough or hemoptysis. He does not have abdominal distention or bloating, early satiety, anorexia/change in appetite. He does has a good urinary response to  oral diuretic. He does have  lower extremity edema. He denies lightheadedness, dizziness. He denies palpitations, syncope or near syncope. He does not complain of chest pain, pressure, discomfort. No Known Allergies      No outpatient medications have been marked as taking for the 4/12/22 encounter [facility] HOSPITAL Encounter) with [facility] ROOM 1. Prior to Visit Medications    Medication Sig Taking?  Authorizing Provider   sacubitril-valsartan (ENTRESTO) 24-26 MG per tablet Take 1 tablet by mouth 2 times daily  Kartik Duarte, APRN - CNP   clopidogrel (PLAVIX) 75 MG tablet Take 1 tablet by mouth daily  Emma Ordoñez MD   pantoprazole (PROTONIX) 40 MG tablet Take 1 tablet by mouth daily  Emma Ordoñez MD   fluticasone (FLONASE) 50 MCG/ACT nasal spray 2 sprays by Each Nostril route daily  Emma Ordoñez MD   metFORMIN (GLUCOPHAGE) 500 MG tablet Take 1 tablet by mouth 2 times daily (with meals)  Emma Ordoñez MD   isosorbide mononitrate (IMDUR) 30 MG extended release tablet Take 3 tablets by mouth daily  Emma Ordoñez MD   aspirin 81 MG EC tablet Take 1 tablet by mouth daily  Emma Ordoñez MD   furosemide (LASIX) 40 MG tablet Take 1 tablet by mouth 2 times daily  Zulema Lepe MD   metoprolol succinate (TOPROL XL) 100 MG extended release tablet Take 1 tablet by mouth 2 times daily  Patient taking differently: Take 100 mg by mouth 2 times daily Took 50 mg last night . Instructed patient to take 100 mg BID  Matt Griffin MD   atorvastatin (LIPITOR) 80 MG tablet Take 1 tablet by mouth nightly  Dvae Varghese DO   spironolactone (ALDACTONE) 50 MG tablet Take 1 tablet by mouth daily  Brian Patrick MD           Guideline directed medical:  ARNI/ACE I/ARB: No  Beta blocker:   Yes  Aldosterone antagonist:  No        Physical Examination:     /69   Pulse 69   Resp 18   Wt 221 lb (100.2 kg)   SpO2 96%   BMI 32.64 kg/m²     Assessment  Charting Type: Shift assessment    Neurological  Level of Consciousness: Alert (0)         HEENT  HEENT (WDL): Within Defined Limits    Respiratory  Respiratory Pattern: Regular  Respiratory Depth: Normal  Respiratory Quality/Effort: Dyspnea with exertion  R Breath Sounds: Diminished,Clear              Cardiac  Cardiac Rhythm: Sinus rhythm    Rhythm Interpretation  Pulse: 69         Gastrointestinal  Abdominal (WDL): Within Defined Limits               Peripheral Vascular  Peripheral Vascular (WDL): Exceptions to WDL  Edema: Right lower extremity,Left lower extremity  RLE Edema: Pitting,+1,+2  LLE Edema: Pitting,+3,+2                                                 Pulse: 69                     LAB DATA:    Last 3 BMP      Sodium (mmol/L)   Date Value   04/04/2022 136   04/01/2022 139   03/29/2022 141     Potassium (mmol/L)   Date Value   04/04/2022 4.3   04/01/2022 4.2   03/29/2022 4.3     Potassium reflex Magnesium (mmol/L)   Date Value   01/23/2022 3.6   01/22/2022 3.8   10/11/2019 3.9     Chloride (mmol/L)   Date Value   04/04/2022 99   04/01/2022 102   03/29/2022 100     CO2 (mmol/L)   Date Value   04/04/2022 25   04/01/2022 25   03/29/2022 27     BUN (mg/dL)   Date Value   04/04/2022 33 (H)   04/01/2022 23 (H)   03/29/2022 30 (H)     Glucose (mg/dL)   Date Value   04/04/2022 183 (H)   04/01/2022 197 (H) 03/29/2022 95   04/23/2012 129 (H)   04/16/2012 163 (H)     Calcium (mg/dL)   Date Value   04/04/2022 9.3   04/01/2022 8.9   03/29/2022 9.3       Last 3 BNP       Pro-BNP (pg/mL)   Date Value   04/04/2022 4,039 (H)   04/01/2022 5,147 (H)   03/29/2022 3,154 (H)          CBC: No results for input(s): WBC, HGB, PLT in the last 72 hours. BMP:    No results for input(s): NA, K, CL, CO2, BUN, CREATININE, GLUCOSE in the last 72 hours. Hepatic: No results for input(s): AST, ALT, ALB, BILITOT, ALKPHOS in the last 72 hours. Troponin: No results for input(s): TROPONINI in the last 72 hours. BNP: No results for input(s): BNP in the last 72 hours. Lipids: No results for input(s): CHOL, HDL in the last 72 hours. Invalid input(s): LDLCALCU  INR: No results for input(s): INR in the last 72 hours. WEIGHTS:    Wt Readings from Last 3 Encounters:   04/12/22 221 lb (100.2 kg)   04/04/22 223 lb 11.2 oz (101.5 kg)   04/01/22 229 lb (103.9 kg)         TELEMETRY:  Cardiac Regularity: Regular  Cardiac Rhythm/Interpretation: NSR        ASSESSMENT:  Meera Castellanos is hypervolemic with stable weight  Interventions completed this visit:  IV diuretics given yes, LASIX 40 MG IV FOR C/O SHORTNESS OF BREATH  Lab work obtained yes, BNP/BMP   Reviewed currently prescribed medications with patient, educated on importance of compliance and answered any questions regarding their medication  Educated on signs and symptoms of HF  Educated on low sodium diet    PLAN:  Scheduled to follow up in CHF clinic on   Future Appointments   Date Time Provider Estuardo Arzate   4/18/2022 11:00 AM [facility]   4/21/2022  9:30 AM [facility] CHF ROOM 1 SEYZ [facility] [address] [address]   5/3/2022  3:40 PM DO Zion Castellon PC [facility]     Given clinic phone number and aware of signs and symptoms to call with any HF change in symptoms. Denies current complaints. Weight down 2 lbs.

## 2022-04-15 NOTE — TELEPHONE ENCOUNTER
Dr. Stacey Villegas,   This patient will be seeing you next week. One topic to address with him will be depression treatment. He reported that Lexapro made him feel sick, and he stopped it with concerns for bleeding risk. Just wanted you to be aware when you see him, as additional treatment of depression may be necessary. Please let me know of any questions. Thank you!

## 2022-04-18 ENCOUNTER — TELEPHONE (OUTPATIENT)
Dept: INPATIENT UNIT | Age: 57
End: 2022-04-18

## 2022-04-18 DIAGNOSIS — Z79.899 MEDICATION DOSE INCREASED: ICD-10-CM

## 2022-04-18 DIAGNOSIS — I50.9 CONGESTIVE HEART FAILURE, UNSPECIFIED HF CHRONICITY, UNSPECIFIED HEART FAILURE TYPE (HCC): Primary | ICD-10-CM

## 2022-04-18 NOTE — TELEPHONE ENCOUNTER
----- Message from PAUL Sahu CNP sent at 4/14/2022  1:42 PM EDT -----  Labs and CHF clinic note reviewed  Vitals at CHF clinic: /69   Pulse 69     Current GDMT:  Entresto 24/26 mg BID  Toprol 100 mg BID  Spironolactone 50 mg daily      Please have him increase Entresto to moderate dose  Follow-up labs in 1 week at CHF clinic visit    Thank you

## 2022-04-18 NOTE — TELEPHONE ENCOUNTER
Raul [place] [phone] (home)    . was provided RICKY Connelly CNP intructions in   Requested call back to  Confirm understanding and keep chf clinic f/u    Future Appointments   Date Time Provider Estuardo Arzate   4/18/2022 11:00 AM Katie Munoz MD [facility] AND WOMEN'S [facility]   4/21/2022  9:30 AM [facility]F ROOM 1 [facility]   5/3/2022  3:40 PM DO Franchesca Lorenzo [facility]

## 2022-04-19 ENCOUNTER — TELEPHONE (OUTPATIENT)
Dept: PHARMACY | Facility: CLINIC | Age: 57
End: 2022-04-19

## 2022-04-19 NOTE — TELEPHONE ENCOUNTER
[facility] CLINICAL PHARMACY: STATIN THERAPY REVIEW  Identified statin use in persons with diabetes care gap per Jaelyn. Records dated: 4/4/22. Last Office Visit: 3/30/22 (cardio); 3/2922 (PCP)    Patient also appears to be taking: metformin    Patient found in [employer] [facility] and is currently eligible for CMR and TIP    ASSESSMENT  ACE/ARB ADHERENCE    Insurance Records claims through 4/4/22 (YTD Erick Tomasandra = Filled only once; Potential Fail Date: 6/28/22): Lisinopril due to refill on 4/20/22. Last filled 90 day supply. Per chart review: lisinopril changed to [facility] on 4/6/22. Dose increased on 4/18/22. BP Readings from Last 3 Encounters:   04/12/22 128/69   04/04/22 (!) 142/93   04/01/22 (!) 160/80     Estimated Creatinine Clearance: 73 mL/min (A) (based on SCr of 1.3 mg/dL (H)). DIABETES ADHERENCE    Insurance Records claims through 4/4/22 (2021 [place] = FAILED; YTD [place] = 49%; Potential Fail Date: 7/29/22): Metformin due to refill on 6/27/22. Last filled 90 day supply. Per chart review: clinical pharmacy team contacted the patient on 2/11/22 and reviewed metformin adherence and converted him to a 90-ds at that time. There are refills remaining on file. Lab Results   Component Value Date    LABA1C 7.4 (H) 01/21/2022    LABA1C 10.0 08/17/2021    LABA1C 10.2 (H) 03/31/2021     NOTE A1c <9%    STATIN GAP IDENTIFIED    Per chart review, patient is prescribed atorvastatin 80 mg daily. Clinical pharmacy team contact the patient and reviewed atorvastatin adherence on 2/11/22 and pt reported forgetting to take it in the evening. Advised pt to start taking in the morning and pt agreeable. A new rx was sent to the pharmacy at that time. Unable to contact [facility] pharmacy to verify if [facility] was pending pick-up and to request atorvastatin refill to be processed - on hold > 5 minutes.      Lab Results   Component Value Date    CHOL 172 08/19/2021    TRIG 220 (H) 08/19/2021    HDL 25 08/19/2021 LDLCALC 103 (H) 08/19/2021     ALT   Date Value Ref Range Status   03/27/2022 52 (H) 0 - 40 U/L Final     AST   Date Value Ref Range Status   03/27/2022 72 (H) 0 - 39 U/L Final       The 10-year ASCVD risk score (Florencio Mary, et al., 2013) is: 21.2%    Values used to calculate the score:      Age: 61 years      Sex: Male      Is Non- : No      Diabetic: Yes      Tobacco smoker: No      Systolic Blood Pressure: 827 mmHg      Is BP treated: Yes      HDL Cholesterol: 25 mg/dL      Total Cholesterol: 172 mg/dL     Hyperlipidemia Goal: Patient is prescribed high-intensity statin therapy. PLAN  Attempting to reach patient to review.  Left message asking for return call. Will attempt to contact the patient and pharmacy again tomorrow to review atorvastatin adherence and complete CMR/TIP.      Future Appointments   Date Time Provider Estuardo Arzate   4/21/2022  9:30 AM [facility] ROOM 1 [facility]   5/3/2022  3:40 PM DO Darin Arriaza [facility]       Paris Mosher, PharmD, [facility] // Department, toll free [phone], option 1

## 2022-04-20 NOTE — TELEPHONE ENCOUNTER
Second attempt to contact the patient to discuss atorvastatin adherence and review medications/complete CMR. Left detailed message for patient to return my call. Called [facility] pharmacy: Schwartz Michael dose was increased on 22 and pharmacy profiled the prescription. Pharmacy staff member will process refill today. Will also process atorvastatin refill as well. Billed through [employer]. Will prepare and send a [employer] message to the patient today and will sign off at this time.      Rosi Soto, PharmD, [facility] // Department, toll free [phone], option 1      For Pharmacy [id number] [place] [address] in place:  No   Recommendation Provided To: Pharmacy: 2   Intervention Detail: Adherence Monitorin   Gap Closed?: No    Intervention Accepted By: Pharmacy: 2   Time Spent (min): 20

## 2022-04-21 ENCOUNTER — HOSPITAL ENCOUNTER (OUTPATIENT)
Dept: OTHER | Age: 57
Setting detail: THERAPIES SERIES
Discharge: HOME | End: 2022-04-21
Payer: MEDICARE

## 2022-04-21 VITALS
RESPIRATION RATE: 18 BRPM | SYSTOLIC BLOOD PRESSURE: 139 MMHG | OXYGEN SATURATION: 95 % | BODY MASS INDEX: 33.37 KG/M2 | DIASTOLIC BLOOD PRESSURE: 82 MMHG | WEIGHT: 226 LBS | HEART RATE: 79 BPM

## 2022-04-21 LAB
ANION GAP SERPL CALCULATED.3IONS-SCNC: 14 MMOL/L (ref 7–16)
BUN BLDV-MCNC: 19 MG/DL (ref 6–20)
CALCIUM SERPL-MCNC: 9 MG/DL (ref 8.6–10.2)
CHLORIDE BLD-SCNC: 103 MMOL/L (ref 98–107)
CO2: 26 MMOL/L (ref 22–29)
CREAT SERPL-MCNC: 1.2 MG/DL (ref 0.7–1.2)
GFR AFRICAN AMERICAN: >60
GFR NON-AFRICAN AMERICAN: >60 ML/MIN/1.73
GLUCOSE BLD-MCNC: 132 MG/DL (ref 74–99)
POTASSIUM SERPL-SCNC: 3.6 MMOL/L (ref 3.5–5)
PRO-BNP: 3191 PG/ML (ref 0–125)
SODIUM BLD-SCNC: 143 MMOL/L (ref 132–146)

## 2022-04-21 PROCEDURE — 96374 THER/PROPH/DIAG INJ IV PUSH: CPT

## 2022-04-21 PROCEDURE — 83880 ASSAY OF NATRIURETIC PEPTIDE: CPT

## 2022-04-21 PROCEDURE — 99214 OFFICE O/P EST MOD 30 MIN: CPT

## 2022-04-21 PROCEDURE — 2580000003 HC RX 258: Performed by: NURSE PRACTITIONER

## 2022-04-21 PROCEDURE — 36415 COLL VENOUS BLD VENIPUNCTURE: CPT

## 2022-04-21 PROCEDURE — 6360000002 HC RX W HCPCS: Performed by: NURSE PRACTITIONER

## 2022-04-21 PROCEDURE — 80048 BASIC METABOLIC PNL TOTAL CA: CPT

## 2022-04-21 RX ORDER — FUROSEMIDE 10 MG/ML
40 INJECTION INTRAMUSCULAR; INTRAVENOUS ONCE
Status: COMPLETED | OUTPATIENT
Start: 2022-04-21 | End: 2022-04-21

## 2022-04-21 RX ORDER — SODIUM CHLORIDE 0.9 % (FLUSH) 0.9 %
5-40 SYRINGE (ML) INJECTION 2 TIMES DAILY
Status: DISCONTINUED | OUTPATIENT
Start: 2022-04-21 | End: 2022-04-22 | Stop reason: HOSPADM

## 2022-04-21 RX ADMIN — Medication 10 ML: at 10:16

## 2022-04-21 RX ADMIN — FUROSEMIDE 40 MG: 10 INJECTION, SOLUTION INTRAMUSCULAR; INTRAVENOUS at 10:17

## 2022-04-21 NOTE — PROGRESS NOTES
Congestive Heart Failure [address]         José Antonio Andrews   1965          Referring Provider: FRANCO RANDLE [facility] NP  Primary Care Physician:   DR. KNIGHT  Cardiologist: DR. Santina Cowden  Nephrologist:N/A      History of Present Illness:     José Antonio Andrews is a 61 y.o. male with a history of HFrEF, most recent EF 30-35% ON 10/11/2019. Patient Story:  Patient has a 5 pound weight gain since last CHF visit. Patient has been taking Entresto 24/26 for a week starting on 4/14/2022. He expresses not feeling well since taking Entresto and having neck pain, sore calves, and a rash on arms and lower legs. He does  have dyspnea with exertion, shortness of breath, or decline in overall functional capacity. He does have orthopnea, PND,and  nocturnal cough. He does not have abdominal distention or bloating, early satiety, anorexia/change in appetite. He does has a good urinary response to  oral diuretic. He does have  lower extremity edema. He denies lightheadedness, dizziness. He denies palpitations, syncope or near syncope. He does not complain of chest pain, pressure, discomfort. No Known Allergies      No outpatient medications have been marked as taking for the 4/21/22 encounter [facility] HOSPITAL Encounter) with [facility] CHF ROOM 1. Prior to Visit Medications    Medication Sig Taking?  Authorizing Provider   sacubitril-valsartan (ENTRESTO) 49-51 MG per tablet Take 1 tablet by mouth 2 times daily  Patient taking differently: Take by mouth 2 times daily Taking low dose 24/26  PAUL Leon - CNP   clopidogrel (PLAVIX) 75 MG tablet Take 1 tablet by mouth daily  Grace Gipson MD   pantoprazole (PROTONIX) 40 MG tablet Take 1 tablet by mouth daily  Grace Gipson MD   fluticasone (FLONASE) 50 MCG/ACT nasal spray 2 sprays by Each Nostril route daily  Patient taking differently: 2 sprays by Each Nostril route as needed   Grace Gipson MD   metFORMIN (GLUCOPHAGE) 500 MG tablet Take 1 tablet Value   01/23/2022 3.6   01/22/2022 3.8   10/11/2019 3.9     Chloride (mmol/L)   Date Value   04/12/2022 102   04/04/2022 99   04/01/2022 102     CO2 (mmol/L)   Date Value   04/12/2022 30 (H)   04/04/2022 25   04/01/2022 25     BUN (mg/dL)   Date Value   04/12/2022 22 (H)   04/04/2022 33 (H)   04/01/2022 23 (H)     Glucose (mg/dL)   Date Value   04/12/2022 125 (H)   04/04/2022 183 (H)   04/01/2022 197 (H)   04/23/2012 129 (H)   04/16/2012 163 (H)     Calcium (mg/dL)   Date Value   04/12/2022 9.0   04/04/2022 9.3   04/01/2022 8.9       Last 3 BNP       Pro-BNP (pg/mL)   Date Value   04/12/2022 3,017 (H)   04/04/2022 4,039 (H)   04/01/2022 5,147 (H)          CBC: No results for input(s): WBC, HGB, PLT in the last 72 hours. BMP:    No results for input(s): NA, K, CL, CO2, BUN, CREATININE, GLUCOSE in the last 72 hours. Hepatic: No results for input(s): AST, ALT, ALB, BILITOT, ALKPHOS in the last 72 hours. Troponin: No results for input(s): TROPONINI in the last 72 hours. BNP: No results for input(s): BNP in the last 72 hours. Lipids: No results for input(s): CHOL, HDL in the last 72 hours. Invalid input(s): LDLCALCU  INR: No results for input(s): INR in the last 72 hours. WEIGHTS:    Wt Readings from Last 3 Encounters:   04/21/22 226 lb (102.5 kg)   04/12/22 221 lb (100.2 kg)   04/04/22 223 lb 11.2 oz (101.5 kg)         TELEMETRY:  Cardiac Regularity: Regular  Cardiac Rhythm/Interpretation: NSR        ASSESSMENT:  Loreto Raymundo has a 5 pound weight gain since last visit. Patient complains of shortness of breath and bilateral lower extremity +2 pitting edema. Rash noted with skin scabs to bilateral lower arms and bilateral lower legs. Patient does state that he wears shashi hose daily.    Interventions completed this visit:  IV diuretics given yes, LASIX 40 MG IV FOR C/O SHORTNESS OF BREATH  Lab work obtained yes, BNP/BMP   Reviewed currently prescribed medications with patient, educated on importance of compliance and answered any questions regarding their medication  Educated on signs and symptoms of HF  Educated on low sodium diet    PLAN:  Scheduled to follow up in CHF clinic on   Future Appointments   Date Time Provider Estuardo Arzate   4/28/2022 12:45 PM [facility]   5/3/2022  3:40 PM Zachery Welch DO [address]     Given clinic phone number and aware of signs and symptoms to call with any HF change in symptoms. Weight is up 5 pounds since last week. Patient does not want to increase Entresto to moderate dose due to side effects patient is experiencing on current dose.

## 2022-04-21 NOTE — RESULT ENCOUNTER NOTE
Labs and CHF clinic note reviewed  Patient with some adverse symptoms and did not increase Entresto.      Please call and see how he is feeling    Thank you

## 2022-04-25 ENCOUNTER — TELEPHONE (OUTPATIENT)
Dept: FAMILY MEDICINE CLINIC | Age: 57
End: 2022-04-25

## 2022-04-25 ENCOUNTER — OFFICE VISIT (OUTPATIENT)
Dept: FAMILY MEDICINE CLINIC | Age: 57
End: 2022-04-25
Payer: MEDICARE

## 2022-04-25 VITALS
DIASTOLIC BLOOD PRESSURE: 77 MMHG | WEIGHT: 220.4 LBS | BODY MASS INDEX: 32.64 KG/M2 | OXYGEN SATURATION: 96 % | HEART RATE: 81 BPM | HEIGHT: 69 IN | TEMPERATURE: 99 F | RESPIRATION RATE: 18 BRPM | SYSTOLIC BLOOD PRESSURE: 121 MMHG

## 2022-04-25 DIAGNOSIS — R22.43 LOCALIZED SWELLING OF BOTH LOWER LEGS: ICD-10-CM

## 2022-04-25 DIAGNOSIS — I50.22 CHRONIC SYSTOLIC CONGESTIVE HEART FAILURE (HCC): ICD-10-CM

## 2022-04-25 DIAGNOSIS — I50.20 HFREF (HEART FAILURE WITH REDUCED EJECTION FRACTION) (HCC): Primary | ICD-10-CM

## 2022-04-25 PROCEDURE — 99213 OFFICE O/P EST LOW 20 MIN: CPT | Performed by: FAMILY MEDICINE

## 2022-04-25 PROCEDURE — 99212 OFFICE O/P EST SF 10 MIN: CPT | Performed by: FAMILY MEDICINE

## 2022-04-25 RX ORDER — FUROSEMIDE 40 MG/1
40 TABLET ORAL 2 TIMES DAILY
Qty: 60 TABLET | Refills: 0 | Status: SHIPPED
Start: 2022-04-25 | End: 2022-05-03 | Stop reason: ALTCHOICE

## 2022-04-25 ASSESSMENT — PATIENT HEALTH QUESTIONNAIRE - PHQ9
SUM OF ALL RESPONSES TO PHQ QUESTIONS 1-9: 9
SUM OF ALL RESPONSES TO PHQ QUESTIONS 1-9: 9
1. LITTLE INTEREST OR PLEASURE IN DOING THINGS: 1
6. FEELING BAD ABOUT YOURSELF - OR THAT YOU ARE A FAILURE OR HAVE LET YOURSELF OR YOUR FAMILY DOWN: 0
3. TROUBLE FALLING OR STAYING ASLEEP: 3
2. FEELING DOWN, DEPRESSED OR HOPELESS: 1
7. TROUBLE CONCENTRATING ON THINGS, SUCH AS READING THE NEWSPAPER OR WATCHING TELEVISION: 0
10. IF YOU CHECKED OFF ANY PROBLEMS, HOW DIFFICULT HAVE THESE PROBLEMS MADE IT FOR YOU TO DO YOUR WORK, TAKE CARE OF THINGS AT HOME, OR GET ALONG WITH OTHER PEOPLE: 1
SUM OF ALL RESPONSES TO PHQ QUESTIONS 1-9: 9
4. FEELING TIRED OR HAVING LITTLE ENERGY: 3
9. THOUGHTS THAT YOU WOULD BE BETTER OFF DEAD, OR OF HURTING YOURSELF: 0
5. POOR APPETITE OR OVEREATING: 1
SUM OF ALL RESPONSES TO PHQ9 QUESTIONS 1 & 2: 2
8. MOVING OR SPEAKING SO SLOWLY THAT OTHER PEOPLE COULD HAVE NOTICED. OR THE OPPOSITE, BEING SO FIGETY OR RESTLESS THAT YOU HAVE BEEN MOVING AROUND A LOT MORE THAN USUAL: 0
SUM OF ALL RESPONSES TO PHQ QUESTIONS 1-9: 9

## 2022-04-25 NOTE — TELEPHONE ENCOUNTER
Call transferred from [facility] ([facility]) for nurse triage. Patient stated his legs are very swollen and hard from feet to thighs. Blood pressure readings at home have been high. Discussed treatment options and patient scheduled for same day visit this afternoon to be evaluated.

## 2022-04-25 NOTE — PROGRESS NOTES
[facility]  FAMILY MEDICINE RESIDENCY PROGRAM   OFFICE PROGRESS NOTE  DATE OF VISIT : 4/26/2022         Chief Complaint :   Chief Complaint   Patient presents with    Leg Swelling     SUBHASH x 1 month       HPI:   Akil Guzman comes to clinic today for     new or recent complaint of increased lower extremity edema. he has a history of heart failure. he follows with cardiology as well as with the CHF clinic.  he is taking lasix 40 mg BID and entresto. Chronic problems reviewed today include:     Hypertension, Hyperlipidemia, Diabetes mellitus and Coronary artery disease    Current status of this/these condition(s):  stable    Tolerating meds: Yes ( he was having nausea and dry heaves from his lexapro, but stopped that.)    Additional history: He notes that the edema in his legs has been about the same, however he now is having \" break outs on his legs\" with pruritis and scabbing. He has been trying neosporin on the legs. Last echocardiogram was in Sept of 2021. He has a history of LAD CAD. Now having significant systolic dysfunction with EF of 30%. He has a defibrillator/pacer in place as well. His diet consists of oatmeal, toast, hashbrowns, 1/2 of a 12 inch sub sandwich with turkey, roast beef, and cheese for lunch and the other 6 inches for dinner. If not eating a sub sandwich, instead he will eat a bowl of oatmeal, eggs, or PB&J. He does not add salt to any of his foods. He rinses any canned veges with water before eating. He is drinking about 4-5, 16 oz bottles of water daily. He also consumes at least 24 ounces (sometimes 1 liter of bottled sweet tea) of iced tea. Diabetes is fair with an A1C 7.4.     Objective:    Vitals: /77 (Site: Left Upper Arm, Position: Sitting, Cuff Size: Large Adult)   Pulse 81   Temp 99 °F (37.2 °C) (Temporal)   Resp 18   Ht 5' 9\" (1.753 m)   Wt 220 lb 6.4 oz (100 kg)   SpO2 96%   BMI 32.55 kg/m²   General Appearance: Well developed, awake, alert, oriented, and in NAD  HEENT: Normocephalic,atraumatic. PERRL, EOM's intact, EAC without erythema or swelling, Normal TM's bilaterally, no pallor or icterus. Neck: Supple, symmetrical, trachea midline. No JVD. Thyroid smooth, not enlarged. Chest wall/Lung: Clear to auscultation bilaterally,  respirations unlabored. No rhonchi/wheezing/rales  Heart: Regular rate and rhythm, S1and S2 normal, no murmur, rub or gallop. Abdomen: Soft, nontender. Normal BS, no hepatosplenomegaly or mass  Extremities:  Extremities normal, atraumatic, no cyanosis. 3+ edema. Skin: Skin color, texture, turgor normal, no rashes or lesions  Musculoskeletal: ROM grossly normal in all joints of extremities, no obvious joint swelling. Lymph nodes: No lymph node enlargement appreciated  Neurologic: Alert, oriented. Moves all 4 limbs. Sensation grossly intact. Psychiatric: has a normal mood and affect. Behavior is normal.     I independently reviewed the following information:  historical medical records and lab reports and echo report    1. HFrEF (heart failure with reduced ejection fraction) ([address]  2. Localized swelling of both lower legs  3. Chronic systolic congestive heart failure ([address]      Additional Plan:    1. I would recommend that liquids be limited to 1.5 liters of water daily. 2.  Please eliminate the amount of sweet tea you are drinking  3. For the next 3 days, increase your lasix to 60 mg twice daily. 4.  I have prescribed benedryl cream for your itchy skin. Use this three times daily as needed  5. Please use a moisturizer if you get itchy skin. 6.  Trim your nails short so that you are not scraping the skin and making scabs  7. You can also use an allergy pill to help with the itching as well. (benedryl or zyrtec)  8. Follow up in the office in 1 week with PCP          RTO in in 1 week(s) or sooner for any persistent, new, or worsening symptoms.       Please see Patient Instructions for further counseling and information given. Advised to be adherent to the treatment plans discussed today, and please call with any questions or concerns, letting the office know of any reasons that the plans may not be followed. The risks of untreated conditions include worsening illness, injury, disability, and possibly, death. Please call if symptoms change in any way, worsen, or fail to completely resolve, as this could necessitate a change to treatment plans. Patient and/or caregiver expressed understanding. Indications and proper use of medication(s) reviewed. Potential side-effects and risks of medication(s) also explained. Patient and/or caregiver was instructed to call if any new symptoms develop prior to next visit. Health risk factors discussed and addressed.     Signed by : Bishop Sellers MD, M.D.

## 2022-04-25 NOTE — PATIENT INSTRUCTIONS
1.  I would recommend that liquids be limited to 1.5 liters of water daily. 2.  Please eliminate the amount of sweet tea you are drinking  3. For the next 3 days, increase your lasix to 60 mg twice daily. 4.  I have prescribed benedryl cream for your itchy skin. Use this three times daily as needed  5. Please use a moisturizer if you get itchy skin. 6.  Trim your nails short so that you are not scraping the skin and making scabs  7. You can also use an allergy pill to help with the itching as well. (benedryl or zyrtec)  8.   Follow up in the office in 1 week with PCP

## 2022-04-25 NOTE — TELEPHONE ENCOUNTER
Last Appointment:  Visit date not found  Future Appointments   Date Time Provider Estuardo Arzate   4/25/2022  1:30 PM SCHEDULE, DR Lionel Traore [facility]   4/28/2022 12:45 PM [facility] CHF ROOM 1 [facility]YZ [facility]   5/3/2022  3:40 PM DO Ashley Aleman PC [facility]

## 2022-04-26 ENCOUNTER — TELEPHONE (OUTPATIENT)
Dept: CARDIOLOGY CLINIC | Age: 57
End: 2022-04-26

## 2022-04-26 NOTE — TELEPHONE ENCOUNTER
----- Message from PAUL Hamlin CNP sent at 4/21/2022  3:36 PM EDT -----  Labs and CHF clinic note reviewed  Patient with some adverse symptoms and did not increase Entresto.      Please call and see how he is feeling    Thank you

## 2022-04-26 NOTE — TELEPHONE ENCOUNTER
Says he feels ok. Saw dr for my legs. Calves and shins especially. Still retaining water. Increased lasix 20mg for 3 days. For total of 60mg  Twice a day lasix for 3 days. Then resume 40mg twice daily. Is to reduce fluid intake to 1.5liter daily  (Dr Zander Vieira)   Has not noticed any inc in urine volume or frequency. Color lemonade as usual). Denies dizzy/lightheaded  Yesterday weight 218# , loosing weight. Modified diet lower sodium. Cut back on red meat and sodium intake.      Future Appointments   Date Time Provider Estuardo Arzate   4/28/2022 12:45 PM [facility] ROOM 1 [facility]   5/3/2022  3:40 PM DO Zion Ovalles [facility]        Vitals 4/25/2022 4/21/2022 4/12/2022 9/4/5109   SYSTOLIC 950 653 351 742   DIASTOLIC 77 82 69 93   Site Left Upper Arm      Position Sitting      Cuff Size Large Adult      Pulse 81 79 69 86   Temp 99      Resp 18 18 18 18   SpO2 96 95 96 98   Weight 220 lb 6.4 oz 226 lb 221 lb 223 lb 11.2 oz   Height 5' 9\"      Body mass index 32.54 kg/m2      Pain Level         Vitals 4/1/2022 3/30/2022 0/97/3775   SYSTOLIC 509 468 851   DIASTOLIC 80 80 83   Site   Right Upper Arm   Position   Sitting   Cuff Size   Large Adult   Pulse 116 115 91   Temp   97.2   Resp 20 28    SpO2 97 94 99   Weight 229 lb 236 lb 239 lb   Height  5' 9\" 5' 9\"   Body mass index  34.85 kg/m2 35.29 kg/m2   Pain Level

## 2022-04-28 ENCOUNTER — HOSPITAL ENCOUNTER (OUTPATIENT)
Dept: OTHER | Age: 57
Setting detail: THERAPIES SERIES
Discharge: HOME | End: 2022-04-28
Payer: MEDICARE

## 2022-04-28 VITALS
WEIGHT: 221.5 LBS | BODY MASS INDEX: 32.71 KG/M2 | SYSTOLIC BLOOD PRESSURE: 138 MMHG | DIASTOLIC BLOOD PRESSURE: 77 MMHG | OXYGEN SATURATION: 97 % | HEART RATE: 79 BPM | RESPIRATION RATE: 18 BRPM

## 2022-04-28 LAB
ANION GAP SERPL CALCULATED.3IONS-SCNC: 10 MMOL/L (ref 7–16)
BUN BLDV-MCNC: 16 MG/DL (ref 6–20)
CALCIUM SERPL-MCNC: 9.2 MG/DL (ref 8.6–10.2)
CHLORIDE BLD-SCNC: 102 MMOL/L (ref 98–107)
CO2: 30 MMOL/L (ref 22–29)
CREAT SERPL-MCNC: 1.1 MG/DL (ref 0.7–1.2)
GFR AFRICAN AMERICAN: >60
GFR NON-AFRICAN AMERICAN: >60 ML/MIN/1.73
GLUCOSE BLD-MCNC: 175 MG/DL (ref 74–99)
POTASSIUM SERPL-SCNC: 4 MMOL/L (ref 3.5–5)
PRO-BNP: 3259 PG/ML (ref 0–125)
SODIUM BLD-SCNC: 142 MMOL/L (ref 132–146)

## 2022-04-28 PROCEDURE — 83880 ASSAY OF NATRIURETIC PEPTIDE: CPT

## 2022-04-28 PROCEDURE — 6360000002 HC RX W HCPCS: Performed by: NURSE PRACTITIONER

## 2022-04-28 PROCEDURE — 2580000003 HC RX 258: Performed by: NURSE PRACTITIONER

## 2022-04-28 PROCEDURE — 99214 OFFICE O/P EST MOD 30 MIN: CPT

## 2022-04-28 PROCEDURE — 36415 COLL VENOUS BLD VENIPUNCTURE: CPT

## 2022-04-28 PROCEDURE — 80048 BASIC METABOLIC PNL TOTAL CA: CPT

## 2022-04-28 PROCEDURE — 96374 THER/PROPH/DIAG INJ IV PUSH: CPT

## 2022-04-28 RX ORDER — SODIUM CHLORIDE 0.9 % (FLUSH) 0.9 %
10 SYRINGE (ML) INJECTION ONCE
Status: COMPLETED | OUTPATIENT
Start: 2022-04-28 | End: 2022-04-28

## 2022-04-28 RX ORDER — FUROSEMIDE 10 MG/ML
40 INJECTION INTRAMUSCULAR; INTRAVENOUS ONCE
Status: COMPLETED | OUTPATIENT
Start: 2022-04-28 | End: 2022-04-28

## 2022-04-28 RX ADMIN — FUROSEMIDE 40 MG: 10 INJECTION, SOLUTION INTRAMUSCULAR; INTRAVENOUS at 13:22

## 2022-04-28 RX ADMIN — Medication 10 ML: at 13:22

## 2022-04-28 NOTE — PLAN OF CARE
Problem: Fluid Volume:  Goal: Risk for excess fluid volume will decrease  Description: Risk for excess fluid volume will decrease  Outcome: Progressing  Goal: Maintenance of adequate hydration will improve  Description: Maintenance of adequate hydration will improve  Outcome: Progressing  Goal: Will show no signs and symptoms of electrolyte imbalance  Description: Will show no signs and symptoms of electrolyte imbalance  Outcome: Progressing

## 2022-04-28 NOTE — RESULT ENCOUNTER NOTE
Labs and CHF clinic note reviewed  Vitals at CHF clinic: /77   Pulse 79    Current GDMT:  Entresto 24/26 mg twice daily  Toprol 100 mg twice daily  Spironolactone 50 mg daily  Lasix 40 mg twice daily    Please have him increase Entresto to moderate dose  Follow-up labs in 1 week

## 2022-04-28 NOTE — PROGRESS NOTES
Congestive Heart Failure [address]         Kat Valera   1965          Referring Provider: FRANCO RANDLE [facility] NP  Primary Care Physician:   DR. KNIGHT  Cardiologist: DR. Nadine Delarosa  Nephrologist:N/A      History of Present Illness:     Kat Valera is a 61 y.o. male with a history of HFrEF, most recent EF 30-35% ON 10/11/2019. Patient Story:  Patient has a 5 pound weight loss since last CHF visit. Patient has been taking Entresto 24/26 for a week starting on 4/14/2022. He does  have dyspnea with exertion, shortness of breath, or decline in overall functional capacity. He does have orthopnea, PND,and  nocturnal cough. He does not have abdominal distention or bloating, early satiety, anorexia/change in appetite. He does has a good urinary response to  oral diuretic. He does have  lower extremity edema. He denies lightheadedness, dizziness. He denies palpitations, syncope or near syncope. He does not complain of chest pain, pressure, discomfort. No Known Allergies      No outpatient medications have been marked as taking for the 4/28/22 encounter [facility] HOSPITAL Encounter) with [facility] CHF ROOM 1. Prior to Visit Medications    Medication Sig Taking? Authorizing Provider   furosemide (LASIX) 40 MG tablet Take 1 tablet by mouth 2 times daily  Leonardo Free, DO   diphenhydrAMINE (BENADRYL) 2 % cream Apply topically 3 times daily as needed.   Leanne Grover MD   sacubitril-valsartan (ENTRESTO) 49-51 MG per tablet Take 1 tablet by mouth 2 times daily  Patient taking differently: Take by mouth 2 times daily Taking low dose 24/26  Kris Duran APRN - CNP   clopidogrel (PLAVIX) 75 MG tablet Take 1 tablet by mouth daily  Alvin Esposito MD   pantoprazole (PROTONIX) 40 MG tablet Take 1 tablet by mouth daily  Alvin Esposito MD   fluticasone (FLONASE) 50 MCG/ACT nasal spray 2 sprays by Each Nostril route daily  Patient taking differently: 2 sprays by Each Nostril route as needed Terri Ortega MD   metFORMIN (GLUCOPHAGE) 500 MG tablet Take 1 tablet by mouth 2 times daily (with meals)  Terri Ortega MD   isosorbide mononitrate (IMDUR) 30 MG extended release tablet Take 3 tablets by mouth daily  Patient taking differently: Take 90 mg by mouth daily Takes 2 tabs in am and 1 pill at bedtime  Terri Ortega MD   aspirin 81 MG EC tablet Take 1 tablet by mouth daily  Terri Ortega MD   metoprolol succinate (TOPROL XL) 100 MG extended release tablet Take 1 tablet by mouth 2 times daily  Patricia Barnes MD   atorvastatin (LIPITOR) 80 MG tablet Take 1 tablet by mouth nightly  Lupe Carey DO   spironolactone (ALDACTONE) 50 MG tablet Take 1 tablet by mouth daily  Desiree Mills MD           Guideline directed medical:  ARNI/ACE I/ARB: No  Beta blocker:   Yes  Aldosterone antagonist:  No        Physical Examination:     /77   Pulse 79   Resp 18   Wt 221 lb 8 oz (100.5 kg)   SpO2 97%   BMI 32.71 kg/m²     Assessment  Charting Type: Shift assessment    Neurological  Level of Consciousness: Alert (0)         HEENT (Head, Ears, Eyes, Nose, & Throat)  HEENT (WDL): Within Defined Limits    Respiratory  Respiratory Pattern: Regular  Respiratory Depth: Normal  Respiratory Quality/Effort: Dyspnea with exertion  L Breath Sounds: Diminished,End Expiratory Wheezes  R Breath Sounds: Diminished,End Expiratory Wheezes              Cardiac  Cardiac Rhythm: Sinus rhythm    Rhythm Interpretation  Pulse: 79         Gastrointestinal  Abdominal (WDL): Within Defined Limits               Peripheral Vascular  Peripheral Vascular (WDL): Exceptions to WDL  Edema: Right lower extremity,Left lower extremity  RLE Edema: +2,Pitting  LLE Edema: +2,Pitting                                                 Pulse: 79                     LAB DATA:    Last 3 BMP      Sodium (mmol/L)   Date Value   04/21/2022 143   04/12/2022 142   04/04/2022 136     Potassium (mmol/L)   Date Value   04/21/2022 3.6   04/12/2022 3.6   04/04/2022 4. 3     Potassium reflex Magnesium (mmol/L)   Date Value   01/23/2022 3.6   01/22/2022 3.8   10/11/2019 3.9     Chloride (mmol/L)   Date Value   04/21/2022 103   04/12/2022 102   04/04/2022 99     CO2 (mmol/L)   Date Value   04/21/2022 26   04/12/2022 30 (H)   04/04/2022 25     BUN (mg/dL)   Date Value   04/21/2022 19   04/12/2022 22 (H)   04/04/2022 33 (H)     Glucose (mg/dL)   Date Value   04/21/2022 132 (H)   04/12/2022 125 (H)   04/04/2022 183 (H)   04/23/2012 129 (H)   04/16/2012 163 (H)     Calcium (mg/dL)   Date Value   04/21/2022 9.0   04/12/2022 9.0   04/04/2022 9.3       Last 3 BNP       Pro-BNP (pg/mL)   Date Value   04/21/2022 3,191 (H)   04/12/2022 3,017 (H)   04/04/2022 4,039 (H)          CBC: No results for input(s): WBC, HGB, PLT in the last 72 hours. BMP:    No results for input(s): NA, K, CL, CO2, BUN, CREATININE, GLUCOSE in the last 72 hours. Hepatic: No results for input(s): AST, ALT, ALB, BILITOT, ALKPHOS in the last 72 hours. Troponin: No results for input(s): TROPONINI in the last 72 hours. BNP: No results for input(s): BNP in the last 72 hours. Lipids: No results for input(s): CHOL, HDL in the last 72 hours. Invalid input(s): LDLCALCU  INR: No results for input(s): INR in the last 72 hours. WEIGHTS:    Wt Readings from Last 3 Encounters:   04/28/22 221 lb 8 oz (100.5 kg)   04/25/22 220 lb 6.4 oz (100 kg)   04/21/22 226 lb (102.5 kg)         TELEMETRY:  Cardiac Regularity: Regular  Cardiac Rhythm/Interpretation: NSR        ASSESSMENT:  Romulo Kim has a 5 pound weight loss since last visit. On 1.5 L fluid restriction. Swelling BLE 2+.   Interventions completed this visit:  IV diuretics given yes, LASIX 40 MG IV FLab work obtained yes, BNP/BMP   Reviewed currently prescribed medications with patient, educated on importance of compliance and answered any questions regarding their medication  Educated on signs and symptoms of HF  Educated on low sodium diet    PLAN:  Scheduled to follow up in CHF clinic on   Future Appointments   Date Time Provider Estuardo Arzate   5/3/2022  3:40 PM DO Ina Montero [facility]OM[facility]   5/10/2022  1:30 PM [facility] CHF ROOM 1 [facility]     Given clinic phone number and aware of signs and symptoms to call with any HF change in symptoms.

## 2022-04-29 NOTE — TELEPHONE ENCOUNTER
Spoke with Juan C Hartmann  again  Says Has no effects from [facility] per patient. Shins were  blistering from the swelling to legs, that is what he was complaining about at the CHF Clinic. (per patient) Denies any adverse effects to entresto. Feels fine. Above socks is blisters. His legs were fluid up and he was blistering. This is now coming down and swelling coming down. No redness or warmpth no oozing. 1.5liter fluid restrictions. He's following and feels he's loosing weight and legs are looking better. Voiding good  Down to 218# weight. Reconfirmed he states he has no adverse reactions to entresto.      Virginia Lee is out of office, will send to [place] [id number] cardiologist.

## 2022-04-29 NOTE — TELEPHONE ENCOUNTER
Patient called back stating he will make the change to Bumex but will not pick it up until Wednesday. (JAIRO-Jolene). Please advise regarding dosing of Entresto and timing for BMP.

## 2022-04-29 NOTE — TELEPHONE ENCOUNTER
Change the Lasix to Bumex 2 mg p.o. twice daily. Increase the Entresto to 97/103 mg twice daily. BMP next Wednesday.

## 2022-04-29 NOTE — TELEPHONE ENCOUNTER
Patient notified of Dr. Shiv French recommendations. Patient was very irritated and advised that he is not going to keep buying new medications. He will decide on Monday when he gets paid whether he is willing to get the Bumex. He also advised that he has not been taking Entresto 49-51 mg BID, he has only been taking 24-26 mg (one tablet BID). Please advise.

## 2022-05-03 ENCOUNTER — OFFICE VISIT (OUTPATIENT)
Dept: FAMILY MEDICINE CLINIC | Age: 57
End: 2022-05-03
Payer: MEDICARE

## 2022-05-03 VITALS
TEMPERATURE: 97.4 F | SYSTOLIC BLOOD PRESSURE: 131 MMHG | HEART RATE: 82 BPM | DIASTOLIC BLOOD PRESSURE: 86 MMHG | HEIGHT: 69 IN | OXYGEN SATURATION: 97 % | RESPIRATION RATE: 18 BRPM | BODY MASS INDEX: 32.88 KG/M2 | WEIGHT: 222 LBS

## 2022-05-03 DIAGNOSIS — G47.33 OSA (OBSTRUCTIVE SLEEP APNEA): ICD-10-CM

## 2022-05-03 DIAGNOSIS — I50.20 HFREF (HEART FAILURE WITH REDUCED EJECTION FRACTION) (HCC): ICD-10-CM

## 2022-05-03 DIAGNOSIS — R33.9 URINARY RETENTION WITH INCOMPLETE BLADDER EMPTYING: ICD-10-CM

## 2022-05-03 DIAGNOSIS — R97.20 ELEVATED PSA, LESS THAN 10 NG/ML: ICD-10-CM

## 2022-05-03 DIAGNOSIS — E11.9 TYPE 2 DIABETES MELLITUS WITHOUT COMPLICATION, WITHOUT LONG-TERM CURRENT USE OF INSULIN (HCC): Primary | ICD-10-CM

## 2022-05-03 DIAGNOSIS — R22.43 LOCALIZED SWELLING OF BOTH LOWER LEGS: ICD-10-CM

## 2022-05-03 LAB — HBA1C MFR BLD: 7.3 %

## 2022-05-03 PROCEDURE — 99212 OFFICE O/P EST SF 10 MIN: CPT | Performed by: FAMILY MEDICINE

## 2022-05-03 PROCEDURE — 83036 HEMOGLOBIN GLYCOSYLATED A1C: CPT | Performed by: FAMILY MEDICINE

## 2022-05-03 PROCEDURE — 3051F HG A1C>EQUAL 7.0%<8.0%: CPT | Performed by: FAMILY MEDICINE

## 2022-05-03 PROCEDURE — 99214 OFFICE O/P EST MOD 30 MIN: CPT | Performed by: FAMILY MEDICINE

## 2022-05-03 RX ORDER — BUMETANIDE 2 MG/1
2 TABLET ORAL 2 TIMES DAILY
Qty: 60 TABLET | Refills: 11 | Status: SHIPPED
Start: 2022-05-03 | End: 2022-06-02 | Stop reason: DRUGHIGH

## 2022-05-03 RX ORDER — SACUBITRIL AND VALSARTAN 49; 51 MG/1; MG/1
1 TABLET, FILM COATED ORAL 2 TIMES DAILY
COMMUNITY
End: 2022-05-03 | Stop reason: SDUPTHER

## 2022-05-03 RX ORDER — SACUBITRIL AND VALSARTAN 49; 51 MG/1; MG/1
1 TABLET, FILM COATED ORAL 2 TIMES DAILY
Qty: 60 TABLET | Refills: 11 | Status: SHIPPED
Start: 2022-05-03 | End: 2022-06-02 | Stop reason: DRUGHIGH

## 2022-05-03 RX ORDER — BUMETANIDE 2 MG/1
2 TABLET ORAL 2 TIMES DAILY
COMMUNITY
End: 2022-05-03 | Stop reason: SDUPTHER

## 2022-05-03 NOTE — PROGRESS NOTES
CC:  Follow up DM, HFrEF, leg wounds     HPI:  61 y.o. male presents for follow up. DM.  A1C 7.3. Still missing medications sometimes. Unable to quantify how often this happens. Has been more adherent recently. Following a healthier diet; eating usually at the [employer] (all meals). Will not grocery shop, stating because of COVID and shopping risks. Discussed grocery pickup services, etc.  States he gets oatmeal, hashbrowns, and toast every AM.  Does not get any salt on hashbrowns. HFrEF, following with cardiology. Edema improving. Now prescribed Bumex, but did not get this yet. Has labs planned in about one week after starting Bumex. Less orthopnea, but still occurs. Stable dyspnea. No weight gain. Leg wounds, healing, feeling better. Less itchy. Compression stockings made the itching worse. Using topical prescription lotion. Restricting fluids more, mostly drinking water, some iced tea and flavored water. Cutting down on red meat, not willing to eliminate. Elevated PSA. Difficulty emptying bladder. Urinates smaller volumes. Occasional incontinence. Willing to see urology at this time. Sleeps in recliner with television on. Not sleeping well, not restful. Coughing, choking when lying down flat, concerns for SARAH. Willing to have sleep study performed. Depression. Does not want to take Lexapro. Declines resources or treatment at this time. States his mood is stable at this time. Has a dog at home. Has also been talking to his ex-wife, Heather Cook. Optimistic about the future. Expresses no SI or HI.       Patient Active Problem List    Diagnosis Date Noted    Age-related nuclear cataract, bilateral 01/31/2022    Presbyopia 01/31/2022    Regular astigmatism, bilateral 01/31/2022    Leg swelling 01/23/2022    ALEX (acute kidney injury) ([facility] 01/23/2022    CHF, acute on chronic ([facility] 01/21/2022    Elevated PSA, less than 10 ng/ml 04/01/2021    Nonrheumatic aortic valve insufficiency 02/20/2019    Obesity (BMI 30.0-34. 9)     Ischemic cardiomyopathy     Type 2 diabetes mellitus without complication, without long-term current use of insulin [facility]     Essential hypertension 06/29/2017    Diverticulosis of colon 08/07/2015    History of MI (myocardial infarction) 08/07/2015    Chronic systolic congestive heart failure ([facility] 08/07/2015    NSVT (nonsustained ventricular tachycardia) ([facility] 07/31/2015    Dual implantable cardioverter-defibrillator in situ 04/02/2015    CAD (coronary artery disease) 12/06/2014    Depression 12/10/2013    History of stroke 07/23/2013    ED (erectile dysfunction) 03/26/2012    COPD (chronic obstructive pulmonary disease)     Hyperlipemia     GERD (gastroesophageal reflux disease) 02/12/2011       Current Outpatient Medications on File Prior to Visit   Medication Sig Dispense Refill    bumetanide (BUMEX) 2 MG tablet Take 2 mg by mouth 2 times daily       sacubitril-valsartan (ENTRESTO) 49-51 MG per tablet Take 1 tablet by mouth 2 times daily      diphenhydrAMINE (BENADRYL) 2 % cream Apply topically 3 times daily as needed.  50 g 1    clopidogrel (PLAVIX) 75 MG tablet Take 1 tablet by mouth daily 90 tablet 0    pantoprazole (PROTONIX) 40 MG tablet Take 1 tablet by mouth daily 30 tablet 1    fluticasone (FLONASE) 50 MCG/ACT nasal spray 2 sprays by Each Nostril route daily (Patient taking differently: 2 sprays by Each Nostril route as needed ) 48 g 1    metFORMIN (GLUCOPHAGE) 500 MG tablet Take 1 tablet by mouth 2 times daily (with meals) 180 tablet 1    isosorbide mononitrate (IMDUR) 30 MG extended release tablet Take 3 tablets by mouth daily (Patient taking differently: Take 90 mg by mouth daily Takes 2 tabs in am and 1 pill at bedtime) 270 tablet 0    aspirin 81 MG EC tablet Take 1 tablet by mouth daily 90 tablet 1    metoprolol succinate (TOPROL XL) 100 MG extended release tablet Take 1 tablet by mouth 2 times daily 30 tablet 3    atorvastatin (LIPITOR) 80 MG tablet Take 1 tablet by mouth nightly 90 tablet 1    spironolactone (ALDACTONE) 50 MG tablet Take 1 tablet by mouth daily 90 tablet 1     No current facility-administered medications on file prior to visit. No Known Allergies    Social History     Tobacco Use    Smoking status: Former Smoker     Packs/day: 2.00     Years: 30.00     Pack years: 60.00     Types: Cigarettes     Start date: 0     Quit date: 2014     Years since quittin.9    Smokeless tobacco: Current User     Types: Chew    Tobacco comment: occ chewing tobacco, trying to quit   Vaping Use    Vaping Use: Never used   Substance Use Topics    Alcohol use: Not Currently    Drug use: No       ROS:   Review of Systems - as above     Physical Exam:    VS:  Blood pressure 131/86, pulse 82, temperature 97.4 °F (36.3 °C), temperature source Temporal, resp. rate 18, height 5' 9\" (1.753 m), weight 222 lb (100.7 kg), SpO2 97 %. General Appearance:  awake, alert, oriented, in no acute distress and well developed, well nourished  Skin:  Healed excoriated lesions bilateral lower extremities with scab formation. No erythema, no induration, no purulence. Head/face:  NCAT  Eyes:  EOMI and Sclera nonicteric  Neck:  neck- supple, no mass, non-tender  Lungs:  Normal expansion. Clear to auscultation. No rales, rhonchi, or wheezing. Heart:  Heart regular rate and rhythm  Abdomen:  Soft, NT, softly distended,    Extremities: 2+ edema bilaterally   Psych: appropriate affect, coherent thought processes, no flight of ideas, no delusions or hallucinations apparent, speech not pressured, no suicidal or homicidal ideation or intent, appropriate insight and judgment fair    Most recent labs and imaging reviewed.   Findings include:     Results for POC orders placed in visit on 22   POCT glycosylated hemoglobin (Hb A1C)   Result Value Ref Range    Hemoglobin A1C 7.3 % Assessments:      Diagnosis Orders   1. Type 2 diabetes mellitus without complication, without long-term current use of insulin ([facility])  POCT glycosylated hemoglobin (Hb A1C)    Microalbumin / Creatinine Urine Ratio   2. HFrEF (heart failure with reduced ejection fraction) ([facility]     3. Localized swelling of both lower legs     4. Elevated PSA, less than 10 ng/ml  SHANE Moreira MD, Urology, [place]   5. Urinary retention with incomplete bladder emptying  SHANE Moreira MD, Urology, [place]   6. SARAH (obstructive sleep apnea)  Baseline Diagnostic Sleep Study         Plans:    As Above. Please see Patient Instructions for further counseling and information given. RTO 1 month, or sooner as needed for any new, persistent, or worsening symptoms. Ordered urine microalb:Cr to have done with next labs. Advised ongoing wound care for LE. Discussed need for compression therapy. Compression stockings contributed to itching. Wash in irritant-free detergent, and/or try using different stockings (has 3 pairs at home). Follow on Bumex. Elevation, salt restriction. Call with any new/worsening symptoms. Advised on warning signs/symptoms for which to seek care urgently, including with urinary retention. He will follow up with urology this time and states he can pay toward the bill. Discussed dietary modifications. Increase fruits and vegetables. Decrease carbohydrates. Same medications for now. Follow with specialists as advised. Please be adherent to the treatment plans discussed today, and please call with any questions or concerns, letting the office know of any reasons that the plans may not be followed. The risks of untreated conditions include worsening illness, injury, disability, and possibly, death. Please call if symptoms change in any way, worsen, or fail to completely resolve, as this could necessitate a change to treatment plans.  Patient expressed understanding. Indications and proper use of medication(s) reviewed. Potential side-effects and risks of medication(s) also explained. Patient was instructed to call if any new symptoms develop prior to next visit.

## 2022-05-03 NOTE — TELEPHONE ENCOUNTER
Patient notified of Dr. Imer Mullins recommendations. Med list amended. Bumex and Entresto e-scribed. Patient is scheduled at the [address] [place] [address] on 5/10/22, we will check labs at that visit.

## 2022-05-10 ENCOUNTER — HOSPITAL ENCOUNTER (OUTPATIENT)
Dept: OTHER | Age: 57
Setting detail: THERAPIES SERIES
Discharge: HOME | End: 2022-05-10
Payer: MEDICARE

## 2022-05-10 ENCOUNTER — TELEPHONE (OUTPATIENT)
Dept: CARDIOLOGY CLINIC | Age: 57
End: 2022-05-10

## 2022-05-10 VITALS
DIASTOLIC BLOOD PRESSURE: 86 MMHG | WEIGHT: 204.5 LBS | OXYGEN SATURATION: 97 % | RESPIRATION RATE: 18 BRPM | BODY MASS INDEX: 30.2 KG/M2 | SYSTOLIC BLOOD PRESSURE: 142 MMHG

## 2022-05-10 DIAGNOSIS — I50.20 HFREF (HEART FAILURE WITH REDUCED EJECTION FRACTION) (HCC): Primary | ICD-10-CM

## 2022-05-10 LAB
ANION GAP SERPL CALCULATED.3IONS-SCNC: 12 MMOL/L (ref 7–16)
BUN BLDV-MCNC: 21 MG/DL (ref 6–20)
CALCIUM SERPL-MCNC: 9.3 MG/DL (ref 8.6–10.2)
CHLORIDE BLD-SCNC: 100 MMOL/L (ref 98–107)
CO2: 30 MMOL/L (ref 22–29)
CREAT SERPL-MCNC: 1.1 MG/DL (ref 0.7–1.2)
GFR AFRICAN AMERICAN: >60
GFR NON-AFRICAN AMERICAN: >60 ML/MIN/1.73
GLUCOSE BLD-MCNC: 151 MG/DL (ref 74–99)
POTASSIUM SERPL-SCNC: 3.2 MMOL/L (ref 3.5–5)
PRO-BNP: 2758 PG/ML (ref 0–125)
SODIUM BLD-SCNC: 142 MMOL/L (ref 132–146)

## 2022-05-10 PROCEDURE — 99214 OFFICE O/P EST MOD 30 MIN: CPT

## 2022-05-10 PROCEDURE — 83880 ASSAY OF NATRIURETIC PEPTIDE: CPT

## 2022-05-10 PROCEDURE — 36415 COLL VENOUS BLD VENIPUNCTURE: CPT

## 2022-05-10 PROCEDURE — 80048 BASIC METABOLIC PNL TOTAL CA: CPT

## 2022-05-10 RX ORDER — SODIUM CHLORIDE 0.9 % (FLUSH) 0.9 %
10 SYRINGE (ML) INJECTION 2 TIMES DAILY
Status: DISCONTINUED | OUTPATIENT
Start: 2022-05-10 | End: 2022-05-10

## 2022-05-10 RX ORDER — FUROSEMIDE 10 MG/ML
40 INJECTION INTRAMUSCULAR; INTRAVENOUS ONCE
Status: DISCONTINUED | OUTPATIENT
Start: 2022-05-10 | End: 2022-05-10

## 2022-05-10 NOTE — PROGRESS NOTES
Congestive Heart Failure [address]         Jackie Dhaliwal   1965          Referring Provider: FRANCO RANDLE [facility] NP  Primary Care Physician:   DR. KNIGHT  Cardiologist: DR. Lindsey Howard  Nephrologist:N/A      History of Present Illness:     Jackie Dhaliwal is a 61 y.o. male with a history of HFrEF, most recent EF 30-35% ON 10/11/2019. Patient Story:  Patient has a 5 pound weight loss since last CHF visit. Patient has been taking Entresto 24/26 for a week starting on 4/14/2022. He does  have dyspnea with exertion, shortness of breath, or decline in overall functional capacity. He does have orthopnea, PND,and  nocturnal cough. He does not have abdominal distention or bloating, early satiety, anorexia/change in appetite. He does has a good urinary response to  oral diuretic. He does have  lower extremity edema. He denies lightheadedness, dizziness. He denies palpitations, syncope or near syncope. He does not complain of chest pain, pressure, discomfort. No Known Allergies      No outpatient medications have been marked as taking for the 5/10/22 encounter [facility] HOSPITAL Encounter) with [facility] CHF ROOM 1. Prior to Visit Medications    Medication Sig Taking? Authorizing Provider   sacubitril-valsartan (ENTRESTO) 49-51 MG per tablet Take 1 tablet by mouth 2 times daily  Bartholome Common, DO   bumetanide (BUMEX) 2 MG tablet Take 1 tablet by mouth 2 times daily  Bartholome Common, DO   diphenhydrAMINE (BENADRYL) 2 % cream Apply topically 3 times daily as needed.   Elder Danielle MD   clopidogrel (PLAVIX) 75 MG tablet Take 1 tablet by mouth daily  Nicky Cr MD   pantoprazole (PROTONIX) 40 MG tablet Take 1 tablet by mouth daily  Nicky Cr MD   fluticasone (FLONASE) 50 MCG/ACT nasal spray 2 sprays by Each Nostril route daily  Patient taking differently: 2 sprays by Each Nostril route as needed   Nicky Cr MD   metFORMIN (GLUCOPHAGE) 500 MG tablet Take 1 tablet by mouth 2 times daily (with meals)  Ever Fairly, MD   isosorbide mononitrate (IMDUR) 30 MG extended release tablet Take 3 tablets by mouth daily  Patient taking differently: Take 90 mg by mouth daily Takes 2 tabs in am and 1 pill at bedtime  Ever Fairly, MD   aspirin 81 MG EC tablet Take 1 tablet by mouth daily  Ever Fairly, MD   metoprolol succinate (TOPROL XL) 100 MG extended release tablet Take 1 tablet by mouth 2 times daily  Curt Brooke MD   atorvastatin (LIPITOR) 80 MG tablet Take 1 tablet by mouth nightly  Ibis Swift DO   spironolactone (ALDACTONE) 50 MG tablet Take 1 tablet by mouth daily  Roena Oppenheim, MD           Guideline directed medical:  ARNI/ACE I/ARB: No  Beta blocker:   Yes  Aldosterone antagonist:  No        Physical Examination:     BP (!) 142/86   Resp 18   Wt 204 lb 8 oz (92.8 kg)   SpO2 97%   BMI 30.20 kg/m²     Assessment  Charting Type: Shift assessment    Neurological  Level of Consciousness: Alert (0)         HEENT (Head, Ears, Eyes, Nose, & Throat)  HEENT (WDL): Within Defined Limits    Respiratory  Respiratory Pattern: Regular  Respiratory Depth: Normal  Respiratory Quality/Effort: Dyspnea with exertion  Chest Assessment: Chest expansion asymmetrical  L Breath Sounds: Diminished  R Breath Sounds: Diminished    Breath Sounds  Right Upper Lobe: Clear  Right Middle Lobe: Clear  Left Upper Lobe: Clear         Cardiac  Cardiac Rhythm: Sinus rhythm              Gastrointestinal  Abdominal (WDL): Within Defined Limits               Peripheral Vascular  Peripheral Vascular (WDL): Exceptions to WDL  Edema: Right lower extremity,Left lower extremity  RLE Edema: +1,Non-pitting  LLE Edema: +1,Pitting                                                                       LAB DATA:    Last 3 BMP      Sodium (mmol/L)   Date Value   04/28/2022 142   04/21/2022 143   04/12/2022 142     Potassium (mmol/L)   Date Value   04/28/2022 4.0   04/21/2022 3.6   04/12/2022 3.6     Potassium reflex Magnesium (mmol/L)   Date Value   01/23/2022 3.6   01/22/2022 3.8   10/11/2019 3.9     Chloride (mmol/L)   Date Value   04/28/2022 102   04/21/2022 103   04/12/2022 102     CO2 (mmol/L)   Date Value   04/28/2022 30 (H)   04/21/2022 26   04/12/2022 30 (H)     BUN (mg/dL)   Date Value   04/28/2022 16   04/21/2022 19   04/12/2022 22 (H)     Glucose (mg/dL)   Date Value   04/28/2022 175 (H)   04/21/2022 132 (H)   04/12/2022 125 (H)   04/23/2012 129 (H)   04/16/2012 163 (H)     Calcium (mg/dL)   Date Value   04/28/2022 9.2   04/21/2022 9.0   04/12/2022 9.0       Last 3 BNP       Pro-BNP (pg/mL)   Date Value   04/28/2022 3,259 (H)   04/21/2022 3,191 (H)   04/12/2022 3,017 (H)          CBC: No results for input(s): WBC, HGB, PLT in the last 72 hours. BMP:    No results for input(s): NA, K, CL, CO2, BUN, CREATININE, GLUCOSE in the last 72 hours. Hepatic: No results for input(s): AST, ALT, ALB, BILITOT, ALKPHOS in the last 72 hours. Troponin: No results for input(s): TROPONINI in the last 72 hours. BNP: No results for input(s): BNP in the last 72 hours. Lipids: No results for input(s): CHOL, HDL in the last 72 hours. Invalid input(s): LDLCALCU  INR: No results for input(s): INR in the last 72 hours. WEIGHTS:    Wt Readings from Last 3 Encounters:   05/10/22 204 lb 8 oz (92.8 kg)   05/03/22 222 lb (100.7 kg)   04/28/22 221 lb 8 oz (100.5 kg)         TELEMETRY:  Cardiac Regularity: Regular  Cardiac Rhythm/Interpretation: NSR        ASSESSMENT:  Cyndi Spann has 16lb weight loss since last visit 4/28/22. States his breathing has improved since switching from Lasix to Bumex. Patient states he is not taking Aldactone, called [facility] and spoke with pharmacy. They stated he had not picked up his medication. Called patient and he is agreeable to  meds and begin taking prescribed dose.     Interventions completed this visit:  IV diuretics given no  Lab work obtained yes, BNP/BMP   Reviewed currently prescribed medications with patient, educated on importance of compliance and answered any questions regarding their medication  Educated on signs and symptoms of HF  Educated on low sodium diet    PLAN:  Scheduled to follow up in CHF clinic on   Future Appointments   Date Time Provider Estuardo Arzate   5/31/2022 12:30 PM [facility] CHF ROOM 1 [facility]   6/7/2022  3:40 PM Anupam Krishna DO [address]     Given clinic phone number and aware of signs and symptoms to call with any HF change in symptoms.

## 2022-05-10 NOTE — PROGRESS NOTES
Patient's labs called to FRANCO Meza. Informed her that patient has NOT started taking Aldactone. Orders given to have patient repeat labs next week once aldactone has been started. Patient informed that [facility] will fill his prescription . His cost is $12.   He is agreeable to the cost, starting the medication and getting lab work next week.

## 2022-05-10 NOTE — RESULT ENCOUNTER NOTE
Received call from CHF clinic, spoke with Sudhakar Rivers RN  Patient has yet to  and start spironolactone  Potassium 3.2 today  Patient instructed to start previously prescribed spironolactone  Follow-up labs in 1 week

## 2022-05-11 ENCOUNTER — TELEPHONE (OUTPATIENT)
Dept: CARDIOLOGY CLINIC | Age: 57
End: 2022-05-11

## 2022-05-11 NOTE — TELEPHONE ENCOUNTER
FERNANDO VEGA [address] [place] [address] was called 12:22 PM       Jorge Bishop pharmacist    Verified Spironolactone 50mg daily was never filled. But he did pick it up 5/10/22 after CHF Clinic called pharmacy to fill. 5 10 22 after [facility]F Clinic visit [place]  did fill the spironolactone 50mg daily dosing. [facility] in a week.      Ervin Hall RN

## 2022-05-11 NOTE — TELEPHONE ENCOUNTER
----- Message from PAUL Hussein CNP sent at 5/10/2022  4:11 PM EDT -----  Received call from CHF clinic, spoke with Ronak Villa RN  Patient has yet to  and start spironolactone  Potassium 3.2 today  Patient instructed to start previously prescribed spironolactone  Follow-up labs in 1 week

## 2022-05-18 NOTE — PATIENT INSTRUCTIONS
1. Make sure you take your medication as soon as you get home. 2. Please make sure that you are taking lasix twice daily     3. Go to CHF clinic Friday and at least weekly, maybe twice weekly for the next couple weeks    4. Referral for sleep study    5. Follow up with Dr. Princess Dobbins in 1 month    6. Weigh yourself daily    -Stay Hydrated    -Diet should sodium restricted to 2 grams    -Again watch your daily weight trends and if you gain water weight please follow below instructions.    -If you gain 3-5 pounds in 2-3 days OR notice that you are retaining fluid in anyway just like you did before then take an extra dose of your water pill (furosemide/Lasix) every day until you lose the weight or feel better.     -If you notice that you have taken more than 2 extra doses in 1 week then please call and let us know. -If at any time you feel that you are retaining fluid, your medications are not working, or you feel ill in anyway, then please call us for either same day appointment or the next day, and for instructions. Our goal is to keep you out of the emergency room and the hospital and we have ways to do it. You just need to call us in a timely manner.     -If you become sick for other reasons, and notice that you are not urinating as much, the urine is very dark, you have significant diarrhea or vomiting, then please DO NOT take your water pill and CALL US immediately.
(2) Patient Placed in Bed

## 2022-05-30 DIAGNOSIS — K21.9 GASTROESOPHAGEAL REFLUX DISEASE, UNSPECIFIED WHETHER ESOPHAGITIS PRESENT: ICD-10-CM

## 2022-05-31 ENCOUNTER — HOSPITAL ENCOUNTER (OUTPATIENT)
Dept: OTHER | Age: 57
Setting detail: THERAPIES SERIES
Discharge: HOME | End: 2022-05-31
Payer: MEDICARE

## 2022-05-31 ENCOUNTER — OFFICE VISIT (OUTPATIENT)
Dept: FAMILY MEDICINE CLINIC | Age: 57
End: 2022-05-31
Payer: MEDICARE

## 2022-05-31 VITALS
OXYGEN SATURATION: 96 % | WEIGHT: 204 LBS | BODY MASS INDEX: 30.13 KG/M2 | DIASTOLIC BLOOD PRESSURE: 70 MMHG | RESPIRATION RATE: 18 BRPM | HEART RATE: 75 BPM | SYSTOLIC BLOOD PRESSURE: 118 MMHG

## 2022-05-31 VITALS
OXYGEN SATURATION: 97 % | SYSTOLIC BLOOD PRESSURE: 120 MMHG | HEART RATE: 84 BPM | RESPIRATION RATE: 18 BRPM | DIASTOLIC BLOOD PRESSURE: 72 MMHG | WEIGHT: 205 LBS | BODY MASS INDEX: 30.36 KG/M2 | TEMPERATURE: 97.9 F | HEIGHT: 69 IN

## 2022-05-31 DIAGNOSIS — N39.43 DRIBBLING OF URINE: ICD-10-CM

## 2022-05-31 DIAGNOSIS — Z76.0 MEDICATION REFILL: ICD-10-CM

## 2022-05-31 DIAGNOSIS — R30.0 BURNING WITH URINATION: ICD-10-CM

## 2022-05-31 DIAGNOSIS — R30.0 BURNING WITH URINATION: Primary | ICD-10-CM

## 2022-05-31 LAB
ANION GAP SERPL CALCULATED.3IONS-SCNC: 10 MMOL/L (ref 7–16)
BACTERIA: ABNORMAL /HPF
BILIRUBIN URINE: NEGATIVE
BILIRUBIN, POC: NORMAL
BLOOD URINE, POC: NORMAL
BLOOD, URINE: NORMAL
BUN BLDV-MCNC: 22 MG/DL (ref 6–20)
CALCIUM SERPL-MCNC: 9 MG/DL (ref 8.6–10.2)
CHLORIDE BLD-SCNC: 100 MMOL/L (ref 98–107)
CLARITY, POC: CLEAR
CLARITY: CLEAR
CO2: 29 MMOL/L (ref 22–29)
COLOR, POC: CLEAR
COLOR: YELLOW
CREAT SERPL-MCNC: 1.1 MG/DL (ref 0.7–1.2)
CREATININE URINE: 11 MG/DL (ref 40–278)
GFR AFRICAN AMERICAN: >60
GFR NON-AFRICAN AMERICAN: >60 ML/MIN/1.73
GLUCOSE BLD-MCNC: 153 MG/DL (ref 74–99)
GLUCOSE URINE, POC: NORMAL
GLUCOSE URINE: NEGATIVE MG/DL
KETONES, POC: NORMAL
KETONES, URINE: NEGATIVE MG/DL
LEUKOCYTE EST, POC: NORMAL
LEUKOCYTE ESTERASE, URINE: NEGATIVE
MICROALBUMIN UR-MCNC: 29.2 MG/L
MICROALBUMIN/CREAT UR-RTO: 265.5 (ref 0–30)
NITRITE, POC: NORMAL
NITRITE, URINE: NEGATIVE
PH UA: 6.5 (ref 5–9)
PH, POC: 7.5
POTASSIUM SERPL-SCNC: 4.1 MMOL/L (ref 3.5–5)
PRO-BNP: 1004 PG/ML (ref 0–125)
PROSTATE SPECIFIC ANTIGEN: 26.39 NG/ML (ref 0–4)
PROTEIN UA: NEGATIVE MG/DL
PROTEIN, POC: NORMAL
RBC UA: ABNORMAL /HPF (ref 0–2)
SODIUM BLD-SCNC: 139 MMOL/L (ref 132–146)
SPECIFIC GRAVITY UA: <=1.005 (ref 1–1.03)
SPECIFIC GRAVITY, POC: 1.02
UROBILINOGEN, POC: 0.2
UROBILINOGEN, URINE: 0.2 E.U./DL
WBC UA: ABNORMAL /HPF (ref 0–5)

## 2022-05-31 PROCEDURE — 80048 BASIC METABOLIC PNL TOTAL CA: CPT

## 2022-05-31 PROCEDURE — G0103 PSA SCREENING: HCPCS

## 2022-05-31 PROCEDURE — 99214 OFFICE O/P EST MOD 30 MIN: CPT

## 2022-05-31 PROCEDURE — 83880 ASSAY OF NATRIURETIC PEPTIDE: CPT

## 2022-05-31 PROCEDURE — 99212 OFFICE O/P EST SF 10 MIN: CPT | Performed by: STUDENT IN AN ORGANIZED HEALTH CARE EDUCATION/TRAINING PROGRAM

## 2022-05-31 PROCEDURE — 99213 OFFICE O/P EST LOW 20 MIN: CPT | Performed by: FAMILY MEDICINE

## 2022-05-31 PROCEDURE — 81002 URINALYSIS NONAUTO W/O SCOPE: CPT | Performed by: STUDENT IN AN ORGANIZED HEALTH CARE EDUCATION/TRAINING PROGRAM

## 2022-05-31 PROCEDURE — 36415 COLL VENOUS BLD VENIPUNCTURE: CPT

## 2022-05-31 RX ORDER — PANTOPRAZOLE SODIUM 40 MG/1
TABLET, DELAYED RELEASE ORAL
Qty: 30 TABLET | Refills: 0 | OUTPATIENT
Start: 2022-05-31

## 2022-05-31 RX ORDER — CLOPIDOGREL BISULFATE 75 MG/1
75 TABLET ORAL DAILY
Qty: 90 TABLET | Refills: 0 | Status: SHIPPED
Start: 2022-05-31 | End: 2022-07-12 | Stop reason: SDUPTHER

## 2022-05-31 RX ORDER — METOPROLOL SUCCINATE 100 MG/1
100 TABLET, EXTENDED RELEASE ORAL 2 TIMES DAILY
Qty: 30 TABLET | Refills: 3 | Status: SHIPPED
Start: 2022-05-31 | End: 2022-06-15 | Stop reason: DRUGHIGH

## 2022-05-31 RX ORDER — SULFAMETHOXAZOLE AND TRIMETHOPRIM 800; 160 MG/1; MG/1
1 TABLET ORAL 2 TIMES DAILY
Qty: 56 TABLET | Refills: 0 | Status: SHIPPED | OUTPATIENT
Start: 2022-05-31 | End: 2022-06-28

## 2022-05-31 RX ORDER — FLUTICASONE PROPIONATE 50 MCG
2 SPRAY, SUSPENSION (ML) NASAL DAILY
Qty: 48 G | Refills: 1 | Status: SHIPPED | OUTPATIENT
Start: 2022-05-31

## 2022-05-31 RX ORDER — PANTOPRAZOLE SODIUM 40 MG/1
40 TABLET, DELAYED RELEASE ORAL DAILY
Qty: 30 TABLET | Refills: 1 | Status: SHIPPED
Start: 2022-05-31 | End: 2022-07-12 | Stop reason: SDUPTHER

## 2022-05-31 RX ORDER — ISOSORBIDE MONONITRATE 30 MG/1
90 TABLET, EXTENDED RELEASE ORAL DAILY
Qty: 270 TABLET | Refills: 0 | Status: SHIPPED
Start: 2022-05-31 | End: 2022-07-12 | Stop reason: SDUPTHER

## 2022-05-31 RX ORDER — SPIRONOLACTONE 50 MG/1
50 TABLET, FILM COATED ORAL DAILY
Qty: 90 TABLET | Refills: 1 | Status: SHIPPED
Start: 2022-05-31 | End: 2022-06-17 | Stop reason: DRUGHIGH

## 2022-05-31 RX ORDER — ASPIRIN 81 MG/1
81 TABLET ORAL DAILY
Qty: 90 TABLET | Refills: 1 | Status: SHIPPED | OUTPATIENT
Start: 2022-05-31 | End: 2022-10-28

## 2022-05-31 NOTE — PROGRESS NOTES
Congestive Heart Failure [address]         Soanli Gonzalez   1965          Referring Provider: FRANCO RANDLE [facility] NP  Primary Care Physician:   DR. KNIGHT  Cardiologist: DR. Brent Vann  Nephrologist:N/A      History of Present Illness:     Sonali Gonzalez is a 61 y.o. male with a history of HFrEF, most recent EF 30-35% ON 10/11/2019. Patient Story:    He does not have dyspnea with exertion, shortness of breath, or decline in overall functional capacity. He does have orthopnea, PND,and nocturnal cough. He does not have abdominal distention or bloating, early satiety, anorexia/change in appetite. He does have a good urinary response to oral diuretic. He does have lower extremity edema. He denies lightheadedness, dizziness. He does not complain of palpitations,syncope. He does not complain of chest pain, pressure, discomfort. No Known Allergies        Prior to Visit Medications    Medication Sig Taking? Authorizing Provider   diphenhydrAMINE (BENADRYL) 2 % cream Apply topically 3 times daily as needed.   Fabian Louis MD   clopidogrel (PLAVIX) 75 MG tablet Take 1 tablet by mouth daily  Fabian Louis MD   pantoprazole (PROTONIX) 40 MG tablet Take 1 tablet by mouth daily  Fabian Louis MD   fluticasone (FLONASE) 50 MCG/ACT nasal spray 2 sprays by Each Nostril route daily  Fabian Louis MD   metFORMIN (GLUCOPHAGE) 500 MG tablet Take 1 tablet by mouth 2 times daily (with meals)  Fabian Louis MD   isosorbide mononitrate (IMDUR) 30 MG extended release tablet Take 3 tablets by mouth daily  Fabian Louis MD   aspirin 81 MG EC tablet Take 1 tablet by mouth daily  Fabian Louis MD   metoprolol succinate (TOPROL XL) 100 MG extended release tablet Take 1 tablet by mouth 2 times daily  Fabian Louis MD   spironolactone (ALDACTONE) 50 MG tablet Take 1 tablet by mouth daily  Patient not taking: Reported on 5/31/2022  Fabian Louis MD sulfamethoxazole-trimethoprim (BACTRIM DS;SEPTRA DS) 800-160 MG per tablet Take 1 tablet by mouth 2 times daily for 28 days  Lamonte Gutierrez MD   sacubitril-valsartan (ENTRESTO) 49-51 MG per tablet Take 1 tablet by mouth 2 times daily  Iris Hale DO   bumetanide (BUMEX) 2 MG tablet Take 1 tablet by mouth 2 times daily  Iris Hale DO   atorvastatin (LIPITOR) 80 MG tablet Take 1 tablet by mouth nightly  Lola Winchester DO           Guideline directed medical:  ARNI/ACE I/ARB: Yes  Beta blocker:   Yes  Aldosterone antagonist:  No        Physical Examination:     /70   Pulse 75   Resp 18   Wt 204 lb (92.5 kg)   SpO2 96%   BMI 30.13 kg/m²     Assessment  Charting Type: Shift assessment (chf clinic)    Neurological  Level of Consciousness: Alert (0)  Orientation Level: Oriented X4  Cognition: Appropriate attention/concentration,Appropriate safety awareness,Appropriate judgement,Appropriate for developmental age,Follows commands         HEENT (Head, Ears, Eyes, Nose, & Throat)  HEENT (WDL): Within Defined Limits    Respiratory  Respiratory Pattern: Regular  Respiratory Depth: Normal  Respiratory Quality/Effort: Unlabored  Chest Assessment: Chest expansion symmetrical  L Breath Sounds: Clear  R Breath Sounds: Clear              Cardiac  Cardiac Rhythm: Sinus rhythm    Rhythm Interpretation  Heart Rate: 75         Gastrointestinal  Abdominal (WDL): Within Defined Limits               Peripheral Vascular  Peripheral Vascular (WDL): Exceptions to WDL  RLE Edema: Trace  LLE Edema: Trace                                                 Heart Rate: 75                     LAB DATA:    Last 3 BMP      Sodium (mmol/L)   Date Value   05/10/2022 142   04/28/2022 142   04/21/2022 143     Potassium (mmol/L)   Date Value   05/10/2022 3.2 (L)   04/28/2022 4.0   04/21/2022 3.6     Potassium reflex Magnesium (mmol/L)   Date Value   01/23/2022 3.6   01/22/2022 3.8   10/11/2019 3.9     Chloride (mmol/L)   Date Value 05/10/2022 100   04/28/2022 102   04/21/2022 103     CO2 (mmol/L)   Date Value   05/10/2022 30 (H)   04/28/2022 30 (H)   04/21/2022 26     BUN (mg/dL)   Date Value   05/10/2022 21 (H)   04/28/2022 16   04/21/2022 19     Glucose (mg/dL)   Date Value   05/10/2022 151 (H)   04/28/2022 175 (H)   04/21/2022 132 (H)   04/23/2012 129 (H)   04/16/2012 163 (H)     Calcium (mg/dL)   Date Value   05/10/2022 9.3   04/28/2022 9.2   04/21/2022 9.0       Last 3 BNP       Pro-BNP (pg/mL)   Date Value   05/10/2022 2,758 (H)   04/28/2022 3,259 (H)   04/21/2022 3,191 (H)          CBC: No results for input(s): WBC, HGB, PLT in the last 72 hours. BMP:    No results for input(s): NA, K, CL, CO2, BUN, CREATININE, GLUCOSE in the last 72 hours. Hepatic: No results for input(s): AST, ALT, ALB, BILITOT, ALKPHOS in the last 72 hours. Troponin: No results for input(s): TROPONINI in the last 72 hours. BNP: No results for input(s): BNP in the last 72 hours. Lipids: No results for input(s): CHOL, HDL in the last 72 hours. Invalid input(s): LDLCALCU  INR: No results for input(s): INR in the last 72 hours. WEIGHTS:    Wt Readings from Last 3 Encounters:   05/31/22 204 lb (92.5 kg)   05/31/22 205 lb (93 kg)   05/10/22 204 lb 8 oz (92.8 kg)         TELEMETRY:  Cardiac Regularity: Regular  Cardiac Rhythm/Interpretation: NSR        ASSESSMENT:  Tulio Flower presents today for CHF clinic follow up. Weighs have been stable. Follow up appt made.     Interventions completed this visit:  IV diuretics given no  Lab work obtained yes, BNP/BMP   Reviewed currently prescribed medications with patient, educated on importance of compliance and answered any questions regarding their medication  Educated on signs and symptoms of HF  Educated on low sodium diet    PLAN:  Scheduled to follow up in CHF clinic on   Future Appointments   Date Time Provider Estuardo Arzate   6/7/2022  3:40 PM Zachery Welch, [facility] [address]   6/22/2022 12:15 PM [facility]   8/26/2022 10:00 PM [facility] [place] HOD     Given clinic phone number and aware of signs and symptoms to call with any HF change in symptoms.

## 2022-05-31 NOTE — TELEPHONE ENCOUNTER
Last Appointment:  3/29/2022  Future Appointments   Date Time Provider Estuardo Arzate   5/31/2022 11:00 AM MD Jose Jim[facility]nez [facility]   5/31/2022 12:30 PM [facility] ROOM 1 [facility]   6/7/2022  3:40 PM DO Zion Blake [facility]   8/26/2022 10:00 PM [facility] BEDRO[facility] 1 [facility] [place] HOD

## 2022-05-31 NOTE — PROGRESS NOTES
S: 61 y.o. male with   Chief Complaint   Patient presents with    Urinary Frequency     weak stream and pressure with odor        65-year-old man presents with 1 month of incomplete urinary stream dribbling of urine also burning with urination. States that it is associated with some low back pain and pressure when he tries to urinate. He denies any trauma to the genitourinary area. Is sexually active. No fevers no chills no chest pain no discharge from the penile area. O: VS:  height is 5' 9\" (1.753 m) and weight is 205 lb (93 kg). His temporal temperature is 97.9 °F (36.6 °C). His blood pressure is 120/72 and his pulse is 84. His respiration is 18 and oxygen saturation is 97%. AAO/NAD, appropriate affect for mood  CV:  RRR, no murmur  Resp: CTAB  -chaperone present. Digital rectal exam nontender prostate. Not boggy. Not enlarged. No tender masses or firm masses palpated. Scrotal area examined not warm     Impression/Plan:   1) urinary symptoms-treat empirically for UTI send urine for cultur  2) incomplete urinary stream- PSA. Refer to urology. No abnormal findings seen on physical exam today. But needs to closely be monitored. Health Maintenance Due   Topic Date Due    Hepatitis B vaccine (1 of 3 - Risk 3-dose series) Never done    Shingles vaccine (1 of 2) Never done    Low dose CT lung screening  Never done    Pneumococcal 0-64 years Vaccine (2 - PCV) 01/05/2016    COVID-19 Vaccine (3 - Booster for Micah Deist series) 09/26/2021    Diabetic retinal exam  10/20/2021    Diabetic microalbuminuria test  03/31/2022    Annual Wellness Visit (AWV)  03/31/2022    Prostate Specific Antigen (PSA) Screening or Monitoring  03/31/2022         Attending Physician Statement  I have discussed the case, including pertinent history and exam findings with the resident. I agree with the documented assessment and plan.

## 2022-05-31 NOTE — PROGRESS NOTES
CC: Weak Flow/Burning with urination  ------------------------------------------------------------------------------------------------------------------------  Assessment/Plan   Diagnosis Orders   1. Burning with urination could be 2/2 acute prostatitis Culture, Urine    MICROALBUMIN / CREATININE URINE RATIO    POCT Urinalysis no Micro    Urinalysis    PSA Screening    sulfamethoxazole-trimethoprim (BACTRIM DS;SEPTRA DS) 800-160 MG per tablet   2. Dribbling of urine  Culture, Urine    MICROALBUMIN / CREATININE URINE RATIO    POCT Urinalysis no Micro    Urinalysis    PSA Screening    sulfamethoxazole-trimethoprim (BACTRIM DS;SEPTRA DS) 800-160 MG per tablet  Consider trial of flomax. Patient has been on it before. Stated he stopped taking it due to medication not being refilled, will check this. Patient seeing urology soon   3.  Medication refill  diphenhydrAMINE (BENADRYL) 2 % cream    clopidogrel (PLAVIX) 75 MG tablet    pantoprazole (PROTONIX) 40 MG tablet    fluticasone (FLONASE) 50 MCG/ACT nasal spray    metFORMIN (GLUCOPHAGE) 500 MG tablet    isosorbide mononitrate (IMDUR) 30 MG extended release tablet    aspirin 81 MG EC tablet    metoprolol succinate (TOPROL XL) 100 MG extended release tablet    spironolactone (ALDACTONE) 50 MG tablet           RTO 2 weeks for follow up of symptoms  -------------------------------------------------------------------------------------------------------------------------    HPI:  61 y.o. man presents for urinary symptoms    1 month of urinary symptoms  Weak stream  Incomplete stream  +dribbling of urine  Burning with urination  Low back pain with urination  Took sialis and felt discomfort in testicular area, unable to have an erection    No fevers or chill chest pain SOB  No nicotine use    Urology referral has been made patient is awaiting to see Dr. Fanyn Lagos now for elevated PSA greater than 9    Patient Active Problem List    Diagnosis Date Noted    Age-related nuclear cataract, bilateral 01/31/2022    Presbyopia 01/31/2022    Regular astigmatism, bilateral 01/31/2022    Leg swelling 01/23/2022    ALEX (acute kidney injury) ([facility] 01/23/2022    CHF, acute on chronic ([address] 01/21/2022    Elevated PSA, less than 10 ng/ml 04/01/2021    Nonrheumatic aortic valve insufficiency 02/20/2019    Obesity (BMI 30.0-34. 9)     Ischemic cardiomyopathy     Type 2 diabetes mellitus without complication, without long-term current use of insulin ([facility]     Essential hypertension 06/29/2017    Diverticulosis of colon 08/07/2015    History of MI (myocardial infarction) 08/07/2015    Chronic systolic congestive heart failure ([address] 08/07/2015    NSVT (nonsustained ventricular tachycardia) ([facility] 07/31/2015    Dual implantable cardioverter-defibrillator in situ 04/02/2015    CAD (coronary artery disease) 12/06/2014    Depression 12/10/2013    History of stroke 07/23/2013    ED (erectile dysfunction) 03/26/2012    COPD (chronic obstructive pulmonary disease)     Hyperlipemia     GERD (gastroesophageal reflux disease) 02/12/2011       Past Medical History:   Diagnosis Date    Allergic rhinitis     Anticoagulant long-term use     Anxiety     Atypical angina (HCC)     CAD (coronary artery disease)     Carotid artery stenosis     Cerebral artery occlusion with cerebral infarction ([address] 2012    CHF (congestive heart failure) (HCC)     COPD (chronic obstructive pulmonary disease) ([facility]     CVA (cerebral infarction) 11/19/2012    Depression     Diverticulitis 2010    Sigmoid abscess, s/p colectomy    Erectile dysfunction     GERD (gastroesophageal reflux disease)     Heart attack ([address] 2014    Hx of blood clots     Hyperlipemia     Hypertension     Ischemic cardiomyopathy     Left atrial thrombus     Obesity     Type 2 diabetes mellitus without complication, without long-term current use of insulin (HCC)     Type II or unspecified type diabetes mellitus without mention of complication, not stated as uncontrolled        Current Outpatient Medications on File Prior to Visit   Medication Sig Dispense Refill    sacubitril-valsartan (ENTRESTO) 49-51 MG per tablet Take 1 tablet by mouth 2 times daily 60 tablet 11    bumetanide (BUMEX) 2 MG tablet Take 1 tablet by mouth 2 times daily 60 tablet 11    diphenhydrAMINE (BENADRYL) 2 % cream Apply topically 3 times daily as needed. 50 g 1    clopidogrel (PLAVIX) 75 MG tablet Take 1 tablet by mouth daily 90 tablet 0    pantoprazole (PROTONIX) 40 MG tablet Take 1 tablet by mouth daily 30 tablet 1    fluticasone (FLONASE) 50 MCG/ACT nasal spray 2 sprays by Each Nostril route daily (Patient taking differently: 2 sprays by Each Nostril route as needed ) 48 g 1    metFORMIN (GLUCOPHAGE) 500 MG tablet Take 1 tablet by mouth 2 times daily (with meals) 180 tablet 1    isosorbide mononitrate (IMDUR) 30 MG extended release tablet Take 3 tablets by mouth daily (Patient taking differently: Take 90 mg by mouth daily Takes 2 tabs in am and 1 pill at bedtime) 270 tablet 0    aspirin 81 MG EC tablet Take 1 tablet by mouth daily 90 tablet 1    metoprolol succinate (TOPROL XL) 100 MG extended release tablet Take 1 tablet by mouth 2 times daily 30 tablet 3    atorvastatin (LIPITOR) 80 MG tablet Take 1 tablet by mouth nightly 90 tablet 1    spironolactone (ALDACTONE) 50 MG tablet Take 1 tablet by mouth daily (Patient not taking: Reported on 5/10/2022) 90 tablet 1     No current facility-administered medications on file prior to visit.        No Known Allergies    Family History   Problem Relation Age of Onset    High Blood Pressure Father     Heart Disease Father         MI stents    Heart Disease Paternal Uncle     Stroke Sister     No Known Problems Brother     No Known Problems Sister     No Known Problems Brother        Past Surgical History:   Procedure Laterality Date    ABDOMEN SURGERY      CARDIAC CATHETERIZATION  3/30/2015    EF 35%,  Brittany Aguilera, [facility] CARDIAC DEFIBRILLATOR PLACEMENT  2015    left chest, [employer], Dr. Jossie Mosquera, [address], [address]  2010    laparoscopic, converted to open sigmoid colon resection (diverticular disease), Dr. Roby Yost, [facility] COLONOSCOPY  2005    diverticulosis, Dr. Roby Yost, [facility] COLONOSCOPY  2008    diverticulosis, Dr. Roby Yost, [facility] COLONOSCOPY  2010    prior to colon surgery, diverticulosis, Dr. Roby Yost, [facility] COLONOSCOPY  2015    sigmoid diverticulosis, rectal polyp bx/cauterized (hyperplastic polyp), Dr. Nicki Amaya, [facility]  2014    [facility] Dr. Rafael Martinez, [address] [place] [address] CYSTOURETHROSCOPY  2010    placement bilateral ureteral stents for colon resection, Dr. Shama Byrne [facility], [address] [place], [place] [address]    UPPER GASTROINTESTINAL ENDOSCOPY  2015    severe duodenitis, mild to moderate gastritis (bx for H pylori), submucosal benign mass body stomach, mild reflux esophagitis, Dr. Nicki Amaya, [facility]    VASCULAR SURGERY         Social History     Tobacco Use    Smoking status: Former Smoker     Packs/day: 2.00     Years: 30.00     Pack years: 60.00     Types: Cigarettes     Start date: 0     Quit date: 2014     Years since quittin.0    Smokeless tobacco: Current User     Types: Chew    Tobacco comment: occ chewing tobacco, trying to quit   Vaping Use    Vaping Use: Never used   Substance Use Topics    Alcohol use: Not Currently    Drug use: No       ROS:    Review of Systems   Constitutional: Negative for activity change, appetite change, chills and fatigue. HENT: Negative for congestion and sore throat. Respiratory: Negative for choking and chest tightness. Cardiovascular: Negative for chest pain, palpitations and leg swelling. Gastrointestinal: Negative for abdominal distention and abdominal pain.    Genitourinary: Difficulty urinating burning with urination incomplete stream   musculoskeletal: Negative for arthralgias and back pain. Skin: Negative for color change and pallor. Neurological: Negative for facial asymmetry and headaches. Psychiatric/Behavioral: Negative for behavioral problems. The patient is not nervous/anxious. Objective:    VS:  Blood pressure 120/72, pulse 84, temperature 97.9 °F (36.6 °C), temperature source Temporal, resp. rate 18, height 5' 9\" (1.753 m), weight 205 lb (93 kg), SpO2 97 %. Physical Exam   Constitutional: Oriented to person, place, and time. Well-developed and well-nourished. HENT:   Head: Normocephalic and atraumatic. Eyes: EOM are normal.   Neck: Neck supple. Cardiovascular: Normal rate, regular rhythm and intact distal pulses. Exam reveals no gallop and no friction rub. No murmur heard. Pulmonary/Chest: Effort normal and breath sounds normal. No wheezes. No rhales. Abdominal: Soft. Bowel sounds are normal. No distension. No abdominal tenderness. Musculoskeletal: Normal range of motion. General: No edema. : Digital rectal exam nontender prostate not enlarged. No firm or palpable masses felt. Chaperone present. No scrotal warmth redness or tenderness    Plans:  As above. Please see Patient Instructions for further counseling and information given. Advised to please be adherent to the treatment plans discussed today, and please call with any questions or concerns, letting the office know of any reasons that the plans may not be followed. The risks of untreated conditions include worsening illness, injury, disability, and possibly, death. Please call if symptoms change in any way, worsen, or fail to completely resolve, as this could necessitate a change to treatment plans. Patient and/or caregiver expressed understanding. Indications and proper use of medication(s) reviewed.   Potential side-effects and risks of medication(s) also explained. Patient and/or caregiver was instructed to call if any new symptoms develop prior to next visit. Health risk factors discussed and addressed.

## 2022-06-01 NOTE — RESULT ENCOUNTER NOTE
Labs and CHF clinic note reviewed  Vitals at CHF clinic: /70   Pulse 75     Current GDMT:  Entresto 49/51 mg BID  Toprol 100 mg BID  Spironolactone 50 mg (please confirm that he is taking and adjust medication list)  Bumex 2 mg BID    Please have him increase Entresto to high dose  Decrease Bumex to 2 mg daily   Follow up labs in 1 week     Also please have PCP evaluate patient for SGLT2i since he is on metformin.      Thank you

## 2022-06-02 ENCOUNTER — TELEPHONE (OUTPATIENT)
Dept: CARDIOLOGY CLINIC | Age: 57
End: 2022-06-02

## 2022-06-02 DIAGNOSIS — Z79.899 MEDICATION DOSE DECREASED: ICD-10-CM

## 2022-06-02 DIAGNOSIS — I50.22 CHRONIC SYSTOLIC CONGESTIVE HEART FAILURE (HCC): Primary | ICD-10-CM

## 2022-06-02 DIAGNOSIS — Z79.899 MEDICATION DOSE INCREASED: ICD-10-CM

## 2022-06-02 LAB — URINE CULTURE, ROUTINE: NORMAL

## 2022-06-02 RX ORDER — BUMETANIDE 2 MG/1
2 TABLET ORAL DAILY
Qty: 30 TABLET | Refills: 3 | Status: SHIPPED
Start: 2022-06-02 | End: 2022-06-08 | Stop reason: DRUGHIGH

## 2022-06-02 NOTE — TELEPHONE ENCOUNTER
Per PCP Dr Issac Leyden evaluating currently for urinary symptoms. PCP will consider an SGLT2i in the future. Message  Received:  Today

## 2022-06-02 NOTE — TELEPHONE ENCOUNTER
----- Message from PAUL Vora CNP sent at 5/31/2022  8:15 PM EDT -----  Labs and CHF clinic note reviewed  Vitals at CHF clinic: /70   Pulse 75     Current GDMT:  Entresto 49/51 mg BID  Toprol 100 mg BID  Spironolactone 50 mg (please confirm that he is taking and adjust medication list)  Bumex 2 mg BID    Please have him increase Entresto to high dose  Decrease Bumex to 2 mg daily   Follow up labs in 1 week     Also please have PCP evaluate patient for SGLT2i since he is on metformin.      Thank you

## 2022-06-02 NOTE — TELEPHONE ENCOUNTER
Left provider instructions in   Reviewed all the instructions/ requests  Pending call back to confirm understanding    Mailed letter and lab scripts and copy of med scripts.      Future Appointments   Date Time Provider Estuardo Arzate   6/7/2022  3:40 PM DO Dirk Wilkerson [facility]   6/22/2022 12:15 PM [facility] ROOM 1 [facility]   8/26/2022 10:00 PM [facility] BEDROOM 1 [facility] [place] BRETT Springer RN

## 2022-06-06 NOTE — TELEPHONE ENCOUNTER
Verified with [facility], didn't  new dose entresto nor the reduced bumex dose      [phone] (home)  Cyndi Spann was left provider RICKY Connelly CNP  instructions again in  and requested a call back to confirm understanding of instructions. See's PCP tomorrow.      Future Appointments   Date Time Provider Estuardo Arzate   6/7/2022  3:40 PM DO Ashley Aleman [facility]   6/22/2022 12:15 PM [facility] ROOM 1 [facility]   8/26/2022 10:00 PM [facility] BEDROOM 1 [facility]

## 2022-06-07 ENCOUNTER — OFFICE VISIT (OUTPATIENT)
Dept: FAMILY MEDICINE CLINIC | Age: 57
End: 2022-06-07
Payer: MEDICARE

## 2022-06-07 VITALS
OXYGEN SATURATION: 98 % | BODY MASS INDEX: 29.92 KG/M2 | RESPIRATION RATE: 20 BRPM | HEIGHT: 69 IN | SYSTOLIC BLOOD PRESSURE: 97 MMHG | WEIGHT: 202 LBS | TEMPERATURE: 97.6 F | HEART RATE: 82 BPM | DIASTOLIC BLOOD PRESSURE: 60 MMHG

## 2022-06-07 DIAGNOSIS — R33.9 URINARY RETENTION WITH INCOMPLETE BLADDER EMPTYING: ICD-10-CM

## 2022-06-07 DIAGNOSIS — E11.9 TYPE 2 DIABETES MELLITUS WITHOUT COMPLICATION, WITHOUT LONG-TERM CURRENT USE OF INSULIN (HCC): Primary | ICD-10-CM

## 2022-06-07 DIAGNOSIS — I25.10 CORONARY ARTERY DISEASE INVOLVING NATIVE CORONARY ARTERY OF NATIVE HEART WITHOUT ANGINA PECTORIS: ICD-10-CM

## 2022-06-07 DIAGNOSIS — I50.22 CHRONIC SYSTOLIC CONGESTIVE HEART FAILURE (HCC): ICD-10-CM

## 2022-06-07 PROCEDURE — 3051F HG A1C>EQUAL 7.0%<8.0%: CPT | Performed by: FAMILY MEDICINE

## 2022-06-07 PROCEDURE — 99214 OFFICE O/P EST MOD 30 MIN: CPT | Performed by: FAMILY MEDICINE

## 2022-06-07 PROCEDURE — 99212 OFFICE O/P EST SF 10 MIN: CPT | Performed by: FAMILY MEDICINE

## 2022-06-07 RX ORDER — ATORVASTATIN CALCIUM 80 MG/1
80 TABLET, FILM COATED ORAL NIGHTLY
Qty: 90 TABLET | Refills: 1 | Status: SHIPPED | OUTPATIENT
Start: 2022-06-07

## 2022-06-07 NOTE — Clinical Note
Good Morning,   Just wanted you to know that Mr. Marcial Hodgson has not been tolerating the bump up on Entresto, he states. BP 97/60 for me, asymptomatic at the time, but has had some dizziness intermittently. He did not increase the dose fully yet. I ordered some labs to check in the next couple of days and encouraged him to reach out to you, too. He actually has been feeling very well overall and more adherent to his meds. He will see urology next week, then we can discuss the SGLT2i. Thank you!

## 2022-06-07 NOTE — PROGRESS NOTES
CC:  Follow up HF, DM    HPI:  61 y.o. male presents for follow up. HFrEF, CAD; BP lower, feels as though he might pass out at times. Not taking Entresto as he had been advised by cardiology; taking 1.5 tablets rather than 2 tablets. Has been taking lower dose of Bumex, tolerating, no weight gain, no increased swelling, no orthopnea, feeling well. Skin on legs is healing; dry, no lesions at this time. Does not feel dizzy at this time. No CP or SOB. DM.  Last A1C 7.3. Tolerating metformin; watch closely with HF. Making some dietary changes. Taking medications more consistently. Will consider SGLT2 after urology evaluation on 6/16. Taking medications and more adherent to diet thanks to his girlfriend, Allegra Cai, who is staying with him. Urination improving, urine culture negative, taking Flomax, seems better flow, feeling better. Will see urology on 6/16. Advised about elevated PSA. Patient Active Problem List    Diagnosis Date Noted    Age-related nuclear cataract, bilateral 01/31/2022    Presbyopia 01/31/2022    Regular astigmatism, bilateral 01/31/2022    Leg swelling 01/23/2022    ALEX (acute kidney injury) ([address] 01/23/2022    CHF, acute on chronic ([address] 01/21/2022    Elevated PSA, less than 10 ng/ml 04/01/2021    Nonrheumatic aortic valve insufficiency 02/20/2019    Obesity (BMI 30.0-34. 9)     Ischemic cardiomyopathy     Type 2 diabetes mellitus without complication, without long-term current use of insulin ([address]     Essential hypertension 06/29/2017    Diverticulosis of colon 08/07/2015    History of MI (myocardial infarction) 08/07/2015    Chronic systolic congestive heart failure ([address] 08/07/2015    NSVT (nonsustained ventricular tachycardia) ([address] 07/31/2015    Dual implantable cardioverter-defibrillator in situ 04/02/2015    CAD (coronary artery disease) 12/06/2014    Depression 12/10/2013    History of stroke 07/23/2013    ED (erectile dysfunction) 03/26/2012  COPD (chronic obstructive pulmonary disease)     Hyperlipemia     GERD (gastroesophageal reflux disease) 2011       Current Outpatient Medications on File Prior to Visit   Medication Sig Dispense Refill    tamsulosin (FLOMAX) 0.4 mg capsule Take 1 capsule by mouth daily 90 capsule 1    sacubitril-valsartan (ENTRESTO)  MG per tablet Take 1 tablet by mouth 2 times daily 60 tablet 3    bumetanide (BUMEX) 2 MG tablet Take 1 tablet by mouth daily 30 tablet 3    clopidogrel (PLAVIX) 75 MG tablet Take 1 tablet by mouth daily 90 tablet 0    pantoprazole (PROTONIX) 40 MG tablet Take 1 tablet by mouth daily 30 tablet 1    fluticasone (FLONASE) 50 MCG/ACT nasal spray 2 sprays by Each Nostril route daily 48 g 1    metFORMIN (GLUCOPHAGE) 500 MG tablet Take 1 tablet by mouth 2 times daily (with meals) 180 tablet 1    isosorbide mononitrate (IMDUR) 30 MG extended release tablet Take 3 tablets by mouth daily 270 tablet 0    aspirin 81 MG EC tablet Take 1 tablet by mouth daily 90 tablet 1    metoprolol succinate (TOPROL XL) 100 MG extended release tablet Take 1 tablet by mouth 2 times daily 30 tablet 3    spironolactone (ALDACTONE) 50 MG tablet Take 1 tablet by mouth daily 90 tablet 1    sulfamethoxazole-trimethoprim (BACTRIM DS;SEPTRA DS) 800-160 MG per tablet Take 1 tablet by mouth 2 times daily for 28 days 56 tablet 0    diphenhydrAMINE (BENADRYL) 2 % cream Apply topically 3 times daily as needed. (Patient not taking: Reported on 2022) 50 g 1     No current facility-administered medications on file prior to visit.        No Known Allergies    Social History     Tobacco Use    Smoking status: Former Smoker     Packs/day: 2.00     Years: 30.00     Pack years: 60.00     Types: Cigarettes     Start date: 0     Quit date: 2014     Years since quittin.0    Smokeless tobacco: Current User     Types: Chew    Tobacco comment: occ chewing tobacco, trying to quit   Vaping Use    Vaping Use: Never used   Substance Use Topics    Alcohol use: Not Currently    Drug use: No       ROS:   Review of Systems - as above     Physical Exam:    VS:  Blood pressure 97/60, pulse 82, temperature 97.6 °F (36.4 °C), resp. rate 20, height 5' 9\" (1.753 m), weight 202 lb (91.6 kg), SpO2 98 %. General Appearance:  awake, alert, oriented, in no acute distress and well developed, well nourished  Skin:  Bilateral legs with some skin flaking/peeling, dry, no lesions or ulcerations, healed   Head/face:  NCAT  Eyes:  EOMI and Sclera nonicteric  Neck:  neck- supple, no mass, non-tender  Lungs:  Normal expansion. Clear to auscultation. No rales, rhonchi, or wheezing. Heart:  Heart regular rate and rhythm  Abdomen:  Soft, NT, stable mild distention   Extremities: Extremities warm to touch, pink, with no edema. and pulses present in all extremities      Assessments:      Diagnosis Orders   1. Type 2 diabetes mellitus without complication, without long-term current use of insulin ([facility])  atorvastatin (LIPITOR) 80 MG tablet    CBC    Comprehensive Metabolic Panel    TSH    Lipid Panel   2. Chronic systolic congestive heart failure ([address]     3. Coronary artery disease involving native coronary artery of native heart without angina pectoris  atorvastatin (LIPITOR) 80 MG tablet   4. Urinary retention with incomplete bladder emptying         Plans:    As Above. Please see Patient Instructions for further counseling and information given. RTO 4 weeks, or sooner as needed for any new, persistent, or worsening symptoms. Ordered labs to be drawn in 2-3 days. Supportive care ongoing for lower extremities, healing well. Encouraged follow up with cardiology as directed. Encouraged him to call to report how he has been taking Entresto and how he has been feeling. Currently he is not dizzy, asymptomatic, at lower-normal BP. He agrees to call and follow up.  Caution with standing and changing positions, caution if symptoms of dizziness return. Continued medication adherence and dietary modification. Follow up with urology as directed. May stop antibiotics after 10 day course, as culture negative, and symptoms have resolved. Await further guidance from urology. Please be adherent to the treatment plans discussed today, and please call with any questions or concerns, letting the office know of any reasons that the plans may not be followed. The risks of untreated conditions include worsening illness, injury, disability, and possibly, death. Please call if symptoms change in any way, worsen, or fail to completely resolve, as this could necessitate a change to treatment plans. Patient expressed understanding. Indications and proper use of medication(s) reviewed. Potential side-effects and risks of medication(s) also explained. Patient was instructed to call if any new symptoms develop prior to next visit.

## 2022-06-08 ENCOUNTER — TELEPHONE (OUTPATIENT)
Dept: CARDIOLOGY CLINIC | Age: 57
End: 2022-06-08

## 2022-06-08 DIAGNOSIS — I50.22 CHRONIC SYSTOLIC CONGESTIVE HEART FAILURE (HCC): Primary | ICD-10-CM

## 2022-06-08 DIAGNOSIS — Z79.899 MEDICATION DOSE DECREASED: ICD-10-CM

## 2022-06-08 RX ORDER — BUMETANIDE 1 MG/1
1 TABLET ORAL DAILY
Qty: 30 TABLET | Refills: 3 | Status: SHIPPED
Start: 2022-06-08 | End: 2022-09-06 | Stop reason: SDUPTHER

## 2022-06-08 NOTE — TELEPHONE ENCOUNTER
Weight: 202lb   (weight fluctuates 1-2 lbs)  Edema: none  Sob: no   Urination:  Good voiding. Color clear   Voids: goes about 15 times. At night. No nocturia. Drinks:  1.5liters daily because PCP told him to after hospital discharge. He drinks to thirst. Does drink about 1500cc. Stopped iced tea use. Edema: none   Does go outside and works  I feel like I'm going to black out at times. Walking makes it worse. Sitting relieves this. Takes 2mg daily Bumex    Hold 6-9 and 6-10 Bumex (bumetanide)  6/11 start taking 1mg Bumex daily. Uses [facility]. Self reduced Toprol 50mg BID yesterday. Feels less dizzy today. He will call Monday with his bp trends and weights and how he feels with     RICKY Connelly CNP Please advise if still should keep the 1.5liter fluid restriction or what your recommendation is? Please advise on toprol dosing.

## 2022-06-08 NOTE — TELEPHONE ENCOUNTER
----- Message from PAUL Coronado CNP sent at 6/8/2022 10:37 AM EDT -----  Thank you so much for the update    Leonora - please call and see how he is feeling from a fluid standpoint. If euvolemic have him hold bumex x 2 days and then decrease his Bumex to 1 mg daily   Instruct him to stay hydrated     Will assess recent labs drawn by PCP    ----- Message -----  From: Perez Moreland DO  Sent: 6/8/2022  10:02 AM EDT  To: PAUL Coronado CNP    Good Morning,   Just wanted you to know that Mr. Brittani Christopher has not been tolerating the bump up on Entresto, he states. BP 97/60 for me, asymptomatic at the time, but has had some dizziness intermittently. He did not increase the dose fully yet. I ordered some labs to check in the next couple of days and encouraged him to reach out to you, too. He actually has been feeling very well overall and more adherent to his meds. He will see urology next week, then we can discuss the SGLT2i. Thank you!

## 2022-06-10 ENCOUNTER — NURSE ONLY (OUTPATIENT)
Dept: FAMILY MEDICINE CLINIC | Age: 57
End: 2022-06-10
Payer: MEDICARE

## 2022-06-10 DIAGNOSIS — E11.9 TYPE 2 DIABETES MELLITUS WITHOUT COMPLICATION, WITHOUT LONG-TERM CURRENT USE OF INSULIN (HCC): ICD-10-CM

## 2022-06-10 DIAGNOSIS — E11.9 TYPE 2 DIABETES MELLITUS WITHOUT COMPLICATION, WITHOUT LONG-TERM CURRENT USE OF INSULIN (HCC): Chronic | ICD-10-CM

## 2022-06-10 LAB
ALBUMIN SERPL-MCNC: 4.7 G/DL (ref 3.5–5.2)
ALP BLD-CCNC: 131 U/L (ref 40–129)
ALT SERPL-CCNC: 14 U/L (ref 0–40)
ANION GAP SERPL CALCULATED.3IONS-SCNC: 15 MMOL/L (ref 7–16)
AST SERPL-CCNC: 23 U/L (ref 0–39)
BILIRUB SERPL-MCNC: 0.7 MG/DL (ref 0–1.2)
BUN BLDV-MCNC: 27 MG/DL (ref 6–20)
CALCIUM SERPL-MCNC: 10.2 MG/DL (ref 8.6–10.2)
CHLORIDE BLD-SCNC: 99 MMOL/L (ref 98–107)
CHOLESTEROL, TOTAL: 232 MG/DL (ref 0–199)
CO2: 23 MMOL/L (ref 22–29)
CREAT SERPL-MCNC: 1.4 MG/DL (ref 0.7–1.2)
GFR AFRICAN AMERICAN: >60
GFR NON-AFRICAN AMERICAN: 52 ML/MIN/1.73
GLUCOSE BLD-MCNC: 130 MG/DL (ref 74–99)
HCT VFR BLD CALC: 56.9 % (ref 37–54)
HDLC SERPL-MCNC: 41 MG/DL
HEMOGLOBIN: 17.3 G/DL (ref 12.5–16.5)
LDL CHOLESTEROL CALCULATED: 147 MG/DL (ref 0–99)
MCH RBC QN AUTO: 24 PG (ref 26–35)
MCHC RBC AUTO-ENTMCNC: 30.4 % (ref 32–34.5)
MCV RBC AUTO: 78.8 FL (ref 80–99.9)
PDW BLD-RTO: 23.5 FL (ref 11.5–15)
PLATELET # BLD: 177 E9/L (ref 130–450)
PMV BLD AUTO: ABNORMAL FL (ref 7–12)
POTASSIUM SERPL-SCNC: 5.1 MMOL/L (ref 3.5–5)
RBC # BLD: 7.22 E12/L (ref 3.8–5.8)
SODIUM BLD-SCNC: 137 MMOL/L (ref 132–146)
TOTAL PROTEIN: 8.5 G/DL (ref 6.4–8.3)
TRIGL SERPL-MCNC: 219 MG/DL (ref 0–149)
TSH SERPL DL<=0.05 MIU/L-ACNC: 1.22 UIU/ML (ref 0.27–4.2)
VLDLC SERPL CALC-MCNC: 44 MG/DL
WBC # BLD: 5.6 E9/L (ref 4.5–11.5)

## 2022-06-10 PROCEDURE — 36415 COLL VENOUS BLD VENIPUNCTURE: CPT | Performed by: FAMILY MEDICINE

## 2022-06-10 NOTE — TELEPHONE ENCOUNTER
Ok to increase fluid intake to 64oz gris when working outside  Continue Toprol at 50 mg BID for now  Will reassess on Monday and determine further GDMT titration as tolerated    Thank you

## 2022-06-13 NOTE — TELEPHONE ENCOUNTER
Patient of DR White Favors [facility] out of office. RICKY Bautista to cover         [employer]   Feels better  Breathing good  Still little dizzy at times. Standing \"see's grey going around\". Moderate improvement with dizzy spell with decrease  in metoprolol dosing. * he has decreased it to 50mg daily  Only takes a 50mg at night if not dizzy. Dizzy spells are  Random no certain time. Sitting or standing dizzy spells. · Checked bp yesterday 87/57  Sitting bp  Around  7pm.  bp:141/84 earlier that day. · Blood sugar 140 (wan't his sugar) when dizzy  Not dizzy now. Feels ok now. 6/13/2022 DR Nice told him to drink more water. He is inc hydration. Going to drink more Water. Labs again for Thursday for PCP. Davy Dow Now only  taking 50mg metoprolol daily. If he feels ok in evening he will take the 50mg at [facility]. Didn't take any toprol  over weekend pm dose . So only taking 50mg daily metoprolol. Was to be 100mg BID. Self decreased to 50mg BID due to Dizzy and now 50mg in am daily and if feels good takes 50mg pm. Not taking the pm dose all weekend. Weight: staying around 195-198lbs. Abd: still little bloated. No worse. Legs: denies edema    Breathing is good  Urine color is clear. Voids good amounts. Says he Resumed Flomax 0.4mg daily  use for his prostate: started this last month. And that  This Thursday urology apt. Improved urination. Not week stream any more since resuming flomax. Concerned with his PSA # increase and will see urology.       Future Appointments   Date Time Provider Estuardo Arzate   6/22/2022 12:15 PM [facility] ROOM 1 [facility]   8/26/2022 10:00 PM [facility] BEDROOM 1 [facility] [place] [facility]

## 2022-06-14 NOTE — TELEPHONE ENCOUNTER
Take Toprol 50 mg once a day. Blood pressure and heart recheck on Thursday. Flomax will also drop the blood pressure and make you dizzy. Will defer to urology.

## 2022-06-15 DIAGNOSIS — I50.22 CHRONIC SYSTOLIC CONGESTIVE HEART FAILURE (HCC): Primary | ICD-10-CM

## 2022-06-15 RX ORDER — METOPROLOL SUCCINATE 50 MG/1
50 TABLET, EXTENDED RELEASE ORAL DAILY
COMMUNITY
End: 2022-07-12 | Stop reason: SDUPTHER

## 2022-06-15 NOTE — TELEPHONE ENCOUNTER
Please make sure  Yuri Hurtadokeyshawn has his labs done on Thursday, and please get vitals at that time as well. Thank you!

## 2022-06-15 NOTE — TELEPHONE ENCOUNTER
Patient notified of Dr. Linsey Arcos recommendations. Med list amended. Will obtain BP/P from his lab visit with PCP on Thursday.

## 2022-06-16 ENCOUNTER — NURSE ONLY (OUTPATIENT)
Dept: FAMILY MEDICINE CLINIC | Age: 57
End: 2022-06-16
Payer: MEDICARE

## 2022-06-16 ENCOUNTER — TELEPHONE (OUTPATIENT)
Dept: CARDIOLOGY CLINIC | Age: 57
End: 2022-06-16

## 2022-06-16 VITALS — HEART RATE: 102 BPM | DIASTOLIC BLOOD PRESSURE: 62 MMHG | SYSTOLIC BLOOD PRESSURE: 94 MMHG

## 2022-06-16 DIAGNOSIS — I50.43 ACUTE ON CHRONIC COMBINED SYSTOLIC AND DIASTOLIC CONGESTIVE HEART FAILURE (HCC): ICD-10-CM

## 2022-06-16 DIAGNOSIS — I10 ESSENTIAL HYPERTENSION: Chronic | ICD-10-CM

## 2022-06-16 DIAGNOSIS — I50.22 CHRONIC SYSTOLIC CONGESTIVE HEART FAILURE (HCC): ICD-10-CM

## 2022-06-16 DIAGNOSIS — N17.9 AKI (ACUTE KIDNEY INJURY) (HCC): ICD-10-CM

## 2022-06-16 LAB
ALBUMIN SERPL-MCNC: 4.7 G/DL (ref 3.5–5.2)
ALP BLD-CCNC: 120 U/L (ref 40–129)
ALT SERPL-CCNC: 17 U/L (ref 0–40)
ANION GAP SERPL CALCULATED.3IONS-SCNC: 16 MMOL/L (ref 7–16)
AST SERPL-CCNC: 24 U/L (ref 0–39)
BILIRUB SERPL-MCNC: 1 MG/DL (ref 0–1.2)
BUN BLDV-MCNC: 48 MG/DL (ref 6–20)
CALCIUM SERPL-MCNC: 9.3 MG/DL (ref 8.6–10.2)
CHLORIDE BLD-SCNC: 98 MMOL/L (ref 98–107)
CO2: 23 MMOL/L (ref 22–29)
CREAT SERPL-MCNC: 1.5 MG/DL (ref 0.7–1.2)
GFR AFRICAN AMERICAN: 58
GFR NON-AFRICAN AMERICAN: 48 ML/MIN/1.73
GLUCOSE BLD-MCNC: 173 MG/DL (ref 74–99)
POTASSIUM SERPL-SCNC: 4.4 MMOL/L (ref 3.5–5)
PRO-BNP: 644 PG/ML (ref 0–125)
SODIUM BLD-SCNC: 137 MMOL/L (ref 132–146)
TOTAL PROTEIN: 7.8 G/DL (ref 6.4–8.3)

## 2022-06-16 PROCEDURE — 99211 OFF/OP EST MAY X REQ PHY/QHP: CPT

## 2022-06-16 NOTE — TELEPHONE ENCOUNTER
Patient was seen for labs at his PCPs office today. BP/P was 94/62 (102). See  6/8/22 telephone encounter regarding symptoms and Toprol dosing. Currently taking Toprol 50 mg daily. Please advise.

## 2022-06-16 NOTE — TELEPHONE ENCOUNTER
That is a good blood pressure for his decreased heart function but the heart rate is too high. Decrease the Aldactone to 25 mg daily. Blood pressure and heart rate check next Tuesday.

## 2022-06-17 ENCOUNTER — TELEPHONE (OUTPATIENT)
Dept: FAMILY MEDICINE CLINIC | Age: 57
End: 2022-06-17

## 2022-06-17 DIAGNOSIS — R97.20 ELEVATED PSA, LESS THAN 10 NG/ML: Primary | ICD-10-CM

## 2022-06-17 DIAGNOSIS — I25.10 CORONARY ARTERY DISEASE INVOLVING NATIVE CORONARY ARTERY OF NATIVE HEART WITHOUT ANGINA PECTORIS: ICD-10-CM

## 2022-06-17 DIAGNOSIS — R33.9 URINARY RETENTION WITH INCOMPLETE BLADDER EMPTYING: ICD-10-CM

## 2022-06-17 RX ORDER — SPIRONOLACTONE 25 MG/1
25 TABLET ORAL DAILY
COMMUNITY
End: 2022-07-12 | Stop reason: SDUPTHER

## 2022-06-17 NOTE — TELEPHONE ENCOUNTER
Patient notified of BP/P results and Dr. Darline Ortiz recommendations. Patient is scheduled at the [facility]F Clinic on Wed, we will obtain BP/P reading at that time.

## 2022-06-17 NOTE — TELEPHONE ENCOUNTER
Kaylyn Colbert called in and is needing a PSA ordered for Wednesday. He states that he will  the order Wednesday morning.     Thank you

## 2022-06-20 ENCOUNTER — NURSE ONLY (OUTPATIENT)
Dept: FAMILY MEDICINE CLINIC | Age: 57
End: 2022-06-20
Payer: MEDICARE

## 2022-06-20 DIAGNOSIS — R97.20 ELEVATED PSA: ICD-10-CM

## 2022-06-20 DIAGNOSIS — I50.22 CHRONIC SYSTOLIC CONGESTIVE HEART FAILURE (HCC): ICD-10-CM

## 2022-06-20 DIAGNOSIS — I50.43 ACUTE ON CHRONIC COMBINED SYSTOLIC AND DIASTOLIC CONGESTIVE HEART FAILURE (HCC): ICD-10-CM

## 2022-06-20 DIAGNOSIS — R97.20 ELEVATED PSA, LESS THAN 10 NG/ML: ICD-10-CM

## 2022-06-20 DIAGNOSIS — R33.9 URINARY RETENTION WITH INCOMPLETE BLADDER EMPTYING: ICD-10-CM

## 2022-06-20 LAB
PRO-BNP: 1010 PG/ML (ref 0–125)
PROSTATE SPECIFIC ANTIGEN: 31.83 NG/ML (ref 0–4)

## 2022-06-20 PROCEDURE — 36415 COLL VENOUS BLD VENIPUNCTURE: CPT | Performed by: FAMILY MEDICINE

## 2022-06-22 ENCOUNTER — TELEPHONE (OUTPATIENT)
Dept: CARDIOLOGY CLINIC | Age: 57
End: 2022-06-22

## 2022-06-22 ENCOUNTER — HOSPITAL ENCOUNTER (OUTPATIENT)
Dept: OTHER | Age: 57
Setting detail: THERAPIES SERIES
Discharge: HOME | End: 2022-06-22
Payer: MEDICARE

## 2022-06-22 VITALS
BODY MASS INDEX: 29.83 KG/M2 | HEART RATE: 92 BPM | WEIGHT: 202 LBS | RESPIRATION RATE: 18 BRPM | OXYGEN SATURATION: 97 % | SYSTOLIC BLOOD PRESSURE: 100 MMHG | DIASTOLIC BLOOD PRESSURE: 62 MMHG

## 2022-06-22 LAB
ANION GAP SERPL CALCULATED.3IONS-SCNC: 15 MMOL/L (ref 7–16)
BUN BLDV-MCNC: 25 MG/DL (ref 6–20)
CALCIUM SERPL-MCNC: 9.4 MG/DL (ref 8.6–10.2)
CHLORIDE BLD-SCNC: 99 MMOL/L (ref 98–107)
CO2: 25 MMOL/L (ref 22–29)
CREAT SERPL-MCNC: 1.2 MG/DL (ref 0.7–1.2)
GFR AFRICAN AMERICAN: >60
GFR NON-AFRICAN AMERICAN: >60 ML/MIN/1.73
GLUCOSE BLD-MCNC: 129 MG/DL (ref 74–99)
POTASSIUM SERPL-SCNC: 4.1 MMOL/L (ref 3.5–5)
PRO-BNP: 728 PG/ML (ref 0–125)
SODIUM BLD-SCNC: 139 MMOL/L (ref 132–146)

## 2022-06-22 PROCEDURE — 36415 COLL VENOUS BLD VENIPUNCTURE: CPT

## 2022-06-22 PROCEDURE — 80048 BASIC METABOLIC PNL TOTAL CA: CPT

## 2022-06-22 PROCEDURE — 99214 OFFICE O/P EST MOD 30 MIN: CPT

## 2022-06-22 PROCEDURE — 83880 ASSAY OF NATRIURETIC PEPTIDE: CPT

## 2022-06-22 NOTE — PROGRESS NOTES
Congestive Heart Failure [address]         Tyrone Ziegler   1965          Referring Provider: FRANCO RANDLE [facility] NP  Primary Care Physician:   DR. KNIGHT  Cardiologist: DR. Marcial Dunlap  Nephrologist:N/A      History of Present Illness:     Tyrone Ziegler is a 61 y.o. male with a history of HFrEF, most recent EF 30-35% ON 10/11/2019. Patient Story:    He does not have dyspnea with exertion, shortness of breath, or decline in overall functional capacity. He does  NOT have orthopnea, PND,and nocturnal cough. He does not have abdominal distention or bloating, early satiety, anorexia/change in appetite. He does have a good urinary response to oral diuretic. He does have lower extremity edema. He DOES HAVE lightheadedness, dizziness. He does not complain of palpitations,syncope. He does not complain of chest pain, pressure, discomfort. No Known Allergies        Prior to Visit Medications    Medication Sig Taking? Authorizing Provider   spironolactone (ALDACTONE) 25 MG tablet Take 25 mg by mouth daily  Historical Provider, MD   metoprolol succinate (TOPROL XL) 50 MG extended release tablet Take 50 mg by mouth daily  Historical Provider, MD   bumetanide (BUMEX) 1 MG tablet Take 1 tablet by mouth daily  PAUL Davis CNP   atorvastatin (LIPITOR) 80 MG tablet Take 1 tablet by mouth nightly  Anupam Krishna DO   tamsulosin (FLOMAX) 0.4 mg capsule Take 1 capsule by mouth daily  Anupam Krishna DO   sacubitril-valsartan (ENTRESTO)  MG per tablet Take 1 tablet by mouth 2 times daily  PAUL Davis CNP   diphenhydrAMINE (BENADRYL) 2 % cream Apply topically 3 times daily as needed.   Patient not taking: Reported on 6/7/2022  Pramod Soriano MD   clopidogrel (PLAVIX) 75 MG tablet Take 1 tablet by mouth daily  Pramod Soriano MD   pantoprazole (PROTONIX) 40 MG tablet Take 1 tablet by mouth daily  Pramod Soriano MD   fluticasone (FLONASE) 50 MCG/ACT nasal spray 2 sprays by Each Nostril route daily  Brenna Johnson MD   metFORMIN (GLUCOPHAGE) 500 MG tablet Take 1 tablet by mouth 2 times daily (with meals)  Brenna Johnson MD   isosorbide mononitrate (IMDUR) 30 MG extended release tablet Take 3 tablets by mouth daily  Tatyana Dunlap MD   aspirin 81 MG EC tablet Take 1 tablet by mouth daily  Tatyana Dunlap MD   sulfamethoxazole-trimethoprim (BACTRIM DS;SEPTRA DS) 800-160 MG per tablet Take 1 tablet by mouth 2 times daily for 28 days  Patient not taking: Reported on 6/22/2022  Tatyana Dunlap MD           Guideline directed medical:  ARNI/ACE I/ARB: Yes  Beta blocker:   Yes  Aldosterone antagonist:  YES      Physical Examination:     /62   Pulse 92   Resp 18   Wt 202 lb (91.6 kg)   SpO2 97%   BMI 29.83 kg/m²     Assessment  Charting Type: Shift assessment (chf clinic)    Neurological  Level of Consciousness: Alert (0)  Orientation Level: Oriented X4  Cognition: Appropriate attention/concentration,Appropriate safety awareness,Appropriate judgement,Appropriate for developmental age,Follows commands         HEENT (Head, Ears, Eyes, Nose, & Throat)  HEENT (WDL): Within Defined Limits    Respiratory  Respiratory Pattern: Regular  Respiratory Depth: Normal  Respiratory Quality/Effort: Unlabored  Chest Assessment: Chest expansion symmetrical  L Breath Sounds: Clear  R Breath Sounds: Clear              Cardiac  Cardiac Rhythm: Sinus rhythm    Rhythm Interpretation  Heart Rate: 92         Gastrointestinal  Abdominal (WDL): Within Defined Limits               Peripheral Vascular  Peripheral Vascular (WDL): Exceptions to WDL  Edema: Right lower extremity,Left lower extremity  RLE Edema: Trace,Pitting  LLE Edema: Trace,Pitting                                                 Heart Rate: 92                     LAB DATA:    Last 3 BMP      Sodium (mmol/L)   Date Value   06/16/2022 137   06/10/2022 137   05/31/2022 139     Potassium (mmol/L)   Date Value   06/16/2022 4.4   06/10/2022 5.1 (H)   05/31/2022 4.1     Potassium reflex Magnesium (mmol/L)   Date Value   01/23/2022 3.6   01/22/2022 3.8   10/11/2019 3.9     Chloride (mmol/L)   Date Value   06/16/2022 98   06/10/2022 99   05/31/2022 100     CO2 (mmol/L)   Date Value   06/16/2022 23   06/10/2022 23   05/31/2022 29     BUN (mg/dL)   Date Value   06/16/2022 48 (H)   06/10/2022 27 (H)   05/31/2022 22 (H)     Glucose (mg/dL)   Date Value   06/16/2022 173 (H)   06/10/2022 130 (H)   05/31/2022 153 (H)   04/23/2012 129 (H)   04/16/2012 163 (H)     Calcium (mg/dL)   Date Value   06/16/2022 9.3   06/10/2022 10.2   05/31/2022 9.0       Last 3 BNP       Pro-BNP (pg/mL)   Date Value   06/20/2022 1,010 (H)   06/16/2022 644 (H)   05/31/2022 1,004 (H)          CBC: No results for input(s): WBC, HGB, PLT in the last 72 hours. BMP:    No results for input(s): NA, K, CL, CO2, BUN, CREATININE, GLUCOSE in the last 72 hours. Hepatic: No results for input(s): AST, ALT, ALB, BILITOT, ALKPHOS in the last 72 hours. Troponin: No results for input(s): TROPONINI in the last 72 hours. BNP: No results for input(s): BNP in the last 72 hours. Lipids: No results for input(s): CHOL, HDL in the last 72 hours. Invalid input(s): LDLCALCU  INR: No results for input(s): INR in the last 72 hours. WEIGHTS:    Wt Readings from Last 3 Encounters:   06/22/22 202 lb (91.6 kg)   06/07/22 202 lb (91.6 kg)   05/31/22 204 lb (92.5 kg)         TELEMETRY:  Cardiac Regularity: Regular  Cardiac Rhythm/Interpretation: NSR        ASSESSMENT:  Raul Modi presents today for CHF clinic follow up. Weighs have been stable. Follow up appt made.     Interventions completed this visit:  IV diuretics given no  Lab work obtained yes, BNP/BMP   Reviewed currently prescribed medications with patient, educated on importance of compliance and answered any questions regarding their medication  Educated on signs and symptoms of HF  Educated on low sodium diet    PLAN:  Scheduled to follow up in CHF clinic on   Future Appointments   Date Time Provider Estuardo Arzate   7/12/2022  3:20 PM DO Т[place]на [facility]   7/13/2022 12:15 PM [facility] ROOM 1 [facility]   8/26/2022 10:00 PM [facility] BEDROOM 1 [facility] SLE[facility] [place] [facility]     Given clinic phone number and aware of signs and symptoms to call with any HF change in symptoms. Pt admits dizziness had stayed the same, no increase, and is tolerable. Denies any shortness of breath.

## 2022-06-27 DIAGNOSIS — Z76.0 MEDICATION REFILL: ICD-10-CM

## 2022-06-27 RX ORDER — ISOSORBIDE MONONITRATE 30 MG/1
TABLET, EXTENDED RELEASE ORAL
Qty: 270 TABLET | Refills: 0 | OUTPATIENT
Start: 2022-06-27

## 2022-07-11 ENCOUNTER — TELEPHONE (OUTPATIENT)
Dept: CARDIOLOGY CLINIC | Age: 57
End: 2022-07-11

## 2022-07-11 NOTE — TELEPHONE ENCOUNTER
Patient needs cardiac clearance for transrectal ultrasound-guided prostate biopsy with IV sedation by Dr. Misty Merchant (not yet scheduled). Patient denies any chest pain, shortness of breath or palpitations since last visit with Geneva Cox NP in March 2022. Patient is able to complete all activities of daily living, including; climbing one flight of stairs and walking one block without cardiac symptoms. Patient on Aspirin and Plavix. Please advise.

## 2022-07-12 ENCOUNTER — OFFICE VISIT (OUTPATIENT)
Dept: FAMILY MEDICINE CLINIC | Age: 57
End: 2022-07-12
Payer: MEDICARE

## 2022-07-12 VITALS
BODY MASS INDEX: 30.81 KG/M2 | RESPIRATION RATE: 22 BRPM | OXYGEN SATURATION: 96 % | DIASTOLIC BLOOD PRESSURE: 67 MMHG | WEIGHT: 208 LBS | HEIGHT: 69 IN | SYSTOLIC BLOOD PRESSURE: 110 MMHG | TEMPERATURE: 97.2 F | HEART RATE: 102 BPM

## 2022-07-12 DIAGNOSIS — Z23 NEED FOR IMMUNIZATION AGAINST VIRAL HEPATITIS: ICD-10-CM

## 2022-07-12 DIAGNOSIS — I50.22 CHRONIC SYSTOLIC CONGESTIVE HEART FAILURE (HCC): Primary | ICD-10-CM

## 2022-07-12 DIAGNOSIS — Z76.0 MEDICATION REFILL: ICD-10-CM

## 2022-07-12 DIAGNOSIS — Z23 NEED FOR PNEUMOCOCCAL VACCINATION: ICD-10-CM

## 2022-07-12 DIAGNOSIS — R97.20 ELEVATED PSA: ICD-10-CM

## 2022-07-12 DIAGNOSIS — E11.9 TYPE 2 DIABETES MELLITUS WITHOUT COMPLICATION, WITHOUT LONG-TERM CURRENT USE OF INSULIN (HCC): ICD-10-CM

## 2022-07-12 PROCEDURE — 99214 OFFICE O/P EST MOD 30 MIN: CPT | Performed by: FAMILY MEDICINE

## 2022-07-12 PROCEDURE — 90670 PCV13 VACCINE IM: CPT

## 2022-07-12 PROCEDURE — G0010 ADMIN HEPATITIS B VACCINE: HCPCS

## 2022-07-12 PROCEDURE — G0009 ADMIN PNEUMOCOCCAL VACCINE: HCPCS

## 2022-07-12 PROCEDURE — 3051F HG A1C>EQUAL 7.0%<8.0%: CPT | Performed by: FAMILY MEDICINE

## 2022-07-12 PROCEDURE — 90740 HEPB VACC 3 DOSE IMMUNSUP IM: CPT

## 2022-07-12 PROCEDURE — 90746 HEPB VACCINE 3 DOSE ADULT IM: CPT | Performed by: FAMILY MEDICINE

## 2022-07-12 PROCEDURE — 6360000002 HC RX W HCPCS

## 2022-07-12 PROCEDURE — 99212 OFFICE O/P EST SF 10 MIN: CPT | Performed by: FAMILY MEDICINE

## 2022-07-12 RX ORDER — PANTOPRAZOLE SODIUM 40 MG/1
40 TABLET, DELAYED RELEASE ORAL DAILY
Qty: 30 TABLET | Refills: 5 | Status: SHIPPED | OUTPATIENT
Start: 2022-07-12

## 2022-07-12 RX ORDER — CLOPIDOGREL BISULFATE 75 MG/1
75 TABLET ORAL DAILY
Qty: 30 TABLET | Refills: 5 | Status: SHIPPED | OUTPATIENT
Start: 2022-07-12

## 2022-07-12 RX ORDER — ISOSORBIDE MONONITRATE 30 MG/1
90 TABLET, EXTENDED RELEASE ORAL DAILY
Qty: 90 TABLET | Refills: 5 | Status: SHIPPED | OUTPATIENT
Start: 2022-07-12 | End: 2022-10-28

## 2022-07-12 RX ORDER — SPIRONOLACTONE 25 MG/1
25 TABLET ORAL DAILY
Qty: 30 TABLET | Refills: 5 | Status: SHIPPED | OUTPATIENT
Start: 2022-07-12

## 2022-07-12 RX ORDER — METOPROLOL SUCCINATE 50 MG/1
50 TABLET, EXTENDED RELEASE ORAL DAILY
Qty: 30 TABLET | Refills: 5 | Status: SHIPPED
Start: 2022-07-12 | End: 2022-10-28 | Stop reason: SDUPTHER

## 2022-07-12 SDOH — ECONOMIC STABILITY: TRANSPORTATION INSECURITY
IN THE PAST 12 MONTHS, HAS THE LACK OF TRANSPORTATION KEPT YOU FROM MEDICAL APPOINTMENTS OR FROM GETTING MEDICATIONS?: NO

## 2022-07-12 SDOH — ECONOMIC STABILITY: FOOD INSECURITY: WITHIN THE PAST 12 MONTHS, YOU WORRIED THAT YOUR FOOD WOULD RUN OUT BEFORE YOU GOT MONEY TO BUY MORE.: NEVER TRUE

## 2022-07-12 SDOH — ECONOMIC STABILITY: TRANSPORTATION INSECURITY
IN THE PAST 12 MONTHS, HAS LACK OF TRANSPORTATION KEPT YOU FROM MEETINGS, WORK, OR FROM GETTING THINGS NEEDED FOR DAILY LIVING?: NO

## 2022-07-12 SDOH — ECONOMIC STABILITY: FOOD INSECURITY: WITHIN THE PAST 12 MONTHS, THE FOOD YOU BOUGHT JUST DIDN'T LAST AND YOU DIDN'T HAVE MONEY TO GET MORE.: NEVER TRUE

## 2022-07-12 ASSESSMENT — SOCIAL DETERMINANTS OF HEALTH (SDOH): HOW HARD IS IT FOR YOU TO PAY FOR THE VERY BASICS LIKE FOOD, HOUSING, MEDICAL CARE, AND HEATING?: NOT HARD AT ALL

## 2022-07-12 NOTE — PATIENT INSTRUCTIONS
Gastroesophageal reflux disease (GERD) can cause symptoms of heartburn, indigestion, regurgitation of food particles or liquid, and cough. Several changes to your diet and lifestyle will help to improve your symptoms. First, avoid spicy and acidic foods whenever possible, including tomatoes and tomato sauces, citrus foods and juices, onions, mints, chocolate, and alcohol. Take smaller meals and smaller portion sizes at one time to avoid overfilling the stomach. Stay upright for at least 3 hours after eating before lying down. You may elevate the head of your bed frame on cinder blocks. Please let me know if your symptoms persist despite treatment.

## 2022-07-12 NOTE — PROGRESS NOTES
CC:  Follow up DM, HTN, HFrEF    HPI:  61 y.o. male presents for follow up. HFrEF, DM, HTN. Seeing HF clinic tomorrow. Able to walk up stairs and around the block. No CP or pressure. No shortness of breath. Feeling better, less dizzy. Eating too many burgers lately, couple of beers per week. Had an episode of three beers and 2 shots. Usually does not drink this much; knows to avoid this. Had been off EtOH for 6 months. Can do without it, he states, and will plan to cut out alcohol again. Trying to watch diet again. Had some salty meals, and weight is up a little. Sees HF clinic tomorrow. No SOB, no edema. Leg skin lesions have healed. Has sleep study scheduled in August.  No dizziness or lightheadedness. GERD. Needs refill of Protonix. Has been having some breakthrough symptoms, but usually this is related to a food that he knows will cause heartburn for him (tomato sauces, acidic foods). DM.  Sugars controlled overall, 125 when checked at home, no concerns or symptoms. No polyuria or polydipsia. Taking medications. Following with urology for prostate evaluation. Scheduling biopsy. Still with weak stream, but stable. No F/C. Has occasional lower abdominal/pelvic pains. Patient Active Problem List    Diagnosis Date Noted    Age-related nuclear cataract, bilateral 01/31/2022    Presbyopia 01/31/2022    Regular astigmatism, bilateral 01/31/2022    Leg swelling 01/23/2022    ALEX (acute kidney injury) ([address] 01/23/2022    CHF, acute on chronic ([address] 01/21/2022    Elevated PSA, less than 10 ng/ml 04/01/2021    Nonrheumatic aortic valve insufficiency 02/20/2019    Obesity (BMI 30.0-34. 9)     Ischemic cardiomyopathy     Type 2 diabetes mellitus without complication, without long-term current use of insulin ([address]     Essential hypertension 06/29/2017    Diverticulosis of colon 08/07/2015    History of MI (myocardial infarction) 08/07/2015    [facility] systolic [facility] failure [facility] 2015    NSVT [facility]onsustained ventricular tachycardia) [facility] 2015    Dual implantable cardioverter-defibrillator in situ 2015    CAD (coronary artery disease) 2014    Depression 12/10/2013    History of stroke 2013    ED (erectile dysfunction) 2012    COPD (chronic obstructive pulmonary disease)     Hyperlipemia     GERD (gastroesophageal reflux disease) 2011       Current Outpatient Medications on File Prior to Visit   Medication Sig Dispense Refill    spironolactone (ALDACTONE) 25 MG tablet Take 25 mg by mouth daily      metoprolol succinate (TOPROL XL) 50 MG extended release tablet Take 50 mg by mouth daily      bumetanide (BUMEX) 1 MG tablet Take 1 tablet by mouth daily 30 tablet 3    atorvastatin (LIPITOR) 80 MG tablet Take 1 tablet by mouth nightly 90 tablet 1    tamsulosin (FLOMAX) 0.4 mg capsule Take 1 capsule by mouth daily 90 capsule 1    sacubitril-valsartan (ENTRESTO)  MG per tablet Take 1 tablet by mouth 2 times daily 60 tablet 3    clopidogrel (PLAVIX) 75 MG tablet Take 1 tablet by mouth daily 90 tablet 0    pantoprazole (PROTONIX) 40 MG tablet Take 1 tablet by mouth daily 30 tablet 1    fluticasone (FLONASE) 50 MCG/ACT nasal spray 2 sprays by Each Nostril route daily 48 g 1    metFORMIN (GLUCOPHAGE) 500 MG tablet Take 1 tablet by mouth 2 times daily (with meals) 180 tablet 1    isosorbide mononitrate (IMDUR) 30 MG extended release tablet Take 3 tablets by mouth daily 270 tablet 0    aspirin 81 MG EC tablet Take 1 tablet by mouth daily 90 tablet 1     No current facility-administered medications on file prior to visit.        No Known Allergies    Social History     Tobacco Use    Smoking status: Former Smoker     Packs/day: 2.00     Years: 30.00     Pack years: 60.00     Types: Cigarettes     Start date: 0     Quit date: 2014     Years since quittin.1    Smokeless tobacco: Current User Types: Chew    Tobacco comment: occ chewing tobacco, trying to quit   Vaping Use    Vaping Use: Never used   Substance Use Topics    Alcohol use: Not Currently    Drug use: No       ROS:   Review of Systems - as above     Physical Exam:    VS:  Blood pressure 110/67, pulse (!) 102, temperature 97.2 °F (36.2 °C), temperature source Temporal, resp. rate 22, height 5' 9\" (1.753 m), weight 208 lb (94.3 kg), SpO2 96 %. General Appearance:  awake, alert, oriented, in no acute distress and well developed, well nourished  Head/face:  NCAT  Eyes:  EOMI and Sclera nonicteric  Neck:  neck- supple, no mass, non-tender  Lungs:  Normal expansion. Clear to auscultation. No rales, rhonchi, or wheezing. Respirations unlabored during exam.    Heart:  Heart regular rate and rhythm; rate improved to approximately 88 during auscultation   Abdomen:  Soft, NT   Extremities: pulses present in all extremities and trace pedal edema    Assessments:      Diagnosis Orders   1. Chronic systolic congestive heart failure (HCC)  sacubitril-valsartan (ENTRESTO)  MG per tablet   2. Type 2 diabetes mellitus without complication, without long-term current use of insulin (HCC)  Hep B, ENGERIX-B, (age 21 yrs+), IM, 1mL, 3-dose    Pneumococcal, PCV20, PREVNAR 20, (age 25 yrs+), IM, PF   3. Elevated PSA     4. Need for immunization against viral hepatitis  Hep B, ENGERIX-B, (age 21 yrs+), IM, 1mL, 3-dose   5. Need for pneumococcal vaccination  Pneumococcal, PCV20, PREVNAR 21, (age 25 yrs+), IM, PF   6. Medication refill  clopidogrel (PLAVIX) 75 MG tablet    pantoprazole (PROTONIX) 40 MG tablet    metFORMIN (GLUCOPHAGE) 500 MG tablet    isosorbide mononitrate (IMDUR) 30 MG extended release tablet       Plans:    As Above. Please see Patient Instructions for further counseling and information given. RTO 6-8 weeks, or sooner as needed for any new, persistent, or worsening symptoms. Hep B #1 and Prevnar 20 given today.   Discussed COVID booster; he and his GF are considering this. Same medications for now; refills provided. Follow up at HF clinic tomorrow; await labs. Counseled extensively on dietary and lifestyle modifications. GERD precautions given; advised on optimal use of PPI. Call if symptoms persist; would consider EGD. Advised extensively on avoiding excess sodium, such as restaurant foods, fast foods, deli meats. Encouraged non-starchy vegetables. He understands. Follow with specialists as advised. Please be adherent to the treatment plans discussed today, and please call with any questions or concerns, letting the office know of any reasons that the plans may not be followed. The risks of untreated conditions include worsening illness, injury, disability, and possibly, death. Please call if symptoms change in any way, worsen, or fail to completely resolve, as this could necessitate a change to treatment plans. Patient expressed understanding. Indications and proper use of medication(s) reviewed. Potential side-effects and risks of medication(s) also explained. Patient was instructed to call if any new symptoms develop prior to next visit.

## 2022-07-13 ENCOUNTER — HOSPITAL ENCOUNTER (OUTPATIENT)
Dept: OTHER | Age: 57
Setting detail: THERAPIES SERIES
Discharge: HOME | End: 2022-07-13
Payer: MEDICARE

## 2022-07-13 VITALS
SYSTOLIC BLOOD PRESSURE: 113 MMHG | DIASTOLIC BLOOD PRESSURE: 73 MMHG | HEART RATE: 91 BPM | OXYGEN SATURATION: 98 % | WEIGHT: 209 LBS | RESPIRATION RATE: 18 BRPM | BODY MASS INDEX: 30.86 KG/M2

## 2022-07-13 LAB
ANION GAP SERPL CALCULATED.3IONS-SCNC: 13 MMOL/L (ref 7–16)
BUN BLDV-MCNC: 28 MG/DL (ref 6–20)
CALCIUM SERPL-MCNC: 9.9 MG/DL (ref 8.6–10.2)
CHLORIDE BLD-SCNC: 102 MMOL/L (ref 98–107)
CO2: 28 MMOL/L (ref 22–29)
CREAT SERPL-MCNC: 1.4 MG/DL (ref 0.7–1.2)
GFR AFRICAN AMERICAN: >60
GFR NON-AFRICAN AMERICAN: 52 ML/MIN/1.73
GLUCOSE BLD-MCNC: 147 MG/DL (ref 74–99)
POTASSIUM SERPL-SCNC: 4.7 MMOL/L (ref 3.5–5)
PRO-BNP: 514 PG/ML (ref 0–125)
SODIUM BLD-SCNC: 143 MMOL/L (ref 132–146)

## 2022-07-13 PROCEDURE — 83880 ASSAY OF NATRIURETIC PEPTIDE: CPT

## 2022-07-13 PROCEDURE — 99214 OFFICE O/P EST MOD 30 MIN: CPT

## 2022-07-13 PROCEDURE — 36415 COLL VENOUS BLD VENIPUNCTURE: CPT

## 2022-07-13 PROCEDURE — 80048 BASIC METABOLIC PNL TOTAL CA: CPT

## 2022-07-13 NOTE — PROGRESS NOTES
Congestive Heart Failure [address]         Carmine Ewing   1965          Referring Provider: FRANCO RANDLE [facility] NP  Primary Care Physician:   DR. KNIGHT  Cardiologist: DR. Amy Sparks  Nephrologist:N/A      History of Present Illness:     Carmine Ewing is a 61 y.o. male with a history of HFrEF, most recent EF 30-35% ON 10/11/2019. Patient Story:    He does not have dyspnea with exertion, shortness of breath, or decline in overall functional capacity. He does  NOT have orthopnea, PND,and nocturnal cough. He does not have abdominal distention or bloating, early satiety, anorexia/change in appetite. He does have a good urinary response to oral diuretic. He does not have lower extremity edema. He DOES HAVE lightheadedness, dizziness, but states its getting better. He does not complain of palpitations,syncope. He does not complain of chest pain, pressure, discomfort. No Known Allergies        Prior to Visit Medications    Medication Sig Taking?  Authorizing Provider   spironolactone (ALDACTONE) 25 MG tablet Take 1 tablet by mouth daily  Rebecca Crockett, DO   metoprolol succinate (TOPROL XL) 50 MG extended release tablet Take 1 tablet by mouth daily  Rebceca Crockett, DO   sacubitril-valsartan (ENTRESTO)  MG per tablet Take 1 tablet by mouth 2 times daily  Rebecca Crockett, DO   clopidogrel (PLAVIX) 75 MG tablet Take 1 tablet by mouth daily  Rebecca Crockett, DO   pantoprazole (PROTONIX) 40 MG tablet Take 1 tablet by mouth daily  Rebecca Crockett, DO   metFORMIN (GLUCOPHAGE) 500 MG tablet Take 1 tablet by mouth 2 times daily (with meals)  Rebecca Crockett, DO   isosorbide mononitrate (IMDUR) 30 MG extended release tablet Take 3 tablets by mouth daily  Patient taking differently: Take 30 mg by mouth in the morning, at noon, and at bedtime   Rebecca Crockett, DO   bumetanide (BUMEX) 1 MG tablet Take 1 tablet by mouth daily  Yue Aly, APRN - CNP   atorvastatin (LIPITOR) 80 MG tablet Take 1 tablet by mouth nightly  Debbie Carty, DO   tamsulosin (FLOMAX) 0.4 mg capsule Take 1 capsule by mouth daily  Mariaelena Gordon DO   fluticasone (FLONASE) 50 MCG/ACT nasal spray 2 sprays by Each Nostril route daily  Brenna Espinal MD   aspirin 81 MG EC tablet Take 1 tablet by mouth daily  Feliz Watts MD           Guideline directed medical:  ARNI/ACE I/ARB: Yes  Beta blocker:   Yes  Aldosterone antagonist:  YES      Physical Examination:     /73   Pulse 91   Resp 18   Wt 209 lb (94.8 kg)   SpO2 98%   BMI 30.86 kg/m²     Assessment  Charting Type: Shift assessment (chf clinic)    Neurological  Level of Consciousness: Alert (0)  Orientation Level: Oriented X4  Cognition: Appropriate attention/concentration,Appropriate safety awareness,Appropriate judgement,Appropriate for developmental age,Follows commands         HEENT (Head, Ears, Eyes, Nose, & Throat)  HEENT (WDL): Within Defined Limits    Respiratory  Respiratory Pattern: Regular  Respiratory Depth: Normal  Respiratory Quality/Effort: Unlabored  Chest Assessment: Chest expansion symmetrical  L Breath Sounds: Clear  R Breath Sounds: Clear              Cardiac  Cardiac Rhythm: Sinus rhythm    Rhythm Interpretation  Heart Rate: 91         Gastrointestinal  Abdominal (WDL): Within Defined Limits               Peripheral Vascular  Peripheral Vascular (WDL): Exceptions to WDL  Edema: Right lower extremity,Left lower extremity  RLE Edema: None  LLE Edema: None                                                 Heart Rate: 91                     LAB DATA:    Last 3 BMP      Sodium (mmol/L)   Date Value   06/22/2022 139   06/16/2022 137   06/10/2022 137     Potassium (mmol/L)   Date Value   06/22/2022 4.1   06/16/2022 4.4   06/10/2022 5.1 (H)     Potassium reflex Magnesium (mmol/L)   Date Value   01/23/2022 3.6   01/22/2022 3.8   10/11/2019 3.9     Chloride (mmol/L)   Date Value   06/22/2022 99   06/16/2022 98   06/10/2022 99     CO2 (mmol/L) Date Value   06/22/2022 25   06/16/2022 23   06/10/2022 23     BUN (mg/dL)   Date Value   06/22/2022 25 (H)   06/16/2022 48 (H)   06/10/2022 27 (H)     Glucose (mg/dL)   Date Value   06/22/2022 129 (H)   06/16/2022 173 (H)   06/10/2022 130 (H)   04/23/2012 129 (H)   04/16/2012 163 (H)     Calcium (mg/dL)   Date Value   06/22/2022 9.4   06/16/2022 9.3   06/10/2022 10.2       Last 3 BNP       Pro-BNP (pg/mL)   Date Value   06/22/2022 728 (H)   06/20/2022 1,010 (H)   06/16/2022 644 (H)          CBC: No results for input(s): WBC, HGB, PLT in the last 72 hours. BMP:    No results for input(s): NA, K, CL, CO2, BUN, CREATININE, GLUCOSE in the last 72 hours. Hepatic: No results for input(s): AST, ALT, ALB, BILITOT, ALKPHOS in the last 72 hours. Troponin: No results for input(s): TROPONINI in the last 72 hours. BNP: No results for input(s): BNP in the last 72 hours. Lipids: No results for input(s): CHOL, HDL in the last 72 hours. Invalid input(s): LDLCALCU  INR: No results for input(s): INR in the last 72 hours. WEIGHTS:    Wt Readings from Last 3 Encounters:   07/13/22 209 lb (94.8 kg)   07/12/22 208 lb (94.3 kg)   06/22/22 202 lb (91.6 kg)         TELEMETRY:  Cardiac Regularity: Regular  Cardiac Rhythm/Interpretation: NSR        ASSESSMENT:  Gifty Blakely presents today for CHF clinic follow up. Weighs have been stable, his weight is up 7lb from his 6/22 visit. He does admit to eating out at fastfood and not eating much at home due to cost. Follow up appt made.     Interventions completed this visit:  IV diuretics given no  Lab work obtained yes, BNP/BMP   Reviewed currently prescribed medications with patient, educated on importance of compliance and answered any questions regarding their medication  Educated on signs and symptoms of HF  Educated on low sodium diet    PLAN:  Scheduled to follow up in CHF clinic on   Future Appointments   Date Time Provider Estuardo Arzate   8/13/2022 12:30 PM [facility] ROOM 1 [address] [facility]   8/26/2022 10:00 PM [facility] SLEEP LAB BEDROOM 1 SE[facility] SLEEP [place] HOD   9/6/2022  2:40 PM Tisha Hatchet, DO Lamarr Reining [facility] AND [facility]   9/12/2022  9:40 AM Tisha Hatchet, DO Lamarr Reining [facility]     Given clinic phone number and aware of signs and symptoms to call with any HF change in symptoms. Pt admits dizziness had stayed the same, no increase, and is tolerable. Denies any shortness of breath.

## 2022-07-14 NOTE — TELEPHONE ENCOUNTER
Patient notified of Dr. Abrahma Daily risk assessment/recommendation for biopsy. Patient states he was advised to hold Plavix (3-7) days prior. Please advise.

## 2022-07-14 NOTE — TELEPHONE ENCOUNTER
Patient notified of Dr. Moose Avila recommendations. Note faxed to Dr. Robert Villavicencio [phone].

## 2022-07-14 NOTE — TELEPHONE ENCOUNTER
He is low risk for perioperative ischemic cardiac events. I recommend being cautious with perioperative fluids to avoid perioperative CHF. No preop cardiac testing is needed. Continue aspirin perioperatively.

## 2022-08-02 ENCOUNTER — PREP FOR PROCEDURE (OUTPATIENT)
Dept: UROLOGY | Age: 57
End: 2022-08-02

## 2022-08-02 RX ORDER — SODIUM CHLORIDE 0.9 % (FLUSH) 0.9 %
5-40 SYRINGE (ML) INJECTION PRN
Status: CANCELLED | OUTPATIENT
Start: 2022-08-02

## 2022-08-02 RX ORDER — SODIUM CHLORIDE 0.9 % (FLUSH) 0.9 %
5-40 SYRINGE (ML) INJECTION EVERY 12 HOURS SCHEDULED
Status: CANCELLED | OUTPATIENT
Start: 2022-08-02

## 2022-08-02 RX ORDER — SODIUM CHLORIDE 9 MG/ML
INJECTION, SOLUTION INTRAVENOUS PRN
Status: CANCELLED | OUTPATIENT
Start: 2022-08-02

## 2022-08-02 RX ORDER — SODIUM CHLORIDE 9 MG/ML
INJECTION, SOLUTION INTRAVENOUS CONTINUOUS
Status: CANCELLED | OUTPATIENT
Start: 2022-08-02

## 2022-08-03 ENCOUNTER — ANESTHESIA EVENT (OUTPATIENT)
Dept: OPERATING ROOM | Age: 57
End: 2022-08-03
Payer: MEDICARE

## 2022-08-04 ENCOUNTER — ANESTHESIA (OUTPATIENT)
Dept: OPERATING ROOM | Age: 57
End: 2022-08-04
Payer: MEDICARE

## 2022-08-04 ENCOUNTER — HOSPITAL ENCOUNTER (OUTPATIENT)
Age: 57
Setting detail: OUTPATIENT SURGERY
Discharge: HOME | End: 2022-08-04
Attending: UROLOGY | Admitting: UROLOGY
Payer: MEDICARE

## 2022-08-04 VITALS
HEART RATE: 72 BPM | TEMPERATURE: 97.2 F | DIASTOLIC BLOOD PRESSURE: 60 MMHG | WEIGHT: 209 LBS | OXYGEN SATURATION: 95 % | SYSTOLIC BLOOD PRESSURE: 112 MMHG | HEIGHT: 69 IN | RESPIRATION RATE: 20 BRPM | BODY MASS INDEX: 30.96 KG/M2

## 2022-08-04 DIAGNOSIS — R97.20 ELEVATED PROSTATE SPECIFIC ANTIGEN (PSA): ICD-10-CM

## 2022-08-04 DIAGNOSIS — Z01.812 PRE-OPERATIVE LABORATORY EXAMINATION: Primary | ICD-10-CM

## 2022-08-04 DIAGNOSIS — Z76.0 MEDICATION REFILL: ICD-10-CM

## 2022-08-04 LAB — METER GLUCOSE: 124 MG/DL (ref 74–99)

## 2022-08-04 PROCEDURE — 2500000003 HC RX 250 WO HCPCS: Performed by: UROLOGY

## 2022-08-04 PROCEDURE — 6360000002 HC RX W HCPCS: Performed by: NURSE PRACTITIONER

## 2022-08-04 PROCEDURE — 82962 GLUCOSE BLOOD TEST: CPT

## 2022-08-04 PROCEDURE — 3700000001 HC ADD 15 MINUTES (ANESTHESIA): Performed by: UROLOGY

## 2022-08-04 PROCEDURE — 6360000002 HC RX W HCPCS

## 2022-08-04 PROCEDURE — 88305 TISSUE EXAM BY PATHOLOGIST: CPT

## 2022-08-04 PROCEDURE — 88342 IMHCHEM/IMCYTCHM 1ST ANTB: CPT

## 2022-08-04 PROCEDURE — 2580000003 HC RX 258: Performed by: NURSE PRACTITIONER

## 2022-08-04 PROCEDURE — 3600000002 HC SURGERY LEVEL 2 BASE: Performed by: UROLOGY

## 2022-08-04 PROCEDURE — 3600000012 HC SURGERY LEVEL 2 ADDTL 15MIN: Performed by: UROLOGY

## 2022-08-04 PROCEDURE — 7100000010 HC PHASE II RECOVERY - FIRST 15 MIN: Performed by: UROLOGY

## 2022-08-04 PROCEDURE — 7100000011 HC PHASE II RECOVERY - ADDTL 15 MIN: Performed by: UROLOGY

## 2022-08-04 PROCEDURE — 2709999900 HC NON-CHARGEABLE SUPPLY: Performed by: UROLOGY

## 2022-08-04 PROCEDURE — 3700000000 HC ANESTHESIA ATTENDED CARE: Performed by: UROLOGY

## 2022-08-04 RX ORDER — SODIUM CHLORIDE 0.9 % (FLUSH) 0.9 %
5-40 SYRINGE (ML) INJECTION EVERY 12 HOURS SCHEDULED
Status: CANCELLED | OUTPATIENT
Start: 2022-08-04

## 2022-08-04 RX ORDER — LABETALOL HYDROCHLORIDE 5 MG/ML
5 INJECTION, SOLUTION INTRAVENOUS
Status: CANCELLED | OUTPATIENT
Start: 2022-08-04

## 2022-08-04 RX ORDER — DROPERIDOL 2.5 MG/ML
0.62 INJECTION, SOLUTION INTRAMUSCULAR; INTRAVENOUS
Status: CANCELLED | OUTPATIENT
Start: 2022-08-04 | End: 2022-08-04

## 2022-08-04 RX ORDER — ACETAMINOPHEN 325 MG/1
650 TABLET ORAL
Status: CANCELLED | OUTPATIENT
Start: 2022-08-04 | End: 2022-08-04

## 2022-08-04 RX ORDER — SODIUM CHLORIDE 0.9 % (FLUSH) 0.9 %
5-40 SYRINGE (ML) INJECTION EVERY 12 HOURS SCHEDULED
Status: DISCONTINUED | OUTPATIENT
Start: 2022-08-04 | End: 2022-08-04 | Stop reason: HOSPADM

## 2022-08-04 RX ORDER — SODIUM CHLORIDE 9 MG/ML
INJECTION, SOLUTION INTRAVENOUS CONTINUOUS
Status: DISCONTINUED | OUTPATIENT
Start: 2022-08-04 | End: 2022-08-04 | Stop reason: HOSPADM

## 2022-08-04 RX ORDER — ONDANSETRON 2 MG/ML
4 INJECTION INTRAMUSCULAR; INTRAVENOUS EVERY 6 HOURS PRN
Status: DISCONTINUED | OUTPATIENT
Start: 2022-08-04 | End: 2022-08-04 | Stop reason: HOSPADM

## 2022-08-04 RX ORDER — SODIUM CHLORIDE 0.9 % (FLUSH) 0.9 %
5-40 SYRINGE (ML) INJECTION PRN
Status: DISCONTINUED | OUTPATIENT
Start: 2022-08-04 | End: 2022-08-04 | Stop reason: HOSPADM

## 2022-08-04 RX ORDER — TRAMADOL HYDROCHLORIDE 50 MG/1
50 TABLET ORAL
Status: CANCELLED | OUTPATIENT
Start: 2022-08-04 | End: 2022-08-04

## 2022-08-04 RX ORDER — SODIUM CHLORIDE 9 MG/ML
INJECTION, SOLUTION INTRAVENOUS PRN
Status: DISCONTINUED | OUTPATIENT
Start: 2022-08-04 | End: 2022-08-04 | Stop reason: HOSPADM

## 2022-08-04 RX ORDER — ONDANSETRON 2 MG/ML
4 INJECTION INTRAMUSCULAR; INTRAVENOUS
Status: CANCELLED | OUTPATIENT
Start: 2022-08-04 | End: 2022-08-04

## 2022-08-04 RX ORDER — MIDAZOLAM HYDROCHLORIDE 2 MG/2ML
2 INJECTION, SOLUTION INTRAMUSCULAR; INTRAVENOUS
Status: CANCELLED | OUTPATIENT
Start: 2022-08-04 | End: 2022-08-04

## 2022-08-04 RX ORDER — IPRATROPIUM BROMIDE AND ALBUTEROL SULFATE 2.5; .5 MG/3ML; MG/3ML
1 SOLUTION RESPIRATORY (INHALATION)
Status: CANCELLED | OUTPATIENT
Start: 2022-08-04 | End: 2022-08-04

## 2022-08-04 RX ORDER — HYDRALAZINE HYDROCHLORIDE 20 MG/ML
5 INJECTION INTRAMUSCULAR; INTRAVENOUS
Status: CANCELLED | OUTPATIENT
Start: 2022-08-04

## 2022-08-04 RX ORDER — SODIUM CHLORIDE 0.9 % (FLUSH) 0.9 %
5-40 SYRINGE (ML) INJECTION PRN
Status: CANCELLED | OUTPATIENT
Start: 2022-08-04

## 2022-08-04 RX ORDER — DIPHENHYDRAMINE HYDROCHLORIDE 50 MG/ML
12.5 INJECTION INTRAMUSCULAR; INTRAVENOUS
Status: CANCELLED | OUTPATIENT
Start: 2022-08-04 | End: 2022-08-04

## 2022-08-04 RX ORDER — FENTANYL CITRATE 50 UG/ML
INJECTION, SOLUTION INTRAMUSCULAR; INTRAVENOUS PRN
Status: DISCONTINUED | OUTPATIENT
Start: 2022-08-04 | End: 2022-08-04 | Stop reason: SDUPTHER

## 2022-08-04 RX ORDER — SODIUM CHLORIDE 9 MG/ML
25 INJECTION, SOLUTION INTRAVENOUS PRN
Status: CANCELLED | OUTPATIENT
Start: 2022-08-04

## 2022-08-04 RX ORDER — BUPIVACAINE HYDROCHLORIDE 5 MG/ML
INJECTION, SOLUTION EPIDURAL; INTRACAUDAL PRN
Status: DISCONTINUED | OUTPATIENT
Start: 2022-08-04 | End: 2022-08-04 | Stop reason: ALTCHOICE

## 2022-08-04 RX ORDER — PROPOFOL 10 MG/ML
INJECTION, EMULSION INTRAVENOUS PRN
Status: DISCONTINUED | OUTPATIENT
Start: 2022-08-04 | End: 2022-08-04 | Stop reason: SDUPTHER

## 2022-08-04 RX ORDER — MIDAZOLAM HYDROCHLORIDE 1 MG/ML
INJECTION INTRAMUSCULAR; INTRAVENOUS PRN
Status: DISCONTINUED | OUTPATIENT
Start: 2022-08-04 | End: 2022-08-04 | Stop reason: SDUPTHER

## 2022-08-04 RX ORDER — OXYCODONE HYDROCHLORIDE AND ACETAMINOPHEN 5; 325 MG/1; MG/1
1 TABLET ORAL EVERY 6 HOURS PRN
Status: DISCONTINUED | OUTPATIENT
Start: 2022-08-04 | End: 2022-08-04 | Stop reason: HOSPADM

## 2022-08-04 RX ADMIN — SODIUM CHLORIDE: 9 INJECTION, SOLUTION INTRAVENOUS at 13:20

## 2022-08-04 RX ADMIN — PHENYLEPHRINE HYDROCHLORIDE 100 MCG: 10 INJECTION INTRAVENOUS at 13:42

## 2022-08-04 RX ADMIN — PROPOFOL 50 MG: 10 INJECTION, EMULSION INTRAVENOUS at 13:40

## 2022-08-04 RX ADMIN — CEFAZOLIN 2000 MG: 2 INJECTION, POWDER, FOR SOLUTION INTRAMUSCULAR; INTRAVENOUS at 13:29

## 2022-08-04 RX ADMIN — MIDAZOLAM 2 MG: 1 INJECTION INTRAMUSCULAR; INTRAVENOUS at 13:21

## 2022-08-04 RX ADMIN — SODIUM CHLORIDE: 9 INJECTION, SOLUTION INTRAVENOUS at 12:29

## 2022-08-04 RX ADMIN — GENTAMICIN SULFATE 120 MG: 40 INJECTION, SOLUTION INTRAMUSCULAR; INTRAVENOUS at 13:22

## 2022-08-04 RX ADMIN — FENTANYL CITRATE 20 MCG: 50 INJECTION, SOLUTION INTRAMUSCULAR; INTRAVENOUS at 13:36

## 2022-08-04 RX ADMIN — PROPOFOL 75 MG: 10 INJECTION, EMULSION INTRAVENOUS at 13:34

## 2022-08-04 RX ADMIN — FENTANYL CITRATE 20 MCG: 50 INJECTION, SOLUTION INTRAMUSCULAR; INTRAVENOUS at 13:32

## 2022-08-04 RX ADMIN — PHENYLEPHRINE HYDROCHLORIDE 100 MCG: 10 INJECTION INTRAVENOUS at 13:34

## 2022-08-04 ASSESSMENT — PAIN - FUNCTIONAL ASSESSMENT: PAIN_FUNCTIONAL_ASSESSMENT: 0-10

## 2022-08-04 NOTE — ANESTHESIA POSTPROCEDURE EVALUATION
Department of Anesthesiology  Postprocedure Note    Patient: Bj Del Castillo  MRN: [id number]  YOB: 1965  Date of evaluation: 8/4/2022      Procedure Summary     Date: 08/04/22 Room / Location: Krist[facility] Bowers OR 11 / [facility]    Anesthesia Start: 8820 Anesthesia Stop: 5238    Procedure: PROSTATE BIOPSY TRANSRECATAL [place] LASER ([id number] QPND) (Rectum) Diagnosis:       Elevated prostate specific antigen (PSA)      (ELEVATED PROSTATE)    Surgeons: Carlos Segovia MD Responsible Provider: Jayce Jaquez MD    Anesthesia Type: MAC ASA Status: 3          Anesthesia Type: No value filed.     Yovani Phase I: Yovani Score: 10    Yovani Phase II:        Anesthesia Post Evaluation    Patient location during evaluation: PACU  Patient participation: complete - patient participated  Level of consciousness: awake  Pain score: 0  Airway patency: patent  Nausea & Vomiting: no nausea and no vomiting  Complications: no  Cardiovascular status: hemodynamically stable and blood pressure returned to baseline  Respiratory status: acceptable, room air, spontaneous ventilation and nonlabored ventilation  Hydration status: euvolemic

## 2022-08-04 NOTE — PROGRESS NOTES
28422 Nargis Chang for patient to take Entresto am of surgery per Dr. Gamez Files.
Friend at bedside.  Patient sitting up drinking coffee and eating crackers
Left the following message for patient: Surgery scheduled for 8/4 time has been changed to 2:11 pm will need to arrive at 12:10 pm. Please call if any questions. 8/3 10:39 patient returned the call. Informed patient of the above he verbalized understanding.
variables that are involved in patient preparation. Your patience is greatly appreciated as you wait for your nurse. Please bring in items such as: books, magazines, newspapers, electronics, or any other items  to occupy your time in the waiting area. [x]  Delays may occur with surgery and staff will make a sincere effort to keep you informed of delays. If any delays occur with your procedure, we apologize ahead of time for your inconvenience as we recognize the value of your time.

## 2022-08-04 NOTE — BRIEF OP NOTE
Brief Postoperative Note      Patient: Bj Del Castillo  YOB: 1965  MRN: [id number]    Date of Procedure: 8/4/2022    Pre-Op Diagnosis: ELEVATED PROSTATE and suspected prostatic nodule right    Post-Op Diagnosis: Same       Procedure(s):  PROSTATE BIOPSY TRANSRECATAL [place] LASER ([id number])    Surgeon(s):  Carlos Segovia MD    Assistant:  None    Anesthesia: Monitor Anesthesia Care and Marcaine point 5 plain 2 cc in each side of the prostate at the seminal vesicle prostatic junction    Estimated Blood Loss (mL): Less than 10 cc    Complications: None    Specimens:   ID Type Source Tests Collected by Time Destination   A : LEFT BASE NEEDLE BIOPSY OF PROSTATE Tissue Tissue [facility] MD Shayne 8/4/2022 1348    B : LEFT MID NEEDLE BIOPSY OF PROSTATE Tissue Tissue SURGICAL PATHOLOGY Claudio Bella MD 8/4/2022 1347    C : LEFT APEX NEEDLE BIOPSY OF PROSTATE Tissue Tissue SURGICAL PATHOLOGY Claudio Bella MD 8/4/2022 1346    D : RIGHT BASE NEEDLE BIOPSY OF PROSTATE Tissue Tissue SURGICAL PATHOLOGY Claudio Bella MD 8/4/2022 1346    E : RIGHT MID NEEDLE BIOPSY OF PROSTATE Tissue Tissue SURGICAL PATHOLOGY Claudio Bella MD 8/4/2022 1345    F : RIGHT APEX NEEDLE BIOPSY OF PROSTATE Tissue Tissue SURGICAL PATHOLOGY Claudio Bella MD 8/4/2022 1345        Implants:    None  Drains: none    Findings: 2 biopsies each of 6 sectors +3 additional biopsies hypoechoic area on the right involving the entire length of the gland    Electronically signed by Carlos Segovia MD on 8/4/2022 at 2:01 PM

## 2022-08-04 NOTE — DISCHARGE INSTRUCTIONS
You may see blood in urine. You may have burning with urination  Drink water  Stay well hydrated  Call if difficulty voiding/fever or chills/severe  Abdominal pain  We will call you for follow-up appointment if you do not hear from us in the next 2 days please call us office number [phone]  No driving today  You may shower tomorrow   Resume your aspirin in 2 days. Continue your isosorbide which was listed as a discontinued medicine in the chart it was discontinued by mistake . continue the isosorbide as you have been taking at home

## 2022-08-04 NOTE — ANESTHESIA PRE PROCEDURE
Department of Anesthesiology  Preprocedure Note       Name:  Segundo Galaviz   Age:  61 y.o.  :  1965                                          MRN:  [id number]         Date:  2022      Surgeon: Ely Kilpatrick):  Costa Patterson MD    Procedure: Procedure(s):  PROSTATE BIOPSY TRANSRECATAL Chris Saúl ([id number] [facility])    Medications prior to admission:   Prior to Admission medications    Medication Sig Start Date End Date Taking?  Authorizing Provider   spironolactone (ALDACTONE) 25 MG tablet Take 1 tablet by mouth daily 22   Virg, DO   metoprolol succinate (TOPROL XL) 50 MG extended release tablet Take 1 tablet by mouth daily 22   [facility] , DO   sacubitril-valsartan (ENTRESTO)  MG per tablet Take 1 tablet by mouth 2 times daily 22   Virg, DO   clopidogrel (PLAVIX) 75 MG tablet Take 1 tablet by mouth daily 22   Virg, DO   pantoprazole (PROTONIX) 40 MG tablet Take 1 tablet by mouth daily 22   Virg, DO   metFORMIN (GLUCOPHAGE) 500 MG tablet Take 1 tablet by mouth 2 times daily (with meals) 22   [facility] , DO   isosorbide mononitrate (IMDUR) 30 MG extended release tablet Take 3 tablets by mouth daily  Patient taking differently: Take 30 mg by mouth in the morning, at noon, and at bedtime 7/12/22 10/10/22  Virg, DO   bumetanide (BUMEX) 1 MG tablet Take 1 tablet by mouth daily 22   Yue Aly APRN - CNP   atorvastatin (LIPITOR) 80 MG tablet Take 1 tablet by mouth nightly 22   [facility] , DO   tamsulosin [facility]) 0.4 mg capsule Take 1 capsule by mouth daily 22   [facility], DO   fluticasone (FLONASE) 50 MCG/ACT nasal spray 2 sprays by Each Nostril route daily 22   Smith Correa MD   aspirin 81 MG EC tablet Take 1 tablet by mouth daily 22  Smith Correa MD       Current medications:    Current Facility-Administered Medications   Medication Dose Route Frequency Provider Last Rate Last Admin    0.9 % sodium chloride infusion   IntraVENous Continuous PAUL Kruse - CNP        sodium chloride flush 0.9 % injection 5-40 mL  5-40 mL IntraVENous 2 times per day Patti Singh APRN - CNP        sodium chloride flush 0.9 % injection 5-40 mL  5-40 mL IntraVENous PRN Patti Singh APRN - CNP        0.9 % sodium chloride infusion   IntraVENous PRN Patti Singh APRN - CNP 75 mL/hr at 08/04/22 1229 New Bag at 08/04/22 1229    ceFAZolin (ANCEF) 2,000 mg in sterile water 20 mL IV syringe  2,000 mg IntraVENous On Call to [address], APRN - CNP        gentamicin (GARAMYCIN) 120 mg in dextrose 5 % 100 mL IVPB  120 mg IntraVENous On Call to [address], APRN - CNP           Allergies:  No Known Allergies    Problem List:    Patient Active Problem List   Diagnosis Code    GERD (gastroesophageal reflux disease) K21.9    COPD (chronic obstructive pulmonary disease) J44.9    Hyperlipemia E78.5    ED (erectile dysfunction) N52.9    History of stroke Z86.73    Depression F32. A    CAD (coronary artery disease) I25.10    Dual implantable cardioverter-defibrillator in situ Z95.810    NSVT (nonsustained ventricular tachycardia) ([facility]) I47.2    Diverticulosis of colon K57.30    History of MI (myocardial infarction) I25.2    Chronic systolic congestive heart failure ([facility]) I50.22    Essential hypertension I10    Type 2 diabetes mellitus without complication, without long-term current use of insulin ([facility]) E11.9    Obesity (BMI 30.0-34. 9) E66.9    Ischemic cardiomyopathy I25.5    Nonrheumatic aortic valve insufficiency I35.1    Elevated PSA, less than 10 ng/ml R97.20    CHF, acute on chronic ([facility]) I50.9    Leg swelling M79.89    ALEX (acute kidney injury) ([facility] N17.9    Age-related nuclear cataract, bilateral H25.13    Presbyopia H52.4    Regular astigmatism, bilateral H52.223       Past Medical History:        Diagnosis Date    Allergic rhinitis     Anticoagulant long-term use     Anxiety     Atypical angina (HCC)     CAD (coronary artery disease)     Carotid artery stenosis     Cerebral artery occlusion with cerebral infarction [facility]) 2012    CHF (congestive heart failure) ([facility])     COPD (chronic obstructive pulmonary disease) [facility]     CVA (cerebral infarction) 11/19/2012    Depression     Diverticulitis 2010    Sigmoid abscess, s/p colectomy    Erectile dysfunction     GERD (gastroesophageal reflux disease)     Heart attack ([facility] 2014    Hx of blood clots     Hyperlipemia     Hypertension     Ischemic cardiomyopathy     Left atrial thrombus     Obesity     Type 2 diabetes mellitus without complication, without long-term current use of insulin ([facility])     Type II or unspecified type diabetes mellitus without mention of complication, not stated as uncontrolled        Past Surgical History:        Procedure Laterality Date    ABDOMEN SURGERY      CARDIAC CATHETERIZATION  3/30/2015    EF 35%, Dr. Nan Reddy, [address]  4/1/2015    left chest, Dual [employer], Dr. Raghav Mtz, [facility]    COLECTOMY  1/29/2010    laparoscopic, converted to open sigmoid colon resection (diverticular disease), Dr. Santhosh Liu, [address] [place] [address] COLONOSCOPY  12/5/2005    diverticulosis, Dr. Santhosh Liu, [address] [place] [address] COLONOSCOPY  9/4/2008    diverticulosis, Dr. Santhosh Liu, [address] [place] [address]  1/28/2010    prior to colon surgery, diverticulosis, Dr. Santhosh Liu, [address] [place] [address]  08/07/2015    sigmoid diverticulosis, rectal polyp bx/cauterized (hyperplastic polyp), Dr. Edgar Dumont, [facility]    CORONARY ANGIOPLASTY WITH STENT PLACEMENT  12/6/2014    LAD PROX [place] 3.5 x 33, Dr. Thania Villarreal, [address] [place] [address] CYSTOURETHROSCOPY  1/29/2010    placement bilateral ureteral stents for colon resection, Dr. Chay Rodriguez, [facility]    PACEMAKER PLACEMENT      pacer defib    UPPER GASTROINTESTINAL ENDOSCOPY 2015    severe duodenitis, mild to moderate gastritis (bx for H pylori), submucosal benign mass body stomach, mild reflux esophagitis, Dr. Ivan George, [facility]    VASCULAR SURGERY         Social History:    Social History     Tobacco Use    Smoking status: Former     Packs/day: 2.00     Years: 30.00     Pack years: 60.00     Types: Cigarettes     Start date: 0     Quit date: 2014     Years since quittin.1    Smokeless tobacco: Current     Types: Chew    Tobacco comments:     occ chewing tobacco, trying to quit   Substance Use Topics    Alcohol use: Not Currently                                Ready to quit: Not Answered  Counseling given: Not Answered  Tobacco comments: occ chewing tobacco, trying to quit      Vital Signs (Current):   Vitals:    22 1125 22 1210   BP:  113/77   Pulse:  77   Resp:  18   Temp:  36.3 °C (97.4 °F)   TempSrc:  Temporal   SpO2:  96%   Weight: 207 lb (93.9 kg) 209 lb (94.8 kg)   Height: 5' 9\" (1.753 m) 5' 9\" (1.753 m)                                              BP Readings from Last 3 Encounters:   22 113/77   22 113/73   22 110/67       NPO Status: Time of last liquid consumption:                         Time of last solid consumption:                         Date of last liquid consumption: 22                        Date of last solid food consumption: 22    BMI:   Wt Readings from Last 3 Encounters:   22 209 lb (94.8 kg)   22 209 lb (94.8 kg)   22 208 lb (94.3 kg)     Body mass index is 30.86 kg/m².     CBC:   Lab Results   Component Value Date/Time    WBC 5.6 06/10/2022 12:55 PM    RBC 7.22 06/10/2022 12:55 PM    HGB 17.3 06/10/2022 12:55 PM    HCT 56.9 06/10/2022 12:55 PM    MCV 78.8 06/10/2022 12:55 PM    RDW 23.5 06/10/2022 12:55 PM     06/10/2022 12:55 PM       CMP:   Lab Results   Component Value Date/Time     2022 12:30 PM    K 4.7 2022 12:30 PM    K 3.6 2022 04:23 AM  07/13/2022 12:30 PM    CO2 28 07/13/2022 12:30 PM    BUN 28 07/13/2022 12:30 PM    CREATININE 1.4 07/13/2022 12:30 PM    GFRAA >60 07/13/2022 12:30 PM    LABGLOM 52 07/13/2022 12:30 PM    GLUCOSE 147 07/13/2022 12:30 PM    GLUCOSE 129 04/23/2012 10:45 AM    PROT 7.8 06/16/2022 12:00 PM    CALCIUM 9.9 07/13/2022 12:30 PM    BILITOT 1.0 06/16/2022 12:00 PM    ALKPHOS 120 06/16/2022 12:00 PM    AST 24 06/16/2022 12:00 PM    ALT 17 06/16/2022 12:00 PM       POC Tests: No results for input(s): POCGLU, POCNA, POCK, POCCL, POCBUN, POCHEMO, POCHCT in the last 72 hours. Coags:   Lab Results   Component Value Date/Time    PROTIME 11.5 06/29/2017 01:14 PM    PROTIME 32.6 08/05/2014 08:19 AM    INR 1.1 06/29/2017 01:14 PM    APTT 30.8 10/20/2016 08:25 PM       HCG (If Applicable): No results found for: PREGTESTUR, PREGSERUM, HCG, HCGQUANT     ABGs: No results found for: PHART, PO2ART, SLT3IRW, PLZ9ZJB, BEART, S2FUDCOI     Type & Screen (If Applicable):  No results found for: LABABO, LABRH    Drug/Infectious Status (If Applicable):  No results found for: HIV, HEPCAB    COVID-19 Screening (If Applicable):   Lab Results   Component Value Date/Time    COVID19 Not Detected 03/23/2022 09:30 AM           Anesthesia Evaluation  Patient summary reviewed and Nursing notes reviewed no history of anesthetic complications:   Airway: Mallampati: III  TM distance: >3 FB   Neck ROM: full  Mouth opening: > = 3 FB   Dental:    (+) edentulous      Pulmonary:normal exam  breath sounds clear to auscultation  (+) COPD:            Patient did not smoke on day of surgery.                  Cardiovascular:  Exercise tolerance: good (>4 METS),   (+) hypertension:, angina: no interval change, pacemaker: pacemaker and AICD, past MI: no interval change, CAD:, CHF: no interval change,         Rhythm: regular  Rate: normal                   PE comment: 3/21/22 ECG  Sinus tachycardia  Left atrial enlargement  Rightward axis  T wave abnormality, consider inferior ischemia  Abnormal ECG  No previous ECGs available    9/21/21 ECHO   Findings      Left Ventricle   apical akinesis. Severely reduced left ventricular systolic function. Stage III diastolic dysfunction. Ejection fraction is visually estimated at 30%. Right Ventricle   Mildly reduced right ventricle systolic function. Pacer wire visualized in right ventricle. Left Atrium   Normal sized left atrium. Right Atrium   Normal right atrium size. Pacemaker/AICD lead(s) was(were) visualized in RA cavity. Mitral Valve   Mild posteriorly directed mitral regurgitation. Tricuspid Valve   Trace tricuspid regurgitation. RVSP is 15 mmHg. Aortic Valve   Trileaflet aortic valve. No aortic stenosis. Moderate aortic regurgitation. Pulmonic Valve   The pulmonic valve was not well visualized. Trace pulmonic regurgitation. Pericardial Effusion   No evidence of pericardial effusion. Aorta   Aortic root dimension within normal limits. Miscellaneous   The inferior vena cava diameter is normal with normal respiratory   variation. Conclusions      Summary   apical akinesis. Severely reduced left ventricular systolic function. Stage III diastolic dysfunction. Mildly reduced right ventricle systolic function. Mild posteriorly directed mitral regurgitation. Moderate aortic regurgitation. Neuro/Psych:   (+) CVA: no interval change,              ROS comment: States Left side weaker after exertion. No deficits noted per my exam. GI/Hepatic/Renal:   (+) GERD: well controlled,           Endo/Other:    (+) DiabetesType II DM, well controlled, , .          Pt had no PAT visit       Abdominal:       Abdomen: soft. Vascular: Other Findings:           Anesthesia Plan      MAC     ASA 3       Induction: intravenous. Anesthetic plan and risks discussed with patient.     Use of blood products discussed with patient whom consented to blood products. Plan discussed with attending.                     PAUL Sen - CRNA   8/4/2022

## 2022-08-05 NOTE — OP NOTE
[address]                  [address] [facility],  [facility]                                OPERATIVE REPORT    PATIENT NAME: Jaison Garcia                     :        1965  MED REC NO:   [id number]                            ROOM:  ACCOUNT NO:   [de-identified]                           ADMIT DATE: 2022  PROVIDER:     Jalaine Goodpasture Memo, MD    DATE OF PROCEDURE:  2022    PREOPERATIVE DIAGNOSES:  Elevated PSA, suspected right prostatic nodule. POSTOPERATIVE DIAGNOSES:  Elevated PSA, suspected right prostatic  nodule. OPERATIONS PERFORMED:  Transrectal ultrasound-guided biopsy and biopsy  of the prostate two in each of six sectors and three in the hypoechoic  areas on the right. SURGEON:  Mandy Szymanski MD    BLOOD LOSS IN THIS CASE:  Minimal, less than 10 mL. CONDITION:  Stable. DISPOSITION:  PACU, then home. DESCRIPTION OF PROCEDURE:  The timeout was read by me, the Anesthesia  and the operating staff; reviewed history, physical, allergy and  medications. The patient had received gentamicin 120 mg in the preop  area and had been placed on Cipro from our office ahead of time. The  patient after the timeout was done, placed in the left lateral position,  I examined and there was still firmness to the right side of the gland. The _____ system was used with 9 MHz, measure of the prostate was  estimated approximately 39 mL. There was a hypoechoic area extending  the entire length of the right side of the gland. The patient then had  two biopsies in each of the six sectors and then had three core biopsies  in the hyperechoic area, specifically front, middle and back. The  patient then had minimal bleeding. Betadine was placed in the rectum. The patient then had Marcaine 0.5 plain 2 mL in each side of the  prostate at the prostatoseminal vesicle junction.  The patient tolerated  the procedure well and was sent to Recovery in a satisfactory condition. He will finish his oral antibiotics.         Melissa Carrillo MD    D: 08/04/2022 14:10:06       T: 08/04/2022 17:30:15     [email or web address]  Job#: [id number]     Doc#: [id number]    CC:  Rosa Maria Quiroz MD

## 2022-08-10 ENCOUNTER — TELEPHONE (OUTPATIENT)
Dept: FAMILY MEDICINE CLINIC | Age: 57
End: 2022-08-10

## 2022-08-10 NOTE — H&P
8/10/2022 9:21 AM  Service: Urology  Group: [facility] (Shayne/Etta/Lili)    Estella Fallon  [id number]     Chief Complaint: PSA of 31 firm right prostate    History of Present Illness: The patient is a 61 y.o. male patient who presents with history of progressive rise in his PSA on 12/4/2017 3.57 1/15/2019 5.23 3/31/2021 9.20 5/31/2000 2226.39 6/28/2022 31.8. The patient was not a candidate for an MRI due to having a defibrillator. He was advised to have a transrectal ultrasound guided prostatic biopsy. Because of his cardiac disease it was felt safer to have him in the hospital with an office setting. Otherwise he has not had serious frequency urgency no gross hematuria no dysuria no history of urinary tract infections.   CAT scan in 2015 which showed an umbilical hernia but no abnormalities of the kidneys bladder ureter no history of calculus disease  I had discussed indications risks and alternatives to sedation and an ultrasound-guided biopsy he was agreeable to this in the present arrangements were made understands the risk of rectal bleeding urinary tract bleeding more serious the risk of infection with bacteremia and risk of urinary retention  Past Medical History:   Diagnosis Date    Allergic rhinitis     Anticoagulant long-term use     Anxiety     Atypical angina (HCC)     CAD (coronary artery disease)     Carotid artery stenosis     Cerebral artery occlusion with cerebral infarction ([address] 2012    CHF (congestive heart failure) (HCC)     COPD (chronic obstructive pulmonary disease) (HCC)     CVA (cerebral infarction) 11/19/2012    Depression     Diverticulitis 2010    Sigmoid abscess, s/p colectomy    Erectile dysfunction     GERD (gastroesophageal reflux disease)     Heart attack ([address] 2014    Hx of blood clots     Hyperlipemia     Hypertension     Ischemic cardiomyopathy     Left atrial thrombus     Obesity     Type 2 diabetes mellitus without complication, without long-term current use of insulin ([facility]     Type II or unspecified type diabetes mellitus without mention of complication, not stated as uncontrolled        Past Surgical History:   Procedure Laterality Date    ABDOMEN SURGERY      CARDIAC CATHETERIZATION  3/30/2015    EF 35%, Dr. David Coats, [facility]  4/1/2015    left chest, [employer], Dr. Mary De La Fuente, [place] [address]  1/29/2010    laparoscopic, converted to open sigmoid colon resection (diverticular disease), Dr. Brian Earl, [facility]    COLONOSCOPY  12/5/2005    diverticulosis, Dr. Brian Earl, [facility]    COLONOSCOPY  9/4/2008    diverticulosis, Dr. Brian Earl, [address] [place] [address] [address] [facility]  1/28/2010    prior to colon surgery, diverticulosis, Dr. Brian Earl, [address] [facility]  08/07/2015    sigmoid diverticulosis, rectal polyp bx/cauterized (hyperplastic polyp), Dr. Briana Marquez, [address] [facility] [address]  12/6/2014    [facility] [address] [facility], Dr. Adan Salmeron, [facility]    CYSTOURETHROSCOPY  1/29/2010    placement bilateral ureteral stents for colon resection, [facility]    right, [facility]      pacer defib    PROSTATE BIOPSY N/A 8/4/2022    PROSTATE BIOPSY [address], [address] [facility] ([id number] [facility]) performed by Vitor Wu MD at [address] [place] [address]  8/7/2015    severe duodenitis, mild to moderate gastritis (bx for H pylori), submucosal benign mass body stomach, mild reflux esophagitis, Dr. Briana Marquez, [facility]    VASCULAR SURGERY         Medications Prior to Admission:    No medications prior to admission. Allergies:    Patient has no known allergies. Social History:    reports that he quit smoking about 8 years ago. His smoking use included cigarettes. He started smoking about 50 years ago. He has a 60.00 pack-year smoking history. His smokeless tobacco use includes chew. He reports that he does not currently use alcohol.  He reports that he does not use drugs. Family History:   Non-contributory to this urological problem  family history includes Cancer in his sister; Heart Disease in his father and paternal uncle; High Blood Pressure in his father; No Known Problems in his brother and brother; Stroke in his sister. Review of Systems: ENT. Allergic rhinitis  Respiratory: negative for cough and hemoptysis pleural effusion 2016 and history of bronchitis  Cardiovascular: negative for chest pain and dyspnea. Nonsustained ventricular tachycardia 2018. Nonrheumatic aortic valve insufficiency November 2019 carotid artery stenosis. Previous CVA. Atypical angina. Long-term anticoagulation. .  Acute myocardial infarction 2015. Congestive heart failure 2015. Left atrial thrombus resolved 2014 gastrointestinal: negative for abdominal pain, diarrhea, nausea and vomiting previous rectal bleeding from rectal polyp 2019. Gastric mass resolved 2019. History of gastroesophageal reflux  Derm: negative for rash and skin lesion(s)  Neurological: negative for seizures and tremors  Endocrine: : As above in the HPI, otherwise negative  Musculoskeletal.  Said right shoulder pain. Intermittent leg swelling  Hematological: History of blood clots and anticoagulation I am not sure if this was peripheral or pulmonary embolism  Physical Exam:   Vitals: /60   Pulse 72   Temp 97.2 °F (36.2 °C) (Temporal)   Resp 20   Ht 5' 9\" (1.753 m)   Wt 209 lb (94.8 kg)   SpO2 95%   BMI 30.86 kg/m²   General:  Awake, alert, oriented X 3. Well developed, well nourished, well groomed. No apparent distress. HEENT:  Normocephalic, atraumatic. Pupils equal, round. No scleral icterus. No conjunctival injection. Normal lips, teeth, and gums. No nasal discharge. Neck:  Supple, no masses. Heart:  RRR  Lungs:  No audible wheezing. Respirations symmetric and non-labored.   Abdomen:  soft, nontender, no masses, no organomegaly, no peritoneal signs  Extremities:  No clubbing, cyanosis, or edema  Skin:  Warm and dry, no open lesions or rashes  Neuro: No motor or sensory deficits in the 4 quadrant extremities  Rectal: State estimated 30 g in size with firmness on the right side  Genitalia: Circumcised testes epididymis and cord normal    Labs:   No results for input(s): WBC, RBC, HGB, HCT, MCV, MCH, MCHC, RDW, PLT, MPV in the last 72 hours. No results for input(s): CREATININE in the last 72 hours. Images:  No orders to display       Assessment: Joseph Patel 61 y.o. male     Patient has an elevated PSA firm prostate we discussed indications risks and alternatives to transrectal ultrasound and biopsy    Plan:    .   Patient was agreeable for us to proceed today    Electronically signed by Jimena Romo MD on 8/10/2022 at 9:21 AM

## 2022-08-12 ENCOUNTER — HOSPITAL ENCOUNTER (OUTPATIENT)
Dept: OTHER | Age: 57
Setting detail: THERAPIES SERIES
Discharge: HOME | End: 2022-08-12
Payer: MEDICARE

## 2022-08-12 VITALS
HEART RATE: 70 BPM | WEIGHT: 213 LBS | OXYGEN SATURATION: 98 % | SYSTOLIC BLOOD PRESSURE: 108 MMHG | DIASTOLIC BLOOD PRESSURE: 63 MMHG | BODY MASS INDEX: 31.45 KG/M2

## 2022-08-12 LAB
ANION GAP SERPL CALCULATED.3IONS-SCNC: 12 MMOL/L (ref 7–16)
BUN BLDV-MCNC: 31 MG/DL (ref 6–20)
CALCIUM SERPL-MCNC: 9.3 MG/DL (ref 8.6–10.2)
CHLORIDE BLD-SCNC: 102 MMOL/L (ref 98–107)
CO2: 26 MMOL/L (ref 22–29)
CREAT SERPL-MCNC: 1.3 MG/DL (ref 0.7–1.2)
GFR AFRICAN AMERICAN: >60
GFR NON-AFRICAN AMERICAN: 57 ML/MIN/1.73
GLUCOSE BLD-MCNC: 148 MG/DL (ref 74–99)
POTASSIUM SERPL-SCNC: 4.4 MMOL/L (ref 3.5–5)
PRO-BNP: 474 PG/ML (ref 0–125)
SODIUM BLD-SCNC: 140 MMOL/L (ref 132–146)

## 2022-08-12 PROCEDURE — 80048 BASIC METABOLIC PNL TOTAL CA: CPT

## 2022-08-12 PROCEDURE — 99214 OFFICE O/P EST MOD 30 MIN: CPT

## 2022-08-12 PROCEDURE — 36415 COLL VENOUS BLD VENIPUNCTURE: CPT

## 2022-08-12 PROCEDURE — 83880 ASSAY OF NATRIURETIC PEPTIDE: CPT

## 2022-08-12 NOTE — PROGRESS NOTES
Congestive Heart Failure [address]         Sonal Matt   1965          Referring Provider: FRANCO RANDLE [facility] NP  Primary Care Physician:   DR. KNIGHT  Cardiologist: DR. Josue Rodriges  Nephrologist:N/A      History of Present Illness:     Sonal Matt is a 61 y.o. male with a history of HFrEF, most recent EF 30-35% ON 10/11/2019. Patient Story:    He does not have dyspnea with exertion, shortness of breath, or decline in overall functional capacity. He does  NOT have orthopnea, PND,and nocturnal cough. He does not have abdominal distention or bloating, early satiety, anorexia/change in appetite. He does have a good urinary response to oral diuretic. He does have lower extremity edema. He DOES HAVE lightheadedness, dizziness. He does not complain of palpitations,syncope. He does not complain of chest pain, pressure, discomfort. No Known Allergies        Prior to Visit Medications    Medication Sig Taking?  Authorizing Provider   spironolactone (ALDACTONE) 25 MG tablet Take 1 tablet by mouth daily  Ivett Sanchez DO   metoprolol succinate (TOPROL XL) 50 MG extended release tablet Take 1 tablet by mouth daily  Ivett Sanchez DO   sacubitril-valsartan (ENTRESTO)  MG per tablet Take 1 tablet by mouth 2 times daily  Ivett Sanchez DO   clopidogrel (PLAVIX) 75 MG tablet Take 1 tablet by mouth daily  Ivett Sanchez DO   pantoprazole (PROTONIX) 40 MG tablet Take 1 tablet by mouth daily  Ivett Sanchez DO   metFORMIN (GLUCOPHAGE) 500 MG tablet Take 1 tablet by mouth 2 times daily (with meals)  Ivett Sanchez DO   isosorbide mononitrate (IMDUR) 30 MG extended release tablet Take 3 tablets by mouth daily  Patient taking differently: Take 30 mg by mouth in the morning, at noon, and at bedtime  Ivett Sanchez DO   bumetanide (BUMEX) 1 MG tablet Take 1 tablet by mouth daily  Saint Solan, APRN - CNP   atorvastatin (LIPITOR) 80 MG tablet Take 1 tablet by mouth nightly  Cem Velazquez,    tamsulosin (FLOMAX) 0.4 mg capsule Take 1 capsule by mouth daily  Mariaelena Gordon DO   fluticasone (FLONASE) 50 MCG/ACT nasal spray 2 sprays by Each Nostril route daily  Brenna Michelle MD   aspirin 81 MG EC tablet Take 1 tablet by mouth daily  Marquise Day MD           Guideline directed medical:  ARNI/ACE I/ARB: Yes  Beta blocker:   Yes  Aldosterone antagonist:  YES      Physical Examination:     /63   Pulse 70   Wt 213 lb (96.6 kg)   SpO2 98%   BMI 31.45 kg/m²     Assessment  Charting Type: Shift assessment (chf clinic)    Neurological  Level of Consciousness: Alert (0)  Orientation Level: Oriented X4  Cognition: Appropriate attention/concentration, Appropriate safety awareness, Appropriate judgement, Appropriate for developmental age, Follows commands         HEENT (Head, Ears, Eyes, Nose, & Throat)  HEENT (WDL): Within Defined Limits    Respiratory  Respiratory Pattern: Regular  Respiratory Depth: Normal  Respiratory Quality/Effort: Unlabored  Chest Assessment: Chest expansion symmetrical  L Breath Sounds: Clear  R Breath Sounds: Clear              Cardiac  Cardiac Rhythm: Sinus rhythm    Rhythm Interpretation  Heart Rate: 70         Gastrointestinal  Abdominal (WDL): Within Defined Limits               Peripheral Vascular  Peripheral Vascular (WDL): Exceptions to WDL  Edema: Right lower extremity, Left lower extremity  RLE Edema: None  LLE Edema: None                                                 Heart Rate: 70                     LAB DATA:    Last 3 BMP      Sodium (mmol/L)   Date Value   07/13/2022 143   06/22/2022 139   06/16/2022 137     Potassium (mmol/L)   Date Value   07/13/2022 4.7   06/22/2022 4.1   06/16/2022 4.4     Potassium reflex Magnesium (mmol/L)   Date Value   01/23/2022 3.6   01/22/2022 3.8   10/11/2019 3.9     Chloride (mmol/L)   Date Value   07/13/2022 102   06/22/2022 99   06/16/2022 98     CO2 (mmol/L)   Date Value   07/13/2022 28   06/22/2022 25   06/16/2022 23     BUN (mg/dL)   Date Value   07/13/2022 28 (H)   06/22/2022 25 (H)   06/16/2022 48 (H)     Glucose (mg/dL)   Date Value   07/13/2022 147 (H)   06/22/2022 129 (H)   06/16/2022 173 (H)   04/23/2012 129 (H)   04/16/2012 163 (H)     Calcium (mg/dL)   Date Value   07/13/2022 9.9   06/22/2022 9.4   06/16/2022 9.3       Last 3 BNP       Pro-BNP (pg/mL)   Date Value   07/13/2022 514 (H)   06/22/2022 728 (H)   06/20/2022 1,010 (H)          CBC: No results for input(s): WBC, HGB, PLT in the last 72 hours. BMP:    No results for input(s): NA, K, CL, CO2, BUN, CREATININE, GLUCOSE in the last 72 hours. Hepatic: No results for input(s): AST, ALT, ALB, BILITOT, ALKPHOS in the last 72 hours. Troponin: No results for input(s): TROPONINI in the last 72 hours. BNP: No results for input(s): BNP in the last 72 hours. Lipids: No results for input(s): CHOL, HDL in the last 72 hours. Invalid input(s): LDLCALCU  INR: No results for input(s): INR in the last 72 hours. WEIGHTS:    Wt Readings from Last 3 Encounters:   08/12/22 213 lb (96.6 kg)   08/04/22 209 lb (94.8 kg)   07/13/22 209 lb (94.8 kg)         TELEMETRY:  Cardiac Regularity: Regular  Cardiac Rhythm/Interpretation: NSR        ASSESSMENT:  Valentino Shawl presents today for CHF clinic follow up. Weighs have been stable. Follow up appt made.     Interventions completed this visit:  IV diuretics given no  Lab work obtained yes, BNP/BMP   Reviewed currently prescribed medications with patient, educated on importance of compliance and answered any questions regarding their medication  Educated on signs and symptoms of HF  Educated on low sodium diet    PLAN:  Scheduled to follow up in CHF clinic on   Future Appointments   Date Time Provider Estuardo Arzate   8/26/2022 10:00 PM [facility] SLEEP LAB BEDROOM 1 [address]   9/6/2022  2:40 PM DO Abigail Yi [facility]   9/12/2022  9:40 AM DO Abigail Yi [facility]   9/16/2022 12:45 PM [facility] CHF ROOM 1 SE[facility]Z CHF [facility] CLINICS       Future Appointments   Date Time Provider Estuardo Arzate   8/12/2022 12:30 PM [facility] ROOM 1 [facility] Dorothy   8/26/2022 10:00 PM [facility] SLEEP LAB BEDROOM 1 [facility] SLEEP [place] HOD   9/6/2022  2:40 PM DO Júnior Martin [facility]   9/12/2022  9:40 AM DO Júnior Martin [facility]     Given clinic phone number and aware of signs and symptoms to call with any HF change in symptoms. Denies current complaints.  Assessment unremarkable

## 2022-08-13 ENCOUNTER — APPOINTMENT (OUTPATIENT)
Dept: OTHER | Age: 57
End: 2022-08-13
Payer: MEDICARE

## 2022-08-17 ENCOUNTER — TELEPHONE (OUTPATIENT)
Dept: FAMILY MEDICINE CLINIC | Age: 57
End: 2022-08-17

## 2022-08-17 DIAGNOSIS — Z76.0 MEDICATION REFILL: ICD-10-CM

## 2022-08-17 NOTE — TELEPHONE ENCOUNTER
Jc Flores, , patient would like to change to 90 day supply of metformin.  Rx pended for your signature/modification as appropriate    LOV: 7/12/22  Next: 9/6/22    Thank you,  Amy Simon, PharmD, [facility]  Department, toll free: [phone] option 1

## 2022-08-17 NOTE — TELEPHONE ENCOUNTER
[facility] HEALTH CLINICAL PHARMACY: ADHERENCE REVIEW  Identified care gap per [place]: fills at [facility]: Diabetes and Statin adherence      DIABETES ADHERENCE    Insurance Records claims through 8/8/22 YTD Erick Villagran =  68%; Potential Fail Date: 9/1/22 ):   Metformin 500mg Next refill due: 8/26/22    Per Reconciled Dispense Report:   last filled on 7/27/22 for 30 day supply. (Had been getting 90DS) refillable    Per  Pharmacy:   Not contacted yet    Lab Results   Component Value Date    LABA1C 7.3 05/03/2022    LABA1C 7.4 (H) 01/21/2022    LABA1C 10.0 08/17/2021     NOTE A1c <9%    STATIN ADHERENCE    Insurance Records claims through 8.8.22 YTD PDC =  ?%; Potential Fail Date: 10/16/22 ):   Atorvastatin 80mg. Next refill due: 9/5/22    Per Reconciled Dispense Report:   last filled on 6/7/22 for 90 day supply. 1RF of #90 for future    Lab Results   Component Value Date    CHOL 232 (H) 06/10/2022    TRIG 219 (H) 06/10/2022    HDL 41 06/10/2022    LDLCALC 147 (H) 06/10/2022     ALT   Date Value Ref Range Status   06/16/2022 17 0 - 40 U/L Final     AST   Date Value Ref Range Status   06/16/2022 24 0 - 39 U/L Final     The 10-year ASCVD risk score (Asaf Whalen et al., 2013) is: 14.8%    Values used to calculate the score:      Age: 61 years      Sex: Male      Is Non- : No      Diabetic: Yes      Tobacco smoker: No      Systolic Blood Pressure: 871 mmHg      Is BP treated: Yes      HDL Cholesterol: 41 mg/dL      Total Cholesterol: 232 mg/dL     PLAN  The following are interventions that have been identified:   - Patient eligible for 90 day supply of metformin    Reached patient to review. Verified medication instructions and Education provided.      Will route to PharmD to facilitate refill authorization for 90 day supply metformin 500mg   Will follow up to inactivate 30DS and fill 90 days prior to fail date    Future Appointments   Date Time Provider Estuardo Arzate   8/23/2022  9:00 AM [facility] NM DOSE RM [facility]   8/23/2022 12:00 PM [facility] NM ECAM [facility]   8/23/2022  2:00 PM [facility] CT SCAN 2 [facility]   8/26/2022 10:00 PM [facility] SLEEP LAB BEDROOM 1 [facility] SLEEP [place] HOD   9/6/2022  2:40 PM DO Geovany Waldron [facility]   9/12/2022  9:40 AM DO Geovany Waldron [facility]   9/16/2022 12:45 PM [facility] CHF ROOM 1 SE[facility]Z [facility].    [address] [facility] free: [phone]

## 2022-08-19 NOTE — TELEPHONE ENCOUNTER
Noted 90 day Rx signed by provider - thank you!     For [address] in place:  No  Recommendation Provided To: Provider: 1 via Note to Provider and Patient/Caregiver: 1 via Telephone  Intervention Detail: New Rx: 1, reason: Improve Adherence  Gap Closed?: Yes   Intervention Accepted By: Provider: 1 and Patient/Caregiver: 1  Time Spent (min): 20

## 2022-08-22 NOTE — TELEPHONE ENCOUNTER
Reached pharmacy and inactivated 30 day script. Staff will refill new 90 day script using Jaelyn JACQUES.     For Edwin Vasquez in place:  No  Recommendation Provided To: Provider: 1 via Note to Provider, Patient/Caregiver: 1 via Telephone, and Pharmacy: 1  Intervention Detail: Adherence Monitorin and New Rx: 1, reason: EITAN, Patient Preference  Gap Closed?: Yes   Intervention Accepted By: Provider: 1, Patient/Caregiver: 1, and Pharmacy: 1  Time Spent (min): 20

## 2022-08-23 ENCOUNTER — HOSPITAL ENCOUNTER (OUTPATIENT)
Dept: NUCLEAR MEDICINE | Age: 57
Discharge: HOME | End: 2022-08-25
Payer: MEDICARE

## 2022-08-23 ENCOUNTER — HOSPITAL ENCOUNTER (OUTPATIENT)
Dept: CT IMAGING | Age: 57
Discharge: HOME | End: 2022-08-25
Payer: MEDICARE

## 2022-08-23 DIAGNOSIS — R97.20 ELEVATED PROSTATE SPECIFIC ANTIGEN (PSA): ICD-10-CM

## 2022-08-23 PROCEDURE — 6360000004 HC RX CONTRAST MEDICATION: Performed by: RADIOLOGY

## 2022-08-23 PROCEDURE — 3430000000 HC RX DIAGNOSTIC RADIOPHARMACEUTICAL: Performed by: RADIOLOGY

## 2022-08-23 PROCEDURE — 74176 CT ABD & PELVIS W/O CONTRAST: CPT

## 2022-08-23 PROCEDURE — 78306 BONE IMAGING WHOLE BODY: CPT | Performed by: RADIOLOGY

## 2022-08-23 PROCEDURE — A9503 TC99M MEDRONATE: HCPCS | Performed by: RADIOLOGY

## 2022-08-23 PROCEDURE — 78306 BONE IMAGING WHOLE BODY: CPT | Performed by: UROLOGY

## 2022-08-23 RX ORDER — SODIUM CHLORIDE 0.9 % (FLUSH) 0.9 %
10 SYRINGE (ML) INJECTION PRN
Status: DISCONTINUED | OUTPATIENT
Start: 2022-08-23 | End: 2022-08-26 | Stop reason: HOSPADM

## 2022-08-23 RX ORDER — TC 99M MEDRONATE 20 MG/10ML
26 INJECTION, POWDER, LYOPHILIZED, FOR SOLUTION INTRAVENOUS
Status: COMPLETED | OUTPATIENT
Start: 2022-08-23 | End: 2022-08-23

## 2022-08-23 RX ADMIN — IOHEXOL 50 ML: 240 INJECTION, SOLUTION INTRATHECAL; INTRAVASCULAR; INTRAVENOUS; ORAL at 10:03

## 2022-08-23 RX ADMIN — TC 99M MEDRONATE 27 MILLICURIE: 20 INJECTION, POWDER, LYOPHILIZED, FOR SOLUTION INTRAVENOUS at 08:45

## 2022-08-29 ENCOUNTER — TELEPHONE (OUTPATIENT)
Dept: FAMILY MEDICINE CLINIC | Age: 57
End: 2022-08-29

## 2022-08-29 DIAGNOSIS — C61 PROSTATE CANCER (HCC): Primary | ICD-10-CM

## 2022-08-29 NOTE — TELEPHONE ENCOUNTER
Called patient to discuss his concerns/questions and recent news from imaging and from his urologist.  Prostate CA with evidence of mets. Has started to have some hip/sarbjit pain; discuss bone scan results. He will take plain Tylenol for now. Knows not to use NSAIDs like ibuprofen; he does not want any other medications at this time, wishes to avoid opioids. Requested referral to oncology. Will place referral as requested. He will continue to follow with Dr. José Antonio Riddle, and he has an appointment to see me next week. He feels optimistic about treatments and keeping a positive outlook. Thank you!

## 2022-09-06 ENCOUNTER — OFFICE VISIT (OUTPATIENT)
Dept: FAMILY MEDICINE CLINIC | Age: 57
End: 2022-09-06
Payer: MEDICARE

## 2022-09-06 VITALS
HEART RATE: 62 BPM | WEIGHT: 217 LBS | DIASTOLIC BLOOD PRESSURE: 66 MMHG | OXYGEN SATURATION: 98 % | RESPIRATION RATE: 16 BRPM | HEIGHT: 69 IN | BODY MASS INDEX: 32.14 KG/M2 | SYSTOLIC BLOOD PRESSURE: 122 MMHG | TEMPERATURE: 97.3 F

## 2022-09-06 DIAGNOSIS — C61 PROSTATE CANCER (HCC): ICD-10-CM

## 2022-09-06 DIAGNOSIS — I50.22 CHRONIC SYSTOLIC CONGESTIVE HEART FAILURE (HCC): ICD-10-CM

## 2022-09-06 DIAGNOSIS — E11.9 TYPE 2 DIABETES MELLITUS WITHOUT COMPLICATION, WITHOUT LONG-TERM CURRENT USE OF INSULIN (HCC): Primary | ICD-10-CM

## 2022-09-06 DIAGNOSIS — M25.552 LEFT HIP PAIN: ICD-10-CM

## 2022-09-06 LAB — HBA1C MFR BLD: 6.6 %

## 2022-09-06 PROCEDURE — 83036 HEMOGLOBIN GLYCOSYLATED A1C: CPT | Performed by: FAMILY MEDICINE

## 2022-09-06 PROCEDURE — 99212 OFFICE O/P EST SF 10 MIN: CPT | Performed by: FAMILY MEDICINE

## 2022-09-06 PROCEDURE — 3044F HG A1C LEVEL LT 7.0%: CPT | Performed by: FAMILY MEDICINE

## 2022-09-06 PROCEDURE — 99214 OFFICE O/P EST MOD 30 MIN: CPT | Performed by: FAMILY MEDICINE

## 2022-09-06 RX ORDER — BUMETANIDE 1 MG/1
1 TABLET ORAL DAILY
Qty: 30 TABLET | Refills: 3 | Status: SHIPPED
Start: 2022-09-06 | End: 2022-09-12 | Stop reason: SDUPTHER

## 2022-09-06 NOTE — PROGRESS NOTES
CC:  Follow up DM, prostate CA    HPI:  61 y.o. male presents for follow up. Prostate CA, started medications 3 days ago. Dr. David Healy. Injection scheduled on 9/14/22. Tolerating treatment so far. No concerns. No symptoms at this time. Left hip pain, since biopsy. Tylenol eases this, does not completely treat this. Better with BioFreeze. Sometimes anterior hip, sometimes to groin. Worse with certain movements. DM, feeling well overall. Has noted some dizziness on occasion, worse after not eating for a long time, worse after stating up. No LOC. No falls. No edema or SOB. No new chest pain. A1C 6.6 today. Taking AM pills; often forgets PM pills. No F/C. No loss appetite. No NS. Has had hot flashes at night. Some weight gain, 5-8 pounds. No change in BM. HFrEF. Following with cardiology and HF clinic. Weight is up some, but no SOB. Plans to follow up with specialists this week. No increase in edema. Has been eating some higher-salt foods recently, Whoppers from [employer]. Discussed lifestyle modifications. Occasionally dizziness as above. Has been skipping some doses of Entresto, forgetting in the evenings. Will be running out of Entresto soon and has no ability to pay for this in the donut hole.       Patient Active Problem List    Diagnosis Date Noted    Age-related nuclear cataract, bilateral 01/31/2022    Presbyopia 01/31/2022    Regular astigmatism, bilateral 01/31/2022    Leg swelling 01/23/2022    ALEX (acute kidney injury) ([address] 01/23/2022    CHF, acute on chronic ([address] 01/21/2022    Elevated PSA, less than 10 ng/ml 04/01/2021    Nonrheumatic aortic valve insufficiency 02/20/2019    Obesity (BMI 30.0-34.9)     Ischemic cardiomyopathy     Type 2 diabetes mellitus without complication, without long-term current use of insulin ([address]     Essential hypertension 06/29/2017    Diverticulosis of colon 08/07/2015    History of MI (myocardial infarction) 08/07/2015    Chronic systolic congestive heart failure ([facility] 2015    NSVT (nonsustained ventricular tachycardia) ([facility] 2015    Dual implantable cardioverter-defibrillator in situ 2015    CAD (coronary artery disease) 2014    Depression 12/10/2013    History of stroke 2013    ED (erectile dysfunction) 2012    COPD (chronic obstructive pulmonary disease)     Hyperlipemia     GERD (gastroesophageal reflux disease) 2011       Current Outpatient Medications on File Prior to Visit   Medication Sig Dispense Refill    metFORMIN (GLUCOPHAGE) 500 MG tablet Take 1 tablet by mouth 2 times daily (with meals) 180 tablet 1    spironolactone (ALDACTONE) 25 MG tablet Take 1 tablet by mouth daily 30 tablet 5    metoprolol succinate (TOPROL XL) 50 MG extended release tablet Take 1 tablet by mouth daily 30 tablet 5    sacubitril-valsartan (ENTRESTO)  MG per tablet Take 1 tablet by mouth 2 times daily 60 tablet 5    clopidogrel (PLAVIX) 75 MG tablet Take 1 tablet by mouth daily 30 tablet 5    pantoprazole (PROTONIX) 40 MG tablet Take 1 tablet by mouth daily 30 tablet 5    isosorbide mononitrate (IMDUR) 30 MG extended release tablet Take 3 tablets by mouth daily (Patient taking differently: 30 mg in the morning, at noon, and at bedtime) 90 tablet 5    atorvastatin (LIPITOR) 80 MG tablet Take 1 tablet by mouth nightly 90 tablet 1    tamsulosin (FLOMAX) 0.4 mg capsule Take 1 capsule by mouth daily 90 capsule 1    fluticasone (FLONASE) 50 MCG/ACT nasal spray 2 sprays by Each Nostril route daily 48 g 1    aspirin 81 MG EC tablet Take 1 tablet by mouth daily 90 tablet 1     No current facility-administered medications on file prior to visit.        No Known Allergies    Social History     Tobacco Use    Smoking status: Former     Packs/day: 2.00     Years: 30.00     Pack years: 60.00     Types: Cigarettes     Start date: 0     Quit date: 2014     Years since quittin.2    Smokeless tobacco: Current Types: Chew    Tobacco comments:     occ chewing tobacco, trying to quit   Vaping Use    Vaping Use: Never used   Substance Use Topics    Alcohol use: Not Currently    Drug use: No       ROS:   Review of Systems -as above     Physical Exam:    VS:  Blood pressure 122/66, pulse 62, temperature 97.3 °F (36.3 °C), temperature source Temporal, resp. rate 16, height 5' 9\" (1.753 m), weight 217 lb (98.4 kg), SpO2 98 %. General Appearance:  awake, alert, oriented, in no acute distress and well developed, well nourished  Skin:  Skin color, texture, turgor normal. No rashes or lesions. Head/face:  NCAT  Eyes:  EOMI and Sclera nonicteric  Neck:  neck- supple, no mass, non-tender  Lungs:  Normal expansion. Clear to auscultation. No rales, rhonchi, or wheezing. Heart:  Heart regular rate and rhythm  Abdomen:  soft, NT, ND, previous scars well healed   Extremities: pulses present in all extremities, trace pedal edema, and bilateral hips with ROM intact without pain. Some pain in quads anteriorly with extending knee, reproducing symptoms. No mass or bulging. Psych: appropriate affect, coherent thought processes, no flight of ideas, no delusions or hallucinations apparent, speech not pressured, no suicidal or homicidal ideation or intent, appropriate insight, and judgment intact      Most recent labs and imaging reviewed. Findings include:     Lab Results   Component Value Date    LABA1C 6.6 09/06/2022     No results found for: EAG      Assessments:      Diagnosis Orders   1. Type 2 diabetes mellitus without complication, without long-term current use of insulin (HCC)  POCT glycosylated hemoglobin (Hb A1C)      2. Chronic systolic congestive heart failure (HCC)  bumetanide (BUMEX) 1 MG tablet      3. Left hip pain  XR HIP 2-3 VW W PELVIS LEFT    XR FEMUR LEFT (MIN 2 VIEWS)      4. Prostate cancer ([address]              Plans:    As Above. Please see Patient Instructions for further counseling and information given. Oncology referral has been placed. Following with urology. Advised follow up with cardiology as directed and HF clinic. Change positions slowly. Please take medications as advised, and call with any worsening symptoms or side effects. Healthy diet. Advised on contacting eROI.  Resources provided. Advised to also discuss with cardiologist for guidance as well as pharmacist for any programs/assistance available. He expressed understanding. Resources printed in AVS.      XR for left hip pain. Supportive care. Concerns for possibility of bone mets, though not seen on recent imaging. Otherwise, pain may be due to a muscular cause. Follow, supportive care. RTO 1 week; already scheduled for AWV, reassess weight and symptoms at that time, or sooner as needed for any new, persistent, or worsening symptoms. Please be adherent to the treatment plans discussed today, and please call with any questions or concerns, letting the office know of any reasons that the plans may not be followed. The risks of untreated conditions include worsening illness, injury, disability, and possibly, death. Please call if symptoms change in any way, worsen, or fail to completely resolve, as this could necessitate a change to treatment plans. Patient expressed understanding. Indications and proper use of medication(s) reviewed. Potential side-effects and risks of medication(s) also explained. Patient was instructed to call if any new symptoms develop prior to next visit.

## 2022-09-06 NOTE — PATIENT INSTRUCTIONS
COMMUNITY RESOURCES      AGENCY PHONE [address] of [address] [phone] 24-hr Crisis Hotline, [facility]. [facility] at the [address] on Aging  [phone] Aging and [address] for Singles/Families   [address] [facility] Home Delivered Meals   Meals on Wheels [facility] [phone] Transportation for [facility] Residents   [address] [phone] Transportation for [facility] and Disabled    [address] [facility] [phone] Transportation for [facility]   [phone] Transportation for [employer], Seniors, Disabled   Help Me Grow [phone] Child Development    Resource Mothers [phone] Support for Woman During and After Pregnancy   Ronen  [address], [address]  for Financial Assistance for [address] Prescription Drug Assistance  [phone] Prescription Drug Assistance through 4[address] [facility], [address] of the [phone] End of 5800 [facility] Diabetes Education [phone] Diabetes Self-Management Education and [address]   819 [facility] [address]   [address] [place] [address] Domestic Violence [place] [address] [phone] Drug and Alcohol Treatment   555 [facility] [phone] Drug and Alcohol Treatment   [facility] [phone] Drug, Alcohol and Gambling Treatment    [facility]  [phone] Drug, Alcohol and 5601 [facility] Inpatient and Outpatient Treatment, 24 Hour Crisis and Emergency Screening   [address] children and [facility] [address] of [address] [facility] Individual, Group, Family Mental Health Counseling     [facility] [phone] Mental Health, Substance Abuse, [address] [facility] [phone]  Individual, Group, 615 [facility] Tobacco Treatment [phone] Stop Smoking Program   [facility], d.b.a. [address], [place] [address], [facility] and [address] [facility] makes every attempt to offer choices when  providing information and referrals to meet your health care needs. This list is not meant to be all inclusive of the service providers in the community but a generalized list of some services available; therefore, a service provider you may be interested in might not be listed. You, as a health care consumer, have the right to choose any service provider that meets your needs. If you do not see the service provider of your choice, please ask to speak with your physician practice .

## 2022-09-07 ENCOUNTER — HOSPITAL ENCOUNTER (OUTPATIENT)
Dept: GENERAL RADIOLOGY | Age: 57
Discharge: HOME | End: 2022-09-09
Payer: MEDICARE

## 2022-09-07 ENCOUNTER — HOSPITAL ENCOUNTER (OUTPATIENT)
Age: 57
Discharge: HOME | End: 2022-09-09
Payer: MEDICARE

## 2022-09-07 DIAGNOSIS — M25.552 LEFT HIP PAIN: ICD-10-CM

## 2022-09-07 PROCEDURE — 73552 X-RAY EXAM OF FEMUR 2/>: CPT

## 2022-09-07 PROCEDURE — 73502 X-RAY EXAM HIP UNI 2-3 VIEWS: CPT

## 2022-09-12 ENCOUNTER — CLINICAL DOCUMENTATION (OUTPATIENT)
Dept: SPIRITUAL SERVICES | Age: 57
End: 2022-09-12

## 2022-09-12 ENCOUNTER — OFFICE VISIT (OUTPATIENT)
Dept: FAMILY MEDICINE CLINIC | Age: 57
End: 2022-09-12
Payer: MEDICARE

## 2022-09-12 VITALS
DIASTOLIC BLOOD PRESSURE: 65 MMHG | HEART RATE: 79 BPM | SYSTOLIC BLOOD PRESSURE: 110 MMHG | HEIGHT: 69 IN | WEIGHT: 218 LBS | TEMPERATURE: 97 F | OXYGEN SATURATION: 98 % | BODY MASS INDEX: 32.29 KG/M2

## 2022-09-12 DIAGNOSIS — I50.22 CHRONIC SYSTOLIC CONGESTIVE HEART FAILURE (HCC): ICD-10-CM

## 2022-09-12 DIAGNOSIS — Z00.00 MEDICARE ANNUAL WELLNESS VISIT, SUBSEQUENT: Primary | ICD-10-CM

## 2022-09-12 DIAGNOSIS — Z71.89 ADVANCED DIRECTIVES, COUNSELING/DISCUSSION: ICD-10-CM

## 2022-09-12 PROCEDURE — 99213 OFFICE O/P EST LOW 20 MIN: CPT | Performed by: FAMILY MEDICINE

## 2022-09-12 PROCEDURE — G0439 PPPS, SUBSEQ VISIT: HCPCS | Performed by: FAMILY MEDICINE

## 2022-09-12 RX ORDER — BUMETANIDE 1 MG/1
1 TABLET ORAL DAILY
Qty: 30 TABLET | Refills: 3 | Status: SHIPPED | OUTPATIENT
Start: 2022-09-12

## 2022-09-12 ASSESSMENT — PATIENT HEALTH QUESTIONNAIRE - PHQ9
9. THOUGHTS THAT YOU WOULD BE BETTER OFF DEAD, OR OF HURTING YOURSELF: 0
SUM OF ALL RESPONSES TO PHQ QUESTIONS 1-9: 5
SUM OF ALL RESPONSES TO PHQ QUESTIONS 1-9: 2
7. TROUBLE CONCENTRATING ON THINGS, SUCH AS READING THE NEWSPAPER OR WATCHING TELEVISION: 0
SUM OF ALL RESPONSES TO PHQ9 QUESTIONS 1 & 2: 2
SUM OF ALL RESPONSES TO PHQ QUESTIONS 1-9: 5
10. IF YOU CHECKED OFF ANY PROBLEMS, HOW DIFFICULT HAVE THESE PROBLEMS MADE IT FOR YOU TO DO YOUR WORK, TAKE CARE OF THINGS AT HOME, OR GET ALONG WITH OTHER PEOPLE: 0
4. FEELING TIRED OR HAVING LITTLE ENERGY: 1
1. LITTLE INTEREST OR PLEASURE IN DOING THINGS: 1
3. TROUBLE FALLING OR STAYING ASLEEP: 1
1. LITTLE INTEREST OR PLEASURE IN DOING THINGS: 1
5. POOR APPETITE OR OVEREATING: 0
2. FEELING DOWN, DEPRESSED OR HOPELESS: 1
SUM OF ALL RESPONSES TO PHQ QUESTIONS 1-9: 5
SUM OF ALL RESPONSES TO PHQ QUESTIONS 1-9: 5
6. FEELING BAD ABOUT YOURSELF - OR THAT YOU ARE A FAILURE OR HAVE LET YOURSELF OR YOUR FAMILY DOWN: 0
SUM OF ALL RESPONSES TO PHQ QUESTIONS 1-9: 2
8. MOVING OR SPEAKING SO SLOWLY THAT OTHER PEOPLE COULD HAVE NOTICED. OR THE OPPOSITE, BEING SO FIGETY OR RESTLESS THAT YOU HAVE BEEN MOVING AROUND A LOT MORE THAN USUAL: 1
SUM OF ALL RESPONSES TO PHQ QUESTIONS 1-9: 2
2. FEELING DOWN, DEPRESSED OR HOPELESS: 1
SUM OF ALL RESPONSES TO PHQ9 QUESTIONS 1 & 2: 2
SUM OF ALL RESPONSES TO PHQ QUESTIONS 1-9: 2

## 2022-09-12 ASSESSMENT — LIFESTYLE VARIABLES
HOW OFTEN DO YOU HAVE A DRINK CONTAINING ALCOHOL: 2-4 TIMES A MONTH
HOW MANY STANDARD DRINKS CONTAINING ALCOHOL DO YOU HAVE ON A TYPICAL DAY: 1 OR 2

## 2022-09-12 NOTE — ACP (ADVANCE CARE PLANNING)
Advance Care Planning   Ambulatory ACP Specialist Patient Outreach    Date:  9/12/2022  ACP Specialist:  Jayme Marquis    Outreach call to patient in follow-up to ACP Specialist referral from: Shaggy Sanchez DO    [x] PCP  [] Provider   [] Ambulatory Care Management [] Other for Reason:    [x] Advance Directive Assistance  [] Code Status Discussion  [] Complete Portable DNR Order  [] Discuss Goals of Care  [] Complete POST/MOST  [] Early ACP Decision-Making  [] Other    Date Referral Received:9/12/22    Today's Outreach:  [x] First   [] Second  [] Third                               Third outreach made by []  phone  [] email []   Byban     Intervention:  [] Spoke with Patient  [x] Left VM requesting return call      Outcome: I left a voice message and mailing documents. Trina Zhu returned my call and is scheduled for 9/16 @ 11:00. Next Step:   [] ACP scheduled conversation  [] Outreach again in one week               [x] Email / Mail ACP Info Sheets  [x] Email / Mail Advance Directive            [] Close Referral. Routing closure to referring provider/staff and to ACP Specialist . [] Closure Letter mailed to Patient with Invitation to Contact ACP Specialist if/when ready.     Thank you for this referral.

## 2022-09-12 NOTE — PROGRESS NOTES
Medicare Annual Wellness Visit    Livia Hernandez is here for Medicare AWV and Diabetes    Assessment & Plan   Advanced directives, counseling/discussion  -     [address] Referral to [facility] Clinical Specialist    Recommendations for Preventive Services Due: see orders and patient instructions/AVS.  Recommended screening schedule for the next 5-10 years is provided to the patient in written form: see Patient Instructions/AVS.     No follow-ups on file. Subjective     No new symptoms. Weight stable at home. Breathing normally. Tolerating medications, but feeling tired overall with treatments. Upcoming injection. Oncology 9/19. Not taking evening doses of most medications still. Encouraged follow up with cardiologists; discuss use of medications and get recommendations. He agrees and will be seeing HF clinic in a couple of days. Hip pain subsiding overall. Biofreeze. Patient's complete Health Risk Assessment and screening values have been reviewed and are found in Flowsheets. The following problems were reviewed today and where indicated follow up appointments were made and/or referrals ordered. Positive Risk Factor Screenings with Interventions:      Depression:  PHQ-2 Score: 2  PHQ-9 Total Score: 5    Severity:1-4 = minimal depression, 5-9 = mild depression, 10-14 = moderate depression, 15-19 = moderately severe depression, 20-27 = severe depression  Depression Interventions:  Adjusting well overall, tired due to treatments, but coping well. Mood stable.      Tobacco Use:  Tobacco Use: High Risk    Smoking Tobacco Use: Former    Smokeless Tobacco Use: Current     E-cigarette/Vaping       Questions Responses    E-cigarette/Vaping Use Never User    Start Date     Passive Exposure     Quit Date     Counseling Given     Comments           Substance Use - Tobacco Interventions:  Tobacco cessation tips and resources provided, cutting down; he declines additional resources          [employer] and ACP:  General  In general, how would you say your health is?: Fair  In the past 7 days, have you experienced any of the following: New or Increased Pain, New or Increased Fatigue, Loneliness, Social Isolation, Stress or Anger?: (!) Yes  Select all that apply: (!) New or Increased Fatigue  Do you get the social and emotional support that you need?: Yes  Do you have a Living Will?: (!) No    Advance Directives       Power of  Living Will ACP-Advance Directive ACP-Power of     Not on File Filed on 09/05/13 Filed Not on File        General Health Risk Interventions: Will refer for advanced directives. Prefers DNR-CCA. Fatigue from treatment for Prostate CA. Health Habits/Nutrition:  Physical Activity: Insufficiently Active    Days of Exercise per Week: 2 days    Minutes of Exercise per Session: 20 min     Have you lost any weight without trying in the past 3 months?: No  Body mass index: (!) 32.19  Have you seen the dentist within the past year?: (!) No  Health Habits/Nutrition Interventions:  Healthy lifestyle modifications advised. Hearing/Vision:  Do you or your family notice any trouble with your hearing that hasn't been managed with hearing aids?: No  Do you have difficulty driving, watching TV, or doing any of your daily activities because of your eyesight?: No  Have you had an eye exam within the past year?: (!) No  No results found. Hearing/Vision Interventions:  Advised vision exam and dental exam.  Full dentures. Objective   Vitals:    09/12/22 0947   BP: 110/65   Pulse: 79   Temp: 97 °F (36.1 °C)   TempSrc: Temporal   SpO2: 98%   Weight: 218 lb (98.9 kg)   Height: 5' 9\" (1.753 m)      Body mass index is 32.19 kg/m².       General Appearance: alert and oriented to person, place and time, well-developed and well-nourished, in no acute distress  Head: normocephalic and atraumatic  Eyes: extraocular eye movements intact and conjunctivae normal  ENT:  MMM, no lesions or ulcerations, edentulous   Neck: neck supple and non tender without mass   Pulmonary/Chest: clear to auscultation bilaterally- no wheezes, rales or rhonchi, normal air movement, no respiratory distress  Cardiovascular: normal rate, normal S1 and S2, and intact distal pulses  Abdomen: soft, non-tender  Extremities: trace edema bilaterally        No Known Allergies  Prior to Visit Medications    Medication Sig Taking? Authorizing Provider   bumetanide (BUMEX) 1 MG tablet Take 1 tablet by mouth daily Yes Amanda Ha DO   metFORMIN (GLUCOPHAGE) 500 MG tablet Take 1 tablet by mouth 2 times daily (with meals) Yes Amanda aH DO   spironolactone (ALDACTONE) 25 MG tablet Take 1 tablet by mouth daily Yes Amanda Ha DO   metoprolol succinate (TOPROL XL) 50 MG extended release tablet Take 1 tablet by mouth daily Yes Amanda Ha DO   sacubitril-valsartan (ENTRESTO)  MG per tablet Take 1 tablet by mouth 2 times daily Yes Amanda Ha DO   clopidogrel (PLAVIX) 75 MG tablet Take 1 tablet by mouth daily Yes Amanda Ha DO   pantoprazole (PROTONIX) 40 MG tablet Take 1 tablet by mouth daily Yes Amanda Ha DO   isosorbide mononitrate (IMDUR) 30 MG extended release tablet Take 3 tablets by mouth daily  Patient taking differently: 30 mg in the morning, at noon, and at bedtime Yes Amanda Ha DO   atorvastatin (LIPITOR) 80 MG tablet Take 1 tablet by mouth nightly Yes Amanda Ha DO   tamsulosin (FLOMAX) 0.4 mg capsule Take 1 capsule by mouth daily Yes Amanda Ha DO   fluticasone (FLONASE) 50 MCG/ACT nasal spray 2 sprays by Each Nostril route daily Yes Shefali Fernando MD   aspirin 81 MG EC tablet Take 1 tablet by mouth daily Yes Shefali Fernando MD       Again, encouraged adherence to medications. Follow up with specialists as advised. Call with symptoms, questions, or concerns. Advised to contact Prescription Assistance again; telephone number provided.       [facility] (Including outside

## 2022-09-12 NOTE — PATIENT INSTRUCTIONS
Personalized Preventive Plan for Bj Del Castillo - 9/12/2022  Medicare offers a range of preventive health benefits. Some of the tests and screenings are paid in full while other may be subject to a deductible, co-insurance, and/or copay. Some of these benefits include a comprehensive review of your medical history including lifestyle, illnesses that may run in your family, and various assessments and screenings as appropriate. After reviewing your medical record and screening and assessments performed today your provider may have ordered immunizations, labs, imaging, and/or referrals for you. A list of these orders (if applicable) as well as your Preventive Care list are included within your After Visit Summary for your review. Other Preventive Recommendations:    A preventive eye exam performed by an eye specialist is recommended every 1-2 years to screen for glaucoma; cataracts, macular degeneration, and other eye disorders. A preventive dental visit is recommended every 6 months. Try to get at least 150 minutes of exercise per week or 10,000 steps per day on a pedometer . Order or download the FREE \"Exercise & Physical Activity: Your Everyday Guide\" from [employer] on Aging. Call 6-341.822.4574 or search [employer] online. You need 4857-3695 mg of calcium and 3276-6164 IU of vitamin D per day. It is possible to meet your calcium requirement with diet alone, but a vitamin D supplement is usually necessary to meet this goal.  When exposed to the sun, use a sunscreen that protects against both UVA and UVB radiation with an SPF of 30 or greater. Reapply every 2 to 3 hours or after sweating, drying off with a towel, or swimming. Always wear a seat belt when traveling in a car. Always wear a helmet when riding a bicycle or motorcycle.       [employer] RESOURCES      AGENCY PHONE 5499 [facility] of [address] [place] [address] Street [phone] 24-hr Crisis Hotline, [employer] Resource Information   [facility] at the [address] [facility] on Aging  [phone] Aging and [address] for Singles/Families   [employer] [phone] Home Delivered Meals   Meals on Wheels [place] [phone] Transportation for [facility] Residents   [address] Transportation for [facility] and Disabled    986 [facility] [phone] Transportation for [address] [phone] Transportation for [employer], Seniors, Disabled   Help Me Grow [phone] Child Development    Resource Mothers [phone] Support for Woman During and After Pregnancy   Ronen  [address], [address] for 1325 [facility] for [address]  [phone] Prescription Drug Assistance through 4201 [facility] [address] of the [address] [place] [address] Diabetes Education [phone] Diabetes Self-Management Education and [address] [phone] [address]   [address] [phone] [address] [facility] [phone] Drug and Alcohol Treatment   [address] [facility] [phone] Drug and Alcohol Treatment   [facility] [phone] Drug, Alcohol and Gambling Treatment    [facility]  [phone] Drug, Alcohol and [address] and Outpatient Treatment, 24 Hour Crisis and Emergency Screening   [address] for children and families   [facility] [address] of 600 [address] [facility] Individual, Group, Family Mental Health Counseling     [facility] [phone] Mental Health, Substance Abuse, [address] [phone]  Individual, Group, 615 [facility] Tobacco Treatment [phone] Stop Smoking Program   Kristan, sia.b.a. [address], [address], [facility] and 3131 [facility] makes every attempt to offer choices when  providing information and referrals to meet your health care needs. This list is not meant to be all inclusive of the service providers in the community but a generalized list of some services available; therefore, a service provider you may be interested in might not be listed. You, as a health care consumer, have the right to choose any service provider that meets your needs. If you do not see the service provider of your choice, please ask to speak with your physician practice .

## 2022-09-16 ENCOUNTER — TELEPHONE (OUTPATIENT)
Dept: CASE MANAGEMENT | Age: 57
End: 2022-09-16

## 2022-09-16 ENCOUNTER — HOSPITAL ENCOUNTER (OUTPATIENT)
Dept: OTHER | Age: 57
Setting detail: THERAPIES SERIES
Discharge: HOME | End: 2022-09-16
Payer: MEDICARE

## 2022-09-16 NOTE — ACP (ADVANCE CARE PLANNING)
Advance Care Planning   Advance Care Planning Note  Ambulatory Spiritual Care Services    Date:  9/12/2022    Received request from [facility] Provider. Consultation conversation participants:   Patient who understands ACP conversation     Goals of ACP Conversation:  Discuss advance care planning documents    Health Care Decision Makers:      Primary Decision Maker: Kirit Gonzalez - Domestic Partner - [phone]    Secondary Decision Maker: Ayanna Gaxiola - Brother/Sister - [phone]   Summary:  Completed [facility]    Advance Care Planning Documents (Patient Wishes)  Currently on file:   None    Assessment: Irving Reese is here completing living will/POA making decisions so they do not have to. Interventions:  Provided education on documents for clarity and greater understanding  Assisted in the completion of documents according to patient's wishes at this time  Encouraged ongoing ACP conversation with future decision makers and loved ones    Care Preferences Communicated:     Hospitalization:  If the patient's health worsens and it becomes clear that the chance of recovery is unlikely,     the patient prefers comfort-focused treatment without hospitalization. If available Irving Reese would want to go home. Ventilation:   If the patient, in their present state of health, suddenly became very ill and unable to breathe on their own,     the patient would desire the use of a ventilator (breathing machine). If their health worsens and it becomes clear that the change of recovery is unlikely,  Irving Reese would never want a trach. If placed on ventilator short term only. Resuscitation:  In the event the patient's heart stopped as a result of an underlying serious health condition, the patient communicates a preference for      resuscitative attempts (CPR).     Outcomes:  Returned original document(s) to patient, as well as copies for distribution to appointed agents  Copy of advance directive given to staff to scan into medical record. Routed ACP note to attending provider or other IDT member.     Patient / Healthcare Decision Maker Instructions:  Review completed ACP document(s) and update, if needed, with changes health or future preferences    Electronically signed by Avni Salmeron [facility] on 9/16/2022 at 11:49 AM.

## 2022-09-16 NOTE — TELEPHONE ENCOUNTER
Call placed this morning to [facility] and message left regarding obtaining information on treatment with doctor there. No return call received yet.

## 2022-09-19 ENCOUNTER — OFFICE VISIT (OUTPATIENT)
Dept: ONCOLOGY | Age: 57
End: 2022-09-19
Payer: MEDICARE

## 2022-09-19 ENCOUNTER — HOSPITAL ENCOUNTER (OUTPATIENT)
Dept: INFUSION THERAPY | Age: 57
Discharge: HOME | End: 2022-09-19
Payer: MEDICARE

## 2022-09-19 ENCOUNTER — TELEPHONE (OUTPATIENT)
Dept: CASE MANAGEMENT | Age: 57
End: 2022-09-19

## 2022-09-19 VITALS
SYSTOLIC BLOOD PRESSURE: 111 MMHG | DIASTOLIC BLOOD PRESSURE: 65 MMHG | BODY MASS INDEX: 34.91 KG/M2 | WEIGHT: 217.2 LBS | TEMPERATURE: 98 F | HEIGHT: 66 IN | HEART RATE: 85 BPM | OXYGEN SATURATION: 98 %

## 2022-09-19 DIAGNOSIS — C61 PROSTATE CANCER (HCC): ICD-10-CM

## 2022-09-19 DIAGNOSIS — C61 PROSTATE CANCER (HCC): Primary | ICD-10-CM

## 2022-09-19 LAB
ALBUMIN SERPL-MCNC: 4.4 G/DL (ref 3.5–5.2)
ALP BLD-CCNC: 284 U/L (ref 40–129)
ALT SERPL-CCNC: 19 U/L (ref 0–40)
ANION GAP SERPL CALCULATED.3IONS-SCNC: 13 MMOL/L (ref 7–16)
AST SERPL-CCNC: 20 U/L (ref 0–39)
BASOPHILS ABSOLUTE: 0.07 E9/L (ref 0–0.2)
BASOPHILS RELATIVE PERCENT: 1.2 % (ref 0–2)
BILIRUB SERPL-MCNC: 0.7 MG/DL (ref 0–1.2)
BUN BLDV-MCNC: 27 MG/DL (ref 6–20)
CALCIUM SERPL-MCNC: 9.2 MG/DL (ref 8.6–10.2)
CHLORIDE BLD-SCNC: 105 MMOL/L (ref 98–107)
CO2: 22 MMOL/L (ref 22–29)
CREAT SERPL-MCNC: 1.2 MG/DL (ref 0.7–1.2)
EOSINOPHILS ABSOLUTE: 0.28 E9/L (ref 0.05–0.5)
EOSINOPHILS RELATIVE PERCENT: 4.6 % (ref 0–6)
GFR AFRICAN AMERICAN: >60
GFR NON-AFRICAN AMERICAN: >60 ML/MIN/1.73
GLUCOSE BLD-MCNC: 111 MG/DL (ref 74–99)
HCT VFR BLD CALC: 45.4 % (ref 37–54)
HEMOGLOBIN: 16 G/DL (ref 12.5–16.5)
IMMATURE GRANULOCYTES #: 0.05 E9/L
IMMATURE GRANULOCYTES %: 0.8 % (ref 0–5)
LYMPHOCYTES ABSOLUTE: 1.56 E9/L (ref 1.5–4)
LYMPHOCYTES RELATIVE PERCENT: 25.7 % (ref 20–42)
MCH RBC QN AUTO: 29.9 PG (ref 26–35)
MCHC RBC AUTO-ENTMCNC: 35.2 % (ref 32–34.5)
MCV RBC AUTO: 84.7 FL (ref 80–99.9)
MONOCYTES ABSOLUTE: 0.55 E9/L (ref 0.1–0.95)
MONOCYTES RELATIVE PERCENT: 9.1 % (ref 2–12)
NEUTROPHILS ABSOLUTE: 3.55 E9/L (ref 1.8–7.3)
NEUTROPHILS RELATIVE PERCENT: 58.6 % (ref 43–80)
PDW BLD-RTO: 12.9 FL (ref 11.5–15)
PLATELET # BLD: 200 E9/L (ref 130–450)
PMV BLD AUTO: 11 FL (ref 7–12)
POTASSIUM SERPL-SCNC: 4 MMOL/L (ref 3.5–5)
PROSTATE SPECIFIC ANTIGEN: 7.57 NG/ML (ref 0–4)
RBC # BLD: 5.36 E12/L (ref 3.8–5.8)
SODIUM BLD-SCNC: 140 MMOL/L (ref 132–146)
TOTAL PROTEIN: 7.9 G/DL (ref 6.4–8.3)
WBC # BLD: 6.1 E9/L (ref 4.5–11.5)

## 2022-09-19 PROCEDURE — 99214 OFFICE O/P EST MOD 30 MIN: CPT

## 2022-09-19 PROCEDURE — 84153 ASSAY OF PSA TOTAL: CPT

## 2022-09-19 PROCEDURE — 80053 COMPREHEN METABOLIC PANEL: CPT

## 2022-09-19 PROCEDURE — 99205 OFFICE O/P NEW HI 60 MIN: CPT | Performed by: INTERNAL MEDICINE

## 2022-09-19 PROCEDURE — 85025 COMPLETE CBC W/AUTO DIFF WBC: CPT

## 2022-09-19 RX ORDER — ENZALUTAMIDE 40 MG/1
4 TABLET ORAL DAILY
COMMUNITY

## 2022-09-19 NOTE — PROGRESS NOTES
Department of [facility] Oncology   Attending Consult Note    Reason for Visit: Consultation on a patient with Prostate Cancer    Referring Physician: Dorys León DO    PCP:  Lupis Briceño DO    History of Present Illness:  58-year-old male who was noted to have elevated PSA   Evaluated by Urology team ([facility])    PSA 9.20 on 03/31/2021  PSA 26.39 on 05/31/2022  PSA 31.83 on 06/20/2022    On 08/04/2022:  A. Prostate, left base, biopsy: Prostatic adenocarcinoma (acinar, not otherwise specified). Andrew Score: 4+3=7; cribriform pattern absent; percentage of pattern 4 is approximately 60%   Grade Group:  3   Tissue Cores Involved by Carcinoma: 2 of 2   Percentage of Tissue Involved by Carcinoma: 10%   Perineural Invasion: Present     B. Prostate, left mid, biopsy: Prostatic adenocarcinoma (acinar, not otherwise specified) and intraductal carcinoma. Andrew Score: 4+5=9; cribriform pattern present   Grade Group:  5   Tissue Cores Involved by Carcinoma: 2 of 2   Percentage of Tissue Involved by Carcinoma: 80%   Perineural Invasion: Present     C. Prostate, left apex, biopsy: Prostatic adenocarcinoma (acinar, not otherwise specified). Andrew Score: 4+4 = 8; cribriform pattern present   Grade Group:  4   Tissue Cores Involved by Carcinoma: 2 of 2   Percentage of Tissue Involved by Carcinoma: 50%   Perineural Invasion: Present     D. Prostate, right base, biopsy: Prostatic adenocarcinoma (acinar, not otherwise specified) and intraductal carcinoma. Andrew Score: 4+3 = 7; cribriform pattern present; percentage of pattern 4 is approximately 80%   Grade Group:  3   Tissue Cores Involved by Carcinoma: 3 of 3   Percentage of Tissue Involved by Carcinoma: 90%   Perineural Invasion: Present     E. 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    Andrew Score: 4+4=8; cribriform pattern present   Grade Group:  4   Tissue Cores Involved by Carcinoma: 3 of 3   Percentage of Tissue Involved by Carcinoma: >90%   Perineural Invasion: Not identified     F. Prostate, right apex, biopsy: Prostatic adenocarcinoma (acinar, not otherwise specified) and intraductal carcinoma. [place] Score: 4+4 = 8; cribriform pattern present   Grade Group:  4   Tissue Cores Involved by Carcinoma: 3 of 3   Percentage of Tissue Involved by Carcinoma: 80%   Perineural Invasion: Present     Comment: In part A, carcinoma is seen involving fibroadipose tissue. Extraprostatic extension cannot be excluded. P63 immunostain is performed on Parts B, D, E, and F and shows the presence of basal cells around the intraductal carcinoma in the absence of basal cells in the   areas of invasive carcinoma. The percentage of tissue involved listed above reflects the total percentage involved by invasive and intraductal carcinoma. Intradepartmental consultation is obtained. CT abdomen/pelvis 8/23/2022 enlarged prostate gland with impingement upon the lymph node to bladder base by an irregular portion of the enlarged gland. Enlarged pelvic and inferior retroperitoneal lymph nodes  Diffuse thickening of the walls of the urinary bladder with surrounding  Small right anterior abdominal wall hernia, which contains fat   Small umbilical hernia, which contains fat. Small bilateral inguinal hernias, left slightly greater than right and both contain fat. Bone scan 08/23/2022:  Findings consistent with widespread metastatic disease    Left Femur/Pelvis X-Ray 09/07/2022: unremarkable left hip and femur. En Row was started on 09/02/2022 with fair tolerance so far. Eligard 22.5 mg was given on 09/14/2022  Referred to Medical Oncology clinic for further evaluation and treatment    Review of Systems;  CONSTITUTIONAL: No fever, chills. Fair appetite and energy level. ENMT: Eyes: No diplopia; Nose: No epistaxis. Mouth: No sore throat.   RESPIRATORY: No hemoptysis, shortness of breath, cough.   CARDIOVASCULAR: No chest pain, palpitations. GASTROINTESTINAL: No nausea/vomiting, abdominal pain  GENITOURINARY: See HPI  NEURO: No syncope, presyncope, headache.   Remainder:  ROS NEGATIVE    Past Medical History:      Diagnosis Date    Allergic rhinitis     Anticoagulant long-term use     Anxiety     Atypical angina ([facility])     CAD (coronary artery disease)     Carotid artery stenosis     Cerebral artery occlusion with cerebral infarction ([facility] [address] 2012    CHF (congestive heart failure) ([facility])     COPD (chronic obstructive pulmonary disease) ([facility])     CVA (cerebral infarction) 11/19/2012    Depression     Diverticulitis 2010    Sigmoid abscess, s/p colectomy    Erectile dysfunction     GERD (gastroesophageal reflux disease)     Heart attack ([facility] [address] 2014    Hx of blood clots     Hyperlipemia     Hypertension     Ischemic cardiomyopathy     Left atrial thrombus     Obesity     Type 2 diabetes mellitus without complication, without long-term current use of insulin ([facility])     Type II or unspecified type diabetes mellitus without mention of complication, not stated as uncontrolled      Past Surgical History:      Procedure Laterality Date    ABDOMEN SURGERY      CARDIAC CATHETERIZATION  3/30/2015    EF 35%, Dr. Jessie Contreras, [facility]  4/1/2015    left chest, Dual Chamber-[place], Dr. Abdiel Owen, [place] [address]  1/29/2010    laparoscopic, converted to open sigmoid colon resection (diverticular disease), Dr. Van Otero, [address] [facility]  12/5/2005    diverticulosis, Dr. Van Otero, [facility]    COLONOSCOPY  9/4/2008    diverticulosis, Dr. Van Otero, [facility]    COLONOSCOPY  1/28/2010    prior to colon surgery, diverticulosis, Dr. Van Otero, [facility]    COLONOSCOPY  08/07/2015    sigmoid diverticulosis, rectal polyp bx/cauterized (hyperplastic polyp), Dr. Kasi Lino, [address]  12/6/2014    LAD PROX [place] 3.5 x 33, Dr. Noemi Del Valle, [facility]    CYSTOURETHROSCOPY  1/29/2010    placement bilateral ureteral stents for colon resection, Dr. Ye Bella, [address]    right, [address] [facility]      pacer defib    PROSTATE BIOPSY N/A 2022    PROSTATE BIOPSY TRANSRECATAL Leonid Su ([id number]) performed by Lilliana Smith MD at [place] [address]  2015    severe duodenitis, mild to moderate gastritis (bx for H pylori), submucosal benign mass body stomach, mild reflux esophagitis, Dr. Luis Felipe Wu, [facility]    VASCULAR SURGERY       Family History:  Family History   Problem Relation Age of Onset    High Blood Pressure Father     Heart Disease Father         MI stents    Stroke Sister     Cancer Sister         Breast    No Known Problems Brother     No Known Problems Brother     Heart Disease Paternal Uncle      Medications:  Reviewed and reconciled.     Social History:  Social History     Socioeconomic History    Marital status:      Spouse name: Not on file    Number of children: Not on file    Years of education: Not on file    Highest education level: Not on file   Occupational History    Not on file   Tobacco Use    Smoking status: Former     Packs/day: 2.00     Years: 30.00     Pack years: 60.00     Types: Cigarettes     Start date: 0     Quit date: 2014     Years since quittin.3    Smokeless tobacco: Current     Types: Chew    Tobacco comments:     occ chewing tobacco, trying to quit   Vaping Use    Vaping Use: Never used   Substance and Sexual Activity    Alcohol use: Not Currently    Drug use: No    Sexual activity: Never   Other Topics Concern    Not on file   Social History Narrative    Not on file     Social Determinants of Health     Financial Resource Strain: Low Risk     Difficulty of Paying Living Expenses: Not hard at all   Food Insecurity: No Food Insecurity    Worried About [address] in the Last Year: Never true    [employer] in the Last Year: Never true   Transportation Needs: No Transportation Needs    Lack of Transportation (Medical): No    Lack of Transportation (Non-Medical): No   Physical Activity: Insufficiently Active    Days of Exercise per Week: 2 days    Minutes of Exercise per Session: 20 min   Stress: Not on file   Social Connections: Not on file   Intimate Partner Violence: Not on file   Housing Stability: Not on file     Allergies:  No Known Allergies    Physical Exam:  /65   Pulse 85   Temp 98 °F (36.7 °C)   Ht 5' 6\" (1.676 m)   Wt 217 lb 3.2 oz (98.5 kg)   SpO2 98%   BMI 35.06 kg/m²   GENERAL: Alert, oriented x 3, not in acute distress. HEENT: PERRLA; EOMI. Oropharynx clear. NECK: Supple. Without lymphadenopathy. LUNGS: Good air entry bilaterally. No wheezing, crackles or ronchi. CARDIOVASCULAR: Regular rate. No murmurs, rubs or gallops. ABDOMEN: Soft. Non-tender, non-distended  EXTREMITIES: Without clubbing, cyanosis, or edema. NEUROLOGIC: No focal deficits. ECOG PS 1    Diagnostics:  Lab Results   Component Value Date    PSA 31.83 (H) 06/20/2022    PSA 26.39 (H) 05/31/2022    PSA 9.20 (H) 03/31/2021     Impression/Plan:  67-year-old male with metastatic prostate cancer     Evaluated by Urology team (Dr. Jorge Armando Shayne)  PSA 9.20 on 03/31/2021  PSA 26.39 on 05/31/2022  PSA 31.83 on 06/20/2022    On 08/04/2022:  A. Prostate, left base, biopsy: Prostatic adenocarcinoma (acinar, not otherwise specified). [place] Score: 4+3=7; cribriform pattern absent; percentage of pattern 4 is approximately 60%   Grade Group:  3   Tissue Cores Involved by Carcinoma: 2 of 2   Percentage of Tissue Involved by Carcinoma: 10%   Perineural Invasion: Present     B. Prostate, left mid, biopsy: Prostatic adenocarcinoma (acinar, not otherwise specified) and intraductal carcinoma.    [place]cox Score: 4+5=9; cribriform pattern present   Grade Group:  5   Tissue Cores Involved by Carcinoma: 2 of 2   Percentage of Tissue Involved by Carcinoma: 80%   Perineural Invasion: Present     C. Prostate, left apex, biopsy: Prostatic adenocarcinoma (acinar, not otherwise specified). Andrew Score: 4+4 = 8; cribriform pattern present   Grade Group:  4   Tissue Cores Involved by Carcinoma: 2 of 2   Percentage of Tissue Involved by Carcinoma: 50%   Perineural Invasion: Present     D. Prostate, right base, biopsy: Prostatic adenocarcinoma (acinar, not otherwise specified) and intraductal carcinoma. Andrew Score: 4+3 = 7; cribriform pattern present; percentage of pattern 4 is approximately 80%   Grade Group:  3   Tissue Cores Involved by Carcinoma: 3 of 3   Percentage of Tissue Involved by Carcinoma: 90%   Perineural Invasion: Present     E. 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 Andrew Score: 4+4=8; cribriform pattern present   Grade Group:  4   Tissue Cores Involved by Carcinoma: 3 of 3   Percentage of Tissue Involved by Carcinoma: >90%   Perineural Invasion: Not identified     F. Prostate, right apex, biopsy: Prostatic adenocarcinoma (acinar, not otherwise specified) and intraductal carcinoma. [place] Score: 4+4 = 8; cribriform pattern present   Grade Group:  4   Tissue Cores Involved by Carcinoma: 3 of 3   Percentage of Tissue Involved by Carcinoma: 80%   Perineural Invasion: Present     Comment: In part A, carcinoma is seen involving fibroadipose tissue. Extraprostatic extension cannot be excluded. P63 immunostain is performed on Parts B, D, E, and F and shows the presence of basal cells around the intraductal carcinoma in the absence of basal cells in the   areas of invasive carcinoma. The percentage of tissue involved listed above reflects the total percentage involved by invasive and intraductal carcinoma. Intradepartmental consultation is obtained.      NGS testing ([employer]):  MS-Stable: No therapies or clinical trials  TMB 0 Muts/Mb: No therapies or clinical trials  No therapies or clinical trials are associated with the genomic findings for the sample    CT abdomen/pelvis 8/23/2022 enlarged prostate gland with impingement upon the lymph node to bladder base by an irregular portion of the enlarged gland. Enlarged pelvic and inferior retroperitoneal lymph nodes  Diffuse thickening of the walls of the urinary bladder with surrounding  Small right anterior abdominal wall hernia, which contains fat   Small umbilical hernia, which contains fat. Small bilateral inguinal hernias, left slightly greater than right and both contain fat. Bone scan 08/23/2022:  Findings consistent with widespread metastatic disease    Left Femur/Pelvis X-Ray 09/07/2022: unremarkable left hip and femur. Cathlyn Baldy was started on 09/02/2022 with fair tolerance so far. Eligard 22.5 mg was given on 09/14/2022  Labs ordered today including CBC, CMP and PSA  CT chest ordered   XGEVA every 4 weeks recommended given bone metastasis. Side effects reviewed. He agreed to proceed. Authorization to be obtained. RTC when authorization obtained for [place].  Continue XTANDI    Thank you for allowing us to participate in the care of Mr. Jeri Ahuja MD   9/19/2022

## 2022-09-19 NOTE — TELEPHONE ENCOUNTER
Met with patient and his girlfriend, Maryuri Silva, during his  initial consultation with Dr. Sherly Figueredo  for his recent prostate cancer diagnosis. Introduced myself and explained my role with patients receiving treatment at our center. Patient was friendly and receptive. Instructed on next steps including CT chest, lab work and xgeva, once authorized, per Dr. Sherly Figueredo' recommendations and follow up care. Provided patient with transportation resource list and literature on [employer] and Patient Resource Prostate Cancer booklet. Reviewed resources available to him  such as Social Work and Dietitian. Patient denies any needs at this time. New patient nursing assessment, medical history, surgical history, family history completed. Medication list reviewed and updated. Provided with my contact information and instructed patient to call me with questions or concerns. Verbalizes understanding. Patient appreciative of visit. Will continue to follow.

## 2022-09-19 NOTE — PROGRESS NOTES
Edmond Serum  1965 61 y.o. Referring Physician: Dr. Crissy Phelan    PCP: Sheba Lloyd, DO    Vitals:    09/19/22 1424   BP: 111/65   Pulse: 85   Temp: 98 °F (36.7 °C)   SpO2: 98%        Wt Readings from Last 3 Encounters:   09/19/22 217 lb 3.2 oz (98.5 kg)   09/12/22 218 lb (98.9 kg)   09/06/22 217 lb (98.4 kg)        Body mass index is 35.06 kg/m². Chief Complaint:   Chief Complaint   Patient presents with    Prostate Cancer         Cancer Staging  No matching staging information was found for the patient. Prior Radiation Therapy? NO    Concurrent Chemo/radiation? NO    Prior Chemotherapy? NO    Prior Hormonal Therapy? NO    Head and Neck Cancer? No, patient does NOT have HN cancer.       Current Outpatient Medications:     Enzalutamide (XTANDI) 40 MG TABS, Take 4 tablets by mouth daily, Disp: , Rfl:     Leuprolide Acetate, 3 Month, (ELIGARD) 22.5 MG KIT, Inject 22.5 mg into the skin once, Disp: , Rfl:     bumetanide (BUMEX) 1 MG tablet, Take 1 tablet by mouth daily, Disp: 30 tablet, Rfl: 3    metFORMIN (GLUCOPHAGE) 500 MG tablet, Take 1 tablet by mouth 2 times daily (with meals), Disp: 180 tablet, Rfl: 1    spironolactone (ALDACTONE) 25 MG tablet, Take 1 tablet by mouth daily, Disp: 30 tablet, Rfl: 5    metoprolol succinate (TOPROL XL) 50 MG extended release tablet, Take 1 tablet by mouth daily, Disp: 30 tablet, Rfl: 5    sacubitril-valsartan (ENTRESTO)  MG per tablet, Take 1 tablet by mouth 2 times daily, Disp: 60 tablet, Rfl: 5    clopidogrel (PLAVIX) 75 MG tablet, Take 1 tablet by mouth daily, Disp: 30 tablet, Rfl: 5    pantoprazole (PROTONIX) 40 MG tablet, Take 1 tablet by mouth daily, Disp: 30 tablet, Rfl: 5    isosorbide mononitrate (IMDUR) 30 MG extended release tablet, Take 3 tablets by mouth daily (Patient taking differently: 30 mg in the morning, at noon, and at bedtime), Disp: 90 tablet, Rfl: 5    atorvastatin (LIPITOR) 80 MG tablet, Take 1 tablet by mouth nightly, Disp: 90 tablet, Rfl: 1    tamsulosin (FLOMAX) 0.4 mg capsule, Take 1 capsule by mouth daily, Disp: 90 capsule, Rfl: 1    fluticasone (FLONASE) 50 MCG/ACT nasal spray, 2 sprays by Each Nostril route daily, Disp: 48 g, Rfl: 1    aspirin 81 MG EC tablet, Take 1 tablet by mouth daily, Disp: 90 tablet, Rfl: 1       Past Medical History:   Diagnosis Date    Allergic rhinitis     Anticoagulant long-term use     Anxiety     Atypical angina (HCC)     CAD (coronary artery disease)     Carotid artery stenosis     Cerebral artery occlusion with cerebral infarction ([facility] 2012    CHF (congestive heart failure) (HCC)     COPD (chronic obstructive pulmonary disease) ([facility]     CVA (cerebral infarction) 11/19/2012    Depression     Diverticulitis 2010    Sigmoid abscess, s/p colectomy    Erectile dysfunction     GERD (gastroesophageal reflux disease)     Heart attack ([facility] 2014    Hx of blood clots     Hyperlipemia     Hypertension     Ischemic cardiomyopathy     Left atrial thrombus     Obesity     Prostate cancer ([facility] 9/19/2022    Type 2 diabetes mellitus without complication, without long-term current use of insulin ([facility])     Type II or unspecified type diabetes mellitus without mention of complication, not stated as uncontrolled        Past Surgical History:   Procedure Laterality Date    ABDOMEN SURGERY      CARDIAC CATHETERIZATION  3/30/2015    EF 35%, Dr. Lauren Martinez, [address] [place] [address]  4/1/2015    left chest, [facility] [employer] [facility], [place] [address]  1/29/2010    laparoscopic, converted to open sigmoid colon resection (diverticular disease), Dr. Marlowe Paget, [facility]    COLONOSCOPY  12/5/2005    diverticulosis, Dr. Marlowe Paget, [facility]    COLONOSCOPY  9/4/2008    diverticulosis, Dr. Marlowe Paget, [facility]    COLONOSCOPY  1/28/2010    prior to colon surgery, diverticulosis, Dr. Marlowe Paget, [facility]    COLONOSCOPY  08/07/2015    sigmoid diverticulosis, rectal polyp bx/cauterized (hyperplastic polyp), Dr. Kobi Avery, [address] [place] [address] ANGIOPLASTY WITH STENT PLACEMENT  2014    LAD PROX [place] 3.5 x 33, Dr. Radha Christopher, [facility]    CYSTOURETHROSCOPY  2010    [facility] bilateral [facility] for colon [facility]    [facility]      pacer defib    PROSTATE BIOPSY N/A 2022    PROSTATE BIOPSY TRANSRECATAL Lenoid Ala ([id number] [facility]) performed by iLlliana Smith MD at [address] [place] [address]  2015    severe duodenitis, mild to moderate gastritis (bx for H pylori), submucosal benign mass body stomach, mild reflux esophagitis, Dr. Luis Felipe Wu, [facility]    VASCULAR SURGERY         Family History   Problem Relation Age of Onset    High Blood Pressure Father     Heart Disease Father         MI stents    Stroke Sister     Cancer Sister         Breast    No Known Problems Brother     No Known Problems Brother     Heart Disease Paternal Uncle        Social History     Socioeconomic History    Marital status:      Spouse name: Not on file    Number of children: Not on file    Years of education: Not on file    Highest education level: Not on file   Occupational History    Not on file   Tobacco Use    Smoking status: Former     Packs/day: 2.00     Years: 30.00     Pack years: 60.00     Types: Cigarettes     Start date: 0     Quit date: 2014     Years since quittin.3    Smokeless tobacco: Current     Types: Chew    Tobacco comments:     occ chewing tobacco, trying to quit   Vaping Use    Vaping Use: Never used   Substance and Sexual Activity    Alcohol use: Not Currently    Drug use: No    Sexual activity: Never   Other Topics Concern    Not on file   Social History Narrative    Not on file     Social Determinants of Health     Financial Resource Strain: Low Risk     Difficulty of Paying Living Expenses: Not hard at all   Food Insecurity: No Food Insecurity    Worried About Running Out of Food in the Last Year: Never true Ran Out of Food in the Last Year: Never true   Transportation Needs: No Transportation Needs    Lack of Transportation (Medical): No    Lack of Transportation (Non-Medical): No   Physical Activity: Insufficiently Active    Days of Exercise per Week: 2 days    Minutes of Exercise per Session: 20 min   Stress: Not on file   Social Connections: Not on file   Intimate Partner Violence: Not on file   Housing Stability: Not on file           Occupation: [employer] previously  Retired:  NO          REVIEW OF SYSTEMS: <<For Level 5, 10 or more systems>>     Pacemaker/Defibulator/ICD:  Yes - left chest    Mediport: No           FALLS RISK SCREENING ASSESSMENT    Instructions:  Assess the patient and [place] the appropriate indicators that are present for fall risk identification. Total the numbers circled and assign a fall risk score from Table 2.  Reassess patient at a minimum every 12 weeks or with status change. Assessment   Date  9/19/2022     1. Mental Ability: confusion/cognitively impaired No - 0       2. Elimination Issues: incontinence, frequency No - 0       3. Ambulatory: use of assistive devices (walker, cane, off-loading devices), attached to equipment (IV pole, oxygen) Yes - 2  Rarely   4. Sensory Limitations: dizziness, vertigo, impaired vision No - 0       5. Age Less than 65 years - 0       6. Medication: diuretics, strong analgesics, hypnotics, sedatives, antihypertensive agents   Yes - 3   7. Falls:  recent history of falls within the last 3 months (not to include slipping or tripping)   No - 0   TOTAL 5    If score of 4 or greater was education given? Yes       TABLE 2   Risk Score Risk Level Plan of Care   0-3 Little or  No Risk 1. Provide assistance as indicated for ambulation activities  2. Reorient confused/cognitively impaired patient  3. Call-light/bell within patient's reach  4.   Chair/bed in low position, stretcher/bed with siderails up except when performing patient care activities  5. Educate patient/family/caregiver on falls prevention  6.  Reassess in 12 weeks or with any noted change in patient condition which places them at a risk for a fall   4-6 Moderate Risk 1. Provide assistance as indicated for ambulation activities  2. Reorient confused/cognitively impaired patient  3. Call-light/bell within patient's reach  4. Chair/bed in low position, stretcher/bed with siderails up except when performing patient care activities  5. Educate patient/family/caregiver on falls prevention  6. Falls risk precaution (Yellow sticker Level II) placed on patient chart   7 or   Higher High Risk 1. Place patient in easily observable treatment room  2. Patient attended at all times by family member or staff  3. Provide assistance as indicated for ambulation activities  4. Reorient confused/cognitively impaired patient  5. Call-light/bell within patient's reach  6. Chair/bed in low position, stretcher/bed with siderails up except when performing patient care activities  7. Educate patient/family/caregiver on falls prevention  8. Falls risk precaution (Yellow sticker Level III) placed on patient chart           MALNUTRITION RISK SCREENING ASSESSMENT    Instructions:  Assess the patient and enter the appropriate indicators that are present for nutrition risk identification. Total the numbers entered and assign a risk score. Follow the appropriate action for total score listed below. Assessment   Date  9/19/2022     Have you lost weight without trying? 0- No     Have you been eating poorly because of a decreased appetite? 0- No   3. Do you have a diagnosis of head and neck cancer? 0- No                                                                                    TOTAL 0        Score of 0-1: No action  Score 2 or greater:   For Non-Diabetic Patient: Recommend adding Ensure Enlive 2 x daily and provide patient with Ensure wellness bag with coupons  For Diabetic Patient: Recommend adding Glucerna Shake 2 x daily and provide patient with Glucerna Wellness bag with coupons  Route to the dietitian via [address]    Are you having  difficulty performing daily routine tasks  due to fatigue or weakness (ie: bathing/showering, dressing, housework, meal prep, work, child Dave Julian): No     Do you have any arm flexibility/ROM restrictions, swelling or pain that limit activity: No     Any changes in memory, attention/focus that impact daily activities: No     Do you avoid participation in leisure/social activity due weakness, fatigue or pain: No     ARE ANY OF THE ABOVE ARE ANSWERED YES: No          PT ASSESSMENT FOR REFERRAL    Have you had any recent falls in past 2 months: No     Do you have difficulty  going up/down stairs: No     Are you having difficulty walking: No     Do you often hold onto furniture/environmental supports or feel off balance when you are walking: No     Do you need to take rest breaks when you are walking: No     Any pain on scale of 1-10 that limits your mobility: No 0/10    ARE ANY OF THE ABOVE ARE ANSWERED YES: No         PREHAB AUDIOLOGY REFERRAL    - Is patient planned to receive Cisplatin? No. This patient is not planned to start Cisplatin. - Is patient complaining of new onset hearing loss? No. Patient is not complaining of new onset hearing loss.         Simon Chaudhry RN

## 2022-09-21 ENCOUNTER — TELEPHONE (OUTPATIENT)
Dept: CASE MANAGEMENT | Age: 57
End: 2022-09-21

## 2022-09-21 NOTE — TELEPHONE ENCOUNTER
Called and spoke with patient regarding scheduling his CT of chest.  Patient request that this staff member call and schedule. Call placed to central scheduling and appointment was made for 9/28/22 at 8:30 with arrival time at 8:15. Patient is to be nothing to eat or drink for 3 hours prior and he was to hold his metformin for 2 days after test.  Patient was called back and informed of above. All questions answered.

## 2022-09-23 ENCOUNTER — HOSPITAL ENCOUNTER (OUTPATIENT)
Dept: OTHER | Age: 57
Setting detail: THERAPIES SERIES
Discharge: HOME | End: 2022-09-23
Payer: MEDICARE

## 2022-09-23 VITALS
BODY MASS INDEX: 35.67 KG/M2 | RESPIRATION RATE: 18 BRPM | SYSTOLIC BLOOD PRESSURE: 129 MMHG | DIASTOLIC BLOOD PRESSURE: 70 MMHG | HEART RATE: 72 BPM | OXYGEN SATURATION: 99 % | WEIGHT: 221 LBS

## 2022-09-23 LAB
ANION GAP SERPL CALCULATED.3IONS-SCNC: 14 MMOL/L (ref 7–16)
BUN BLDV-MCNC: 29 MG/DL (ref 6–20)
CALCIUM SERPL-MCNC: 9.9 MG/DL (ref 8.6–10.2)
CHLORIDE BLD-SCNC: 104 MMOL/L (ref 98–107)
CO2: 24 MMOL/L (ref 22–29)
CREAT SERPL-MCNC: 1.2 MG/DL (ref 0.7–1.2)
GFR AFRICAN AMERICAN: >60
GFR NON-AFRICAN AMERICAN: >60 ML/MIN/1.73
GLUCOSE BLD-MCNC: 166 MG/DL (ref 74–99)
POTASSIUM SERPL-SCNC: 4 MMOL/L (ref 3.5–5)
PRO-BNP: 795 PG/ML (ref 0–125)
SODIUM BLD-SCNC: 142 MMOL/L (ref 132–146)

## 2022-09-23 PROCEDURE — 83880 ASSAY OF NATRIURETIC PEPTIDE: CPT

## 2022-09-23 PROCEDURE — 96374 THER/PROPH/DIAG INJ IV PUSH: CPT

## 2022-09-23 PROCEDURE — 80048 BASIC METABOLIC PNL TOTAL CA: CPT

## 2022-09-23 PROCEDURE — 2580000003 HC RX 258: Performed by: INTERNAL MEDICINE

## 2022-09-23 PROCEDURE — 99214 OFFICE O/P EST MOD 30 MIN: CPT

## 2022-09-23 PROCEDURE — 6360000002 HC RX W HCPCS: Performed by: INTERNAL MEDICINE

## 2022-09-23 PROCEDURE — 36415 COLL VENOUS BLD VENIPUNCTURE: CPT

## 2022-09-23 RX ORDER — FUROSEMIDE 10 MG/ML
40 INJECTION INTRAMUSCULAR; INTRAVENOUS ONCE
Status: COMPLETED | OUTPATIENT
Start: 2022-09-23 | End: 2022-09-23

## 2022-09-23 RX ORDER — SODIUM CHLORIDE 0.9 % (FLUSH) 0.9 %
10 SYRINGE (ML) INJECTION PRN
Status: DISCONTINUED | OUTPATIENT
Start: 2022-09-23 | End: 2022-09-24 | Stop reason: HOSPADM

## 2022-09-23 RX ADMIN — FUROSEMIDE 40 MG: 10 INJECTION, SOLUTION INTRAMUSCULAR; INTRAVENOUS at 12:41

## 2022-09-23 RX ADMIN — SODIUM CHLORIDE, PRESERVATIVE FREE 10 ML: 5 INJECTION INTRAVENOUS at 12:42

## 2022-09-23 NOTE — PROGRESS NOTES
Congestive Heart Failure [address]         Bohdan Gowers   1965          Referring Provider: FRANCO RANDLE [facility] NP  Primary Care Physician:   DR. KNIGHT  Cardiologist: DR. Xander Kendrick  Nephrologist:N/A      History of Present Illness:     Bohdan Gowers is a 61 y.o. male with a history of HFrEF, most recent EF 30-35% ON 10/11/2019. Patient Story:    He does have some dyspnea with exertion, shortness of breath, or decline in overall functional capacity. He does  NOT have orthopnea, PND,and nocturnal cough. He does  have some abdominal distention or bloating, early satiety, anorexia/change in appetite. He does have a good urinary response to oral diuretic. He does not have lower extremity edema. He DOES HAVE some lightheadedness, dizziness. He does not complain of palpitations,syncope. He does not complain of chest pain, pressure, discomfort. No Known Allergies        Prior to Visit Medications    Medication Sig Taking?  Authorizing Provider   Enzalutamide (XTANDI) 40 MG TABS Take 4 tablets by mouth daily  Historical Provider, MD   Leuprolide Acetate, 3 Month, (ELIGARD) 22.5 MG KIT Inject 22.5 mg into the skin once  Historical Provider, MD   bumetanide (BUMEX) 1 MG tablet Take 1 tablet by mouth daily  Mechelle Ashford DO   metFORMIN (GLUCOPHAGE) 500 MG tablet Take 1 tablet by mouth 2 times daily (with meals)  Mechelle Ashford DO   spironolactone (ALDACTONE) 25 MG tablet Take 1 tablet by mouth daily  Mechelle Ashford DO   metoprolol succinate (TOPROL XL) 50 MG extended release tablet Take 1 tablet by mouth daily  Mechelle Ashford DO   sacubitril-valsartan (ENTRESTO)  MG per tablet Take 1 tablet by mouth 2 times daily  Mechelle Ashford DO   clopidogrel (PLAVIX) 75 MG tablet Take 1 tablet by mouth daily  Mechelle Ashford DO   pantoprazole (PROTONIX) 40 MG tablet Take 1 tablet by mouth daily  Mechelle Ashford DO   isosorbide mononitrate (IMDUR) 30 MG extended release tablet Take 3 tablets by mouth daily  Patient taking differently: 30 mg in the morning, at noon, and at bedtime  Dorma Rump, DO   atorvastatin (LIPITOR) 80 MG tablet Take 1 tablet by mouth nightly  Dorma Rump, DO   tamsulosin (FLOMAX) 0.4 mg capsule Take 1 capsule by mouth daily  Mariaelena Gordon DO   fluticasone (FLONASE) 50 MCG/ACT nasal spray 2 sprays by Each Nostril route daily  Brenna Espinal MD   aspirin 81 MG EC tablet Take 1 tablet by mouth daily  Feliz Watts MD           Guideline directed medical:  ARNI/ACE I/ARB: Yes  Beta blocker: Yes  Aldosterone antagonist:  YES      Physical Examination:     /70   Pulse 72   Resp 18   Wt 221 lb (100.2 kg)   SpO2 99%   BMI 35.67 kg/m²     Assessment  Charting Type: Shift assessment (chf clinic)    Neurological  Orientation Level: Oriented X4  Cognition: Appropriate attention/concentration, Appropriate safety awareness, Appropriate judgement, Appropriate for developmental age, Follows commands         HEENT (Head, Ears, Eyes, Nose, & Throat)  HEENT (WDL): Within Defined Limits    Respiratory  Respiratory Pattern: Regular  Respiratory Depth: Normal  Respiratory Quality/Effort: Unlabored  Chest Assessment: Chest expansion symmetrical  L Breath Sounds: Clear  R Breath Sounds: Clear              Cardiac  Cardiac Rhythm: Sinus rhythm    Rhythm Interpretation  Heart Rate: 72         Gastrointestinal  Abdominal (WDL): Within Defined Limits  Abdomen Inspection: Distended  RUQ Bowel Sounds: Active  LUQ Bowel Sounds: Active  RLQ Bowel Sounds: Active  LLQ Bowel Sounds: Active          Bowel Sounds  RUQ Bowel Sounds: Active  LUQ Bowel Sounds: Active  RLQ Bowel Sounds: Active  LLQ Bowel Sounds:  Active    Peripheral Vascular  Peripheral Vascular (WDL): Exceptions to WDL  Edema: Right lower extremity, Left lower extremity  RLE Edema: None  LLE Edema: None                                                 Heart Rate: 72                     LAB DATA:    Last 3 BMP      Sodium (mmol/L)   Date Value   09/19/2022 140   08/12/2022 140   07/13/2022 143     Potassium (mmol/L)   Date Value   09/19/2022 4.0   08/12/2022 4.4   07/13/2022 4.7     Potassium reflex Magnesium (mmol/L)   Date Value   01/23/2022 3.6   01/22/2022 3.8   10/11/2019 3.9     Chloride (mmol/L)   Date Value   09/19/2022 105   08/12/2022 102   07/13/2022 102     CO2 (mmol/L)   Date Value   09/19/2022 22   08/12/2022 26   07/13/2022 28     BUN (mg/dL)   Date Value   09/19/2022 27 (H)   08/12/2022 31 (H)   07/13/2022 28 (H)     Glucose (mg/dL)   Date Value   09/19/2022 111 (H)   08/12/2022 148 (H)   07/13/2022 147 (H)   04/23/2012 129 (H)   04/16/2012 163 (H)     Calcium (mg/dL)   Date Value   09/19/2022 9.2   08/12/2022 9.3   07/13/2022 9.9       Last 3 BNP       Pro-BNP (pg/mL)   Date Value   08/12/2022 474 (H)   07/13/2022 514 (H)   06/22/2022 728 (H)          CBC: No results for input(s): WBC, HGB, PLT in the last 72 hours. BMP:    No results for input(s): NA, K, CL, CO2, BUN, CREATININE, GLUCOSE in the last 72 hours. Hepatic: No results for input(s): AST, ALT, ALB, BILITOT, ALKPHOS in the last 72 hours. Troponin: No results for input(s): TROPONINI in the last 72 hours. BNP: No results for input(s): BNP in the last 72 hours. Lipids: No results for input(s): CHOL, HDL in the last 72 hours. Invalid input(s): LDLCALCU  INR: No results for input(s): INR in the last 72 hours. WEIGHTS:    Wt Readings from Last 3 Encounters:   09/23/22 221 lb (100.2 kg)   09/19/22 217 lb 3.2 oz (98.5 kg)   09/12/22 218 lb (98.9 kg)         TELEMETRY:  Cardiac Regularity: Regular  Cardiac Rhythm/Interpretation: NSR        ASSESSMENT:  Rasheed Lewis's weight is up 8 lbs from last visit on 8-12-22  States sl increase of SOB. C/O abdominal bloating.     Interventions completed this visit:  IV diuretics given: Yes, Lasix 40 mg IVP  Lab work obtained yes, BNP/BMP   Reviewed currently prescribed medications with patient, educated on importance of compliance and answered any questions regarding their medication  Educated on signs and symptoms of HF  Educated on low sodium diet    PLAN:  Scheduled to follow up in CHF clinic on   Future Appointments   Date Time Provider Estuardo Arzate   9/26/2022  1:00 PM Juan Ramon Mejias MD MED ONC [facility]   9/26/2022  1:45 PM [facility] MED ONC FAST TRACK 3 [facility] Med Onc [facility]   9/28/2022  8:30 AM [facility] CT SCAN 3 [facility] CT [facility]   9/30/2022 10:30 AM [facility] CHF ROOM 1 [facility] CHF [facility]   12/13/2022  2:00 PM DO Zion Puente [facility]         Given clinic phone number and aware of signs and symptoms to call with any HF change in symptoms. Denies current complaints.  Assessment unremarkable

## 2022-09-26 ENCOUNTER — HOSPITAL ENCOUNTER (OUTPATIENT)
Dept: INFUSION THERAPY | Age: 57
Discharge: HOME | End: 2022-09-26
Payer: MEDICARE

## 2022-09-26 ENCOUNTER — OFFICE VISIT (OUTPATIENT)
Dept: ONCOLOGY | Age: 57
End: 2022-09-26
Payer: MEDICARE

## 2022-09-26 VITALS
HEART RATE: 71 BPM | SYSTOLIC BLOOD PRESSURE: 128 MMHG | TEMPERATURE: 97.3 F | BODY MASS INDEX: 35.47 KG/M2 | DIASTOLIC BLOOD PRESSURE: 70 MMHG | WEIGHT: 220.7 LBS | HEIGHT: 66 IN | OXYGEN SATURATION: 98 %

## 2022-09-26 DIAGNOSIS — C61 PROSTATE CANCER (HCC): Primary | ICD-10-CM

## 2022-09-26 PROCEDURE — 96372 THER/PROPH/DIAG INJ SC/IM: CPT

## 2022-09-26 PROCEDURE — 99214 OFFICE O/P EST MOD 30 MIN: CPT | Performed by: INTERNAL MEDICINE

## 2022-09-26 PROCEDURE — 99212 OFFICE O/P EST SF 10 MIN: CPT

## 2022-09-26 PROCEDURE — 6360000002 HC RX W HCPCS: Performed by: INTERNAL MEDICINE

## 2022-09-26 RX ORDER — ONDANSETRON 2 MG/ML
8 INJECTION INTRAMUSCULAR; INTRAVENOUS
Status: CANCELLED | OUTPATIENT
Start: 2022-09-26

## 2022-09-26 RX ORDER — ONDANSETRON 2 MG/ML
8 INJECTION INTRAMUSCULAR; INTRAVENOUS
Status: CANCELLED | OUTPATIENT
Start: 2022-10-23

## 2022-09-26 RX ORDER — SODIUM CHLORIDE 9 MG/ML
INJECTION, SOLUTION INTRAVENOUS CONTINUOUS
Status: CANCELLED | OUTPATIENT
Start: 2022-10-23

## 2022-09-26 RX ORDER — DIPHENHYDRAMINE HYDROCHLORIDE 50 MG/ML
50 INJECTION INTRAMUSCULAR; INTRAVENOUS
Status: CANCELLED | OUTPATIENT
Start: 2022-10-23

## 2022-09-26 RX ORDER — ACETAMINOPHEN 325 MG/1
650 TABLET ORAL
Status: CANCELLED | OUTPATIENT
Start: 2022-09-26

## 2022-09-26 RX ORDER — SODIUM CHLORIDE 9 MG/ML
INJECTION, SOLUTION INTRAVENOUS CONTINUOUS
Status: CANCELLED | OUTPATIENT
Start: 2022-09-26

## 2022-09-26 RX ORDER — EPINEPHRINE 1 MG/ML
0.3 INJECTION, SOLUTION, CONCENTRATE INTRAVENOUS PRN
Status: CANCELLED | OUTPATIENT
Start: 2022-09-26

## 2022-09-26 RX ORDER — DIPHENHYDRAMINE HYDROCHLORIDE 50 MG/ML
50 INJECTION INTRAMUSCULAR; INTRAVENOUS
Status: CANCELLED | OUTPATIENT
Start: 2022-09-26

## 2022-09-26 RX ORDER — EPINEPHRINE 1 MG/ML
0.3 INJECTION, SOLUTION, CONCENTRATE INTRAVENOUS PRN
Status: CANCELLED | OUTPATIENT
Start: 2022-10-23

## 2022-09-26 RX ORDER — FAMOTIDINE 10 MG/ML
20 INJECTION, SOLUTION INTRAVENOUS
Status: CANCELLED | OUTPATIENT
Start: 2022-09-26

## 2022-09-26 RX ORDER — ALBUTEROL SULFATE 90 UG/1
4 AEROSOL, METERED RESPIRATORY (INHALATION) PRN
Status: CANCELLED | OUTPATIENT
Start: 2022-09-26

## 2022-09-26 RX ORDER — ALBUTEROL SULFATE 90 UG/1
4 AEROSOL, METERED RESPIRATORY (INHALATION) PRN
Status: CANCELLED | OUTPATIENT
Start: 2022-10-23

## 2022-09-26 RX ORDER — ACETAMINOPHEN 325 MG/1
650 TABLET ORAL
Status: CANCELLED | OUTPATIENT
Start: 2022-10-23

## 2022-09-26 RX ADMIN — DENOSUMAB 120 MG: 120 INJECTION SUBCUTANEOUS at 13:34

## 2022-09-26 NOTE — PROGRESS NOTES
Department of [facility] Oncology   Attending Clinic Note    Reason for Visit: Follow-up on a patient with Prostate Cancer    PCP:  Sheba Lloyd DO    History of Present Illness:  74-year-old male who was noted to have elevated PSA   Evaluated by Urology team (Dr. Santa Mcgee Shayne)    PSA 9.20 on 03/31/2021  PSA 26.39 on 05/31/2022  PSA 31.83 on 06/20/2022    On 08/04/2022:  A. Prostate, left base, biopsy: Prostatic adenocarcinoma (acinar, not otherwise specified). Andrew Score: 4+3=7; cribriform pattern absent; percentage of pattern 4 is approximately 60%   Grade Group:  3   Tissue Cores Involved by Carcinoma: 2 of 2   Percentage of Tissue Involved by Carcinoma: 10%   Perineural Invasion: Present     B. Prostate, left mid, biopsy: Prostatic adenocarcinoma (acinar, not otherwise specified) and intraductal carcinoma. Andrew Score: 4+5=9; cribriform pattern present   Grade Group:  5   Tissue Cores Involved by Carcinoma: 2 of 2   Percentage of Tissue Involved by Carcinoma: 80%   Perineural Invasion: Present     C. Prostate, left apex, biopsy: Prostatic adenocarcinoma (acinar, not otherwise specified). Kiefer Score: 4+4 = 8; cribriform pattern present   Grade Group:  4   Tissue Cores Involved by Carcinoma: 2 of 2   Percentage of Tissue Involved by Carcinoma: 50%   Perineural Invasion: Present     D. Prostate, right base, biopsy: Prostatic adenocarcinoma (acinar, not otherwise specified) and intraductal carcinoma. Andrew Score: 4+3 = 7; cribriform pattern present; percentage of pattern 4 is approximately 80%   Grade Group:  3   Tissue Cores Involved by Carcinoma: 3 of 3   Percentage of Tissue Involved by Carcinoma: 90%   Perineural Invasion: Present     E. 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    Kiefer Score: 4+4=8; cribriform pattern present   Grade Group:  4   Tissue Cores Involved by Carcinoma: 3 of 3   Percentage of Tissue Involved by Carcinoma: >90%   Perineural Invasion: Not identified     F. Prostate, right apex, biopsy: Prostatic adenocarcinoma (acinar, not otherwise specified) and intraductal carcinoma. Bussey Score: 4+4 = 8; cribriform pattern present   Grade Group:  4   Tissue Cores Involved by Carcinoma: 3 of 3   Percentage of Tissue Involved by Carcinoma: 80%   Perineural Invasion: Present     Comment: In part A, carcinoma is seen involving fibroadipose tissue. Extraprostatic extension cannot be excluded. P63 immunostain is performed on Parts B, D, E, and F and shows the presence of basal cells around the intraductal carcinoma in the absence of basal cells in the   areas of invasive carcinoma. The percentage of tissue involved listed above reflects the total percentage involved by invasive and intraductal carcinoma. Intradepartmental consultation is obtained. CT abdomen/pelvis 8/23/2022 enlarged prostate gland with impingement upon the lymph node to bladder base by an irregular portion of the enlarged gland. Enlarged pelvic and inferior retroperitoneal lymph nodes  Diffuse thickening of the walls of the urinary bladder with surrounding  Small right anterior abdominal wall hernia, which contains fat   Small umbilical hernia, which contains fat. Small bilateral inguinal hernias, left slightly greater than right and both contain fat. Bone scan 08/23/2022:  Findings consistent with widespread metastatic disease    Left Femur/Pelvis X-Ray 09/07/2022: unremarkable left hip and femur. Kathern Aland was started on 09/02/2022 with fair tolerance so far. Eligard 22.5 mg was given on 09/14/2022  Referred to Medical Oncology clinic for further evaluation and treatment  Today 09/26/2022. No fever, chills. Fair appetite and energy level. Tolerating XTANDI well. Review of Systems;  CONSTITUTIONAL: No fever, chills. Fair appetite and energy level. ENMT: Eyes: No diplopia; Nose: No epistaxis.  Mouth: No sore throat. RESPIRATORY: No hemoptysis, shortness of breath, cough. CARDIOVASCULAR: No chest pain, palpitations. GASTROINTESTINAL: No nausea/vomiting, abdominal pain  GENITOURINARY: See HPI  NEURO: No syncope, presyncope, headache. Remainder:  ROS NEGATIVE    Past Medical History:      Diagnosis Date    Allergic rhinitis     Anticoagulant long-term use     Anxiety     Atypical angina (HCC)     CAD (coronary artery disease)     Carotid artery stenosis     Cerebral artery occlusion with cerebral infarction ([facility] 2012    CHF (congestive heart failure) ([facility])     COPD (chronic obstructive pulmonary disease) ([facility])     CVA (cerebral infarction) 11/19/2012    Depression     Diverticulitis 2010    Sigmoid abscess, s/p colectomy    Erectile dysfunction     GERD (gastroesophageal reflux disease)     Heart attack ([facility] 2014    Hx of blood clots     Hyperlipemia     Hypertension     Ischemic cardiomyopathy     Left atrial thrombus     Obesity     Prostate cancer [facility] 9/19/2022    Type 2 diabetes mellitus without complication, without long-term current use of insulin ([facility])     Type II or unspecified type diabetes mellitus without mention of complication, not stated as uncontrolled      Medications:  Reviewed and reconciled. Allergies:  No Known Allergies    Physical Exam:  /70   Pulse 71   Temp 97.3 °F (36.3 °C)   Ht 5' 6\" (1.676 m)   Wt 220 lb 11.2 oz (100.1 kg)   SpO2 98%   BMI 35.62 kg/m²   GENERAL: Alert, oriented x 3, not in acute distress. HEENT: PERRLA; EOMI. Oropharynx clear. LUNGS: Good air entry bilaterally. No wheezing, crackles or ronchi. CARDIOVASCULAR: Regular rate. No murmurs, rubs or gallops. EXTREMITIES: Without clubbing, cyanosis, or edema. NEUROLOGIC: No focal deficits.    ECOG PS 1    Diagnostics:  Lab Results   Component Value Date    PSA 7.57 (H) 09/19/2022    PSA 31.83 (H) 06/20/2022    PSA 26.39 (H) 05/31/2022     Impression/Plan:  58-year-old male with metastatic prostate cancer Evaluated [facility] team [facility])  PSA 9.20 on 03/31/2021  PSA 26.39 on 05/31/2022  PSA 31.83 on 06/20/2022    On 08/04/2022:  A. Prostate, left base, biopsy: Prostatic adenocarcinoma (acinar, not otherwise specified). Merrimac Score: 4+3=7; cribriform pattern absent; percentage of pattern 4 is approximately 60%   Grade Group:  3   Tissue Cores Involved by Carcinoma: 2 of 2   Percentage of Tissue Involved by Carcinoma: 10%   Perineural Invasion: Present     B. Prostate, left mid, biopsy: Prostatic adenocarcinoma (acinar, not otherwise specified) and intraductal carcinoma. Andrew Score: 4+5=9; cribriform pattern present   Grade Group:  5   Tissue Cores Involved by Carcinoma: 2 of 2   Percentage of Tissue Involved by Carcinoma: 80%   Perineural Invasion: Present     C. Prostate, left apex, biopsy: Prostatic adenocarcinoma (acinar, not otherwise specified). Merrimac Score: 4+4 = 8; cribriform pattern present   Grade Group:  4   Tissue Cores Involved by Carcinoma: 2 of 2   Percentage of Tissue Involved by Carcinoma: 50%   Perineural Invasion: Present     D. Prostate, right base, biopsy: Prostatic adenocarcinoma (acinar, not otherwise specified) and intraductal carcinoma. Merrimac Score: 4+3 = 7; cribriform pattern present; percentage of pattern 4 is approximately 80%   Grade Group:  3   Tissue Cores Involved by Carcinoma: 3 of 3   Percentage of Tissue Involved by Carcinoma: 90%   Perineural Invasion: Present     E. 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 Andrew Score: 4+4=8; cribriform pattern present   Grade Group:  4   Tissue Cores Involved by Carcinoma: 3 of 3   Percentage of Tissue Involved by Carcinoma: >90%   Perineural Invasion: Not identified     F. Prostate, right apex, biopsy: Prostatic adenocarcinoma (acinar, not otherwise specified) and intraductal carcinoma.    Andrew Score: 4+4 = 8; cribriform pattern present Grade Group:  4   Tissue Cores Involved by Carcinoma: 3 of 3   Percentage of Tissue Involved by Carcinoma: 80%   Perineural Invasion: Present     Comment: In part A, carcinoma is seen involving fibroadipose tissue. Extraprostatic extension cannot be excluded. P63 immunostain is performed on Parts B, D, E, and F and shows the presence of basal cells around the intraductal carcinoma in the absence of basal cells in the   areas of invasive carcinoma. The percentage of tissue involved listed above reflects the total percentage involved by invasive and intraductal carcinoma. Intradepartmental consultation is obtained. NGS testing ([employer]):  MS-Stable: No therapies or clinical trials  TMB 0 Muts/Mb: No therapies or clinical trials  No therapies or clinical trials are associated with the genomic findings for the sample    CT abdomen/pelvis 8/23/2022 enlarged prostate gland with impingement upon the lymph node to bladder base by an irregular portion of the enlarged gland. Enlarged pelvic and inferior retroperitoneal lymph nodes  Diffuse thickening of the walls of the urinary bladder with surrounding  Small right anterior abdominal wall hernia, which contains fat   Small umbilical hernia, which contains fat. Small bilateral inguinal hernias, left slightly greater than right and both contain fat. Bone scan 08/23/2022:  Findings consistent with widespread metastatic disease    Left Femur/Pelvis X-Ray 09/07/2022: unremarkable left hip and femur. Leontine Bench was started on 09/02/2022 with fair tolerance so far. Eligard 22.5 mg was given on 09/14/2022    PSA 7.57 on 09/19/2022. Labs reviewed. CT chest ordered and scheduled on 09/28/2022. XGEVA every 4 weeks recommended given bone metastasis. Side effects reviewed. He agreed to proceed. Authorization obtained. Lamin Barcenas given today 09/26/2022.    Continue XTANDI per protocol  RTC 4 weeks with labs and Lamin Barcenas same day    Danny Moya MD 9/26/2022

## 2022-09-28 ENCOUNTER — HOSPITAL ENCOUNTER (OUTPATIENT)
Dept: CT IMAGING | Age: 57
Discharge: HOME | End: 2022-09-30
Payer: MEDICARE

## 2022-09-28 DIAGNOSIS — C61 PROSTATE CANCER (HCC): ICD-10-CM

## 2022-09-28 PROCEDURE — 6360000004 HC RX CONTRAST MEDICATION: Performed by: RADIOLOGY

## 2022-09-28 PROCEDURE — 71260 CT THORAX DX C+: CPT

## 2022-09-28 RX ADMIN — IOPAMIDOL 75 ML: 755 INJECTION, SOLUTION INTRAVENOUS at 08:12

## 2022-09-30 ENCOUNTER — HOSPITAL ENCOUNTER (OUTPATIENT)
Dept: OTHER | Age: 57
Setting detail: THERAPIES SERIES
Discharge: HOME | End: 2022-09-30
Payer: MEDICARE

## 2022-09-30 VITALS
OXYGEN SATURATION: 100 % | SYSTOLIC BLOOD PRESSURE: 112 MMHG | WEIGHT: 221 LBS | DIASTOLIC BLOOD PRESSURE: 56 MMHG | BODY MASS INDEX: 35.67 KG/M2 | RESPIRATION RATE: 18 BRPM | HEART RATE: 67 BPM

## 2022-09-30 LAB
ANION GAP SERPL CALCULATED.3IONS-SCNC: 13 MMOL/L (ref 7–16)
BUN BLDV-MCNC: 23 MG/DL (ref 6–20)
CALCIUM SERPL-MCNC: 8.1 MG/DL (ref 8.6–10.2)
CHLORIDE BLD-SCNC: 101 MMOL/L (ref 98–107)
CO2: 25 MMOL/L (ref 22–29)
CREAT SERPL-MCNC: 1.2 MG/DL (ref 0.7–1.2)
GFR AFRICAN AMERICAN: >60
GFR NON-AFRICAN AMERICAN: >60 ML/MIN/1.73
GLUCOSE BLD-MCNC: 175 MG/DL (ref 74–99)
POTASSIUM SERPL-SCNC: 3.8 MMOL/L (ref 3.5–5)
PRO-BNP: 847 PG/ML (ref 0–125)
SODIUM BLD-SCNC: 139 MMOL/L (ref 132–146)

## 2022-09-30 PROCEDURE — 80048 BASIC METABOLIC PNL TOTAL CA: CPT

## 2022-09-30 PROCEDURE — 99214 OFFICE O/P EST MOD 30 MIN: CPT

## 2022-09-30 PROCEDURE — 83880 ASSAY OF NATRIURETIC PEPTIDE: CPT

## 2022-09-30 PROCEDURE — 36415 COLL VENOUS BLD VENIPUNCTURE: CPT

## 2022-09-30 NOTE — PROGRESS NOTES
Congestive Heart Failure [address]         Omari Garcia   1965          Referring Provider: FRANCO RANDLE [facility] NP  Primary Care Physician:   DR. KNIGHT  Cardiologist: DR. Anjali Hewitt  Nephrologist:N/A      History of Present Illness:     Omari Garcia is a 61 y.o. male with a history of HFrEF, most recent EF 30-35% ON 10/11/2019. Patient Story:    He denies having dyspnea with exertion, shortness of breath, or decline in overall functional capacity. He does  NOT have orthopnea, PND,and nocturnal cough. He does not  have  abdominal distention or bloating, early satiety, anorexia/change in appetite. He does have a good urinary response to oral diuretic. He does not have lower extremity edema. He DOES HAVE some lightheadedness, dizziness. He does not complain of palpitations,syncope. He does not complain of chest pain, pressure, discomfort. No Known Allergies        Prior to Visit Medications    Medication Sig Taking?  Authorizing Provider   denosumab (XGEVA) 120 MG/1.7ML SOLN SC injection Inject 120 mg into the skin once Once a month Yes Historical Provider, MD   Enzalutamide (XTANDI) 40 MG TABS Take 4 tablets by mouth daily  Historical Provider, MD   Leuprolide Acetate, 3 Month, (ELIGARD) 22.5 MG KIT Inject 22.5 mg into the skin once  Historical Provider, MD   bumetanide (BUMEX) 1 MG tablet Take 1 tablet by mouth daily  Thad Reddish, DO   metFORMIN (GLUCOPHAGE) 500 MG tablet Take 1 tablet by mouth 2 times daily (with meals)  Thad Reddish, DO   spironolactone (ALDACTONE) 25 MG tablet Take 1 tablet by mouth daily  Thad Reddish, DO   metoprolol succinate (TOPROL XL) 50 MG extended release tablet Take 1 tablet by mouth daily  Thad Reddish, DO   sacubitril-valsartan (ENTRESTO)  MG per tablet Take 1 tablet by mouth 2 times daily  Thad Reddish, DO   clopidogrel (PLAVIX) 75 MG tablet Take 1 tablet by mouth daily  Thad Reddish, DO   pantoprazole (PROTONIX) 40 MG tablet Take 1 tablet by mouth daily  Matilda Alanis DO   isosorbide mononitrate (IMDUR) 30 MG extended release tablet Take 3 tablets by mouth daily  Patient taking differently: 30 mg in the morning, at noon, and at bedtime  Matilda Alanis DO   atorvastatin (LIPITOR) 80 MG tablet Take 1 tablet by mouth nightly  Matilda Alanis DO   tamsulosin (FLOMAX) 0.4 mg capsule Take 1 capsule by mouth daily  Matilda Alanis DO   fluticasone (FLONASE) 50 MCG/ACT nasal spray 2 sprays by Each Nostril route daily  Brenna Brunner MD   aspirin 81 MG EC tablet Take 1 tablet by mouth daily  Hannah Aguilar MD           Guideline directed medical:  ARNI/ACE I/ARB: Yes  Beta blocker: Yes  Aldosterone antagonist:  YES      Physical Examination:     BP (!) 112/56   Pulse 67   Resp 18   Wt 221 lb (100.2 kg)   SpO2 100%   BMI 35.67 kg/m²     Assessment  Charting Type: Shift assessment (chf clinic)    Neurological  Orientation Level: Oriented X4  Cognition: Appropriate attention/concentration, Appropriate safety awareness, Appropriate judgement, Appropriate for developmental age, Follows commands         HEENT (Head, Ears, Eyes, Nose, & Throat)  HEENT (WDL): Within Defined Limits    Respiratory  Respiratory Pattern: Regular  Respiratory Depth: Normal  Respiratory Quality/Effort: Unlabored  Chest Assessment: Chest expansion symmetrical  L Breath Sounds: Clear  R Breath Sounds: Clear              Cardiac  Cardiac Rhythm: Sinus rhythm    Rhythm Interpretation  Heart Rate: 67         Gastrointestinal  Abdominal (WDL): Within Defined Limits  Abdomen Inspection: Distended  RUQ Bowel Sounds: Active  LUQ Bowel Sounds: Active  RLQ Bowel Sounds: Active  LLQ Bowel Sounds: Active          Bowel Sounds  RUQ Bowel Sounds: Active  LUQ Bowel Sounds: Active  RLQ Bowel Sounds: Active  LLQ Bowel Sounds:  Active    Peripheral Vascular  Peripheral Vascular (WDL): Exceptions to WDL  Edema: Right lower extremity, Left lower extremity  RLE Edema: None  LLE Edema: None                                                 Heart Rate: 67                     LAB DATA:    Last 3 BMP      Sodium (mmol/L)   Date Value   09/23/2022 142   09/19/2022 140   08/12/2022 140     Potassium (mmol/L)   Date Value   09/23/2022 4.0   09/19/2022 4.0   08/12/2022 4.4     Potassium reflex Magnesium (mmol/L)   Date Value   01/23/2022 3.6   01/22/2022 3.8   10/11/2019 3.9     Chloride (mmol/L)   Date Value   09/23/2022 104   09/19/2022 105   08/12/2022 102     CO2 (mmol/L)   Date Value   09/23/2022 24   09/19/2022 22   08/12/2022 26     BUN (mg/dL)   Date Value   09/23/2022 29 (H)   09/19/2022 27 (H)   08/12/2022 31 (H)     Glucose (mg/dL)   Date Value   09/23/2022 166 (H)   09/19/2022 111 (H)   08/12/2022 148 (H)   04/23/2012 129 (H)   04/16/2012 163 (H)     Calcium (mg/dL)   Date Value   09/23/2022 9.9   09/19/2022 9.2   08/12/2022 9.3       Last 3 BNP       Pro-BNP (pg/mL)   Date Value   09/23/2022 795 (H)   08/12/2022 474 (H)   07/13/2022 514 (H)          CBC: No results for input(s): WBC, HGB, PLT in the last 72 hours. BMP:    No results for input(s): NA, K, CL, CO2, BUN, CREATININE, GLUCOSE in the last 72 hours. Hepatic: No results for input(s): AST, ALT, ALB, BILITOT, ALKPHOS in the last 72 hours. Troponin: No results for input(s): TROPONINI in the last 72 hours. BNP: No results for input(s): BNP in the last 72 hours. Lipids: No results for input(s): CHOL, HDL in the last 72 hours. Invalid input(s): LDLCALCU  INR: No results for input(s): INR in the last 72 hours. WEIGHTS:    Wt Readings from Last 3 Encounters:   09/30/22 221 lb (100.2 kg)   09/26/22 220 lb 11.2 oz (100.1 kg)   09/23/22 221 lb (100.2 kg)         TELEMETRY:  Cardiac Regularity: Regular  Cardiac Rhythm/Interpretation: NSR        ASSESSMENT:  Amy Roman weight is stable from last visit on 9-22-22. Denies any SOB. Assessment unchanged from previous.  Patient feels as though his recent weight gain is because of increase in calories. States he's been eating much better. Interventions completed this visit:  IV diuretics given:No  Lab work obtained yes, BMP/BNP  Reviewed currently prescribed medications with patient, educated on importance of compliance and answered any questions regarding their medication  Educated on signs and symptoms of HF  Educated on low sodium diet    PLAN:  Scheduled to follow up in CHF clinic on   Future Appointments   Date Time Provider Estuardo Arzate   9/30/2022 10:30 AM [facility] CHF ROOM 1 YZ CHF [facility]   10/24/2022  2:00 PM Janette Cruz MD MED ONC [facility]   10/24/2022  2:45 PM [facility] MED ONC FAST TRACK 3 SE[facility] Med Onc [facility]   10/28/2022 12:30 PM [facility] CHF ROOM 1 [facility] CHF [facility]   12/13/2022  2:00 PM DO [facility]         Given clinic phone number and aware of signs and symptoms to call with any HF change in symptoms. Denies current complaints.  Assessment unremarkable

## 2022-10-01 ENCOUNTER — TELEPHONE (OUTPATIENT)
Dept: CARDIOLOGY CLINIC | Age: 57
End: 2022-10-01

## 2022-10-01 NOTE — TELEPHONE ENCOUNTER
9-26 - 22 last Hem Onc visit  Анна Manzo MD     Currently following onc for Prostate CA. Started on Xtandi 9-2. Bone scan 08/23/2022:  Findings consistent with widespread metastatic disease. XGEVA every 4 weeks (bone nets)     Kim Foreman , Please advise SGLT2i initiation at this time?      Anila Hou RN

## 2022-10-01 NOTE — RESULT ENCOUNTER NOTE
Labs and CHF clinic note reviewed  Please have him start SGLT2i (Jardiance vs Farxiga) 10 mg daily - whichever one covered by his insurance  Follow up labs 10 days after starting new medication    Thank you

## 2022-10-01 NOTE — TELEPHONE ENCOUNTER
----- Message from PAUL Hendricks - CNP sent at 10/1/2022  8:52 AM EDT -----  Labs and CHF clinic note reviewed  Please have him start SGLT2i (Jardiance vs Farxiga) 10 mg daily - whichever one covered by his insurance  Follow up labs 10 days after starting new medication    Thank you

## 2022-10-20 DIAGNOSIS — C61 PROSTATE CANCER (HCC): Primary | ICD-10-CM

## 2022-10-24 ENCOUNTER — HOSPITAL ENCOUNTER (OUTPATIENT)
Dept: INFUSION THERAPY | Age: 57
Discharge: HOME | End: 2022-10-24
Payer: MEDICARE

## 2022-10-24 ENCOUNTER — OFFICE VISIT (OUTPATIENT)
Dept: ONCOLOGY | Age: 57
End: 2022-10-24
Payer: MEDICARE

## 2022-10-24 VITALS
WEIGHT: 224.1 LBS | HEIGHT: 69 IN | DIASTOLIC BLOOD PRESSURE: 77 MMHG | HEART RATE: 73 BPM | BODY MASS INDEX: 33.19 KG/M2 | SYSTOLIC BLOOD PRESSURE: 136 MMHG | TEMPERATURE: 97.2 F | OXYGEN SATURATION: 98 %

## 2022-10-24 DIAGNOSIS — C61 PROSTATE CANCER (HCC): Primary | ICD-10-CM

## 2022-10-24 LAB
ALBUMIN SERPL-MCNC: 4.4 G/DL (ref 3.5–5.2)
ALP BLD-CCNC: 126 U/L (ref 40–129)
ALT SERPL-CCNC: 13 U/L (ref 0–40)
ANION GAP SERPL CALCULATED.3IONS-SCNC: 13 MMOL/L (ref 7–16)
AST SERPL-CCNC: 19 U/L (ref 0–39)
BASOPHILS ABSOLUTE: 0.07 E9/L (ref 0–0.2)
BASOPHILS RELATIVE PERCENT: 1.4 % (ref 0–2)
BILIRUB SERPL-MCNC: 0.5 MG/DL (ref 0–1.2)
BUN BLDV-MCNC: 18 MG/DL (ref 6–20)
CALCIUM SERPL-MCNC: 9.2 MG/DL (ref 8.6–10.2)
CHLORIDE BLD-SCNC: 105 MMOL/L (ref 98–107)
CO2: 25 MMOL/L (ref 22–29)
CREAT SERPL-MCNC: 1.2 MG/DL (ref 0.7–1.2)
EOSINOPHILS ABSOLUTE: 0.29 E9/L (ref 0.05–0.5)
EOSINOPHILS RELATIVE PERCENT: 5.7 % (ref 0–6)
GFR SERPL CREATININE-BSD FRML MDRD: >60 ML/MIN/1.73
GLUCOSE BLD-MCNC: 152 MG/DL (ref 74–99)
HCT VFR BLD CALC: 44.9 % (ref 37–54)
HEMOGLOBIN: 15.7 G/DL (ref 12.5–16.5)
IMMATURE GRANULOCYTES #: 0.02 E9/L
IMMATURE GRANULOCYTES %: 0.4 % (ref 0–5)
LYMPHOCYTES ABSOLUTE: 1.42 E9/L (ref 1.5–4)
LYMPHOCYTES RELATIVE PERCENT: 28 % (ref 20–42)
MAGNESIUM: 2.2 MG/DL (ref 1.6–2.6)
MCH RBC QN AUTO: 29.8 PG (ref 26–35)
MCHC RBC AUTO-ENTMCNC: 35 % (ref 32–34.5)
MCV RBC AUTO: 85.4 FL (ref 80–99.9)
MONOCYTES ABSOLUTE: 0.52 E9/L (ref 0.1–0.95)
MONOCYTES RELATIVE PERCENT: 10.3 % (ref 2–12)
NEUTROPHILS ABSOLUTE: 2.75 E9/L (ref 1.8–7.3)
NEUTROPHILS RELATIVE PERCENT: 54.2 % (ref 43–80)
PDW BLD-RTO: 12.3 FL (ref 11.5–15)
PHOSPHORUS: 2.8 MG/DL (ref 2.5–4.5)
PLATELET # BLD: 184 E9/L (ref 130–450)
PMV BLD AUTO: 10.4 FL (ref 7–12)
POTASSIUM SERPL-SCNC: 4.2 MMOL/L (ref 3.5–5)
PROSTATE SPECIFIC ANTIGEN: 0.24 NG/ML (ref 0–4)
RBC # BLD: 5.26 E12/L (ref 3.8–5.8)
SODIUM BLD-SCNC: 143 MMOL/L (ref 132–146)
TOTAL PROTEIN: 7.5 G/DL (ref 6.4–8.3)
WBC # BLD: 5.1 E9/L (ref 4.5–11.5)

## 2022-10-24 PROCEDURE — 80053 COMPREHEN METABOLIC PANEL: CPT

## 2022-10-24 PROCEDURE — 96372 THER/PROPH/DIAG INJ SC/IM: CPT

## 2022-10-24 PROCEDURE — 84100 ASSAY OF PHOSPHORUS: CPT

## 2022-10-24 PROCEDURE — 83735 ASSAY OF MAGNESIUM: CPT

## 2022-10-24 PROCEDURE — 6360000002 HC RX W HCPCS: Performed by: INTERNAL MEDICINE

## 2022-10-24 PROCEDURE — 84153 ASSAY OF PSA TOTAL: CPT

## 2022-10-24 PROCEDURE — 85025 COMPLETE CBC W/AUTO DIFF WBC: CPT

## 2022-10-24 PROCEDURE — 99214 OFFICE O/P EST MOD 30 MIN: CPT | Performed by: INTERNAL MEDICINE

## 2022-10-24 PROCEDURE — 36415 COLL VENOUS BLD VENIPUNCTURE: CPT

## 2022-10-24 PROCEDURE — 99212 OFFICE O/P EST SF 10 MIN: CPT

## 2022-10-24 RX ORDER — ACETAMINOPHEN 325 MG/1
650 TABLET ORAL
Status: CANCELLED | OUTPATIENT
Start: 2022-11-21

## 2022-10-24 RX ORDER — ONDANSETRON 2 MG/ML
8 INJECTION INTRAMUSCULAR; INTRAVENOUS
Status: CANCELLED | OUTPATIENT
Start: 2022-11-21

## 2022-10-24 RX ORDER — SODIUM CHLORIDE 9 MG/ML
INJECTION, SOLUTION INTRAVENOUS CONTINUOUS
Status: CANCELLED | OUTPATIENT
Start: 2022-11-21

## 2022-10-24 RX ORDER — ALBUTEROL SULFATE 90 UG/1
4 AEROSOL, METERED RESPIRATORY (INHALATION) PRN
Status: CANCELLED | OUTPATIENT
Start: 2022-11-21

## 2022-10-24 RX ORDER — DIPHENHYDRAMINE HYDROCHLORIDE 50 MG/ML
50 INJECTION INTRAMUSCULAR; INTRAVENOUS
Status: CANCELLED | OUTPATIENT
Start: 2022-11-21

## 2022-10-24 RX ORDER — EPINEPHRINE 1 MG/ML
0.3 INJECTION, SOLUTION, CONCENTRATE INTRAVENOUS PRN
Status: CANCELLED | OUTPATIENT
Start: 2022-11-21

## 2022-10-24 RX ADMIN — DENOSUMAB 120 MG: 120 INJECTION SUBCUTANEOUS at 14:54

## 2022-10-24 NOTE — PROGRESS NOTES
Department of [facility] Oncology   Attending Clinic Note    Reason for Visit: Follow-up on a patient with Prostate Cancer    PCP:  Silvino Monsalve DO    History of Present Illness:  50-year-old male who was noted to have elevated PSA   Evaluated by Urology team (Dr. Divya Rg Shayne)    PSA 9.20 on 03/31/2021  PSA 26.39 on 05/31/2022  PSA 31.83 on 06/20/2022    On 08/04/2022:  A. Prostate, left base, biopsy: Prostatic adenocarcinoma (acinar, not otherwise specified). [place] Score: 4+3=7; cribriform pattern absent; percentage of pattern 4 is approximately 60%   Grade Group:  3   Tissue Cores Involved by Carcinoma: 2 of 2   Percentage of Tissue Involved by Carcinoma: 10%   Perineural Invasion: Present     B. Prostate, left mid, biopsy: Prostatic adenocarcinoma (acinar, not otherwise specified) and intraductal carcinoma. Andrew Score: 4+5=9; cribriform pattern present   Grade Group:  5   Tissue Cores Involved by Carcinoma: 2 of 2   Percentage of Tissue Involved by Carcinoma: 80%   Perineural Invasion: Present     C. Prostate, left apex, biopsy: Prostatic adenocarcinoma (acinar, not otherwise specified). Andrew Score: 4+4 = 8; cribriform pattern present   Grade Group:  4   Tissue Cores Involved by Carcinoma: 2 of 2   Percentage of Tissue Involved by Carcinoma: 50%   Perineural Invasion: Present     D. Prostate, right base, biopsy: Prostatic adenocarcinoma (acinar, not otherwise specified) and intraductal carcinoma. [place] Score: 4+3 = 7; cribriform pattern present; percentage of pattern 4 is approximately 80%   Grade Group:  3   Tissue Cores Involved by Carcinoma: 3 of 3   Percentage of Tissue Involved by Carcinoma: 90%   Perineural Invasion: Present     E. 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    Lad[place]mith Score: 4+4=8; cribriform pattern present   Grade Group:  4   Tissue Cores Involved by Carcinoma: 3 of 3   Percentage of Tissue Involved by Carcinoma: >90%   Perineural Invasion: Not identified     F. Prostate, right apex, biopsy: Prostatic adenocarcinoma (acinar, not otherwise specified) and intraductal carcinoma. [place] Score: 4+4 = 8; cribriform pattern present   Grade Group:  4   Tissue Cores Involved by Carcinoma: 3 of 3   Percentage of Tissue Involved by Carcinoma: 80%   Perineural Invasion: Present     Comment: In part A, carcinoma is seen involving fibroadipose tissue. Extraprostatic extension cannot be excluded. P63 immunostain is performed on Parts B, D, E, and F and shows the presence of basal cells around the intraductal carcinoma in the absence of basal cells in the   areas of invasive carcinoma. The percentage of tissue involved listed above reflects the total percentage involved by invasive and intraductal carcinoma. Intradepartmental consultation is obtained. CT abdomen/pelvis 8/23/2022 enlarged prostate gland with impingement upon the lymph node to bladder base by an irregular portion of the enlarged gland. Enlarged pelvic and inferior retroperitoneal lymph nodes  Diffuse thickening of the walls of the urinary bladder with surrounding  Small right anterior abdominal wall hernia, which contains fat   Small umbilical hernia, which contains fat. Small bilateral inguinal hernias, left slightly greater than right and both contain fat. Bone scan 08/23/2022:  Findings consistent with widespread metastatic disease    Left Femur/Pelvis X-Ray 09/07/2022: unremarkable left hip and femur. Mckenna Rias was started on 09/02/2022 with fair tolerance so far. Eligard 22.5 mg was given on 09/14/2022  Referred to Medical Oncology clinic for further evaluation and treatment  Today 10/24/2022. No fever, chills. Fair appetite and energy level. Tolerating XTANDI well. Review of Systems;  CONSTITUTIONAL: No fever, chills. Fair appetite and energy level. ENMT: Eyes: No diplopia; Nose: No epistaxis.  Mouth: No sore throat. RESPIRATORY: No hemoptysis   CARDIOVASCULAR: No chest pain, palpitations. GASTROINTESTINAL: No nausea/vomiting, abdominal pain  GENITOURINARY: See HPI  NEURO: No syncope, presyncope, headache. Remainder:  ROS NEGATIVE    Past Medical History:      Diagnosis Date    Allergic rhinitis     Anticoagulant long-term use     Anxiety     Atypical angina (HCC)     CAD (coronary artery disease)     Carotid artery stenosis     Cerebral artery occlusion with cerebral infarction ([facility] 2012    CHF (congestive heart failure) ([facility])     COPD (chronic obstructive pulmonary disease) ([facility])     CVA (cerebral infarction) 11/19/2012    Depression     Diverticulitis 2010    Sigmoid abscess, s/p colectomy    Erectile dysfunction     GERD (gastroesophageal reflux disease)     Heart attack ([facility] 2014    Hx of blood clots     Hyperlipemia     Hypertension     Ischemic cardiomyopathy     Left atrial thrombus     Obesity     Prostate cancer ([facility] 9/19/2022    Type 2 diabetes mellitus without complication, without long-term current use of insulin ([facility])     Type II or unspecified type diabetes mellitus without mention of complication, not stated as uncontrolled      Medications:  Reviewed and reconciled. Allergies:  No Known Allergies    Physical Exam:  /77   Pulse 73   Temp 97.2 °F (36.2 °C)   Ht 5' 9\" (1.753 m)   Wt 224 lb 1.6 oz (101.7 kg)   SpO2 98%   BMI 33.09 kg/m²   GENERAL: Alert, oriented x 3, not in acute distress. HEENT: PERRLA; EOMI. Oropharynx clear. LUNGS: Good air entry bilaterally. No wheezing, crackles or ronchi. CARDIOVASCULAR: Regular rate. No murmurs, rubs or gallops. EXTREMITIES: Without clubbing, cyanosis, or edema. NEUROLOGIC: No focal deficits.    ECOG PS 1    Diagnostics:  Lab Results   Component Value Date    WBC 5.1 10/24/2022    HGB 15.7 10/24/2022    HCT 44.9 10/24/2022    MCV 85.4 10/24/2022     10/24/2022     Lab Results   Component Value Date    PSA 7.57 (H) 09/19/2022    PSA 31.83 (H) 06/20/2022    PSA 26.39 (H) 05/31/2022     Impression/Plan:  23-year-old male with metastatic prostate cancer     Evaluated by Urology team (Dr. Orlando Rainey Shayne)  PSA 9.20 on 03/31/2021  PSA 26.39 on 05/31/2022  PSA 31.83 on 06/20/2022    On 08/04/2022:  A. Prostate, left base, biopsy: Prostatic adenocarcinoma (acinar, not otherwise specified). Andrew Score: 4+3=7; cribriform pattern absent; percentage of pattern 4 is approximately 60%   Grade Group:  3   Tissue Cores Involved by Carcinoma: 2 of 2   Percentage of Tissue Involved by Carcinoma: 10%   Perineural Invasion: Present     B. Prostate, left mid, biopsy: Prostatic adenocarcinoma (acinar, not otherwise specified) and intraductal carcinoma. [place] Score: 4+5=9; cribriform pattern present   Grade Group:  5   Tissue Cores Involved by Carcinoma: 2 of 2   Percentage of Tissue Involved by Carcinoma: 80%   Perineural Invasion: Present     C. Prostate, left apex, biopsy: Prostatic adenocarcinoma (acinar, not otherwise specified). [place] Score: 4+4 = 8; cribriform pattern present   Grade Group:  4   Tissue Cores Involved by Carcinoma: 2 of 2   Percentage of Tissue Involved by Carcinoma: 50%   Perineural Invasion: Present     D. Prostate, right base, biopsy: Prostatic adenocarcinoma (acinar, not otherwise specified) and intraductal carcinoma. Andrew Score: 4+3 = 7; cribriform pattern present; percentage of pattern 4 is approximately 80%   Grade Group:  3   Tissue Cores Involved by Carcinoma: 3 of 3   Percentage of Tissue Involved by Carcinoma: 90%   Perineural Invasion: Present     E. 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    Andrew Score: 4+4=8; cribriform pattern present   Grade Group:  4   Tissue Cores Involved by Carcinoma: 3 of 3   Percentage of Tissue Involved by Carcinoma: >90%   Perineural Invasion: Not identified     F. Prostate, right apex, biopsy: Prostatic adenocarcinoma (acinar, not otherwise specified) and intraductal carcinoma. [place] Score: 4+4 = 8; cribriform pattern present   Grade Group:  4   Tissue Cores Involved by Carcinoma: 3 of 3   Percentage of Tissue Involved by Carcinoma: 80%   Perineural Invasion: Present     Comment: In part A, carcinoma is seen involving fibroadipose tissue. Extraprostatic extension cannot be excluded. P63 immunostain is performed on Parts B, D, E, and F and shows the presence of basal cells around the intraductal carcinoma in the absence of basal cells in the   areas of invasive carcinoma. The percentage of tissue involved listed above reflects the total percentage involved by invasive and intraductal carcinoma. Intradepartmental consultation is obtained. NGS testing ([employer]):  MS-Stable: No therapies or clinical trials  TMB 0 Muts/Mb: No therapies or clinical trials  No therapies or clinical trials are associated with the genomic findings for the sample    CT abdomen/pelvis 8/23/2022 enlarged prostate gland with impingement upon the lymph node to bladder base by an irregular portion of the enlarged gland. Enlarged pelvic and inferior retroperitoneal lymph nodes  Diffuse thickening of the walls of the urinary bladder with surrounding  Small right anterior abdominal wall hernia, which contains fat   Small umbilical hernia, which contains fat. Small bilateral inguinal hernias, left slightly greater than right and both contain fat. Bone scan 08/23/2022:  Findings consistent with widespread metastatic disease    Left Femur/Pelvis X-Ray 09/07/2022: unremarkable left hip and femur. Gillespie McRae was started on 09/02/2022 with fair tolerance so far. Eligard 22.5 mg was given on 09/14/2022    PSA 7.57 on 09/19/2022. CT chest on 09/28/2022: No CT evidence of acute intrathoracic abnormality. No definite evidence of metastatic disease. Imaging reviewed. Labs reviewed.    PSA pending today 10/24/2022    Continue Gillespie McRae per protocol    RTC 4 weeks with labs and Diane Wendy every 4 weeks (one given today 10/24/2022)    Janet Posada MD   10/24/2022

## 2022-10-24 NOTE — PROGRESS NOTES
Patient provided with discharge instructions, patient has [address] All questions answered. Patient understands follow up plan of care.

## 2022-10-28 ENCOUNTER — HOSPITAL ENCOUNTER (OUTPATIENT)
Dept: OTHER | Age: 57
Setting detail: THERAPIES SERIES
Discharge: HOME | End: 2022-10-28
Payer: MEDICARE

## 2022-10-28 VITALS
DIASTOLIC BLOOD PRESSURE: 76 MMHG | BODY MASS INDEX: 32.58 KG/M2 | SYSTOLIC BLOOD PRESSURE: 124 MMHG | HEART RATE: 97 BPM | WEIGHT: 220 LBS | RESPIRATION RATE: 18 BRPM | HEIGHT: 69 IN | OXYGEN SATURATION: 97 %

## 2022-10-28 LAB
ANION GAP SERPL CALCULATED.3IONS-SCNC: 13 MMOL/L (ref 7–16)
BUN BLDV-MCNC: 18 MG/DL (ref 6–20)
CALCIUM SERPL-MCNC: 8.4 MG/DL (ref 8.6–10.2)
CHLORIDE BLD-SCNC: 100 MMOL/L (ref 98–107)
CO2: 23 MMOL/L (ref 22–29)
CREAT SERPL-MCNC: 1.1 MG/DL (ref 0.7–1.2)
GFR SERPL CREATININE-BSD FRML MDRD: >60 ML/MIN/1.73
GLUCOSE BLD-MCNC: 152 MG/DL (ref 74–99)
POTASSIUM SERPL-SCNC: 3.5 MMOL/L (ref 3.5–5)
PRO-BNP: 1468 PG/ML (ref 0–125)
SODIUM BLD-SCNC: 136 MMOL/L (ref 132–146)

## 2022-10-28 PROCEDURE — 80048 BASIC METABOLIC PNL TOTAL CA: CPT

## 2022-10-28 PROCEDURE — 83880 ASSAY OF NATRIURETIC PEPTIDE: CPT

## 2022-10-28 PROCEDURE — 99214 OFFICE O/P EST MOD 30 MIN: CPT

## 2022-10-28 PROCEDURE — 36415 COLL VENOUS BLD VENIPUNCTURE: CPT

## 2022-10-28 RX ORDER — METOPROLOL SUCCINATE 50 MG/1
75 TABLET, EXTENDED RELEASE ORAL NIGHTLY
Qty: 45 TABLET | Refills: 5 | Status: SHIPPED | OUTPATIENT
Start: 2022-10-28

## 2022-10-28 NOTE — PROGRESS NOTES
Spoke with Eli Ruiz CNP and orders obtained to have patient take Toprol 75 mg oral nightly d/t heart rate being elevated at today's appointment and to have patient come back to the clinic in 2-3 weeks. Also instructed to have patient call the chf clinic if he takes 3 additional bumex doses in one week. Called patient but no answer. Left patient a voicemail instructing him to take Toprol 75mg oral at night and to call the chf clinic back to make sure he received the order and to reschedule his next appointment in 2-3 weeks to assess heart rate and his BNP since he did have an increase(1458) from previous visit of ([phone]). Awaiting return call.

## 2022-10-28 NOTE — RESULT ENCOUNTER NOTE
Labs and CHF clinic   /76   Pulse 97    Current GDMT:  Toprol 50 mg daily   Entresto 97/103 mg BID  Spironolactone 25 mg daily   Bumex 1 mg daily     Spoke with Nunu in CHF clinic  Instructed to increase Toprol to 75 mg nightly  Follow up with CHF clinic in 2-4 weeks

## 2022-10-28 NOTE — PROGRESS NOTES
Congestive Heart Failure [address]         Piter Howard   1965          Referring Provider: FRANCO RANDLE [facility] NP  Primary Care Physician:   DR. KNIGHT  Cardiologist: DR. Nancy Mendoza  Nephrologist:N/A      History of Present Illness:     Piter Howard is a 61 y.o. male with a history of HFrEF, most recent EF 30-35% ON 10/11/2019. Patient Story:    He denies having dyspnea with exertion, shortness of breath, or decline in overall functional capacity. He does  NOT have orthopnea, PND,and nocturnal cough. He does not  have  abdominal distention or bloating, early satiety, anorexia/change in appetite. He does have a good urinary response to oral diuretic. He does not have lower extremity edema. He DOES HAVE some lightheadedness, dizziness. He does not complain of palpitations,syncope. He does not complain of chest pain, pressure, discomfort. No Known Allergies        Prior to Visit Medications    Medication Sig Taking?  Authorizing Provider   denosumab (XGEVA) 120 MG/1.7ML SOLN SC injection Inject 120 mg into the skin once Once a month  Historical Provider, MD   Enzalutamide (XTANDI) 40 MG TABS Take 4 tablets by mouth daily  Historical Provider, MD   Leuprolide Acetate, 3 Month, (ELIGARD) 22.5 MG KIT Inject 22.5 mg into the skin once  Historical Provider, MD   bumetanide (BUMEX) 1 MG tablet Take 1 tablet by mouth daily  Janina , DO   metFORMIN (GLUCOPHAGE) 500 MG tablet Take 1 tablet by mouth 2 times daily (with meals)  Janina , DO   spironolactone (ALDACTONE) 25 MG tablet Take 1 tablet by mouth daily  Janina , DO   metoprolol succinate (TOPROL XL) 50 MG extended release tablet Take 1 tablet by mouth daily  Janina , DO   sacubitril-valsartan (ENTRESTO)  MG per tablet Take 1 tablet by mouth 2 times daily  Janina , DO   clopidogrel (PLAVIX) 75 MG tablet Take 1 tablet by mouth daily  Janina , DO   pantoprazole (PROTONIX) lower extremity, Left lower extremity  RLE Edema: None  LLE Edema: None                                                 Heart Rate: 97                     LAB DATA:    Last 3 BMP      Sodium (mmol/L)   Date Value   10/28/2022 136   10/24/2022 143   09/30/2022 139     Potassium (mmol/L)   Date Value   10/28/2022 3.5   10/24/2022 4.2   09/30/2022 3.8     Potassium reflex Magnesium (mmol/L)   Date Value   01/23/2022 3.6   01/22/2022 3.8   10/11/2019 3.9     Chloride (mmol/L)   Date Value   10/28/2022 100   10/24/2022 105   09/30/2022 101     CO2 (mmol/L)   Date Value   10/28/2022 23   10/24/2022 25   09/30/2022 25     BUN (mg/dL)   Date Value   10/28/2022 18   10/24/2022 18   09/30/2022 23 (H)     Glucose (mg/dL)   Date Value   10/28/2022 152 (H)   10/24/2022 152 (H)   09/30/2022 175 (H)   04/23/2012 129 (H)   04/16/2012 163 (H)     Calcium (mg/dL)   Date Value   10/28/2022 8.4 (L)   10/24/2022 9.2   09/30/2022 8.1 (L)       Last 3 BNP       Pro-BNP (pg/mL)   Date Value   10/28/2022 1,468 (H)   09/30/2022 847 (H)   09/23/2022 795 (H)          CBC: No results for input(s): WBC, HGB, PLT in the last 72 hours. BMP:    Recent Labs     10/28/22  1240      K 3.5      CO2 23   BUN 18   CREATININE 1.1   GLUCOSE 152*     Hepatic: No results for input(s): AST, ALT, ALB, BILITOT, ALKPHOS in the last 72 hours. Troponin: No results for input(s): TROPONINI in the last 72 hours. BNP: No results for input(s): BNP in the last 72 hours. Lipids: No results for input(s): CHOL, HDL in the last 72 hours. Invalid input(s): LDLCALCU  INR: No results for input(s): INR in the last 72 hours. WEIGHTS:    Wt Readings from Last 3 Encounters:   10/28/22 220 lb (99.8 kg)   10/24/22 224 lb 1.6 oz (101.7 kg)   09/30/22 221 lb (100.2 kg)         TELEMETRY:  Cardiac Regularity: Regular  Cardiac Rhythm/Interpretation: NSR        ASSESSMENT:  Nicholas Hopkins weight is stable from last visit on 9-30-22. Denies any SOB.  Assessment unchanged from previous visit. Patient does state that he has been tired and attributes it to recently receiving his chemo shot and the covid vaccine. Patient states he took an additional Bumex 1 mg oral today and yesterday d/t shortness of breath and weight gain. He states he feels better this afternoon. Interventions completed this visit:  IV diuretics given:No  Lab work obtained yes, BMP/BNP  Reviewed currently prescribed medications with patient, educated on importance of compliance and answered any questions regarding their medication  Educated on signs and symptoms of HF  Educated on low sodium diet    PLAN:  Scheduled to follow up in CHF clinic on   Future Appointments   Date Time Provider Estuardo Arzate   11/21/2022  2:00 PM Janet Posada MD MED ONC [facility]   11/21/2022  2:45 PM LELA MED ONC FAST TRACK 2 YZ Med Onc [facility]   12/5/2022 12:15 PM [facility] ROOM 1 [facility]   12/13/2022  2:00 PM DO Zion Bloom [address] [place] [address]         Given clinic phone number and aware of signs and symptoms to call with any HF change in symptoms. Denies current complaints.  Assessment unremarkable

## 2022-11-09 ENCOUNTER — TELEPHONE (OUTPATIENT)
Dept: NON INVASIVE DIAGNOSTICS | Age: 57
End: 2022-11-09

## 2022-11-09 NOTE — TELEPHONE ENCOUNTER
----- Message from Elroy Aggarwal MD sent at 11/9/2022  2:00 PM EST -----  He needs office follow up  thx  ----- Message -----  From: Dayton Larsen RN  Sent: 11/9/2022   1:11 PM EST  To: Elroy Aggarwal MD    Please see Murj report from 11/09/2022. Patient has a R Woody Godwin 1 ICD, and is actively following in the CHF clinic downto. His device recorded 3 episodes of NSVT. Non-sustained Ventricular Tachycardia  1  Stored EGMs are consistent with or suggestive of Non-sustained VT  Total episodes: 3 since 8/3/2022. One of which appears to be atrially driven. One episode without EGM for review. Longest lasting 3 seconds @ 193 bpm. No therapies delivered. Takes metoprolol 75mg q hs.

## 2022-11-15 ENCOUNTER — TELEPHONE (OUTPATIENT)
Dept: NON INVASIVE DIAGNOSTICS | Age: 57
End: 2022-11-15

## 2022-11-15 NOTE — TELEPHONE ENCOUNTER
Overdue 6 mo OV (12/20) BSCI ICD (wireless) hadNSVT on Murj report w/ C      Left message to call the office back

## 2022-11-21 ENCOUNTER — HOSPITAL ENCOUNTER (OUTPATIENT)
Dept: INFUSION THERAPY | Age: 57
Discharge: HOME | End: 2022-11-21
Payer: MEDICARE

## 2022-11-21 ENCOUNTER — OFFICE VISIT (OUTPATIENT)
Dept: ONCOLOGY | Age: 57
End: 2022-11-21
Payer: MEDICARE

## 2022-11-21 VITALS
BODY MASS INDEX: 33.33 KG/M2 | OXYGEN SATURATION: 95 % | HEIGHT: 69 IN | DIASTOLIC BLOOD PRESSURE: 81 MMHG | WEIGHT: 225 LBS | RESPIRATION RATE: 16 BRPM | HEART RATE: 78 BPM | SYSTOLIC BLOOD PRESSURE: 154 MMHG | TEMPERATURE: 97.5 F

## 2022-11-21 DIAGNOSIS — C61 PROSTATE CANCER (HCC): Primary | ICD-10-CM

## 2022-11-21 DIAGNOSIS — C79.51 PROSTATE CANCER METASTATIC TO BONE (HCC): ICD-10-CM

## 2022-11-21 DIAGNOSIS — C61 PROSTATE CANCER METASTATIC TO BONE (HCC): ICD-10-CM

## 2022-11-21 LAB
ALBUMIN SERPL-MCNC: 4.5 G/DL (ref 3.5–5.2)
ALP BLD-CCNC: 107 U/L (ref 40–129)
ALT SERPL-CCNC: 13 U/L (ref 0–40)
ANION GAP SERPL CALCULATED.3IONS-SCNC: 11 MMOL/L (ref 7–16)
AST SERPL-CCNC: 17 U/L (ref 0–39)
BASOPHILS ABSOLUTE: 0.04 E9/L (ref 0–0.2)
BASOPHILS RELATIVE PERCENT: 0.8 % (ref 0–2)
BILIRUB SERPL-MCNC: 0.7 MG/DL (ref 0–1.2)
BUN BLDV-MCNC: 23 MG/DL (ref 6–20)
CALCIUM SERPL-MCNC: 9.8 MG/DL (ref 8.6–10.2)
CHLORIDE BLD-SCNC: 103 MMOL/L (ref 98–107)
CO2: 28 MMOL/L (ref 22–29)
CREAT SERPL-MCNC: 1 MG/DL (ref 0.7–1.2)
EOSINOPHILS ABSOLUTE: 0.29 E9/L (ref 0.05–0.5)
EOSINOPHILS RELATIVE PERCENT: 6.1 % (ref 0–6)
GFR SERPL CREATININE-BSD FRML MDRD: >60 ML/MIN/1.73
GLUCOSE BLD-MCNC: 236 MG/DL (ref 74–99)
HCT VFR BLD CALC: 45.9 % (ref 37–54)
HEMOGLOBIN: 15.9 G/DL (ref 12.5–16.5)
IMMATURE GRANULOCYTES #: 0.02 E9/L
IMMATURE GRANULOCYTES %: 0.4 % (ref 0–5)
LYMPHOCYTES ABSOLUTE: 1.46 E9/L (ref 1.5–4)
LYMPHOCYTES RELATIVE PERCENT: 30.5 % (ref 20–42)
MAGNESIUM: 2 MG/DL (ref 1.6–2.6)
MCH RBC QN AUTO: 29.3 PG (ref 26–35)
MCHC RBC AUTO-ENTMCNC: 34.6 % (ref 32–34.5)
MCV RBC AUTO: 84.7 FL (ref 80–99.9)
MONOCYTES ABSOLUTE: 0.43 E9/L (ref 0.1–0.95)
MONOCYTES RELATIVE PERCENT: 9 % (ref 2–12)
NEUTROPHILS ABSOLUTE: 2.54 E9/L (ref 1.8–7.3)
NEUTROPHILS RELATIVE PERCENT: 53.2 % (ref 43–80)
PDW BLD-RTO: 12.5 FL (ref 11.5–15)
PHOSPHORUS: 3.4 MG/DL (ref 2.5–4.5)
PLATELET # BLD: 179 E9/L (ref 130–450)
PMV BLD AUTO: 10.9 FL (ref 7–12)
POTASSIUM SERPL-SCNC: 4 MMOL/L (ref 3.5–5)
PROSTATE SPECIFIC ANTIGEN: 0.14 NG/ML (ref 0–4)
RBC # BLD: 5.42 E12/L (ref 3.8–5.8)
SODIUM BLD-SCNC: 142 MMOL/L (ref 132–146)
TOTAL PROTEIN: 8 G/DL (ref 6.4–8.3)
WBC # BLD: 4.8 E9/L (ref 4.5–11.5)

## 2022-11-21 PROCEDURE — 85025 COMPLETE CBC W/AUTO DIFF WBC: CPT

## 2022-11-21 PROCEDURE — 84100 ASSAY OF PHOSPHORUS: CPT

## 2022-11-21 PROCEDURE — 6360000002 HC RX W HCPCS: Performed by: INTERNAL MEDICINE

## 2022-11-21 PROCEDURE — 83735 ASSAY OF MAGNESIUM: CPT

## 2022-11-21 PROCEDURE — 99213 OFFICE O/P EST LOW 20 MIN: CPT

## 2022-11-21 PROCEDURE — 84153 ASSAY OF PSA TOTAL: CPT

## 2022-11-21 PROCEDURE — 80053 COMPREHEN METABOLIC PANEL: CPT

## 2022-11-21 PROCEDURE — 96372 THER/PROPH/DIAG INJ SC/IM: CPT

## 2022-11-21 RX ORDER — DIPHENHYDRAMINE HYDROCHLORIDE 50 MG/ML
50 INJECTION INTRAMUSCULAR; INTRAVENOUS
OUTPATIENT
Start: 2022-12-19

## 2022-11-21 RX ORDER — ACETAMINOPHEN 325 MG/1
650 TABLET ORAL
OUTPATIENT
Start: 2022-12-19

## 2022-11-21 RX ORDER — ONDANSETRON 2 MG/ML
8 INJECTION INTRAMUSCULAR; INTRAVENOUS
OUTPATIENT
Start: 2022-12-19

## 2022-11-21 RX ORDER — EPINEPHRINE 1 MG/ML
0.3 INJECTION, SOLUTION, CONCENTRATE INTRAVENOUS PRN
OUTPATIENT
Start: 2022-12-19

## 2022-11-21 RX ORDER — ALBUTEROL SULFATE 90 UG/1
4 AEROSOL, METERED RESPIRATORY (INHALATION) PRN
OUTPATIENT
Start: 2022-12-19

## 2022-11-21 RX ORDER — SODIUM CHLORIDE 9 MG/ML
INJECTION, SOLUTION INTRAVENOUS CONTINUOUS
OUTPATIENT
Start: 2022-12-19

## 2022-11-21 RX ADMIN — DENOSUMAB 120 MG: 120 INJECTION SUBCUTANEOUS at 15:00

## 2022-11-21 NOTE — PROGRESS NOTES
Department of [facility] Oncology   Attending Clinic Note    Reason for Visit: Follow-up on a patient with Prostate Cancer    PCP:  Laure King DO    History of Present Illness:  69-year-old male who was noted to have elevated PSA   Evaluated by Urology team (Dr. Marylene Fried Memo)    PSA 9.20 on 03/31/2021  PSA 26.39 on 05/31/2022  PSA 31.83 on 06/20/2022    On 08/04/2022:  A. Prostate, left base, biopsy: Prostatic adenocarcinoma (acinar, not otherwise specified). Andrew Score: 4+3=7; cribriform pattern absent; percentage of pattern 4 is approximately 60%   Grade Group:  3   Tissue Cores Involved by Carcinoma: 2 of 2   Percentage of Tissue Involved by Carcinoma: 10%   Perineural Invasion: Present     B. Prostate, left mid, biopsy: Prostatic adenocarcinoma (acinar, not otherwise specified) and intraductal carcinoma. [place] Score: 4+5=9; cribriform pattern present   Grade Group:  5   Tissue Cores Involved by Carcinoma: 2 of 2   Percentage of Tissue Involved by Carcinoma: 80%   Perineural Invasion: Present     C. Prostate, left apex, biopsy: Prostatic adenocarcinoma (acinar, not otherwise specified). Andrew Score: 4+4 = 8; cribriform pattern present   Grade Group:  4   Tissue Cores Involved by Carcinoma: 2 of 2   Percentage of Tissue Involved by Carcinoma: 50%   Perineural Invasion: Present     D. Prostate, right base, biopsy: Prostatic adenocarcinoma (acinar, not otherwise specified) and intraductal carcinoma. Andrew Score: 4+3 = 7; cribriform pattern present; percentage of pattern 4 is approximately 80%   Grade Group:  3   Tissue Cores Involved by Carcinoma: 3 of 3   Percentage of Tissue Involved by Carcinoma: 90%   Perineural Invasion: Present     E. 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    Andrew Score: 4+4=8; cribriform pattern present   Grade Group:  4   Tissue Cores Involved by Carcinoma: 3 of 3   Percentage of Tissue Involved by Carcinoma: >90%   Perineural Invasion: Not identified     F. Prostate, right apex, biopsy: Prostatic adenocarcinoma (acinar, not otherwise specified) and intraductal carcinoma. Andrew Score: 4+4 = 8; cribriform pattern present   Grade Group:  4   Tissue Cores Involved by Carcinoma: 3 of 3   Percentage of Tissue Involved by Carcinoma: 80%   Perineural Invasion: Present     Comment: In part A, carcinoma is seen involving fibroadipose tissue. Extraprostatic extension cannot be excluded. P63 immunostain is performed on Parts B, D, E, and F and shows the presence of basal cells around the intraductal carcinoma in the absence of basal cells in the   areas of invasive carcinoma. The percentage of tissue involved listed above reflects the total percentage involved by invasive and intraductal carcinoma. Intradepartmental consultation is obtained. CT abdomen/pelvis 8/23/2022 enlarged prostate gland with impingement upon the lymph node to bladder base by an irregular portion of the enlarged gland. Enlarged pelvic and inferior retroperitoneal lymph nodes  Diffuse thickening of the walls of the urinary bladder with surrounding  Small right anterior abdominal wall hernia, which contains fat   Small umbilical hernia, which contains fat. Small bilateral inguinal hernias, left slightly greater than right and both contain fat. Bone scan 08/23/2022:  Findings consistent with widespread metastatic disease    Left Femur/Pelvis X-Ray 09/07/2022: unremarkable left hip and femur. Young Little was started on 09/02/2022 with fair tolerance so far. Eligard 22.5 mg was given on 09/14/2022    Today 11/21/2022; No fever, chills. Fair appetite and energy level. No N.V. No chest pain or SOB. Tolerating XTANDI well. Review of Systems;  CONSTITUTIONAL: No fever, chills. Fair appetite and energy level. ENMT: Eyes: No diplopia; Nose: No epistaxis. Mouth: No sore throat.   RESPIRATORY: No hemoptysis   CARDIOVASCULAR: No chest pain, palpitations. GASTROINTESTINAL: No nausea/vomiting, abdominal pain  GENITOURINARY: See HPI  NEURO: No syncope, presyncope, headache. Remainder:  ROS NEGATIVE    Past Medical History:      Diagnosis Date    Allergic rhinitis     Anticoagulant long-term use     Anxiety     Atypical angina (HCC)     CAD (coronary artery disease)     Carotid artery stenosis     Cerebral artery occlusion with cerebral infarction ([facility] 2012    CHF (congestive heart failure) ([facility])     COPD (chronic obstructive pulmonary disease) ([facility])     CVA (cerebral infarction) 11/19/2012    Depression     Diverticulitis 2010    Sigmoid abscess, s/p colectomy    Erectile dysfunction     GERD (gastroesophageal reflux disease)     Heart attack ([facility] 2014    Hx of blood clots     Hyperlipemia     Hypertension     Ischemic cardiomyopathy     Left atrial thrombus     Obesity     Prostate cancer ([facility] 9/19/2022    Type 2 diabetes mellitus without complication, without long-term current use of insulin ([facility])     Type II or unspecified type diabetes mellitus without mention of complication, not stated as uncontrolled      Medications:  Reviewed and reconciled. Allergies:  No Known Allergies    Physical Exam:  BP (!) 154/81   Pulse 78   Temp 97.5 °F (36.4 °C) (Infrared)   Resp 16   Ht 5' 9\" (1.753 m)   Wt 225 lb (102.1 kg)   SpO2 95%   BMI 33.23 kg/m²   GENERAL: Alert, oriented x 3, not in acute distress. HEENT: PERRLA; EOMI. Oropharynx clear. LUNGS: Good air entry bilaterally. No wheezing, crackles or ronchi. CARDIOVASCULAR: Regular rate. No murmurs, rubs or gallops. EXTREMITIES: Without clubbing, cyanosis, or edema. NEUROLOGIC: No focal deficits.    ECOG PS 1    Diagnostics:  Lab Results   Component Value Date    PSA 0.24 10/24/2022    PSA 7.57 (H) 09/19/2022    PSA 31.83 (H) 06/20/2022     Impression/Plan:  79-year-old male with metastatic prostate cancer     Evaluated by Urology team (Dr. Javon Pisano Shayne)  PSA 9.20 on 03/31/2021  PSA 26.39 on 05/31/2022  PSA 31.83 on 06/20/2022    On 08/04/2022:  A. Prostate, left base, biopsy: Prostatic adenocarcinoma (acinar, not otherwise specified). Andrew Score: 4+3=7; cribriform pattern absent; percentage of pattern 4 is approximately 60%   Grade Group:  3   Tissue Cores Involved by Carcinoma: 2 of 2   Percentage of Tissue Involved by Carcinoma: 10%   Perineural Invasion: Present     B. Prostate, left mid, biopsy: Prostatic adenocarcinoma (acinar, not otherwise specified) and intraductal carcinoma. Andrew Score: 4+5=9; cribriform pattern present   Grade Group:  5   Tissue Cores Involved by Carcinoma: 2 of 2   Percentage of Tissue Involved by Carcinoma: 80%   Perineural Invasion: Present     C. Prostate, left apex, biopsy: Prostatic adenocarcinoma (acinar, not otherwise specified). Andrew Score: 4+4 = 8; cribriform pattern present   Grade Group:  4   Tissue Cores Involved by Carcinoma: 2 of 2   Percentage of Tissue Involved by Carcinoma: 50%   Perineural Invasion: Present     D. Prostate, right base, biopsy: Prostatic adenocarcinoma (acinar, not otherwise specified) and intraductal carcinoma. Andrew Score: 4+3 = 7; cribriform pattern present; percentage of pattern 4 is approximately 80%   Grade Group:  3   Tissue Cores Involved by Carcinoma: 3 of 3   Percentage of Tissue Involved by Carcinoma: 90%   Perineural Invasion: Present     E. 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 Andrew Score: 4+4=8; cribriform pattern present   Grade Group:  4   Tissue Cores Involved by Carcinoma: 3 of 3   Percentage of Tissue Involved by Carcinoma: >90%   Perineural Invasion: Not identified     F. Prostate, right apex, biopsy: Prostatic adenocarcinoma (acinar, not otherwise specified) and intraductal carcinoma.    [place] Score: 4+4 = 8; cribriform pattern present   Grade Group:  4   Tissue Cores Involved by Carcinoma: 3 of 3   Percentage of Tissue Involved by Carcinoma: 80%   Perineural Invasion: Present     Comment: In part A, carcinoma is seen involving fibroadipose tissue. Extraprostatic extension cannot be excluded. P63 immunostain is performed on Parts B, D, E, and F and shows the presence of basal cells around the intraductal carcinoma in the absence of basal cells in the   areas of invasive carcinoma. The percentage of tissue involved listed above reflects the total percentage involved by invasive and intraductal carcinoma. Intradepartmental consultation is obtained. NGS testing ([employer]):  MS-Stable: No therapies or clinical trials  TMB 0 Muts/Mb: No therapies or clinical trials  No therapies or clinical trials are associated with the genomic findings for the sample    CT abdomen/pelvis 8/23/2022 enlarged prostate gland with impingement upon the lymph node to bladder base by an irregular portion of the enlarged gland. Enlarged pelvic and inferior retroperitoneal lymph nodes  Diffuse thickening of the walls of the urinary bladder with surrounding  Small right anterior abdominal wall hernia, which contains fat   Small umbilical hernia, which contains fat. Small bilateral inguinal hernias, left slightly greater than right and both contain fat. Bone scan 08/23/2022:  Findings consistent with widespread metastatic disease    Left Femur/Pelvis X-Ray 09/07/2022: unremarkable left hip and femur. Wilbern Loosen was started on 09/02/2022 with fair tolerance so far. Eligard 22.5 mg was given on 09/14/2022    PSA 7.57 on 09/19/2022. CT chest on 09/28/2022: No CT evidence of acute intrathoracic abnormality. No definite evidence of metastatic disease. Imaging reviewed. PSA 0.24 on 10/24/2022  PSA pending 11/21/2022. Labs reviewed. Ok to proceed with Leigh Grit today. Leigh Grit ordered and given today 11/21/2022.   Scans ordered   Continue XTANDI per protocol    RTC 4 weeks with prior scans (ordered in Epic), labs and Leigh Grit XGEVA every 4 weeks (one given today 11/21/2022)    David Dillard MD   11/21/2022

## 2022-12-05 ENCOUNTER — HOSPITAL ENCOUNTER (OUTPATIENT)
Dept: OTHER | Age: 57
Setting detail: THERAPIES SERIES
Discharge: HOME | End: 2022-12-05
Payer: MEDICARE

## 2022-12-05 VITALS
RESPIRATION RATE: 18 BRPM | DIASTOLIC BLOOD PRESSURE: 70 MMHG | HEIGHT: 69 IN | WEIGHT: 228 LBS | HEART RATE: 78 BPM | OXYGEN SATURATION: 96 % | SYSTOLIC BLOOD PRESSURE: 125 MMHG | BODY MASS INDEX: 33.77 KG/M2

## 2022-12-05 LAB
ANION GAP SERPL CALCULATED.3IONS-SCNC: 15 MMOL/L (ref 7–16)
BUN BLDV-MCNC: 22 MG/DL (ref 6–20)
CALCIUM SERPL-MCNC: 8.6 MG/DL (ref 8.6–10.2)
CHLORIDE BLD-SCNC: 101 MMOL/L (ref 98–107)
CO2: 23 MMOL/L (ref 22–29)
CREAT SERPL-MCNC: 1 MG/DL (ref 0.7–1.2)
GFR SERPL CREATININE-BSD FRML MDRD: >60 ML/MIN/1.73
GLUCOSE BLD-MCNC: 272 MG/DL (ref 74–99)
POTASSIUM SERPL-SCNC: 3.7 MMOL/L (ref 3.5–5)
PRO-BNP: 1125 PG/ML (ref 0–125)
SODIUM BLD-SCNC: 139 MMOL/L (ref 132–146)

## 2022-12-05 PROCEDURE — 36415 COLL VENOUS BLD VENIPUNCTURE: CPT

## 2022-12-05 PROCEDURE — 99214 OFFICE O/P EST MOD 30 MIN: CPT

## 2022-12-05 PROCEDURE — 80048 BASIC METABOLIC PNL TOTAL CA: CPT

## 2022-12-05 PROCEDURE — 83880 ASSAY OF NATRIURETIC PEPTIDE: CPT

## 2022-12-05 NOTE — PROGRESS NOTES
Congestive Heart Failure [address]         Myla Hernández   1965          Referring Provider: FRANCO RAND[facility] NP  Primary Care Physician:   DR. KNIGHT  Cardiologist: DR. Gely Weiss  Nephrologist:N/A      History of Present Illness:     Myla Hernández is a 61 y.o. male with a history of HFrEF, most recent EF 30-35% ON 10/11/2019. Patient Story:    He denies having dyspnea with exertion, shortness of breath, or decline in overall functional capacity. He does  NOT have orthopnea, PND,and nocturnal cough. He does not  have  abdominal distention or bloating, early satiety, anorexia/change in appetite. He does have a good urinary response to oral diuretic. He does not have lower extremity edema. He DOES HAVE some lightheadedness, dizziness. He does not complain of palpitations,syncope. He does not complain of chest pain, pressure, discomfort. No Known Allergies        Prior to Visit Medications    Medication Sig Taking?  Authorizing Provider   metoprolol succinate (TOPROL XL) 50 MG extended release tablet Take 1.5 tablets by mouth at bedtime  Savi Smith, APRN - CNP   denosumab (XGEVA) 120 MG/1.7ML SOLN SC injection Inject 120 mg into the skin once Once a month  Historical Provider, MD   Enzalutamide (XTANDI) 40 MG TABS Take 4 tablets by mouth daily  Historical Provider, MD   Leuprolide Acetate, 3 Month, (ELIGARD) 22.5 MG KIT Inject 22.5 mg into the skin once  Historical Provider, MD   bumetanide (BUMEX) 1 MG tablet Take 1 tablet by mouth daily  Kasia Rios DO   metFORMIN (GLUCOPHAGE) 500 MG tablet Take 1 tablet by mouth 2 times daily (with meals)  Kasia Rios DO   spironolactone (ALDACTONE) 25 MG tablet Take 1 tablet by mouth daily  Kasia Rios DO   sacubitril-valsartan (ENTRESTO)  MG per tablet Take 1 tablet by mouth 2 times daily  Kasia Rios DO   clopidogrel (PLAVIX) 75 MG tablet Take 1 tablet by mouth daily  Kasia Rios,  pantoprazole (PROTONIX) 40 MG tablet Take 1 tablet by mouth daily  Tay Najera DO   isosorbide mononitrate (IMDUR) 30 MG extended release tablet Take 3 tablets by mouth daily  Patient taking differently: 30 mg in the morning, at noon, and at bedtime  Tay Najera DO   atorvastatin (LIPITOR) 80 MG tablet Take 1 tablet by mouth nightly  Tay Najera DO   tamsulosin (FLOMAX) 0.4 mg capsule Take 1 capsule by mouth daily  Tay Najera DO   fluticasone (FLONASE) 50 MCG/ACT nasal spray 2 sprays by Each Nostril route daily  Brenna Cadena MD   aspirin 81 MG EC tablet Take 1 tablet by mouth daily  Inez Galvez MD           Guideline directed medical:  ARNI/ACE I/ARB: Yes  Beta blocker: Yes  Aldosterone antagonist:  YES      Physical Examination:     /70   Pulse 78   Resp 18   Ht 5' 9\" (1.753 m)   Wt 228 lb (103.4 kg)   SpO2 96%   BMI 33.67 kg/m²     Assessment  Charting Type: Shift assessment (chf clinic)    Neurological  Level of Consciousness: Alert (0)  Orientation Level: Oriented X4  Cognition: Appropriate attention/concentration, Appropriate safety awareness, Appropriate judgement, Appropriate for developmental age, Follows commands         HEENT (Head, Ears, Eyes, Nose, & Throat)  HEENT (WDL): Within Defined Limits    Respiratory  Respiratory Pattern: Regular  Respiratory Depth: Normal  Respiratory Quality/Effort: Unlabored  Chest Assessment: Chest expansion symmetrical  L Breath Sounds: Clear  R Breath Sounds: Clear              Cardiac  Cardiac Rhythm: Sinus rhythm    Rhythm Interpretation  Heart Rate: 78         Gastrointestinal  Abdominal (WDL): Within Defined Limits  Abdomen Inspection: Distended  RUQ Bowel Sounds: Active  LUQ Bowel Sounds: Active  RLQ Bowel Sounds: Active  LLQ Bowel Sounds: Active          Bowel Sounds  RUQ Bowel Sounds: Active  LUQ Bowel Sounds: Active  RLQ Bowel Sounds: Active  LLQ Bowel Sounds:  Active    Peripheral Vascular  Peripheral Vascular (WDL): Exceptions to WDL  Edema: Right lower extremity, Left lower extremity  RLE Edema: None  LLE Edema: None                                                 Heart Rate: 78                     LAB DATA:    Last 3 BMP      Sodium (mmol/L)   Date Value   12/05/2022 139   11/21/2022 142   10/28/2022 136     Potassium (mmol/L)   Date Value   12/05/2022 3.7   11/21/2022 4.0   10/28/2022 3.5     Potassium reflex Magnesium (mmol/L)   Date Value   01/23/2022 3.6   01/22/2022 3.8   10/11/2019 3.9     Chloride (mmol/L)   Date Value   12/05/2022 101   11/21/2022 103   10/28/2022 100     CO2 (mmol/L)   Date Value   12/05/2022 23   11/21/2022 28   10/28/2022 23     BUN (mg/dL)   Date Value   12/05/2022 22 (H)   11/21/2022 23 (H)   10/28/2022 18     Glucose (mg/dL)   Date Value   12/05/2022 272 (H)   11/21/2022 236 (H)   10/28/2022 152 (H)   04/23/2012 129 (H)   04/16/2012 163 (H)     Calcium (mg/dL)   Date Value   12/05/2022 8.6   11/21/2022 9.8   10/28/2022 8.4 (L)       Last 3 BNP       Pro-BNP (pg/mL)   Date Value   12/05/2022 1,125 (H)   10/28/2022 1,468 (H)   09/30/2022 847 (H)          CBC: No results for input(s): WBC, HGB, PLT in the last 72 hours. BMP:    Recent Labs     12/05/22  1324      K 3.7      CO2 23   BUN 22*   CREATININE 1.0   GLUCOSE 272*       Hepatic: No results for input(s): AST, ALT, ALB, BILITOT, ALKPHOS in the last 72 hours. Troponin: No results for input(s): TROPONINI in the last 72 hours. BNP: No results for input(s): BNP in the last 72 hours. Lipids: No results for input(s): CHOL, HDL in the last 72 hours. Invalid input(s): LDLCALCU  INR: No results for input(s): INR in the last 72 hours.         WEIGHTS:    Wt Readings from Last 3 Encounters:   12/05/22 228 lb (103.4 kg)   11/21/22 225 lb (102.1 kg)   10/28/22 220 lb (99.8 kg)         TELEMETRY:  Cardiac Regularity: Regular  Cardiac Rhythm/Interpretation: NSR        ASSESSMENT:  Hermelinda Meng Debbie's weight is up 8 pounds since last CHF visit on 10/28/22 but attributes it to having a better appetite and the Thanksgiving holiday. Denies any SOB. Assessment unchanged from previous visit. Patient states he has not had to take any additional bumex since last visit. Interventions completed this visit:  IV diuretics given:No  Lab work obtained yes, BMP/BNP  Reviewed currently prescribed medications with patient, educated on importance of compliance and answered any questions regarding their medication  Educated on signs and symptoms of HF  Educated on low sodium diet    PLAN:  Scheduled to follow up in CHF clinic on   Future Appointments   Date Time Provider Estuardo Arzate   12/7/2022  8:00 AM [facility] CT SCAN 3 [facility] CT [facility]   12/7/2022  8:30 AM [facility] NM DOSE RM SE[facility]Z NM SE [facility]   12/7/2022 11:30 AM [facility] NM ECAM [facility] NM SE [facility]   12/13/2022  2:00 PM DO Zion Marie [facility]   12/19/2022  2:00 PM Ismael Jain MD MED ONC [facility]   12/19/2022  2:45 PM [facility] MED ONC FAST TRACK 3 SEYZ Med Onc [facility]   2/3/2023  1:00 PM [facility] CHF ROOM 1 [facility] [address]  27         Given clinic phone number and aware of signs and symptoms to call with any HF change in symptoms. Denies current complaints.  Assessment unremarkable

## 2022-12-05 NOTE — PLAN OF CARE
Problem: Chronic Conditions and Co-morbidities  Goal: Patient's chronic conditions and co-morbidity symptoms are monitored and maintained or improved  Flowsheets (Taken 12/5/2022 0701)  Care Plan - Patient's Chronic Conditions and Co-Morbidity Symptoms are Monitored and Maintained or Improved: Monitor and assess patient's chronic conditions and comorbid symptoms for stability, deterioration, or improvement

## 2022-12-06 ENCOUNTER — TELEPHONE (OUTPATIENT)
Dept: CARDIOLOGY CLINIC | Age: 57
End: 2022-12-06

## 2022-12-06 NOTE — RESULT ENCOUNTER NOTE
Labs and CHF clinic   /70   Pulse 78      Current GDMT:  Toprol 75 mg nightly  Entresto 97/103 mg BID  Spironolactone 25 mg daily   Bumex 1 mg daily      Please assess how he is taking Imdur? Listed daily and TID? It is an ER medication therefore we can adjust dosing if he is taking this TID.

## 2022-12-06 NOTE — TELEPHONE ENCOUNTER
----- Message from PAUL Parker CNP sent at 12/6/2022 12:59 PM EST -----  Labs and CHF clinic   /70   Pulse 78      Current GDMT:  Toprol 75 mg nightly  Entresto 97/103 mg BID  Spironolactone 25 mg daily   Bumex 1 mg daily      Please assess how he is taking Imdur? Listed daily and TID? It is an ER medication therefore we can adjust dosing if he is taking this TID.

## 2022-12-06 NOTE — TELEPHONE ENCOUNTER
Left message [phone] (home)  to call office in am with how he takes isosorbide mononitrate (imdur)   Are you taking 30mg tab (take 3 of these tabs everyam). Or are you taking 30mg 1 tab three times a day. Pending call back  I am out of office tomorrow.  Will forward to [facility] to call f/u and then let moy know

## 2022-12-07 ENCOUNTER — HOSPITAL ENCOUNTER (OUTPATIENT)
Dept: CT IMAGING | Age: 57
Discharge: HOME | End: 2022-12-09
Payer: MEDICARE

## 2022-12-07 ENCOUNTER — HOSPITAL ENCOUNTER (OUTPATIENT)
Dept: NUCLEAR MEDICINE | Age: 57
Discharge: HOME | End: 2022-12-09
Payer: MEDICARE

## 2022-12-07 DIAGNOSIS — C61 PROSTATE CANCER METASTATIC TO BONE (HCC): ICD-10-CM

## 2022-12-07 DIAGNOSIS — C79.51 PROSTATE CANCER METASTATIC TO BONE (HCC): ICD-10-CM

## 2022-12-07 PROCEDURE — A9503 TC99M MEDRONATE: HCPCS | Performed by: RADIOLOGY

## 2022-12-07 PROCEDURE — 3430000000 HC RX DIAGNOSTIC RADIOPHARMACEUTICAL: Performed by: RADIOLOGY

## 2022-12-07 PROCEDURE — 78306 BONE IMAGING WHOLE BODY: CPT | Performed by: RADIOLOGY

## 2022-12-07 PROCEDURE — 6360000004 HC RX CONTRAST MEDICATION: Performed by: RADIOLOGY

## 2022-12-07 PROCEDURE — 78306 BONE IMAGING WHOLE BODY: CPT | Performed by: INTERNAL MEDICINE

## 2022-12-07 PROCEDURE — 74177 CT ABD & PELVIS W/CONTRAST: CPT

## 2022-12-07 RX ORDER — TC 99M MEDRONATE 20 MG/10ML
25 INJECTION, POWDER, LYOPHILIZED, FOR SOLUTION INTRAVENOUS
Status: COMPLETED | OUTPATIENT
Start: 2022-12-07 | End: 2022-12-07

## 2022-12-07 RX ORDER — SODIUM CHLORIDE 0.9 % (FLUSH) 0.9 %
10 SYRINGE (ML) INJECTION
Status: ACTIVE | OUTPATIENT
Start: 2022-12-07 | End: 2022-12-08

## 2022-12-07 RX ADMIN — IOPAMIDOL 50 ML: 612 INJECTION, SOLUTION INTRAVENOUS at 08:23

## 2022-12-07 RX ADMIN — TC 99M MEDRONATE 30 MILLICURIE: 20 INJECTION, POWDER, LYOPHILIZED, FOR SOLUTION INTRAVENOUS at 07:45

## 2022-12-07 RX ADMIN — IOPAMIDOL 75 ML: 755 INJECTION, SOLUTION INTRAVENOUS at 08:23

## 2022-12-09 DIAGNOSIS — E11.9 TYPE 2 DIABETES MELLITUS WITHOUT COMPLICATION, WITHOUT LONG-TERM CURRENT USE OF INSULIN (HCC): ICD-10-CM

## 2022-12-09 DIAGNOSIS — I25.10 CORONARY ARTERY DISEASE INVOLVING NATIVE CORONARY ARTERY OF NATIVE HEART WITHOUT ANGINA PECTORIS: ICD-10-CM

## 2022-12-09 RX ORDER — TAMSULOSIN HYDROCHLORIDE 0.4 MG/1
CAPSULE ORAL
Qty: 90 CAPSULE | Refills: 0 | Status: SHIPPED | OUTPATIENT
Start: 2022-12-09

## 2022-12-09 RX ORDER — ATORVASTATIN CALCIUM 80 MG/1
TABLET, FILM COATED ORAL
Qty: 90 TABLET | Refills: 0 | Status: SHIPPED | OUTPATIENT
Start: 2022-12-09

## 2022-12-09 NOTE — TELEPHONE ENCOUNTER
Last Appointment:  9/12/2022  Future Appointments   Date Time Provider Estuardo Carito   12/13/2022  2:00 PM DO Michael Tapia [facility]   12/19/2022  2:00 PM Frank Monroy MD MED ONC [facility]   12/19/2022  2:45 PM [facility] MED ONC FAST TRACK 3 [facility] Med Onc [facility]   2/3/2023  1:00 PM [facility] CHF ROOM 1 [facility] CHF Marlene Torres

## 2022-12-13 ENCOUNTER — OFFICE VISIT (OUTPATIENT)
Dept: FAMILY MEDICINE CLINIC | Age: 57
End: 2022-12-13
Payer: MEDICARE

## 2022-12-13 VITALS
RESPIRATION RATE: 20 BRPM | OXYGEN SATURATION: 96 % | DIASTOLIC BLOOD PRESSURE: 71 MMHG | TEMPERATURE: 97.2 F | HEIGHT: 69 IN | WEIGHT: 229 LBS | HEART RATE: 79 BPM | BODY MASS INDEX: 33.92 KG/M2 | SYSTOLIC BLOOD PRESSURE: 131 MMHG

## 2022-12-13 DIAGNOSIS — I72.3 ANEURYSM OF RIGHT COMMON ILIAC ARTERY (HCC): ICD-10-CM

## 2022-12-13 DIAGNOSIS — M25.512 ACUTE PAIN OF BOTH SHOULDERS: ICD-10-CM

## 2022-12-13 DIAGNOSIS — M25.511 ACUTE PAIN OF BOTH SHOULDERS: ICD-10-CM

## 2022-12-13 DIAGNOSIS — E11.9 TYPE 2 DIABETES MELLITUS WITHOUT COMPLICATION, WITHOUT LONG-TERM CURRENT USE OF INSULIN (HCC): Primary | ICD-10-CM

## 2022-12-13 DIAGNOSIS — B35.3 TINEA PEDIS OF BOTH FEET: ICD-10-CM

## 2022-12-13 DIAGNOSIS — I50.22 CHRONIC SYSTOLIC CONGESTIVE HEART FAILURE (HCC): ICD-10-CM

## 2022-12-13 LAB — HBA1C MFR BLD: 7.5 %

## 2022-12-13 PROCEDURE — 99212 OFFICE O/P EST SF 10 MIN: CPT | Performed by: FAMILY MEDICINE

## 2022-12-13 PROCEDURE — 3074F SYST BP LT 130 MM HG: CPT | Performed by: FAMILY MEDICINE

## 2022-12-13 PROCEDURE — 3051F HG A1C>EQUAL 7.0%<8.0%: CPT | Performed by: FAMILY MEDICINE

## 2022-12-13 PROCEDURE — 83036 HEMOGLOBIN GLYCOSYLATED A1C: CPT | Performed by: FAMILY MEDICINE

## 2022-12-13 PROCEDURE — 99214 OFFICE O/P EST MOD 30 MIN: CPT | Performed by: FAMILY MEDICINE

## 2022-12-13 PROCEDURE — 3078F DIAST BP <80 MM HG: CPT | Performed by: FAMILY MEDICINE

## 2022-12-13 RX ORDER — CLOPIDOGREL BISULFATE 75 MG/1
75 TABLET ORAL DAILY
Qty: 30 TABLET | Refills: 5 | Status: SHIPPED | OUTPATIENT
Start: 2022-12-13

## 2022-12-13 RX ORDER — BUMETANIDE 1 MG/1
1 TABLET ORAL DAILY
Qty: 30 TABLET | Refills: 3 | Status: SHIPPED | OUTPATIENT
Start: 2022-12-13

## 2022-12-13 RX ORDER — PANTOPRAZOLE SODIUM 40 MG/1
40 TABLET, DELAYED RELEASE ORAL DAILY
Qty: 30 TABLET | Refills: 5 | Status: SHIPPED | OUTPATIENT
Start: 2022-12-13

## 2022-12-13 RX ORDER — ASPIRIN 81 MG/1
81 TABLET ORAL DAILY
Qty: 90 TABLET | Refills: 1 | Status: SHIPPED | OUTPATIENT
Start: 2022-12-13 | End: 2023-03-13

## 2022-12-13 RX ORDER — CLOTRIMAZOLE 1 %
CREAM (GRAM) TOPICAL
Qty: 60 G | Refills: 1 | Status: SHIPPED | OUTPATIENT
Start: 2022-12-13 | End: 2022-12-20

## 2022-12-13 RX ORDER — FLUTICASONE PROPIONATE 50 MCG
2 SPRAY, SUSPENSION (ML) NASAL DAILY
Qty: 48 G | Refills: 1 | Status: SHIPPED | OUTPATIENT
Start: 2022-12-13

## 2022-12-13 RX ORDER — ISOSORBIDE MONONITRATE 30 MG/1
30 TABLET, EXTENDED RELEASE ORAL 3 TIMES DAILY
Qty: 90 TABLET | Refills: 5 | Status: SHIPPED | OUTPATIENT
Start: 2022-12-13 | End: 2023-03-13

## 2022-12-13 RX ORDER — SPIRONOLACTONE 25 MG/1
25 TABLET ORAL DAILY
Qty: 30 TABLET | Refills: 5 | Status: SHIPPED | OUTPATIENT
Start: 2022-12-13

## 2022-12-13 NOTE — PROGRESS NOTES
CC:  Follow up DM    HPI:  61 y.o. male presents for follow up    DM. A1C 7.5. Has not been watching diet at all over the holidays. Had been drinking soda. Eating more recently. Plans to do better. A1C has increased; believes he can lower this with healthy changes. Taking medications more consistently. Prostate CA with bone mets mets. Following with Oncology this week and Urology next week. Undergoing treatment. Feels fatigued; no other significant side effects. HFrEF. Following with HF clinic. Weight has increased but due to food and not swelling, he states. Plans to work on healthier dietary choices. Continue to follow daily weights. Taking medications as prescribed at this time. Feeling well. No new edema, no dyspnea. Bilateral shoulder pain. No known injury. Gradually worsening, limited motion.       Patient Active Problem List    Diagnosis Date Noted    Prostate cancer ([facility] [address] 09/19/2022    Age-related nuclear cataract, bilateral 01/31/2022    Presbyopia 01/31/2022    Regular astigmatism, bilateral 01/31/2022    Leg swelling 01/23/2022    ALEX (acute kidney injury) ([address] 01/23/2022    CHF, acute on chronic ([address] 01/21/2022    Elevated PSA, less than 10 ng/ml 04/01/2021    Nonrheumatic aortic valve insufficiency 02/20/2019    Obesity (BMI 30.0-34.9)     Ischemic cardiomyopathy     Type 2 diabetes mellitus without complication, without long-term current use of insulin ([address]     Essential hypertension 06/29/2017    Diverticulosis of colon 08/07/2015    History of MI (myocardial infarction) 08/07/2015    Chronic systolic congestive heart failure ([address] 08/07/2015    NSVT (nonsustained ventricular tachycardia) 07/31/2015    Dual implantable cardioverter-defibrillator in situ 04/02/2015    CAD (coronary artery disease) 12/06/2014    Depression 12/10/2013    History of stroke 07/23/2013    ED (erectile dysfunction) 03/26/2012    COPD (chronic obstructive pulmonary disease)     Hyperlipemia chewing tobacco, trying to quit   Vaping Use    Vaping Use: Never used   Substance Use Topics    Alcohol use: Not Currently     Alcohol/week: 2.0 standard drinks     Types: 2 Cans of beer per week    Drug use: No       ROS:   Review of Systems - as above     Physical Exam:    VS:  Blood pressure 131/71, pulse 79, temperature 97.2 °F (36.2 °C), temperature source Temporal, resp. rate 20, height 5' 9\" (1.753 m), weight 229 lb (103.9 kg), SpO2 96 %. General Appearance:  awake, alert, oriented, in no acute distress and well developed, well nourished  Head/face:  NCAT  Eyes:  EOMI and Sclera nonicteric  Neck:  neck- supple, no mass, non-tender  Lungs:  Normal expansion. Clear to auscultation. No rales, rhonchi, or wheezing. Heart:  Heart regular rate and rhythm  Murmur(s)-  2/6 systolic at 2nd left intercostal space, at 2nd right intercostal space, at lower left sternal border  Abdomen:  softly distended, nontender   Extremities: Extremities warm to touch, pink, pulses present in all extremities, and trace pedal edema. Foot exam showed normal sensation to monofilament, equal bilaterally. Good pulses and capillary refill. Skin warm and dry. No ulcerations. No significant deformity or callus formation. Skin with scaling diffusely on plantar aspect bilaterally and on the dorsal toes, concerning for tinea pedis. Bilateral shoulders with limited abduction to 120 degrees, flexion to 100 degrees, and internal rotation to T 10 approximately. Distal motor and sensation, pulses, intact and equal.  Positive Hawkin's right. Negative empty can. Most recent labs and imaging reviewed. Findings include:     Results for POC orders placed in visit on 12/13/22   POCT glycosylated hemoglobin (Hb A1C)   Result Value Ref Range    Hemoglobin A1C 7.5 %     CT abdomen pelvis note is made of aneurysmal dilatations of common iliac artery. Stable from August 2022.   Will plan to re-image in June 2023 or sooner with any concerns. Sent message. Assessments:      Diagnosis Orders   1. Type 2 diabetes mellitus without complication, without long-term current use of insulin (HCC)  POCT glycosylated hemoglobin (Hb A1C)     DIABETES FOOT EXAM      2. Chronic systolic congestive heart failure (HCC)  bumetanide (BUMEX) 1 MG tablet      3. Acute pain of both shoulders  XR SHOULDER LEFT (MIN 2 VIEWS)    XR SHOULDER RIGHT (MIN 2 VIEWS)      4. Tinea pedis of both feet  clotrimazole (LOTRIMIN) 1 % cream      5. Aneurysm of right common iliac artery ([address]            Plans:    As Above. Please see Patient Instructions for further counseling and information given. RTO 3 months, or sooner as needed for any new, persistent, or worsening symptoms. Same medications for now. Dietary modifications as discussed. Follow with specialists as directed. For shoulder pain, will check XR with history of metastatic CA. Supportive care. Consider PT. Home stretches for now. Call with any new/worsening symptoms or concerns. Please be adherent to the treatment plans discussed today, and please call with any questions or concerns, letting the office know of any reasons that the plans may not be followed. The risks of untreated conditions include worsening illness, injury, disability, and possibly, death. Please call if symptoms change in any way, worsen, or fail to completely resolve, as this could necessitate a change to treatment plans. Patient expressed understanding. Indications and proper use of medication(s) reviewed. Potential side-effects and risks of medication(s) also explained. Patient was instructed to call if any new symptoms develop prior to next visit.

## 2022-12-19 ENCOUNTER — OFFICE VISIT (OUTPATIENT)
Dept: ONCOLOGY | Age: 57
End: 2022-12-19
Payer: MEDICARE

## 2022-12-19 ENCOUNTER — HOSPITAL ENCOUNTER (OUTPATIENT)
Dept: INFUSION THERAPY | Age: 57
Discharge: HOME | End: 2022-12-19
Payer: MEDICARE

## 2022-12-19 VITALS
DIASTOLIC BLOOD PRESSURE: 78 MMHG | SYSTOLIC BLOOD PRESSURE: 141 MMHG | TEMPERATURE: 97.5 F | OXYGEN SATURATION: 97 % | RESPIRATION RATE: 18 BRPM | WEIGHT: 227 LBS | BODY MASS INDEX: 33.62 KG/M2 | HEART RATE: 81 BPM | HEIGHT: 69 IN

## 2022-12-19 DIAGNOSIS — C79.51 PROSTATE CANCER METASTATIC TO BONE (HCC): Primary | ICD-10-CM

## 2022-12-19 DIAGNOSIS — C61 PROSTATE CANCER (HCC): ICD-10-CM

## 2022-12-19 DIAGNOSIS — C61 PROSTATE CANCER (HCC): Primary | ICD-10-CM

## 2022-12-19 DIAGNOSIS — C61 PROSTATE CANCER METASTATIC TO BONE (HCC): Primary | ICD-10-CM

## 2022-12-19 LAB
ALBUMIN SERPL-MCNC: 4.4 G/DL (ref 3.5–5.2)
ALP BLD-CCNC: 78 U/L (ref 40–129)
ALT SERPL-CCNC: 10 U/L (ref 0–40)
ANION GAP SERPL CALCULATED.3IONS-SCNC: 13 MMOL/L (ref 7–16)
AST SERPL-CCNC: 16 U/L (ref 0–39)
BILIRUB SERPL-MCNC: 0.6 MG/DL (ref 0–1.2)
BUN BLDV-MCNC: 27 MG/DL (ref 6–20)
CALCIUM SERPL-MCNC: 9.2 MG/DL (ref 8.6–10.2)
CHLORIDE BLD-SCNC: 105 MMOL/L (ref 98–107)
CO2: 23 MMOL/L (ref 22–29)
CREAT SERPL-MCNC: 1.1 MG/DL (ref 0.7–1.2)
GFR SERPL CREATININE-BSD FRML MDRD: >60 ML/MIN/1.73
GLUCOSE BLD-MCNC: 141 MG/DL (ref 74–99)
MAGNESIUM: 2 MG/DL (ref 1.6–2.6)
PHOSPHORUS: 2.9 MG/DL (ref 2.5–4.5)
POTASSIUM SERPL-SCNC: 3.8 MMOL/L (ref 3.5–5)
PROSTATE SPECIFIC ANTIGEN: 0.1 NG/ML (ref 0–4)
SODIUM BLD-SCNC: 141 MMOL/L (ref 132–146)
TOTAL PROTEIN: 7.5 G/DL (ref 6.4–8.3)

## 2022-12-19 PROCEDURE — 3078F DIAST BP <80 MM HG: CPT | Performed by: INTERNAL MEDICINE

## 2022-12-19 PROCEDURE — 6360000002 HC RX W HCPCS: Performed by: INTERNAL MEDICINE

## 2022-12-19 PROCEDURE — 99214 OFFICE O/P EST MOD 30 MIN: CPT | Performed by: INTERNAL MEDICINE

## 2022-12-19 PROCEDURE — 83735 ASSAY OF MAGNESIUM: CPT

## 2022-12-19 PROCEDURE — 84100 ASSAY OF PHOSPHORUS: CPT

## 2022-12-19 PROCEDURE — 80053 COMPREHEN METABOLIC PANEL: CPT

## 2022-12-19 PROCEDURE — 96372 THER/PROPH/DIAG INJ SC/IM: CPT

## 2022-12-19 PROCEDURE — 3074F SYST BP LT 130 MM HG: CPT | Performed by: INTERNAL MEDICINE

## 2022-12-19 PROCEDURE — 36415 COLL VENOUS BLD VENIPUNCTURE: CPT

## 2022-12-19 PROCEDURE — 99212 OFFICE O/P EST SF 10 MIN: CPT

## 2022-12-19 RX ORDER — SODIUM CHLORIDE 9 MG/ML
INJECTION, SOLUTION INTRAVENOUS CONTINUOUS
OUTPATIENT
Start: 2023-01-16

## 2022-12-19 RX ORDER — ACETAMINOPHEN 325 MG/1
650 TABLET ORAL
OUTPATIENT
Start: 2023-01-16

## 2022-12-19 RX ORDER — ONDANSETRON 2 MG/ML
8 INJECTION INTRAMUSCULAR; INTRAVENOUS
OUTPATIENT
Start: 2023-01-16

## 2022-12-19 RX ORDER — ALBUTEROL SULFATE 90 UG/1
4 AEROSOL, METERED RESPIRATORY (INHALATION) PRN
OUTPATIENT
Start: 2023-01-16

## 2022-12-19 RX ORDER — DIPHENHYDRAMINE HYDROCHLORIDE 50 MG/ML
50 INJECTION INTRAMUSCULAR; INTRAVENOUS
OUTPATIENT
Start: 2023-01-16

## 2022-12-19 RX ORDER — EPINEPHRINE 1 MG/ML
0.3 INJECTION, SOLUTION, CONCENTRATE INTRAVENOUS PRN
OUTPATIENT
Start: 2023-01-16

## 2022-12-19 RX ADMIN — DENOSUMAB 120 MG: 120 INJECTION SUBCUTANEOUS at 14:35

## 2022-12-19 NOTE — PROGRESS NOTES
Department of [place] [facility]   Attending Clinic Note    Reason for Visit: Follow-up on a patient with Prostate Cancer    PCP:  Sabrina Mccarthy DO    History of Present Illness:  58-year-old male who was noted to have elevated PSA   Evaluated by Urology team (Dr. Charmian Leventhal Shayne)    PSA 9.20 on 03/31/2021  PSA 26.39 on 05/31/2022  PSA 31.83 on 06/20/2022    On 08/04/2022:  A. Prostate, left base, biopsy: Prostatic adenocarcinoma (acinar, not otherwise specified). Tony Score: 4+3=7; cribriform pattern absent; percentage of pattern 4 is approximately 60%   Grade Group:  3   Tissue Cores Involved by Carcinoma: 2 of 2   Percentage of Tissue Involved by Carcinoma: 10%   Perineural Invasion: Present     B. Prostate, left mid, biopsy: Prostatic adenocarcinoma (acinar, not otherwise specified) and intraductal carcinoma. Andrew Score: 4+5=9; cribriform pattern present   Grade Group:  5   Tissue Cores Involved by Carcinoma: 2 of 2   Percentage of Tissue Involved by Carcinoma: 80%   Perineural Invasion: Present     C. Prostate, left apex, biopsy: Prostatic adenocarcinoma (acinar, not otherwise specified). Tony Score: 4+4 = 8; cribriform pattern present   Grade Group:  4   Tissue Cores Involved by Carcinoma: 2 of 2   Percentage of Tissue Involved by Carcinoma: 50%   Perineural Invasion: Present     D. Prostate, right base, biopsy: Prostatic adenocarcinoma (acinar, not otherwise specified) and intraductal carcinoma. Andrew Score: 4+3 = 7; cribriform pattern present; percentage of pattern 4 is approximately 80%   Grade Group:  3   Tissue Cores Involved by Carcinoma: 3 of 3   Percentage of Tissue Involved by Carcinoma: 90%   Perineural Invasion: Present     E. 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    Andrew Score: 4+4=8; cribriform pattern present   Grade Group:  4   Tissue Cores Involved by Carcinoma: 3 of 3   Percentage of Tissue Involved by Carcinoma: >90%   Perineural Invasion: Not identified     F. Prostate, right apex, biopsy: Prostatic adenocarcinoma (acinar, not otherwise specified) and intraductal carcinoma. Bayfield Score: 4+4 = 8; cribriform pattern present   Grade Group:  4   Tissue Cores Involved by Carcinoma: 3 of 3   Percentage of Tissue Involved by Carcinoma: 80%   Perineural Invasion: Present     Comment: In part A, carcinoma is seen involving fibroadipose tissue. Extraprostatic extension cannot be excluded. P63 immunostain is performed on Parts B, D, E, and F and shows the presence of basal cells around the intraductal carcinoma in the absence of basal cells in the   areas of invasive carcinoma. The percentage of tissue involved listed above reflects the total percentage involved by invasive and intraductal carcinoma. Intradepartmental consultation is obtained. CT abdomen/pelvis 8/23/2022 enlarged prostate gland with impingement upon the lymph node to bladder base by an irregular portion of the enlarged gland. Enlarged pelvic and inferior retroperitoneal lymph nodes  Diffuse thickening of the walls of the urinary bladder with surrounding  Small right anterior abdominal wall hernia, which contains fat   Small umbilical hernia, which contains fat. Small bilateral inguinal hernias, left slightly greater than right and both contain fat. Bone scan 08/23/2022:  Findings consistent with widespread metastatic disease    Left Femur/Pelvis X-Ray 09/07/2022: unremarkable left hip and femur. Tenisha Fabian was started on 09/02/2022 with fair tolerance so far. Eligard 22.5 mg was given on 09/14/2022    Today 12/19/2022; No fever, chills. Fair appetite and energy level. No N.V. No chest pain or SOB. Tolerating XTANDI well. Review of Systems;  CONSTITUTIONAL: No fever, chills. Fair appetite and energy level. ENMT: Eyes: No diplopia; Nose: No epistaxis. Mouth: No sore throat.   RESPIRATORY: No hemoptysis   CARDIOVASCULAR: No chest pain, palpitations. GASTROINTESTINAL: No nausea/vomiting, abdominal pain  GENITOURINARY: See HPI  NEURO: No syncope, presyncope, headache. Remainder:  ROS NEGATIVE    Past Medical History:      Diagnosis Date    Allergic rhinitis     Aneurysm of right common iliac artery (HCC)     1.9 cm 12/2022 on CT, stable from 8/2022. Follow with imaging. Anticoagulant long-term use     Anxiety     Atypical angina (HCC)     CAD (coronary artery disease)     Carotid artery stenosis     Cerebral artery occlusion with cerebral infarction ([facility] [address] 2012    CHF (congestive heart failure) ([facility])     COPD (chronic obstructive pulmonary disease) ([facility])     CVA (cerebral infarction) 11/19/2012    Depression     Diverticulitis 2010    Sigmoid abscess, s/p colectomy    Erectile dysfunction     GERD (gastroesophageal reflux disease)     Heart attack ([facility] [address] 2014    Hx of blood clots     Hyperlipemia     Hypertension     Ischemic cardiomyopathy     Left atrial thrombus     Obesity     Prostate cancer ([facility] 09/19/2022    Type 2 diabetes mellitus without complication, without long-term current use of insulin ([facility])     Type II or unspecified type diabetes mellitus without mention of complication, not stated as uncontrolled      Medications:  Reviewed and reconciled. Allergies:  No Known Allergies    Physical Exam:  BP (!) 141/78   Pulse 81   Temp 97.5 °F (36.4 °C) (Infrared)   Resp 18   Ht 5' 9\" (1.753 m)   Wt 227 lb (103 kg)   SpO2 97%   BMI 33.52 kg/m²   GENERAL: Alert, oriented x 3, not in acute distress. HEENT: PERRLA; EOMI. Oropharynx clear. LUNGS: Good air entry bilaterally. No wheezing, crackles or ronchi. CARDIOVASCULAR: Regular rate. No murmurs, rubs or gallops. EXTREMITIES: Without clubbing, cyanosis, or edema. NEUROLOGIC: No focal deficits.    ECOG PS 1    Diagnostics:  Lab Results   Component Value Date    PSA 0.10 12/19/2022    PSA 0.14 11/21/2022    PSA 0.24 10/24/2022     Impression/Plan:  69-year-old male with metastatic prostate cancer     Evaluated by Urology team (Dr. [employer] Shayne)  PSA 9.20 on 03/31/2021  PSA 26.39 on 05/31/2022  PSA 31.83 on 06/20/2022    On 08/04/2022:  A. Prostate, left base, biopsy: Prostatic adenocarcinoma (acinar, not otherwise specified). Blaine Score: 4+3=7; cribriform pattern absent; percentage of pattern 4 is approximately 60%   Grade Group:  3   Tissue Cores Involved by Carcinoma: 2 of 2   Percentage of Tissue Involved by Carcinoma: 10%   Perineural Invasion: Present     B. Prostate, left mid, biopsy: Prostatic adenocarcinoma (acinar, not otherwise specified) and intraductal carcinoma. Andrew Score: 4+5=9; cribriform pattern present   Grade Group:  5   Tissue Cores Involved by Carcinoma: 2 of 2   Percentage of Tissue Involved by Carcinoma: 80%   Perineural Invasion: Present     C. Prostate, left apex, biopsy: Prostatic adenocarcinoma (acinar, not otherwise specified). Blaine Score: 4+4 = 8; cribriform pattern present   Grade Group:  4   Tissue Cores Involved by Carcinoma: 2 of 2   Percentage of Tissue Involved by Carcinoma: 50%   Perineural Invasion: Present     D. Prostate, right base, biopsy: Prostatic adenocarcinoma (acinar, not otherwise specified) and intraductal carcinoma. Andrew Score: 4+3 = 7; cribriform pattern present; percentage of pattern 4 is approximately 80%   Grade Group:  3   Tissue Cores Involved by Carcinoma: 3 of 3   Percentage of Tissue Involved by Carcinoma: 90%   Perineural Invasion: Present     E. 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 Blaine Score: 4+4=8; cribriform pattern present   Grade Group:  4   Tissue Cores Involved by Carcinoma: 3 of 3   Percentage of Tissue Involved by Carcinoma: >90%   Perineural Invasion: Not identified     F. Prostate, right apex, biopsy: Prostatic adenocarcinoma (acinar, not otherwise specified) and intraductal carcinoma.    Blaine Score: 4+4 = 8; cribriform pattern present   Grade Group:  4   Tissue Cores Involved by Carcinoma: 3 of 3   Percentage of Tissue Involved by Carcinoma: 80%   Perineural Invasion: Present     Comment: In part A, carcinoma is seen involving fibroadipose tissue. Extraprostatic extension cannot be excluded. P63 immunostain is performed on Parts B, D, E, and F and shows the presence of basal cells around the intraductal carcinoma in the absence of basal cells in the   areas of invasive carcinoma. The percentage of tissue involved listed above reflects the total percentage involved by invasive and intraductal carcinoma. Intradepartmental consultation is obtained. NGS testing ([employer]):  MS-Stable: No therapies or clinical trials  TMB 0 Muts/Mb: No therapies or clinical trials  No therapies or clinical trials are associated with the genomic findings for the sample    CT abdomen/pelvis 8/23/2022 enlarged prostate gland with impingement upon the lymph node to bladder base by an irregular portion of the enlarged gland. Enlarged pelvic and inferior retroperitoneal lymph nodes  Diffuse thickening of the walls of the urinary bladder with surrounding  Small right anterior abdominal wall hernia, which contains fat   Small umbilical hernia, which contains fat. Small bilateral inguinal hernias, left slightly greater than right and both contain fat. Bone scan 08/23/2022:  Findings consistent with widespread metastatic disease    Left Femur/Pelvis X-Ray 09/07/2022: unremarkable left hip and femur. Palafox Garcia was started on 09/02/2022 with fair tolerance so far. Eligard 22.5 mg was given on 09/14/2022    PSA 7.57 on 09/19/2022. CT chest on 09/28/2022: No CT evidence of acute intrathoracic abnormality. No definite evidence of metastatic disease.   PSA 0.24 on 10/24/2022  PSA 0.14 on 11/21/2022    CT abdomen/pelvis 12/07/2022: Compared to prior exam the prostate gland is decreased in size and previously seen enlarged pelvic lymph nodes are now subcentimeter and decreased in size compatible with positive response to treatment. No new areas of lymphadenopathy. Bone scan 12/07/2022: There is evidence to suggest metastatic disease, however the degree of tracer uptake is significantly diminished in the interval suggesting a positive response to therapy. PSA 0.10 on 12/19/2022    Imaging reviewed. Labs reviewed. Overall positive response.  Recommended to continue XTANDI per protocol    RTC 4 weeks with labs and XGEVA same day  XGEVA every 4 weeks (labs reviewed; ok to proceed with Genie Pepper today 12/19/2022)    Reyes Ward MD   12/19/2022

## 2022-12-23 NOTE — TELEPHONE ENCOUNTER
Called again to assess how he is taking Imdur? Listed daily and TID? It is an ER medication therefore we can adjust dosing if he is taking this TID. Per Arianna Louie CNP original request.   Pending call back from patient with response.      Future Appointments   Date Time Provider Estuardo Arzate   1/16/2023  1:45 PM Chin Alcala MD MED ONC [facility]   1/16/2023  2:30 PM [facility] MED ONC FAST TRACK 2 [facility] Med Onc [facility]   2/3/2023  1:00 PM [facility] CHF ROOM 1 [facility] CHF [facility]   3/14/2023  1:40 PM Shelagh Moritz, DO [facility]

## 2022-12-28 RX ORDER — ISOSORBIDE MONONITRATE 30 MG/1
90 TABLET, EXTENDED RELEASE ORAL DAILY
Qty: 90 TABLET | Refills: 5 | Status: SHIPPED | OUTPATIENT
Start: 2022-12-28 | End: 2023-06-26

## 2022-12-28 NOTE — TELEPHONE ENCOUNTER
Left provider instruction again in VM   Pending office call back. This is 4 calls regarding imdur dosing. Dr Fouzia Mei 12/28/2022 wrote imdur 30mg  3 tablets daily script.

## 2023-01-16 ENCOUNTER — HOSPITAL ENCOUNTER (OUTPATIENT)
Dept: INFUSION THERAPY | Age: 58
Discharge: HOME | End: 2023-01-16
Payer: MEDICARE

## 2023-01-16 ENCOUNTER — OFFICE VISIT (OUTPATIENT)
Dept: ONCOLOGY | Age: 58
End: 2023-01-16
Payer: MEDICARE

## 2023-01-16 VITALS
BODY MASS INDEX: 33.46 KG/M2 | HEART RATE: 73 BPM | TEMPERATURE: 97.1 F | HEIGHT: 69 IN | OXYGEN SATURATION: 99 % | DIASTOLIC BLOOD PRESSURE: 79 MMHG | SYSTOLIC BLOOD PRESSURE: 146 MMHG | WEIGHT: 225.9 LBS

## 2023-01-16 DIAGNOSIS — C79.51 PROSTATE CANCER METASTATIC TO BONE (HCC): Primary | ICD-10-CM

## 2023-01-16 DIAGNOSIS — C61 PROSTATE CANCER METASTATIC TO BONE (HCC): Primary | ICD-10-CM

## 2023-01-16 DIAGNOSIS — C61 PROSTATE CANCER (HCC): Primary | ICD-10-CM

## 2023-01-16 LAB
ALBUMIN SERPL-MCNC: 4.4 G/DL (ref 3.5–5.2)
ALP BLD-CCNC: 92 U/L (ref 40–129)
ALT SERPL-CCNC: 11 U/L (ref 0–40)
ANION GAP SERPL CALCULATED.3IONS-SCNC: 11 MMOL/L (ref 7–16)
AST SERPL-CCNC: 15 U/L (ref 0–39)
BASOPHILS ABSOLUTE: 0.05 E9/L (ref 0–0.2)
BASOPHILS RELATIVE PERCENT: 0.9 % (ref 0–2)
BILIRUB SERPL-MCNC: 0.6 MG/DL (ref 0–1.2)
BUN BLDV-MCNC: 17 MG/DL (ref 6–20)
CALCIUM SERPL-MCNC: 9 MG/DL (ref 8.6–10.2)
CHLORIDE BLD-SCNC: 97 MMOL/L (ref 98–107)
CO2: 28 MMOL/L (ref 22–29)
CREAT SERPL-MCNC: 1.1 MG/DL (ref 0.7–1.2)
EOSINOPHILS ABSOLUTE: 0.22 E9/L (ref 0.05–0.5)
EOSINOPHILS RELATIVE PERCENT: 3.8 % (ref 0–6)
GFR SERPL CREATININE-BSD FRML MDRD: >60 ML/MIN/1.73
GLUCOSE BLD-MCNC: 219 MG/DL (ref 74–99)
HCT VFR BLD CALC: 46.1 % (ref 37–54)
HEMOGLOBIN: 15.4 G/DL (ref 12.5–16.5)
IMMATURE GRANULOCYTES #: 0.03 E9/L
IMMATURE GRANULOCYTES %: 0.5 % (ref 0–5)
LYMPHOCYTES ABSOLUTE: 1.41 E9/L (ref 1.5–4)
LYMPHOCYTES RELATIVE PERCENT: 24.1 % (ref 20–42)
MAGNESIUM: 2.2 MG/DL (ref 1.6–2.6)
MCH RBC QN AUTO: 28 PG (ref 26–35)
MCHC RBC AUTO-ENTMCNC: 33.4 % (ref 32–34.5)
MCV RBC AUTO: 83.8 FL (ref 80–99.9)
MONOCYTES ABSOLUTE: 0.47 E9/L (ref 0.1–0.95)
MONOCYTES RELATIVE PERCENT: 8 % (ref 2–12)
NEUTROPHILS ABSOLUTE: 3.67 E9/L (ref 1.8–7.3)
NEUTROPHILS RELATIVE PERCENT: 62.7 % (ref 43–80)
PDW BLD-RTO: 12.8 FL (ref 11.5–15)
PHOSPHORUS: 2.8 MG/DL (ref 2.5–4.5)
PLATELET # BLD: 227 E9/L (ref 130–450)
PMV BLD AUTO: 10.4 FL (ref 7–12)
POTASSIUM SERPL-SCNC: 3.7 MMOL/L (ref 3.5–5)
PROSTATE SPECIFIC ANTIGEN: 0.09 NG/ML (ref 0–4)
RBC # BLD: 5.5 E12/L (ref 3.8–5.8)
SODIUM BLD-SCNC: 136 MMOL/L (ref 132–146)
TOTAL PROTEIN: 7.8 G/DL (ref 6.4–8.3)
WBC # BLD: 5.9 E9/L (ref 4.5–11.5)

## 2023-01-16 PROCEDURE — 85025 COMPLETE CBC W/AUTO DIFF WBC: CPT

## 2023-01-16 PROCEDURE — 3078F DIAST BP <80 MM HG: CPT | Performed by: INTERNAL MEDICINE

## 2023-01-16 PROCEDURE — 3077F SYST BP >= 140 MM HG: CPT | Performed by: INTERNAL MEDICINE

## 2023-01-16 PROCEDURE — 99212 OFFICE O/P EST SF 10 MIN: CPT

## 2023-01-16 PROCEDURE — 36415 COLL VENOUS BLD VENIPUNCTURE: CPT

## 2023-01-16 PROCEDURE — 83735 ASSAY OF MAGNESIUM: CPT

## 2023-01-16 PROCEDURE — 99213 OFFICE O/P EST LOW 20 MIN: CPT | Performed by: INTERNAL MEDICINE

## 2023-01-16 PROCEDURE — 80053 COMPREHEN METABOLIC PANEL: CPT

## 2023-01-16 PROCEDURE — 84153 ASSAY OF PSA TOTAL: CPT

## 2023-01-16 PROCEDURE — 6360000002 HC RX W HCPCS: Performed by: INTERNAL MEDICINE

## 2023-01-16 PROCEDURE — 84100 ASSAY OF PHOSPHORUS: CPT

## 2023-01-16 PROCEDURE — 96372 THER/PROPH/DIAG INJ SC/IM: CPT

## 2023-01-16 RX ORDER — SODIUM CHLORIDE 9 MG/ML
INJECTION, SOLUTION INTRAVENOUS CONTINUOUS
OUTPATIENT
Start: 2023-02-13

## 2023-01-16 RX ORDER — ACETAMINOPHEN 325 MG/1
650 TABLET ORAL
OUTPATIENT
Start: 2023-02-13

## 2023-01-16 RX ORDER — EPINEPHRINE 1 MG/ML
0.3 INJECTION, SOLUTION, CONCENTRATE INTRAVENOUS PRN
OUTPATIENT
Start: 2023-02-13

## 2023-01-16 RX ORDER — ALBUTEROL SULFATE 90 UG/1
4 AEROSOL, METERED RESPIRATORY (INHALATION) PRN
OUTPATIENT
Start: 2023-02-13

## 2023-01-16 RX ORDER — DIPHENHYDRAMINE HYDROCHLORIDE 50 MG/ML
50 INJECTION INTRAMUSCULAR; INTRAVENOUS
OUTPATIENT
Start: 2023-02-13

## 2023-01-16 RX ORDER — ONDANSETRON 2 MG/ML
8 INJECTION INTRAMUSCULAR; INTRAVENOUS
OUTPATIENT
Start: 2023-02-13

## 2023-01-16 RX ADMIN — DENOSUMAB 120 MG: 120 INJECTION SUBCUTANEOUS at 14:59

## 2023-01-16 NOTE — PROGRESS NOTES
Department of [facility] Oncology   Attending Clinic Note    Reason for Visit: Follow-up on a patient with Prostate Cancer    PCP:  Brandie Bustamante DO    History of Present Illness:  69-year-old male who was noted to have elevated PSA   Evaluated by Urology team (Dr. Tucker [facility])    PSA 9.20 on 03/31/2021  PSA 26.39 on 05/31/2022  PSA 31.83 on 06/20/2022    On 08/04/2022:  A. Prostate, left base, biopsy: Prostatic adenocarcinoma (acinar, not otherwise specified). Andrew Score: 4+3=7; cribriform pattern absent; percentage of pattern 4 is approximately 60%   Grade Group:  3   Tissue Cores Involved by Carcinoma: 2 of 2   Percentage of Tissue Involved by Carcinoma: 10%   Perineural Invasion: Present     B. Prostate, left mid, biopsy: Prostatic adenocarcinoma (acinar, not otherwise specified) and intraductal carcinoma. [place] Score: 4+5=9; cribriform pattern present   Grade Group:  5   Tissue Cores Involved by Carcinoma: 2 of 2   Percentage of Tissue Involved by Carcinoma: 80%   Perineural Invasion: Present     C. Prostate, left apex, biopsy: Prostatic adenocarcinoma (acinar, not otherwise specified). Andrew Score: 4+4 = 8; cribriform pattern present   Grade Group:  4   Tissue Cores Involved by Carcinoma: 2 of 2   Percentage of Tissue Involved by Carcinoma: 50%   Perineural Invasion: Present     D. Prostate, right base, biopsy: Prostatic adenocarcinoma (acinar, not otherwise specified) and intraductal carcinoma. Andrew Score: 4+3 = 7; cribriform pattern present; percentage of pattern 4 is approximately 80%   Grade Group:  3   Tissue Cores Involved by Carcinoma: 3 of 3   Percentage of Tissue Involved by Carcinoma: 90%   Perineural Invasion: Present     E. 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    Andrew Score: 4+4=8; cribriform pattern present   Grade Group:  4   Tissue Cores Involved by Carcinoma: 3 of 3   Percentage of Tissue Involved by Carcinoma: >90%   Perineural Invasion: Not identified     F. Prostate, right apex, biopsy: Prostatic adenocarcinoma (acinar, not otherwise specified) and intraductal carcinoma. [place] Score: 4+4 = 8; cribriform pattern present   Grade Group:  4   Tissue Cores Involved by Carcinoma: 3 of 3   Percentage of Tissue Involved by Carcinoma: 80%   Perineural Invasion: Present     Comment: In part A, carcinoma is seen involving fibroadipose tissue. Extraprostatic extension cannot be excluded. P63 immunostain is performed on Parts B, D, E, and F and shows the presence of basal cells around the intraductal carcinoma in the absence of basal cells in the   areas of invasive carcinoma. The percentage of tissue involved listed above reflects the total percentage involved by invasive and intraductal carcinoma. Intradepartmental consultation is obtained. CT abdomen/pelvis 8/23/2022 enlarged prostate gland with impingement upon the lymph node to bladder base by an irregular portion of the enlarged gland. Enlarged pelvic and inferior retroperitoneal lymph nodes  Diffuse thickening of the walls of the urinary bladder with surrounding  Small right anterior abdominal wall hernia, which contains fat   Small umbilical hernia, which contains fat. Small bilateral inguinal hernias, left slightly greater than right and both contain fat. Bone scan 08/23/2022:  Findings consistent with widespread metastatic disease    Left Femur/Pelvis X-Ray 09/07/2022: unremarkable left hip and femur. Randine Pih was started on 09/02/2022 with fair tolerance so far. Eligard 22.5 mg was given on 09/14/2022 and Dec 2022    Today 01/16/2023; No fever, chills. Fair appetite and energy level. No N.V. +arthralgias. Review of Systems;  CONSTITUTIONAL: No fever, chills. Fair appetite and energy level. ENMT: Eyes: No diplopia; Nose: No epistaxis. Mouth: No sore throat.   RESPIRATORY: No hemoptysis   CARDIOVASCULAR: No chest pain, palpitations. GASTROINTESTINAL: No nausea/vomiting, abdominal pain  GENITOURINARY: See HPI  NEURO: No syncope, presyncope, headache. MSK:+ arthralgias. Remainder:  ROS NEGATIVE    Past Medical History:      Diagnosis Date    Allergic rhinitis     Aneurysm of right common iliac artery (HCC)     1.9 cm 12/2022 on CT, stable from 8/2022. Follow with imaging. Anticoagulant long-term use     Anxiety     Atypical angina ([facility])     CAD (coronary artery disease)     Carotid artery stenosis     Cerebral artery occlusion with cerebral infarction ([facility] [address] 2012    CHF (congestive heart failure) ([facility])     COPD (chronic obstructive pulmonary disease) ([facility])     CVA (cerebral infarction) 11/19/2012    Depression     Diverticulitis 2010    Sigmoid abscess, s/p colectomy    Erectile dysfunction     GERD (gastroesophageal reflux disease)     Heart attack ([facility] [address] 2014    Hx of blood clots     Hyperlipemia     Hypertension     Ischemic cardiomyopathy     Left atrial thrombus     Obesity     Prostate cancer ([facility] [address] 09/19/2022    Type 2 diabetes mellitus without complication, without long-term current use of insulin ([facility])     Type II or unspecified type diabetes mellitus without mention of complication, not stated as uncontrolled      Medications:  Reviewed and reconciled. Allergies:  No Known Allergies    Physical Exam:  BP (!) 146/79   Pulse 73   Temp 97.1 °F (36.2 °C)   Ht 5' 9\" (1.753 m)   Wt 225 lb 14.4 oz (102.5 kg)   SpO2 99%   BMI 33.36 kg/m²   GENERAL: Alert, oriented x 3, not in acute distress. HEENT: PERRLA; EOMI. Oropharynx clear. LUNGS: Good air entry bilaterally. No wheezing, crackles or ronchi. CARDIOVASCULAR: Regular rate. No murmurs, rubs or gallops. EXTREMITIES: Without clubbing, cyanosis, or edema. NEUROLOGIC: No focal deficits.    ECOG PS 1    Diagnostics:  Lab Results   Component Value Date    WBC 5.9 01/16/2023    HGB 15.4 01/16/2023    HCT 46.1 01/16/2023    MCV 83.8 01/16/2023     01/16/2023     Lab Results   Component Value Date     01/16/2023    K 3.7 01/16/2023    CL 97 (L) 01/16/2023    CO2 28 01/16/2023    BUN 17 01/16/2023    CREATININE 1.1 01/16/2023    GLUCOSE 219 (H) 01/16/2023    CALCIUM 9.0 01/16/2023    PROT 7.8 01/16/2023    LABALBU 4.4 01/16/2023    BILITOT 0.6 01/16/2023    ALKPHOS 92 01/16/2023    AST 15 01/16/2023    ALT 11 01/16/2023    LABGLOM >60 01/16/2023    GFRAA >60 09/30/2022     Lab Results   Component Value Date    PSA 0.09 01/16/2023    PSA 0.10 12/19/2022    PSA 0.14 11/21/2022     Impression/Plan:  57-year-old male with metastatic prostate cancer     Evaluated by Urology team (Dr. Kg Bella)  PSA 9.20 on 03/31/2021  PSA 26.39 on 05/31/2022  PSA 31.83 on 06/20/2022    On 08/04/2022:  A. Prostate, left base, biopsy: Prostatic adenocarcinoma (acinar, not otherwise specified).   Andrew Score: 4+3=7; cribriform pattern absent; percentage of pattern 4 is approximately 60%   Grade Group:  3   Tissue Cores Involved by Carcinoma: 2 of 2   Percentage of Tissue Involved by Carcinoma: 10%   Perineural Invasion: Present     B. Prostate, left mid, biopsy: Prostatic adenocarcinoma (acinar, not otherwise specified) and intraductal carcinoma.   Andrew Score: 4+5=9; cribriform pattern present   Grade Group:  5   Tissue Cores Involved by Carcinoma: 2 of 2   Percentage of Tissue Involved by Carcinoma: 80%   Perineural Invasion: Present     C. Prostate, left apex, biopsy: Prostatic adenocarcinoma (acinar, not otherwise specified).   [place] Score: 4+4 = 8; cribriform pattern present   Grade Group:  4   Tissue Cores Involved by Carcinoma: 2 of 2   Percentage of Tissue Involved by Carcinoma: 50%   Perineural Invasion: Present     D. Prostate, right base, biopsy: Prostatic adenocarcinoma (acinar, not otherwise specified) and intraductal carcinoma.   Andrew Score: 4+3 = 7; cribriform pattern present; percentage of pattern 4 is approximately 80%   Grade Group:  3   Tissue Cores Involved  by Carcinoma: 3 of 3   Percentage of Tissue Involved by Carcinoma: 90%   Perineural Invasion: Present     E. 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 [place] Score: 4+4=8; cribriform pattern present   Grade Group:  4   Tissue Cores Involved by Carcinoma: 3 of 3   Percentage of Tissue Involved by Carcinoma: >90%   Perineural Invasion: Not identified     F. Prostate, right apex, biopsy: Prostatic adenocarcinoma (acinar, not otherwise specified) and intraductal carcinoma. [place] Score: 4+4 = 8; cribriform pattern present   Grade Group:  4   Tissue Cores Involved by Carcinoma: 3 of 3   Percentage of Tissue Involved by Carcinoma: 80%   Perineural Invasion: Present     Comment: In part A, carcinoma is seen involving fibroadipose tissue. Extraprostatic extension cannot be excluded. P63 immunostain is performed on Parts B, D, E, and F and shows the presence of basal cells around the intraductal carcinoma in the absence of basal cells in the   areas of invasive carcinoma. The percentage of tissue involved listed above reflects the total percentage involved by invasive and intraductal carcinoma. Intradepartmental consultation is obtained. NGS testing ([employer]):  MS-Stable: No therapies or clinical trials  TMB 0 Muts/Mb: No therapies or clinical trials  No therapies or clinical trials are associated with the genomic findings for the sample    CT abdomen/pelvis 8/23/2022 enlarged prostate gland with impingement upon the lymph node to bladder base by an irregular portion of the enlarged gland. Enlarged pelvic and inferior retroperitoneal lymph nodes  Diffuse thickening of the walls of the urinary bladder with surrounding  Small right anterior abdominal wall hernia, which contains fat   Small umbilical hernia, which contains fat. Small bilateral inguinal hernias, left slightly greater than right and both contain fat.     Bone scan 08/23/2022:  Findings consistent with widespread metastatic disease    Left Femur/Pelvis X-Ray 09/07/2022: unremarkable left hip and femur. Babs Keyshawn was started on 09/02/2022 with fair tolerance so far. Eligard 22.5 mg was given on 09/14/2022 and Dec 2022    PSA 7.57 on 09/19/2022. CT chest on 09/28/2022: No CT evidence of acute intrathoracic abnormality. No definite evidence of metastatic disease. PSA 0.24 on 10/24/2022  PSA 0.14 on 11/21/2022    CT abdomen/pelvis 12/07/2022: Compared to prior exam the prostate gland is decreased in size and previously seen enlarged pelvic lymph nodes are now subcentimeter and decreased in size compatible with positive response to treatment. No new areas of lymphadenopathy. Bone scan 12/07/2022: There is evidence to suggest metastatic disease, however the degree of tracer uptake is significantly diminished in the interval suggesting a positive response to therapy. PSA 0.10 on 12/19/2022    Overall positive response. PSA 0.09 on 01/16/2023  Labs reviewed. Continue XTANDI per protocol. Aleksandr Tran ordered and given today 01/16/2023. Recommended Tylenol arthritis for arthralgias. RTC 4 weeks and XGEVA same day.   XGEVA every 4 weeks (labs reviewed; ok to proceed with Aleksandr Tran today 01/16/2023)    Stella Tsang MD   1/16/2023

## 2023-01-20 NOTE — TELEPHONE ENCOUNTER
[phone] (home)    Left voice message to call card office with bottle of imdur in hand. How do you take imdur  What is mg on bottle and specifically how are you taking this medication  Dose, frequency. Pending call back. Damien Kilpatrick  has upcoming ep visit 1/25, med req will be done. Please send Kate Newton CNP update. (Will send this note to FRANKY Whitney CNP).          Future Appointments   Date Time Provider Estuardo Arzate   1/25/2023 10:30 AM PAUL Bagley - CNP ELECTRO PHYS [facility]   1/25/2023 10:30 AM SCHEDULE, DEVICE CLINIC [address] [place] ELECTRO PHYS [facility]   2/3/2023  1:00 PM [facility] CHF ROOM 1 [facility] CHF [facility]   2/13/2023  1:45 PM Jeannette Morris MD MED ONC [facility]   2/13/2023  2:30 PM [facility] MED ONC FAST TRACK 1 [facility] Med Onc [facility]   3/14/2023  1:40 PM DO Zion Marroquin [facility]      Dean Escamilla RN

## 2023-02-03 ENCOUNTER — HOSPITAL ENCOUNTER (OUTPATIENT)
Dept: OTHER | Age: 58
Setting detail: THERAPIES SERIES
Discharge: HOME | End: 2023-02-03
Payer: MEDICARE

## 2023-02-03 VITALS
DIASTOLIC BLOOD PRESSURE: 76 MMHG | RESPIRATION RATE: 18 BRPM | HEART RATE: 74 BPM | OXYGEN SATURATION: 98 % | SYSTOLIC BLOOD PRESSURE: 143 MMHG | WEIGHT: 230 LBS | BODY MASS INDEX: 33.97 KG/M2

## 2023-02-03 LAB
ANION GAP SERPL CALCULATED.3IONS-SCNC: 11 MMOL/L (ref 7–16)
BUN BLDV-MCNC: 24 MG/DL (ref 6–20)
CALCIUM SERPL-MCNC: 9.1 MG/DL (ref 8.6–10.2)
CHLORIDE BLD-SCNC: 101 MMOL/L (ref 98–107)
CO2: 26 MMOL/L (ref 22–29)
CREAT SERPL-MCNC: 1 MG/DL (ref 0.7–1.2)
GFR SERPL CREATININE-BSD FRML MDRD: >60 ML/MIN/1.73
GLUCOSE BLD-MCNC: 243 MG/DL (ref 74–99)
POTASSIUM SERPL-SCNC: 4.1 MMOL/L (ref 3.5–5)
PRO-BNP: 1348 PG/ML (ref 0–125)
SODIUM BLD-SCNC: 138 MMOL/L (ref 132–146)

## 2023-02-03 PROCEDURE — 99214 OFFICE O/P EST MOD 30 MIN: CPT

## 2023-02-03 PROCEDURE — 83880 ASSAY OF NATRIURETIC PEPTIDE: CPT

## 2023-02-03 PROCEDURE — 80048 BASIC METABOLIC PNL TOTAL CA: CPT

## 2023-02-03 NOTE — PROGRESS NOTES
Congestive Heart Failure [address]         Hemal Solano   1965          Referring Provider: FRANCO RANDLE [facility] NP  Primary Care Physician: DR. KNIGHT  Cardiologist: DR. Lisa Perez  Nephrologist:N/A      History of Present Illness:     Hemal Solano is a 61 y.o. male with a history of HFrEF, most recent EF 30-35% ON 10/11/2019. Patient Story:    He denies having dyspnea with exertion, shortness of breath, or decline in overall functional capacity. He does  NOT have orthopnea, PND,and nocturnal cough. He does not  have  abdominal distention or bloating, early satiety, anorexia/change in appetite. He does have a good urinary response to oral diuretic. He does not have lower extremity edema. He DOES HAVE occasional  lightheadedness, dizziness. He does not complain of palpitations,syncope. He does not complain of chest pain, pressure, discomfort. No Known Allergies        Prior to Visit Medications    Medication Sig Taking?  Authorizing Provider   isosorbide mononitrate (IMDUR) 30 MG extended release tablet Take 3 tablets by mouth daily  Abdiel Cramerton, DO   aspirin 81 MG EC tablet Take 1 tablet by mouth daily  Abdiel Cramerton, DO   fluticasone (FLONASE) 50 MCG/ACT nasal spray 2 sprays by Each Nostril route daily  Abdiel Cramerton, DO   pantoprazole (PROTONIX) 40 MG tablet Take 1 tablet by mouth daily  Abdiel Cramerton, DO   clopidogrel (PLAVIX) 75 MG tablet Take 1 tablet by mouth daily  Abdiel Cramerton, DO   spironolactone (ALDACTONE) 25 MG tablet Take 1 tablet by mouth daily  Abdiel Cramerton, DO   metFORMIN (GLUCOPHAGE) 500 MG tablet Take 1 tablet by mouth 2 times daily (with meals)  Abdiel Cramerton, DO   bumetanide (BUMEX) 1 MG tablet Take 1 tablet by mouth daily  Abdiel Cramerton, DO   tamsulosin (FLOMAX) 0.4 MG capsule TAKE ONE CAPSULE BY MOUTH DAILY  Abdiel Cramerton, DO   atorvastatin (LIPITOR) 80 MG tablet TAKE ONE TABLET BY MOUTH NIGHTLY  Abdiel Cramerton, DO metoprolol succinate (TOPROL XL) 50 MG extended release tablet Take 1.5 tablets by mouth at bedtime  Stan Montelongo, APRN - CNP   denosumab (XGEVA) 120 MG/1.7ML SOLN SC injection Inject 120 mg into the skin once Once a month. Received on 1-25-23  Historical Provider, MD   Enzalutamide Findunia Berry) 40 MG TABS Take 4 tablets by mouth daily  Historical Provider, MD   Leuprolide Acetate, 3 Month, (ELIGARD) 22.5 MG KIT Inject 22.5 mg into the skin once Received 12-20-22  Historical Provider, MD   sacubitril-valsartan (ENTRESTO)  MG per tablet Take 1 tablet by mouth 2 times daily  Savita Arambula DO           Guideline directed medical:  ARNI/ACE I/ARB: Yes  Beta blocker: Yes  Aldosterone antagonist:  YES  SGLT2i: No      Physical Examination:     BP (!) 143/76   Pulse 74   Resp 18   Wt 230 lb (104.3 kg)   SpO2 98%   BMI 33.97 kg/m²     Assessment  Charting Type: Shift assessment (chf clinic)    Neurological  Orientation Level: Oriented X4  Cognition: Appropriate attention/concentration, Appropriate safety awareness, Appropriate judgement, Appropriate for developmental age, Follows commands         HEENT (Head, Ears, Eyes, Nose, & Throat)  HEENT (WDL): Within Defined Limits    Respiratory  Respiratory Pattern: Regular  Respiratory Depth: Normal  Respiratory Quality/Effort: Unlabored  Chest Assessment: Chest expansion symmetrical  L Breath Sounds: Clear  R Breath Sounds: Clear              Cardiac  Cardiac Rhythm: Sinus rhythm    Rhythm Interpretation  Heart Rate: 74         Gastrointestinal  Abdominal (WDL): Within Defined Limits  Abdomen Inspection: Distended  RUQ Bowel Sounds: Active  LUQ Bowel Sounds: Active  RLQ Bowel Sounds: Active  LLQ Bowel Sounds: Active          Bowel Sounds  RUQ Bowel Sounds: Active  LUQ Bowel Sounds: Active  RLQ Bowel Sounds: Active  LLQ Bowel Sounds:  Active    Peripheral Vascular  Peripheral Vascular (WDL): Exceptions to WDL  Edema: Right lower extremity, Left lower extremity  RLE Edema: None  LLE Edema: None                                                 Heart Rate: 74                     LAB DATA:    Last 3 BMP      Sodium (mmol/L)   Date Value   01/16/2023 136   12/19/2022 141   12/05/2022 139     Potassium (mmol/L)   Date Value   01/16/2023 3.7   12/19/2022 3.8   12/05/2022 3.7     Potassium reflex Magnesium (mmol/L)   Date Value   01/23/2022 3.6   01/22/2022 3.8   10/11/2019 3.9     Chloride (mmol/L)   Date Value   01/16/2023 97 (L)   12/19/2022 105   12/05/2022 101     CO2 (mmol/L)   Date Value   01/16/2023 28   12/19/2022 23   12/05/2022 23     BUN (mg/dL)   Date Value   01/16/2023 17   12/19/2022 27 (H)   12/05/2022 22 (H)     Glucose (mg/dL)   Date Value   01/16/2023 219 (H)   12/19/2022 141 (H)   12/05/2022 272 (H)   04/23/2012 129 (H)   04/16/2012 163 (H)     Calcium (mg/dL)   Date Value   01/16/2023 9.0   12/19/2022 9.2   12/05/2022 8.6       Last 3 BNP       Pro-BNP (pg/mL)   Date Value   12/05/2022 1,125 (H)   10/28/2022 1,468 (H)   09/30/2022 847 (H)          CBC: No results for input(s): WBC, HGB, PLT in the last 72 hours. BMP:    No results for input(s): NA, K, CL, CO2, BUN, CREATININE, GLUCOSE in the last 72 hours. Hepatic: No results for input(s): AST, ALT, ALB, BILITOT, ALKPHOS in the last 72 hours. Troponin: No results for input(s): TROPONINI in the last 72 hours. BNP: No results for input(s): BNP in the last 72 hours. Lipids: No results for input(s): CHOL, HDL in the last 72 hours. Invalid input(s): LDLCALCU  INR: No results for input(s): INR in the last 72 hours. WEIGHTS:    Wt Readings from Last 3 Encounters:   02/03/23 230 lb (104.3 kg)   01/16/23 225 lb 14.4 oz (102.5 kg)   12/19/22 227 lb (103 kg)         TELEMETRY:  Cardiac Regularity: Regular  Cardiac Rhythm/Interpretation: NSR        ASSESSMENT:  Anjali Lewis's weight is up 2 lbs from last visit. Adamantly states that he's just been eating more. Has a much better  appetite. Denies any SOB. Assessment unchanged from previous visit. Interventions completed this visit:  IV diuretics given:No  Lab work obtained yes, BMP/BNP  Reviewed currently prescribed medications with patient, educated on importance of compliance and answered any questions regarding their medication  Educated on signs and symptoms of HF  Educated on low sodium diet    PLAN:  Scheduled to follow up in CHF clinic on   Future Appointments   Date Time Provider Estuardo Arzate   2/13/2023  1:45 PM Ismael Jain MD MED ONC [facility]   2/13/2023  2:30 PM  MED ONC FAST TRACK 1 [address] [facility]   3/14/2023  1:40 PM DO Zion Marie [facility]   4/5/2023  1:00 PM [facility] CHF ROOM 1 [facility] [address]         Given clinic phone number and aware of signs and symptoms to call with any HF change in symptoms. Denies current complaints.  Assessment unremarkable

## 2023-02-03 NOTE — PLAN OF CARE
Problem: Chronic Conditions and Co-morbidities  Goal: Patient's chronic conditions and co-morbidity symptoms are monitored and maintained or improved  Flowsheets (Taken 2/3/2023 [id number])  Care Plan - Patient's Chronic Conditions and Co-Morbidity Symptoms are Monitored and Maintained or Improved: Monitor and assess patient's chronic conditions and comorbid symptoms for stability, deterioration, or improvement

## 2023-02-06 RX ORDER — FLUTICASONE PROPIONATE 50 MCG
2 SPRAY, SUSPENSION (ML) NASAL DAILY
Qty: 48 G | Refills: 1 | Status: SHIPPED | OUTPATIENT
Start: 2023-02-06

## 2023-02-06 RX ORDER — TAMSULOSIN HYDROCHLORIDE 0.4 MG/1
0.4 CAPSULE ORAL DAILY
Qty: 90 CAPSULE | Refills: 1 | Status: SHIPPED | OUTPATIENT
Start: 2023-02-06

## 2023-02-13 ENCOUNTER — OFFICE VISIT (OUTPATIENT)
Dept: ONCOLOGY | Age: 58
End: 2023-02-13
Payer: MEDICARE

## 2023-02-13 ENCOUNTER — HOSPITAL ENCOUNTER (OUTPATIENT)
Dept: INFUSION THERAPY | Age: 58
Discharge: HOME | End: 2023-02-13
Payer: MEDICARE

## 2023-02-13 VITALS
HEIGHT: 69 IN | WEIGHT: 233.8 LBS | DIASTOLIC BLOOD PRESSURE: 86 MMHG | SYSTOLIC BLOOD PRESSURE: 150 MMHG | BODY MASS INDEX: 34.63 KG/M2 | TEMPERATURE: 97.3 F | HEART RATE: 83 BPM | OXYGEN SATURATION: 96 %

## 2023-02-13 DIAGNOSIS — C61 PROSTATE CANCER (HCC): Primary | ICD-10-CM

## 2023-02-13 DIAGNOSIS — C79.51 PROSTATE CANCER METASTATIC TO BONE (HCC): Primary | ICD-10-CM

## 2023-02-13 DIAGNOSIS — C61 PROSTATE CANCER METASTATIC TO BONE (HCC): Primary | ICD-10-CM

## 2023-02-13 LAB
ALBUMIN SERPL-MCNC: 4.4 G/DL (ref 3.5–5.2)
ALP BLD-CCNC: 79 U/L (ref 40–129)
ALT SERPL-CCNC: 12 U/L (ref 0–40)
ANION GAP SERPL CALCULATED.3IONS-SCNC: 12 MMOL/L (ref 7–16)
AST SERPL-CCNC: 13 U/L (ref 0–39)
BASOPHILS ABSOLUTE: 0.05 E9/L (ref 0–0.2)
BASOPHILS RELATIVE PERCENT: 0.9 % (ref 0–2)
BILIRUB SERPL-MCNC: 0.8 MG/DL (ref 0–1.2)
BUN BLDV-MCNC: 28 MG/DL (ref 6–20)
CALCIUM SERPL-MCNC: 9.7 MG/DL (ref 8.6–10.2)
CHLORIDE BLD-SCNC: 102 MMOL/L (ref 98–107)
CO2: 26 MMOL/L (ref 22–29)
CREAT SERPL-MCNC: 1.2 MG/DL (ref 0.7–1.2)
EOSINOPHILS ABSOLUTE: 0.22 E9/L (ref 0.05–0.5)
EOSINOPHILS RELATIVE PERCENT: 3.8 % (ref 0–6)
GFR SERPL CREATININE-BSD FRML MDRD: >60 ML/MIN/1.73
GLUCOSE BLD-MCNC: 182 MG/DL (ref 74–99)
HCT VFR BLD CALC: 41.1 % (ref 37–54)
HEMOGLOBIN: 14 G/DL (ref 12.5–16.5)
IMMATURE GRANULOCYTES #: 0.02 E9/L
IMMATURE GRANULOCYTES %: 0.3 % (ref 0–5)
LYMPHOCYTES ABSOLUTE: 1.19 E9/L (ref 1.5–4)
LYMPHOCYTES RELATIVE PERCENT: 20.6 % (ref 20–42)
MAGNESIUM: 1.9 MG/DL (ref 1.6–2.6)
MCH RBC QN AUTO: 27.5 PG (ref 26–35)
MCHC RBC AUTO-ENTMCNC: 34.1 % (ref 32–34.5)
MCV RBC AUTO: 80.6 FL (ref 80–99.9)
MONOCYTES ABSOLUTE: 0.44 E9/L (ref 0.1–0.95)
MONOCYTES RELATIVE PERCENT: 7.6 % (ref 2–12)
NEUTROPHILS ABSOLUTE: 3.87 E9/L (ref 1.8–7.3)
NEUTROPHILS RELATIVE PERCENT: 66.8 % (ref 43–80)
PDW BLD-RTO: 13.9 FL (ref 11.5–15)
PHOSPHORUS: 4.5 MG/DL (ref 2.5–4.5)
PLATELET # BLD: 204 E9/L (ref 130–450)
PMV BLD AUTO: 11.2 FL (ref 7–12)
POTASSIUM SERPL-SCNC: 4.2 MMOL/L (ref 3.5–5)
PROSTATE SPECIFIC ANTIGEN: 0.06 NG/ML (ref 0–4)
RBC # BLD: 5.1 E12/L (ref 3.8–5.8)
SODIUM BLD-SCNC: 140 MMOL/L (ref 132–146)
TOTAL PROTEIN: 7.3 G/DL (ref 6.4–8.3)
WBC # BLD: 5.8 E9/L (ref 4.5–11.5)

## 2023-02-13 PROCEDURE — 80053 COMPREHEN METABOLIC PANEL: CPT

## 2023-02-13 PROCEDURE — 84100 ASSAY OF PHOSPHORUS: CPT

## 2023-02-13 PROCEDURE — 6360000002 HC RX W HCPCS: Performed by: INTERNAL MEDICINE

## 2023-02-13 PROCEDURE — 85025 COMPLETE CBC W/AUTO DIFF WBC: CPT

## 2023-02-13 PROCEDURE — 84153 ASSAY OF PSA TOTAL: CPT

## 2023-02-13 PROCEDURE — 99213 OFFICE O/P EST LOW 20 MIN: CPT

## 2023-02-13 PROCEDURE — 99213 OFFICE O/P EST LOW 20 MIN: CPT | Performed by: INTERNAL MEDICINE

## 2023-02-13 PROCEDURE — 36415 COLL VENOUS BLD VENIPUNCTURE: CPT

## 2023-02-13 PROCEDURE — 3077F SYST BP >= 140 MM HG: CPT | Performed by: INTERNAL MEDICINE

## 2023-02-13 PROCEDURE — 83735 ASSAY OF MAGNESIUM: CPT

## 2023-02-13 PROCEDURE — 3079F DIAST BP 80-89 MM HG: CPT | Performed by: INTERNAL MEDICINE

## 2023-02-13 PROCEDURE — 96372 THER/PROPH/DIAG INJ SC/IM: CPT

## 2023-02-13 RX ORDER — ONDANSETRON 2 MG/ML
8 INJECTION INTRAMUSCULAR; INTRAVENOUS
OUTPATIENT
Start: 2023-03-13

## 2023-02-13 RX ORDER — ACETAMINOPHEN 325 MG/1
650 TABLET ORAL
OUTPATIENT
Start: 2023-03-13

## 2023-02-13 RX ORDER — SODIUM CHLORIDE 9 MG/ML
INJECTION, SOLUTION INTRAVENOUS CONTINUOUS
OUTPATIENT
Start: 2023-03-13

## 2023-02-13 RX ORDER — DIPHENHYDRAMINE HYDROCHLORIDE 50 MG/ML
50 INJECTION INTRAMUSCULAR; INTRAVENOUS
OUTPATIENT
Start: 2023-03-13

## 2023-02-13 RX ORDER — ALBUTEROL SULFATE 90 UG/1
4 AEROSOL, METERED RESPIRATORY (INHALATION) PRN
OUTPATIENT
Start: 2023-03-13

## 2023-02-13 RX ORDER — EPINEPHRINE 1 MG/ML
0.3 INJECTION, SOLUTION, CONCENTRATE INTRAVENOUS PRN
OUTPATIENT
Start: 2023-03-13

## 2023-02-13 RX ADMIN — DENOSUMAB 120 MG: 120 INJECTION SUBCUTANEOUS at 12:19

## 2023-02-14 ENCOUNTER — TELEPHONE (OUTPATIENT)
Dept: PALLATIVE CARE | Age: 58
End: 2023-02-14

## 2023-02-14 NOTE — TELEPHONE ENCOUNTER
Called and spoke with Latrice Lucas regarding referral for Palliative care. Explained Palliative care location and days available for clinic. Offered mickey 2/27/23, Latrice Lucas declined and would like to be scheduled to coordinate with next Med Onc mickey. Latrice Lucas scheduled 3/13/23.

## 2023-03-13 ENCOUNTER — OFFICE VISIT (OUTPATIENT)
Dept: PALLATIVE CARE | Age: 58
End: 2023-03-13
Payer: MEDICARE

## 2023-03-13 ENCOUNTER — OFFICE VISIT (OUTPATIENT)
Dept: ONCOLOGY | Age: 58
End: 2023-03-13
Payer: MEDICARE

## 2023-03-13 ENCOUNTER — HOSPITAL ENCOUNTER (OUTPATIENT)
Dept: INFUSION THERAPY | Age: 58
Discharge: HOME | End: 2023-03-13
Payer: MEDICARE

## 2023-03-13 VITALS
SYSTOLIC BLOOD PRESSURE: 146 MMHG | HEART RATE: 82 BPM | DIASTOLIC BLOOD PRESSURE: 93 MMHG | OXYGEN SATURATION: 98 % | BODY MASS INDEX: 34.11 KG/M2 | TEMPERATURE: 97.1 F | WEIGHT: 231 LBS

## 2023-03-13 VITALS
DIASTOLIC BLOOD PRESSURE: 87 MMHG | TEMPERATURE: 97.2 F | OXYGEN SATURATION: 97 % | BODY MASS INDEX: 34.51 KG/M2 | HEART RATE: 77 BPM | SYSTOLIC BLOOD PRESSURE: 157 MMHG | WEIGHT: 233 LBS | HEIGHT: 69 IN

## 2023-03-13 DIAGNOSIS — C61 PROSTATE CANCER (HCC): Primary | ICD-10-CM

## 2023-03-13 DIAGNOSIS — G89.3 PAIN DUE TO NEOPLASM: ICD-10-CM

## 2023-03-13 DIAGNOSIS — Z51.5 PALLIATIVE CARE BY SPECIALIST: ICD-10-CM

## 2023-03-13 LAB
ALBUMIN SERPL-MCNC: 4.3 G/DL (ref 3.5–5.2)
ALP BLD-CCNC: 89 U/L (ref 40–129)
ALT SERPL-CCNC: 13 U/L (ref 0–40)
ANION GAP SERPL CALCULATED.3IONS-SCNC: 12 MMOL/L (ref 7–16)
AST SERPL-CCNC: 15 U/L (ref 0–39)
BASOPHILS ABSOLUTE: 0.07 E9/L (ref 0–0.2)
BASOPHILS RELATIVE PERCENT: 1.1 % (ref 0–2)
BILIRUB SERPL-MCNC: 0.7 MG/DL (ref 0–1.2)
BUN BLDV-MCNC: 23 MG/DL (ref 6–20)
CALCIUM SERPL-MCNC: 9.5 MG/DL (ref 8.6–10.2)
CHLORIDE BLD-SCNC: 102 MMOL/L (ref 98–107)
CO2: 28 MMOL/L (ref 22–29)
CREAT SERPL-MCNC: 1.1 MG/DL (ref 0.7–1.2)
EOSINOPHILS ABSOLUTE: 0.19 E9/L (ref 0.05–0.5)
EOSINOPHILS RELATIVE PERCENT: 3.1 % (ref 0–6)
GFR SERPL CREATININE-BSD FRML MDRD: >60 ML/MIN/1.73
GLUCOSE BLD-MCNC: 161 MG/DL (ref 74–99)
HCT VFR BLD CALC: 47.2 % (ref 37–54)
HEMOGLOBIN: 14.8 G/DL (ref 12.5–16.5)
IMMATURE GRANULOCYTES #: 0.02 E9/L
IMMATURE GRANULOCYTES %: 0.3 % (ref 0–5)
LYMPHOCYTES ABSOLUTE: 1.39 E9/L (ref 1.5–4)
LYMPHOCYTES RELATIVE PERCENT: 22.6 % (ref 20–42)
MAGNESIUM: 2.1 MG/DL (ref 1.6–2.6)
MCH RBC QN AUTO: 27.2 PG (ref 26–35)
MCHC RBC AUTO-ENTMCNC: 31.4 % (ref 32–34.5)
MCV RBC AUTO: 86.8 FL (ref 80–99.9)
MONOCYTES ABSOLUTE: 0.46 E9/L (ref 0.1–0.95)
MONOCYTES RELATIVE PERCENT: 7.5 % (ref 2–12)
NEUTROPHILS ABSOLUTE: 4.02 E9/L (ref 1.8–7.3)
NEUTROPHILS RELATIVE PERCENT: 65.4 % (ref 43–80)
PDW BLD-RTO: 14.2 FL (ref 11.5–15)
PHOSPHORUS: 3.7 MG/DL (ref 2.5–4.5)
PLATELET # BLD: 208 E9/L (ref 130–450)
PMV BLD AUTO: 10.9 FL (ref 7–12)
POTASSIUM SERPL-SCNC: 4.3 MMOL/L (ref 3.5–5)
PROSTATE SPECIFIC ANTIGEN: 0.06 NG/ML (ref 0–4)
RBC # BLD: 5.44 E12/L (ref 3.8–5.8)
SODIUM BLD-SCNC: 142 MMOL/L (ref 132–146)
TOTAL PROTEIN: 7.5 G/DL (ref 6.4–8.3)
WBC # BLD: 6.2 E9/L (ref 4.5–11.5)

## 2023-03-13 PROCEDURE — 3077F SYST BP >= 140 MM HG: CPT | Performed by: NURSE PRACTITIONER

## 2023-03-13 PROCEDURE — 6360000002 HC RX W HCPCS: Performed by: INTERNAL MEDICINE

## 2023-03-13 PROCEDURE — 3079F DIAST BP 80-89 MM HG: CPT | Performed by: INTERNAL MEDICINE

## 2023-03-13 PROCEDURE — 84153 ASSAY OF PSA TOTAL: CPT

## 2023-03-13 PROCEDURE — 99212 OFFICE O/P EST SF 10 MIN: CPT

## 2023-03-13 PROCEDURE — 83735 ASSAY OF MAGNESIUM: CPT

## 2023-03-13 PROCEDURE — 84100 ASSAY OF PHOSPHORUS: CPT

## 2023-03-13 PROCEDURE — 3079F DIAST BP 80-89 MM HG: CPT | Performed by: NURSE PRACTITIONER

## 2023-03-13 PROCEDURE — 85025 COMPLETE CBC W/AUTO DIFF WBC: CPT

## 2023-03-13 PROCEDURE — 80053 COMPREHEN METABOLIC PANEL: CPT

## 2023-03-13 PROCEDURE — 3077F SYST BP >= 140 MM HG: CPT | Performed by: INTERNAL MEDICINE

## 2023-03-13 PROCEDURE — 96372 THER/PROPH/DIAG INJ SC/IM: CPT

## 2023-03-13 PROCEDURE — 99204 OFFICE O/P NEW MOD 45 MIN: CPT | Performed by: NURSE PRACTITIONER

## 2023-03-13 PROCEDURE — 99213 OFFICE O/P EST LOW 20 MIN: CPT

## 2023-03-13 PROCEDURE — 99214 OFFICE O/P EST MOD 30 MIN: CPT | Performed by: INTERNAL MEDICINE

## 2023-03-13 PROCEDURE — 36415 COLL VENOUS BLD VENIPUNCTURE: CPT

## 2023-03-13 RX ORDER — EPINEPHRINE 1 MG/ML
0.3 INJECTION, SOLUTION, CONCENTRATE INTRAVENOUS PRN
OUTPATIENT
Start: 2023-04-10

## 2023-03-13 RX ORDER — ALBUTEROL SULFATE 90 UG/1
4 AEROSOL, METERED RESPIRATORY (INHALATION) PRN
OUTPATIENT
Start: 2023-04-10

## 2023-03-13 RX ORDER — ONDANSETRON 2 MG/ML
8 INJECTION INTRAMUSCULAR; INTRAVENOUS
OUTPATIENT
Start: 2023-04-10

## 2023-03-13 RX ORDER — SODIUM CHLORIDE 9 MG/ML
INJECTION, SOLUTION INTRAVENOUS CONTINUOUS
OUTPATIENT
Start: 2023-04-10

## 2023-03-13 RX ORDER — ACETAMINOPHEN 325 MG/1
650 TABLET ORAL
OUTPATIENT
Start: 2023-04-10

## 2023-03-13 RX ORDER — DIPHENHYDRAMINE HYDROCHLORIDE 50 MG/ML
50 INJECTION INTRAMUSCULAR; INTRAVENOUS
OUTPATIENT
Start: 2023-04-10

## 2023-03-13 RX ADMIN — DENOSUMAB 120 MG: 120 INJECTION SUBCUTANEOUS at 14:49

## 2023-03-13 NOTE — PROGRESS NOTES
Palliative Care Department  Provider: PAUL Urbina - CNP    Referring Provider:  Dr. Caitlin House    Location of Service:   [address] [place] [address]    Reason for Consult:  []  Code status Discussion  []  Assist with goals of care  []  Psychosocial support  [x]  Symptom Management  []  Advanced Care Planning    Chief Complaint: Ashley Marquez is a 61 y.o. male with chief complaint of pain    Assessment/Plan      Prostate Cancer with bone mets:   -  Found Aug. 2023   -  Known to Dr. Loretta Israel, [facility] in Dec. Showed improvement    Pain related to Neoplasm:   -  Tylenol 500 mg 2 tabs TID PRN   -  Voltaren gel QID PRN   -  Refer to PT    Follow Up:  6 weeks. They were encouraged to call with any questions, concerns, needs, or changes in symptoms. Subjective:     HPI:  Ashley Marquez is a 61 y.o. male with significant medical history of CAD, s/p angioplasty with stents, HFrEF, pacer/defibrillator placement, and COPD, who was found to have prostate cancer in August 2022. He was referred to [address] [place] [address] for support with symptomatic management. Subjective/Events/Discussions:  Chen Quevedo presents today accompanied by his girlfriend. He is alert and oriented but voices concerns well. His primary complaint is that of pain primarily located in his bilateral shoulders, back, as well as upper abdomen. He states he utilizes Tylenol arthritis, which is a 650 mg tablet, and he will use this 1-2 times per day with mild benefit. He also states he has tried [employer], [employer], has found these to be helpful. He also was found taking a hot shower to be beneficial.  We discussed this further, and he primarily sounds as though his pain is more related to arthritis, and degenerative changes, is not likely that this pain is directly related to his cancer.   He does live a relatively sedentary lifestyle, working as a  at a bank 2 days/week, he otherwise has been on disability for approximately the last decade following a stroke. Discussed options at great length, and I do not believe he is a good candidate for opioid therapy, as the pain likely is unrelated to his cancer, and does not significantly impair his functional ability. I did encourage him to try taking Tylenol Extra Strength 2 tablets up to 3 times per day, and offered Voltaren gel which he is agreeable to. I also recommend physical therapy, as believe his pain would improve with improvement in his activity tolerance. He is agreeable to this plan of care, we will have him return in 6 weeks to reassess. Pain Assessment   Rating:  10  Description: aching, sharp, stabbing, and pulling tightness  Duration: months  Frequency: daily  Location: bilateral shoulders, back, upper abdomen  Alleviating Factors: relaxation, pain medication, and hot bath  Exacerbating Factors:  moving/ROM  Effect: change in function and interference with activities    Past Medical History:   Diagnosis Date    Allergic rhinitis     Aneurysm of right common iliac artery (HCC)     1.9 cm 12/2022 on CT, stable from 8/2022. Follow with imaging.     Anticoagulant long-term use     Anxiety     Atypical angina ([facility])     CAD (coronary artery disease)     Carotid artery stenosis     Cerebral artery occlusion with cerebral infarction ([address] 2012    CHF (congestive heart failure) ([facility])     COPD (chronic obstructive pulmonary disease) ([facility])     CVA (cerebral infarction) 11/19/2012    Depression     Diverticulitis 2010    Sigmoid abscess, s/p colectomy    Erectile dysfunction     GERD (gastroesophageal reflux disease)     Heart attack ([address] 2014    Hx of blood clots     Hyperlipemia     Hypertension     Ischemic cardiomyopathy     Left atrial thrombus     Obesity     Prostate cancer ([facility] 09/19/2022    Type 2 diabetes mellitus without complication, without long-term current use of insulin ([facility])     Type II or unspecified type diabetes mellitus without mention of complication, not stated as uncontrolled        Past Surgical History:   Procedure Laterality Date    ABDOMEN SURGERY      CARDIAC CATHETERIZATION  3/30/2015    EF 35%, Dr. Aba Carlton, [address]  4/1/2015    left chest, Dual Chamber-ERPLY, Dr. Lyndsey Knutson, [place] [address]  1/29/2010    laparoscopic, converted to open sigmoid colon resection (diverticular disease), Dr. Virgilio Clark, [facility]    COLONOSCOPY  12/5/2005    diverticulosis, Dr. Virgilio Clark, [facility]    COLONOSCOPY  9/4/2008    diverticulosis, Dr. Virgilio Clark, [address] [place] [address] [address] [facility]  1/28/2010    prior to colon surgery, diverticulosis, Dr. Virgilio Clark, [address] [place] [address] [address] [facility]  08/07/2015    sigmoid diverticulosis, rectal polyp bx/cauterized (hyperplastic polyp), Dr. Hernandez Cyr, [address] [facility] [address] [place]  12/6/2014    L[address] 3.5 x 33, Dr. Eduarda Lopez, [facility]    CYSTOURETHROSCOPY  1/29/2010    placement bilateral ureteral stents for colon resection, Dr. Bre Lewis, . [facility] [address]    right, [address]      pacer defib    PROSTATE BIOPSY N/A 8/4/2022    PROSTATE BIOPSY [address] [facility] ([id number]) performed by Mikal Austin MD at [address] [facility]  8/7/2015    severe duodenitis, mild to moderate gastritis (bx for H pylori), submucosal benign mass body stomach, mild reflux esophagitis, Dr. Hernandez Cyr, [facility]    VASCULAR SURGERY         Family History   Problem Relation Age of Onset    Coronary Art Dis Mother     Dementia Mother     High Blood Pressure Father     Heart Disease Father         MI stents    Stroke Sister     Cancer Sister         Breast    No Known Problems Brother     No Known Problems Brother     Cancer Paternal Uncle         lung    Heart Disease Paternal Uncle        ROS: UNLESS STATED ABOVE PATIENT DENIES:  CONSTITUTIONAL:  fever, chill, rigors, nausea, vomiting, fatigue.   HEENT: blurry vision, double vision, hearing problem, tinnitus, hoarseness, dysphagia, odynophagia  RESPIRATORY: cough, shortness of breath, sputum expectoration. CARDIOVASCULAR:  Chest pain/pressure, palpitation, syncope, irregular beats  GASTROINTESTINAL:  abdominal or rectal pain, diarrhea, constipation, . GENITOURINARY:  Burning, frequency, urgency, incontinence, discharge  INTEGUMENTARY: rash, wound, pruritis  HEMATOLOGIC/LYMPHATIC:  Swelling, sores, gum bleeding, easy bruising, pica. MUSCULOSKELETAL:  pain, edema, joint swelling or redness  NEUROLOGICAL:  light headed, dizziness, loss of consciousness, weakness, change in memory, seizures, tremors    Objective:     Physical Exam  BP (!) 146/93   Pulse 82   Temp 97.1 °F (36.2 °C)   Wt 231 lb (104.8 kg)   SpO2 98%   BMI 34.11 kg/m²     Gen:  Alert, appears stated age, well nourished, in no acute distress  HEENT:  Normocephalic, conjunctiva pink, no drainage, mucosa moist  Neck:  Supple  Lungs:  CTA bilaterally, no audible rhonchi or wheezes noted  Heart[de-identified]  RRR, no murmur, rub, or gallop noted during exam  Abd:  Soft, non tender, non distended, BS+  M/S/Ext:  Moving all extremities, no edema, pulses present  Skin:  Warm and dry  Neuro:  PERRL, Alert, oriented x 3; following commands    [place] Symptom Assessment Score   [place] Score 3/13/2023   Pain Score Worst possible pain   Tiredness Score 8   Nausea Score Not nauseated   Depression Score 7   Anxiety Score 7   Drowsiness Score 7   Appetite Score 4   Wellbeing Score 5   Dyspnea Score 8   Other Problem Score 6   Total Assessment Score(calculated) 62     Assessed by: patient and provider. Current Medications:  Medications reviewed: yes    Controlled Substances Monitoring: [place]ARRS reviewed 3/13/23.     PAUL Sweeney - CNP  Palliative Medicine    Time/Communication  Greater than 50% of time spent, total 45 minutes in face-to-face counseling and coordination of care regarding symptom management and see above.    Discussed the plan of care with the other IDT members of the Palliative Care Team, and with consultants, Primary Attending, patient, family, and facility staff. Thank you for allowing Palliative Medicine to participate in the care of Mitch Dorado. Note: This report was completed using [employer] voiced recognition software. Every effort has been made to ensure accuracy; however, inadvertent computerized transcription errors may be present.

## 2023-03-13 NOTE — PROGRESS NOTES
Department of [facility] Oncology   Attending Clinic Note    Reason for Visit: Follow-up on a patient with Prostate Cancer    PCP:  Abimael Hamilton DO    History of Present Illness:  28-year-old male who was noted to have elevated PSA   Evaluated by Urology team (Dr. Christelle House Memo)    PSA 9.20 on 03/31/2021  PSA 26.39 on 05/31/2022  PSA 31.83 on 06/20/2022    On 08/04/2022:  A. Prostate, left base, biopsy: Prostatic adenocarcinoma (acinar, not otherwise specified). Andrew Score: 4+3=7; cribriform pattern absent; percentage of pattern 4 is approximately 60%   Grade Group:  3   Tissue Cores Involved by Carcinoma: 2 of 2   Percentage of Tissue Involved by Carcinoma: 10%   Perineural Invasion: Present     B. Prostate, left mid, biopsy: Prostatic adenocarcinoma (acinar, not otherwise specified) and intraductal carcinoma. Andrew Score: 4+5=9; cribriform pattern present   Grade Group:  5   Tissue Cores Involved by Carcinoma: 2 of 2   Percentage of Tissue Involved by Carcinoma: 80%   Perineural Invasion: Present     C. Prostate, left apex, biopsy: Prostatic adenocarcinoma (acinar, not otherwise specified). De[place] Score: 4+4 = 8; cribriform pattern present   Grade Group:  4   Tissue Cores Involved by Carcinoma: 2 of 2   Percentage of Tissue Involved by Carcinoma: 50%   Perineural Invasion: Present     D. Prostate, right base, biopsy: Prostatic adenocarcinoma (acinar, not otherwise specified) and intraductal carcinoma. Deeth Score: 4+3 = 7; cribriform pattern present; percentage of pattern 4 is approximately 80%   Grade Group:  3   Tissue Cores Involved by Carcinoma: 3 of 3   Percentage of Tissue Involved by Carcinoma: 90%   Perineural Invasion: Present     E. 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    Andrew Score: 4+4=8; cribriform pattern present   Grade Group:  4   Tissue Cores Involved by Carcinoma: 3 of 3   Percentage of Tissue Involved by Carcinoma: >90%   Perineural Invasion: Not identified     F. Prostate, right apex, biopsy: Prostatic adenocarcinoma (acinar, not otherwise specified) and intraductal carcinoma. Andrew Score: 4+4 = 8; cribriform pattern present   Grade Group:  4   Tissue Cores Involved by Carcinoma: 3 of 3   Percentage of Tissue Involved by Carcinoma: 80%   Perineural Invasion: Present     Comment: In part A, carcinoma is seen involving fibroadipose tissue. Extraprostatic extension cannot be excluded. P63 immunostain is performed on Parts B, D, E, and F and shows the presence of basal cells around the intraductal carcinoma in the absence of basal cells in the   areas of invasive carcinoma. The percentage of tissue involved listed above reflects the total percentage involved by invasive and intraductal carcinoma. Intradepartmental consultation is obtained. CT abdomen/pelvis 8/23/2022 enlarged prostate gland with impingement upon the lymph node to bladder base by an irregular portion of the enlarged gland. Enlarged pelvic and inferior retroperitoneal lymph nodes  Diffuse thickening of the walls of the urinary bladder with surrounding  Small right anterior abdominal wall hernia, which contains fat   Small umbilical hernia, which contains fat. Small bilateral inguinal hernias, left slightly greater than right and both contain fat. Bone scan 08/23/2022:  Findings consistent with widespread metastatic disease    Left Femur/Pelvis X-Ray 09/07/2022: unremarkable left hip and femur. Elsa Cathy was started on 09/02/2022 with fair tolerance so far. Eligard 22.5 mg was given on 09/14/2022 and Dec 2022    Today 03/13/2023; No fever, chills. Fair appetite and energy level. No N.V. intermittent arthralgias not relieved by Tylenol    Review of Systems;  CONSTITUTIONAL: No fever, chills. Fair appetite and energy level. ENMT: Eyes: No diplopia; Nose: No epistaxis. Mouth: No sore throat.   RESPIRATORY: No hemoptysis CARDIOVASCULAR: No chest pain, palpitations. GASTROINTESTINAL: No nausea/vomiting, abdominal pain  GENITOURINARY: See HPI  NEURO: No syncope, presyncope, headache. MSK: Intermittent arthralgias not relieved by Tylenol  Remainder:  ROS NEGATIVE    Past Medical History:      Diagnosis Date    Allergic rhinitis     Aneurysm of right common iliac artery (HCC)     1.9 cm 12/2022 on CT, stable from 8/2022. Follow with imaging. Anticoagulant long-term use     Anxiety     Atypical angina ([facility])     CAD (coronary artery disease)     Carotid artery stenosis     Cerebral artery occlusion with cerebral infarction ([facility] 2012    CHF (congestive heart failure) ([facility])     COPD (chronic obstructive pulmonary disease) ([facility])     CVA (cerebral infarction) 11/19/2012    Depression     Diverticulitis 2010    Sigmoid abscess, s/p colectomy    Erectile dysfunction     GERD (gastroesophageal reflux disease)     Heart attack ([facility] 2014    Hx of blood clots     Hyperlipemia     Hypertension     Ischemic cardiomyopathy     Left atrial thrombus     Obesity     Prostate cancer ([facility] 09/19/2022    Type 2 diabetes mellitus without complication, without long-term current use of insulin ([facility])     Type II or unspecified type diabetes mellitus without mention of complication, not stated as uncontrolled      Medications:  Reviewed and reconciled. Allergies:  No Known Allergies    Physical Exam:  BP (!) 157/87   Pulse 77   Temp 97.2 °F (36.2 °C)   Ht 5' 9\" (1.753 m)   Wt 233 lb (105.7 kg)   SpO2 97%   BMI 34.41 kg/m²   GENERAL: Alert, oriented x 3, not in acute distress. HEENT: PERRLA; EOMI. Oropharynx clear. LUNGS: Good air entry bilaterally. No wheezing, crackles or ronchi. CARDIOVASCULAR: Regular rate. No murmurs, rubs or gallops. EXTREMITIES: Without clubbing, cyanosis, or edema. NEUROLOGIC: No focal deficits.    ECOG PS 1    Diagnostics:  Lab Results   Component Value Date    WBC 6.2 03/13/2023    HGB 14.8 03/13/2023    HCT 47.2 03/13/2023    MCV 86.8 03/13/2023     03/13/2023     Lab Results   Component Value Date     03/13/2023    K 4.3 03/13/2023     03/13/2023    CO2 28 03/13/2023    BUN 23 (H) 03/13/2023    CREATININE 1.1 03/13/2023    GLUCOSE 161 (H) 03/13/2023    CALCIUM 9.5 03/13/2023    PROT 7.5 03/13/2023    LABALBU 4.3 03/13/2023    BILITOT 0.7 03/13/2023    ALKPHOS 89 03/13/2023    AST 15 03/13/2023    ALT 13 03/13/2023    LABGLOM >60 03/13/2023    GFRAA >60 09/30/2022     Lab Results   Component Value Date    PSA 0.06 03/13/2023     Impression/Plan:  79-year-old male with metastatic prostate cancer     Evaluated by Urology team (Dr. Brad Bella)  PSA 9.20 on 03/31/2021  PSA 26.39 on 05/31/2022  PSA 31.83 on 06/20/2022    On 08/04/2022:  A. Prostate, left base, biopsy: Prostatic adenocarcinoma (acinar, not otherwise specified). Andrew Score: 4+3=7; cribriform pattern absent; percentage of pattern 4 is approximately 60%   Grade Group:  3   Tissue Cores Involved by Carcinoma: 2 of 2   Percentage of Tissue Involved by Carcinoma: 10%   Perineural Invasion: Present     B. Prostate, left mid, biopsy: Prostatic adenocarcinoma (acinar, not otherwise specified) and intraductal carcinoma. [place] Score: 4+5=9; cribriform pattern present   Grade Group:  5   Tissue Cores Involved by Carcinoma: 2 of 2   Percentage of Tissue Involved by Carcinoma: 80%   Perineural Invasion: Present     C. Prostate, left apex, biopsy: Prostatic adenocarcinoma (acinar, not otherwise specified). [place] Score: 4+4 = 8; cribriform pattern present   Grade Group:  4   Tissue Cores Involved by Carcinoma: 2 of 2   Percentage of Tissue Involved by Carcinoma: 50%   Perineural Invasion: Present     D. Prostate, right base, biopsy: Prostatic adenocarcinoma (acinar, not otherwise specified) and intraductal carcinoma.    [place] Score: 4+3 = 7; cribriform pattern present; percentage of pattern 4 is approximately 80%   Grade Group:  3   Tissue Cores Involved by Carcinoma: 3 of 3   Percentage of Tissue Involved by Carcinoma: 90%   Perineural Invasion: Present     E. 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 Andrew Score: 4+4=8; cribriform pattern present   Grade Group:  4   Tissue Cores Involved by Carcinoma: 3 of 3   Percentage of Tissue Involved by Carcinoma: >90%   Perineural Invasion: Not identified     F. Prostate, right apex, biopsy: Prostatic adenocarcinoma (acinar, not otherwise specified) and intraductal carcinoma. Andrew Score: 4+4 = 8; cribriform pattern present   Grade Group:  4   Tissue Cores Involved by Carcinoma: 3 of 3   Percentage of Tissue Involved by Carcinoma: 80%   Perineural Invasion: Present     Comment: In part A, carcinoma is seen involving fibroadipose tissue. Extraprostatic extension cannot be excluded. P63 immunostain is performed on Parts B, D, E, and F and shows the presence of basal cells around the intraductal carcinoma in the absence of basal cells in the   areas of invasive carcinoma. The percentage of tissue involved listed above reflects the total percentage involved by invasive and intraductal carcinoma. Intradepartmental consultation is obtained. NGS testing ([employer]):  MS-Stable: No therapies or clinical trials  TMB 0 Muts/Mb: No therapies or clinical trials  No therapies or clinical trials are associated with the genomic findings for the sample    CT abdomen/pelvis 8/23/2022 enlarged prostate gland with impingement upon the lymph node to bladder base by an irregular portion of the enlarged gland. Enlarged pelvic and inferior retroperitoneal lymph nodes  Diffuse thickening of the walls of the urinary bladder with surrounding  Small right anterior abdominal wall hernia, which contains fat   Small umbilical hernia, which contains fat.   Small bilateral inguinal hernias, left slightly greater than right and both contain fat.    Bone scan 08/23/2022:  Findings consistent with widespread metastatic disease    Left Femur/Pelvis X-Ray 09/07/2022: unremarkable left hip and femur. Lanora Yolanda was started on 09/02/2022 with fair tolerance so far. Eligard 22.5 mg was given on 09/14/2022 and Dec 2022    PSA 7.57 on 09/19/2022. CT chest on 09/28/2022: No CT evidence of acute intrathoracic abnormality. No definite evidence of metastatic disease. PSA 0.24 on 10/24/2022  PSA 0.14 on 11/21/2022    CT abdomen/pelvis 12/07/2022: Compared to prior exam the prostate gland is decreased in size and previously seen enlarged pelvic lymph nodes are now subcentimeter and decreased in size compatible with positive response to treatment. No new areas of lymphadenopathy. Bone scan 12/07/2022: There is evidence to suggest metastatic disease, however the degree of tracer uptake is significantly diminished in the interval suggesting a positive response to therapy. PSA 0.10 on 12/19/2022    Overall positive response. PSA 0.09 on 01/16/2023  PSA 0.06 on 02/13/2023. PSA 0.06 on 03/13/2023. Labs reviewed. Continue XTANDI per protocol. Lannis Cruise ordered and given today 03/13/2023. Intermittent arthralgias not relieved by Tylenol; recommended Tylenol 500 mg 2 tabs TID and added Voltaren gel QID. Patient recommended to follow with Physical therapy.    Scans ordered   RTC 4 weeks with prior scans, labs and XGEVA same day    XGEVA q4 weeks (labs reviewed; ok to proceed with Lannis Cruise today 03/13/2023)    Nicolás Silva MD   3/13/2023

## 2023-03-14 ENCOUNTER — OFFICE VISIT (OUTPATIENT)
Dept: FAMILY MEDICINE CLINIC | Age: 58
End: 2023-03-14
Payer: MEDICARE

## 2023-03-14 VITALS
SYSTOLIC BLOOD PRESSURE: 155 MMHG | TEMPERATURE: 97.4 F | OXYGEN SATURATION: 100 % | HEART RATE: 77 BPM | BODY MASS INDEX: 34.51 KG/M2 | WEIGHT: 233 LBS | HEIGHT: 69 IN | DIASTOLIC BLOOD PRESSURE: 90 MMHG

## 2023-03-14 DIAGNOSIS — E11.9 TYPE 2 DIABETES MELLITUS WITHOUT COMPLICATION, WITHOUT LONG-TERM CURRENT USE OF INSULIN (HCC): Primary | ICD-10-CM

## 2023-03-14 DIAGNOSIS — I50.20 HFREF (HEART FAILURE WITH REDUCED EJECTION FRACTION) (HCC): ICD-10-CM

## 2023-03-14 DIAGNOSIS — M54.6 ACUTE RIGHT-SIDED THORACIC BACK PAIN: ICD-10-CM

## 2023-03-14 DIAGNOSIS — I10 ESSENTIAL HYPERTENSION: ICD-10-CM

## 2023-03-14 LAB — HBA1C MFR BLD: 7.4 %

## 2023-03-14 PROCEDURE — 3080F DIAST BP >= 90 MM HG: CPT | Performed by: FAMILY MEDICINE

## 2023-03-14 PROCEDURE — 3051F HG A1C>EQUAL 7.0%<8.0%: CPT | Performed by: FAMILY MEDICINE

## 2023-03-14 PROCEDURE — 3077F SYST BP >= 140 MM HG: CPT | Performed by: FAMILY MEDICINE

## 2023-03-14 PROCEDURE — 99214 OFFICE O/P EST MOD 30 MIN: CPT | Performed by: FAMILY MEDICINE

## 2023-03-14 PROCEDURE — 83036 HEMOGLOBIN GLYCOSYLATED A1C: CPT | Performed by: FAMILY MEDICINE

## 2023-03-14 RX ORDER — METOPROLOL SUCCINATE 50 MG/1
100 TABLET, EXTENDED RELEASE ORAL DAILY
Qty: 180 TABLET | Refills: 0 | Status: SHIPPED | OUTPATIENT
Start: 2023-03-14

## 2023-03-14 RX ORDER — LIDOCAINE 4 G/G
1 PATCH TOPICAL DAILY
Qty: 30 PATCH | Refills: 2 | Status: SHIPPED | OUTPATIENT
Start: 2023-03-14

## 2023-03-14 SDOH — ECONOMIC STABILITY: FOOD INSECURITY: WITHIN THE PAST 12 MONTHS, THE FOOD YOU BOUGHT JUST DIDN'T LAST AND YOU DIDN'T HAVE MONEY TO GET MORE.: NEVER TRUE

## 2023-03-14 SDOH — ECONOMIC STABILITY: INCOME INSECURITY: HOW HARD IS IT FOR YOU TO PAY FOR THE VERY BASICS LIKE FOOD, HOUSING, MEDICAL CARE, AND HEATING?: NOT HARD AT ALL

## 2023-03-14 SDOH — ECONOMIC STABILITY: HOUSING INSECURITY
IN THE LAST 12 MONTHS, WAS THERE A TIME WHEN YOU DID NOT HAVE A STEADY PLACE TO SLEEP OR SLEPT IN A SHELTER (INCLUDING NOW)?: NO

## 2023-03-14 SDOH — ECONOMIC STABILITY: FOOD INSECURITY: WITHIN THE PAST 12 MONTHS, YOU WORRIED THAT YOUR FOOD WOULD RUN OUT BEFORE YOU GOT MONEY TO BUY MORE.: NEVER TRUE

## 2023-03-14 ASSESSMENT — PATIENT HEALTH QUESTIONNAIRE - PHQ9
SUM OF ALL RESPONSES TO PHQ QUESTIONS 1-9: 9
3. TROUBLE FALLING OR STAYING ASLEEP: 0
2. FEELING DOWN, DEPRESSED OR HOPELESS: 3
SUM OF ALL RESPONSES TO PHQ QUESTIONS 1-9: 9
9. THOUGHTS THAT YOU WOULD BE BETTER OFF DEAD, OR OF HURTING YOURSELF: 0
4. FEELING TIRED OR HAVING LITTLE ENERGY: 1
10. IF YOU CHECKED OFF ANY PROBLEMS, HOW DIFFICULT HAVE THESE PROBLEMS MADE IT FOR YOU TO DO YOUR WORK, TAKE CARE OF THINGS AT HOME, OR GET ALONG WITH OTHER PEOPLE: 0
SUM OF ALL RESPONSES TO PHQ9 QUESTIONS 1 & 2: 6
1. LITTLE INTEREST OR PLEASURE IN DOING THINGS: 3
SUM OF ALL RESPONSES TO PHQ QUESTIONS 1-9: 9
SUM OF ALL RESPONSES TO PHQ QUESTIONS 1-9: 9
5. POOR APPETITE OR OVEREATING: 2
8. MOVING OR SPEAKING SO SLOWLY THAT OTHER PEOPLE COULD HAVE NOTICED. OR THE OPPOSITE, BEING SO FIGETY OR RESTLESS THAT YOU HAVE BEEN MOVING AROUND A LOT MORE THAN USUAL: 0
6. FEELING BAD ABOUT YOURSELF - OR THAT YOU ARE A FAILURE OR HAVE LET YOURSELF OR YOUR FAMILY DOWN: 0
7. TROUBLE CONCENTRATING ON THINGS, SUCH AS READING THE NEWSPAPER OR WATCHING TELEVISION: 0

## 2023-03-14 NOTE — PROGRESS NOTES
CC:  Follow up DM    HPI:  61 y.o. male presents for follow up. DM, HTN. Trying to eat healthier, he states, but has had a lot of candy and fast food recently. A1C stable. Tolerating metformin. HTN. Trying to avoid salt. No missed doses of medications. BP higher today. No CP or SOB at this time. Mildly increased swelling. Ate [employer] last night. 2 burrito supremes and Dorito taco.  Has been in more pain recently as well. [employer] PB eggs, 3 Musketeers, Snickers. Coffee shake at [employer]. Discussed need for healthy dietary choices in detail. Had previously been on a higher dose of metoprolol. This had been decreased due to lower BP. BP has been higher again recently. Prostate CA with mets. Feels sweats sometimes, legs feel like rubber bands, infrequently. Saw palliative care yesterday as well as oncology Dr. Danuta Anderson. Abdominal symptoms, sometimes feels like nausea, sometimes a stabbing pain on right side, noted in past couple of weeks. BMs normal.  Normal urination. When laying down, shoulders hurt. Taking Tylenol Arthritis, 2 of 650, twice daily. Scans planned for this month with Dr. Danuta Anderson. PSA dropped. Pet CT upcoming. Did not yet  Voltaren gel. Pain in bilateral shoulders, possible impingement/OA. Plans for PT, Voltaren gel prescribed, which he has not yet picked up. Indicates pain in mid back, more on right side, occasional wraps around to abdomen. No injuries or trauma. Has been happening for several weeks. Indicates right side of back. No dysuria, and normal BMs. No F/C.      HFrEF, following with HF clinic again on April 5. Breathing is about the same overall, sometimes hard to catch breath. Some gasping overnights, better with sitting up. Had sleep study at [place]; did not yet get results. Planning to call to follow up there. Appointment was cancelled on 3/10, needs to reschedule. Has Lasix at home. Weight at home gradually increasing.   Some increased swelling and bloating. He usually takes Lasix 40 mg orally in addition to his Bumex when that happens.       Patient Active Problem List    Diagnosis Date Noted    Aneurysm of right common iliac artery ([facility] 12/13/2022    Prostate cancer ([facility] 09/19/2022    Age-related nuclear cataract, bilateral 01/31/2022    Presbyopia 01/31/2022    Regular astigmatism, bilateral 01/31/2022    Leg swelling 01/23/2022    ALEX (acute kidney injury) ([facility] 01/23/2022    CHF, acute on chronic ([facility] 01/21/2022    Elevated PSA, less than 10 ng/ml 04/01/2021    Nonrheumatic aortic valve insufficiency 02/20/2019    Obesity (BMI 30.0-34.9)     Ischemic cardiomyopathy     Type 2 diabetes mellitus without complication, without long-term current use of insulin ([facility]     Essential hypertension 06/29/2017    Diverticulosis of colon 08/07/2015    History of MI (myocardial infarction) 08/07/2015    Chronic systolic congestive heart failure ([facility] 08/07/2015    NSVT (nonsustained ventricular tachycardia) 07/31/2015    Dual implantable cardioverter-defibrillator in situ 04/02/2015    CAD (coronary artery disease) 12/06/2014    Depression 12/10/2013    History of stroke 07/23/2013    ED (erectile dysfunction) 03/26/2012    COPD (chronic obstructive pulmonary disease)     Hyperlipemia     GERD (gastroesophageal reflux disease) 02/12/2011       Current Outpatient Medications on File Prior to Visit   Medication Sig Dispense Refill    diclofenac sodium (VOLTAREN) 1 % GEL Apply 2 g topically 4 times daily as needed for Pain 240 g 2    tamsulosin (FLOMAX) 0.4 MG capsule Take 1 capsule by mouth daily 90 capsule 1    fluticasone (FLONASE) 50 MCG/ACT nasal spray 2 sprays by Each Nostril route daily 48 g 1    isosorbide mononitrate (IMDUR) 30 MG extended release tablet Take 3 tablets by mouth daily 90 tablet 5    pantoprazole (PROTONIX) 40 MG tablet Take 1 tablet by mouth daily 30 tablet 5    clopidogrel (PLAVIX) 75 MG tablet Take 1 tablet by mouth daily 30 tablet 5    spironolactone (ALDACTONE) 25 MG tablet Take 1 tablet by mouth daily 30 tablet 5    metFORMIN (GLUCOPHAGE) 500 MG tablet Take 1 tablet by mouth 2 times daily (with meals) 180 tablet 1    bumetanide (BUMEX) 1 MG tablet Take 1 tablet by mouth daily 30 tablet 3    atorvastatin (LIPITOR) 80 MG tablet TAKE ONE TABLET BY MOUTH NIGHTLY 90 tablet 0    metoprolol succinate (TOPROL XL) 50 MG extended release tablet Take 1.5 tablets by mouth at bedtime 45 tablet 5    denosumab (XGEVA) 120 MG/1.7ML SOLN SC injection Inject 120 mg into the skin once Once a month. Received on 23      Enzalutamide (XTANDI) 40 MG TABS Take 4 tablets by mouth daily      Leuprolide Acetate, 3 Month, (ELIGARD) 22.5 MG KIT Inject 22.5 mg into the skin once Received 22      sacubitril-valsartan (ENTRESTO)  MG per tablet Take 1 tablet by mouth 2 times daily 60 tablet 5    aspirin 81 MG EC tablet Take 1 tablet by mouth daily 90 tablet 1     No current facility-administered medications on file prior to visit. No Known Allergies    Social History     Tobacco Use    Smoking status: Former     Packs/day: 2.00     Years: 30.00     Pack years: 60.00     Types: Cigarettes     Start date: 0     Quit date: 2014     Years since quittin.7    Smokeless tobacco: Current     Types: Chew    Tobacco comments:     occ chewing tobacco, trying to quit   Vaping Use    Vaping Use: Never used   Substance Use Topics    Alcohol use: Not Currently     Alcohol/week: 2.0 standard drinks     Types: 2 Cans of beer per week    Drug use: No       ROS:   Review of Systems - as above     Physical Exam:    VS:  Blood pressure (!) 171/115, pulse 77, temperature 97.4 °F (36.3 °C), temperature source Temporal, height 5' 9\" (1.753 m), weight 233 lb (105.7 kg), SpO2 100 %.     Recheck BP:  BP (!) 155/90   Pulse 77   Temp 97.4 °F (36.3 °C) (Temporal)   Ht 5' 9\" (1.753 m)   Wt 233 lb (105.7 kg)   SpO2 100%   BMI 34.41 kg/m²     General Appearance:  awake, alert, oriented, in no acute distress and well developed, well nourished  Head/face:  NCAT  Eyes:  EOMI and Sclera nonicteric  Mouth/Throat:  no erythema or exudate   Neck:  neck- supple, no mass, non-tender  Lungs:  Normal expansion. Clear to auscultation. No rales, rhonchi, or wheezing. Heart:  Heart regular rate and rhythm  Murmur(s)-  1/6 systolic at 2nd left intercostal space, at 2nd right intercostal space, at lower left sternal border  Abdomen:  softly distended, nontender. RUQ without tenderness or lesions. Back:  ROM grossly intact. No tenderness to palpation. Extremities: pulses present in all extremities and 1+ bilateral edema lower extremities. Most recent labs and imaging reviewed. Findings include:     Results for POC orders placed in visit on 03/14/23   POCT glycosylated hemoglobin (Hb A1C)   Result Value Ref Range    Hemoglobin A1C 7.4 %         Assessments:      Diagnosis Orders   1. Type 2 diabetes mellitus without complication, without long-term current use of insulin ([facility])  POCT glycosylated hemoglobin (Hb A1C)      2. Acute right-sided thoracic back pain  XR THORACIC SPINE (3 VIEWS)    lidocaine (HM LIDOCAINE PATCH) 4 % external patch      3. Essential hypertension  Basic Metabolic Panel      4. HFrEF (heart failure with reduced ejection fraction) ([facility]              Plans:    As Above. Please see Patient Instructions for further counseling and information given. RTO 4 weeks, or sooner as needed for any new, persistent, or worsening symptoms. Follow with specialists and HF clinic as advised. May resume metoprolol at 100 mg, as he previously took, following symptoms and BP closely. May take Lasix 40 mg today, as he states he occasionally does when he starts to gain weight. Please call with symptoms tomorrow. Lab visit in 1 week for BMP, BP check, and weight check. Follow BP and weight at home.     Please call with any new/worsening symptoms, questions, or concerns. Same medications otherwise. Advised on Tylenol dosing, keep total daily dose under 4 g. Discussed healthy dietary choices in detail; advised against sweets, excess carbohydrates, and high salt foods. He expressed understanding. Please be adherent to the treatment plans discussed today, and please call with any questions or concerns, letting the office know of any reasons that the plans may not be followed. The risks of untreated conditions include worsening illness, injury, disability, and possibly, death. Please call if symptoms change in any way, worsen, or fail to completely resolve, as this could necessitate a change to treatment plans. Patient expressed understanding. Indications and proper use of medication(s) reviewed. Potential side-effects and risks of medication(s) also explained. Patient was instructed to call if any new symptoms develop prior to next visit.

## 2023-03-27 ENCOUNTER — HOSPITAL ENCOUNTER (OUTPATIENT)
Dept: INFUSION THERAPY | Age: 58
Setting detail: INFUSION SERIES
Discharge: HOME | End: 2023-03-27
Payer: MEDICARE

## 2023-03-27 VITALS
TEMPERATURE: 96.8 F | DIASTOLIC BLOOD PRESSURE: 86 MMHG | HEART RATE: 75 BPM | SYSTOLIC BLOOD PRESSURE: 143 MMHG | OXYGEN SATURATION: 98 % | RESPIRATION RATE: 16 BRPM

## 2023-03-27 DIAGNOSIS — C61 PROSTATE CANCER (HCC): Primary | ICD-10-CM

## 2023-03-27 PROCEDURE — 6360000002 HC RX W HCPCS: Performed by: UROLOGY

## 2023-03-27 PROCEDURE — 96402 CHEMO HORMON ANTINEOPL SQ/IM: CPT

## 2023-03-27 RX ADMIN — LEUPROLIDE ACETATE 22.5 MG: KIT SUBCUTANEOUS at 11:05

## 2023-03-27 NOTE — FLOWSHEET NOTE
Discharged to home in stable condition , tolerated injection well, all questions answered, does not want any dc instructions, has had injection before, is aware of the side effects, VS stable

## 2023-03-27 NOTE — DISCHARGE INSTRUCTIONS
electrolyte imbalance (such as low levels of potassium or magnesium in your blood); or  risk factors for bone loss (personal or family history of osteoporosis, smoking, alcohol use, taking steroid or seizure medicines long term). Do not give this medicine to any child without medical advice. Leuprolide usually causes women to stop ovulating or having menstrual periods. However, you may still be able to get pregnant. Use a condom or diaphragm with spermicide to prevent pregnancy. Leuprolide can make hormonal birth control less effective (birth control pills, injections, implants, skin patches, vaginal rings). Call your doctor if your periods continue while you are being treated with this medicine. You should not breast-feed while using this medicine. How should I use leuprolide? Follow all directions on your prescription label and read all medication guides or instruction sheets. Use the medicine exactly as directed. Different brands or strengths of leuprolide are used to treat different conditions. It is very important that you receive exactly the brand and strength your doctor has prescribed. Always check your medication to make sure you have received the correct brand and type prescribed by your doctor. Leuprolide is injected under the skin or into a muscle, once every month or once every 3 to 6 months. A healthcare provider can teach you how to properly use the medication by yourself. Read and carefully follow any Instructions for Use provided with your medicine. Do not use leuprolide if you don't understand all instructions for proper use. Ask your doctor or pharmacist if you have questions. Your symptoms may become temporarily worse as your hormones adjust to leuprolide. A child using this medicine may have increased signs of puberty (such as vaginal bleeding) during the first weeks of treatment.   Keep using the medicine as directed, and tell your doctor if your condition is still worse after 2 months of using this medicine. You may need frequent medical tests while using leuprolide. Bone growth may need to be checked in a child treated with Fensolvi. Store Lupron in the original carton at room temperature, away from moisture and heat. Protect from light. Store [employer] or [place] in the refrigerator. Do not freeze. You may take the medicine out and allow it to reach room temperature before mixing and injecting your dose. Mixed medicine must be used within 30 minutes. You may also store Eligard or Fensolvi in its original packaging at room temperature for up to 8 weeks. Use a needle and syringe only once and then place them in a puncture-proof \"sharps\" container. Follow state or local laws about how to dispose of this container. Keep it out of the reach of children and pets. What happens if I miss a dose? Call your doctor for instructions if you miss a dose. What happens if I overdose? Seek emergency medical attention or call the Poison Help line at 1-950.521.2248. What should I avoid while using leuprolide? Follow your doctor's instructions about any restrictions on food, beverages, or activity. What are the possible side effects of leuprolide? Get emergency medical help if you have signs of an allergic reaction (hives, sweating, fast heartbeats, dizziness, difficult breathing, swelling in your face or throat) or a severe skin reaction (fever, sore throat, burning in your eyes, skin pain, red or purple skin rash that spreads and causes blistering and peeling).   Call your doctor at once if you have:  problems with your pituitary gland --sudden severe headache, vomiting, problems with your eyes or vision, changes in mood or behavior;  bone pain, loss of movement in any part of your body;  swelling, rapid weight gain;  a seizure;  unusual changes in mood or behavior (crying spells, anger, feeling irritable);  sudden chest pain or discomfort, wheezing, dry cough or hack;  painful or difficult

## 2023-04-05 ENCOUNTER — HOSPITAL ENCOUNTER (OUTPATIENT)
Dept: OTHER | Age: 58
Setting detail: THERAPIES SERIES
Discharge: HOME | End: 2023-04-05
Payer: MEDICARE

## 2023-04-05 VITALS
SYSTOLIC BLOOD PRESSURE: 138 MMHG | BODY MASS INDEX: 34.56 KG/M2 | DIASTOLIC BLOOD PRESSURE: 77 MMHG | WEIGHT: 234 LBS | OXYGEN SATURATION: 96 % | RESPIRATION RATE: 18 BRPM | HEART RATE: 74 BPM

## 2023-04-05 LAB
ANION GAP SERPL CALCULATED.3IONS-SCNC: 11 MMOL/L (ref 7–16)
BNP BLD-MCNC: 2407 PG/ML (ref 0–125)
BUN SERPL-MCNC: 19 MG/DL (ref 6–20)
CALCIUM SERPL-MCNC: 9.5 MG/DL (ref 8.6–10.2)
CHLORIDE SERPL-SCNC: 101 MMOL/L (ref 98–107)
CO2 SERPL-SCNC: 27 MMOL/L (ref 22–29)
CREAT SERPL-MCNC: 1 MG/DL (ref 0.7–1.2)
GLUCOSE SERPL-MCNC: 187 MG/DL (ref 74–99)
POTASSIUM SERPL-SCNC: 4.2 MMOL/L (ref 3.5–5)
SODIUM SERPL-SCNC: 139 MMOL/L (ref 132–146)

## 2023-04-05 PROCEDURE — 83880 ASSAY OF NATRIURETIC PEPTIDE: CPT

## 2023-04-05 PROCEDURE — 99214 OFFICE O/P EST MOD 30 MIN: CPT

## 2023-04-05 PROCEDURE — 80048 BASIC METABOLIC PNL TOTAL CA: CPT

## 2023-04-05 NOTE — PLAN OF CARE
Problem: Chronic Conditions and Co-morbidities  Goal: Patient's chronic conditions and co-morbidity symptoms are monitored and maintained or improved  Flowsheets (Taken 4/5/2023 0702)  Care Plan - Patient's Chronic Conditions and Co-Morbidity Symptoms are Monitored and Maintained or Improved: Monitor and assess patient's chronic conditions and comorbid symptoms for stability, deterioration, or improvement

## 2023-04-17 ENCOUNTER — OFFICE VISIT (OUTPATIENT)
Dept: ONCOLOGY | Age: 58
End: 2023-04-17
Payer: MEDICARE

## 2023-04-17 ENCOUNTER — HOSPITAL ENCOUNTER (OUTPATIENT)
Dept: INFUSION THERAPY | Age: 58
Discharge: HOME | End: 2023-04-17
Payer: MEDICARE

## 2023-04-17 VITALS
TEMPERATURE: 97.8 F | DIASTOLIC BLOOD PRESSURE: 80 MMHG | HEART RATE: 75 BPM | SYSTOLIC BLOOD PRESSURE: 146 MMHG | BODY MASS INDEX: 34.96 KG/M2 | OXYGEN SATURATION: 94 % | HEIGHT: 69 IN | WEIGHT: 236 LBS

## 2023-04-17 DIAGNOSIS — C61 PROSTATE CANCER METASTATIC TO BONE (HCC): ICD-10-CM

## 2023-04-17 DIAGNOSIS — C79.51 PROSTATE CANCER METASTATIC TO BONE (HCC): ICD-10-CM

## 2023-04-17 DIAGNOSIS — C61 PROSTATE CANCER (HCC): Primary | ICD-10-CM

## 2023-04-17 LAB
ALBUMIN SERPL-MCNC: 4.2 G/DL (ref 3.5–5.2)
ALP SERPL-CCNC: 67 U/L (ref 40–129)
ALT SERPL-CCNC: 5 U/L (ref 0–40)
ANION GAP SERPL CALCULATED.3IONS-SCNC: 12 MMOL/L (ref 7–16)
AST SERPL-CCNC: 14 U/L (ref 0–39)
BASOPHILS # BLD: 0.04 E9/L (ref 0–0.2)
BASOPHILS NFR BLD: 0.8 % (ref 0–2)
BILIRUB SERPL-MCNC: 0.7 MG/DL (ref 0–1.2)
BUN SERPL-MCNC: 28 MG/DL (ref 6–20)
CALCIUM SERPL-MCNC: 8.3 MG/DL (ref 8.6–10.2)
CHLORIDE SERPL-SCNC: 105 MMOL/L (ref 98–107)
CO2 SERPL-SCNC: 25 MMOL/L (ref 22–29)
CREAT SERPL-MCNC: 1.3 MG/DL (ref 0.7–1.2)
EOSINOPHIL # BLD: 0.13 E9/L (ref 0.05–0.5)
EOSINOPHIL NFR BLD: 2.7 % (ref 0–6)
ERYTHROCYTE [DISTWIDTH] IN BLOOD BY AUTOMATED COUNT: 14.7 FL (ref 11.5–15)
GLUCOSE SERPL-MCNC: 182 MG/DL (ref 74–99)
HCT VFR BLD AUTO: 45.4 % (ref 37–54)
HGB BLD-MCNC: 14.5 G/DL (ref 12.5–16.5)
IMM GRANULOCYTES # BLD: 0.03 E9/L
IMM GRANULOCYTES NFR BLD: 0.6 % (ref 0–5)
LYMPHOCYTES # BLD: 1.11 E9/L (ref 1.5–4)
LYMPHOCYTES NFR BLD: 23 % (ref 20–42)
MAGNESIUM SERPL-MCNC: 1.9 MG/DL (ref 1.6–2.6)
MCH RBC QN AUTO: 27.3 PG (ref 26–35)
MCHC RBC AUTO-ENTMCNC: 31.9 % (ref 32–34.5)
MCV RBC AUTO: 85.5 FL (ref 80–99.9)
MONOCYTES # BLD: 0.32 E9/L (ref 0.1–0.95)
MONOCYTES NFR BLD: 6.6 % (ref 2–12)
NEUTROPHILS # BLD: 3.19 E9/L (ref 1.8–7.3)
NEUTS SEG NFR BLD: 66.3 % (ref 43–80)
PHOSPHATE SERPL-MCNC: 2.3 MG/DL (ref 2.5–4.5)
PLATELET # BLD AUTO: 174 E9/L (ref 130–450)
PMV BLD AUTO: 11.2 FL (ref 7–12)
POTASSIUM SERPL-SCNC: 3.7 MMOL/L (ref 3.5–5)
PROT SERPL-MCNC: 6.9 G/DL (ref 6.4–8.3)
PSA SERPL-MCNC: 0.06 NG/ML (ref 0–4)
RBC # BLD AUTO: 5.31 E12/L (ref 3.8–5.8)
SODIUM SERPL-SCNC: 142 MMOL/L (ref 132–146)
WBC # BLD: 4.8 E9/L (ref 4.5–11.5)

## 2023-04-17 PROCEDURE — 85025 COMPLETE CBC W/AUTO DIFF WBC: CPT

## 2023-04-17 PROCEDURE — 99214 OFFICE O/P EST MOD 30 MIN: CPT | Performed by: INTERNAL MEDICINE

## 2023-04-17 PROCEDURE — 84153 ASSAY OF PSA TOTAL: CPT

## 2023-04-17 PROCEDURE — 84100 ASSAY OF PHOSPHORUS: CPT

## 2023-04-17 PROCEDURE — 3079F DIAST BP 80-89 MM HG: CPT | Performed by: INTERNAL MEDICINE

## 2023-04-17 PROCEDURE — 99212 OFFICE O/P EST SF 10 MIN: CPT

## 2023-04-17 PROCEDURE — 6360000002 HC RX W HCPCS: Performed by: INTERNAL MEDICINE

## 2023-04-17 PROCEDURE — 3077F SYST BP >= 140 MM HG: CPT | Performed by: INTERNAL MEDICINE

## 2023-04-17 PROCEDURE — 96372 THER/PROPH/DIAG INJ SC/IM: CPT

## 2023-04-17 PROCEDURE — 36415 COLL VENOUS BLD VENIPUNCTURE: CPT

## 2023-04-17 PROCEDURE — 83735 ASSAY OF MAGNESIUM: CPT

## 2023-04-17 PROCEDURE — 80053 COMPREHEN METABOLIC PANEL: CPT

## 2023-04-17 RX ORDER — ACETAMINOPHEN 325 MG/1
650 TABLET ORAL
OUTPATIENT
Start: 2023-05-07

## 2023-04-17 RX ORDER — ONDANSETRON 2 MG/ML
8 INJECTION INTRAMUSCULAR; INTRAVENOUS
OUTPATIENT
Start: 2023-05-07

## 2023-04-17 RX ORDER — PHENOL 1.4 %
1 AEROSOL, SPRAY (ML) MUCOUS MEMBRANE DAILY
Qty: 30 TABLET | Refills: 3 | Status: SHIPPED | OUTPATIENT
Start: 2023-04-17

## 2023-04-17 RX ORDER — DIPHENHYDRAMINE HYDROCHLORIDE 50 MG/ML
50 INJECTION INTRAMUSCULAR; INTRAVENOUS
OUTPATIENT
Start: 2023-05-07

## 2023-04-17 RX ORDER — SODIUM CHLORIDE 9 MG/ML
INJECTION, SOLUTION INTRAVENOUS CONTINUOUS
OUTPATIENT
Start: 2023-05-07

## 2023-04-17 RX ORDER — EPINEPHRINE 1 MG/ML
0.3 INJECTION, SOLUTION, CONCENTRATE INTRAVENOUS PRN
OUTPATIENT
Start: 2023-05-07

## 2023-04-17 RX ORDER — ALBUTEROL SULFATE 90 UG/1
4 AEROSOL, METERED RESPIRATORY (INHALATION) PRN
OUTPATIENT
Start: 2023-05-07

## 2023-04-17 RX ADMIN — DENOSUMAB 120 MG: 120 INJECTION SUBCUTANEOUS at 15:10

## 2023-04-17 NOTE — PROGRESS NOTES
Patient provided with discharge instructions, received printed AVS.  All questions answered. Patient understands follow up plan of care.
mid, biopsy: Prostatic adenocarcinoma (acinar, not otherwise specified) and intraductal carcinoma. Andrew Score: 4+4=8; cribriform pattern present   Grade Group:  4   Tissue Cores Involved by Carcinoma: 3 of 3   Percentage of Tissue Involved by Carcinoma: >90%   Perineural Invasion: Not identified     F. Prostate, right apex, biopsy: Prostatic adenocarcinoma (acinar, not otherwise specified) and intraductal carcinoma. Sunflower Score: 4+4 = 8; cribriform pattern present   Grade Group:  4   Tissue Cores Involved by Carcinoma: 3 of 3   Percentage of Tissue Involved by Carcinoma: 80%   Perineural Invasion: Present     Comment: In part A, carcinoma is seen involving fibroadipose tissue. Extraprostatic extension cannot be excluded. P63 immunostain is performed on Parts B, D, E, and F and shows the presence of basal cells around the intraductal carcinoma in the absence of basal cells in the   areas of invasive carcinoma. The percentage of tissue involved listed above reflects the total percentage involved by invasive and intraductal carcinoma. Intradepartmental consultation is obtained. NGS testing ([employer]):  MS-Stable: No therapies or clinical trials  TMB 0 Muts/Mb: No therapies or clinical trials  No therapies or clinical trials are associated with the genomic findings for the sample    CT abdomen/pelvis 8/23/2022 enlarged prostate gland with impingement upon the lymph node to bladder base by an irregular portion of the enlarged gland. Enlarged pelvic and inferior retroperitoneal lymph nodes  Diffuse thickening of the walls of the urinary bladder with surrounding  Small right anterior abdominal wall hernia, which contains fat   Small umbilical hernia, which contains fat. Small bilateral inguinal hernias, left slightly greater than right and both contain fat.     Bone scan 08/23/2022:  Findings consistent with widespread metastatic disease    Left Femur/Pelvis X-Ray 09/07/2022: unremarkable left hip

## 2023-04-17 NOTE — PROGRESS NOTES
Calcium level reviewed with Dr Cadet Moment per  to give Arie Grandemitt injection today-Dr also suggests that pt take a Calcium supplement daily-BRUNA Hernandez RN

## 2023-04-24 ENCOUNTER — OFFICE VISIT (OUTPATIENT)
Dept: PALLATIVE CARE | Age: 58
End: 2023-04-24
Payer: MEDICARE

## 2023-04-24 VITALS
WEIGHT: 232 LBS | OXYGEN SATURATION: 94 % | DIASTOLIC BLOOD PRESSURE: 92 MMHG | SYSTOLIC BLOOD PRESSURE: 153 MMHG | BODY MASS INDEX: 34.26 KG/M2 | HEART RATE: 83 BPM | TEMPERATURE: 97 F

## 2023-04-24 DIAGNOSIS — G89.4 CHRONIC PAIN SYNDROME: Primary | ICD-10-CM

## 2023-04-24 DIAGNOSIS — C61 PROSTATE CANCER (HCC): ICD-10-CM

## 2023-04-24 PROCEDURE — 3080F DIAST BP >= 90 MM HG: CPT | Performed by: NURSE PRACTITIONER

## 2023-04-24 PROCEDURE — 99212 OFFICE O/P EST SF 10 MIN: CPT | Performed by: NURSE PRACTITIONER

## 2023-04-24 PROCEDURE — 99211 OFF/OP EST MAY X REQ PHY/QHP: CPT | Performed by: NURSE PRACTITIONER

## 2023-04-24 PROCEDURE — 3077F SYST BP >= 140 MM HG: CPT | Performed by: NURSE PRACTITIONER

## 2023-04-24 NOTE — PROGRESS NOTES
[facility]    COLONOSCOPY  08/07/2015    sigmoid diverticulosis, rectal polyp bx/cauterized (hyperplastic polyp), Dr. Parminder Sher, [address]  12/6/2014    LAD PROX [place] 3.5 x 33, Dr. Ekaterina Lucas, [facility]    CYSTOURETHROSCOPY  1/29/2010    placement bilateral ureteral stents for colon resection, Dr. Chaz Mcgarry, . [address]    right, [facility]      pacer defib    PROSTATE BIOPSY N/A 8/4/2022    PROSTATE BIOPSY TRANSRECATAL Velora Yusef ([id number]) performed by Oscar Armstrong MD at [address] [place] [address]  8/7/2015    severe duodenitis, mild to moderate gastritis (bx for H pylori), submucosal benign mass body stomach, mild reflux esophagitis, Dr. Parminder Sher, [facility]    VASCULAR SURGERY         Family History   Problem Relation Age of Onset    Coronary Art Dis Mother     Dementia Mother     High Blood Pressure Father     Heart Disease Father         MI stents    Stroke Sister     Cancer Sister         Breast    No Known Problems Brother     No Known Problems Brother     Cancer Paternal Uncle         lung    Heart Disease Paternal Uncle        ROS: UNLESS STATED ABOVE PATIENT DENIES:  CONSTITUTIONAL:  fever, chill, rigors, nausea, vomiting, fatigue. HEENT: blurry vision, double vision, hearing problem, tinnitus, hoarseness, dysphagia, odynophagia  RESPIRATORY: cough, shortness of breath, sputum expectoration. CARDIOVASCULAR:  Chest pain/pressure, palpitation, syncope, irregular beats  GASTROINTESTINAL:  abdominal or rectal pain, diarrhea, constipation, . GENITOURINARY:  Burning, frequency, urgency, incontinence, discharge  INTEGUMENTARY: rash, wound, pruritis  HEMATOLOGIC/LYMPHATIC:  Swelling, sores, gum bleeding, easy bruising, pica.   MUSCULOSKELETAL:  pain, edema, joint swelling or redness  NEUROLOGICAL:  light headed, dizziness, loss of consciousness, weakness, change in memory, seizures, tremors    Objective:

## 2023-05-05 ENCOUNTER — HOSPITAL ENCOUNTER (OUTPATIENT)
Dept: CT IMAGING | Age: 58
End: 2023-05-05
Payer: MEDICARE

## 2023-05-05 ENCOUNTER — HOSPITAL ENCOUNTER (OUTPATIENT)
Dept: NUCLEAR MEDICINE | Age: 58
Discharge: HOME | End: 2023-05-07
Payer: MEDICARE

## 2023-05-05 DIAGNOSIS — C61 PROSTATE CANCER (HCC): ICD-10-CM

## 2023-05-05 DIAGNOSIS — R97.20 ELEVATED PROSTATE SPECIFIC ANTIGEN (PSA): ICD-10-CM

## 2023-05-05 PROCEDURE — 2580000003 HC RX 258: Performed by: RADIOLOGY

## 2023-05-05 PROCEDURE — 74177 CT ABD & PELVIS W/CONTRAST: CPT

## 2023-05-05 PROCEDURE — 3430000000 HC RX DIAGNOSTIC RADIOPHARMACEUTICAL: Performed by: RADIOLOGY

## 2023-05-05 PROCEDURE — 78306 BONE IMAGING WHOLE BODY: CPT | Performed by: INTERNAL MEDICINE

## 2023-05-05 PROCEDURE — 6360000004 HC RX CONTRAST MEDICATION: Performed by: RADIOLOGY

## 2023-05-05 PROCEDURE — 71260 CT THORAX DX C+: CPT

## 2023-05-05 PROCEDURE — A9503 TC99M MEDRONATE: HCPCS | Performed by: RADIOLOGY

## 2023-05-05 RX ORDER — TC 99M MEDRONATE 20 MG/10ML
25 INJECTION, POWDER, LYOPHILIZED, FOR SOLUTION INTRAVENOUS
Status: COMPLETED | OUTPATIENT
Start: 2023-05-05 | End: 2023-05-05

## 2023-05-05 RX ORDER — SODIUM CHLORIDE 0.9 % (FLUSH) 0.9 %
10 SYRINGE (ML) INJECTION
Status: COMPLETED | OUTPATIENT
Start: 2023-05-05 | End: 2023-05-05

## 2023-05-05 RX ADMIN — Medication 10 ML: at 09:16

## 2023-05-05 RX ADMIN — TC 99M MEDRONATE 27.9 MILLICURIE: 20 INJECTION, POWDER, LYOPHILIZED, FOR SOLUTION INTRAVENOUS at 08:04

## 2023-05-05 RX ADMIN — IOPAMIDOL 18 ML: 755 INJECTION, SOLUTION INTRAVENOUS at 09:16

## 2023-05-05 RX ADMIN — IOPAMIDOL 75 ML: 755 INJECTION, SOLUTION INTRAVENOUS at 09:16

## 2023-05-09 ENCOUNTER — OFFICE VISIT (OUTPATIENT)
Dept: FAMILY MEDICINE CLINIC | Age: 58
End: 2023-05-09
Payer: MEDICARE

## 2023-05-09 VITALS
SYSTOLIC BLOOD PRESSURE: 127 MMHG | DIASTOLIC BLOOD PRESSURE: 77 MMHG | TEMPERATURE: 97 F | WEIGHT: 230 LBS | BODY MASS INDEX: 33.97 KG/M2 | HEART RATE: 69 BPM | OXYGEN SATURATION: 97 %

## 2023-05-09 DIAGNOSIS — I10 ESSENTIAL HYPERTENSION: Primary | ICD-10-CM

## 2023-05-09 DIAGNOSIS — I50.20 HFREF (HEART FAILURE WITH REDUCED EJECTION FRACTION) (HCC): ICD-10-CM

## 2023-05-09 DIAGNOSIS — L24.0 IRRITANT CONTACT DERMATITIS DUE TO DETERGENT: ICD-10-CM

## 2023-05-09 DIAGNOSIS — C61 PROSTATE CANCER (HCC): ICD-10-CM

## 2023-05-09 PROCEDURE — 99214 OFFICE O/P EST MOD 30 MIN: CPT | Performed by: FAMILY MEDICINE

## 2023-05-09 PROCEDURE — 3074F SYST BP LT 130 MM HG: CPT | Performed by: FAMILY MEDICINE

## 2023-05-09 PROCEDURE — 3078F DIAST BP <80 MM HG: CPT | Performed by: FAMILY MEDICINE

## 2023-05-09 NOTE — PROGRESS NOTES
CC:  Follow up HTN    HPI:  61 y.o. male presents for follow up. HTN, HFrEF. At last visit, BP had been elevated, and he had more edema at that time, weight gain. Had increased diuresis temporarily. Today, weight is lower, closer to baseline. Following with HF clinic. Takes occasional extra diuretic under their direction. Active, landscaping, working outside. Not always eating healthy foods. Pizza for breakfast.     Prostate CA. Next injection is coming up. Breast tenderness, increase in size. No nipple discharge. Fatigued. Some days better, some are worse. No N/V. Following with oncology. Back pain, thoracic, lumbar. Had scans. Wraps around to ribs at times. Certain movements make it worse briefly. Following with palliative. Using medications with some improvement. Taking Vitamin D from [place]. Low vitamin D.  VA moderate SARAH on sleep study. He is planning to follow up. Skin breakout, which he attributed to Tide detergent. Mostly on arms, itching. Will follow for improvement after switching detergent.   Please call if symptoms persist.      Patient Active Problem List    Diagnosis Date Noted    Aneurysm of right common iliac artery ([address] 12/13/2022    Prostate cancer ([address] 09/19/2022    Age-related nuclear cataract, bilateral 01/31/2022    Presbyopia 01/31/2022    Regular astigmatism, bilateral 01/31/2022    Leg swelling 01/23/2022    ALEX (acute kidney injury) ([address] 01/23/2022    CHF, acute on chronic ([address] 01/21/2022    Elevated PSA, less than 10 ng/ml 04/01/2021    Nonrheumatic aortic valve insufficiency 02/20/2019    Obesity (BMI 30.0-34.9)     Ischemic cardiomyopathy     Type 2 diabetes mellitus without complication, without long-term current use of insulin ([address]     Essential hypertension 06/29/2017    Diverticulosis of colon 08/07/2015    History of MI (myocardial infarction) 08/07/2015    Chronic systolic congestive heart failure ([address] 08/07/2015    NSVT (nonsustained

## 2023-05-15 ENCOUNTER — HOSPITAL ENCOUNTER (OUTPATIENT)
Dept: INFUSION THERAPY | Age: 58
Discharge: HOME | End: 2023-05-15
Payer: MEDICARE

## 2023-05-15 ENCOUNTER — OFFICE VISIT (OUTPATIENT)
Dept: PALLATIVE CARE | Age: 58
End: 2023-05-15
Payer: MEDICARE

## 2023-05-15 ENCOUNTER — OFFICE VISIT (OUTPATIENT)
Dept: ONCOLOGY | Age: 58
End: 2023-05-15
Payer: MEDICARE

## 2023-05-15 VITALS
TEMPERATURE: 97.6 F | HEART RATE: 68 BPM | WEIGHT: 232 LBS | HEIGHT: 69 IN | SYSTOLIC BLOOD PRESSURE: 114 MMHG | OXYGEN SATURATION: 96 % | BODY MASS INDEX: 34.36 KG/M2 | DIASTOLIC BLOOD PRESSURE: 68 MMHG

## 2023-05-15 DIAGNOSIS — C79.51 PROSTATE CANCER METASTATIC TO BONE (HCC): ICD-10-CM

## 2023-05-15 DIAGNOSIS — Z51.5 PALLIATIVE CARE BY SPECIALIST: Primary | ICD-10-CM

## 2023-05-15 DIAGNOSIS — G89.3 PAIN DUE TO NEOPLASM: ICD-10-CM

## 2023-05-15 DIAGNOSIS — I50.22 CHRONIC SYSTOLIC CONGESTIVE HEART FAILURE (HCC): ICD-10-CM

## 2023-05-15 DIAGNOSIS — C61 PROSTATE CANCER (HCC): Primary | ICD-10-CM

## 2023-05-15 DIAGNOSIS — C61 PROSTATE CANCER METASTATIC TO BONE (HCC): ICD-10-CM

## 2023-05-15 DIAGNOSIS — C61 PROSTATE CANCER (HCC): ICD-10-CM

## 2023-05-15 LAB
ALBUMIN SERPL-MCNC: 4.3 G/DL (ref 3.5–5.2)
ALP SERPL-CCNC: 91 U/L (ref 40–129)
ALT SERPL-CCNC: 10 U/L (ref 0–40)
ANION GAP SERPL CALCULATED.3IONS-SCNC: 11 MMOL/L (ref 7–16)
AST SERPL-CCNC: 15 U/L (ref 0–39)
BASOPHILS # BLD: 0.05 E9/L (ref 0–0.2)
BASOPHILS NFR BLD: 0.9 % (ref 0–2)
BILIRUB SERPL-MCNC: 0.8 MG/DL (ref 0–1.2)
BUN SERPL-MCNC: 21 MG/DL (ref 6–20)
CALCIUM SERPL-MCNC: 9.1 MG/DL (ref 8.6–10.2)
CHLORIDE SERPL-SCNC: 102 MMOL/L (ref 98–107)
CO2 SERPL-SCNC: 27 MMOL/L (ref 22–29)
CREAT SERPL-MCNC: 1 MG/DL (ref 0.7–1.2)
EOSINOPHIL # BLD: 0.14 E9/L (ref 0.05–0.5)
EOSINOPHIL NFR BLD: 2.6 % (ref 0–6)
ERYTHROCYTE [DISTWIDTH] IN BLOOD BY AUTOMATED COUNT: 13.9 FL (ref 11.5–15)
GLUCOSE SERPL-MCNC: 165 MG/DL (ref 74–99)
HCT VFR BLD AUTO: 51 % (ref 37–54)
HGB BLD-MCNC: 16.4 G/DL (ref 12.5–16.5)
IMM GRANULOCYTES # BLD: 0.02 E9/L
IMM GRANULOCYTES NFR BLD: 0.4 % (ref 0–5)
LYMPHOCYTES # BLD: 1.2 E9/L (ref 1.5–4)
LYMPHOCYTES NFR BLD: 22.3 % (ref 20–42)
MAGNESIUM SERPL-MCNC: 2.2 MG/DL (ref 1.6–2.6)
MCH RBC QN AUTO: 27.1 PG (ref 26–35)
MCHC RBC AUTO-ENTMCNC: 32.2 % (ref 32–34.5)
MCV RBC AUTO: 84.3 FL (ref 80–99.9)
MONOCYTES # BLD: 0.41 E9/L (ref 0.1–0.95)
MONOCYTES NFR BLD: 7.6 % (ref 2–12)
NEUTROPHILS # BLD: 3.57 E9/L (ref 1.8–7.3)
NEUTS SEG NFR BLD: 66.2 % (ref 43–80)
PHOSPHATE SERPL-MCNC: 2.7 MG/DL (ref 2.5–4.5)
PLATELET # BLD AUTO: 185 E9/L (ref 130–450)
PMV BLD AUTO: 11.1 FL (ref 7–12)
POTASSIUM SERPL-SCNC: 4.2 MMOL/L (ref 3.5–5)
PROT SERPL-MCNC: 7.3 G/DL (ref 6.4–8.3)
PSA SERPL-MCNC: 0.06 NG/ML (ref 0–4)
RBC # BLD AUTO: 6.05 E12/L (ref 3.8–5.8)
SODIUM SERPL-SCNC: 140 MMOL/L (ref 132–146)
WBC # BLD: 5.4 E9/L (ref 4.5–11.5)

## 2023-05-15 PROCEDURE — 36415 COLL VENOUS BLD VENIPUNCTURE: CPT

## 2023-05-15 PROCEDURE — 84153 ASSAY OF PSA TOTAL: CPT

## 2023-05-15 PROCEDURE — 99211 OFF/OP EST MAY X REQ PHY/QHP: CPT | Performed by: NURSE PRACTITIONER

## 2023-05-15 PROCEDURE — 99212 OFFICE O/P EST SF 10 MIN: CPT

## 2023-05-15 PROCEDURE — 84100 ASSAY OF PHOSPHORUS: CPT

## 2023-05-15 PROCEDURE — 96372 THER/PROPH/DIAG INJ SC/IM: CPT

## 2023-05-15 PROCEDURE — 99214 OFFICE O/P EST MOD 30 MIN: CPT | Performed by: INTERNAL MEDICINE

## 2023-05-15 PROCEDURE — 6360000002 HC RX W HCPCS: Performed by: INTERNAL MEDICINE

## 2023-05-15 PROCEDURE — 99213 OFFICE O/P EST LOW 20 MIN: CPT | Performed by: NURSE PRACTITIONER

## 2023-05-15 PROCEDURE — 3074F SYST BP LT 130 MM HG: CPT | Performed by: INTERNAL MEDICINE

## 2023-05-15 PROCEDURE — 80053 COMPREHEN METABOLIC PANEL: CPT

## 2023-05-15 PROCEDURE — 85025 COMPLETE CBC W/AUTO DIFF WBC: CPT

## 2023-05-15 PROCEDURE — 3078F DIAST BP <80 MM HG: CPT | Performed by: INTERNAL MEDICINE

## 2023-05-15 PROCEDURE — 83735 ASSAY OF MAGNESIUM: CPT

## 2023-05-15 RX ORDER — METOPROLOL SUCCINATE 50 MG/1
100 TABLET, EXTENDED RELEASE ORAL DAILY
Qty: 180 TABLET | Refills: 1 | Status: SHIPPED | OUTPATIENT
Start: 2023-05-15

## 2023-05-15 RX ORDER — ONDANSETRON 2 MG/ML
8 INJECTION INTRAMUSCULAR; INTRAVENOUS
OUTPATIENT
Start: 2023-06-05

## 2023-05-15 RX ORDER — BUMETANIDE 1 MG/1
1 TABLET ORAL DAILY
Qty: 90 TABLET | Refills: 1 | Status: SHIPPED | OUTPATIENT
Start: 2023-05-15

## 2023-05-15 RX ORDER — CYCLOBENZAPRINE HCL 10 MG
10 TABLET ORAL 3 TIMES DAILY PRN
Qty: 45 TABLET | Refills: 0 | Status: SHIPPED | OUTPATIENT
Start: 2023-05-15 | End: 2023-05-30

## 2023-05-15 RX ORDER — DIPHENHYDRAMINE HYDROCHLORIDE 50 MG/ML
50 INJECTION INTRAMUSCULAR; INTRAVENOUS
OUTPATIENT
Start: 2023-06-05

## 2023-05-15 RX ORDER — SODIUM CHLORIDE 9 MG/ML
INJECTION, SOLUTION INTRAVENOUS CONTINUOUS
OUTPATIENT
Start: 2023-06-05

## 2023-05-15 RX ORDER — ALBUTEROL SULFATE 90 UG/1
4 AEROSOL, METERED RESPIRATORY (INHALATION) PRN
OUTPATIENT
Start: 2023-06-05

## 2023-05-15 RX ORDER — ACETAMINOPHEN 325 MG/1
650 TABLET ORAL
OUTPATIENT
Start: 2023-06-05

## 2023-05-15 RX ORDER — TRAMADOL HYDROCHLORIDE 50 MG/1
50 TABLET ORAL EVERY 6 HOURS PRN
Qty: 45 TABLET | Refills: 0 | Status: SHIPPED | OUTPATIENT
Start: 2023-05-15 | End: 2023-05-30

## 2023-05-15 RX ORDER — EPINEPHRINE 1 MG/ML
0.3 INJECTION, SOLUTION, CONCENTRATE INTRAVENOUS PRN
OUTPATIENT
Start: 2023-06-05

## 2023-05-15 RX ADMIN — DENOSUMAB 120 MG: 120 INJECTION SUBCUTANEOUS at 15:19

## 2023-05-15 NOTE — PROGRESS NOTES
Palliative Care Department  Provider: PAUL Chambers - CNP    Referring Provider:  Dr. Jessica Vogel    Location of Service:   [address] [place] [address]    Reason for Consult:  []  Code status Discussion  []  Assist with goals of care  []  Psychosocial support  [x]  Symptom Management  []  Advanced Care Planning    Chief Complaint: Hemal Solano is a 61 y.o. male with chief complaint of pain    Assessment/Plan      Prostate Cancer with bone mets:   -  Found Aug. 2023   -  Known to Dr. Iona Ireland, [facility] in Dec. Showed improvement    Pain related to Neoplasm:   -  Tylenol 500 mg 2 tabs TID PRN   -  Flexeril 10 mg 3 times daily   -  Tramadol 50 mg every 6.   -  Voltaren gel QID PRN   -  Await start of PT    Follow Up:  4 weeks. They were encouraged to call with any questions, concerns, needs, or changes in symptoms. Subjective:     HPI:  Hemal Solano is a 61 y.o. male with significant medical history of CAD, s/p angioplasty with stents, HFrEF, known to the heart failure clinic, pacer/defibrillator placement, and COPD, who was found to have prostate cancer in August 2022. He was referred to [address] [place] [address] for support with symptomatic management. Subjective/Events/Discussions:  Gibson Hodge presents today accompanied by his significant other. He was seen by oncology today, and had been reporting worsening pain within his mid back. He describes his pain as a tight pulling sensation, as well as crampy, at times sharp and stabbing. He has been utilizing Voltaren, lidocaine patches, as well as Tylenol with no significant proved. He feels he has not started therapy, and we spoke with the physical therapy team today and they will be beginning to have the patient try to schedule physical therapy. Again encouraged him to pursue physical therapy as I believe this is the most helpful for him.   The interim we will provide a trial of tramadol

## 2023-05-15 NOTE — PROGRESS NOTES
Dr. Caitlin House asked staff to call Palliative care due to patient being in pain. Patient had a follow up with palliative care on 6/5/23; Palliative care was called, was able to see the patient today after visit with Oncology.

## 2023-05-16 NOTE — PROGRESS NOTES
Patient provided with discharge instructions, patient has [address] All questions answered. Patient understands follow up plan of care.
Cores Involved by Carcinoma: 3 of 3   Percentage of Tissue Involved by Carcinoma: 90%   Perineural Invasion: Present     E. 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 Andrew Score: 4+4=8; cribriform pattern present   Grade Group:  4   Tissue Cores Involved by Carcinoma: 3 of 3   Percentage of Tissue Involved by Carcinoma: >90%   Perineural Invasion: Not identified     F. Prostate, right apex, biopsy: Prostatic adenocarcinoma (acinar, not otherwise specified) and intraductal carcinoma. Andrew Score: 4+4 = 8; cribriform pattern present   Grade Group:  4   Tissue Cores Involved by Carcinoma: 3 of 3   Percentage of Tissue Involved by Carcinoma: 80%   Perineural Invasion: Present     Comment: In part A, carcinoma is seen involving fibroadipose tissue. Extraprostatic extension cannot be excluded. P63 immunostain is performed on Parts B, D, E, and F and shows the presence of basal cells around the intraductal carcinoma in the absence of basal cells in the   areas of invasive carcinoma. The percentage of tissue involved listed above reflects the total percentage involved by invasive and intraductal carcinoma. Intradepartmental consultation is obtained. NGS testing ([employer]):  MS-Stable: No therapies or clinical trials  TMB 0 Muts/Mb: No therapies or clinical trials  No therapies or clinical trials are associated with the genomic findings for the sample    CT abdomen/pelvis 8/23/2022 enlarged prostate gland with impingement upon the lymph node to bladder base by an irregular portion of the enlarged gland. Enlarged pelvic and inferior retroperitoneal lymph nodes  Diffuse thickening of the walls of the urinary bladder with surrounding  Small right anterior abdominal wall hernia, which contains fat   Small umbilical hernia, which contains fat.   Small bilateral inguinal hernias, left slightly greater than right and both contain

## 2023-06-12 ENCOUNTER — HOSPITAL ENCOUNTER (OUTPATIENT)
Dept: INFUSION THERAPY | Age: 58
Discharge: HOME | End: 2023-06-12
Payer: MEDICARE

## 2023-06-12 DIAGNOSIS — C61 PROSTATE CANCER (HCC): Primary | ICD-10-CM

## 2023-06-12 LAB
ALBUMIN SERPL-MCNC: 4.1 G/DL (ref 3.5–5.2)
ALP SERPL-CCNC: 96 U/L (ref 40–129)
ALT SERPL-CCNC: 9 U/L (ref 0–40)
ANION GAP SERPL CALCULATED.3IONS-SCNC: 12 MMOL/L (ref 7–16)
AST SERPL-CCNC: 15 U/L (ref 0–39)
BASOPHILS # BLD: 0.05 E9/L (ref 0–0.2)
BASOPHILS NFR BLD: 1 % (ref 0–2)
BILIRUB SERPL-MCNC: 0.9 MG/DL (ref 0–1.2)
BUN SERPL-MCNC: 16 MG/DL (ref 6–20)
CALCIUM SERPL-MCNC: 9.6 MG/DL (ref 8.6–10.2)
CHLORIDE SERPL-SCNC: 98 MMOL/L (ref 98–107)
CO2 SERPL-SCNC: 29 MMOL/L (ref 22–29)
CREAT SERPL-MCNC: 0.9 MG/DL (ref 0.7–1.2)
EOSINOPHIL # BLD: 0.19 E9/L (ref 0.05–0.5)
EOSINOPHIL NFR BLD: 3.9 % (ref 0–6)
ERYTHROCYTE [DISTWIDTH] IN BLOOD BY AUTOMATED COUNT: 14.5 FL (ref 11.5–15)
GLUCOSE SERPL-MCNC: 149 MG/DL (ref 74–99)
HCT VFR BLD AUTO: 49.5 % (ref 37–54)
HGB BLD-MCNC: 15.9 G/DL (ref 12.5–16.5)
IMM GRANULOCYTES # BLD: 0.02 E9/L
IMM GRANULOCYTES NFR BLD: 0.4 % (ref 0–5)
LYMPHOCYTES # BLD: 1.17 E9/L (ref 1.5–4)
LYMPHOCYTES NFR BLD: 24.1 % (ref 20–42)
MAGNESIUM SERPL-MCNC: 1.9 MG/DL (ref 1.6–2.6)
MCH RBC QN AUTO: 26.6 PG (ref 26–35)
MCHC RBC AUTO-ENTMCNC: 32.1 % (ref 32–34.5)
MCV RBC AUTO: 82.9 FL (ref 80–99.9)
MONOCYTES # BLD: 0.43 E9/L (ref 0.1–0.95)
MONOCYTES NFR BLD: 8.9 % (ref 2–12)
NEUTROPHILS # BLD: 2.99 E9/L (ref 1.8–7.3)
NEUTS SEG NFR BLD: 61.7 % (ref 43–80)
PHOSPHATE SERPL-MCNC: 3.4 MG/DL (ref 2.5–4.5)
PLATELET # BLD AUTO: 167 E9/L (ref 130–450)
PMV BLD AUTO: 10.5 FL (ref 7–12)
POTASSIUM SERPL-SCNC: 3.3 MMOL/L (ref 3.5–5)
PROT SERPL-MCNC: 7.4 G/DL (ref 6.4–8.3)
PSA SERPL-MCNC: 0.07 NG/ML (ref 0–4)
RBC # BLD AUTO: 5.97 E12/L (ref 3.8–5.8)
SODIUM SERPL-SCNC: 139 MMOL/L (ref 132–146)
WBC # BLD: 4.9 E9/L (ref 4.5–11.5)

## 2023-06-12 PROCEDURE — 80053 COMPREHEN METABOLIC PANEL: CPT

## 2023-06-12 PROCEDURE — 36415 COLL VENOUS BLD VENIPUNCTURE: CPT

## 2023-06-12 PROCEDURE — 85025 COMPLETE CBC W/AUTO DIFF WBC: CPT

## 2023-06-12 PROCEDURE — 84153 ASSAY OF PSA TOTAL: CPT

## 2023-06-12 PROCEDURE — 83735 ASSAY OF MAGNESIUM: CPT

## 2023-06-12 PROCEDURE — 84100 ASSAY OF PHOSPHORUS: CPT

## 2023-06-12 PROCEDURE — 96372 THER/PROPH/DIAG INJ SC/IM: CPT

## 2023-06-12 PROCEDURE — 6360000002 HC RX W HCPCS: Performed by: INTERNAL MEDICINE

## 2023-06-12 RX ORDER — EPINEPHRINE 1 MG/ML
0.3 INJECTION, SOLUTION, CONCENTRATE INTRAVENOUS PRN
OUTPATIENT
Start: 2023-07-03

## 2023-06-12 RX ORDER — ACETAMINOPHEN 325 MG/1
650 TABLET ORAL
OUTPATIENT
Start: 2023-07-03

## 2023-06-12 RX ORDER — DIPHENHYDRAMINE HYDROCHLORIDE 50 MG/ML
50 INJECTION INTRAMUSCULAR; INTRAVENOUS
OUTPATIENT
Start: 2023-07-03

## 2023-06-12 RX ORDER — ALBUTEROL SULFATE 90 UG/1
4 AEROSOL, METERED RESPIRATORY (INHALATION) PRN
OUTPATIENT
Start: 2023-07-03

## 2023-06-12 RX ORDER — ONDANSETRON 2 MG/ML
8 INJECTION INTRAMUSCULAR; INTRAVENOUS
OUTPATIENT
Start: 2023-07-03

## 2023-06-12 RX ORDER — SODIUM CHLORIDE 9 MG/ML
INJECTION, SOLUTION INTRAVENOUS CONTINUOUS
OUTPATIENT
Start: 2023-07-03

## 2023-06-12 RX ADMIN — DENOSUMAB 120 MG: 120 INJECTION SUBCUTANEOUS at 14:49

## 2023-06-21 DIAGNOSIS — C61 PROSTATE CANCER (HCC): ICD-10-CM

## 2023-06-21 DIAGNOSIS — G89.3 PAIN DUE TO NEOPLASM: ICD-10-CM

## 2023-06-21 DIAGNOSIS — Z51.5 PALLIATIVE CARE BY SPECIALIST: ICD-10-CM

## 2023-06-21 RX ORDER — TRAMADOL HYDROCHLORIDE 50 MG/1
50 TABLET ORAL EVERY 6 HOURS PRN
Qty: 45 TABLET | Refills: 0 | Status: SHIPPED | OUTPATIENT
Start: 2023-06-21 | End: 2023-07-06

## 2023-06-21 NOTE — TELEPHONE ENCOUNTER
Message through [facility] from [facility] requesting refill for Tramadol. Pharmacy is [facility] on [address]. Next mickey 8/28/23.

## 2023-06-27 ENCOUNTER — HOSPITAL ENCOUNTER (OUTPATIENT)
Dept: INFUSION THERAPY | Age: 58
Setting detail: INFUSION SERIES
Discharge: HOME | End: 2023-06-27
Payer: MEDICARE

## 2023-06-27 VITALS
TEMPERATURE: 96.9 F | OXYGEN SATURATION: 98 % | DIASTOLIC BLOOD PRESSURE: 64 MMHG | SYSTOLIC BLOOD PRESSURE: 130 MMHG | RESPIRATION RATE: 18 BRPM | HEART RATE: 89 BPM

## 2023-06-27 DIAGNOSIS — C61 PROSTATE CANCER (HCC): Primary | ICD-10-CM

## 2023-06-27 PROCEDURE — 96372 THER/PROPH/DIAG INJ SC/IM: CPT

## 2023-06-27 PROCEDURE — 6360000002 HC RX W HCPCS: Performed by: UROLOGY

## 2023-06-27 PROCEDURE — 96402 CHEMO HORMON ANTINEOPL SQ/IM: CPT

## 2023-06-27 RX ORDER — EPINEPHRINE 1 MG/ML
0.3 INJECTION, SOLUTION, CONCENTRATE INTRAVENOUS PRN
OUTPATIENT
Start: 2023-07-03

## 2023-06-27 RX ORDER — ONDANSETRON 2 MG/ML
8 INJECTION INTRAMUSCULAR; INTRAVENOUS
OUTPATIENT
Start: 2023-07-03

## 2023-06-27 RX ORDER — SODIUM CHLORIDE 9 MG/ML
INJECTION, SOLUTION INTRAVENOUS CONTINUOUS
OUTPATIENT
Start: 2023-07-03

## 2023-06-27 RX ORDER — ACETAMINOPHEN 325 MG/1
650 TABLET ORAL
OUTPATIENT
Start: 2023-07-03

## 2023-06-27 RX ORDER — DIPHENHYDRAMINE HYDROCHLORIDE 50 MG/ML
50 INJECTION INTRAMUSCULAR; INTRAVENOUS
OUTPATIENT
Start: 2023-07-03

## 2023-06-27 RX ORDER — ALBUTEROL SULFATE 90 UG/1
4 AEROSOL, METERED RESPIRATORY (INHALATION) PRN
OUTPATIENT
Start: 2023-07-03

## 2023-06-27 RX ADMIN — LEUPROLIDE ACETATE 22.5 MG: KIT SUBCUTANEOUS at 11:18

## 2023-06-27 ASSESSMENT — PAIN DESCRIPTION - DESCRIPTORS: DESCRIPTORS: ACHING;DISCOMFORT;DULL

## 2023-06-27 ASSESSMENT — PAIN DESCRIPTION - ORIENTATION: ORIENTATION: LOWER

## 2023-06-27 ASSESSMENT — PAIN DESCRIPTION - FREQUENCY: FREQUENCY: INTERMITTENT

## 2023-06-27 ASSESSMENT — PAIN DESCRIPTION - PAIN TYPE: TYPE: CHRONIC PAIN

## 2023-06-27 ASSESSMENT — PAIN - FUNCTIONAL ASSESSMENT: PAIN_FUNCTIONAL_ASSESSMENT: ACTIVITIES ARE NOT PREVENTED

## 2023-06-27 ASSESSMENT — PAIN DESCRIPTION - ONSET: ONSET: GRADUAL

## 2023-06-27 ASSESSMENT — PAIN DESCRIPTION - LOCATION: LOCATION: BACK

## 2023-06-27 ASSESSMENT — PAIN SCALES - GENERAL: PAINLEVEL_OUTOF10: 5

## 2023-07-03 ENCOUNTER — TELEPHONE (OUTPATIENT)
Dept: PHARMACY | Facility: CLINIC | Age: 58
End: 2023-07-03

## 2023-07-03 NOTE — TELEPHONE ENCOUNTER
[facility] CLINICAL PHARMACY: ADHERENCE REVIEW  Identified care gap per [place]: fills at [facility]: Diabetes adherence    Patient also appears to be prescribed: Statin (atorvastatin 80mg LF 2022)    [facility] Records claims through 23 (Prior Year  [address] = not reported;  [address] = FIRST FILL; Potential Fail Date: 23): Metformin 500mg last filled on 23 for 90 day supply. Next refill due: 23    Prescribed si tablet/capsule twice daily    Per Insurer Portal: same    First fill-pharmacy not contacted at this time. Lab Results   Component Value Date    LABA1C 7.4 2023    LABA1C 7.5 2022    LABA1C 6.6 2022     NOTE: A1c <9%      The following are interventions that have been identified:   Patient overdue refilling metformin & statin and active on home medication list.     Attempting to reach patient to review. Left message asking for return call. [employer] message sent to patient. Last Visit: 23  Next Visit: 23      Margaret Lopez CP.    [facility] free: [phone]     For Pharmacy Admin Tracking Only    Program: [place] in place:  No  Gap Closed?: No   Time Spent (min): 15

## 2023-07-10 ENCOUNTER — HOSPITAL ENCOUNTER (OUTPATIENT)
Dept: INFUSION THERAPY | Age: 58
Discharge: HOME | End: 2023-07-10
Payer: MEDICARE

## 2023-07-10 ENCOUNTER — OFFICE VISIT (OUTPATIENT)
Dept: ONCOLOGY | Age: 58
End: 2023-07-10
Payer: MEDICARE

## 2023-07-10 VITALS
OXYGEN SATURATION: 97 % | TEMPERATURE: 97 F | WEIGHT: 227 LBS | HEIGHT: 69 IN | BODY MASS INDEX: 33.62 KG/M2 | SYSTOLIC BLOOD PRESSURE: 136 MMHG | DIASTOLIC BLOOD PRESSURE: 67 MMHG | HEART RATE: 88 BPM

## 2023-07-10 DIAGNOSIS — C61 PROSTATE CANCER METASTATIC TO BONE (HCC): Primary | ICD-10-CM

## 2023-07-10 DIAGNOSIS — C61 PROSTATE CANCER (HCC): Primary | ICD-10-CM

## 2023-07-10 DIAGNOSIS — C79.51 PROSTATE CANCER METASTATIC TO BONE (HCC): Primary | ICD-10-CM

## 2023-07-10 LAB
ALBUMIN SERPL-MCNC: 3.7 G/DL (ref 3.5–5.2)
ALP SERPL-CCNC: 107 U/L (ref 40–129)
ALT SERPL-CCNC: 8 U/L (ref 0–40)
ANION GAP SERPL CALCULATED.3IONS-SCNC: 12 MMOL/L (ref 7–16)
AST SERPL-CCNC: 15 U/L (ref 0–39)
BASOPHILS # BLD: 0.08 E9/L (ref 0–0.2)
BASOPHILS NFR BLD: 1.2 % (ref 0–2)
BILIRUB SERPL-MCNC: 1 MG/DL (ref 0–1.2)
BUN SERPL-MCNC: 19 MG/DL (ref 6–20)
CALCIUM SERPL-MCNC: 9 MG/DL (ref 8.6–10.2)
CHLORIDE SERPL-SCNC: 98 MMOL/L (ref 98–107)
CO2 SERPL-SCNC: 26 MMOL/L (ref 22–29)
CREAT SERPL-MCNC: 1.1 MG/DL (ref 0.7–1.2)
EOSINOPHIL # BLD: 0.63 E9/L (ref 0.05–0.5)
EOSINOPHIL NFR BLD: 9.8 % (ref 0–6)
ERYTHROCYTE [DISTWIDTH] IN BLOOD BY AUTOMATED COUNT: 15.7 FL (ref 11.5–15)
GLUCOSE SERPL-MCNC: 137 MG/DL (ref 74–99)
HCT VFR BLD AUTO: 45.3 % (ref 37–54)
HGB BLD-MCNC: 14.5 G/DL (ref 12.5–16.5)
IMM GRANULOCYTES # BLD: 0.04 E9/L
IMM GRANULOCYTES NFR BLD: 0.6 % (ref 0–5)
LYMPHOCYTES # BLD: 1.1 E9/L (ref 1.5–4)
LYMPHOCYTES NFR BLD: 17.1 % (ref 20–42)
MAGNESIUM SERPL-MCNC: 2.1 MG/DL (ref 1.6–2.6)
MCH RBC QN AUTO: 26.9 PG (ref 26–35)
MCHC RBC AUTO-ENTMCNC: 32 % (ref 32–34.5)
MCV RBC AUTO: 83.9 FL (ref 80–99.9)
MONOCYTES # BLD: 0.41 E9/L (ref 0.1–0.95)
MONOCYTES NFR BLD: 6.4 % (ref 2–12)
NEUTROPHILS # BLD: 4.19 E9/L (ref 1.8–7.3)
NEUTS SEG NFR BLD: 64.9 % (ref 43–80)
PHOSPHATE SERPL-MCNC: 2.9 MG/DL (ref 2.5–4.5)
PLATELET # BLD AUTO: 307 E9/L (ref 130–450)
PMV BLD AUTO: 10.2 FL (ref 7–12)
POTASSIUM SERPL-SCNC: 3.5 MMOL/L (ref 3.5–5)
PROT SERPL-MCNC: 7.5 G/DL (ref 6.4–8.3)
PSA SERPL-MCNC: 0.07 NG/ML (ref 0–4)
RBC # BLD AUTO: 5.4 E12/L (ref 3.8–5.8)
SODIUM SERPL-SCNC: 136 MMOL/L (ref 132–146)
WBC # BLD: 6.5 E9/L (ref 4.5–11.5)

## 2023-07-10 PROCEDURE — 99214 OFFICE O/P EST MOD 30 MIN: CPT | Performed by: INTERNAL MEDICINE

## 2023-07-10 PROCEDURE — 83735 ASSAY OF MAGNESIUM: CPT

## 2023-07-10 PROCEDURE — 3075F SYST BP GE 130 - 139MM HG: CPT | Performed by: INTERNAL MEDICINE

## 2023-07-10 PROCEDURE — 84100 ASSAY OF PHOSPHORUS: CPT

## 2023-07-10 PROCEDURE — 3078F DIAST BP <80 MM HG: CPT | Performed by: INTERNAL MEDICINE

## 2023-07-10 PROCEDURE — 84153 ASSAY OF PSA TOTAL: CPT

## 2023-07-10 PROCEDURE — 96372 THER/PROPH/DIAG INJ SC/IM: CPT

## 2023-07-10 PROCEDURE — 85025 COMPLETE CBC W/AUTO DIFF WBC: CPT

## 2023-07-10 PROCEDURE — 6360000002 HC RX W HCPCS: Performed by: INTERNAL MEDICINE

## 2023-07-10 PROCEDURE — 80053 COMPREHEN METABOLIC PANEL: CPT

## 2023-07-10 PROCEDURE — 36415 COLL VENOUS BLD VENIPUNCTURE: CPT

## 2023-07-10 PROCEDURE — 99212 OFFICE O/P EST SF 10 MIN: CPT

## 2023-07-10 RX ORDER — ONDANSETRON 4 MG/1
4 TABLET, FILM COATED ORAL EVERY 8 HOURS PRN
Qty: 30 TABLET | Refills: 0 | Status: SHIPPED | OUTPATIENT
Start: 2023-07-10

## 2023-07-10 RX ORDER — EPINEPHRINE 1 MG/ML
0.3 INJECTION, SOLUTION, CONCENTRATE INTRAVENOUS PRN
OUTPATIENT
Start: 2023-07-31

## 2023-07-10 RX ORDER — DIPHENHYDRAMINE HYDROCHLORIDE 50 MG/ML
50 INJECTION INTRAMUSCULAR; INTRAVENOUS
OUTPATIENT
Start: 2023-07-31

## 2023-07-10 RX ORDER — ACETAMINOPHEN 325 MG/1
650 TABLET ORAL
OUTPATIENT
Start: 2023-07-31

## 2023-07-10 RX ORDER — ONDANSETRON 2 MG/ML
8 INJECTION INTRAMUSCULAR; INTRAVENOUS
OUTPATIENT
Start: 2023-07-31

## 2023-07-10 RX ORDER — SODIUM CHLORIDE 9 MG/ML
INJECTION, SOLUTION INTRAVENOUS CONTINUOUS
OUTPATIENT
Start: 2023-07-31

## 2023-07-10 RX ORDER — ALBUTEROL SULFATE 90 UG/1
4 AEROSOL, METERED RESPIRATORY (INHALATION) PRN
OUTPATIENT
Start: 2023-07-31

## 2023-07-10 RX ADMIN — DENOSUMAB 120 MG: 120 INJECTION SUBCUTANEOUS at 15:09

## 2023-07-10 NOTE — PROGRESS NOTES
Department of [facility] Oncology   Attending Clinic Note    Reason for Visit: Follow-up on a patient with Prostate Cancer    PCP:  Bruno Samayoa DO    History of Present Illness:  65-year-old male who was noted to have elevated PSA   Evaluated by Urology team (Dr. Lizbet Bella)    PSA 9.20 on 03/31/2021  PSA 26.39 on 05/31/2022  PSA 31.83 on 06/20/2022    On 08/04/2022:  A. Prostate, left base, biopsy: Prostatic adenocarcinoma (acinar, not otherwise specified). Ripley Score: 4+3=7; cribriform pattern absent; percentage of pattern 4 is approximately 60%   Grade Group:  3   Tissue Cores Involved by Carcinoma: 2 of 2   Percentage of Tissue Involved by Carcinoma: 10%   Perineural Invasion: Present     B. Prostate, left mid, biopsy: Prostatic adenocarcinoma (acinar, not otherwise specified) and intraductal carcinoma. Andrew Score: 4+5=9; cribriform pattern present   Grade Group:  5   Tissue Cores Involved by Carcinoma: 2 of 2   Percentage of Tissue Involved by Carcinoma: 80%   Perineural Invasion: Present     C. Prostate, left apex, biopsy: Prostatic adenocarcinoma (acinar, not otherwise specified). Ripley Score: 4+4 = 8; cribriform pattern present   Grade Group:  4   Tissue Cores Involved by Carcinoma: 2 of 2   Percentage of Tissue Involved by Carcinoma: 50%   Perineural Invasion: Present     D. Prostate, right base, biopsy: Prostatic adenocarcinoma (acinar, not otherwise specified) and intraductal carcinoma. Andrew Score: 4+3 = 7; cribriform pattern present; percentage of pattern 4 is approximately 80%   Grade Group:  3   Tissue Cores Involved by Carcinoma: 3 of 3   Percentage of Tissue Involved by Carcinoma: 90%   Perineural Invasion: Present     E. 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    Andrew Score: 4+4=8; cribriform pattern present   Grade Group:  4   Tissue Cores Involved by Carcinoma: 3 of 3   Percentage of Tissue

## 2023-07-12 ENCOUNTER — HOSPITAL ENCOUNTER (OUTPATIENT)
Dept: OTHER | Age: 58
Setting detail: THERAPIES SERIES
Discharge: HOME | End: 2023-07-12
Payer: MEDICARE

## 2023-07-12 VITALS
OXYGEN SATURATION: 95 % | BODY MASS INDEX: 33.97 KG/M2 | WEIGHT: 230 LBS | HEART RATE: 78 BPM | SYSTOLIC BLOOD PRESSURE: 131 MMHG | DIASTOLIC BLOOD PRESSURE: 75 MMHG | RESPIRATION RATE: 16 BRPM

## 2023-07-12 LAB
ANION GAP SERPL CALCULATED.3IONS-SCNC: 13 MMOL/L (ref 7–16)
BNP BLD-MCNC: 5164 PG/ML (ref 0–125)
BUN SERPL-MCNC: 18 MG/DL (ref 6–20)
CALCIUM SERPL-MCNC: 8.6 MG/DL (ref 8.6–10.2)
CHLORIDE SERPL-SCNC: 101 MMOL/L (ref 98–107)
CO2 SERPL-SCNC: 24 MMOL/L (ref 22–29)
CREAT SERPL-MCNC: 1 MG/DL (ref 0.7–1.2)
GLUCOSE SERPL-MCNC: 142 MG/DL (ref 74–99)
POTASSIUM SERPL-SCNC: 3.7 MMOL/L (ref 3.5–5)
SODIUM SERPL-SCNC: 138 MMOL/L (ref 132–146)

## 2023-07-12 PROCEDURE — 99214 OFFICE O/P EST MOD 30 MIN: CPT

## 2023-07-12 PROCEDURE — 80048 BASIC METABOLIC PNL TOTAL CA: CPT

## 2023-07-12 PROCEDURE — 83880 ASSAY OF NATRIURETIC PEPTIDE: CPT

## 2023-07-12 PROCEDURE — 36415 COLL VENOUS BLD VENIPUNCTURE: CPT

## 2023-07-12 ASSESSMENT — PATIENT HEALTH QUESTIONNAIRE - PHQ9: 2. FEELING DOWN, DEPRESSED OR HOPELESS: SEVERAL DAYS

## 2023-07-19 DIAGNOSIS — Z51.5 PALLIATIVE CARE BY SPECIALIST: Primary | ICD-10-CM

## 2023-07-19 DIAGNOSIS — G89.3 PAIN DUE TO NEOPLASM: ICD-10-CM

## 2023-07-19 DIAGNOSIS — C61 PROSTATE CANCER (HCC): ICD-10-CM

## 2023-07-19 RX ORDER — HYDROCODONE BITARTRATE AND ACETAMINOPHEN 5; 325 MG/1; MG/1
1 TABLET ORAL EVERY 6 HOURS PRN
Qty: 60 TABLET | Refills: 0 | Status: SHIPPED | OUTPATIENT
Start: 2023-07-19 | End: 2023-08-03

## 2023-07-19 NOTE — PROGRESS NOTES
Palliative Medicine  Progress Note    Gene Sanchez called stating tramadol is causing him to be sick. Will send trial of norco.    Elbert MILLER-CNP  Palliative Medicine    Note: This report was completed using computerRoyal Treatment Fly [employer]ishing voiced recognition software. Every effort has been made to ensure accuracy; however, inadvertent computerized transcription errors may be present.

## 2023-07-31 ENCOUNTER — OFFICE VISIT (OUTPATIENT)
Dept: FAMILY MEDICINE CLINIC | Age: 58
End: 2023-07-31
Payer: MEDICARE

## 2023-07-31 VITALS
OXYGEN SATURATION: 96 % | HEART RATE: 86 BPM | WEIGHT: 231 LBS | SYSTOLIC BLOOD PRESSURE: 148 MMHG | HEIGHT: 69 IN | BODY MASS INDEX: 34.21 KG/M2 | TEMPERATURE: 97 F | DIASTOLIC BLOOD PRESSURE: 93 MMHG | RESPIRATION RATE: 17 BRPM

## 2023-07-31 DIAGNOSIS — R10.31 RLQ ABDOMINAL PAIN: Primary | ICD-10-CM

## 2023-07-31 PROCEDURE — 99213 OFFICE O/P EST LOW 20 MIN: CPT | Performed by: FAMILY MEDICINE

## 2023-07-31 PROCEDURE — 3074F SYST BP LT 130 MM HG: CPT | Performed by: FAMILY MEDICINE

## 2023-07-31 PROCEDURE — 3078F DIAST BP <80 MM HG: CPT | Performed by: FAMILY MEDICINE

## 2023-08-01 ENCOUNTER — HOSPITAL ENCOUNTER (OUTPATIENT)
Dept: CT IMAGING | Age: 58
Discharge: HOME | DRG: 417 | End: 2023-08-03
Attending: FAMILY MEDICINE
Payer: MEDICARE

## 2023-08-01 ENCOUNTER — APPOINTMENT (OUTPATIENT)
Dept: ULTRASOUND IMAGING | Age: 58
DRG: 417 | End: 2023-08-01
Payer: MEDICARE

## 2023-08-01 ENCOUNTER — HOSPITAL ENCOUNTER (INPATIENT)
Age: 58
LOS: 2 days | Discharge: HOME | DRG: 417 | End: 2023-08-03
Attending: EMERGENCY MEDICINE | Admitting: FAMILY MEDICINE
Payer: MEDICARE

## 2023-08-01 ENCOUNTER — TELEPHONE (OUTPATIENT)
Dept: CARDIOLOGY CLINIC | Age: 58
End: 2023-08-01

## 2023-08-01 DIAGNOSIS — K81.0 ACUTE CHOLECYSTITIS: Primary | ICD-10-CM

## 2023-08-01 DIAGNOSIS — R10.31 RLQ ABDOMINAL PAIN: ICD-10-CM

## 2023-08-01 PROBLEM — Z01.810 PREOPERATIVE CARDIOVASCULAR EXAMINATION: Status: ACTIVE | Noted: 2023-08-01

## 2023-08-01 LAB
ALBUMIN SERPL-MCNC: 3.8 G/DL (ref 3.5–5.2)
ALBUMIN SERPL-MCNC: 4 G/DL (ref 3.5–5.2)
ALP SERPL-CCNC: 110 U/L (ref 40–129)
ALP SERPL-CCNC: 114 U/L (ref 40–129)
ALT SERPL-CCNC: 15 U/L (ref 0–40)
ALT SERPL-CCNC: 16 U/L (ref 0–40)
ANION GAP SERPL CALCULATED.3IONS-SCNC: 13 MMOL/L (ref 7–16)
AST SERPL-CCNC: 20 U/L (ref 0–39)
AST SERPL-CCNC: 21 U/L (ref 0–39)
BACTERIA URNS QL MICRO: ABNORMAL
BASOPHILS # BLD: 0.06 K/UL (ref 0–0.2)
BASOPHILS NFR BLD: 1 % (ref 0–2)
BILIRUB DIRECT SERPL-MCNC: 0.5 MG/DL (ref 0–0.3)
BILIRUB INDIRECT SERPL-MCNC: 0.8 MG/DL (ref 0–1)
BILIRUB SERPL-MCNC: 1.3 MG/DL (ref 0–1.2)
BILIRUB SERPL-MCNC: 1.3 MG/DL (ref 0–1.2)
BILIRUB UR QL STRIP: NEGATIVE
BNP SERPL-MCNC: 6496 PG/ML (ref 0–125)
BUN SERPL-MCNC: 21 MG/DL (ref 6–20)
CALCIUM SERPL-MCNC: 8.4 MG/DL (ref 8.6–10.2)
CHLORIDE SERPL-SCNC: 99 MMOL/L (ref 98–107)
CHOLEST SERPL-MCNC: 166 MG/DL
CLARITY UR: CLEAR
CO2 SERPL-SCNC: 26 MMOL/L (ref 22–29)
COLOR UR: ABNORMAL
CREAT SERPL-MCNC: 1 MG/DL (ref 0.7–1.2)
EOSINOPHIL # BLD: 0.34 K/UL (ref 0.05–0.5)
EOSINOPHILS RELATIVE PERCENT: 5 % (ref 0–6)
ERYTHROCYTE [DISTWIDTH] IN BLOOD BY AUTOMATED COUNT: 17.2 % (ref 11.5–15)
GFR SERPL CREATININE-BSD FRML MDRD: >60 ML/MIN/1.73M2
GLUCOSE SERPL-MCNC: 187 MG/DL (ref 74–99)
GLUCOSE UR STRIP-MCNC: NEGATIVE MG/DL
HBA1C MFR BLD: 6.9 % (ref 4–5.6)
HCT VFR BLD AUTO: 48 % (ref 37–54)
HDLC SERPL-MCNC: 29 MG/DL
HGB BLD-MCNC: 14.7 G/DL (ref 12.5–16.5)
HGB UR QL STRIP.AUTO: NEGATIVE
IMM GRANULOCYTES # BLD AUTO: <0.03 K/UL (ref 0–0.58)
IMM GRANULOCYTES NFR BLD: 0 % (ref 0–5)
KETONES UR STRIP-MCNC: NEGATIVE MG/DL
LACTATE BLDV-SCNC: 1.6 MMOL/L (ref 0.5–1.9)
LDLC SERPL CALC-MCNC: 109 MG/DL
LEUKOCYTE ESTERASE UR QL STRIP: NEGATIVE
LIPASE SERPL-CCNC: 25 U/L (ref 13–60)
LYMPHOCYTES NFR BLD: 0.84 K/UL (ref 1.5–4)
LYMPHOCYTES RELATIVE PERCENT: 13 % (ref 20–42)
MAGNESIUM SERPL-MCNC: 1.7 MG/DL (ref 1.6–2.6)
MCH RBC QN AUTO: 26.4 PG (ref 26–35)
MCHC RBC AUTO-ENTMCNC: 30.6 G/DL (ref 32–34.5)
MCV RBC AUTO: 86.2 FL (ref 80–99.9)
METER GLUCOSE: 157 MG/DL (ref 74–99)
METER GLUCOSE: 174 MG/DL (ref 74–99)
MONOCYTES NFR BLD: 0.34 K/UL (ref 0.1–0.95)
MONOCYTES NFR BLD: 5 % (ref 2–12)
NEUTROPHILS NFR BLD: 75 % (ref 43–80)
NEUTS SEG NFR BLD: 4.75 K/UL (ref 1.8–7.3)
NITRITE UR QL STRIP: NEGATIVE
PH UR STRIP: 5.5 [PH] (ref 5–9)
PLATELET # BLD AUTO: 265 K/UL (ref 130–450)
PMV BLD AUTO: 11 FL (ref 7–12)
POTASSIUM SERPL-SCNC: 3.3 MMOL/L (ref 3.5–5)
PROT SERPL-MCNC: 7.2 G/DL (ref 6.4–8.3)
PROT SERPL-MCNC: 7.3 G/DL (ref 6.4–8.3)
PROT UR STRIP-MCNC: 100 MG/DL
RBC # BLD AUTO: 5.57 M/UL (ref 3.8–5.8)
RBC #/AREA URNS HPF: ABNORMAL /HPF
SODIUM SERPL-SCNC: 138 MMOL/L (ref 132–146)
SP GR UR STRIP: 1.02 (ref 1–1.03)
TRIGL SERPL-MCNC: 142 MG/DL
UROBILINOGEN UR STRIP-ACNC: >8 EU/DL (ref 0–1)
VLDLC SERPL CALC-MCNC: 28 MG/DL
WBC #/AREA URNS HPF: ABNORMAL /HPF
WBC OTHER # BLD: 6.4 K/UL (ref 4.5–11.5)

## 2023-08-01 PROCEDURE — 80053 COMPREHEN METABOLIC PANEL: CPT

## 2023-08-01 PROCEDURE — 6360000004 HC RX CONTRAST MEDICATION: Performed by: RADIOLOGY

## 2023-08-01 PROCEDURE — 6360000002 HC RX W HCPCS

## 2023-08-01 PROCEDURE — APPSS60 APP SPLIT SHARED TIME 46-60 MINUTES: Performed by: CLINICAL NURSE SPECIALIST

## 2023-08-01 PROCEDURE — 83605 ASSAY OF LACTIC ACID: CPT

## 2023-08-01 PROCEDURE — 76705 ECHO EXAM OF ABDOMEN: CPT

## 2023-08-01 PROCEDURE — 96365 THER/PROPH/DIAG IV INF INIT: CPT

## 2023-08-01 PROCEDURE — 81001 URINALYSIS AUTO W/SCOPE: CPT

## 2023-08-01 PROCEDURE — 2580000003 HC RX 258

## 2023-08-01 PROCEDURE — 99285 EMERGENCY DEPT VISIT HI MDM: CPT

## 2023-08-01 PROCEDURE — 80076 HEPATIC FUNCTION PANEL: CPT

## 2023-08-01 PROCEDURE — 96375 TX/PRO/DX INJ NEW DRUG ADDON: CPT

## 2023-08-01 PROCEDURE — 82947 ASSAY GLUCOSE BLOOD QUANT: CPT

## 2023-08-01 PROCEDURE — G0378 HOSPITAL OBSERVATION PER HR: HCPCS

## 2023-08-01 PROCEDURE — 99223 1ST HOSP IP/OBS HIGH 75: CPT | Performed by: INTERNAL MEDICINE

## 2023-08-01 PROCEDURE — 36415 COLL VENOUS BLD VENIPUNCTURE: CPT

## 2023-08-01 PROCEDURE — 80061 LIPID PANEL: CPT

## 2023-08-01 PROCEDURE — 96367 TX/PROPH/DG ADDL SEQ IV INF: CPT

## 2023-08-01 PROCEDURE — 83690 ASSAY OF LIPASE: CPT

## 2023-08-01 PROCEDURE — 6360000002 HC RX W HCPCS: Performed by: EMERGENCY MEDICINE

## 2023-08-01 PROCEDURE — 74177 CT ABD & PELVIS W/CONTRAST: CPT

## 2023-08-01 PROCEDURE — 93005 ELECTROCARDIOGRAM TRACING: CPT

## 2023-08-01 PROCEDURE — 6370000000 HC RX 637 (ALT 250 FOR IP)

## 2023-08-01 PROCEDURE — 99223 1ST HOSP IP/OBS HIGH 75: CPT | Performed by: SURGERY

## 2023-08-01 PROCEDURE — 83880 ASSAY OF NATRIURETIC PEPTIDE: CPT

## 2023-08-01 PROCEDURE — 2580000003 HC RX 258: Performed by: EMERGENCY MEDICINE

## 2023-08-01 PROCEDURE — 1200000000 HC SEMI PRIVATE

## 2023-08-01 PROCEDURE — 96366 THER/PROPH/DIAG IV INF ADDON: CPT

## 2023-08-01 PROCEDURE — 83735 ASSAY OF MAGNESIUM: CPT

## 2023-08-01 PROCEDURE — 83036 HEMOGLOBIN GLYCOSYLATED A1C: CPT

## 2023-08-01 PROCEDURE — 85025 COMPLETE CBC W/AUTO DIFF WBC: CPT

## 2023-08-01 RX ORDER — TAMSULOSIN HYDROCHLORIDE 0.4 MG/1
0.4 CAPSULE ORAL DAILY
Status: DISCONTINUED | OUTPATIENT
Start: 2023-08-01 | End: 2023-08-03 | Stop reason: HOSPADM

## 2023-08-01 RX ORDER — ONDANSETRON 2 MG/ML
4 INJECTION INTRAMUSCULAR; INTRAVENOUS EVERY 6 HOURS PRN
Status: DISCONTINUED | OUTPATIENT
Start: 2023-08-01 | End: 2023-08-03 | Stop reason: HOSPADM

## 2023-08-01 RX ORDER — LIDOCAINE 4 G/G
1 PATCH TOPICAL DAILY
Status: DISCONTINUED | OUTPATIENT
Start: 2023-08-02 | End: 2023-08-03 | Stop reason: HOSPADM

## 2023-08-01 RX ORDER — POTASSIUM CHLORIDE 20 MEQ/1
40 TABLET, EXTENDED RELEASE ORAL ONCE
Status: COMPLETED | OUTPATIENT
Start: 2023-08-01 | End: 2023-08-01

## 2023-08-01 RX ORDER — SODIUM CHLORIDE 0.9 % (FLUSH) 0.9 %
5-40 SYRINGE (ML) INJECTION EVERY 12 HOURS SCHEDULED
Status: DISCONTINUED | OUTPATIENT
Start: 2023-08-01 | End: 2023-08-03 | Stop reason: HOSPADM

## 2023-08-01 RX ORDER — CLOPIDOGREL BISULFATE 75 MG/1
75 TABLET ORAL DAILY
Status: DISCONTINUED | OUTPATIENT
Start: 2023-08-01 | End: 2023-08-03 | Stop reason: HOSPADM

## 2023-08-01 RX ORDER — FENTANYL CITRATE 50 UG/ML
25 INJECTION, SOLUTION INTRAMUSCULAR; INTRAVENOUS ONCE
Status: COMPLETED | OUTPATIENT
Start: 2023-08-01 | End: 2023-08-01

## 2023-08-01 RX ORDER — DEXTROSE MONOHYDRATE 100 MG/ML
INJECTION, SOLUTION INTRAVENOUS CONTINUOUS PRN
Status: DISCONTINUED | OUTPATIENT
Start: 2023-08-01 | End: 2023-08-03 | Stop reason: HOSPADM

## 2023-08-01 RX ORDER — METRONIDAZOLE 500 MG/100ML
500 INJECTION, SOLUTION INTRAVENOUS EVERY 8 HOURS
Status: DISCONTINUED | OUTPATIENT
Start: 2023-08-01 | End: 2023-08-03 | Stop reason: HOSPADM

## 2023-08-01 RX ORDER — ACETAMINOPHEN 650 MG/1
650 SUPPOSITORY RECTAL EVERY 6 HOURS PRN
Status: DISCONTINUED | OUTPATIENT
Start: 2023-08-01 | End: 2023-08-03 | Stop reason: HOSPADM

## 2023-08-01 RX ORDER — SODIUM CHLORIDE 0.9 % (FLUSH) 0.9 %
10 SYRINGE (ML) INJECTION
Status: DISPENSED | OUTPATIENT
Start: 2023-08-01 | End: 2023-08-02

## 2023-08-01 RX ORDER — BUMETANIDE 1 MG/1
1 TABLET ORAL DAILY
Status: DISCONTINUED | OUTPATIENT
Start: 2023-08-01 | End: 2023-08-03 | Stop reason: HOSPADM

## 2023-08-01 RX ORDER — METRONIDAZOLE 500 MG/100ML
500 INJECTION, SOLUTION INTRAVENOUS ONCE
Status: COMPLETED | OUTPATIENT
Start: 2023-08-01 | End: 2023-08-01

## 2023-08-01 RX ORDER — ISOSORBIDE MONONITRATE 30 MG/1
90 TABLET, EXTENDED RELEASE ORAL DAILY
Status: DISCONTINUED | OUTPATIENT
Start: 2023-08-01 | End: 2023-08-03 | Stop reason: HOSPADM

## 2023-08-01 RX ORDER — MAGNESIUM SULFATE IN WATER 40 MG/ML
2000 INJECTION, SOLUTION INTRAVENOUS ONCE
Status: COMPLETED | OUTPATIENT
Start: 2023-08-01 | End: 2023-08-01

## 2023-08-01 RX ORDER — ONDANSETRON 2 MG/ML
4 INJECTION INTRAMUSCULAR; INTRAVENOUS EVERY 6 HOURS PRN
Status: DISCONTINUED | OUTPATIENT
Start: 2023-08-01 | End: 2023-08-01 | Stop reason: HOSPADM

## 2023-08-01 RX ORDER — INSULIN LISPRO 100 [IU]/ML
0-4 INJECTION, SOLUTION INTRAVENOUS; SUBCUTANEOUS NIGHTLY
Status: DISCONTINUED | OUTPATIENT
Start: 2023-08-01 | End: 2023-08-03 | Stop reason: HOSPADM

## 2023-08-01 RX ORDER — METOPROLOL SUCCINATE 100 MG/1
100 TABLET, EXTENDED RELEASE ORAL DAILY
Status: DISCONTINUED | OUTPATIENT
Start: 2023-08-02 | End: 2023-08-03 | Stop reason: HOSPADM

## 2023-08-01 RX ORDER — ONDANSETRON 4 MG/1
4 TABLET, ORALLY DISINTEGRATING ORAL EVERY 8 HOURS PRN
Status: DISCONTINUED | OUTPATIENT
Start: 2023-08-01 | End: 2023-08-03 | Stop reason: HOSPADM

## 2023-08-01 RX ORDER — INDOCYANINE GREEN AND WATER 25 MG
5 KIT INJECTION ONCE
Status: DISCONTINUED | OUTPATIENT
Start: 2023-08-02 | End: 2023-08-03 | Stop reason: HOSPADM

## 2023-08-01 RX ORDER — SODIUM CHLORIDE 9 MG/ML
INJECTION, SOLUTION INTRAVENOUS PRN
Status: DISCONTINUED | OUTPATIENT
Start: 2023-08-01 | End: 2023-08-03 | Stop reason: HOSPADM

## 2023-08-01 RX ORDER — POLYETHYLENE GLYCOL 3350 17 G/17G
17 POWDER, FOR SOLUTION ORAL DAILY PRN
Status: DISCONTINUED | OUTPATIENT
Start: 2023-08-01 | End: 2023-08-03 | Stop reason: HOSPADM

## 2023-08-01 RX ORDER — ASPIRIN 81 MG/1
81 TABLET ORAL DAILY
Status: DISCONTINUED | OUTPATIENT
Start: 2023-08-01 | End: 2023-08-03 | Stop reason: HOSPADM

## 2023-08-01 RX ORDER — SPIRONOLACTONE 25 MG/1
25 TABLET ORAL DAILY
Status: DISCONTINUED | OUTPATIENT
Start: 2023-08-02 | End: 2023-08-03 | Stop reason: HOSPADM

## 2023-08-01 RX ORDER — ATORVASTATIN CALCIUM 40 MG/1
80 TABLET, FILM COATED ORAL NIGHTLY
Status: DISCONTINUED | OUTPATIENT
Start: 2023-08-01 | End: 2023-08-03 | Stop reason: HOSPADM

## 2023-08-01 RX ORDER — ACETAMINOPHEN 325 MG/1
650 TABLET ORAL EVERY 6 HOURS PRN
Status: DISCONTINUED | OUTPATIENT
Start: 2023-08-01 | End: 2023-08-03 | Stop reason: HOSPADM

## 2023-08-01 RX ORDER — SODIUM CHLORIDE 0.9 % (FLUSH) 0.9 %
5-40 SYRINGE (ML) INJECTION PRN
Status: DISCONTINUED | OUTPATIENT
Start: 2023-08-01 | End: 2023-08-03 | Stop reason: HOSPADM

## 2023-08-01 RX ORDER — PANTOPRAZOLE SODIUM 40 MG/1
40 TABLET, DELAYED RELEASE ORAL DAILY
Status: DISCONTINUED | OUTPATIENT
Start: 2023-08-01 | End: 2023-08-03 | Stop reason: HOSPADM

## 2023-08-01 RX ORDER — HYDROCODONE BITARTRATE AND ACETAMINOPHEN 5; 325 MG/1; MG/1
1 TABLET ORAL EVERY 6 HOURS PRN
Status: DISCONTINUED | OUTPATIENT
Start: 2023-08-01 | End: 2023-08-03 | Stop reason: HOSPADM

## 2023-08-01 RX ORDER — INSULIN LISPRO 100 [IU]/ML
0-4 INJECTION, SOLUTION INTRAVENOUS; SUBCUTANEOUS
Status: DISCONTINUED | OUTPATIENT
Start: 2023-08-01 | End: 2023-08-03 | Stop reason: HOSPADM

## 2023-08-01 RX ORDER — ONDANSETRON 2 MG/ML
INJECTION INTRAMUSCULAR; INTRAVENOUS
Status: COMPLETED
Start: 2023-08-01 | End: 2023-08-01

## 2023-08-01 RX ADMIN — METRONIDAZOLE 500 MG: 500 INJECTION, SOLUTION INTRAVENOUS at 11:27

## 2023-08-01 RX ADMIN — ATORVASTATIN CALCIUM 80 MG: 40 TABLET, FILM COATED ORAL at 21:17

## 2023-08-01 RX ADMIN — SACUBITRIL AND VALSARTAN 1 TABLET: 97; 103 TABLET, FILM COATED ORAL at 21:17

## 2023-08-01 RX ADMIN — CEFTRIAXONE SODIUM 2000 MG: 2 INJECTION, POWDER, FOR SOLUTION INTRAMUSCULAR; INTRAVENOUS at 11:23

## 2023-08-01 RX ADMIN — PANTOPRAZOLE SODIUM 40 MG: 40 TABLET, DELAYED RELEASE ORAL at 16:52

## 2023-08-01 RX ADMIN — SODIUM CHLORIDE, PRESERVATIVE FREE 10 ML: 5 INJECTION INTRAVENOUS at 21:10

## 2023-08-01 RX ADMIN — MAGNESIUM SULFATE HEPTAHYDRATE 2000 MG: 40 INJECTION, SOLUTION INTRAVENOUS at 15:42

## 2023-08-01 RX ADMIN — HYDROCODONE BITARTRATE AND ACETAMINOPHEN 1 TABLET: 5; 325 TABLET ORAL at 21:10

## 2023-08-01 RX ADMIN — IOPAMIDOL 18 ML: 755 INJECTION, SOLUTION INTRAVENOUS at 09:08

## 2023-08-01 RX ADMIN — TAMSULOSIN HYDROCHLORIDE 0.4 MG: 0.4 CAPSULE ORAL at 16:52

## 2023-08-01 RX ADMIN — ONDANSETRON 4 MG: 2 INJECTION INTRAMUSCULAR; INTRAVENOUS at 12:01

## 2023-08-01 RX ADMIN — METRONIDAZOLE 500 MG: 500 INJECTION, SOLUTION INTRAVENOUS at 18:41

## 2023-08-01 RX ADMIN — IOPAMIDOL 75 ML: 755 INJECTION, SOLUTION INTRAVENOUS at 09:08

## 2023-08-01 RX ADMIN — BUMETANIDE 1 MG: 1 TABLET ORAL at 17:21

## 2023-08-01 RX ADMIN — FENTANYL CITRATE 25 MCG: 50 INJECTION INTRAMUSCULAR; INTRAVENOUS at 11:27

## 2023-08-01 RX ADMIN — ISOSORBIDE MONONITRATE 90 MG: 30 TABLET, EXTENDED RELEASE ORAL at 16:52

## 2023-08-01 RX ADMIN — POTASSIUM CHLORIDE 40 MEQ: 1500 TABLET, EXTENDED RELEASE ORAL at 16:53

## 2023-08-01 ASSESSMENT — ENCOUNTER SYMPTOMS
VOMITING: 1
NAUSEA: 1
COUGH: 0
SHORTNESS OF BREATH: 0
BACK PAIN: 0
ABDOMINAL PAIN: 1
CHEST TIGHTNESS: 0

## 2023-08-01 ASSESSMENT — PAIN SCALES - GENERAL
PAINLEVEL_OUTOF10: 5
PAINLEVEL_OUTOF10: 0
PAINLEVEL_OUTOF10: 5

## 2023-08-01 ASSESSMENT — PAIN DESCRIPTION - LOCATION
LOCATION: ABDOMEN
LOCATION: ABDOMEN

## 2023-08-01 NOTE — CONSULTS
GENERAL SURGERY  CONSULT NOTE  8/1/2023    Physician Consulted: Dr. Josh Belle  Reason for Consult: Acute cholecystitis     GUS Crook is a 61 y.o. male who presents for evaluation of acute cholecystitis. Pt has been having RUQ abdominal pain for the past few days that has been worsened after eating. He sought care with his PCP and was evaluated with a CT scan which showed cholecystitis. He ate biscuits and gravy after the CT scan and had pain. He was instructed to present to the ED by his PCP after the scan was read. He states he has also been having nausea with emesis. Pt also states he has been itching lately. He has an extensive heart history for which he follows with Dr. Karina Perez and prostate cancer. Past surgical history of sigmoid colectomy due to diverticulitis with abscess. Denies daily anticoagulation. On exam, he is soft, tender to palpation in RUQ. 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 Follow with imaging.     Anticoagulant long-term use     Anxiety     Atypical angina (HCC)     CAD (coronary artery disease)     Carotid artery stenosis     Cerebral artery occlusion with cerebral infarction ([address]) 2012    CHF (congestive heart failure) (HCC)     COPD (chronic obstructive pulmonary disease) (HCC)     CVA (cerebral infarction) 11/19/2012    Depression     Diverticulitis 2010    Sigmoid abscess, s/p colectomy    Erectile dysfunction     GERD (gastroesophageal reflux disease)     Heart attack ([address]) 2014    Hx of blood clots     Hyperlipemia     Hypertension     Ischemic cardiomyopathy     Left atrial thrombus     Obesity     Prostate cancer ([address]) 09/19/2022    Type 2 diabetes mellitus without complication, without long-term current use of insulin (HCC)     Type II or unspecified type diabetes mellitus without mention of complication, not stated as uncontrolled        Past Surgical performed if clinical symptoms persist. Areas of sclerosis throughout the bony skeleton to suggest patient's known bony metastasis. There is a small umbilical hernia containing fat only. Right ventral abdominal wall hernia containing fat only.          ASSESSMENT:  61 y.o. male with acute cholecystitis    PLAN:   - medicine to admit   - pre-op cardiology evaluation   - IVF   - NPO after midnight   - plan for OR 8/2 for lap madhu   - manage nausea and pain as needed   - monitor LFTs   - Rocephin/flagyl   - discussed with Dr. Leann Florez, DO  General Surgery PGY-1  8/1/23 at 12:30 PM EDT

## 2023-08-01 NOTE — CONSULTS
Inpatient Cardiology Consultation      Reason for Consult:  preop cardiology evaluation    Consulting Physician: Dr. Gómez Soler     Requesting Physician:  Dr. Popeye Mcclure    Date of Consultation: 8/1/2023    HISTORY OF PRESENT ILLNESS:   Mr. Bev Abbasi is a 61 yr old white male - followed in the past by Dr. Aashish Young - ree in office 9/2021. Patient follows at [facility]. CHF Clinic - last visit 7/12/2023 weight 230lbs /75 HR 78 (weight 7/10/23 227lbs)  No IV diuretics given. Patient takes PRN Lasix 40mg. On Bumex PO 1mg daily. Eligard injection 6/27/2023    PMH: anterior STEMI 2014 s/p JOLENE-LAD, ICM/HFrEF (EF 30% TTE 9/2021), s/p ICD (BS implant 2015), Mod AS, HTN, HLD, T2DM, obesity, depression, previous tobacco abuse / COPD, GERD, right CVA 2012 with residual gait instability -- uses a cane PRN, prostate CA with bone mets - chemotherapy  - chronic pain     [facility] ED 8/1/2023 1039 walk in - abdominal pain - RUQ- out patient CT Acute cholecystitis   Arrival vitals: T97 RR 20 HR 88 /93 SPO2 96% on room air  Significant Labs: K3.3 mag 1.7 lactic acid 1.6 BUN 21 CR 1.0 albumin 4.0 ALT and AST within normal limits bilirubin total 1.3 WBC 6.4 hemoglobin 14.7 platelets 530  RAD: Abdominal ultrasound pending  CT of abdomen and pelvis with contrast done as outpatient 8/1/2023 Vascular calcifications seen diffusely throughout the abdominal aorta and iliac vessels; acute cholecystitis with no definite stones; adjacent duodenitis-small umbilical hernia containing fat only. Right ventral abdominal wall hernia containing fat only  ED treatment: Zofran, Fentanyl, Flagyl, Rocephin    Patient seen and examined in chair in ED waiting area. Patient has had abdominal pain intermittently for 2 weeks - RUQ - worse after meals. It got worse the past few days, called attending and was sent for CT scan. Today after pain meds, had vomiting - which resolved. Functional Capacity: ambulates with cane.  Sedentary lifestyle - mostly sits in chair all day - no steps in house. He does go to the grocery store 2 X per month and is able to slowly push cart around store with no c/o COLLINS / CP. Currently, denies chest pain, SOB at rest, orthopnea, PND, palpations, dizziness / syncope. He has chronic swelling in BLE that he feels is just slightly more than usual. He took a PRN Lasix 40 mg 2 days ago. He noted good  urinary response from this and improvement of swelling. He is compliant with all his medications. He tells me he had a 2D echo at the [facility] on Friday - records requested. Please note: past medical records were reviewed per electronic medical record (EMR) - see detailed reports under Past Medical/ Surgical History. Past Medical History:    CAD anterior STEMI 2014 s/p JOLENE-LAD  GDMT: BB, Imdur, statin, aspirin, Plavix   ICM/HFrEF (EF 30% TTE 9/2021)  GDMT: Toprol XL, Bumex 1 mg PO qd, Entresto, Aldactone   s/p dual chamber ICD [employer] implant 2015 - Dr. Christina Miranda )  Mod AS TTE 9/2021  HTN  HLD - on statin   T2DM-non insulin requiring - HgA1c 7.4   3/14/2023  Obesity BMI 34.1  8/23    Previous tobacco abuse 45 pack years exposure. Smoked 1.5 packs per day and stopped smoking, 07/2014  COPD  Probable SARAH. Noncompliant with recommendations for sleep study  right CVA 2012 with residual gait instability -- uses a cane PRN  CKD -baseline creatinine 0.9-1.2  Vascular calcifications seen diffusely throughout the abdominal aorta and iliac vessels noted on CT scan 8/2023  Prostate CA with bone mets  Diagnosed 8/2023  [facility] - Dr. Carline Shook - seen 6/12/23; Urology Dr. Carly Gibson   Chronic pain related to cancer -   GERD / PUD  Endoscopy and colonoscopy, 08/07/2015, severe duodenitis, mild to moderate gastritis, small 8-10 mm submucosal mass in the body of the stomach and mild reflux esophagitis. The mass was felt to be benign.   Colonoscopy showed rectal polyp 14 cm from the anus, which was resected  sigmoid colectomy

## 2023-08-01 NOTE — H&P
[facility] Resident Inpatient  History and Physical      CC: Abdominal Pain (RUQ abd pain, sent over from outpatient CT for  acute cholecystitis, c/o N/V )  . HPI: History obtained from patient. Patient is oriented to time, place, person. Edison Ordonez is a 61 y.o. male with a PMH of sigmoid colectomy with diverticulitis, CAD, COPD, GERD, HFrEF w/EF 30% in 2021, T2DM, prostate cancer currently on chemo, HTN, HLD who presented to the ED today for stabbing RLQ abdominal pain that initially started 1 week ago and worsened over the past 2 days. He's been experiencing nausea and had an episode of emesis in the ED. He ate biscuits, gravy, and fries this morning, but claims that the pain isn't postprandial. Patient denies pain radiating elsewhere, recent dietary changes, or any alleviating factors. Of note, patient has chronic back pain due to spinal mets from prostatic carcinoma. Patient also states that he has chronic SOB that's not worse than usual. He recently followed up at CHF clinic on 7/12 and although he had weight gain of 3 lbs, he doesn't feel volume overloaded. He drinks 1 beer per week and denies smoking as well as illicit drug use including marijuana. ED Course: The patient remained hemodynamically stable. Labs: significant for elevated total bili (1.3) and hypokalemia (3.3). Lactate, lipase and WBC WNL  Imaging:   CT Abdomen/Pelvis: gallbladder surrounded with stranding and fluid suggesting acute cholecystitis. No gallstones. Mild thickening of duodenum suggesting duodenitis. Sclerosis throughout bony skeleton consistent with mets. Umbilical hernia and R ventral abdominal hernia, both containing fat only   US Abdomen: no evidence of gallstones or biliary dilatation   EKG: Pending  Patient was given Zofran for nausea, Rocephin, Flagyl, Kcl and Fentanyl.    Pt admitted for acute cholecystitis       PMH:  has a past medical history of Allergic rhinitis, Aneurysm of right common iliac tenderness or deformity, full & symmetric excursion  Lung: Clear to auscultation bilaterally,  respirations unlabored. No rales/wheezing/rubs  Heart: RRR, S1 and S2 normal, no murmur, rub or gallop. Abdomen: SNTND, + well-healed vertical scar through umbilicus (from colectomy), no masses, no organomegaly, no guarding, rebound or rigidity, +gage sign  Genital/Rectal: deferred  Extremities:  Extremities normal, atraumatic, no cyanosis. 2+ pitting edema in b/l LE   Skin: Skin color, texture, turgor normal, no rashes or lesions  Musculoskeletal: No joint swelling, no muscle tenderness. Normal ROM in extremities. Neurologic: Alert & Oriented; no focal neurological deficits. Normal and symmetric strength in UEs and LEs; Sensation intact  Psychiatric: appropriate affect. Intact judgment and insight.      Labs:   Results for orders placed or performed during the hospital encounter of 08/01/23   Comprehensive Metabolic Panel w/ Reflex to MG   Result Value Ref Range    Glucose 187 (H) 74 - 99 mg/dL    BUN 21 (H) 6 - 20 mg/dL    Creatinine 1.0 0.70 - 1.20 mg/dL    Est, Glom Filt Rate >60 >60 mL/min/1.73m2    Calcium 8.4 (L) 8.6 - 10.2 mg/dL    Sodium 138 132 - 146 mmol/L    Potassium 3.3 (L) 3.5 - 5.0 mmol/L    Chloride 99 98 - 107 mmol/L    CO2 26 22 - 29 mmol/L    Anion Gap 13 7 - 16 mmol/L    Alkaline Phosphatase 110 40 - 129 U/L    ALT 15 0 - 40 U/L    AST 20 0 - 39 U/L    Total Bilirubin 1.3 (H) 0.0 - 1.2 mg/dL    Total Protein 7.2 6.4 - 8.3 g/dL    Albumin 4.0 3.5 - 5.2 g/dL   CBC with Auto Differential   Result Value Ref Range    WBC 6.4 4.5 - 11.5 k/uL    RBC 5.57 3.80 - 5.80 m/uL    Hemoglobin 14.7 12.5 - 16.5 g/dL    Hematocrit 48.0 37.0 - 54.0 %    MCV 86.2 80.0 - 99.9 fL    MCH 26.4 26.0 - 35.0 pg    MCHC 30.6 (L) 32.0 - 34.5 g/dL    RDW 17.2 (H) 11.5 - 15.0 %    Platelets 693 977 - 706 k/uL    MPV 11.0 7.0 - 12.0 fL    Neutrophils % 75 43.0 - 80.0 %    Lymphocytes % 13 (L) 20.0 - 42.0 %    Monocytes % 5 2.0 -

## 2023-08-01 NOTE — CONSULTS
Patient seen and examined. Chart, labs, imaging studies, EKG and rhythm strips reviewed. Full consult to follow. We are consulted for preop cardiology evaluation - Lap Brianda 8/2/23     IMPRESSION:  Acute cholecystitis   Hypokalemia / borderline hypomagnesemia   CAD anterior STEMI 2014 s/p JOLENE-LAD. Denies Chest Pain   ICM/HFrEF (EF 30% TTE 9/2021). Denies worsening of exertional dyspnea. Clinically, near euvolemic / euvolemic on exam  s/p dual chamber ICD [facility] [employer] implant 2015 - Dr. Lenny Mehta )  Mod AS on TTE 9/2021  HTN  HLD  L8NS-NH A1c - non  insulin requiring - 7.4 3/14/2023  Obesity BMI 34.1  8/22    Previous tobacco abuse 45 pack years exposure. Smoked 1.5 packs per day and stopped smoking, 07/2014  COPD  Probable SARAH. Noncompliant with recommendations for sleep study  right CVA 2012 with residual gait instability -- uses a cane PRN  CKD -baseline creatinine 0.9-1.2  Prostate CA with bone mets  - Joanna Fried Xgeva  Chronic pain related to cancer   GERD / PUD  Depression  Sedentary Lifestyle   Patient is at a moderate risk from cardiac standpoint for general anesthesia / surgery       PLAN:   Supplement potassium / magnesium   Continue home cardiac medications   May proceed with surgery without the need for advanced cardiac testing prior to surgery, since that will not change recommendations / perioperative management  Avoid significant anemia, hypotension, hypoxia or aggressive fluid shifts in the perioperative period  Cardiology will see PRN, please call if needed.     Electronically signed by James [place] MD on 8/1/2023 at 2:18 PM

## 2023-08-02 ENCOUNTER — ANESTHESIA EVENT (OUTPATIENT)
Dept: OPERATING ROOM | Age: 58
DRG: 417 | End: 2023-08-02
Payer: MEDICARE

## 2023-08-02 ENCOUNTER — APPOINTMENT (OUTPATIENT)
Dept: GENERAL RADIOLOGY | Age: 58
DRG: 417 | End: 2023-08-02
Payer: MEDICARE

## 2023-08-02 ENCOUNTER — ANESTHESIA (OUTPATIENT)
Dept: OPERATING ROOM | Age: 58
DRG: 417 | End: 2023-08-02
Payer: MEDICARE

## 2023-08-02 LAB
ALBUMIN SERPL-MCNC: 3.2 G/DL (ref 3.5–5.2)
ALP SERPL-CCNC: 87 U/L (ref 40–129)
ALT SERPL-CCNC: 12 U/L (ref 0–40)
ANION GAP SERPL CALCULATED.3IONS-SCNC: 13 MMOL/L (ref 7–16)
AST SERPL-CCNC: 17 U/L (ref 0–39)
BASOPHILS # BLD: 0.07 K/UL (ref 0–0.2)
BASOPHILS NFR BLD: 1 % (ref 0–2)
BILIRUB SERPL-MCNC: 0.8 MG/DL (ref 0–1.2)
BUN SERPL-MCNC: 17 MG/DL (ref 6–20)
CALCIUM SERPL-MCNC: 7.8 MG/DL (ref 8.6–10.2)
CHLORIDE SERPL-SCNC: 103 MMOL/L (ref 98–107)
CO2 SERPL-SCNC: 26 MMOL/L (ref 22–29)
CREAT SERPL-MCNC: 1 MG/DL (ref 0.7–1.2)
EKG ATRIAL RATE: 73 BPM
EKG P AXIS: 63 DEGREES
EKG P-R INTERVAL: 220 MS
EKG Q-T INTERVAL: 450 MS
EKG QRS DURATION: 122 MS
EKG QTC CALCULATION (BAZETT): 495 MS
EKG R AXIS: 110 DEGREES
EKG T AXIS: -40 DEGREES
EKG VENTRICULAR RATE: 73 BPM
EOSINOPHIL # BLD: 0.48 K/UL (ref 0.05–0.5)
EOSINOPHILS RELATIVE PERCENT: 10 % (ref 0–6)
ERYTHROCYTE [DISTWIDTH] IN BLOOD BY AUTOMATED COUNT: 16.9 % (ref 11.5–15)
GFR SERPL CREATININE-BSD FRML MDRD: >60 ML/MIN/1.73M2
GLUCOSE SERPL-MCNC: 98 MG/DL (ref 74–99)
HCT VFR BLD AUTO: 43.4 % (ref 37–54)
HGB BLD-MCNC: 13 G/DL (ref 12.5–16.5)
IMM GRANULOCYTES # BLD AUTO: <0.03 K/UL (ref 0–0.58)
IMM GRANULOCYTES NFR BLD: 0 % (ref 0–5)
LYMPHOCYTES NFR BLD: 1.19 K/UL (ref 1.5–4)
LYMPHOCYTES RELATIVE PERCENT: 24 % (ref 20–42)
MAGNESIUM SERPL-MCNC: 2.4 MG/DL (ref 1.6–2.6)
MCH RBC QN AUTO: 26.1 PG (ref 26–35)
MCHC RBC AUTO-ENTMCNC: 30 G/DL (ref 32–34.5)
MCV RBC AUTO: 87 FL (ref 80–99.9)
METER GLUCOSE: 115 MG/DL (ref 74–99)
METER GLUCOSE: 152 MG/DL (ref 74–99)
METER GLUCOSE: 199 MG/DL (ref 74–99)
METER GLUCOSE: 99 MG/DL (ref 74–99)
MONOCYTES NFR BLD: 0.35 K/UL (ref 0.1–0.95)
MONOCYTES NFR BLD: 7 % (ref 2–12)
NEUTROPHILS NFR BLD: 58 % (ref 43–80)
NEUTS SEG NFR BLD: 2.94 K/UL (ref 1.8–7.3)
PLATELET # BLD AUTO: 219 K/UL (ref 130–450)
PMV BLD AUTO: 10.9 FL (ref 7–12)
POTASSIUM SERPL-SCNC: 3.5 MMOL/L (ref 3.5–5)
PROT SERPL-MCNC: 6 G/DL (ref 6.4–8.3)
RBC # BLD AUTO: 4.99 M/UL (ref 3.8–5.8)
SODIUM SERPL-SCNC: 142 MMOL/L (ref 132–146)
WBC OTHER # BLD: 5.1 K/UL (ref 4.5–11.5)

## 2023-08-02 PROCEDURE — 82947 ASSAY GLUCOSE BLOOD QUANT: CPT

## 2023-08-02 PROCEDURE — 3700000000 HC ANESTHESIA ATTENDED CARE: Performed by: SURGERY

## 2023-08-02 PROCEDURE — 0FT44ZZ RESECTION OF GALLBLADDER, PERCUTANEOUS ENDOSCOPIC APPROACH: ICD-10-PCS | Performed by: SURGERY

## 2023-08-02 PROCEDURE — 83735 ASSAY OF MAGNESIUM: CPT

## 2023-08-02 PROCEDURE — 80053 COMPREHEN METABOLIC PANEL: CPT

## 2023-08-02 PROCEDURE — 36415 COLL VENOUS BLD VENIPUNCTURE: CPT

## 2023-08-02 PROCEDURE — 6360000002 HC RX W HCPCS: Performed by: SURGERY

## 2023-08-02 PROCEDURE — 6370000000 HC RX 637 (ALT 250 FOR IP): Performed by: STUDENT IN AN ORGANIZED HEALTH CARE EDUCATION/TRAINING PROGRAM

## 2023-08-02 PROCEDURE — G0378 HOSPITAL OBSERVATION PER HR: HCPCS

## 2023-08-02 PROCEDURE — 3600000004 HC SURGERY LEVEL 4 BASE: Performed by: SURGERY

## 2023-08-02 PROCEDURE — 2720000010 HC SURG SUPPLY STERILE: Performed by: SURGERY

## 2023-08-02 PROCEDURE — 6360000002 HC RX W HCPCS

## 2023-08-02 PROCEDURE — 93010 ELECTROCARDIOGRAM REPORT: CPT | Performed by: INTERNAL MEDICINE

## 2023-08-02 PROCEDURE — 96376 TX/PRO/DX INJ SAME DRUG ADON: CPT

## 2023-08-02 PROCEDURE — 1200000000 HC SEMI PRIVATE

## 2023-08-02 PROCEDURE — 6360000002 HC RX W HCPCS: Performed by: NURSE ANESTHETIST, CERTIFIED REGISTERED

## 2023-08-02 PROCEDURE — 71045 X-RAY EXAM CHEST 1 VIEW: CPT

## 2023-08-02 PROCEDURE — 2580000003 HC RX 258

## 2023-08-02 PROCEDURE — 85025 COMPLETE CBC W/AUTO DIFF WBC: CPT

## 2023-08-02 PROCEDURE — 2580000003 HC RX 258: Performed by: STUDENT IN AN ORGANIZED HEALTH CARE EDUCATION/TRAINING PROGRAM

## 2023-08-02 PROCEDURE — 2500000003 HC RX 250 WO HCPCS

## 2023-08-02 PROCEDURE — 88304 TISSUE EXAM BY PATHOLOGIST: CPT

## 2023-08-02 PROCEDURE — 7100000000 HC PACU RECOVERY - FIRST 15 MIN: Performed by: SURGERY

## 2023-08-02 PROCEDURE — 3600000014 HC SURGERY LEVEL 4 ADDTL 15MIN: Performed by: SURGERY

## 2023-08-02 PROCEDURE — 6360000002 HC RX W HCPCS: Performed by: STUDENT IN AN ORGANIZED HEALTH CARE EDUCATION/TRAINING PROGRAM

## 2023-08-02 PROCEDURE — 6370000000 HC RX 637 (ALT 250 FOR IP)

## 2023-08-02 PROCEDURE — 99222 1ST HOSP IP/OBS MODERATE 55: CPT | Performed by: FAMILY MEDICINE

## 2023-08-02 PROCEDURE — 2500000003 HC RX 250 WO HCPCS: Performed by: ANESTHESIOLOGY

## 2023-08-02 PROCEDURE — 2500000003 HC RX 250 WO HCPCS: Performed by: NURSE ANESTHETIST, CERTIFIED REGISTERED

## 2023-08-02 PROCEDURE — 7100000001 HC PACU RECOVERY - ADDTL 15 MIN: Performed by: SURGERY

## 2023-08-02 PROCEDURE — 96366 THER/PROPH/DIAG IV INF ADDON: CPT

## 2023-08-02 PROCEDURE — 3700000001 HC ADD 15 MINUTES (ANESTHESIA): Performed by: SURGERY

## 2023-08-02 PROCEDURE — 47562 LAPAROSCOPIC CHOLECYSTECTOMY: CPT | Performed by: SURGERY

## 2023-08-02 PROCEDURE — C9399 UNCLASSIFIED DRUGS OR BIOLOG: HCPCS | Performed by: NURSE ANESTHETIST, CERTIFIED REGISTERED

## 2023-08-02 PROCEDURE — 2580000003 HC RX 258: Performed by: NURSE ANESTHETIST, CERTIFIED REGISTERED

## 2023-08-02 PROCEDURE — 2709999900 HC NON-CHARGEABLE SUPPLY: Performed by: SURGERY

## 2023-08-02 RX ORDER — SODIUM CHLORIDE 9 MG/ML
INJECTION, SOLUTION INTRAVENOUS PRN
Status: DISCONTINUED | OUTPATIENT
Start: 2023-08-02 | End: 2023-08-02 | Stop reason: HOSPADM

## 2023-08-02 RX ORDER — HYDROMORPHONE HYDROCHLORIDE 1 MG/ML
0.25 INJECTION, SOLUTION INTRAMUSCULAR; INTRAVENOUS; SUBCUTANEOUS EVERY 5 MIN PRN
Status: DISCONTINUED | OUTPATIENT
Start: 2023-08-02 | End: 2023-08-02 | Stop reason: HOSPADM

## 2023-08-02 RX ORDER — BUPIVACAINE HYDROCHLORIDE 2.5 MG/ML
INJECTION, SOLUTION EPIDURAL; INFILTRATION; INTRACAUDAL PRN
Status: DISCONTINUED | OUTPATIENT
Start: 2023-08-02 | End: 2023-08-02 | Stop reason: ALTCHOICE

## 2023-08-02 RX ORDER — CEFAZOLIN SODIUM 1 G/3ML
INJECTION, POWDER, FOR SOLUTION INTRAMUSCULAR; INTRAVENOUS PRN
Status: DISCONTINUED | OUTPATIENT
Start: 2023-08-02 | End: 2023-08-02 | Stop reason: SDUPTHER

## 2023-08-02 RX ORDER — DEXAMETHASONE SODIUM PHOSPHATE 10 MG/ML
INJECTION INTRAMUSCULAR; INTRAVENOUS PRN
Status: DISCONTINUED | OUTPATIENT
Start: 2023-08-02 | End: 2023-08-02 | Stop reason: SDUPTHER

## 2023-08-02 RX ORDER — SODIUM CHLORIDE 9 MG/ML
INJECTION, SOLUTION INTRAVENOUS CONTINUOUS PRN
Status: DISCONTINUED | OUTPATIENT
Start: 2023-08-02 | End: 2023-08-02 | Stop reason: SDUPTHER

## 2023-08-02 RX ORDER — ONDANSETRON 2 MG/ML
4 INJECTION INTRAMUSCULAR; INTRAVENOUS
Status: DISCONTINUED | OUTPATIENT
Start: 2023-08-02 | End: 2023-08-02 | Stop reason: HOSPADM

## 2023-08-02 RX ORDER — MIDAZOLAM HYDROCHLORIDE 1 MG/ML
INJECTION INTRAMUSCULAR; INTRAVENOUS PRN
Status: DISCONTINUED | OUTPATIENT
Start: 2023-08-02 | End: 2023-08-02 | Stop reason: SDUPTHER

## 2023-08-02 RX ORDER — ROCURONIUM BROMIDE 10 MG/ML
INJECTION, SOLUTION INTRAVENOUS PRN
Status: DISCONTINUED | OUTPATIENT
Start: 2023-08-02 | End: 2023-08-02 | Stop reason: SDUPTHER

## 2023-08-02 RX ORDER — ETOMIDATE 2 MG/ML
INJECTION INTRAVENOUS PRN
Status: DISCONTINUED | OUTPATIENT
Start: 2023-08-02 | End: 2023-08-02 | Stop reason: SDUPTHER

## 2023-08-02 RX ORDER — SODIUM CHLORIDE 0.9 % (FLUSH) 0.9 %
5-40 SYRINGE (ML) INJECTION EVERY 12 HOURS SCHEDULED
Status: DISCONTINUED | OUTPATIENT
Start: 2023-08-02 | End: 2023-08-02 | Stop reason: HOSPADM

## 2023-08-02 RX ORDER — HYDROMORPHONE HYDROCHLORIDE 1 MG/ML
0.5 INJECTION, SOLUTION INTRAMUSCULAR; INTRAVENOUS; SUBCUTANEOUS EVERY 5 MIN PRN
Status: DISCONTINUED | OUTPATIENT
Start: 2023-08-02 | End: 2023-08-02 | Stop reason: HOSPADM

## 2023-08-02 RX ORDER — FENTANYL CITRATE 50 UG/ML
INJECTION, SOLUTION INTRAMUSCULAR; INTRAVENOUS PRN
Status: DISCONTINUED | OUTPATIENT
Start: 2023-08-02 | End: 2023-08-02 | Stop reason: SDUPTHER

## 2023-08-02 RX ORDER — LIDOCAINE HYDROCHLORIDE 20 MG/ML
INJECTION, SOLUTION INTRAVENOUS PRN
Status: DISCONTINUED | OUTPATIENT
Start: 2023-08-02 | End: 2023-08-02 | Stop reason: SDUPTHER

## 2023-08-02 RX ORDER — SODIUM CHLORIDE 0.9 % (FLUSH) 0.9 %
5-40 SYRINGE (ML) INJECTION PRN
Status: DISCONTINUED | OUTPATIENT
Start: 2023-08-02 | End: 2023-08-02 | Stop reason: HOSPADM

## 2023-08-02 RX ORDER — ESMOLOL HYDROCHLORIDE 10 MG/ML
INJECTION INTRAVENOUS PRN
Status: DISCONTINUED | OUTPATIENT
Start: 2023-08-02 | End: 2023-08-02 | Stop reason: SDUPTHER

## 2023-08-02 RX ORDER — ONDANSETRON 2 MG/ML
INJECTION INTRAMUSCULAR; INTRAVENOUS PRN
Status: DISCONTINUED | OUTPATIENT
Start: 2023-08-02 | End: 2023-08-02 | Stop reason: SDUPTHER

## 2023-08-02 RX ADMIN — SACUBITRIL AND VALSARTAN 1 TABLET: 97; 103 TABLET, FILM COATED ORAL at 23:04

## 2023-08-02 RX ADMIN — SODIUM CHLORIDE, PRESERVATIVE FREE 10 ML: 5 INJECTION INTRAVENOUS at 20:35

## 2023-08-02 RX ADMIN — CEFTRIAXONE SODIUM 2000 MG: 2 INJECTION, POWDER, FOR SOLUTION INTRAMUSCULAR; INTRAVENOUS at 12:22

## 2023-08-02 RX ADMIN — ISOSORBIDE MONONITRATE 90 MG: 30 TABLET, EXTENDED RELEASE ORAL at 09:44

## 2023-08-02 RX ADMIN — SUGAMMADEX 200 MG: 100 INJECTION, SOLUTION INTRAVENOUS at 14:46

## 2023-08-02 RX ADMIN — METOPROLOL SUCCINATE 100 MG: 100 TABLET, EXTENDED RELEASE ORAL at 09:42

## 2023-08-02 RX ADMIN — METRONIDAZOLE 500 MG: 500 INJECTION, SOLUTION INTRAVENOUS at 12:32

## 2023-08-02 RX ADMIN — FENTANYL CITRATE 50 MCG: 50 INJECTION, SOLUTION INTRAMUSCULAR; INTRAVENOUS at 14:17

## 2023-08-02 RX ADMIN — FENTANYL CITRATE 50 MCG: 50 INJECTION, SOLUTION INTRAMUSCULAR; INTRAVENOUS at 14:06

## 2023-08-02 RX ADMIN — SODIUM CHLORIDE: 9 INJECTION, SOLUTION INTRAVENOUS at 13:57

## 2023-08-02 RX ADMIN — DEXAMETHASONE SODIUM PHOSPHATE 10 MG: 10 INJECTION INTRAMUSCULAR; INTRAVENOUS at 14:17

## 2023-08-02 RX ADMIN — ROCURONIUM BROMIDE 40 MG: 10 INJECTION, SOLUTION INTRAVENOUS at 14:07

## 2023-08-02 RX ADMIN — SACUBITRIL AND VALSARTAN 1 TABLET: 97; 103 TABLET, FILM COATED ORAL at 09:44

## 2023-08-02 RX ADMIN — ONDANSETRON 4 MG: 2 INJECTION INTRAMUSCULAR; INTRAVENOUS at 14:39

## 2023-08-02 RX ADMIN — CEFAZOLIN 2 G: 1 INJECTION, POWDER, FOR SOLUTION INTRAMUSCULAR; INTRAVENOUS at 14:17

## 2023-08-02 RX ADMIN — ESMOLOL HYDROCHLORIDE 20 MG: 10 INJECTION, SOLUTION INTRAVENOUS at 14:18

## 2023-08-02 RX ADMIN — METRONIDAZOLE 500 MG: 500 INJECTION, SOLUTION INTRAVENOUS at 20:39

## 2023-08-02 RX ADMIN — ETOMIDATE 12 MG: 2 INJECTION, SOLUTION INTRAVENOUS at 14:06

## 2023-08-02 RX ADMIN — ROCURONIUM BROMIDE 10 MG: 10 INJECTION, SOLUTION INTRAVENOUS at 14:24

## 2023-08-02 RX ADMIN — ETOMIDATE 8 MG: 2 INJECTION, SOLUTION INTRAVENOUS at 14:17

## 2023-08-02 RX ADMIN — LIDOCAINE HYDROCHLORIDE 50 MG: 20 INJECTION, SOLUTION INTRAVENOUS at 14:06

## 2023-08-02 RX ADMIN — BUMETANIDE 1 MG: 1 TABLET ORAL at 09:42

## 2023-08-02 RX ADMIN — ATORVASTATIN CALCIUM 80 MG: 40 TABLET, FILM COATED ORAL at 20:35

## 2023-08-02 RX ADMIN — HYDROMORPHONE HYDROCHLORIDE 0.5 MG: 1 INJECTION, SOLUTION INTRAMUSCULAR; INTRAVENOUS; SUBCUTANEOUS at 15:34

## 2023-08-02 RX ADMIN — HYDROMORPHONE HYDROCHLORIDE 0.5 MG: 1 INJECTION, SOLUTION INTRAMUSCULAR; INTRAVENOUS; SUBCUTANEOUS at 15:40

## 2023-08-02 RX ADMIN — SPIRONOLACTONE 25 MG: 25 TABLET ORAL at 09:42

## 2023-08-02 RX ADMIN — TAMSULOSIN HYDROCHLORIDE 0.4 MG: 0.4 CAPSULE ORAL at 09:42

## 2023-08-02 RX ADMIN — MIDAZOLAM 1 MG: 1 INJECTION INTRAMUSCULAR; INTRAVENOUS at 13:57

## 2023-08-02 RX ADMIN — FENTANYL CITRATE 50 MCG: 50 INJECTION, SOLUTION INTRAMUSCULAR; INTRAVENOUS at 14:28

## 2023-08-02 RX ADMIN — METRONIDAZOLE 500 MG: 500 INJECTION, SOLUTION INTRAVENOUS at 02:38

## 2023-08-02 RX ADMIN — PANTOPRAZOLE SODIUM 40 MG: 40 TABLET, DELAYED RELEASE ORAL at 09:42

## 2023-08-02 RX ADMIN — SODIUM CHLORIDE, PRESERVATIVE FREE 10 ML: 5 INJECTION INTRAVENOUS at 09:49

## 2023-08-02 RX ADMIN — HYDROCODONE BITARTRATE AND ACETAMINOPHEN 1 TABLET: 5; 325 TABLET ORAL at 09:42

## 2023-08-02 ASSESSMENT — PAIN SCALES - GENERAL
PAINLEVEL_OUTOF10: 0
PAINLEVEL_OUTOF10: 8
PAINLEVEL_OUTOF10: 3
PAINLEVEL_OUTOF10: 5
PAINLEVEL_OUTOF10: 3
PAINLEVEL_OUTOF10: 8
PAINLEVEL_OUTOF10: 5

## 2023-08-02 ASSESSMENT — PAIN DESCRIPTION - DESCRIPTORS
DESCRIPTORS: SORE;ACHING;DISCOMFORT
DESCRIPTORS: CRAMPING;STABBING;SORE

## 2023-08-02 ASSESSMENT — PAIN SCALES - WONG BAKER
WONGBAKER_NUMERICALRESPONSE: 2
WONGBAKER_NUMERICALRESPONSE: 2

## 2023-08-02 ASSESSMENT — PAIN DESCRIPTION - PAIN TYPE: TYPE: SURGICAL PAIN

## 2023-08-02 ASSESSMENT — PAIN DESCRIPTION - LOCATION
LOCATION: ABDOMEN
LOCATION: BACK

## 2023-08-02 ASSESSMENT — PAIN DESCRIPTION - ORIENTATION: ORIENTATION: MID

## 2023-08-02 NOTE — BRIEF OP NOTE
Brief Postoperative Note      Patient: Celestine Patel  YOB: 1965  MRN: [id number]    Date of Procedure: 8/2/2023    Pre-Op Diagnosis Codes:     * Acute cholecystitis [K81.0]    Post-Op Diagnosis: Same    Procedure(s):  LAPAROSCOPIC CHOLECYSTECTOMY    Surgeon(s):  Ozzie Boas, MD    Assistant:  Resident: Leoncio Wong MD    Anesthesia: General    Estimated Blood Loss (mL): Minimal    Complications: None    Specimens:   ID Type Source Tests Collected by Time Destination   A : GALLBLADDER Tissue Gallbladder SURGICAL PATHOLOGY Ozzie Boas, MD 8/2/2023 1423        Implants:  * No implants in log *      Drains: * No LDAs found *    Findings: steatosis, intraabdominal adhesions, acute cholecystitis      Electronically signed by Dhaval Perea MD on 8/2/2023 at 3:00 PM

## 2023-08-02 NOTE — PLAN OF CARE
Patient's chart updated to reflect:      . - HF care plan, HF education points and HF discharge instructions.  -Orders: 2 gram sodium diet, daily weights, I/O.  -PCP and cardiology follow up appointments to be scheduled within 7 days of hospital discharge. -CHF education session will be provided to the patient prior to hospital discharge.        Cesia Mims RN, MSN, CV-BC  Heart Failure Navigator

## 2023-08-02 NOTE — PLAN OF CARE
Problem: ABCDS Injury Assessment  Goal: Absence of physical injury  8/1/2023 2316 by Sola Brtit RN  Outcome: Progressing     Problem: Pain  Goal: Verbalizes/displays adequate comfort level or baseline comfort level  Outcome: Progressing     Problem: Chronic Conditions and Co-morbidities  Goal: Patient's chronic conditions and co-morbidity symptoms are monitored and maintained or improved  Outcome: Progressing     Problem: Cardiovascular - Adult  Goal: Maintains optimal cardiac output and hemodynamic stability  8/1/2023 2316 by Sola Britt RN  Outcome: Progressing     Problem: Cardiovascular - Adult  Goal: Absence of cardiac dysrhythmias or at baseline  8/1/2023 2316 by Sola Britt RN  Outcome: Progressing

## 2023-08-02 NOTE — ANESTHESIA POSTPROCEDURE EVALUATION
Department of Anesthesiology  Postprocedure Note    Patient: Shari Schultz  MRN: [id number]  YOB: 1965  Date of evaluation: 8/2/2023      Procedure Summary     Date: 08/02/23 Room / Location: [facility] 04 / [facility]    Anesthesia Start: 3259 Anesthesia Stop: 0433    Procedure: LAPAROSCOPIC CHOLECYSTECTOMY (Abdomen) Diagnosis:       Acute cholecystitis      (Acute cholecystitis [K81.0])    Surgeons: Rae Harrington MD Responsible Provider: Anai Tate MD    Anesthesia Type: General ASA Status: 3          Anesthesia Type: General    Yovani Phase I: Yovani Score: 8    Yovani Phase II:        Anesthesia Post Evaluation    Patient location during evaluation: PACU  Patient participation: complete - patient participated  Level of consciousness: awake  Pain score: 3  Airway patency: patent  Nausea & Vomiting: no nausea  Complications: no  Cardiovascular status: hemodynamically stable  Respiratory status: acceptable  Hydration status: stable  Multimodal analgesia pain management approach

## 2023-08-02 NOTE — PLAN OF CARE
Problem: Cardiovascular - Adult  Goal: Maintains optimal cardiac output and hemodynamic stability  Outcome: Progressing  Goal: Absence of cardiac dysrhythmias or at baseline  Outcome: Progressing     Problem: Metabolic/Fluid and Electrolytes - Adult  Goal: Electrolytes maintained within normal limits  Outcome: Progressing  Goal: Hemodynamic stability and optimal renal function maintained  Outcome: Progressing

## 2023-08-02 NOTE — ANESTHESIA PROCEDURE NOTES
Arterial Line:    An arterial line was placed using surface landmarks, in the OR for the following indication(s): continuous blood pressure monitoring and blood sampling needed. A 20 gauge (size), 1 and 3/4 inch (length), Arrow (type) catheter was placed, Seldinger technique used, into the left radial artery, secured by tape and Tegaderm. Anesthesia type: General    Events:  patient tolerated procedure well with no complications and EBL < 5mL.   Resident/CRNA: PAUL Fu - CRNA  Performed: Resident/CRNA   Preanesthetic Checklist  Completed: patient identified, IV checked, site marked, risks and benefits discussed, surgical/procedural consents, equipment checked, pre-op evaluation, timeout performed, anesthesia consent given, oxygen available and monitors applied/VS acknowledged

## 2023-08-03 VITALS
BODY MASS INDEX: 34.97 KG/M2 | WEIGHT: 236.11 LBS | SYSTOLIC BLOOD PRESSURE: 118 MMHG | TEMPERATURE: 97.8 F | DIASTOLIC BLOOD PRESSURE: 81 MMHG | RESPIRATION RATE: 16 BRPM | HEART RATE: 67 BPM | HEIGHT: 69 IN | OXYGEN SATURATION: 95 %

## 2023-08-03 PROBLEM — K81.0 ACUTE CHOLECYSTITIS: Status: RESOLVED | Noted: 2023-08-01 | Resolved: 2023-08-03

## 2023-08-03 PROBLEM — Z01.810 PREOPERATIVE CARDIOVASCULAR EXAMINATION: Status: RESOLVED | Noted: 2023-08-01 | Resolved: 2023-08-03

## 2023-08-03 LAB
ALBUMIN SERPL-MCNC: 3.3 G/DL (ref 3.5–5.2)
ALP SERPL-CCNC: 85 U/L (ref 40–129)
ALT SERPL-CCNC: 15 U/L (ref 0–40)
ANION GAP SERPL CALCULATED.3IONS-SCNC: 15 MMOL/L (ref 7–16)
AST SERPL-CCNC: 30 U/L (ref 0–39)
BASOPHILS # BLD: 0.03 K/UL (ref 0–0.2)
BASOPHILS NFR BLD: 1 % (ref 0–2)
BILIRUB SERPL-MCNC: 0.7 MG/DL (ref 0–1.2)
BUN SERPL-MCNC: 16 MG/DL (ref 6–20)
CALCIUM SERPL-MCNC: 7.8 MG/DL (ref 8.6–10.2)
CHLORIDE SERPL-SCNC: 101 MMOL/L (ref 98–107)
CO2 SERPL-SCNC: 24 MMOL/L (ref 22–29)
CREAT SERPL-MCNC: 1 MG/DL (ref 0.7–1.2)
EOSINOPHIL # BLD: 0.01 K/UL (ref 0.05–0.5)
EOSINOPHILS RELATIVE PERCENT: 0 % (ref 0–6)
ERYTHROCYTE [DISTWIDTH] IN BLOOD BY AUTOMATED COUNT: 16.8 % (ref 11.5–15)
GFR SERPL CREATININE-BSD FRML MDRD: >60 ML/MIN/1.73M2
GLUCOSE SERPL-MCNC: 120 MG/DL (ref 74–99)
HCT VFR BLD AUTO: 44.5 % (ref 37–54)
HGB BLD-MCNC: 13.4 G/DL (ref 12.5–16.5)
IMM GRANULOCYTES # BLD AUTO: <0.03 K/UL (ref 0–0.58)
IMM GRANULOCYTES NFR BLD: 0 % (ref 0–5)
LYMPHOCYTES NFR BLD: 0.74 K/UL (ref 1.5–4)
LYMPHOCYTES RELATIVE PERCENT: 13 % (ref 20–42)
MCH RBC QN AUTO: 26.2 PG (ref 26–35)
MCHC RBC AUTO-ENTMCNC: 30.1 G/DL (ref 32–34.5)
MCV RBC AUTO: 86.9 FL (ref 80–99.9)
METER GLUCOSE: 132 MG/DL (ref 74–99)
METER GLUCOSE: 179 MG/DL (ref 74–99)
MONOCYTES NFR BLD: 0.49 K/UL (ref 0.1–0.95)
MONOCYTES NFR BLD: 9 % (ref 2–12)
NEUTROPHILS NFR BLD: 77 % (ref 43–80)
NEUTS SEG NFR BLD: 4.26 K/UL (ref 1.8–7.3)
PLATELET # BLD AUTO: 264 K/UL (ref 130–450)
PMV BLD AUTO: 11.4 FL (ref 7–12)
POTASSIUM SERPL-SCNC: 3.7 MMOL/L (ref 3.5–5)
PROT SERPL-MCNC: 6.3 G/DL (ref 6.4–8.3)
RBC # BLD AUTO: 5.12 M/UL (ref 3.8–5.8)
SODIUM SERPL-SCNC: 140 MMOL/L (ref 132–146)
WBC OTHER # BLD: 5.6 K/UL (ref 4.5–11.5)

## 2023-08-03 PROCEDURE — 85025 COMPLETE CBC W/AUTO DIFF WBC: CPT

## 2023-08-03 PROCEDURE — 82947 ASSAY GLUCOSE BLOOD QUANT: CPT

## 2023-08-03 PROCEDURE — 6370000000 HC RX 637 (ALT 250 FOR IP): Performed by: STUDENT IN AN ORGANIZED HEALTH CARE EDUCATION/TRAINING PROGRAM

## 2023-08-03 PROCEDURE — 97535 SELF CARE MNGMENT TRAINING: CPT

## 2023-08-03 PROCEDURE — 99024 POSTOP FOLLOW-UP VISIT: CPT | Performed by: SURGERY

## 2023-08-03 PROCEDURE — 96366 THER/PROPH/DIAG IV INF ADDON: CPT

## 2023-08-03 PROCEDURE — 99238 HOSP IP/OBS DSCHRG MGMT 30/<: CPT | Performed by: FAMILY MEDICINE

## 2023-08-03 PROCEDURE — G0378 HOSPITAL OBSERVATION PER HR: HCPCS

## 2023-08-03 PROCEDURE — 97165 OT EVAL LOW COMPLEX 30 MIN: CPT

## 2023-08-03 PROCEDURE — 80053 COMPREHEN METABOLIC PANEL: CPT

## 2023-08-03 PROCEDURE — 6360000002 HC RX W HCPCS: Performed by: STUDENT IN AN ORGANIZED HEALTH CARE EDUCATION/TRAINING PROGRAM

## 2023-08-03 PROCEDURE — 36415 COLL VENOUS BLD VENIPUNCTURE: CPT

## 2023-08-03 PROCEDURE — 96376 TX/PRO/DX INJ SAME DRUG ADON: CPT

## 2023-08-03 PROCEDURE — 2580000003 HC RX 258: Performed by: STUDENT IN AN ORGANIZED HEALTH CARE EDUCATION/TRAINING PROGRAM

## 2023-08-03 PROCEDURE — 97161 PT EVAL LOW COMPLEX 20 MIN: CPT

## 2023-08-03 PROCEDURE — 97530 THERAPEUTIC ACTIVITIES: CPT

## 2023-08-03 RX ORDER — OXYCODONE HYDROCHLORIDE 5 MG/1
5 TABLET ORAL EVERY 6 HOURS PRN
Qty: 20 TABLET | Refills: 0 | Status: SHIPPED | OUTPATIENT
Start: 2023-08-03 | End: 2023-08-08

## 2023-08-03 RX ORDER — SENNA AND DOCUSATE SODIUM 50; 8.6 MG/1; MG/1
2 TABLET, FILM COATED ORAL 2 TIMES DAILY
Qty: 28 TABLET | Refills: 0 | Status: SHIPPED | OUTPATIENT
Start: 2023-08-03 | End: 2023-08-10 | Stop reason: ALTCHOICE

## 2023-08-03 RX ORDER — BUMETANIDE 1 MG/1
1 TABLET ORAL ONCE
Status: COMPLETED | OUTPATIENT
Start: 2023-08-03 | End: 2023-08-03

## 2023-08-03 RX ADMIN — CEFTRIAXONE SODIUM 2000 MG: 2 INJECTION, POWDER, FOR SOLUTION INTRAMUSCULAR; INTRAVENOUS at 11:23

## 2023-08-03 RX ADMIN — PANTOPRAZOLE SODIUM 40 MG: 40 TABLET, DELAYED RELEASE ORAL at 09:26

## 2023-08-03 RX ADMIN — TAMSULOSIN HYDROCHLORIDE 0.4 MG: 0.4 CAPSULE ORAL at 09:31

## 2023-08-03 RX ADMIN — SACUBITRIL AND VALSARTAN 1 TABLET: 97; 103 TABLET, FILM COATED ORAL at 09:26

## 2023-08-03 RX ADMIN — ACETAMINOPHEN 650 MG: 325 TABLET ORAL at 04:06

## 2023-08-03 RX ADMIN — METRONIDAZOLE 500 MG: 500 INJECTION, SOLUTION INTRAVENOUS at 11:27

## 2023-08-03 RX ADMIN — CLOPIDOGREL BISULFATE 75 MG: 75 TABLET ORAL at 09:26

## 2023-08-03 RX ADMIN — BUMETANIDE 1 MG: 1 TABLET ORAL at 13:48

## 2023-08-03 RX ADMIN — BUMETANIDE 1 MG: 1 TABLET ORAL at 09:25

## 2023-08-03 RX ADMIN — METRONIDAZOLE 500 MG: 500 INJECTION, SOLUTION INTRAVENOUS at 02:59

## 2023-08-03 RX ADMIN — SODIUM CHLORIDE, PRESERVATIVE FREE 10 ML: 5 INJECTION INTRAVENOUS at 09:31

## 2023-08-03 RX ADMIN — ASPIRIN 81 MG: 81 TABLET, COATED ORAL at 09:26

## 2023-08-03 RX ADMIN — SPIRONOLACTONE 25 MG: 25 TABLET ORAL at 09:26

## 2023-08-03 RX ADMIN — METOPROLOL SUCCINATE 100 MG: 100 TABLET, EXTENDED RELEASE ORAL at 09:25

## 2023-08-03 ASSESSMENT — PAIN DESCRIPTION - PAIN TYPE
TYPE: SURGICAL PAIN
TYPE: ACUTE PAIN;SURGICAL PAIN

## 2023-08-03 ASSESSMENT — PAIN SCALES - GENERAL
PAINLEVEL_OUTOF10: 0
PAINLEVEL_OUTOF10: 3
PAINLEVEL_OUTOF10: 2

## 2023-08-03 ASSESSMENT — PAIN DESCRIPTION - DESCRIPTORS
DESCRIPTORS: ACHING;DISCOMFORT;DULL
DESCRIPTORS: ACHING;DISCOMFORT;DULL

## 2023-08-03 ASSESSMENT — PAIN SCALES - WONG BAKER: WONGBAKER_NUMERICALRESPONSE: 2

## 2023-08-03 ASSESSMENT — PAIN DESCRIPTION - LOCATION
LOCATION: ABDOMEN
LOCATION: ABDOMEN

## 2023-08-03 ASSESSMENT — PAIN DESCRIPTION - FREQUENCY
FREQUENCY: INTERMITTENT
FREQUENCY: INTERMITTENT

## 2023-08-03 ASSESSMENT — PAIN DESCRIPTION - ONSET: ONSET: GRADUAL

## 2023-08-03 ASSESSMENT — PAIN DESCRIPTION - ORIENTATION
ORIENTATION: MID
ORIENTATION: MID

## 2023-08-03 ASSESSMENT — PAIN - FUNCTIONAL ASSESSMENT
PAIN_FUNCTIONAL_ASSESSMENT: ACTIVITIES ARE NOT PREVENTED
PAIN_FUNCTIONAL_ASSESSMENT: ACTIVITIES ARE NOT PREVENTED

## 2023-08-03 NOTE — PLAN OF CARE
Problem: ABCDS Injury Assessment  Goal: Absence of physical injury  8/3/2023 1109 by Arlette Holley RN  Outcome: Progressing     Problem: Pain  Goal: Verbalizes/displays adequate comfort level or baseline comfort level  8/3/2023 1109 by Arlette Holley RN  Outcome: Progressing     Problem: Chronic Conditions and Co-morbidities  Goal: Patient's chronic conditions and co-morbidity symptoms are monitored and maintained or improved  8/3/2023 1109 by Arlette Holley RN  Outcome: Progressing     Problem: Cardiovascular - Adult  Goal: Maintains optimal cardiac output and hemodynamic stability  8/3/2023 1109 by Arlette Holley RN  Outcome: Progressing     Problem: Cardiovascular - Adult  Goal: Absence of cardiac dysrhythmias or at baseline  8/3/2023 1109 by Arlette Holley RN  Outcome: Progressing     Problem: Metabolic/Fluid and Electrolytes - Adult  Goal: Electrolytes maintained within normal limits  8/3/2023 1109 by Arlette Holley RN  Outcome: Progressing     Problem: Metabolic/Fluid and Electrolytes - Adult  Goal: Hemodynamic stability and optimal renal function maintained  8/3/2023 1109 by Arlette Holley RN  Outcome: Progressing     Problem: Discharge Planning  Goal: Discharge to home or other facility with appropriate resources  Outcome: Progressing     Problem: Safety - Adult  Goal: Free from fall injury  8/3/2023 1109 by Arlette Holley RN  Outcome: Progressing

## 2023-08-03 NOTE — DISCHARGE SUMMARY
Discharge Summary    Hillary Weaver  :  1965  MRN:  [id number]    Admit date:  2023  Discharge date:  8/3/2023    Admitting Physician:  Nazanin Murillo DO    Discharge Diagnoses:    Patient Active Problem List   Diagnosis    GERD (gastroesophageal reflux disease)    COPD (chronic obstructive pulmonary disease)    Hyperlipemia    ED (erectile dysfunction)    History of stroke    Depression    CAD (coronary artery disease)    Dual implantable cardioverter-defibrillator in situ    NSVT (nonsustained ventricular tachycardia) (HCC)    Diverticulosis of colon    History of MI (myocardial infarction)    Chronic systolic congestive heart failure ([address])    Essential hypertension    Type 2 diabetes mellitus, without long-term current use of insulin (HCC)    Obesity (BMI 30.0-34.9)    Ischemic cardiomyopathy    Nonrheumatic aortic valve insufficiency    Elevated PSA, less than 10 ng/ml    CHF, acute on chronic (HCC)    Leg swelling    ALEX (acute kidney injury) ([address])    Age-related nuclear cataract, bilateral    Presbyopia    Regular astigmatism, bilateral    Prostate cancer ([address])    Aneurysm of right common iliac artery ([address])       Admission Condition:  fair    Discharged Condition:  stable    Hospital Course:  Hillary Weaver is a 61 y.o. male with      Hillary Weaver is a 61 y.o. male with a PMH of sigmoid colectomy with diverticulitis, CAD, COPD, GERD, HFrEF w/EF 30% in , T2DM, prostate cancer currently on chemo, HTN, HLD who presented to the ED today for stabbing RLQ abdominal pain that initially started 1 week ago and worsened over the past 2 days. He presented to his PCP at [facility] and an outpatient CT scan showed evidence of cholecystitis. In the ED, patient was evaluated by general surgery and a lap madhu was scheduled the following day. Cardiology cleared him for surgery and he underwent an echo. He underwent the procedure and tolerated it well. The patient's course was otherwise uneventful.  He progressed tablet  Commonly known as: TOPROL XL  Take 2 tablets by mouth daily     pantoprazole 40 MG tablet  Commonly known as: PROTONIX  Take 1 tablet by mouth daily     sacubitril-valsartan  MG per tablet  Commonly known as: ENTRESTO  Take 1 tablet by mouth 2 times daily     spironolactone 25 MG tablet  Commonly known as: ALDACTONE  Take 1 tablet by mouth daily     tamsulosin 0.4 MG capsule  Commonly known as: FLOMAX  Take 1 capsule by mouth daily     Xtandi 40 MG Tabs  Generic drug: Enzalutamide            STOP taking these medications      calcium carbonate 600 MG Tabs tablet  Commonly known as: Calcium 600     HYDROcodone-acetaminophen 5-325 MG per tablet  Commonly known as: Norco     ondansetron 4 MG tablet  Commonly known as: ZOFRAN     potassium chloride 20 MEQ extended release tablet  Commonly known as: KLOR-CON M               Where to Get Your Medications        These medications were sent to [address] [phone]  [address] [place] [address],[address]      Phone: [phone]   oxyCODONE 5 MG immediate release tablet  sennosides-docusate sodium 8.6-50 MG tablet         Consults:  cardiology and general surgery    Significant Diagnostic Studies:      Labs:  Na/K/Cl/CO2:  140/3.7/101/24 (08/03 0515)  BUN/Cr/glu/ALT/AST/amyl/lip:  16/1.0/--/15/30/--/-- (08/03 0515)  WBC/Hgb/Hct/Plts:  5.6/13.4/44.5/264 (08/03 0515)  [unfilled]  estimated creatinine clearance is 97 mL/min (based on SCr of 1 mg/dL). New Imaging:  XR CHEST PORTABLE   Final Result   Cardiomediastinal silhouette is enlarged. Left chest wall pacemaker/AICD. Suspect mild atelectasis at the lung bases. No sizable pleural effusion or   gross pneumothorax. US ABDOMEN LIMITED   Final Result   Fluid surrounding the gallbladder with mild wall thickening. No evidence of   stones or biliary ductal dilatation.   Continued follow-up can be performed if   clinically

## 2023-08-03 NOTE — PLAN OF CARE
Problem: ABCDS Injury Assessment  Goal: Absence of physical injury  8/3/2023 0833 by Josey Freeman RN  Outcome: Progressing  8/2/2023 2332 by Judd Hurley RN  Outcome: Progressing  8/2/2023 2300 by Keke Weaver RN  Outcome: Progressing     Problem: Pain  Goal: Verbalizes/displays adequate comfort level or baseline comfort level  8/3/2023 0833 by Josey Freeman RN  Outcome: Progressing  Flowsheets (Taken 8/3/2023 0701)  Verbalizes/displays adequate comfort level or baseline comfort level: Encourage patient to monitor pain and request assistance  8/2/2023 2332 by Judd Hurley RN  Outcome: Progressing  8/2/2023 2300 by Keke Weaver RN  Outcome: Progressing     Problem: Chronic Conditions and Co-morbidities  Goal: Patient's chronic conditions and co-morbidity symptoms are monitored and maintained or improved  8/3/2023 0833 by Josey Freeman RN  Outcome: Progressing  8/2/2023 2300 by Keke Weaver RN  Outcome: Progressing     Problem: Cardiovascular - Adult  Goal: Maintains optimal cardiac output and hemodynamic stability  8/3/2023 0833 by Josey Freeman RN  Outcome: Progressing  8/2/2023 2300 by Keke Weaver RN  Outcome: Progressing  Goal: Absence of cardiac dysrhythmias or at baseline  8/3/2023 0833 by Josey Freeman RN  Outcome: Progressing  8/2/2023 2300 by Keke Weaver RN  Outcome: Progressing     Problem: Metabolic/Fluid and Electrolytes - Adult  Goal: Electrolytes maintained within normal limits  8/3/2023 0833 by Josey Freeman RN  Outcome: Progressing  8/2/2023 2300 by Keke Weaver RN  Outcome: Progressing  Goal: Hemodynamic stability and optimal renal function maintained  8/3/2023 0833 by Josey Freeman RN  Outcome: Progressing  8/2/2023 2300 by Keke Weaver RN  Outcome: Progressing     Problem: Safety - Adult  Goal: Free from fall injury  8/2/2023 2332 by Judd Hurley RN  Outcome: Progressing

## 2023-08-03 NOTE — CARE COORDINATION
SW met with patient. He is alert, oriented. He states he lives home alone in a 2 story home. He is independent at home and uses no DME. PCP is Dr Jordan Billy, and pharmacy is [facility]. No history of HHC or SONI. Plan home, likely today. No needs noted. Case Management Assessment  Initial Evaluation    Date/Time of Evaluation: 8/3/2023 2:22 PM  Assessment Completed by: JOSE Cuevas    If patient is discharged prior to next notation, then this note serves as note for discharge by case management. Patient Name: Richelle Ramos                   YOB: 1965  Diagnosis: Acute cholecystitis [K81.0]                   Date / Time: 8/1/2023 10:49 AM    Patient Admission Status: Inpatient   Readmission Risk (Low < 19, Mod (19-27), High > 27): Readmission Risk Score: 13    Current PCP: Ghazala Rosario, DO  PCP verified by CM? Yes    Chart Reviewed: Yes      History Provided by: Patient  Patient Orientation: Alert and Oriented, Person, Place, Situation, Self    Patient Cognition: Alert    Hospitalization in the last 30 days (Readmission):  No    If yes, Readmission Assessment in CM Navigator will be completed. Advance Directives:      Code Status: Full Code   Patient's Primary Decision Maker is: Legal Next of Kin    Primary Decision Maker: Aida Hollingsworth - Domestic Partner - [phone]    Secondary Decision Maker: Ayanna Gaxiola - Brother/Sister - [phone]    Supplemental (Other) Decision Maker: Gustavo Hernandezkraig - Parent - [phone]    Discharge Planning:    Patient lives with: Alone Type of Home: House  Primary Care Giver: Self  Patient Support Systems include: Family Members   Current Financial resources: Medicare  Current community resources: None  Current services prior to admission: None            Current DME:              Type of Home Care services:  None    ADLS  Prior functional level: Independent in ADLs/IADLs  Current functional level:  Independent in

## 2023-08-03 NOTE — PLAN OF CARE
Problem: ABCDS Injury Assessment  Goal: Absence of physical injury  Outcome: Progressing     Problem: Pain  Goal: Verbalizes/displays adequate comfort level or baseline comfort level  Outcome: Progressing     Problem: Chronic Conditions and Co-morbidities  Goal: Patient's chronic conditions and co-morbidity symptoms are monitored and maintained or improved  Outcome: Progressing     Problem: Cardiovascular - Adult  Goal: Maintains optimal cardiac output and hemodynamic stability  Outcome: Progressing  Goal: Absence of cardiac dysrhythmias or at baseline  Outcome: Progressing     Problem: Metabolic/Fluid and Electrolytes - Adult  Goal: Electrolytes maintained within normal limits  Outcome: Progressing  Goal: Hemodynamic stability and optimal renal function maintained  Outcome: Progressing

## 2023-08-04 ENCOUNTER — TELEPHONE (OUTPATIENT)
Dept: FAMILY MEDICINE CLINIC | Age: 58
End: 2023-08-04

## 2023-08-04 NOTE — TELEPHONE ENCOUNTER
[facility] Transitions Initial Follow Up Call    Call within 2 business days of discharge: Yes     Patient: Franky Stroud Patient : 1965   MRN: [id number]  Reason for Admission: 23  Discharge Date: 8/3/23 RARS: Readmission Risk Score: 13       Spoke with: April spoke with patient     Discharge department/facility: [facility]    Non-face-to-face services provided:       Follow Up  Future Appointments   Date Time Provider [address] [place] [address]   2023  2:00 PM Ronda Aguilar MD MED ONC [facility]   2023  2:45 PM [facility] MED ONC FAST TRACK 1 [facility] Med Onc [facility]   8/10/2023  1:00 PM Kiera Trejo MD [facility]   8/15/2023  1:00 PM [facility] CHF ROOM 1 [facility] CHF [facility]   2023  1:00 PM SCHEDULE, [facility] PALLIATIVE CARE PROVIDER [facility] PALLIAT [facility]   2023  1:20 PM [facility]   2023 11:00 AM [facility] 2 [facility] INF SER [place], [place]

## 2023-08-04 NOTE — OP NOTE
Operative Note      Patient: Katarzyna Flores  YOB: 1965  MRN: [id number]    Date of Procedure: 8/2/2023    Pre-Op Diagnosis Codes:     * Acute cholecystitis [K81.0]    Post-Op Diagnosis: Same       Procedure(s):  LAPAROSCOPIC CHOLECYSTECTOMY    Surgeon(s):  Duc Steen MD    Assistant:   Resident: Juan Diaz MD    Anesthesia: General    Estimated Blood Loss (mL): Minimal    Complications: None    Specimens:   ID Type Source Tests Collected by Time Destination   A : GALLBLADDER Tissue Gallbladder SURGICAL PATHOLOGY Duc Steen MD 8/2/2023 1423        Implants:  * No implants in log *      Drains: * No LDAs found *    Findings: steatosis, intraabdominal adhesions, acute cholecystitis    Detailed Description of Procedure: The patient was brought to the operating room and positioned supine on the operating room table. Sequential compression devices were placed on the patient's lower extremities and were powered on. General anesthesia was administered and preoperative antibiotics were administered per the anesthetic record. The patient was prepped and draped in the usual sterile fashion and the procedure went forth with strict aseptic technique under maximal barrier precautions. Immediately prior to the procedure a time-out was called and the surgical checklist was reviewed and agreed upon by all present. 15 scalpel blade was used to make an incision at Ahuja's point and Veress needle was inserted. Resting abdominal pressure 5 mmHg was measured. The abdomen is insufflated using carbon dioxide gas to a pressure of 15 mmHg. A 5 mm 30 degree laparoscope was loaded onto a 5 mm trocar and after the Veress needle was removed the end was entered into under direct visualization. There was good visualization with the laparoscope and there was no injury identified from our method of intra-abdominal entry.   There were adhesions at the midline however there was an area that could be

## 2023-08-07 ENCOUNTER — HOSPITAL ENCOUNTER (OUTPATIENT)
Dept: INFUSION THERAPY | Age: 58
Discharge: HOME | End: 2023-08-07
Payer: MEDICARE

## 2023-08-07 ENCOUNTER — OFFICE VISIT (OUTPATIENT)
Dept: ONCOLOGY | Age: 58
End: 2023-08-07
Payer: MEDICARE

## 2023-08-07 VITALS
RESPIRATION RATE: 20 BRPM | HEART RATE: 79 BPM | WEIGHT: 237.2 LBS | DIASTOLIC BLOOD PRESSURE: 89 MMHG | TEMPERATURE: 97.9 F | SYSTOLIC BLOOD PRESSURE: 153 MMHG | BODY MASS INDEX: 35.03 KG/M2 | OXYGEN SATURATION: 98 %

## 2023-08-07 VITALS
SYSTOLIC BLOOD PRESSURE: 139 MMHG | HEART RATE: 78 BPM | TEMPERATURE: 96.8 F | RESPIRATION RATE: 16 BRPM | DIASTOLIC BLOOD PRESSURE: 92 MMHG

## 2023-08-07 DIAGNOSIS — E83.51 HYPOCALCEMIA: ICD-10-CM

## 2023-08-07 DIAGNOSIS — C61 PROSTATE CANCER (HCC): Primary | ICD-10-CM

## 2023-08-07 DIAGNOSIS — C79.51 PROSTATE CANCER METASTATIC TO BONE (HCC): ICD-10-CM

## 2023-08-07 DIAGNOSIS — C61 PROSTATE CANCER (HCC): ICD-10-CM

## 2023-08-07 DIAGNOSIS — C61 PROSTATE CANCER METASTATIC TO BONE (HCC): ICD-10-CM

## 2023-08-07 LAB
ALBUMIN SERPL-MCNC: 3.5 G/DL (ref 3.5–5.2)
ALP SERPL-CCNC: 98 U/L (ref 40–129)
ALT SERPL-CCNC: 10 U/L (ref 0–40)
ANION GAP SERPL CALCULATED.3IONS-SCNC: 13 MMOL/L (ref 7–16)
AST SERPL-CCNC: 18 U/L (ref 0–39)
BASOPHILS # BLD: 0.06 K/UL (ref 0–0.2)
BASOPHILS NFR BLD: 1 % (ref 0–2)
BILIRUB SERPL-MCNC: 1.2 MG/DL (ref 0–1.2)
BUN SERPL-MCNC: 13 MG/DL (ref 6–20)
CALCIUM SERPL-MCNC: 7.9 MG/DL (ref 8.6–10.2)
CHLORIDE SERPL-SCNC: 100 MMOL/L (ref 98–107)
CO2 SERPL-SCNC: 26 MMOL/L (ref 22–29)
CREAT SERPL-MCNC: 1 MG/DL (ref 0.7–1.2)
EOSINOPHIL # BLD: 0.21 K/UL (ref 0.05–0.5)
EOSINOPHILS RELATIVE PERCENT: 4 % (ref 0–6)
ERYTHROCYTE [DISTWIDTH] IN BLOOD BY AUTOMATED COUNT: 17.2 % (ref 11.5–15)
GFR SERPL CREATININE-BSD FRML MDRD: >60 ML/MIN/1.73M2
GLUCOSE SERPL-MCNC: 181 MG/DL (ref 74–99)
HCT VFR BLD AUTO: 47.2 % (ref 37–54)
HGB BLD-MCNC: 14.3 G/DL (ref 12.5–16.5)
IMM GRANULOCYTES # BLD AUTO: <0.03 K/UL (ref 0–0.58)
IMM GRANULOCYTES NFR BLD: 0 % (ref 0–5)
LYMPHOCYTES NFR BLD: 0.87 K/UL (ref 1.5–4)
LYMPHOCYTES RELATIVE PERCENT: 14 % (ref 20–42)
MAGNESIUM SERPL-MCNC: 2 MG/DL (ref 1.6–2.6)
MCH RBC QN AUTO: 25.8 PG (ref 26–35)
MCHC RBC AUTO-ENTMCNC: 30.3 G/DL (ref 32–34.5)
MCV RBC AUTO: 85 FL (ref 80–99.9)
MONOCYTES NFR BLD: 0.3 K/UL (ref 0.1–0.95)
MONOCYTES NFR BLD: 5 % (ref 2–12)
NEUTROPHILS NFR BLD: 76 % (ref 43–80)
NEUTS SEG NFR BLD: 4.57 K/UL (ref 1.8–7.3)
PHOSPHATE SERPL-MCNC: 1.9 MG/DL (ref 2.5–4.5)
PLATELET # BLD AUTO: 257 K/UL (ref 130–450)
PMV BLD AUTO: 10.5 FL (ref 7–12)
POTASSIUM SERPL-SCNC: 3.7 MMOL/L (ref 3.5–5)
PROT SERPL-MCNC: 6.7 G/DL (ref 6.4–8.3)
PSA SERPL-MCNC: 0.1 NG/ML (ref 0–4)
RBC # BLD AUTO: 5.55 M/UL (ref 3.8–5.8)
SODIUM SERPL-SCNC: 139 MMOL/L (ref 132–146)
SURGICAL PATHOLOGY REPORT: NORMAL
WBC OTHER # BLD: 6 K/UL (ref 4.5–11.5)

## 2023-08-07 PROCEDURE — 84100 ASSAY OF PHOSPHORUS: CPT

## 2023-08-07 PROCEDURE — 85025 COMPLETE CBC W/AUTO DIFF WBC: CPT

## 2023-08-07 PROCEDURE — 36415 COLL VENOUS BLD VENIPUNCTURE: CPT

## 2023-08-07 PROCEDURE — 6360000002 HC RX W HCPCS: Performed by: INTERNAL MEDICINE

## 2023-08-07 PROCEDURE — 80053 COMPREHEN METABOLIC PANEL: CPT

## 2023-08-07 PROCEDURE — 96365 THER/PROPH/DIAG IV INF INIT: CPT

## 2023-08-07 PROCEDURE — 83735 ASSAY OF MAGNESIUM: CPT

## 2023-08-07 PROCEDURE — 84153 ASSAY OF PSA TOTAL: CPT

## 2023-08-07 PROCEDURE — 99214 OFFICE O/P EST MOD 30 MIN: CPT | Performed by: INTERNAL MEDICINE

## 2023-08-07 PROCEDURE — 3077F SYST BP >= 140 MM HG: CPT | Performed by: INTERNAL MEDICINE

## 2023-08-07 PROCEDURE — 3079F DIAST BP 80-89 MM HG: CPT | Performed by: INTERNAL MEDICINE

## 2023-08-07 RX ORDER — CALCIUM GLUCONATE 94 MG/ML
1000 INJECTION, SOLUTION INTRAVENOUS ONCE
Status: DISCONTINUED | OUTPATIENT
Start: 2023-08-07 | End: 2023-08-07

## 2023-08-07 RX ORDER — CALCIUM GLUCONATE 10 MG/ML
1000 INJECTION, SOLUTION INTRAVENOUS ONCE
Status: COMPLETED | OUTPATIENT
Start: 2023-08-07 | End: 2023-08-07

## 2023-08-07 RX ADMIN — CALCIUM GLUCONATE 1000 MG: 10 INJECTION, SOLUTION INTRAVENOUS at 15:02

## 2023-08-07 ASSESSMENT — PAIN DESCRIPTION - ORIENTATION: ORIENTATION: LOWER;MID

## 2023-08-07 ASSESSMENT — PAIN DESCRIPTION - ONSET: ONSET: ON-GOING

## 2023-08-07 ASSESSMENT — PAIN SCALES - GENERAL: PAINLEVEL_OUTOF10: 5

## 2023-08-07 ASSESSMENT — PAIN DESCRIPTION - LOCATION: LOCATION: BACK

## 2023-08-07 ASSESSMENT — PAIN DESCRIPTION - PAIN TYPE: TYPE: CHRONIC PAIN;OTHER (COMMENT)

## 2023-08-07 ASSESSMENT — PAIN DESCRIPTION - DESCRIPTORS: DESCRIPTORS: ACHING;DISCOMFORT;SORE

## 2023-08-07 ASSESSMENT — PAIN - FUNCTIONAL ASSESSMENT: PAIN_FUNCTIONAL_ASSESSMENT: ACTIVITIES ARE NOT PREVENTED

## 2023-08-07 ASSESSMENT — PAIN DESCRIPTION - FREQUENCY: FREQUENCY: CONTINUOUS

## 2023-08-09 NOTE — PROGRESS NOTES
Patient provided with discharge instructions, received printed AVS.  All questions answered. Patient understands follow up plan of care.
ordered today 8/7/2023.   Hold Xgeva today  Repeat BMP next week; if calcium equal or more than 8.0 then proceed with Linda Canchola (labs reviewed; hypocalcemia secondary to Alma Delia Carhema; hold Alma Delia Leong today)    Lois Reyes MD   8/7/2023

## 2023-08-10 ENCOUNTER — OFFICE VISIT (OUTPATIENT)
Dept: FAMILY MEDICINE CLINIC | Age: 58
End: 2023-08-10

## 2023-08-10 VITALS
BODY MASS INDEX: 41.46 KG/M2 | HEART RATE: 78 BPM | DIASTOLIC BLOOD PRESSURE: 74 MMHG | WEIGHT: 234 LBS | HEIGHT: 63 IN | SYSTOLIC BLOOD PRESSURE: 118 MMHG | OXYGEN SATURATION: 97 % | RESPIRATION RATE: 18 BRPM | TEMPERATURE: 98.2 F

## 2023-08-10 DIAGNOSIS — C61 PROSTATE CANCER (HCC): ICD-10-CM

## 2023-08-10 DIAGNOSIS — Z90.49 S/P LAPAROSCOPIC CHOLECYSTECTOMY: ICD-10-CM

## 2023-08-10 DIAGNOSIS — E11.9 TYPE 2 DIABETES MELLITUS WITHOUT COMPLICATION, WITHOUT LONG-TERM CURRENT USE OF INSULIN (HCC): ICD-10-CM

## 2023-08-10 DIAGNOSIS — Z09 HOSPITAL DISCHARGE FOLLOW-UP: ICD-10-CM

## 2023-08-10 DIAGNOSIS — I50.22 CHRONIC SYSTOLIC CONGESTIVE HEART FAILURE (HCC): ICD-10-CM

## 2023-08-10 DIAGNOSIS — I25.10 CORONARY ARTERY DISEASE INVOLVING NATIVE CORONARY ARTERY OF NATIVE HEART WITHOUT ANGINA PECTORIS: Chronic | ICD-10-CM

## 2023-08-10 DIAGNOSIS — I50.20 HFREF (HEART FAILURE WITH REDUCED EJECTION FRACTION) (HCC): ICD-10-CM

## 2023-08-10 DIAGNOSIS — E13.9 DIABETES MELLITUS OF OTHER TYPE WITHOUT COMPLICATION, UNSPECIFIED WHETHER LONG TERM INSULIN USE (HCC): ICD-10-CM

## 2023-08-10 DIAGNOSIS — M79.89 LEG SWELLING: Primary | ICD-10-CM

## 2023-08-10 DIAGNOSIS — C61 PROSTATE CANCER (HCC): Primary | ICD-10-CM

## 2023-08-10 DIAGNOSIS — R10.9 FLANK PAIN: ICD-10-CM

## 2023-08-10 DIAGNOSIS — K21.9 GASTROESOPHAGEAL REFLUX DISEASE, UNSPECIFIED WHETHER ESOPHAGITIS PRESENT: Chronic | ICD-10-CM

## 2023-08-10 LAB
ABSOLUTE IMMATURE GRANULOCYTE: <0.03 K/UL (ref 0–0.58)
ALBUMIN SERPL-MCNC: 4 G/DL (ref 3.5–5.2)
ALP BLD-CCNC: 104 U/L (ref 40–129)
ALT SERPL-CCNC: 8 U/L (ref 0–40)
AMYLASE: 60 U/L (ref 20–100)
ANION GAP SERPL CALCULATED.3IONS-SCNC: 15 MMOL/L (ref 7–16)
AST SERPL-CCNC: 21 U/L (ref 0–39)
BASOPHILS ABSOLUTE: 0.06 K/UL (ref 0–0.2)
BASOPHILS RELATIVE PERCENT: 1 % (ref 0–2)
BILIRUB SERPL-MCNC: 1.2 MG/DL (ref 0–1.2)
BUN BLDV-MCNC: 16 MG/DL (ref 6–20)
CALCIUM SERPL-MCNC: 8.6 MG/DL (ref 8.6–10.2)
CHLORIDE BLD-SCNC: 100 MMOL/L (ref 98–107)
CO2: 26 MMOL/L (ref 22–29)
CREAT SERPL-MCNC: 1 MG/DL (ref 0.7–1.2)
EOSINOPHILS ABSOLUTE: 0.3 K/UL (ref 0.05–0.5)
EOSINOPHILS RELATIVE PERCENT: 5 % (ref 0–6)
GFR SERPL CREATININE-BSD FRML MDRD: >60 ML/MIN/1.73M2
GLUCOSE BLD-MCNC: 101 MG/DL (ref 74–99)
HCT VFR BLD CALC: 50.3 % (ref 37–54)
HEMOGLOBIN: 14.7 G/DL (ref 12.5–16.5)
IMMATURE GRANULOCYTES: 0 % (ref 0–5)
LIPASE: 27 U/L (ref 13–60)
LYMPHOCYTES ABSOLUTE: 1.03 K/UL (ref 1.5–4)
LYMPHOCYTES RELATIVE PERCENT: 18 % (ref 20–42)
MCH RBC QN AUTO: 25.7 PG (ref 26–35)
MCHC RBC AUTO-ENTMCNC: 29.2 G/DL (ref 32–34.5)
MCV RBC AUTO: 88.1 FL (ref 80–99.9)
MONOCYTES ABSOLUTE: 0.37 K/UL (ref 0.1–0.95)
MONOCYTES RELATIVE PERCENT: 7 % (ref 2–12)
NEUTROPHILS ABSOLUTE: 3.94 K/UL (ref 1.8–7.3)
NEUTROPHILS RELATIVE PERCENT: 69 % (ref 43–80)
PDW BLD-RTO: 17.8 % (ref 11.5–15)
PLATELET # BLD: 249 K/UL (ref 130–450)
PMV BLD AUTO: 11.1 FL (ref 7–12)
POTASSIUM SERPL-SCNC: 3.7 MMOL/L (ref 3.5–5)
RBC # BLD: 5.71 M/UL (ref 3.8–5.8)
SODIUM BLD-SCNC: 141 MMOL/L (ref 132–146)
TOTAL PROTEIN: 7 G/DL (ref 6.4–8.3)
WBC # BLD: 5.7 K/UL (ref 4.5–11.5)

## 2023-08-10 RX ORDER — SPIRONOLACTONE 25 MG/1
25 TABLET ORAL DAILY
Qty: 30 TABLET | Refills: 5 | Status: SHIPPED | OUTPATIENT
Start: 2023-08-10

## 2023-08-10 RX ORDER — METOPROLOL SUCCINATE 50 MG/1
100 TABLET, EXTENDED RELEASE ORAL DAILY
Qty: 180 TABLET | Refills: 1 | Status: SHIPPED | OUTPATIENT
Start: 2023-08-10

## 2023-08-10 RX ORDER — ASPIRIN 81 MG/1
81 TABLET ORAL DAILY
Qty: 90 TABLET | Refills: 3 | Status: SHIPPED | OUTPATIENT
Start: 2023-08-10

## 2023-08-10 RX ORDER — FUROSEMIDE 40 MG/1
40 TABLET ORAL PRN
Qty: 90 TABLET | Refills: 0 | Status: SHIPPED | OUTPATIENT
Start: 2023-08-10

## 2023-08-10 RX ORDER — CLOPIDOGREL BISULFATE 75 MG/1
75 TABLET ORAL DAILY
Qty: 30 TABLET | Refills: 5 | Status: SHIPPED | OUTPATIENT
Start: 2023-08-10

## 2023-08-10 RX ORDER — BUMETANIDE 1 MG/1
1 TABLET ORAL DAILY
Qty: 90 TABLET | Refills: 1 | Status: SHIPPED | OUTPATIENT
Start: 2023-08-10

## 2023-08-10 RX ORDER — TAMSULOSIN HYDROCHLORIDE 0.4 MG/1
0.4 CAPSULE ORAL DAILY
Qty: 90 CAPSULE | Refills: 1 | Status: SHIPPED | OUTPATIENT
Start: 2023-08-10

## 2023-08-10 RX ORDER — ISOSORBIDE MONONITRATE 30 MG/1
90 TABLET, EXTENDED RELEASE ORAL DAILY
Qty: 90 TABLET | Refills: 5 | Status: SHIPPED | OUTPATIENT
Start: 2023-08-10 | End: 2024-02-06

## 2023-08-10 RX ORDER — PANTOPRAZOLE SODIUM 40 MG/1
40 TABLET, DELAYED RELEASE ORAL DAILY
Qty: 30 TABLET | Refills: 5 | Status: SHIPPED | OUTPATIENT
Start: 2023-08-10

## 2023-08-10 RX ORDER — ATORVASTATIN CALCIUM 80 MG/1
80 TABLET, FILM COATED ORAL NIGHTLY
Qty: 90 TABLET | Refills: 3 | Status: SHIPPED | OUTPATIENT
Start: 2023-08-10

## 2023-08-10 ASSESSMENT — ENCOUNTER SYMPTOMS
VOMITING: 0
NAUSEA: 0
SHORTNESS OF BREATH: 1
WHEEZING: 0
ABDOMINAL PAIN: 0
COUGH: 0
CONSTIPATION: 0
DIARRHEA: 0

## 2023-08-10 NOTE — PROGRESS NOTES
Post-Discharge Transitional Care Follow Up      Philip Rogers   YOB: 1965    Date of Office Visit:  8/10/2023  Date of Hospital Admission: 8/1/23  Date of Hospital Discharge: 8/3/23  Readmission Risk Score (high >=14%. Medium >=10%):Readmission Risk Score: 13      Care management risk score Rising risk (score 2-5) and Complex Care (Scores >=6): No Risk Score On File     Non face to face  following discharge, date last encounter closed (first attempt may have been earlier): 08/04/2023     Call initiated 2 business days of discharge: Yes     Leg swelling  -     Compression Stockings MISC; Starting Thu 8/10/2023, Disp-1 each, R-0, Print  -     furosemide (LASIX) 40 MG tablet; Take 1 tablet by mouth as needed (as needed for leg swelling), Disp-90 tablet, R-0Normal  -     Comprehensive Metabolic Panel  Gastroesophageal reflux disease, unspecified whether esophagitis present  -     pantoprazole (PROTONIX) 40 MG tablet; Take 1 tablet by mouth daily, Disp-30 tablet, R-5Normal  HFrEF (heart failure with reduced ejection fraction) ([facility])  -     Compression Stockings MISC; Starting Thu 8/10/2023, Disp-1 each, R-0, Print  -     isosorbide mononitrate (IMDUR) 30 MG extended release tablet; Take 3 tablets by mouth daily, Disp-90 tablet, R-5Normal  -     spironolactone (ALDACTONE) 25 MG tablet; Take 1 tablet by mouth daily, Disp-30 tablet, R-5Normal  -     metoprolol succinate (TOPROL XL) 50 MG extended release tablet; Take 2 tablets by mouth daily, Disp-180 tablet, R-1Normal  -     furosemide (LASIX) 40 MG tablet; Take 1 tablet by mouth as needed (as needed for leg swelling), Disp-90 tablet, R-0Normal  Coronary artery disease involving native coronary artery of native heart without angina pectoris  -     aspirin 81 MG EC tablet; Take 1 tablet by mouth daily, Disp-90 tablet, R-3Normal  -     clopidogrel (PLAVIX) 75 MG tablet;  Take 1 tablet by mouth daily, Disp-30 tablet, R-5Normal  -     isosorbide mononitrate

## 2023-08-11 LAB
BILIRUBIN URINE: NEGATIVE
COLOR: YELLOW
GLUCOSE URINE: NEGATIVE MG/DL
KETONES, URINE: NEGATIVE MG/DL
LEUKOCYTE ESTERASE, URINE: NEGATIVE
NITRITE, URINE: NEGATIVE
PH UA: 7 (ref 5–9)
PROTEIN UA: NEGATIVE MG/DL
RBC UA: ABNORMAL /HPF
SPECIFIC GRAVITY UA: 1.01 (ref 1–1.03)
TURBIDITY: CLEAR
URINE HGB: NEGATIVE
UROBILINOGEN, URINE: 2 EU/DL (ref 0–1)
WBC UA: ABNORMAL /HPF

## 2023-08-11 ASSESSMENT — ENCOUNTER SYMPTOMS
BACK PAIN: 1
SORE THROAT: 0
SINUS PRESSURE: 0

## 2023-08-11 NOTE — PROGRESS NOTES
Department of [facility] Oncology   Attending Clinic Note    Reason for Visit: Follow-up on a patient with Prostate Cancer    PCP:  Luis Sarmiento DO    History of Present Illness:  59-year-old male who was noted to have elevated PSA   Evaluated by Urology team (Dr. Rachell Saravia Shayne)    PSA 9.20 on 03/31/2021  PSA 26.39 on 05/31/2022  PSA 31.83 on 06/20/2022    On 08/04/2022:  A. Prostate, left base, biopsy: Prostatic adenocarcinoma (acinar, not otherwise specified). Savona Score: 4+3=7; cribriform pattern absent; percentage of pattern 4 is approximately 60%   Grade Group:  3   Tissue Cores Involved by Carcinoma: 2 of 2   Percentage of Tissue Involved by Carcinoma: 10%   Perineural Invasion: Present     B. Prostate, left mid, biopsy: Prostatic adenocarcinoma (acinar, not otherwise specified) and intraductal carcinoma. Andrew Score: 4+5=9; cribriform pattern present   Grade Group:  5   Tissue Cores Involved by Carcinoma: 2 of 2   Percentage of Tissue Involved by Carcinoma: 80%   Perineural Invasion: Present     C. Prostate, left apex, biopsy: Prostatic adenocarcinoma (acinar, not otherwise specified). Savona Score: 4+4 = 8; cribriform pattern present   Grade Group:  4   Tissue Cores Involved by Carcinoma: 2 of 2   Percentage of Tissue Involved by Carcinoma: 50%   Perineural Invasion: Present     D. Prostate, right base, biopsy: Prostatic adenocarcinoma (acinar, not otherwise specified) and intraductal carcinoma. Savona Score: 4+3 = 7; cribriform pattern present; percentage of pattern 4 is approximately 80%   Grade Group:  3   Tissue Cores Involved by Carcinoma: 3 of 3   Percentage of Tissue Involved by Carcinoma: 90%   Perineural Invasion: Present     E. 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    Andrew Score: 4+4=8; cribriform pattern present   Grade Group:  4   Tissue Cores Involved by Carcinoma: 3 of 3   Percentage of Tissue

## 2023-08-14 ENCOUNTER — HOSPITAL ENCOUNTER (OUTPATIENT)
Dept: INFUSION THERAPY | Age: 58
Discharge: HOME | End: 2023-08-14
Payer: MEDICARE

## 2023-08-14 ENCOUNTER — OFFICE VISIT (OUTPATIENT)
Dept: ONCOLOGY | Age: 58
End: 2023-08-14
Payer: MEDICARE

## 2023-08-14 VITALS
HEIGHT: 63 IN | TEMPERATURE: 97.2 F | OXYGEN SATURATION: 95 % | WEIGHT: 234 LBS | DIASTOLIC BLOOD PRESSURE: 76 MMHG | HEART RATE: 89 BPM | BODY MASS INDEX: 41.46 KG/M2 | SYSTOLIC BLOOD PRESSURE: 136 MMHG

## 2023-08-14 DIAGNOSIS — C61 PROSTATE CANCER (HCC): Primary | ICD-10-CM

## 2023-08-14 LAB
ALBUMIN SERPL-MCNC: 3.7 G/DL (ref 3.5–5.2)
ALP SERPL-CCNC: 96 U/L (ref 40–129)
ALT SERPL-CCNC: 8 U/L (ref 0–40)
ANION GAP SERPL CALCULATED.3IONS-SCNC: 11 MMOL/L (ref 7–16)
AST SERPL-CCNC: 15 U/L (ref 0–39)
BILIRUB SERPL-MCNC: 1.3 MG/DL (ref 0–1.2)
BUN SERPL-MCNC: 21 MG/DL (ref 6–20)
CALCIUM SERPL-MCNC: 9 MG/DL (ref 8.6–10.2)
CHLORIDE SERPL-SCNC: 99 MMOL/L (ref 98–107)
CO2 SERPL-SCNC: 28 MMOL/L (ref 22–29)
CREAT SERPL-MCNC: 1 MG/DL (ref 0.7–1.2)
GFR SERPL CREATININE-BSD FRML MDRD: >60 ML/MIN/1.73M2
GLUCOSE SERPL-MCNC: 119 MG/DL (ref 74–99)
MAGNESIUM SERPL-MCNC: 1.9 MG/DL (ref 1.6–2.6)
PHOSPHATE SERPL-MCNC: 3.1 MG/DL (ref 2.5–4.5)
POTASSIUM SERPL-SCNC: 3.3 MMOL/L (ref 3.5–5)
PROT SERPL-MCNC: 6.8 G/DL (ref 6.4–8.3)
PSA SERPL-MCNC: 0.09 NG/ML (ref 0–4)
SODIUM SERPL-SCNC: 138 MMOL/L (ref 132–146)

## 2023-08-14 PROCEDURE — 3075F SYST BP GE 130 - 139MM HG: CPT | Performed by: CLINICAL NURSE SPECIALIST

## 2023-08-14 PROCEDURE — 6360000002 HC RX W HCPCS: Performed by: INTERNAL MEDICINE

## 2023-08-14 PROCEDURE — 80053 COMPREHEN METABOLIC PANEL: CPT

## 2023-08-14 PROCEDURE — 84100 ASSAY OF PHOSPHORUS: CPT

## 2023-08-14 PROCEDURE — 99212 OFFICE O/P EST SF 10 MIN: CPT

## 2023-08-14 PROCEDURE — 84153 ASSAY OF PSA TOTAL: CPT

## 2023-08-14 PROCEDURE — 83735 ASSAY OF MAGNESIUM: CPT

## 2023-08-14 PROCEDURE — 3078F DIAST BP <80 MM HG: CPT | Performed by: CLINICAL NURSE SPECIALIST

## 2023-08-14 PROCEDURE — 99213 OFFICE O/P EST LOW 20 MIN: CPT | Performed by: CLINICAL NURSE SPECIALIST

## 2023-08-14 PROCEDURE — 96372 THER/PROPH/DIAG INJ SC/IM: CPT

## 2023-08-14 PROCEDURE — 36415 COLL VENOUS BLD VENIPUNCTURE: CPT

## 2023-08-14 RX ORDER — ALBUTEROL SULFATE 90 UG/1
4 AEROSOL, METERED RESPIRATORY (INHALATION) PRN
OUTPATIENT
Start: 2023-09-04

## 2023-08-14 RX ORDER — DIPHENHYDRAMINE HYDROCHLORIDE 50 MG/ML
50 INJECTION INTRAMUSCULAR; INTRAVENOUS
OUTPATIENT
Start: 2023-09-04

## 2023-08-14 RX ORDER — SODIUM CHLORIDE 9 MG/ML
INJECTION, SOLUTION INTRAVENOUS CONTINUOUS
OUTPATIENT
Start: 2023-09-04

## 2023-08-14 RX ORDER — ACETAMINOPHEN 325 MG/1
650 TABLET ORAL
OUTPATIENT
Start: 2023-09-04

## 2023-08-14 RX ORDER — ONDANSETRON 2 MG/ML
8 INJECTION INTRAMUSCULAR; INTRAVENOUS
OUTPATIENT
Start: 2023-09-04

## 2023-08-14 RX ORDER — EPINEPHRINE 1 MG/ML
0.3 INJECTION, SOLUTION, CONCENTRATE INTRAVENOUS PRN
OUTPATIENT
Start: 2023-09-04

## 2023-08-14 RX ADMIN — DENOSUMAB 120 MG: 120 INJECTION SUBCUTANEOUS at 15:00

## 2023-08-15 ENCOUNTER — TELEPHONE (OUTPATIENT)
Dept: CARDIOLOGY CLINIC | Age: 58
End: 2023-08-15

## 2023-08-15 ENCOUNTER — HOSPITAL ENCOUNTER (OUTPATIENT)
Dept: OTHER | Age: 58
Setting detail: THERAPIES SERIES
Discharge: HOME | End: 2023-08-15
Payer: MEDICARE

## 2023-08-15 VITALS
SYSTOLIC BLOOD PRESSURE: 130 MMHG | RESPIRATION RATE: 18 BRPM | HEART RATE: 86 BPM | DIASTOLIC BLOOD PRESSURE: 77 MMHG | WEIGHT: 238 LBS | OXYGEN SATURATION: 98 % | BODY MASS INDEX: 42.16 KG/M2

## 2023-08-15 LAB
ANION GAP SERPL CALCULATED.3IONS-SCNC: 11 MMOL/L (ref 7–16)
BNP SERPL-MCNC: 8921 PG/ML (ref 0–125)
BUN SERPL-MCNC: 21 MG/DL (ref 6–20)
CALCIUM SERPL-MCNC: 9.2 MG/DL (ref 8.6–10.2)
CHLORIDE SERPL-SCNC: 99 MMOL/L (ref 98–107)
CO2 SERPL-SCNC: 28 MMOL/L (ref 22–29)
CREAT SERPL-MCNC: 1.1 MG/DL (ref 0.7–1.2)
GFR SERPL CREATININE-BSD FRML MDRD: >60 ML/MIN/1.73M2
GLUCOSE SERPL-MCNC: 135 MG/DL (ref 74–99)
POTASSIUM SERPL-SCNC: 3.5 MMOL/L (ref 3.5–5)
SODIUM SERPL-SCNC: 138 MMOL/L (ref 132–146)

## 2023-08-15 PROCEDURE — 2580000003 HC RX 258: Performed by: INTERNAL MEDICINE

## 2023-08-15 PROCEDURE — 96374 THER/PROPH/DIAG INJ IV PUSH: CPT

## 2023-08-15 PROCEDURE — 83880 ASSAY OF NATRIURETIC PEPTIDE: CPT

## 2023-08-15 PROCEDURE — 6360000002 HC RX W HCPCS: Performed by: INTERNAL MEDICINE

## 2023-08-15 PROCEDURE — 99214 OFFICE O/P EST MOD 30 MIN: CPT

## 2023-08-15 PROCEDURE — 80048 BASIC METABOLIC PNL TOTAL CA: CPT

## 2023-08-15 RX ORDER — SODIUM CHLORIDE 0.9 % (FLUSH) 0.9 %
10 SYRINGE (ML) INJECTION PRN
Status: DISCONTINUED | OUTPATIENT
Start: 2023-08-15 | End: 2023-08-16 | Stop reason: HOSPADM

## 2023-08-15 RX ORDER — FUROSEMIDE 10 MG/ML
40 INJECTION INTRAMUSCULAR; INTRAVENOUS ONCE
Status: COMPLETED | OUTPATIENT
Start: 2023-08-15 | End: 2023-08-15

## 2023-08-15 RX ADMIN — FUROSEMIDE 40 MG: 10 INJECTION, SOLUTION INTRAMUSCULAR; INTRAVENOUS at 13:14

## 2023-08-15 RX ADMIN — SODIUM CHLORIDE, PRESERVATIVE FREE 10 ML: 5 INJECTION INTRAVENOUS at 13:14

## 2023-08-15 NOTE — TELEPHONE ENCOUNTER
CHRISTINA Osuna, DO; Alex Jiang  Patient's weight is up 8 lbs in a month. He is post Cholecystectomy from 8-2-23. Lasix 40 mg IVP given today. K today 3.5   Scheduled to return next week ( Friday ).

## 2023-08-15 NOTE — PLAN OF CARE
Problem: Chronic Conditions and Co-morbidities  Goal: Patient's chronic conditions and co-morbidity symptoms are monitored and maintained or improved  Flowsheets (Taken 8/15/2023 6641)  Care Plan - Patient's Chronic Conditions and Co-Morbidity Symptoms are Monitored and Maintained or Improved: Monitor and assess patient's chronic conditions and comorbid symptoms for stability, deterioration, or improvement

## 2023-08-16 ENCOUNTER — TELEPHONE (OUTPATIENT)
Dept: CARDIOLOGY CLINIC | Age: 58
End: 2023-08-16

## 2023-08-16 NOTE — TELEPHONE ENCOUNTER
Take K. Dur 40 mEq tomorrow.   On Friday only, take the Bumex twice daily and take another 20 mill equivalents of K-Dur.

## 2023-08-17 NOTE — TELEPHONE ENCOUNTER
Per verbal from Dr. Rolanda Duran:    1)  Increase Bumex to 1.5 mg daily  2)  Stop Lasix  3)  Take K. Dur 40 mEq today  4)  On Friday only, take the Bumex twice daily and take another 20 mill equivalents of K-Dur    Left message for patient to call the office.

## 2023-08-17 NOTE — TELEPHONE ENCOUNTER
Patient notified of Dr. Sam Brar recommendations. Patient states he is taking both Bumex and Lasix. He states he has no idea how many days he takes both but he guesses about (5) times per week he will take a Bumex and then about an hour later a Lasix. He does not take Potassium daily.

## 2023-08-19 PROCEDURE — 93296 REM INTERROG EVL PM/IDS: CPT | Performed by: STUDENT IN AN ORGANIZED HEALTH CARE EDUCATION/TRAINING PROGRAM

## 2023-08-19 PROCEDURE — 93295 DEV INTERROG REMOTE 1/2/MLT: CPT | Performed by: STUDENT IN AN ORGANIZED HEALTH CARE EDUCATION/TRAINING PROGRAM

## 2023-08-23 ENCOUNTER — OFFICE VISIT (OUTPATIENT)
Dept: SURGERY | Age: 58
End: 2023-08-23

## 2023-08-23 VITALS
TEMPERATURE: 98.2 F | RESPIRATION RATE: 16 BRPM | DIASTOLIC BLOOD PRESSURE: 88 MMHG | OXYGEN SATURATION: 98 % | SYSTOLIC BLOOD PRESSURE: 148 MMHG | HEART RATE: 95 BPM

## 2023-08-23 DIAGNOSIS — Z09 POSTOP CHECK: Primary | ICD-10-CM

## 2023-08-23 PROCEDURE — 99024 POSTOP FOLLOW-UP VISIT: CPT | Performed by: SURGERY

## 2023-08-23 RX ORDER — CALCIUM POLYCARBOPHIL 625 MG
1 TABLET ORAL DAILY
Qty: 90 TABLET | Refills: 3 | Status: SHIPPED | OUTPATIENT
Start: 2023-08-23 | End: 2023-11-21

## 2023-08-24 NOTE — TELEPHONE ENCOUNTER
Patient notified (via voicemail) of Dr. Lrorie Abarca recommendation. Patient put on list to call when February 2024 schedule opens.

## 2023-08-24 NOTE — TELEPHONE ENCOUNTER
Left message for patient to call the office. Patient is scheduled at [address] [facility] tomorrow. Spoke with Nanette Carlson, if patient shows up [address] [facility] will give him Dr. Alyce Chahal instructions.

## 2023-08-25 ENCOUNTER — TELEPHONE (OUTPATIENT)
Dept: OTHER | Age: 58
End: 2023-08-25

## 2023-08-25 ENCOUNTER — HOSPITAL ENCOUNTER (OUTPATIENT)
Dept: OTHER | Age: 58
Setting detail: THERAPIES SERIES
Discharge: HOME | End: 2023-08-25
Payer: MEDICARE

## 2023-08-25 VITALS
SYSTOLIC BLOOD PRESSURE: 142 MMHG | BODY MASS INDEX: 42.16 KG/M2 | WEIGHT: 238 LBS | RESPIRATION RATE: 18 BRPM | DIASTOLIC BLOOD PRESSURE: 82 MMHG | HEART RATE: 88 BPM | OXYGEN SATURATION: 98 %

## 2023-08-25 DIAGNOSIS — I50.20 HFREF (HEART FAILURE WITH REDUCED EJECTION FRACTION) (HCC): ICD-10-CM

## 2023-08-25 DIAGNOSIS — I50.22 CHRONIC SYSTOLIC CONGESTIVE HEART FAILURE (HCC): ICD-10-CM

## 2023-08-25 LAB
ANION GAP SERPL CALCULATED.3IONS-SCNC: 12 MMOL/L (ref 7–16)
BNP SERPL-MCNC: 6763 PG/ML (ref 0–125)
BUN SERPL-MCNC: 22 MG/DL (ref 6–20)
CALCIUM SERPL-MCNC: 8.4 MG/DL (ref 8.6–10.2)
CHLORIDE SERPL-SCNC: 101 MMOL/L (ref 98–107)
CO2 SERPL-SCNC: 26 MMOL/L (ref 22–29)
CREAT SERPL-MCNC: 1 MG/DL (ref 0.7–1.2)
GFR SERPL CREATININE-BSD FRML MDRD: >60 ML/MIN/1.73M2
GLUCOSE SERPL-MCNC: 115 MG/DL (ref 74–99)
POTASSIUM SERPL-SCNC: 3.6 MMOL/L (ref 3.5–5)
SODIUM SERPL-SCNC: 139 MMOL/L (ref 132–146)

## 2023-08-25 PROCEDURE — 2580000003 HC RX 258: Performed by: NURSE PRACTITIONER

## 2023-08-25 PROCEDURE — 80048 BASIC METABOLIC PNL TOTAL CA: CPT

## 2023-08-25 PROCEDURE — 6360000002 HC RX W HCPCS: Performed by: NURSE PRACTITIONER

## 2023-08-25 PROCEDURE — 99214 OFFICE O/P EST MOD 30 MIN: CPT

## 2023-08-25 PROCEDURE — 83880 ASSAY OF NATRIURETIC PEPTIDE: CPT

## 2023-08-25 PROCEDURE — 96374 THER/PROPH/DIAG INJ IV PUSH: CPT

## 2023-08-25 RX ORDER — FUROSEMIDE 10 MG/ML
40 INJECTION INTRAMUSCULAR; INTRAVENOUS ONCE
Status: COMPLETED | OUTPATIENT
Start: 2023-08-25 | End: 2023-08-25

## 2023-08-25 RX ORDER — SPIRONOLACTONE 25 MG/1
37.5 TABLET ORAL DAILY
Qty: 30 TABLET | Refills: 3 | Status: SHIPPED | OUTPATIENT
Start: 2023-08-25

## 2023-08-25 RX ORDER — SODIUM CHLORIDE 0.9 % (FLUSH) 0.9 %
10 SYRINGE (ML) INJECTION PRN
Status: DISCONTINUED | OUTPATIENT
Start: 2023-08-25 | End: 2023-08-26 | Stop reason: HOSPADM

## 2023-08-25 RX ORDER — BUMETANIDE 1 MG/1
1.5 TABLET ORAL DAILY
Qty: 90 TABLET | Refills: 3 | Status: SHIPPED | OUTPATIENT
Start: 2023-08-25

## 2023-08-25 RX ADMIN — SODIUM CHLORIDE, PRESERVATIVE FREE 10 ML: 5 INJECTION INTRAVENOUS at 13:20

## 2023-08-25 RX ADMIN — FUROSEMIDE 40 MG: 10 INJECTION, SOLUTION INTRAMUSCULAR; INTRAVENOUS at 13:19

## 2023-08-25 NOTE — PLAN OF CARE
Problem: Chronic Conditions and Co-morbidities  Goal: Patient's chronic conditions and co-morbidity symptoms are monitored and maintained or improved  Flowsheets (Taken 8/25/2023 0072)  Care Plan - Patient's Chronic Conditions and Co-Morbidity Symptoms are Monitored and Maintained or Improved: Monitor and assess patient's chronic conditions and comorbid symptoms for stability, deterioration, or improvement

## 2023-08-25 NOTE — TELEPHONE ENCOUNTER
Received call from CHF clinic   Spoke with Carmen Knapp RN in [facility]     Patient confused with medications   Has been taking both bumex and lasix PRN  Never started potassium supplement as ordered      Will have him stop lasix  Take Bumex 1.5 mg daily as ordered by Dr. Lisseth Hutton  Will increase spironolactone to 37.5 mg daily   Follow up in CHF clinic in 1 week as scheduled      Thank you

## 2023-08-25 NOTE — PROGRESS NOTES
PAUL Mcdonald - CNP  Nurse Practitioner  Specialty:  Nurse Practitioner  Telephone Encounter     Signed  Encounter Date:  8/25/2023     Signed                  Received call from CHF clinic   Spoke with Carlo Hardin RN in [facility]      Patient confused with medications   Has been taking both bumex and lasix PRN  Never started potassium supplement as ordered        Will have him stop lasix  Take Bumex 1.5 mg daily as ordered by Dr. Luis E Simon  Will increase spironolactone to 37.5 mg daily   Follow up in CHF clinic in 1 week as scheduled                            Patient given written instructions of above . Demonstrates understanding.
AST, ALT, ALB, BILITOT, ALKPHOS in the last 72 hours. Troponin: No results for input(s): TROPONINI in the last 72 hours. BNP: No results for input(s): BNP in the last 72 hours. Lipids: No results for input(s): CHOL, HDL in the last 72 hours. Invalid input(s): LDLCALCU  INR: No results for input(s): INR in the last 72 hours. WEIGHTS:    Wt Readings from Last 3 Encounters:   08/25/23 238 lb (108 kg)   08/15/23 238 lb (108 kg)   08/14/23 234 lb (106.1 kg)         TELEMETRY:  Cardiac Regularity: Regular  Cardiac Rhythm/Interpretation: NSR        ASSESSMENT:  Olinda Fallon weight is the same from last visit; although it remains elevated from last visit. He had gallbladder surgery on 8-2-23 and feels that since then he has been holding onto fluid. He DID NOT  increase Bumex to 1.5 mg daily per Dr Lea Byrne order on 8-15-23. States he does not have enough pills to do that. He'll need a new RX called in if that's what he's supposed to do. He takes a lasix 40 mg if he has any s/s of holding onto fluid. Also, He DID NOT take the [address] as instructed because he does not have any at home. I will draw labs, give an IV diuretic and hold patient until labs come back. Unable to assess for JVD due to surgical tenderness. He weighs himself daily, tries to follow a low sodium diet, but lately has been eating Chicken Pot Pies and instant mashed potatoes.      Interventions completed this visit:  IV diuretics given Yes, Lasix 40 mg IVP  Lab work obtained yes, BMP/BNP    Reviewed currently prescribed medications with patient, educated on importance of compliance and answered any questions regarding their medication  Educated on signs and symptoms of HF  Educated on low sodium diet    PLAN:  Scheduled to follow up in CHF clinic on   Future Appointments   Date Time Provider [address]   8/28/2023  1:00 PM SCHEDULE, [address]   8/29/2023  1:20 PM Arvin Ledesma

## 2023-08-28 ENCOUNTER — OFFICE VISIT (OUTPATIENT)
Dept: PALLATIVE CARE | Age: 58
End: 2023-08-28
Payer: MEDICARE

## 2023-08-28 VITALS
DIASTOLIC BLOOD PRESSURE: 93 MMHG | SYSTOLIC BLOOD PRESSURE: 150 MMHG | TEMPERATURE: 97.3 F | WEIGHT: 227 LBS | HEART RATE: 91 BPM | BODY MASS INDEX: 40.21 KG/M2 | OXYGEN SATURATION: 95 %

## 2023-08-28 DIAGNOSIS — Z51.5 ENCOUNTER FOR PALLIATIVE CARE: Primary | ICD-10-CM

## 2023-08-28 PROCEDURE — 99213 OFFICE O/P EST LOW 20 MIN: CPT | Performed by: NURSE PRACTITIONER

## 2023-08-28 PROCEDURE — 3077F SYST BP >= 140 MM HG: CPT | Performed by: NURSE PRACTITIONER

## 2023-08-28 PROCEDURE — 3080F DIAST BP >= 90 MM HG: CPT | Performed by: NURSE PRACTITIONER

## 2023-08-28 PROCEDURE — 99211 OFF/OP EST MAY X REQ PHY/QHP: CPT | Performed by: NURSE PRACTITIONER

## 2023-08-28 NOTE — PROGRESS NOTES
Palliative Care Department  Provider: PAUL Merino - CNP    Referring Provider:  Dr. Amy Marquez    Location of Service:   [facility]    Reason for Consult:  []  Code status Discussion  []  Assist with goals of care  []  Psychosocial support  [x]  Symptom Management  []  Advanced Care Planning    Chief Complaint: Mick Acevedo is a 61 y.o. male with chief complaint of pain    Assessment/Plan      Prostate Cancer with bone mets:   -  Found Aug. 2023   -  Known to Dr. Sybil Bowles, [facility] in Dec. Showed improvement    Pain related to Neoplasm:   -  Tylenol 500 mg 2 tabs TID PRN   -  stop opioids at this time   -  Voltaren gel QID PRN   -  Still no PT, he declines now stating he is doing his own exercise    Physical Debility:   -  Mostly related to HF with worse LE edema    -  Has help with household chores   -  Encouraged physical activity   -  Declined PT or cardiac rehab   -  he is going to CHF clinic    Follow Up:  3 months. They were encouraged to call with any questions, concerns, needs, or changes in symptoms. Subjective:     HPI:  Mick Acevedo is a 61 y.o. male with significant medical history of CAD, s/p angioplasty with stents, HFrEF, known to the heart failure clinic, pacer/defibrillator placement, and COPD, who was found to have prostate cancer in August 2022. He was referred to [facility] for support with symptomatic management. Subjective/Events/Discussions:  Favian Manuel presents today unaccompanied. He was recent hospitalized, he has been having some ongoing issues related to heart failure lower extremity edema. He has been following with heart failure clinic. I recommend that he go to cardiac rehab, as we have this would be beneficial to help him manage his fatigue and weakness, but he again declines even rehab, stating he would be able to do his own exercises increased activity on his own.   He

## 2023-08-29 ENCOUNTER — OFFICE VISIT (OUTPATIENT)
Dept: FAMILY MEDICINE CLINIC | Age: 58
End: 2023-08-29

## 2023-08-29 VITALS
RESPIRATION RATE: 18 BRPM | WEIGHT: 233 LBS | TEMPERATURE: 97 F | DIASTOLIC BLOOD PRESSURE: 85 MMHG | HEIGHT: 69 IN | BODY MASS INDEX: 34.51 KG/M2 | OXYGEN SATURATION: 96 % | HEART RATE: 81 BPM | SYSTOLIC BLOOD PRESSURE: 136 MMHG

## 2023-08-29 DIAGNOSIS — E11.9 TYPE 2 DIABETES MELLITUS WITHOUT COMPLICATION, WITHOUT LONG-TERM CURRENT USE OF INSULIN (HCC): Primary | ICD-10-CM

## 2023-08-29 DIAGNOSIS — I50.20 HFREF (HEART FAILURE WITH REDUCED EJECTION FRACTION) (HCC): ICD-10-CM

## 2023-08-29 DIAGNOSIS — C61 PROSTATE CANCER (HCC): ICD-10-CM

## 2023-08-29 NOTE — PROGRESS NOTES
CC:  Follow up HF, DM    HPI:  61 y.o. male presents for follow up. Recent lap madhu, healing well, no new concerns. Decreased appetite. Tender over incision, but improving. Aversion to food/drink at times, less appetite. DM. Last A1C was 6.9 on 8/1/23, controlled. No symptoms or concerns. No polydipsia. Diet is not always healthy, but he is trying to be more careful. Prostate CA. Recent disability determination from [facility], covered. Entresto covered by [facility]. [facility]; declines for today. CAD, HFrEF. Following up with HF clinic in 2 days. Overall, edema is improving. He has been advised to take higher doses of bumex. He is hesitant to do this, but he will follow the instructions. Has been eating a lot of canned ravioli. Advised against this. We read the labels and discussed the sodium content. Advised on healthier dietary options. He declines CT screening chest today. Advised, but declines.       Patient Active Problem List    Diagnosis Date Noted    Aneurysm of right common iliac artery ([address]) 12/13/2022    Prostate cancer ([address]) 09/19/2022    Age-related nuclear cataract, bilateral 01/31/2022    Presbyopia 01/31/2022    Regular astigmatism, bilateral 01/31/2022    Leg swelling 01/23/2022    ALEX (acute kidney injury) ([address]) 01/23/2022    CHF, acute on chronic ([address]) 01/21/2022    Elevated PSA, less than 10 ng/ml 04/01/2021    Nonrheumatic aortic valve insufficiency 02/20/2019    Obesity (BMI 30.0-34.9)     Ischemic cardiomyopathy     Type 2 diabetes mellitus, without long-term current use of insulin ([address])     Essential hypertension 06/29/2017    Diverticulosis of colon 08/07/2015    History of MI (myocardial infarction) 08/07/2015    Chronic systolic congestive heart failure ([address]) 08/07/2015    NSVT (nonsustained ventricular tachycardia) ([address]) 07/31/2015    Dual implantable cardioverter-defibrillator in situ 04/02/2015    CAD (coronary artery disease)

## 2023-08-29 NOTE — PROGRESS NOTES
Department of [facility] Oncology   Attending Clinic Note    Reason for Visit: Follow-up on a patient with Prostate Cancer    PCP:  Delfin Sagastume DO    History of Present Illness:  50-year-old male who was noted to have elevated PSA   Evaluated by Urology team (Dr. Brayan Marsh Shayne)    PSA 9.20 on 03/31/2021  PSA 26.39 on 05/31/2022  PSA 31.83 on 06/20/2022    On 08/04/2022:  A. Prostate, left base, biopsy: Prostatic adenocarcinoma (acinar, not otherwise specified). [place] Score: 4+3=7; cribriform pattern absent; percentage of pattern 4 is approximately 60%   Grade Group:  3   Tissue Cores Involved by Carcinoma: 2 of 2   Percentage of Tissue Involved by Carcinoma: 10%   Perineural Invasion: Present     B. Prostate, left mid, biopsy: Prostatic adenocarcinoma (acinar, not otherwise specified) and intraductal carcinoma. Andrew Score: 4+5=9; cribriform pattern present   Grade Group:  5   Tissue Cores Involved by Carcinoma: 2 of 2   Percentage of Tissue Involved by Carcinoma: 80%   Perineural Invasion: Present     C. Prostate, left apex, biopsy: Prostatic adenocarcinoma (acinar, not otherwise specified). Andrew Score: 4+4 = 8; cribriform pattern present   Grade Group:  4   Tissue Cores Involved by Carcinoma: 2 of 2   Percentage of Tissue Involved by Carcinoma: 50%   Perineural Invasion: Present     D. Prostate, right base, biopsy: Prostatic adenocarcinoma (acinar, not otherwise specified) and intraductal carcinoma. Andrew Score: 4+3 = 7; cribriform pattern present; percentage of pattern 4 is approximately 80%   Grade Group:  3   Tissue Cores Involved by Carcinoma: 3 of 3   Percentage of Tissue Involved by Carcinoma: 90%   Perineural Invasion: Present     E. 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    [place] Score: 4+4=8; cribriform pattern present   Grade Group:  4   Tissue Cores Involved by Carcinoma: 3 of 3   Percentage of Tissue Involved by Carcinoma: >90%   Perineural Invasion: Not identified     F. Prostate, right apex, biopsy: Prostatic adenocarcinoma (acinar, not otherwise specified) and intraductal carcinoma. Baldwin Score: 4+4 = 8; cribriform pattern present   Grade Group:  4   Tissue Cores Involved by Carcinoma: 3 of 3   Percentage of Tissue Involved by Carcinoma: 80%   Perineural Invasion: Present     Comment: In part A, carcinoma is seen involving fibroadipose tissue. Extraprostatic extension cannot be excluded. P63 immunostain is performed on Parts B, D, E, and F and shows the presence of basal cells around the intraductal carcinoma in the absence of basal cells in the   areas of invasive carcinoma. The percentage of tissue involved listed above reflects the total percentage involved by invasive and intraductal carcinoma. Intradepartmental consultation is obtained. CT abdomen/pelvis 8/23/2022 enlarged prostate gland with impingement upon the lymph node to bladder base by an irregular portion of the enlarged gland. Enlarged pelvic and inferior retroperitoneal lymph nodes  Diffuse thickening of the walls of the urinary bladder with surrounding  Small right anterior abdominal wall hernia, which contains fat   Small umbilical hernia, which contains fat. Small bilateral inguinal hernias, left slightly greater than right and both contain fat. Bone scan 08/23/2022:  Findings consistent with widespread metastatic disease    Left Femur/Pelvis X-Ray 09/07/2022: unremarkable left hip and femur. Haze Mew was started on 09/02/2022 with fair tolerance so far. Eligard 22.5 mg was given on 09/14/2022 and Dec 2022    Today 02/13/2023; No fever, chills. Fair appetite and energy level. No N/V. Intermittent arthralgias not relieved by Tylenol arthritis. Review of Systems;  CONSTITUTIONAL: No fever, chills. Fair appetite and energy level. ENMT: Eyes: No diplopia; Nose: No epistaxis. Mouth: No sore throat.   RESPIRATORY: No hemoptysis   CARDIOVASCULAR: No chest pain, palpitations. GASTROINTESTINAL: No nausea/vomiting, abdominal pain  GENITOURINARY: See HPI  NEURO: No syncope, presyncope, headache. MSK: Intermittent arthralgias not relieved by Tylenol arthritis   Remainder:  ROS NEGATIVE    Past Medical History:      Diagnosis Date    Allergic rhinitis     Aneurysm of right common iliac artery (HCC)     1.9 cm 12/2022 on CT, stable from 8/2022. Follow with imaging. Anticoagulant long-term use     Anxiety     Atypical angina ([facility])     CAD (coronary artery disease)     Carotid artery stenosis     Cerebral artery occlusion with cerebral infarction ([facility] [address] 2012    CHF (congestive heart failure) ([facility])     COPD (chronic obstructive pulmonary disease) ([facility])     CVA (cerebral infarction) 11/19/2012    Depression     Diverticulitis 2010    Sigmoid abscess, s/p colectomy    Erectile dysfunction     GERD (gastroesophageal reflux disease)     Heart attack ([facility] [address] 2014    Hx of blood clots     Hyperlipemia     Hypertension     Ischemic cardiomyopathy     Left atrial thrombus     Obesity     Prostate cancer ([facility] 09/19/2022    Type 2 diabetes mellitus without complication, without long-term current use of insulin ([facility])     Type II or unspecified type diabetes mellitus without mention of complication, not stated as uncontrolled      Medications:  Reviewed and reconciled. Allergies:  No Known Allergies    Physical Exam:  BP (!) 150/86   Pulse 83   Temp 97.3 °F (36.3 °C)   Ht 5' 9\" (1.753 m)   Wt 233 lb 12.8 oz (106.1 kg)   SpO2 96%   BMI 34.53 kg/m²   GENERAL: Alert, oriented x 3, not in acute distress. HEENT: PERRLA; EOMI. Oropharynx clear. LUNGS: Good air entry bilaterally. No wheezing, crackles or ronchi. CARDIOVASCULAR: Regular rate. No murmurs, rubs or gallops. EXTREMITIES: Without clubbing, cyanosis, or edema. NEUROLOGIC: No focal deficits.    ECOG PS 1    Diagnostics:  Lab Results   Component Value Date    WBC 5.8 02/13/2023 HGB 14.0 02/13/2023    HCT 41.1 02/13/2023    MCV 80.6 02/13/2023     02/13/2023     Lab Results   Component Value Date     02/13/2023    K 4.2 02/13/2023     02/13/2023    CO2 26 02/13/2023    BUN 28 (H) 02/13/2023    CREATININE 1.2 02/13/2023    GLUCOSE 182 (H) 02/13/2023    CALCIUM 9.7 02/13/2023    PROT 7.3 02/13/2023    LABALBU 4.4 02/13/2023    BILITOT 0.8 02/13/2023    ALKPHOS 79 02/13/2023    AST 13 02/13/2023    ALT 12 02/13/2023    LABGLOM >60 02/13/2023    GFRAA >60 09/30/2022     Lab Results   Component Value Date    PSA 0.06 02/13/2023     Impression/Plan:  58-year-old male with metastatic prostate cancer     Evaluated by Urology team (Dr. Landry Estes Shayne)  PSA 9.20 on 03/31/2021  PSA 26.39 on 05/31/2022  PSA 31.83 on 06/20/2022    On 08/04/2022:  A. Prostate, left base, biopsy: Prostatic adenocarcinoma (acinar, not otherwise specified). [place] Score: 4+3=7; cribriform pattern absent; percentage of pattern 4 is approximately 60%   Grade Group:  3   Tissue Cores Involved by Carcinoma: 2 of 2   Percentage of Tissue Involved by Carcinoma: 10%   Perineural Invasion: Present     B. Prostate, left mid, biopsy: Prostatic adenocarcinoma (acinar, not otherwise specified) and intraductal carcinoma. [place] Score: 4+5=9; cribriform pattern present   Grade Group:  5   Tissue Cores Involved by Carcinoma: 2 of 2   Percentage of Tissue Involved by Carcinoma: 80%   Perineural Invasion: Present     C. Prostate, left apex, biopsy: Prostatic adenocarcinoma (acinar, not otherwise specified). [place] Score: 4+4 = 8; cribriform pattern present   Grade Group:  4   Tissue Cores Involved by Carcinoma: 2 of 2   Percentage of Tissue Involved by Carcinoma: 50%   Perineural Invasion: Present     D. Prostate, right base, biopsy: Prostatic adenocarcinoma (acinar, not otherwise specified) and intraductal carcinoma.    [place] Score: 4+3 = 7; cribriform pattern present; percentage of pattern 4 is approximately 80% Grade Group:  3   Tissue Cores Involved by Carcinoma: 3 of 3   Percentage of Tissue Involved by Carcinoma: 90%   Perineural Invasion: Present     E. 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 Andrew Score: 4+4=8; cribriform pattern present   Grade Group:  4   Tissue Cores Involved by Carcinoma: 3 of 3   Percentage of Tissue Involved by Carcinoma: >90%   Perineural Invasion: Not identified     F. Prostate, right apex, biopsy: Prostatic adenocarcinoma (acinar, not otherwise specified) and intraductal carcinoma. Winnie Score: 4+4 = 8; cribriform pattern present   Grade Group:  4   Tissue Cores Involved by Carcinoma: 3 of 3   Percentage of Tissue Involved by Carcinoma: 80%   Perineural Invasion: Present     Comment: In part A, carcinoma is seen involving fibroadipose tissue. Extraprostatic extension cannot be excluded. P63 immunostain is performed on Parts B, D, E, and F and shows the presence of basal cells around the intraductal carcinoma in the absence of basal cells in the   areas of invasive carcinoma. The percentage of tissue involved listed above reflects the total percentage involved by invasive and intraductal carcinoma. Intradepartmental consultation is obtained. NGS testing ([employer]):  MS-Stable: No therapies or clinical trials  TMB 0 Muts/Mb: No therapies or clinical trials  No therapies or clinical trials are associated with the genomic findings for the sample    CT abdomen/pelvis 8/23/2022 enlarged prostate gland with impingement upon the lymph node to bladder base by an irregular portion of the enlarged gland. Enlarged pelvic and inferior retroperitoneal lymph nodes  Diffuse thickening of the walls of the urinary bladder with surrounding  Small right anterior abdominal wall hernia, which contains fat   Small umbilical hernia, which contains fat.   Small bilateral inguinal hernias, left slightly greater than right and both contain fat. Bone scan 08/23/2022:  Findings consistent with widespread metastatic disease    Left Femur/Pelvis X-Ray 09/07/2022: unremarkable left hip and femur. Rory Panda was started on 09/02/2022 with fair tolerance so far. Eligard 22.5 mg was given on 09/14/2022 and Dec 2022    PSA 7.57 on 09/19/2022. CT chest on 09/28/2022: No CT evidence of acute intrathoracic abnormality. No definite evidence of metastatic disease. PSA 0.24 on 10/24/2022  PSA 0.14 on 11/21/2022    CT abdomen/pelvis 12/07/2022: Compared to prior exam the prostate gland is decreased in size and previously seen enlarged pelvic lymph nodes are now subcentimeter and decreased in size compatible with positive response to treatment. No new areas of lymphadenopathy. Bone scan 12/07/2022: There is evidence to suggest metastatic disease, however the degree of tracer uptake is significantly diminished in the interval suggesting a positive response to therapy. PSA 0.10 on 12/19/2022    Overall positive response. PSA 0.09 on 01/16/2023  PSA 0.06 on 02/13/2023. Labs reviewed. Continue XTANDI per protocol. Mavis Rm ordered and given today 02/13/2023.  Intermittent arthralgias not relieved by Tylenol arthritis; referred to Palliative care team for sx management    RTC 4 weeks with labs and XGEVA same day  XGEVA every 4 weeks (labs reviewed; ok to proceed with Mavis Rm today 02/13/2023)    Jewel Lyons MD   2/13/2023 Minoxidil Pregnancy And Lactation Text: This medication has not been assigned a Pregnancy Risk Category but animal studies failed to show danger with the topical medication. It is unknown if the medication is excreted in breast milk.

## 2023-08-31 ENCOUNTER — HOSPITAL ENCOUNTER (OUTPATIENT)
Dept: OTHER | Age: 58
Setting detail: THERAPIES SERIES
Discharge: HOME | End: 2023-08-31
Payer: MEDICARE

## 2023-08-31 VITALS
BODY MASS INDEX: 33.97 KG/M2 | SYSTOLIC BLOOD PRESSURE: 142 MMHG | RESPIRATION RATE: 18 BRPM | DIASTOLIC BLOOD PRESSURE: 76 MMHG | WEIGHT: 230 LBS | HEART RATE: 88 BPM | OXYGEN SATURATION: 98 %

## 2023-08-31 DIAGNOSIS — I50.22 CHRONIC SYSTOLIC CONGESTIVE HEART FAILURE (HCC): ICD-10-CM

## 2023-08-31 DIAGNOSIS — I50.20 HFREF (HEART FAILURE WITH REDUCED EJECTION FRACTION) (HCC): ICD-10-CM

## 2023-08-31 LAB
ANION GAP SERPL CALCULATED.3IONS-SCNC: 13 MMOL/L (ref 7–16)
BNP SERPL-MCNC: 7734 PG/ML (ref 0–125)
BUN SERPL-MCNC: 17 MG/DL (ref 6–20)
CALCIUM SERPL-MCNC: 8.8 MG/DL (ref 8.6–10.2)
CHLORIDE SERPL-SCNC: 101 MMOL/L (ref 98–107)
CO2 SERPL-SCNC: 27 MMOL/L (ref 22–29)
CREAT SERPL-MCNC: 0.9 MG/DL (ref 0.7–1.2)
GFR SERPL CREATININE-BSD FRML MDRD: >60 ML/MIN/1.73M2
GLUCOSE SERPL-MCNC: 113 MG/DL (ref 74–99)
POTASSIUM SERPL-SCNC: 3.1 MMOL/L (ref 3.5–5)
SODIUM SERPL-SCNC: 141 MMOL/L (ref 132–146)

## 2023-08-31 PROCEDURE — 2580000003 HC RX 258: Performed by: NURSE PRACTITIONER

## 2023-08-31 PROCEDURE — 96374 THER/PROPH/DIAG INJ IV PUSH: CPT

## 2023-08-31 PROCEDURE — 6360000002 HC RX W HCPCS: Performed by: NURSE PRACTITIONER

## 2023-08-31 PROCEDURE — 80048 BASIC METABOLIC PNL TOTAL CA: CPT

## 2023-08-31 PROCEDURE — 83880 ASSAY OF NATRIURETIC PEPTIDE: CPT

## 2023-08-31 PROCEDURE — 99214 OFFICE O/P EST MOD 30 MIN: CPT

## 2023-08-31 RX ORDER — SPIRONOLACTONE 50 MG/1
50 TABLET, FILM COATED ORAL DAILY
Qty: 90 TABLET | Refills: 3 | Status: SHIPPED | OUTPATIENT
Start: 2023-08-31

## 2023-08-31 RX ORDER — SODIUM CHLORIDE 0.9 % (FLUSH) 0.9 %
10 SYRINGE (ML) INJECTION PRN
Status: DISCONTINUED | OUTPATIENT
Start: 2023-08-31 | End: 2023-09-01 | Stop reason: HOSPADM

## 2023-08-31 RX ORDER — FUROSEMIDE 10 MG/ML
40 INJECTION INTRAMUSCULAR; INTRAVENOUS ONCE
Status: COMPLETED | OUTPATIENT
Start: 2023-08-31 | End: 2023-08-31

## 2023-08-31 RX ADMIN — FUROSEMIDE 40 MG: 10 INJECTION, SOLUTION INTRAMUSCULAR; INTRAVENOUS at 13:24

## 2023-08-31 RX ADMIN — SODIUM CHLORIDE, PRESERVATIVE FREE 10 ML: 5 INJECTION INTRAVENOUS at 13:24

## 2023-08-31 NOTE — PLAN OF CARE
Problem: Chronic Conditions and Co-morbidities  Goal: Patient's chronic conditions and co-morbidity symptoms are monitored and maintained or improved  Flowsheets (Taken 8/31/2023 9436)  Care Plan - Patient's Chronic Conditions and Co-Morbidity Symptoms are Monitored and Maintained or Improved: Monitor and assess patient's chronic conditions and comorbid symptoms for stability, deterioration, or improvement

## 2023-08-31 NOTE — PROGRESS NOTES
Notified patient to increase Aldactone to 50 mg daily. He demonstrates understanding, and will return next week.

## 2023-08-31 NOTE — RESULT ENCOUNTER NOTE
Labs and CHF clinic note reviewed  Spoke with Maria E Amor RN in CHF clinic   Will increase spironolactone to 50 mg daily   Follow up labs in 1 week    Thank you

## 2023-08-31 NOTE — PROGRESS NOTES
Congestive Heart Failure [address]       Referring Provider: [facility]  Primary Care Physician: Jose Carlos Gentile DO   Cardiologist: Dr Merary Chappell  Nephrologist: N/A      HISTORY OF PRESENT ILLNESS:     Hillary Weaver is a 61 y.o. (1965) male with a history of HFrEF  Lab Results   Component Value Date    LVEF 30 09/01/2021         He presents to the CHF clinic for ongoing evaluation and monitoring of heart failure.     In the CHF clinic today he denies any adverse symptoms except:  [] Dizziness or lightheadedness   [] Syncope or near syncope  [] Recent Fall  [] Shortness of breath at rest   [x] Dyspnea with exertion  [] Decline in functional capacity (unable to perform activities they had previously been able to do)  [x] Fatigue   [] Orthopnea  [] PND  [] Nocturnal cough  [] Hemoptysis  [] Chest pain, pressure, or discomfort  [] Palpitations  [x] Abdominal distention  [x] Abdominal bloating  [x] Early satiety  [] Blood in stool   [] Diarrhea  [x] Constipation  [] Nausea/Vomiting  [] Decreased urinary response to oral diuretic   [] Scrotal swelling   [x] Lower extremity edema  [] Used PRN doses of oral diuretic   [] Weight gain    Wt Readings from Last 3 Encounters:   08/31/23 230 lb (104.3 kg)   08/29/23 233 lb (105.7 kg)   08/28/23 227 lb (103 kg)           SOCIAL HISTORY:  [] Denies tobacco, alcohol or illicit drug abuse  [x] Tobacco use:\"Chews a little bit\"  [] ETOH use:  [] Illicit drug use:        MEDICATIONS:    No Known Allergies    Current Outpatient Medications:     bumetanide (BUMEX) 1 MG tablet, Take 1.5 tablets by mouth daily, Disp: 90 tablet, Rfl: 3    spironolactone (ALDACTONE) 25 MG tablet, Take 1.5 tablets by mouth daily, Disp: 30 tablet, Rfl: 3    Calcium Polycarbophil (FIBER) 625 MG TABS, Take 1 tablet by mouth daily, Disp: 90 tablet, Rfl: 3    aspirin 81 MG EC tablet, Take 1 tablet by mouth daily, Disp: 90 tablet, Rfl: 3    clopidogrel (PLAVIX) 75 MG

## 2023-09-07 ENCOUNTER — HOSPITAL ENCOUNTER (OUTPATIENT)
Dept: OTHER | Age: 58
Setting detail: THERAPIES SERIES
Discharge: HOME | End: 2023-09-07
Payer: MEDICARE

## 2023-09-07 ENCOUNTER — TELEPHONE (OUTPATIENT)
Dept: CARDIOLOGY CLINIC | Age: 58
End: 2023-09-07

## 2023-09-07 VITALS
SYSTOLIC BLOOD PRESSURE: 137 MMHG | WEIGHT: 222 LBS | BODY MASS INDEX: 32.78 KG/M2 | RESPIRATION RATE: 18 BRPM | DIASTOLIC BLOOD PRESSURE: 80 MMHG | HEART RATE: 82 BPM | OXYGEN SATURATION: 97 %

## 2023-09-07 LAB
ANION GAP SERPL CALCULATED.3IONS-SCNC: 20 MMOL/L (ref 7–16)
BNP SERPL-MCNC: 7621 PG/ML (ref 0–125)
BUN SERPL-MCNC: 22 MG/DL (ref 6–20)
CALCIUM SERPL-MCNC: 9.1 MG/DL (ref 8.6–10.2)
CHLORIDE SERPL-SCNC: 100 MMOL/L (ref 98–107)
CO2 SERPL-SCNC: 22 MMOL/L (ref 22–29)
CREAT SERPL-MCNC: 1 MG/DL (ref 0.7–1.2)
GFR SERPL CREATININE-BSD FRML MDRD: >60 ML/MIN/1.73M2
GLUCOSE SERPL-MCNC: 145 MG/DL (ref 74–99)
POTASSIUM SERPL-SCNC: 3.3 MMOL/L (ref 3.5–5)
SODIUM SERPL-SCNC: 142 MMOL/L (ref 132–146)

## 2023-09-07 PROCEDURE — 80048 BASIC METABOLIC PNL TOTAL CA: CPT

## 2023-09-07 PROCEDURE — 99214 OFFICE O/P EST MOD 30 MIN: CPT

## 2023-09-07 PROCEDURE — 83880 ASSAY OF NATRIURETIC PEPTIDE: CPT

## 2023-09-07 NOTE — PLAN OF CARE
Problem: Chronic Conditions and Co-morbidities  Goal: Patient's chronic conditions and co-morbidity symptoms are monitored and maintained or improved  Flowsheets (Taken 9/7/2023 [id number])  Care Plan - Patient's Chronic Conditions and Co-Morbidity Symptoms are Monitored and Maintained or Improved: Monitor and assess patient's chronic conditions and comorbid symptoms for stability, deterioration, or improvement

## 2023-09-07 NOTE — PROGRESS NOTES
Patient cancelled appointment for [id number] due to a conflict with [place] appointment. Rescheduled for 9-18-23. Instructed patient to make sure he does not miss any doses of Aldactone, and to try to increase foods high in potassium. Patient agreeable.

## 2023-09-07 NOTE — PROGRESS NOTES
Congestive Heart Failure [address]       Referring Provider: [facility]  Primary Care Physician: Umu Jane DO   Cardiologist: Dr Miller Chery  Nephrologist: N/A      HISTORY OF PRESENT ILLNESS:     Janina Bills is a 61 y.o. (1965) male with a history of HFrEF  Lab Results   Component Value Date    LVEF 30 09/01/2021         He presents to the CHF clinic for ongoing evaluation and monitoring of heart failure.     In the CHF clinic today he denies any adverse symptoms except:  [] Dizziness or lightheadedness   [] Syncope or near syncope  [] Recent Fall  [] Shortness of breath at rest   [x] Dyspnea with exertion  [] Decline in functional capacity (unable to perform activities they had previously been able to do)  [x] Fatigue   [] Orthopnea  [] PND  [] Nocturnal cough  [] Hemoptysis  [] Chest pain, pressure, or discomfort  [] Palpitations  [x] Abdominal distention  [x] Abdominal bloating  [x] Early satiety  [] Blood in stool   [] Diarrhea  [x] Constipation  [] Nausea/Vomiting  [] Decreased urinary response to oral diuretic   [] Scrotal swelling   [x] Lower extremity edema  [] Used PRN doses of oral diuretic   [] Weight gain    Wt Readings from Last 3 Encounters:   09/07/23 222 lb (100.7 kg)   08/31/23 230 lb (104.3 kg)   08/29/23 233 lb (105.7 kg)           SOCIAL HISTORY:  [] Denies tobacco, alcohol or illicit drug abuse  [x] Tobacco use:\"Chews a little bit\"  [] ETOH use:  [] Illicit drug use:        MEDICATIONS:    No Known Allergies    Current Outpatient Medications:     spironolactone (ALDACTONE) 50 MG tablet, Take 1 tablet by mouth daily, Disp: 90 tablet, Rfl: 3    bumetanide (BUMEX) 1 MG tablet, Take 1.5 tablets by mouth daily, Disp: 90 tablet, Rfl: 3    Calcium Polycarbophil (FIBER) 625 MG TABS, Take 1 tablet by mouth daily, Disp: 90 tablet, Rfl: 3    aspirin 81 MG EC tablet, Take 1 tablet by mouth daily, Disp: 90 tablet, Rfl: 3    clopidogrel (PLAVIX) 75 MG

## 2023-09-07 NOTE — TELEPHONE ENCOUNTER
CLARIFICATION:  Per verbal from Dr. Schaeffer Credit, take Potassium 40 meq today and then 10 meq daily thereafter. Left message for patient to call the office.

## 2023-09-07 NOTE — TELEPHONE ENCOUNTER
----- Message from Sandy Robins DO sent at 9/7/2023  2:56 PM EDT -----  8/40 mill equivalents today and then 10 mill equivalents once a day after that.

## 2023-09-11 ENCOUNTER — HOSPITAL ENCOUNTER (OUTPATIENT)
Dept: INFUSION THERAPY | Age: 58
Discharge: HOME | End: 2023-09-11
Payer: MEDICARE

## 2023-09-11 ENCOUNTER — OFFICE VISIT (OUTPATIENT)
Dept: ONCOLOGY | Age: 58
End: 2023-09-11
Payer: MEDICARE

## 2023-09-11 VITALS
BODY MASS INDEX: 32.53 KG/M2 | TEMPERATURE: 97 F | OXYGEN SATURATION: 98 % | HEIGHT: 69 IN | HEART RATE: 75 BPM | SYSTOLIC BLOOD PRESSURE: 135 MMHG | WEIGHT: 219.6 LBS | DIASTOLIC BLOOD PRESSURE: 78 MMHG

## 2023-09-11 DIAGNOSIS — C61 PROSTATE CANCER METASTATIC TO BONE (HCC): ICD-10-CM

## 2023-09-11 DIAGNOSIS — C61 PROSTATE CANCER (HCC): Primary | ICD-10-CM

## 2023-09-11 DIAGNOSIS — C79.51 PROSTATE CANCER METASTATIC TO BONE (HCC): ICD-10-CM

## 2023-09-11 LAB
ALBUMIN SERPL-MCNC: 4.1 G/DL (ref 3.5–5.2)
ALP SERPL-CCNC: 102 U/L (ref 40–129)
ALT SERPL-CCNC: 8 U/L (ref 0–40)
ANION GAP SERPL CALCULATED.3IONS-SCNC: 14 MMOL/L (ref 7–16)
AST SERPL-CCNC: 16 U/L (ref 0–39)
BILIRUB SERPL-MCNC: 1.2 MG/DL (ref 0–1.2)
BUN SERPL-MCNC: 18 MG/DL (ref 6–20)
CALCIUM SERPL-MCNC: 9.2 MG/DL (ref 8.6–10.2)
CHLORIDE SERPL-SCNC: 99 MMOL/L (ref 98–107)
CO2 SERPL-SCNC: 30 MMOL/L (ref 22–29)
CREAT SERPL-MCNC: 1.1 MG/DL (ref 0.7–1.2)
GFR SERPL CREATININE-BSD FRML MDRD: >60 ML/MIN/1.73M2
GLUCOSE SERPL-MCNC: 154 MG/DL (ref 74–99)
MAGNESIUM SERPL-MCNC: 1.9 MG/DL (ref 1.6–2.6)
PHOSPHATE SERPL-MCNC: 2.6 MG/DL (ref 2.5–4.5)
POTASSIUM SERPL-SCNC: 3.2 MMOL/L (ref 3.5–5)
PROT SERPL-MCNC: 7.3 G/DL (ref 6.4–8.3)
PSA SERPL-MCNC: 0.07 NG/ML (ref 0–4)
SODIUM SERPL-SCNC: 143 MMOL/L (ref 132–146)

## 2023-09-11 PROCEDURE — 3078F DIAST BP <80 MM HG: CPT | Performed by: CLINICAL NURSE SPECIALIST

## 2023-09-11 PROCEDURE — 80053 COMPREHEN METABOLIC PANEL: CPT

## 2023-09-11 PROCEDURE — 96372 THER/PROPH/DIAG INJ SC/IM: CPT

## 2023-09-11 PROCEDURE — 99212 OFFICE O/P EST SF 10 MIN: CPT

## 2023-09-11 PROCEDURE — 84153 ASSAY OF PSA TOTAL: CPT

## 2023-09-11 PROCEDURE — 99213 OFFICE O/P EST LOW 20 MIN: CPT | Performed by: CLINICAL NURSE SPECIALIST

## 2023-09-11 PROCEDURE — 83735 ASSAY OF MAGNESIUM: CPT

## 2023-09-11 PROCEDURE — 84100 ASSAY OF PHOSPHORUS: CPT

## 2023-09-11 PROCEDURE — 3075F SYST BP GE 130 - 139MM HG: CPT | Performed by: CLINICAL NURSE SPECIALIST

## 2023-09-11 PROCEDURE — 6360000002 HC RX W HCPCS: Performed by: CLINICAL NURSE SPECIALIST

## 2023-09-11 RX ADMIN — DENOSUMAB 120 MG: 120 INJECTION SUBCUTANEOUS at 14:49

## 2023-09-12 RX ORDER — POTASSIUM CHLORIDE 750 MG/1
TABLET, EXTENDED RELEASE ORAL
Qty: 30 TABLET | Refills: 11 | Status: SHIPPED | OUTPATIENT
Start: 2023-09-12

## 2023-09-12 NOTE — PROGRESS NOTES
Patient provided with discharge instructions, received printed AVS.  All questions answered. Patient understands follow up plan of care.
was started on 09/02/2022 with fair tolerance so far. Eligard 22.5 mg was given on 09/14/2022, Dec 2022 March 2023 and June 2023    PSA 7.57 on 09/19/2022. CT chest on 09/28/2022: No CT evidence of acute intrathoracic abnormality. No definite evidence of metastatic disease. PSA 0.24 on 10/24/2022  PSA 0.14 on 11/21/2022    CT abdomen/pelvis 12/07/2022: Compared to prior exam the prostate gland is decreased in size and previously seen enlarged pelvic lymph nodes are now subcentimeter and decreased in size compatible with positive response to treatment. No new areas of lymphadenopathy. Bone scan 12/07/2022: There is evidence to suggest metastatic disease, however the degree of tracer uptake is significantly diminished in the interval suggesting a positive response to therapy. PSA 0.10 on 12/19/2022    Overall positive response. PSA 0.09 on 01/16/2023  PSA 0.06 on 02/13/2023. PSA 0.06 on 03/13/2023. PSA 0.07 on 04/17/2023. Bone scan 05/05/2023 continued decrease conspicuity of osseous metastatic disease as compared to prior exam suggestive of favorable treatment response. CT chest/abdomen/pelvis 05/05/2023: Diffuse osseous metastasis without evidence for extraosseous metastatic involvement in the chest, abdomen or pelvis. PSA 0.06 on 05/15/2023. Overall favorable response. PSA 0.07 on 06/12/2023. PSA 0.07 on 07/10/2023. CXR 08/01/2023: mild atelectasis in lung bases. No sizable pleural effusion or pneumothorax. Imaging reviewed. Recent laparoscopic cholecystectomy; recovering well so far. General surgery note reviewed. Labs reviewed,   Continue XTANDI  Intermittent arthralgias; Tramadol  Continue to follow-up with palliative care team  Hypercalcemia secondary to Xgeva; calcium gluconate 1 g IV x1 ordered today 8/7/2023.   Hold Xgeva today  Repeat BMP next week; if calcium equal or more than 8.0 then proceed with Sree Amaury    8/14/2023  labs reviewed ca 9 ok to proceed with xgeva today , c/o

## 2023-09-15 ENCOUNTER — APPOINTMENT (OUTPATIENT)
Dept: OTHER | Age: 58
End: 2023-09-15
Payer: MEDICARE

## 2023-09-18 ENCOUNTER — HOSPITAL ENCOUNTER (OUTPATIENT)
Dept: OTHER | Age: 58
Setting detail: THERAPIES SERIES
Discharge: HOME | End: 2023-09-18
Payer: MEDICARE

## 2023-09-18 VITALS
RESPIRATION RATE: 16 BRPM | SYSTOLIC BLOOD PRESSURE: 126 MMHG | DIASTOLIC BLOOD PRESSURE: 86 MMHG | BODY MASS INDEX: 31.9 KG/M2 | HEART RATE: 82 BPM | WEIGHT: 216 LBS | OXYGEN SATURATION: 96 %

## 2023-09-18 LAB
ANION GAP SERPL CALCULATED.3IONS-SCNC: 12 MMOL/L (ref 7–16)
BNP SERPL-MCNC: 4180 PG/ML (ref 0–125)
BUN SERPL-MCNC: 22 MG/DL (ref 6–20)
CALCIUM SERPL-MCNC: 9.3 MG/DL (ref 8.6–10.2)
CHLORIDE SERPL-SCNC: 100 MMOL/L (ref 98–107)
CO2 SERPL-SCNC: 29 MMOL/L (ref 22–29)
CREAT SERPL-MCNC: 1 MG/DL (ref 0.7–1.2)
GFR SERPL CREATININE-BSD FRML MDRD: >60 ML/MIN/1.73M2
GLUCOSE SERPL-MCNC: 121 MG/DL (ref 74–99)
POTASSIUM SERPL-SCNC: 4.1 MMOL/L (ref 3.5–5)
SODIUM SERPL-SCNC: 141 MMOL/L (ref 132–146)

## 2023-09-18 PROCEDURE — 99214 OFFICE O/P EST MOD 30 MIN: CPT

## 2023-09-18 PROCEDURE — 80048 BASIC METABOLIC PNL TOTAL CA: CPT

## 2023-09-18 PROCEDURE — 83880 ASSAY OF NATRIURETIC PEPTIDE: CPT

## 2023-09-18 NOTE — PLAN OF CARE
Problem: Chronic Conditions and Co-morbidities  Goal: Patient's chronic conditions and co-morbidity symptoms are monitored and maintained or improved  Flowsheets (Taken 9/18/2023 [id number])  Care Plan - Patient's Chronic Conditions and Co-Morbidity Symptoms are Monitored and Maintained or Improved: Monitor and assess patient's chronic conditions and comorbid symptoms for stability, deterioration, or improvement

## 2023-09-18 NOTE — PROGRESS NOTES
prescribed medications with patient, educated on importance of compliance and answered any questions regarding their medication  [] Pill box provided to patient  [] Patient using pill packing pharmacy   [] CPAP/BiPAP use  [] Low impact exercise / cardiac rehab   [] LifeVest use  [x] Patient aware of signs and symptoms of worsening HF, CHF clinic phone number provided and made aware to call clinic for sooner if evaluation if needed     [] Difficulty affording medications  [] CHF CHW consulted  [] Prescription assistance information given   [] [facility] medication assistance program information given   [] Sample medications provided to patient to help bridge gap until affordability N/A      Additional Notes:    Weight is down 8 lbs from last visit on 8-31-23, but remains sl. up overall. Also has continued BLE edema, but it is lessened. Scheduled to follow up in CHF clinic on:    Future Appointments   Date Time Provider [address]   9/27/2023 11:00 AM [facility] 2 [facility]   10/9/2023  2:15 PM PAUL Forman [facility] ONC [facility]   10/9/2023  3:00 PM [facility] MED ONC FAST TRACK 2 SEYZ Med Onc [facility]   10/23/2023  1:00 PM SCHEDULE, [facility] PALLIATIVE CARE PROVIDER [facility] PALLIAT [facility]   12/5/2023  2:20 PM DO [facility]

## 2023-09-27 ENCOUNTER — HOSPITAL ENCOUNTER (OUTPATIENT)
Dept: INFUSION THERAPY | Age: 58
Setting detail: INFUSION SERIES
Discharge: HOME | End: 2023-09-27
Payer: MEDICARE

## 2023-09-27 VITALS
RESPIRATION RATE: 16 BRPM | SYSTOLIC BLOOD PRESSURE: 142 MMHG | TEMPERATURE: 96.3 F | DIASTOLIC BLOOD PRESSURE: 87 MMHG | OXYGEN SATURATION: 100 % | HEART RATE: 86 BPM

## 2023-09-27 DIAGNOSIS — C61 PROSTATE CANCER (HCC): Primary | ICD-10-CM

## 2023-09-27 PROCEDURE — 6360000002 HC RX W HCPCS: Performed by: UROLOGY

## 2023-09-27 PROCEDURE — 96402 CHEMO HORMON ANTINEOPL SQ/IM: CPT

## 2023-09-27 RX ADMIN — LEUPROLIDE ACETATE 22.5 MG: KIT SUBCUTANEOUS at 11:13

## 2023-09-27 NOTE — FLOWSHEET NOTE
Discharged to home in stable condition , tolerated injection well, all questions answered, VS stable, does not want dc instructions.

## 2023-10-02 DIAGNOSIS — C61 PROSTATE CANCER (HCC): Primary | ICD-10-CM

## 2023-10-03 DIAGNOSIS — C61 PROSTATE CANCER (HCC): ICD-10-CM

## 2023-10-03 DIAGNOSIS — I50.22 CHRONIC SYSTOLIC CONGESTIVE HEART FAILURE (HCC): ICD-10-CM

## 2023-10-03 DIAGNOSIS — I50.20 HFREF (HEART FAILURE WITH REDUCED EJECTION FRACTION) (HCC): ICD-10-CM

## 2023-10-03 DIAGNOSIS — I25.10 CORONARY ARTERY DISEASE INVOLVING NATIVE CORONARY ARTERY OF NATIVE HEART WITHOUT ANGINA PECTORIS: Chronic | ICD-10-CM

## 2023-10-03 DIAGNOSIS — E13.9 DIABETES MELLITUS OF OTHER TYPE WITHOUT COMPLICATION, UNSPECIFIED WHETHER LONG TERM INSULIN USE (HCC): ICD-10-CM

## 2023-10-03 DIAGNOSIS — K21.9 GASTROESOPHAGEAL REFLUX DISEASE, UNSPECIFIED WHETHER ESOPHAGITIS PRESENT: Chronic | ICD-10-CM

## 2023-10-03 DIAGNOSIS — E11.9 TYPE 2 DIABETES MELLITUS WITHOUT COMPLICATION, WITHOUT LONG-TERM CURRENT USE OF INSULIN (HCC): ICD-10-CM

## 2023-10-03 RX ORDER — BUMETANIDE 1 MG/1
1.5 TABLET ORAL DAILY
Qty: 90 TABLET | Refills: 3 | Status: SHIPPED | OUTPATIENT
Start: 2023-10-03

## 2023-10-03 RX ORDER — CLOPIDOGREL BISULFATE 75 MG/1
75 TABLET ORAL DAILY
Qty: 30 TABLET | Refills: 5 | Status: SHIPPED | OUTPATIENT
Start: 2023-10-03

## 2023-10-03 RX ORDER — TAMSULOSIN HYDROCHLORIDE 0.4 MG/1
0.4 CAPSULE ORAL DAILY
Qty: 90 CAPSULE | Refills: 1 | Status: SHIPPED | OUTPATIENT
Start: 2023-10-03

## 2023-10-03 RX ORDER — PANTOPRAZOLE SODIUM 40 MG/1
40 TABLET, DELAYED RELEASE ORAL DAILY
Qty: 30 TABLET | Refills: 5 | Status: SHIPPED | OUTPATIENT
Start: 2023-10-03

## 2023-10-03 RX ORDER — SPIRONOLACTONE 50 MG/1
50 TABLET, FILM COATED ORAL DAILY
Qty: 90 TABLET | Refills: 3 | Status: SHIPPED | OUTPATIENT
Start: 2023-10-03

## 2023-10-03 RX ORDER — METOPROLOL SUCCINATE 50 MG/1
100 TABLET, EXTENDED RELEASE ORAL DAILY
Qty: 180 TABLET | Refills: 1 | Status: SHIPPED | OUTPATIENT
Start: 2023-10-03

## 2023-10-03 RX ORDER — POTASSIUM CHLORIDE 750 MG/1
TABLET, EXTENDED RELEASE ORAL
Qty: 30 TABLET | Refills: 11 | Status: SHIPPED | OUTPATIENT
Start: 2023-10-03

## 2023-10-03 RX ORDER — ATORVASTATIN CALCIUM 80 MG/1
80 TABLET, FILM COATED ORAL NIGHTLY
Qty: 90 TABLET | Refills: 3 | Status: SHIPPED | OUTPATIENT
Start: 2023-10-03

## 2023-10-03 RX ORDER — ASPIRIN 81 MG/1
81 TABLET ORAL DAILY
Qty: 90 TABLET | Refills: 3 | Status: SHIPPED | OUTPATIENT
Start: 2023-10-03

## 2023-10-09 ENCOUNTER — OFFICE VISIT (OUTPATIENT)
Dept: ONCOLOGY | Age: 58
End: 2023-10-09
Payer: MEDICARE

## 2023-10-09 ENCOUNTER — HOSPITAL ENCOUNTER (OUTPATIENT)
Dept: INFUSION THERAPY | Age: 58
Discharge: HOME | End: 2023-10-09
Payer: MEDICARE

## 2023-10-09 VITALS
HEART RATE: 76 BPM | TEMPERATURE: 97.2 F | DIASTOLIC BLOOD PRESSURE: 79 MMHG | HEIGHT: 69 IN | OXYGEN SATURATION: 94 % | BODY MASS INDEX: 31.95 KG/M2 | WEIGHT: 215.7 LBS | SYSTOLIC BLOOD PRESSURE: 120 MMHG

## 2023-10-09 DIAGNOSIS — C61 PROSTATE CANCER (HCC): Primary | ICD-10-CM

## 2023-10-09 DIAGNOSIS — K81.0 ACUTE CHOLECYSTITIS: ICD-10-CM

## 2023-10-09 LAB
ALBUMIN SERPL-MCNC: 4.3 G/DL (ref 3.5–5.2)
ALP SERPL-CCNC: 130 U/L (ref 40–129)
ALT SERPL-CCNC: 9 U/L (ref 0–40)
ANION GAP SERPL CALCULATED.3IONS-SCNC: 13 MMOL/L (ref 7–16)
AST SERPL-CCNC: 17 U/L (ref 0–39)
BASOPHILS # BLD: 0.04 K/UL (ref 0–0.2)
BASOPHILS NFR BLD: 1 % (ref 0–2)
BILIRUB SERPL-MCNC: 0.9 MG/DL (ref 0–1.2)
BUN SERPL-MCNC: 22 MG/DL (ref 6–20)
CALCIUM SERPL-MCNC: 9.3 MG/DL (ref 8.6–10.2)
CHLORIDE SERPL-SCNC: 100 MMOL/L (ref 98–107)
CO2 SERPL-SCNC: 28 MMOL/L (ref 22–29)
CREAT SERPL-MCNC: 1 MG/DL (ref 0.7–1.2)
EOSINOPHIL # BLD: 0.21 K/UL (ref 0.05–0.5)
EOSINOPHILS RELATIVE PERCENT: 4 % (ref 0–6)
ERYTHROCYTE [DISTWIDTH] IN BLOOD BY AUTOMATED COUNT: 19 % (ref 11.5–15)
GFR SERPL CREATININE-BSD FRML MDRD: >60 ML/MIN/1.73M2
GLUCOSE SERPL-MCNC: 110 MG/DL (ref 74–99)
HCT VFR BLD AUTO: 51.5 % (ref 37–54)
HGB BLD-MCNC: 15.7 G/DL (ref 12.5–16.5)
IMM GRANULOCYTES # BLD AUTO: <0.03 K/UL (ref 0–0.58)
IMM GRANULOCYTES NFR BLD: 0 % (ref 0–5)
LYMPHOCYTES NFR BLD: 1.05 K/UL (ref 1.5–4)
LYMPHOCYTES RELATIVE PERCENT: 19 % (ref 20–42)
MAGNESIUM SERPL-MCNC: 2.1 MG/DL (ref 1.6–2.6)
MCH RBC QN AUTO: 25.1 PG (ref 26–35)
MCHC RBC AUTO-ENTMCNC: 30.5 G/DL (ref 32–34.5)
MCV RBC AUTO: 82.4 FL (ref 80–99.9)
MONOCYTES NFR BLD: 0.46 K/UL (ref 0.1–0.95)
MONOCYTES NFR BLD: 9 % (ref 2–12)
NEUTROPHILS NFR BLD: 67 % (ref 43–80)
NEUTS SEG NFR BLD: 3.63 K/UL (ref 1.8–7.3)
PHOSPHATE SERPL-MCNC: 3.9 MG/DL (ref 2.5–4.5)
PLATELET # BLD AUTO: 184 K/UL (ref 130–450)
PMV BLD AUTO: 10.8 FL (ref 7–12)
POTASSIUM SERPL-SCNC: 3.8 MMOL/L (ref 3.5–5)
PROT SERPL-MCNC: 7.8 G/DL (ref 6.4–8.3)
PSA SERPL-MCNC: 0.05 NG/ML (ref 0–4)
RBC # BLD AUTO: 6.25 M/UL (ref 3.8–5.8)
SODIUM SERPL-SCNC: 141 MMOL/L (ref 132–146)
WBC OTHER # BLD: 5.4 K/UL (ref 4.5–11.5)

## 2023-10-09 PROCEDURE — 84153 ASSAY OF PSA TOTAL: CPT

## 2023-10-09 PROCEDURE — 80053 COMPREHEN METABOLIC PANEL: CPT

## 2023-10-09 PROCEDURE — 99213 OFFICE O/P EST LOW 20 MIN: CPT | Performed by: CLINICAL NURSE SPECIALIST

## 2023-10-09 PROCEDURE — 83735 ASSAY OF MAGNESIUM: CPT

## 2023-10-09 PROCEDURE — 85025 COMPLETE CBC W/AUTO DIFF WBC: CPT

## 2023-10-09 PROCEDURE — 96372 THER/PROPH/DIAG INJ SC/IM: CPT

## 2023-10-09 PROCEDURE — 84100 ASSAY OF PHOSPHORUS: CPT

## 2023-10-09 PROCEDURE — 3074F SYST BP LT 130 MM HG: CPT | Performed by: CLINICAL NURSE SPECIALIST

## 2023-10-09 PROCEDURE — 99212 OFFICE O/P EST SF 10 MIN: CPT

## 2023-10-09 PROCEDURE — 3078F DIAST BP <80 MM HG: CPT | Performed by: CLINICAL NURSE SPECIALIST

## 2023-10-09 PROCEDURE — 36415 COLL VENOUS BLD VENIPUNCTURE: CPT

## 2023-10-09 PROCEDURE — 6360000002 HC RX W HCPCS: Performed by: CLINICAL NURSE SPECIALIST

## 2023-10-09 RX ORDER — CYCLOBENZAPRINE HCL 10 MG
10 TABLET ORAL 3 TIMES DAILY PRN
Qty: 45 TABLET | Refills: 0 | Status: SHIPPED | OUTPATIENT
Start: 2023-10-09 | End: 2023-10-24

## 2023-10-09 RX ORDER — OXYCODONE HYDROCHLORIDE 5 MG/1
5 TABLET ORAL EVERY 6 HOURS PRN
Qty: 20 TABLET | Refills: 0 | Status: SHIPPED | OUTPATIENT
Start: 2023-10-09 | End: 2023-10-14

## 2023-10-09 RX ADMIN — DENOSUMAB 120 MG: 120 INJECTION SUBCUTANEOUS at 13:42

## 2023-10-09 NOTE — PROGRESS NOTES
Department of [facility] Oncology   Clinic Note    Reason for Visit: Follow-up on a patient with Prostate Cancer    PCP:  Yahaira Leone DO    History of Present Illness:  26-year-old male who was noted to have elevated PSA   Evaluated by Urology team (Dr. Rosas Bella)    PSA 9.20 on 03/31/2021  PSA 26.39 on 05/31/2022  PSA 31.83 on 06/20/2022    On 08/04/2022:  A. Prostate, left base, biopsy: Prostatic adenocarcinoma (acinar, not otherwise specified). Andrew Score: 4+3=7; cribriform pattern absent; percentage of pattern 4 is approximately 60%   Grade Group:  3   Tissue Cores Involved by Carcinoma: 2 of 2   Percentage of Tissue Involved by Carcinoma: 10%   Perineural Invasion: Present     B. Prostate, left mid, biopsy: Prostatic adenocarcinoma (acinar, not otherwise specified) and intraductal carcinoma. Andrew Score: 4+5=9; cribriform pattern present   Grade Group:  5   Tissue Cores Involved by Carcinoma: 2 of 2   Percentage of Tissue Involved by Carcinoma: 80%   Perineural Invasion: Present     C. Prostate, left apex, biopsy: Prostatic adenocarcinoma (acinar, not otherwise specified). Andrew Score: 4+4 = 8; cribriform pattern present   Grade Group:  4   Tissue Cores Involved by Carcinoma: 2 of 2   Percentage of Tissue Involved by Carcinoma: 50%   Perineural Invasion: Present     D. Prostate, right base, biopsy: Prostatic adenocarcinoma (acinar, not otherwise specified) and intraductal carcinoma. Andrew Score: 4+3 = 7; cribriform pattern present; percentage of pattern 4 is approximately 80%   Grade Group:  3   Tissue Cores Involved by Carcinoma: 3 of 3   Percentage of Tissue Involved by Carcinoma: 90%   Perineural Invasion: Present     E. 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    Andrew Score: 4+4=8; cribriform pattern present   Grade Group:  4   Tissue Cores Involved by Carcinoma: 3 of 3   Percentage of Tissue Involved by

## 2023-10-09 NOTE — TELEPHONE ENCOUNTER
Figueroa Hudson came to clinic to request a refill for Oxy IR 5 mg and flexeril. Pharmacy is [facility] in [facility]. Next mickey 10/24/23.

## 2023-10-12 ENCOUNTER — APPOINTMENT (OUTPATIENT)
Dept: OTHER | Age: 58
End: 2023-10-12
Payer: MEDICARE

## 2023-10-16 ENCOUNTER — HOSPITAL ENCOUNTER (OUTPATIENT)
Dept: OTHER | Age: 58
Setting detail: THERAPIES SERIES
Discharge: HOME | End: 2023-10-16
Payer: MEDICARE

## 2023-10-16 VITALS
WEIGHT: 215 LBS | HEART RATE: 73 BPM | SYSTOLIC BLOOD PRESSURE: 133 MMHG | BODY MASS INDEX: 31.75 KG/M2 | DIASTOLIC BLOOD PRESSURE: 79 MMHG | RESPIRATION RATE: 18 BRPM | OXYGEN SATURATION: 97 %

## 2023-10-16 LAB
ANION GAP SERPL CALCULATED.3IONS-SCNC: 15 MMOL/L (ref 7–16)
BNP SERPL-MCNC: 2254 PG/ML (ref 0–125)
BUN SERPL-MCNC: 19 MG/DL (ref 6–20)
CALCIUM SERPL-MCNC: 8.4 MG/DL (ref 8.6–10.2)
CHLORIDE SERPL-SCNC: 101 MMOL/L (ref 98–107)
CO2 SERPL-SCNC: 25 MMOL/L (ref 22–29)
CREAT SERPL-MCNC: 1 MG/DL (ref 0.7–1.2)
GFR SERPL CREATININE-BSD FRML MDRD: >60 ML/MIN/1.73M2
GLUCOSE SERPL-MCNC: 145 MG/DL (ref 74–99)
POTASSIUM SERPL-SCNC: 3.7 MMOL/L (ref 3.5–5)
SODIUM SERPL-SCNC: 141 MMOL/L (ref 132–146)

## 2023-10-16 PROCEDURE — 83880 ASSAY OF NATRIURETIC PEPTIDE: CPT

## 2023-10-16 PROCEDURE — 99214 OFFICE O/P EST MOD 30 MIN: CPT

## 2023-10-16 PROCEDURE — 80048 BASIC METABOLIC PNL TOTAL CA: CPT

## 2023-10-16 NOTE — PROGRESS NOTES
Congestive Heart Failure [address]       Referring Provider: FRANCO Dinero NP  Primary Care Physician: Rae Matthews DO   Cardiologist: Dr. Aysha Duffy  Nephrologist: None      HISTORY OF PRESENT ILLNESS:     Queta Emmanuel is a 61 y.o. (1965) male with a history of HFrEF (EF < 40%), most recent EF:  Lab Results   Component Value Date    LVEF 30 09/01/2021     Spoke with patient about possibly starting Iver Finner as it is indicated for CHF patients, he declines. States; \"I do not want to take another pill\". He presents to the CHF clinic for ongoing evaluation and monitoring of heart failure. In the CHF clinic today he denies any adverse symptoms except:  [] Dizziness or lightheadedness   [] Syncope or near syncope  [] Recent Fall  [] Shortness of breath at rest   [] Dyspnea with exertion  [] Decline in functional capacity (unable to perform activities they had previously been able to do)  [] Fatigue   [] Orthopnea  [] PND  [] Nocturnal cough  [] Hemoptysis  [] Chest pain, pressure, or discomfort  [] Palpitations  [] Abdominal distention  [] Abdominal bloating  [] Early satiety  [] Blood in stool   [] Diarrhea  [] Constipation  [] Nausea/Vomiting  [] Decreased urinary response to oral diuretic   [] Scrotal swelling   [] Lower extremity edema  [] Used PRN doses of oral diuretic   [] Weight gain    Wt Readings from Last 3 Encounters:   10/16/23 215 lb (97.5 kg)   10/09/23 215 lb 11.2 oz (97.8 kg)   09/18/23 216 lb (98 kg)           SOCIAL HISTORY:  [x] Denies tobacco, alcohol or illicit drug abuse  [] Tobacco use:  [] ETOH use:  [] Illicit drug use:        MEDICATIONS:    No Known Allergies  Prior to Visit Medications    Medication Sig Taking?  Authorizing Provider   cyclobenzaprine (FLEXERIL) 10 MG tablet Take 1 tablet by mouth 3 times daily as needed for Muscle spasms  Lisa Park, APRN - CNP   aspirin 81 MG EC tablet Take 1 tablet by mouth daily  Zuly

## 2023-10-16 NOTE — PLAN OF CARE
Problem: Chronic Conditions and Co-morbidities  Goal: Patient's chronic conditions and co-morbidity symptoms are monitored and maintained or improved  Flowsheets (Taken 10/16/2023 2364)  Care Plan - Patient's Chronic Conditions and Co-Morbidity Symptoms are Monitored and Maintained or Improved: Monitor and assess patient's chronic conditions and comorbid symptoms for stability, deterioration, or improvement

## 2023-11-06 ENCOUNTER — OFFICE VISIT (OUTPATIENT)
Dept: ONCOLOGY | Age: 58
End: 2023-11-06
Payer: MEDICARE

## 2023-11-06 ENCOUNTER — HOSPITAL ENCOUNTER (OUTPATIENT)
Dept: INFUSION THERAPY | Age: 58
Discharge: HOME | End: 2023-11-06
Payer: MEDICARE

## 2023-11-06 ENCOUNTER — OFFICE VISIT (OUTPATIENT)
Dept: PALLATIVE CARE | Age: 58
End: 2023-11-06
Payer: MEDICARE

## 2023-11-06 VITALS
HEIGHT: 69 IN | TEMPERATURE: 97.1 F | OXYGEN SATURATION: 97 % | SYSTOLIC BLOOD PRESSURE: 143 MMHG | DIASTOLIC BLOOD PRESSURE: 82 MMHG | BODY MASS INDEX: 32.64 KG/M2 | WEIGHT: 220.4 LBS | HEART RATE: 84 BPM

## 2023-11-06 DIAGNOSIS — C61 PROSTATE CANCER (HCC): Primary | ICD-10-CM

## 2023-11-06 DIAGNOSIS — C61 PROSTATE CANCER (HCC): ICD-10-CM

## 2023-11-06 DIAGNOSIS — Z51.5 PALLIATIVE CARE BY SPECIALIST: Primary | ICD-10-CM

## 2023-11-06 DIAGNOSIS — G89.3 PAIN DUE TO NEOPLASM: ICD-10-CM

## 2023-11-06 LAB
ALBUMIN SERPL-MCNC: 4.3 G/DL (ref 3.5–5.2)
ALP SERPL-CCNC: 99 U/L (ref 40–129)
ALT SERPL-CCNC: 9 U/L (ref 0–40)
ANION GAP SERPL CALCULATED.3IONS-SCNC: 12 MMOL/L (ref 7–16)
AST SERPL-CCNC: 16 U/L (ref 0–39)
BASOPHILS # BLD: 0.05 K/UL (ref 0–0.2)
BASOPHILS NFR BLD: 1 % (ref 0–2)
BILIRUB SERPL-MCNC: 0.7 MG/DL (ref 0–1.2)
BUN SERPL-MCNC: 24 MG/DL (ref 6–20)
CALCIUM SERPL-MCNC: 9.8 MG/DL (ref 8.6–10.2)
CHLORIDE SERPL-SCNC: 101 MMOL/L (ref 98–107)
CO2 SERPL-SCNC: 30 MMOL/L (ref 22–29)
CREAT SERPL-MCNC: 0.9 MG/DL (ref 0.7–1.2)
EOSINOPHIL # BLD: 0.25 K/UL (ref 0.05–0.5)
EOSINOPHILS RELATIVE PERCENT: 4 % (ref 0–6)
ERYTHROCYTE [DISTWIDTH] IN BLOOD BY AUTOMATED COUNT: 18.3 % (ref 11.5–15)
GFR SERPL CREATININE-BSD FRML MDRD: >60 ML/MIN/1.73M2
GLUCOSE SERPL-MCNC: 136 MG/DL (ref 74–99)
HCT VFR BLD AUTO: 49.2 % (ref 37–54)
HGB BLD-MCNC: 15.8 G/DL (ref 12.5–16.5)
IMM GRANULOCYTES # BLD AUTO: <0.03 K/UL (ref 0–0.58)
IMM GRANULOCYTES NFR BLD: 0 % (ref 0–5)
LYMPHOCYTES NFR BLD: 1.36 K/UL (ref 1.5–4)
LYMPHOCYTES RELATIVE PERCENT: 22 % (ref 20–42)
MAGNESIUM SERPL-MCNC: 1.8 MG/DL (ref 1.6–2.6)
MCH RBC QN AUTO: 25.6 PG (ref 26–35)
MCHC RBC AUTO-ENTMCNC: 32.1 G/DL (ref 32–34.5)
MCV RBC AUTO: 79.7 FL (ref 80–99.9)
MONOCYTES NFR BLD: 0.42 K/UL (ref 0.1–0.95)
MONOCYTES NFR BLD: 7 % (ref 2–12)
NEUTROPHILS NFR BLD: 66 % (ref 43–80)
NEUTS SEG NFR BLD: 3.98 K/UL (ref 1.8–7.3)
PHOSPHATE SERPL-MCNC: 3.4 MG/DL (ref 2.5–4.5)
PLATELET # BLD AUTO: 160 K/UL (ref 130–450)
PMV BLD AUTO: 10.4 FL (ref 7–12)
POTASSIUM SERPL-SCNC: 3.8 MMOL/L (ref 3.5–5)
PROT SERPL-MCNC: 7.6 G/DL (ref 6.4–8.3)
PSA SERPL-MCNC: 0.03 NG/ML (ref 0–4)
RBC # BLD AUTO: 6.17 M/UL (ref 3.8–5.8)
SODIUM SERPL-SCNC: 143 MMOL/L (ref 132–146)
WBC OTHER # BLD: 6.1 K/UL (ref 4.5–11.5)

## 2023-11-06 PROCEDURE — 99213 OFFICE O/P EST LOW 20 MIN: CPT | Performed by: CLINICAL NURSE SPECIALIST

## 2023-11-06 PROCEDURE — 96372 THER/PROPH/DIAG INJ SC/IM: CPT

## 2023-11-06 PROCEDURE — 6360000002 HC RX W HCPCS: Performed by: CLINICAL NURSE SPECIALIST

## 2023-11-06 PROCEDURE — 80053 COMPREHEN METABOLIC PANEL: CPT

## 2023-11-06 PROCEDURE — 99211 OFF/OP EST MAY X REQ PHY/QHP: CPT | Performed by: NURSE PRACTITIONER

## 2023-11-06 PROCEDURE — 84153 ASSAY OF PSA TOTAL: CPT

## 2023-11-06 PROCEDURE — 3077F SYST BP >= 140 MM HG: CPT | Performed by: CLINICAL NURSE SPECIALIST

## 2023-11-06 PROCEDURE — 99212 OFFICE O/P EST SF 10 MIN: CPT

## 2023-11-06 PROCEDURE — 99213 OFFICE O/P EST LOW 20 MIN: CPT | Performed by: NURSE PRACTITIONER

## 2023-11-06 PROCEDURE — 83735 ASSAY OF MAGNESIUM: CPT

## 2023-11-06 PROCEDURE — 85025 COMPLETE CBC W/AUTO DIFF WBC: CPT

## 2023-11-06 PROCEDURE — 84100 ASSAY OF PHOSPHORUS: CPT

## 2023-11-06 PROCEDURE — 3079F DIAST BP 80-89 MM HG: CPT | Performed by: CLINICAL NURSE SPECIALIST

## 2023-11-06 PROCEDURE — 36415 COLL VENOUS BLD VENIPUNCTURE: CPT

## 2023-11-06 RX ADMIN — DENOSUMAB 120 MG: 120 INJECTION SUBCUTANEOUS at 14:55

## 2023-11-06 NOTE — PROGRESS NOTES
redness  NEUROLOGICAL:  light headed, dizziness, loss of consciousness, weakness, change in memory, seizures, tremors    Objective:     Physical Exam  There were no vitals taken for this visit. Gen:  Alert, appears stated age, well nourished, in no acute distress  HEENT:  Normocephalic, conjunctiva pink, no drainage, mucosa moist  Neck:  Supple  Lungs:  CTA bilaterally, no audible rhonchi or wheezes noted  Heart[de-identified]  RRR, no murmur, rub, or gallop noted during exam  Abd:  Soft, non tender, non distended, BS+  M/S/Ext:  Moving all extremities, no edema, pulses present  Skin:  Warm and dry  Neuro:  PERRL, Alert, oriented x 3; following commands    [place] Symptom Assessment Score       11/6/2023     2:00 PM 8/28/2023     4:00 PM 6/12/2023     1:00 PM 5/15/2023     4:00 PM 4/24/2023     2:00 PM   [place] Score   Pain Score 6 6 6 6 7   Tiredness Score 5 8 7 9 7   Nausea Score 2 Not nauseated 3 Not nauseated Not nauseated   Depression Score 2 4 4 8 Not depressed   Anxiety Score 2 Not anxious 2 1 Not anxious   Drowsiness Score 4 1 3 4 2   Appetite Score Best appetite 4 4 5 5   Wellbeing Score Best feeling of wellbeing 2 3 5 3   Dyspnea Score 3 7 5 5 6   Other Problem Score 1 4 5 Best possible response Best possible response   Total Assessment Score(calculated) 25 36 42 43 30     Assessed by: patient and provider. Current Medications:  Medications reviewed: yes    Controlled Substances Monitoring: [place] reviewed 11/6/23. Dhaval Estimable, APRN - CNP  Palliative Medicine    Time/Communication  Greater than 50% of time spent, total 25 minutes in face-to-face counseling and coordination of care regarding symptom management and see above. Discussed the plan of care with the other IDT members of the Palliative Care Team, and with consultants, Primary Attending, patient, family, and facility staff. Thank you for allowing Palliative Medicine to participate in the care of Nikita Sen.     Note: This report was

## 2023-11-07 NOTE — PROGRESS NOTES
Department of [facility] Oncology   Clinic Note    Reason for Visit: Follow-up on a patient with Prostate Cancer    PCP:  Nazanin Murillo DO    History of Present Illness:  53-year-old male who was noted to have elevated PSA   Evaluated by Urology team (Dr. Maulik Negron [facility])    PSA 9.20 on 03/31/2021  PSA 26.39 on 05/31/2022  PSA 31.83 on 06/20/2022    On 08/04/2022:  A. Prostate, left base, biopsy: Prostatic adenocarcinoma (acinar, not otherwise specified). [place] Score: 4+3=7; cribriform pattern absent; percentage of pattern 4 is approximately 60%   Grade Group:  3   Tissue Cores Involved by Carcinoma: 2 of 2   Percentage of Tissue Involved by Carcinoma: 10%   Perineural Invasion: Present     B. Prostate, left mid, biopsy: Prostatic adenocarcinoma (acinar, not otherwise specified) and intraductal carcinoma. Andrew Score: 4+5=9; cribriform pattern present   Grade Group:  5   Tissue Cores Involved by Carcinoma: 2 of 2   Percentage of Tissue Involved by Carcinoma: 80%   Perineural Invasion: Present     C. Prostate, left apex, biopsy: Prostatic adenocarcinoma (acinar, not otherwise specified). Andrew Score: 4+4 = 8; cribriform pattern present   Grade Group:  4   Tissue Cores Involved by Carcinoma: 2 of 2   Percentage of Tissue Involved by Carcinoma: 50%   Perineural Invasion: Present     D. Prostate, right base, biopsy: Prostatic adenocarcinoma (acinar, not otherwise specified) and intraductal carcinoma. [place] Score: 4+3 = 7; cribriform pattern present; percentage of pattern 4 is approximately 80%   Grade Group:  3   Tissue Cores Involved by Carcinoma: 3 of 3   Percentage of Tissue Involved by Carcinoma: 90%   Perineural Invasion: Present     E. 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    [place] Score: 4+4=8; cribriform pattern present   Grade Group:  4   Tissue Cores Involved by Carcinoma: 3 of 3   Percentage of Tissue Involved by
Patient provided with discharge instructions, received printed AVS.  All questions answered. Patient understands follow up plan of care.
unremarkable left hip and femur. Darlina Folk was started on 09/02/2022 with fair tolerance so far. Eligard 22.5 mg was given on 09/14/2022, Dec 2022 March 2023 and June 2023    PSA 7.57 on 09/19/2022. CT chest on 09/28/2022: No CT evidence of acute intrathoracic abnormality. No definite evidence of metastatic disease. PSA 0.24 on 10/24/2022  PSA 0.14 on 11/21/2022    CT abdomen/pelvis 12/07/2022: Compared to prior exam the prostate gland is decreased in size and previously seen enlarged pelvic lymph nodes are now subcentimeter and decreased in size compatible with positive response to treatment. No new areas of lymphadenopathy. Bone scan 12/07/2022: There is evidence to suggest metastatic disease, however the degree of tracer uptake is significantly diminished in the interval suggesting a positive response to therapy. PSA 0.10 on 12/19/2022    Overall positive response. PSA 0.09 on 01/16/2023  PSA 0.06 on 02/13/2023. PSA 0.06 on 03/13/2023. PSA 0.07 on 04/17/2023. Bone scan 05/05/2023 continued decrease conspicuity of osseous metastatic disease as compared to prior exam suggestive of favorable treatment response. CT chest/abdomen/pelvis 05/05/2023: Diffuse osseous metastasis without evidence for extraosseous metastatic involvement in the chest, abdomen or pelvis. PSA 0.06 on 05/15/2023. Overall favorable response. PSA 0.07 on 06/12/2023. PSA 0.07 on 07/10/2023. CXR 08/01/2023: mild atelectasis in lung bases. No sizable pleural effusion or pneumothorax. Imaging reviewed. Recent laparoscopic cholecystectomy; recovering well so far. General surgery note reviewed. Labs reviewed,   Continue XTANDI  Intermittent arthralgias; Tramadol  Continue to follow-up with palliative care team  Hypercalcemia secondary to Xgeva; calcium gluconate 1 g IV x1 ordered today 8/7/2023.   Hold Xgeva today  Repeat BMP next week; if calcium equal or more than 8.0 then proceed with Xgeva    8/14/2023  labs

## 2023-11-13 ENCOUNTER — TELEPHONE (OUTPATIENT)
Dept: CARDIOLOGY CLINIC | Age: 58
End: 2023-11-13

## 2023-11-17 ENCOUNTER — HOSPITAL ENCOUNTER (OUTPATIENT)
Dept: OTHER | Age: 58
Setting detail: THERAPIES SERIES
Discharge: HOME | End: 2023-11-17

## 2023-11-18 PROCEDURE — 93296 REM INTERROG EVL PM/IDS: CPT | Performed by: STUDENT IN AN ORGANIZED HEALTH CARE EDUCATION/TRAINING PROGRAM

## 2023-11-18 PROCEDURE — 93295 DEV INTERROG REMOTE 1/2/MLT: CPT | Performed by: STUDENT IN AN ORGANIZED HEALTH CARE EDUCATION/TRAINING PROGRAM

## 2023-11-29 ENCOUNTER — TELEPHONE (OUTPATIENT)
Dept: PHARMACY | Facility: CLINIC | Age: 58
End: 2023-11-29

## 2023-11-29 NOTE — TELEPHONE ENCOUNTER
POPULATION HEALTH CLINICAL PHARMACY: ADHERENCE REVIEW  Identified care gap per [place]: fills at [facility]: Statin adherence    ASSESSMENT  DIABETES ADHERENCE - impossible in     Lab Results   Component Value Date    LABA1C 6.9 (H) 2023    LABA1C 7.4 2023    LABA1C 7.5 2022     NOTE: A1c <9%     [facility] [employer] Records claims through 23 (Prior Year [address] = not reported; YTD [address] = FIRST FILL; Potential Fail Date: 23): Atorvastatin 80 mg tab last filled on 8/10/23 for 90 day supply. Next refill due: 23    Prescribed si tablet/capsule daily    Per Reconcile Dispense History:   Atorvastatin 80 Mg  Tab Niva 08/10/2023 90 90 each Amelia Coombs MD [id number] - YO. ..   3 refills per hyperlink; last Rx 10/3/23 x 90 day supply 3 refills - believe has refill at pharmacy    Lab Results   Component Value Date    CHOL 232 (H) 06/10/2022    TRIG 219 (H) 06/10/2022    HDL 29 (L) 2023    LDLCHOLESTEROL 109 (H) 2023    LDLCALC 147 (H) 06/10/2022     ALT   Date Value Ref Range Status   2023 9 0 - 40 U/L Final     AST   Date Value Ref Range Status   2023 16 0 - 39 U/L Final     The 10-year ASCVD risk score (Terri DK, et al., 2019) is: 25.2%    Values used to calculate the score:      Age: 60 years      Sex: Male      Is Non- : No      Diabetic: Yes      Tobacco smoker: No      Systolic Blood Pressure: 333 mmHg      Is BP treated: Yes      HDL Cholesterol: 29 mg/dL      Total Cholesterol: 166 mg/dL     PLAN  The following are interventions that have been identified:   Patient overdue refilling atorvastatin and active on home medication list.   One fill, refill at [address] [facility] to please follow up with pharmacy and patient.     Casimiro Smith, PharmD, [address] [place] [address], toll free: [phone], option 1

## 2023-12-04 ENCOUNTER — OFFICE VISIT (OUTPATIENT)
Dept: ONCOLOGY | Age: 58
End: 2023-12-04
Payer: MEDICARE

## 2023-12-04 ENCOUNTER — HOSPITAL ENCOUNTER (OUTPATIENT)
Dept: INFUSION THERAPY | Age: 58
Discharge: HOME | End: 2023-12-04
Payer: MEDICARE

## 2023-12-04 VITALS
DIASTOLIC BLOOD PRESSURE: 67 MMHG | HEIGHT: 69 IN | TEMPERATURE: 97.7 F | WEIGHT: 219.3 LBS | HEART RATE: 81 BPM | SYSTOLIC BLOOD PRESSURE: 137 MMHG | OXYGEN SATURATION: 96 % | BODY MASS INDEX: 32.48 KG/M2

## 2023-12-04 DIAGNOSIS — C61 PROSTATE CANCER (HCC): Primary | ICD-10-CM

## 2023-12-04 DIAGNOSIS — C79.51 PROSTATE CANCER METASTATIC TO BONE (HCC): ICD-10-CM

## 2023-12-04 DIAGNOSIS — C61 PROSTATE CANCER METASTATIC TO BONE (HCC): ICD-10-CM

## 2023-12-04 LAB
ALBUMIN SERPL-MCNC: 4.4 G/DL (ref 3.5–5.2)
ALP SERPL-CCNC: 99 U/L (ref 40–129)
ALT SERPL-CCNC: 9 U/L (ref 0–40)
ANION GAP SERPL CALCULATED.3IONS-SCNC: 15 MMOL/L (ref 7–16)
AST SERPL-CCNC: 14 U/L (ref 0–39)
BASOPHILS # BLD: 0.07 K/UL (ref 0–0.2)
BASOPHILS NFR BLD: 1 % (ref 0–2)
BILIRUB SERPL-MCNC: 0.7 MG/DL (ref 0–1.2)
BUN SERPL-MCNC: 25 MG/DL (ref 6–20)
CALCIUM SERPL-MCNC: 9.3 MG/DL (ref 8.6–10.2)
CHLORIDE SERPL-SCNC: 100 MMOL/L (ref 98–107)
CO2 SERPL-SCNC: 26 MMOL/L (ref 22–29)
CREAT SERPL-MCNC: 1 MG/DL (ref 0.7–1.2)
EOSINOPHIL # BLD: 0.31 K/UL (ref 0.05–0.5)
EOSINOPHILS RELATIVE PERCENT: 5 % (ref 0–6)
ERYTHROCYTE [DISTWIDTH] IN BLOOD BY AUTOMATED COUNT: 17.2 % (ref 11.5–15)
GFR SERPL CREATININE-BSD FRML MDRD: >60 ML/MIN/1.73M2
GLUCOSE SERPL-MCNC: 177 MG/DL (ref 74–99)
HCT VFR BLD AUTO: 48.5 % (ref 37–54)
HGB BLD-MCNC: 15.6 G/DL (ref 12.5–16.5)
IMM GRANULOCYTES # BLD AUTO: 0.04 K/UL (ref 0–0.58)
IMM GRANULOCYTES NFR BLD: 1 % (ref 0–5)
LYMPHOCYTES NFR BLD: 1.34 K/UL (ref 1.5–4)
LYMPHOCYTES RELATIVE PERCENT: 20 % (ref 20–42)
MAGNESIUM SERPL-MCNC: 2.2 MG/DL (ref 1.6–2.6)
MCH RBC QN AUTO: 25.9 PG (ref 26–35)
MCHC RBC AUTO-ENTMCNC: 32.2 G/DL (ref 32–34.5)
MCV RBC AUTO: 80.4 FL (ref 80–99.9)
MONOCYTES NFR BLD: 0.45 K/UL (ref 0.1–0.95)
MONOCYTES NFR BLD: 7 % (ref 2–12)
NEUTROPHILS NFR BLD: 66 % (ref 43–80)
NEUTS SEG NFR BLD: 4.36 K/UL (ref 1.8–7.3)
PHOSPHATE SERPL-MCNC: 3.4 MG/DL (ref 2.5–4.5)
PLATELET # BLD AUTO: 183 K/UL (ref 130–450)
PMV BLD AUTO: 10.1 FL (ref 7–12)
POTASSIUM SERPL-SCNC: 4.4 MMOL/L (ref 3.5–5)
PROT SERPL-MCNC: 7.8 G/DL (ref 6.4–8.3)
PSA SERPL-MCNC: 0.03 NG/ML (ref 0–4)
RBC # BLD AUTO: 6.03 M/UL (ref 3.8–5.8)
SODIUM SERPL-SCNC: 141 MMOL/L (ref 132–146)
WBC OTHER # BLD: 6.6 K/UL (ref 4.5–11.5)

## 2023-12-04 PROCEDURE — 36415 COLL VENOUS BLD VENIPUNCTURE: CPT

## 2023-12-04 PROCEDURE — 85025 COMPLETE CBC W/AUTO DIFF WBC: CPT

## 2023-12-04 PROCEDURE — 99213 OFFICE O/P EST LOW 20 MIN: CPT

## 2023-12-04 PROCEDURE — 83735 ASSAY OF MAGNESIUM: CPT

## 2023-12-04 PROCEDURE — 3078F DIAST BP <80 MM HG: CPT | Performed by: CLINICAL NURSE SPECIALIST

## 2023-12-04 PROCEDURE — 84153 ASSAY OF PSA TOTAL: CPT

## 2023-12-04 PROCEDURE — 6360000002 HC RX W HCPCS: Performed by: CLINICAL NURSE SPECIALIST

## 2023-12-04 PROCEDURE — 80053 COMPREHEN METABOLIC PANEL: CPT

## 2023-12-04 PROCEDURE — 3075F SYST BP GE 130 - 139MM HG: CPT | Performed by: CLINICAL NURSE SPECIALIST

## 2023-12-04 PROCEDURE — 99213 OFFICE O/P EST LOW 20 MIN: CPT | Performed by: CLINICAL NURSE SPECIALIST

## 2023-12-04 PROCEDURE — 96372 THER/PROPH/DIAG INJ SC/IM: CPT

## 2023-12-04 PROCEDURE — 84100 ASSAY OF PHOSPHORUS: CPT

## 2023-12-04 RX ADMIN — DENOSUMAB 120 MG: 120 INJECTION SUBCUTANEOUS at 15:26

## 2023-12-04 NOTE — PROGRESS NOTES
Patient provided with discharge instructions, patient has [facility]. All questions answered. Patient understands follow up plan of care.   Patient aware to arrive @ 1:00 pm. for labs same day first.
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 Andrew Score: 4+4=8; cribriform pattern present   Grade Group:  4   Tissue Cores Involved by Carcinoma: 3 of 3   Percentage of Tissue Involved by Carcinoma: >90%   Perineural Invasion: Not identified     F. Prostate, right apex, biopsy: Prostatic adenocarcinoma (acinar, not otherwise specified) and intraductal carcinoma. Andrew Score: 4+4 = 8; cribriform pattern present   Grade Group:  4   Tissue Cores Involved by Carcinoma: 3 of 3   Percentage of Tissue Involved by Carcinoma: 80%   Perineural Invasion: Present     Comment: In part A, carcinoma is seen involving fibroadipose tissue. Extraprostatic extension cannot be excluded. P63 immunostain is performed on Parts B, D, E, and F and shows the presence of basal cells around the intraductal carcinoma in the absence of basal cells in the   areas of invasive carcinoma. The percentage of tissue involved listed above reflects the total percentage involved by invasive and intraductal carcinoma. Intradepartmental consultation is obtained. NGS testing ([employer]):  MS-Stable: No therapies or clinical trials  TMB 0 Muts/Mb: No therapies or clinical trials  No therapies or clinical trials are associated with the genomic findings for the sample    CT abdomen/pelvis 8/23/2022 enlarged prostate gland with impingement upon the lymph node to bladder base by an irregular portion of the enlarged gland. Enlarged pelvic and inferior retroperitoneal lymph nodes  Diffuse thickening of the walls of the urinary bladder with surrounding  Small right anterior abdominal wall hernia, which contains fat   Small umbilical hernia, which contains fat. Small bilateral inguinal hernias, left slightly greater than right and both contain fat.     Bone scan 08/23/2022:  Findings consistent with widespread metastatic disease    Left Femur/Pelvis X-Ray 09/07/2022:

## 2023-12-04 NOTE — TELEPHONE ENCOUNTER
Per Terrell Us, a 90ds of Atorvastatin 80mg nightly will be refilled and ready for  later today with a $4.15 copay    Unable to reach patient   LVM   [employer] message sent       For Pharmacy Admin Tracking Only    Program: [place] in place:  No  Recommendation Provided To: Pharmacy: 1  Intervention Detail: Adherence Monitorin  Intervention Accepted By: Pharmacy: 1  Gap Closed?: No   Time Spent (min): 10

## 2023-12-05 ENCOUNTER — OFFICE VISIT (OUTPATIENT)
Dept: FAMILY MEDICINE CLINIC | Age: 58
End: 2023-12-05
Payer: MEDICARE

## 2023-12-05 VITALS
WEIGHT: 221 LBS | HEART RATE: 80 BPM | SYSTOLIC BLOOD PRESSURE: 105 MMHG | OXYGEN SATURATION: 96 % | TEMPERATURE: 97 F | DIASTOLIC BLOOD PRESSURE: 74 MMHG | BODY MASS INDEX: 32.64 KG/M2

## 2023-12-05 DIAGNOSIS — F33.1 MODERATE EPISODE OF RECURRENT MAJOR DEPRESSIVE DISORDER (HCC): ICD-10-CM

## 2023-12-05 DIAGNOSIS — I72.3 ANEURYSM OF RIGHT COMMON ILIAC ARTERY (HCC): ICD-10-CM

## 2023-12-05 DIAGNOSIS — N18.2 TYPE 2 DIABETES MELLITUS WITH STAGE 2 CHRONIC KIDNEY DISEASE, WITHOUT LONG-TERM CURRENT USE OF INSULIN (HCC): ICD-10-CM

## 2023-12-05 DIAGNOSIS — J44.9 CHRONIC OBSTRUCTIVE PULMONARY DISEASE, UNSPECIFIED COPD TYPE (HCC): ICD-10-CM

## 2023-12-05 DIAGNOSIS — E11.22 TYPE 2 DIABETES MELLITUS WITH STAGE 2 CHRONIC KIDNEY DISEASE, WITHOUT LONG-TERM CURRENT USE OF INSULIN (HCC): ICD-10-CM

## 2023-12-05 DIAGNOSIS — E11.9 TYPE 2 DIABETES MELLITUS WITHOUT COMPLICATION, WITHOUT LONG-TERM CURRENT USE OF INSULIN (HCC): Primary | ICD-10-CM

## 2023-12-05 DIAGNOSIS — I47.29 NSVT (NONSUSTAINED VENTRICULAR TACHYCARDIA) (HCC): ICD-10-CM

## 2023-12-05 LAB — HBA1C MFR BLD: 7.1 %

## 2023-12-05 PROCEDURE — 83036 HEMOGLOBIN GLYCOSYLATED A1C: CPT | Performed by: FAMILY MEDICINE

## 2023-12-05 PROCEDURE — 99214 OFFICE O/P EST MOD 30 MIN: CPT | Performed by: FAMILY MEDICINE

## 2023-12-05 PROCEDURE — 3051F HG A1C>EQUAL 7.0%<8.0%: CPT | Performed by: FAMILY MEDICINE

## 2023-12-05 PROCEDURE — 3074F SYST BP LT 130 MM HG: CPT | Performed by: FAMILY MEDICINE

## 2023-12-05 PROCEDURE — 3078F DIAST BP <80 MM HG: CPT | Performed by: FAMILY MEDICINE

## 2023-12-26 ENCOUNTER — HOSPITAL ENCOUNTER (OUTPATIENT)
Dept: CT IMAGING | Age: 58
Discharge: HOME | End: 2023-12-28
Payer: MEDICARE

## 2023-12-26 ENCOUNTER — HOSPITAL ENCOUNTER (OUTPATIENT)
Dept: NUCLEAR MEDICINE | Age: 58
Discharge: HOME | End: 2023-12-28
Payer: MEDICARE

## 2023-12-26 DIAGNOSIS — C61 PROSTATE CANCER METASTATIC TO BONE (HCC): ICD-10-CM

## 2023-12-26 DIAGNOSIS — C79.51 PROSTATE CANCER METASTATIC TO BONE (HCC): ICD-10-CM

## 2023-12-26 PROCEDURE — 6360000004 HC RX CONTRAST MEDICATION: Performed by: RADIOLOGY

## 2023-12-26 PROCEDURE — 3430000000 HC RX DIAGNOSTIC RADIOPHARMACEUTICAL: Performed by: RADIOLOGY

## 2023-12-26 PROCEDURE — 78306 BONE IMAGING WHOLE BODY: CPT | Performed by: CLINICAL NURSE SPECIALIST

## 2023-12-26 PROCEDURE — 74177 CT ABD & PELVIS W/CONTRAST: CPT

## 2023-12-26 PROCEDURE — 71260 CT THORAX DX C+: CPT

## 2023-12-26 PROCEDURE — A9503 TC99M MEDRONATE: HCPCS | Performed by: RADIOLOGY

## 2023-12-26 RX ORDER — TC 99M MEDRONATE 20 MG/10ML
26 INJECTION, POWDER, LYOPHILIZED, FOR SOLUTION INTRAVENOUS
Status: COMPLETED | OUTPATIENT
Start: 2023-12-26 | End: 2023-12-26

## 2023-12-26 RX ADMIN — TC 99M MEDRONATE 26 MILLICURIE: 20 INJECTION, POWDER, LYOPHILIZED, FOR SOLUTION INTRAVENOUS at 07:45

## 2023-12-26 RX ADMIN — IOPAMIDOL 75 ML: 755 INJECTION, SOLUTION INTRAVENOUS at 09:20

## 2023-12-26 RX ADMIN — IOPAMIDOL 18 ML: 755 INJECTION, SOLUTION INTRAVENOUS at 09:19

## 2023-12-29 ENCOUNTER — HOSPITAL ENCOUNTER (OUTPATIENT)
Dept: INFUSION THERAPY | Age: 58
Setting detail: INFUSION SERIES
Discharge: HOME | End: 2023-12-29
Payer: MEDICARE

## 2023-12-29 VITALS
SYSTOLIC BLOOD PRESSURE: 106 MMHG | HEART RATE: 80 BPM | RESPIRATION RATE: 16 BRPM | DIASTOLIC BLOOD PRESSURE: 61 MMHG | OXYGEN SATURATION: 98 % | TEMPERATURE: 96.4 F

## 2023-12-29 DIAGNOSIS — C61 PROSTATE CANCER (HCC): Primary | ICD-10-CM

## 2023-12-29 PROCEDURE — 96402 CHEMO HORMON ANTINEOPL SQ/IM: CPT

## 2023-12-29 PROCEDURE — 6360000002 HC RX W HCPCS: Performed by: UROLOGY

## 2023-12-29 RX ADMIN — LEUPROLIDE ACETATE 22.5 MG: KIT SUBCUTANEOUS at 14:35

## 2023-12-29 ASSESSMENT — PAIN DESCRIPTION - ORIENTATION: ORIENTATION: LEFT;MID

## 2023-12-29 ASSESSMENT — PAIN DESCRIPTION - DESCRIPTORS: DESCRIPTORS: DISCOMFORT

## 2023-12-29 ASSESSMENT — PAIN DESCRIPTION - FREQUENCY: FREQUENCY: CONTINUOUS

## 2023-12-29 ASSESSMENT — PAIN DESCRIPTION - ONSET: ONSET: ON-GOING

## 2023-12-29 ASSESSMENT — PAIN SCALES - GENERAL: PAINLEVEL_OUTOF10: 5

## 2023-12-29 ASSESSMENT — PAIN DESCRIPTION - PAIN TYPE: TYPE: CHRONIC PAIN

## 2023-12-29 ASSESSMENT — PAIN DESCRIPTION - DIRECTION: RADIATING_TOWARDS: R SIDE

## 2023-12-29 ASSESSMENT — PAIN - FUNCTIONAL ASSESSMENT: PAIN_FUNCTIONAL_ASSESSMENT: PREVENTS OR INTERFERES SOME ACTIVE ACTIVITIES AND ADLS

## 2023-12-29 ASSESSMENT — PAIN DESCRIPTION - LOCATION: LOCATION: BACK;SHOULDER

## 2023-12-29 NOTE — PROGRESS NOTES
Patient tolerated eligard injection well. Patient alert and oriented x3. No distress noted. Vital signs stable. Patient denies any new or worsening pain. Offered patient education and/or discharge material. Patient declined. Patient denies any needs. All questions answered. D/C in stable condition.

## 2024-01-08 ENCOUNTER — OFFICE VISIT (OUTPATIENT)
Dept: ONCOLOGY | Age: 59
End: 2024-01-08
Payer: MEDICARE

## 2024-01-08 ENCOUNTER — HOSPITAL ENCOUNTER (OUTPATIENT)
Dept: INFUSION THERAPY | Age: 59
Discharge: HOME | End: 2024-01-08
Payer: MEDICARE

## 2024-01-08 VITALS
SYSTOLIC BLOOD PRESSURE: 87 MMHG | BODY MASS INDEX: 34 KG/M2 | WEIGHT: 229.6 LBS | HEART RATE: 77 BPM | TEMPERATURE: 97 F | HEIGHT: 69 IN | DIASTOLIC BLOOD PRESSURE: 54 MMHG | OXYGEN SATURATION: 96 %

## 2024-01-08 DIAGNOSIS — C61 PROSTATE CANCER (HCC): Primary | ICD-10-CM

## 2024-01-08 DIAGNOSIS — C61 PROSTATE CANCER METASTATIC TO BONE (HCC): Primary | ICD-10-CM

## 2024-01-08 DIAGNOSIS — C79.51 PROSTATE CANCER METASTATIC TO BONE (HCC): Primary | ICD-10-CM

## 2024-01-08 LAB
ALBUMIN SERPL-MCNC: 4.5 G/DL (ref 3.5–5.2)
ALP SERPL-CCNC: 89 U/L (ref 40–129)
ALT SERPL-CCNC: 12 U/L (ref 0–40)
ANION GAP SERPL CALCULATED.3IONS-SCNC: 13 MMOL/L (ref 7–16)
AST SERPL-CCNC: 13 U/L (ref 0–39)
BASOPHILS # BLD: 0.06 K/UL (ref 0–0.2)
BASOPHILS NFR BLD: 1 % (ref 0–2)
BILIRUB SERPL-MCNC: 0.6 MG/DL (ref 0–1.2)
BUN SERPL-MCNC: 29 MG/DL (ref 6–20)
CALCIUM SERPL-MCNC: 8.9 MG/DL (ref 8.6–10.2)
CHLORIDE SERPL-SCNC: 98 MMOL/L (ref 98–107)
CO2 SERPL-SCNC: 27 MMOL/L (ref 22–29)
CREAT SERPL-MCNC: 1.1 MG/DL (ref 0.7–1.2)
EOSINOPHIL # BLD: 0.36 K/UL (ref 0.05–0.5)
EOSINOPHILS RELATIVE PERCENT: 6 % (ref 0–6)
ERYTHROCYTE [DISTWIDTH] IN BLOOD BY AUTOMATED COUNT: 15.3 % (ref 11.5–15)
GFR SERPL CREATININE-BSD FRML MDRD: >60 ML/MIN/1.73M2
GLUCOSE SERPL-MCNC: 315 MG/DL (ref 74–99)
HCT VFR BLD AUTO: 45.4 % (ref 37–54)
HGB BLD-MCNC: 14.9 G/DL (ref 12.5–16.5)
IMM GRANULOCYTES # BLD AUTO: 0.06 K/UL (ref 0–0.58)
IMM GRANULOCYTES NFR BLD: 1 % (ref 0–5)
LYMPHOCYTES NFR BLD: 1.36 K/UL (ref 1.5–4)
LYMPHOCYTES RELATIVE PERCENT: 22 % (ref 20–42)
MAGNESIUM SERPL-MCNC: 1.9 MG/DL (ref 1.6–2.6)
MCH RBC QN AUTO: 27 PG (ref 26–35)
MCHC RBC AUTO-ENTMCNC: 32.8 G/DL (ref 32–34.5)
MCV RBC AUTO: 82.4 FL (ref 80–99.9)
MONOCYTES NFR BLD: 0.55 K/UL (ref 0.1–0.95)
MONOCYTES NFR BLD: 9 % (ref 2–12)
NEUTROPHILS NFR BLD: 61 % (ref 43–80)
NEUTS SEG NFR BLD: 3.69 K/UL (ref 1.8–7.3)
PHOSPHATE SERPL-MCNC: 4.2 MG/DL (ref 2.5–4.5)
PLATELET # BLD AUTO: 163 K/UL (ref 130–450)
PMV BLD AUTO: 10.7 FL (ref 7–12)
POTASSIUM SERPL-SCNC: 3.8 MMOL/L (ref 3.5–5)
PROT SERPL-MCNC: 7.9 G/DL (ref 6.4–8.3)
PSA SERPL-MCNC: 0.03 NG/ML (ref 0–4)
RBC # BLD AUTO: 5.51 M/UL (ref 3.8–5.8)
SODIUM SERPL-SCNC: 138 MMOL/L (ref 132–146)
WBC OTHER # BLD: 6.1 K/UL (ref 4.5–11.5)

## 2024-01-08 PROCEDURE — 3078F DIAST BP <80 MM HG: CPT | Performed by: NURSE PRACTITIONER

## 2024-01-08 PROCEDURE — 3074F SYST BP LT 130 MM HG: CPT | Performed by: NURSE PRACTITIONER

## 2024-01-08 PROCEDURE — 6360000002 HC RX W HCPCS: Performed by: CLINICAL NURSE SPECIALIST

## 2024-01-08 PROCEDURE — 85025 COMPLETE CBC W/AUTO DIFF WBC: CPT

## 2024-01-08 PROCEDURE — 36415 COLL VENOUS BLD VENIPUNCTURE: CPT

## 2024-01-08 PROCEDURE — 84100 ASSAY OF PHOSPHORUS: CPT

## 2024-01-08 PROCEDURE — 80053 COMPREHEN METABOLIC PANEL: CPT

## 2024-01-08 PROCEDURE — 96372 THER/PROPH/DIAG INJ SC/IM: CPT

## 2024-01-08 PROCEDURE — 99214 OFFICE O/P EST MOD 30 MIN: CPT | Performed by: NURSE PRACTITIONER

## 2024-01-08 PROCEDURE — 83735 ASSAY OF MAGNESIUM: CPT

## 2024-01-08 PROCEDURE — 84153 ASSAY OF PSA TOTAL: CPT

## 2024-01-08 PROCEDURE — 99212 OFFICE O/P EST SF 10 MIN: CPT

## 2024-01-08 RX ADMIN — DENOSUMAB 120 MG: 120 INJECTION SUBCUTANEOUS at 12:17

## 2024-01-08 NOTE — PROGRESS NOTES
Patient did stop at check out window, ok'd to leave without AVS.  Patient has MYCHART.   Patient aware to arrive @ 1:00 pm. for labs same day first.  
12/26/2023  IMPRESSION:  1. Sclerotic areas throughout the osseous structures of the thoracic spine  and osseous structures of osseous metastatic disease without evidence for  acute pathologic fracture.  2. No evidence of extra osseous intra-abdominal or intrapelvic metastatic  disease.  3. Emphysema.  4. Small hiatal hernia.      NM BONE SCAN:  12/26/2023  IMPRESSION:  The multifocal activity seen on prior bone scans has almost completely  resolved.  No new focal uptake to suggest new skeletal metastatic disease.         ASSESSMENT/PLAN:   Impression/Plan:  A 58-year-old male with metastatic prostate cancer      Evaluated by Urology team (Dr. Kg Bella)  PSA 9.20 on 03/31/2021  PSA 26.39 on 05/31/2022  PSA 31.83 on 06/20/2022     On 08/04/2022:  A. Prostate, left base, biopsy: Prostatic adenocarcinoma (acinar, not otherwise specified).   [place] Score: 4+3=7; cribriform pattern absent; percentage of pattern 4 is approximately 60%   Grade Group:  3   Tissue Cores Involved by Carcinoma: 2 of 2   Percentage of Tissue Involved by Carcinoma: 10%   Perineural Invasion: Present     B. Prostate, left mid, biopsy: Prostatic adenocarcinoma (acinar, not otherwise specified) and intraductal carcinoma.   [place] Score: 4+5=9; cribriform pattern present   Grade Group:  5   Tissue Cores Involved by Carcinoma: 2 of 2   Percentage of Tissue Involved by Carcinoma: 80%   Perineural Invasion: Present     C. Prostate, left apex, biopsy: Prostatic adenocarcinoma (acinar, not otherwise specified).   [place] Score: 4+4 = 8; cribriform pattern present   Grade Group:  4   Tissue Cores Involved by Carcinoma: 2 of 2   Percentage of Tissue Involved by Carcinoma: 50%   Perineural Invasion: Present     D. Prostate, right base, biopsy: Prostatic adenocarcinoma (acinar, not otherwise specified) and intraductal carcinoma.   [place] Score: 4+3 = 7; cribriform pattern present; percentage of pattern 4 is approximately 80%   Grade Group:  3   Tissue

## 2024-01-29 DIAGNOSIS — C61 PROSTATE CANCER (HCC): Primary | ICD-10-CM

## 2024-02-05 ENCOUNTER — OFFICE VISIT (OUTPATIENT)
Dept: ONCOLOGY | Age: 59
End: 2024-02-05
Payer: MEDICARE

## 2024-02-05 ENCOUNTER — HOSPITAL ENCOUNTER (OUTPATIENT)
Dept: INFUSION THERAPY | Age: 59
Discharge: HOME | End: 2024-02-05
Payer: MEDICARE

## 2024-02-05 VITALS
HEIGHT: 69 IN | WEIGHT: 232.9 LBS | HEART RATE: 74 BPM | TEMPERATURE: 97.2 F | BODY MASS INDEX: 34.49 KG/M2 | SYSTOLIC BLOOD PRESSURE: 111 MMHG | OXYGEN SATURATION: 97 % | DIASTOLIC BLOOD PRESSURE: 61 MMHG

## 2024-02-05 DIAGNOSIS — C61 PROSTATE CANCER (HCC): ICD-10-CM

## 2024-02-05 DIAGNOSIS — C61 PROSTATE CANCER METASTATIC TO BONE (HCC): Primary | ICD-10-CM

## 2024-02-05 DIAGNOSIS — C79.51 PROSTATE CANCER METASTATIC TO BONE (HCC): Primary | ICD-10-CM

## 2024-02-05 LAB
ALBUMIN SERPL-MCNC: 4.5 G/DL (ref 3.5–5.2)
ALP SERPL-CCNC: 98 U/L (ref 40–129)
ALT SERPL-CCNC: 10 U/L (ref 0–40)
ANION GAP SERPL CALCULATED.3IONS-SCNC: 11 MMOL/L (ref 7–16)
AST SERPL-CCNC: 12 U/L (ref 0–39)
BASOPHILS # BLD: 0.05 K/UL (ref 0–0.2)
BASOPHILS NFR BLD: 1 % (ref 0–2)
BILIRUB SERPL-MCNC: 0.7 MG/DL (ref 0–1.2)
BUN SERPL-MCNC: 23 MG/DL (ref 6–20)
CALCIUM SERPL-MCNC: 9 MG/DL (ref 8.6–10.2)
CHLORIDE SERPL-SCNC: 99 MMOL/L (ref 98–107)
CO2 SERPL-SCNC: 27 MMOL/L (ref 22–29)
CREAT SERPL-MCNC: 1 MG/DL (ref 0.7–1.2)
EOSINOPHIL # BLD: 0.2 K/UL (ref 0.05–0.5)
EOSINOPHILS RELATIVE PERCENT: 3 % (ref 0–6)
ERYTHROCYTE [DISTWIDTH] IN BLOOD BY AUTOMATED COUNT: 14.3 % (ref 11.5–15)
GFR SERPL CREATININE-BSD FRML MDRD: >60 ML/MIN/1.73M2
GLUCOSE SERPL-MCNC: 268 MG/DL (ref 74–99)
HCT VFR BLD AUTO: 40.3 % (ref 37–54)
HGB BLD-MCNC: 13.8 G/DL (ref 12.5–16.5)
IMM GRANULOCYTES # BLD AUTO: 0.05 K/UL (ref 0–0.58)
IMM GRANULOCYTES NFR BLD: 1 % (ref 0–5)
LYMPHOCYTES NFR BLD: 1.12 K/UL (ref 1.5–4)
LYMPHOCYTES RELATIVE PERCENT: 18 % (ref 20–42)
MCH RBC QN AUTO: 28.4 PG (ref 26–35)
MCHC RBC AUTO-ENTMCNC: 34.2 G/DL (ref 32–34.5)
MCV RBC AUTO: 82.9 FL (ref 80–99.9)
MONOCYTES NFR BLD: 0.51 K/UL (ref 0.1–0.95)
MONOCYTES NFR BLD: 8 % (ref 2–12)
NEUTROPHILS NFR BLD: 70 % (ref 43–80)
NEUTS SEG NFR BLD: 4.45 K/UL (ref 1.8–7.3)
PLATELET # BLD AUTO: 167 K/UL (ref 130–450)
PMV BLD AUTO: 10.7 FL (ref 7–12)
POTASSIUM SERPL-SCNC: 3.7 MMOL/L (ref 3.5–5)
PROT SERPL-MCNC: 7.8 G/DL (ref 6.4–8.3)
RBC # BLD AUTO: 4.86 M/UL (ref 3.8–5.8)
SODIUM SERPL-SCNC: 137 MMOL/L (ref 132–146)
WBC OTHER # BLD: 6.4 K/UL (ref 4.5–11.5)

## 2024-02-05 PROCEDURE — 99212 OFFICE O/P EST SF 10 MIN: CPT

## 2024-02-05 PROCEDURE — 3078F DIAST BP <80 MM HG: CPT | Performed by: CLINICAL NURSE SPECIALIST

## 2024-02-05 PROCEDURE — 3074F SYST BP LT 130 MM HG: CPT | Performed by: CLINICAL NURSE SPECIALIST

## 2024-02-05 PROCEDURE — 99213 OFFICE O/P EST LOW 20 MIN: CPT | Performed by: CLINICAL NURSE SPECIALIST

## 2024-02-05 PROCEDURE — 36415 COLL VENOUS BLD VENIPUNCTURE: CPT

## 2024-02-05 PROCEDURE — 80053 COMPREHEN METABOLIC PANEL: CPT

## 2024-02-05 PROCEDURE — 96372 THER/PROPH/DIAG INJ SC/IM: CPT

## 2024-02-05 PROCEDURE — 6360000002 HC RX W HCPCS: Performed by: CLINICAL NURSE SPECIALIST

## 2024-02-05 PROCEDURE — 85025 COMPLETE CBC W/AUTO DIFF WBC: CPT

## 2024-02-05 RX ADMIN — DENOSUMAB 120 MG: 120 INJECTION SUBCUTANEOUS at 14:21

## 2024-02-05 NOTE — PROGRESS NOTES
Patient did stop at check out window, ok'd to leave without AVS.  Patient has MYCHART.  Patient aware to arrive @ 1:00 pm. for labs same day first.    
with fair tolerance so far.   Eligard 22.5 mg was given on 09/14/2022, Dec 2022 March 2023 and June 2023    PSA 7.57 on 09/19/2022.    CT chest on 09/28/2022: No CT evidence of acute intrathoracic abnormality.   No definite evidence of metastatic disease.  PSA 0.24 on 10/24/2022  PSA 0.14 on 11/21/2022    CT abdomen/pelvis 12/07/2022: Compared to prior exam the prostate gland is decreased in size and previously seen enlarged pelvic lymph nodes are now subcentimeter and decreased in size compatible with positive response to treatment.  No new areas of lymphadenopathy.    Bone scan 12/07/2022: There is evidence to suggest metastatic disease, however the degree of tracer uptake is significantly diminished in the interval suggesting a positive response to therapy.  PSA 0.10 on 12/19/2022    Overall positive response.    PSA 0.09 on 01/16/2023  PSA 0.06 on 02/13/2023.   PSA 0.06 on 03/13/2023.  PSA 0.07 on 04/17/2023.    Bone scan 05/05/2023 continued decrease conspicuity of osseous metastatic disease as compared to prior exam suggestive of favorable treatment response.   CT chest/abdomen/pelvis 05/05/2023: Diffuse osseous metastasis without evidence for extraosseous metastatic involvement in the chest, abdomen or pelvis.  PSA 0.06 on 05/15/2023.  Overall favorable response.  PSA 0.07 on 06/12/2023.  PSA 0.07 on 07/10/2023.  CXR 08/01/2023: mild atelectasis in lung bases. No sizable pleural effusion or pneumothorax.  Imaging reviewed.   Recent laparoscopic cholecystectomy; recovering well so far.  General surgery note reviewed.  Labs reviewed,   Continue XTANDI  Intermittent arthralgias; Tramadol  Continue to follow-up with palliative care team  Hypercalcemia secondary to Xgeva; calcium gluconate 1 g IV x1 ordered today 8/7/2023.  Hold Xgeva today  Repeat BMP next week; if calcium equal or more than 8.0 then proceed with Xgeva    8/14/2023  labs reviewed ca 9 ok to proceed with xgeva today , c/o increased swelling of b/l

## 2024-02-17 PROCEDURE — 93296 REM INTERROG EVL PM/IDS: CPT | Performed by: STUDENT IN AN ORGANIZED HEALTH CARE EDUCATION/TRAINING PROGRAM

## 2024-02-17 PROCEDURE — 93295 DEV INTERROG REMOTE 1/2/MLT: CPT | Performed by: STUDENT IN AN ORGANIZED HEALTH CARE EDUCATION/TRAINING PROGRAM

## 2024-02-19 ENCOUNTER — HOSPITAL ENCOUNTER (OUTPATIENT)
Dept: OTHER | Age: 59
Setting detail: THERAPIES SERIES
Discharge: HOME | End: 2024-02-19
Payer: MEDICARE

## 2024-02-19 VITALS
HEART RATE: 80 BPM | BODY MASS INDEX: 35 KG/M2 | WEIGHT: 237 LBS | SYSTOLIC BLOOD PRESSURE: 123 MMHG | RESPIRATION RATE: 18 BRPM | DIASTOLIC BLOOD PRESSURE: 71 MMHG | OXYGEN SATURATION: 99 %

## 2024-02-19 LAB
ANION GAP SERPL CALCULATED.3IONS-SCNC: 14 MMOL/L (ref 7–16)
BNP SERPL-MCNC: 642 PG/ML (ref 0–125)
BUN SERPL-MCNC: 22 MG/DL (ref 6–20)
CALCIUM SERPL-MCNC: 8.9 MG/DL (ref 8.6–10.2)
CHLORIDE SERPL-SCNC: 97 MMOL/L (ref 98–107)
CO2 SERPL-SCNC: 25 MMOL/L (ref 22–29)
CREAT SERPL-MCNC: 0.9 MG/DL (ref 0.7–1.2)
GFR SERPL CREATININE-BSD FRML MDRD: >60 ML/MIN/1.73M2
GLUCOSE SERPL-MCNC: 316 MG/DL (ref 74–99)
POTASSIUM SERPL-SCNC: 3.8 MMOL/L (ref 3.5–5)
SODIUM SERPL-SCNC: 136 MMOL/L (ref 132–146)

## 2024-02-19 PROCEDURE — 2580000003 HC RX 258: Performed by: NURSE PRACTITIONER

## 2024-02-19 PROCEDURE — 99214 OFFICE O/P EST MOD 30 MIN: CPT

## 2024-02-19 PROCEDURE — 83880 ASSAY OF NATRIURETIC PEPTIDE: CPT

## 2024-02-19 PROCEDURE — 96374 THER/PROPH/DIAG INJ IV PUSH: CPT

## 2024-02-19 PROCEDURE — 80048 BASIC METABOLIC PNL TOTAL CA: CPT

## 2024-02-19 PROCEDURE — 6360000002 HC RX W HCPCS: Performed by: NURSE PRACTITIONER

## 2024-02-19 RX ORDER — BUMETANIDE 0.25 MG/ML
2 INJECTION INTRAMUSCULAR; INTRAVENOUS ONCE
Status: COMPLETED | OUTPATIENT
Start: 2024-02-19 | End: 2024-02-19

## 2024-02-19 RX ORDER — SODIUM CHLORIDE 0.9 % (FLUSH) 0.9 %
10 SYRINGE (ML) INJECTION PRN
Status: DISCONTINUED | OUTPATIENT
Start: 2024-02-19 | End: 2024-02-20 | Stop reason: HOSPADM

## 2024-02-19 RX ADMIN — BUMETANIDE 2 MG: 0.25 INJECTION INTRAMUSCULAR; INTRAVENOUS at 12:28

## 2024-02-19 RX ADMIN — SODIUM CHLORIDE, PRESERVATIVE FREE 10 ML: 5 INJECTION INTRAVENOUS at 12:28

## 2024-02-19 NOTE — PROGRESS NOTES
(U/L)   Date Value   02/05/2024 12     ALT (U/L)   Date Value   02/05/2024 10     Total Bilirubin (mg/dL)   Date Value   02/05/2024 0.7     Alkaline Phosphatase (U/L)   Date Value   02/05/2024 98     INR:  INR (no units)   Date Value   06/29/2017 1.1         Wt Readings from Last 3 Encounters:   02/19/24 107.5 kg (237 lb)   02/05/24 105.6 kg (232 lb 14.4 oz)   01/08/24 104.1 kg (229 lb 9.6 oz)           ASSESSMENT/PLAN:    [] Euvolemic          [x] Hypervolemic, with increase from baseline:  [x] Shortness of breath/COLLINS(slightly)  [] JVD  [] HJR  [] Abnormal lung assessment:   [] Orthopnea  [] PND  [] Decreased urinary response to oral diuretic   [] Scrotal swelling   [] Lower extremity edema  [] Compression stockings provided  [] Decline in functional capacity (unable to perform activities they had previously been able to do)  [x] Weight gain     [x] IV diuretics given IV BUMEX 2 mg  [] Provider notified of recurrent IV diuretic use    Additional Notes:     Patient has a weight gain of 15 lbs from his last visit in December . States that he's been eating a lot more. Admits to eating a lot of pizza and wings. Chili dip, salsa and chips.   States he really does NOT have that much of an increase in SOB. Feels that the weight gain is caloric.         [x]Lab work obtained    [x] Patient/Family Educated On:  [x] HF zones (Green, Yellow, Red) and aware of when to take action   [x] Daily weights  [] Scale provided   [x] Low sodium diet (2000 mg)  Barriers to compliance  [] Refuses to monitor diet  [] Socioeconomic difficulties  [] Unable to cook for self (use of frozen meals, can goods, etc)  [] CHF CHW consulted  [] Low sodium meal delivery options given to patient  [] Dietician consulted   [] Low sodium recipes provided  [] Sodium free seasoning provided   [x] Fluid intake 6-8 cups (around 64 oz)  [x] Reviewed currently prescribed medications with patient, educated on importance of compliance and answered any questions

## 2024-02-27 DIAGNOSIS — C61 PROSTATE CANCER (HCC): Primary | ICD-10-CM

## 2024-03-04 ENCOUNTER — HOSPITAL ENCOUNTER (OUTPATIENT)
Dept: INFUSION THERAPY | Age: 59
Discharge: HOME | End: 2024-03-04
Payer: MEDICARE

## 2024-03-04 ENCOUNTER — CLINICAL DOCUMENTATION (OUTPATIENT)
Dept: INFUSION THERAPY | Age: 59
End: 2024-03-04

## 2024-03-04 ENCOUNTER — OFFICE VISIT (OUTPATIENT)
Dept: PALLATIVE CARE | Age: 59
End: 2024-03-04
Payer: MEDICARE

## 2024-03-04 ENCOUNTER — OFFICE VISIT (OUTPATIENT)
Dept: ONCOLOGY | Age: 59
End: 2024-03-04
Payer: MEDICARE

## 2024-03-04 VITALS
OXYGEN SATURATION: 96 % | SYSTOLIC BLOOD PRESSURE: 113 MMHG | HEART RATE: 76 BPM | DIASTOLIC BLOOD PRESSURE: 61 MMHG | BODY MASS INDEX: 34.8 KG/M2 | WEIGHT: 235 LBS | TEMPERATURE: 97.2 F | HEIGHT: 69 IN

## 2024-03-04 DIAGNOSIS — C61 PROSTATE CANCER (HCC): Primary | ICD-10-CM

## 2024-03-04 DIAGNOSIS — Z51.5 PALLIATIVE CARE BY SPECIALIST: Primary | ICD-10-CM

## 2024-03-04 DIAGNOSIS — C61 PROSTATE CANCER (HCC): ICD-10-CM

## 2024-03-04 LAB
ALBUMIN SERPL-MCNC: 4.7 G/DL (ref 3.5–5.2)
ALP SERPL-CCNC: 87 U/L (ref 40–129)
ALT SERPL-CCNC: 16 U/L (ref 0–40)
ANION GAP SERPL CALCULATED.3IONS-SCNC: 12 MMOL/L (ref 7–16)
AST SERPL-CCNC: 16 U/L (ref 0–39)
BASOPHILS # BLD: 0.05 K/UL (ref 0–0.2)
BASOPHILS NFR BLD: 1 % (ref 0–2)
BILIRUB SERPL-MCNC: 0.7 MG/DL (ref 0–1.2)
BUN SERPL-MCNC: 30 MG/DL (ref 6–20)
CALCIUM SERPL-MCNC: 9.8 MG/DL (ref 8.6–10.2)
CHLORIDE SERPL-SCNC: 96 MMOL/L (ref 98–107)
CO2 SERPL-SCNC: 29 MMOL/L (ref 22–29)
CREAT SERPL-MCNC: 1.1 MG/DL (ref 0.7–1.2)
EOSINOPHIL # BLD: 0.22 K/UL (ref 0.05–0.5)
EOSINOPHILS RELATIVE PERCENT: 3 % (ref 0–6)
ERYTHROCYTE [DISTWIDTH] IN BLOOD BY AUTOMATED COUNT: 14.6 % (ref 11.5–15)
GFR SERPL CREATININE-BSD FRML MDRD: >60 ML/MIN/1.73M2
GLUCOSE SERPL-MCNC: 260 MG/DL (ref 74–99)
HCT VFR BLD AUTO: 42.6 % (ref 37–54)
HGB BLD-MCNC: 14.6 G/DL (ref 12.5–16.5)
IMM GRANULOCYTES # BLD AUTO: 0.05 K/UL (ref 0–0.58)
IMM GRANULOCYTES NFR BLD: 1 % (ref 0–5)
LYMPHOCYTES NFR BLD: 1.31 K/UL (ref 1.5–4)
LYMPHOCYTES RELATIVE PERCENT: 21 % (ref 20–42)
MCH RBC QN AUTO: 29.6 PG (ref 26–35)
MCHC RBC AUTO-ENTMCNC: 34.3 G/DL (ref 32–34.5)
MCV RBC AUTO: 86.2 FL (ref 80–99.9)
MONOCYTES NFR BLD: 0.44 K/UL (ref 0.1–0.95)
MONOCYTES NFR BLD: 7 % (ref 2–12)
NEUTROPHILS NFR BLD: 68 % (ref 43–80)
NEUTS SEG NFR BLD: 4.31 K/UL (ref 1.8–7.3)
PLATELET # BLD AUTO: 193 K/UL (ref 130–450)
PMV BLD AUTO: 10.9 FL (ref 7–12)
POTASSIUM SERPL-SCNC: 3.8 MMOL/L (ref 3.5–5)
PROT SERPL-MCNC: 7.9 G/DL (ref 6.4–8.3)
PSA SERPL-MCNC: 0.02 NG/ML (ref 0–4)
RBC # BLD AUTO: 4.94 M/UL (ref 3.8–5.8)
SODIUM SERPL-SCNC: 137 MMOL/L (ref 132–146)
WBC OTHER # BLD: 6.4 K/UL (ref 4.5–11.5)

## 2024-03-04 PROCEDURE — 36415 COLL VENOUS BLD VENIPUNCTURE: CPT

## 2024-03-04 PROCEDURE — 85025 COMPLETE CBC W/AUTO DIFF WBC: CPT

## 2024-03-04 PROCEDURE — 99212 OFFICE O/P EST SF 10 MIN: CPT | Performed by: NURSE PRACTITIONER

## 2024-03-04 PROCEDURE — 99213 OFFICE O/P EST LOW 20 MIN: CPT

## 2024-03-04 PROCEDURE — 84153 ASSAY OF PSA TOTAL: CPT

## 2024-03-04 PROCEDURE — 80053 COMPREHEN METABOLIC PANEL: CPT

## 2024-03-04 PROCEDURE — 99211 OFF/OP EST MAY X REQ PHY/QHP: CPT | Performed by: NURSE PRACTITIONER

## 2024-03-04 PROCEDURE — 99214 OFFICE O/P EST MOD 30 MIN: CPT | Performed by: INTERNAL MEDICINE

## 2024-03-04 PROCEDURE — 3078F DIAST BP <80 MM HG: CPT | Performed by: INTERNAL MEDICINE

## 2024-03-04 PROCEDURE — 3074F SYST BP LT 130 MM HG: CPT | Performed by: INTERNAL MEDICINE

## 2024-03-04 NOTE — PROGRESS NOTES
Palliative Care Department  Provider: PAUL Stubbs - CNP    Referring Provider:  Dr. Santiago    Location of Service:   [facility]    Reason for Consult:  []  Code status Discussion  []  Assist with goals of care  []  Psychosocial support  [x]  Symptom Management  []  Advanced Care Planning    Chief Complaint: En Lewis is a 58 y.o. male with chief complaint of pain    Assessment/Plan      Prostate Cancer with bone mets:   -  Found Aug. 2023   -  Known to Dr. Bella, Dr. Santiago   -  Xtandi      Pain related to Neoplasm:   -  Tylenol 500 mg 2 tabs TID PRN   -  Stop oxycodone 5 mg Q 6 PRN,   -  Flexeril 10 mg BID PRN, using 1 time per week if needed    Follow Up:  PRN.  They were encouraged to call with any questions, concerns, needs, or changes in symptoms.    Subjective:     HPI:  En Lewis is a 58 y.o. male with significant medical history of CAD, s/p angioplasty with stents, HFrEF, known to the heart failure clinic, pacer/defibrillator placement, and COPD, who was found to have prostate cancer in August 2022.  He was referred to [facility] Palliative Medicine for support with symptomatic management.    Subjective/Events/Discussions:  En presents today unaccompanied.    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 Follow with imaging.    Anticoagulant long-term use     Anxiety     Atypical angina     CAD (coronary artery disease)     Carotid artery stenosis     Cerebral artery occlusion with

## 2024-03-04 NOTE — PROGRESS NOTES
Department of [facility] Med Oncology   Clinic Note    Reason for Visit: Follow-up on a patient with Prostate Cancer    PCP:  Mariaelena Gordon DO    History of Present Illness:  57-year-old male who was noted to have elevated PSA   Evaluated by Urology team (Dr. Kg Bella)    PSA 9.20 on 03/31/2021  PSA 26.39 on 05/31/2022  PSA 31.83 on 06/20/2022    On 08/04/2022:  A. Prostate, left base, biopsy: Prostatic adenocarcinoma (acinar, not otherwise specified).   Andrew Score: 4+3=7; cribriform pattern absent; percentage of pattern 4 is approximately 60%   Grade Group:  3   Tissue Cores Involved by Carcinoma: 2 of 2   Percentage of Tissue Involved by Carcinoma: 10%   Perineural Invasion: Present     B. Prostate, left mid, biopsy: Prostatic adenocarcinoma (acinar, not otherwise specified) and intraductal carcinoma.   Lowell Score: 4+5=9; cribriform pattern present   Grade Group:  5   Tissue Cores Involved by Carcinoma: 2 of 2   Percentage of Tissue Involved by Carcinoma: 80%   Perineural Invasion: Present     C. Prostate, left apex, biopsy: Prostatic adenocarcinoma (acinar, not otherwise specified).   Lowell Score: 4+4 = 8; cribriform pattern present   Grade Group:  4   Tissue Cores Involved by Carcinoma: 2 of 2   Percentage of Tissue Involved by Carcinoma: 50%   Perineural Invasion: Present     D. Prostate, right base, biopsy: Prostatic adenocarcinoma (acinar, not otherwise specified) and intraductal carcinoma.   Andrew Score: 4+3 = 7; cribriform pattern present; percentage of pattern 4 is approximately 80%   Grade Group:  3   Tissue Cores Involved by Carcinoma: 3 of 3   Percentage of Tissue Involved by Carcinoma: 90%   Perineural Invasion: Present     E. 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   Lowell Score: 4+4=8; cribriform pattern present   Grade Group:  4   Tissue Cores Involved by Carcinoma: 3 of 3   Percentage of Tissue Involved by

## 2024-03-12 ENCOUNTER — HOSPITAL ENCOUNTER (OUTPATIENT)
Dept: OTHER | Age: 59
Setting detail: THERAPIES SERIES
Discharge: HOME | End: 2024-03-12

## 2024-03-19 ENCOUNTER — OFFICE VISIT (OUTPATIENT)
Dept: FAMILY MEDICINE CLINIC | Age: 59
End: 2024-03-19
Payer: MEDICARE

## 2024-03-19 VITALS
HEIGHT: 69 IN | HEART RATE: 79 BPM | WEIGHT: 236 LBS | TEMPERATURE: 97.9 F | BODY MASS INDEX: 34.96 KG/M2 | RESPIRATION RATE: 18 BRPM | DIASTOLIC BLOOD PRESSURE: 62 MMHG | SYSTOLIC BLOOD PRESSURE: 104 MMHG | OXYGEN SATURATION: 98 %

## 2024-03-19 DIAGNOSIS — I72.3 ANEURYSM OF RIGHT COMMON ILIAC ARTERY (HCC): ICD-10-CM

## 2024-03-19 DIAGNOSIS — I50.20 HFREF (HEART FAILURE WITH REDUCED EJECTION FRACTION) (HCC): ICD-10-CM

## 2024-03-19 DIAGNOSIS — E11.22 TYPE 2 DIABETES MELLITUS WITH STAGE 2 CHRONIC KIDNEY DISEASE, WITHOUT LONG-TERM CURRENT USE OF INSULIN (HCC): Primary | ICD-10-CM

## 2024-03-19 DIAGNOSIS — I47.29 NSVT (NONSUSTAINED VENTRICULAR TACHYCARDIA) (HCC): ICD-10-CM

## 2024-03-19 DIAGNOSIS — N18.2 TYPE 2 DIABETES MELLITUS WITH STAGE 2 CHRONIC KIDNEY DISEASE, WITHOUT LONG-TERM CURRENT USE OF INSULIN (HCC): Primary | ICD-10-CM

## 2024-03-19 DIAGNOSIS — J44.9 CHRONIC OBSTRUCTIVE PULMONARY DISEASE, UNSPECIFIED COPD TYPE (HCC): ICD-10-CM

## 2024-03-19 DIAGNOSIS — F33.1 MODERATE EPISODE OF RECURRENT MAJOR DEPRESSIVE DISORDER (HCC): ICD-10-CM

## 2024-03-19 LAB
CREATININE URINE POCT: ABNORMAL
HBA1C MFR BLD: 8.9 %
MICROALBUMIN/CREAT 24H UR: ABNORMAL MG/G{CREAT}
MICROALBUMIN/CREAT UR-RTO: ABNORMAL

## 2024-03-19 PROCEDURE — 99214 OFFICE O/P EST MOD 30 MIN: CPT | Performed by: FAMILY MEDICINE

## 2024-03-19 PROCEDURE — 82044 UR ALBUMIN SEMIQUANTITATIVE: CPT | Performed by: FAMILY MEDICINE

## 2024-03-19 PROCEDURE — 3074F SYST BP LT 130 MM HG: CPT | Performed by: FAMILY MEDICINE

## 2024-03-19 PROCEDURE — 3078F DIAST BP <80 MM HG: CPT | Performed by: FAMILY MEDICINE

## 2024-03-19 PROCEDURE — 3052F HG A1C>EQUAL 8.0%<EQUAL 9.0%: CPT | Performed by: FAMILY MEDICINE

## 2024-03-19 PROCEDURE — 83036 HEMOGLOBIN GLYCOSYLATED A1C: CPT | Performed by: FAMILY MEDICINE

## 2024-03-19 NOTE — PROGRESS NOTES
agrees.     Follow with specialists as advised. Medication adherence encouraged.      Schedule AWV.      Please be adherent to the treatment plans discussed today, and please call with any questions or concerns, letting the office know of any reasons that the plans may not be followed.  The risks of untreated conditions include worsening illness, injury, disability, and possibly, death. Please call if symptoms change in any way, worsen, or fail to completely resolve, as this could necessitate a change to treatment plans. Patient expressed understanding.     Indications and proper use of medication(s) reviewed.  Potential side-effects and risks of medication(s) also explained.  Patient was instructed to call if any new symptoms develop prior to next visit.

## 2024-03-22 DIAGNOSIS — C61 PROSTATE CANCER (HCC): ICD-10-CM

## 2024-03-22 DIAGNOSIS — I25.10 CORONARY ARTERY DISEASE INVOLVING NATIVE CORONARY ARTERY OF NATIVE HEART WITHOUT ANGINA PECTORIS: Chronic | ICD-10-CM

## 2024-03-22 DIAGNOSIS — I50.20 HFREF (HEART FAILURE WITH REDUCED EJECTION FRACTION) (HCC): ICD-10-CM

## 2024-03-22 DIAGNOSIS — I50.22 CHRONIC SYSTOLIC CONGESTIVE HEART FAILURE (HCC): ICD-10-CM

## 2024-03-22 RX ORDER — METOPROLOL SUCCINATE 50 MG/1
100 TABLET, EXTENDED RELEASE ORAL DAILY
Qty: 180 TABLET | Refills: 1 | Status: SHIPPED | OUTPATIENT
Start: 2024-03-22

## 2024-03-22 RX ORDER — TAMSULOSIN HYDROCHLORIDE 0.4 MG/1
0.4 CAPSULE ORAL DAILY
Qty: 90 CAPSULE | Refills: 1 | Status: SHIPPED | OUTPATIENT
Start: 2024-03-22

## 2024-03-29 ENCOUNTER — HOSPITAL ENCOUNTER (OUTPATIENT)
Dept: INFUSION THERAPY | Age: 59
Setting detail: INFUSION SERIES
Discharge: HOME | End: 2024-03-29
Payer: MEDICARE

## 2024-03-29 VITALS
RESPIRATION RATE: 18 BRPM | TEMPERATURE: 97.5 F | SYSTOLIC BLOOD PRESSURE: 151 MMHG | OXYGEN SATURATION: 98 % | DIASTOLIC BLOOD PRESSURE: 95 MMHG

## 2024-03-29 DIAGNOSIS — C61 PROSTATE CANCER (HCC): Primary | ICD-10-CM

## 2024-03-29 PROCEDURE — 6360000002 HC RX W HCPCS: Performed by: UROLOGY

## 2024-03-29 PROCEDURE — 96402 CHEMO HORMON ANTINEOPL SQ/IM: CPT

## 2024-03-29 RX ADMIN — LEUPROLIDE ACETATE 22.5 MG: 22.5 INJECTION, SUSPENSION, EXTENDED RELEASE SUBCUTANEOUS at 13:22

## 2024-03-29 ASSESSMENT — PAIN SCALES - GENERAL: PAINLEVEL_OUTOF10: 6

## 2024-03-29 ASSESSMENT — PAIN DESCRIPTION - FREQUENCY: FREQUENCY: CONTINUOUS

## 2024-03-29 ASSESSMENT — PAIN DESCRIPTION - ORIENTATION: ORIENTATION: LEFT

## 2024-03-29 ASSESSMENT — PAIN - FUNCTIONAL ASSESSMENT: PAIN_FUNCTIONAL_ASSESSMENT: PREVENTS OR INTERFERES SOME ACTIVE ACTIVITIES AND ADLS

## 2024-03-29 ASSESSMENT — PAIN DESCRIPTION - LOCATION: LOCATION: BACK

## 2024-03-29 ASSESSMENT — PAIN DESCRIPTION - PAIN TYPE: TYPE: CHRONIC PAIN

## 2024-03-29 ASSESSMENT — PAIN DESCRIPTION - DESCRIPTORS: DESCRIPTORS: ACHING;DISCOMFORT

## 2024-03-29 NOTE — FLOWSHEET NOTE
Patient tolerated  injection well.Patient alert and oriented x3. No distress noted. Vital signs stable. Patient denies any new or worsening pain. Educated patient on possible side effects and treatment of medication.     Patient verbalized understanding. Offered patient education and/or discharge material. Patient declined.  Patient denies any needs. All questions answered. D/C in stable condition.

## 2024-04-02 ENCOUNTER — HOSPITAL ENCOUNTER (OUTPATIENT)
Dept: OTHER | Age: 59
Setting detail: THERAPIES SERIES
Discharge: HOME | End: 2024-04-02
Payer: MEDICARE

## 2024-04-02 VITALS
HEART RATE: 86 BPM | WEIGHT: 236 LBS | BODY MASS INDEX: 34.85 KG/M2 | RESPIRATION RATE: 18 BRPM | SYSTOLIC BLOOD PRESSURE: 127 MMHG | DIASTOLIC BLOOD PRESSURE: 64 MMHG | OXYGEN SATURATION: 98 %

## 2024-04-02 LAB
ANION GAP SERPL CALCULATED.3IONS-SCNC: 13 MMOL/L (ref 7–16)
BNP SERPL-MCNC: 361 PG/ML (ref 0–125)
BUN SERPL-MCNC: 20 MG/DL (ref 6–20)
CALCIUM SERPL-MCNC: 9.2 MG/DL (ref 8.6–10.2)
CHLORIDE SERPL-SCNC: 102 MMOL/L (ref 98–107)
CO2 SERPL-SCNC: 25 MMOL/L (ref 22–29)
CREAT SERPL-MCNC: 1.2 MG/DL (ref 0.7–1.2)
GFR SERPL CREATININE-BSD FRML MDRD: 70 ML/MIN/1.73M2
GLUCOSE SERPL-MCNC: 224 MG/DL (ref 74–99)
POTASSIUM SERPL-SCNC: 3.8 MMOL/L (ref 3.5–5)
SODIUM SERPL-SCNC: 140 MMOL/L (ref 132–146)

## 2024-04-02 PROCEDURE — 80048 BASIC METABOLIC PNL TOTAL CA: CPT

## 2024-04-02 PROCEDURE — 83880 ASSAY OF NATRIURETIC PEPTIDE: CPT

## 2024-04-02 PROCEDURE — 99214 OFFICE O/P EST MOD 30 MIN: CPT

## 2024-04-02 ASSESSMENT — PATIENT HEALTH QUESTIONNAIRE - PHQ9
SUM OF ALL RESPONSES TO PHQ9 QUESTIONS 1 & 2: 0
1. LITTLE INTEREST OR PLEASURE IN DOING THINGS: NOT AT ALL
2. FEELING DOWN, DEPRESSED OR HOPELESS: NOT AT ALL

## 2024-04-02 NOTE — PROGRESS NOTES
[] Difficulty affording medications  [] CHF CHW consulted  [] Prescription assistance information given   [] [facility] medication assistance program information given   [] Sample medications provided to patient to help bridge gap until affordability N/A    [x] PHQ assessment completed while in CHF clinic (1st visit, every 3 months and PRN)      3/14/2023     1:41 PM 9/12/2022     9:57 AM 9/12/2022     9:51 AM 4/25/2022     1:10 PM 3/30/2021     3:58 PM 3/30/2021     3:47 PM 5/28/2019     2:34 PM   PHQ Scores   PHQ2 Score 6 2 2 2 3 3 2   PHQ9 Score 9 5 2 9 9 3 2     Interpretation of Total Score Depression Severity:   1-4 = Minimal depression  5-9 = Mild depression  10-14 = Moderate depression  15-19 = Moderately severe depression  20-27 = Severe depression   [] Patient provided community resources for counseling services  [] PCP called and PHQ result faxed   [] [facility]        Scheduled to follow up in CHF clinic on:   Future Appointments   Date Time Provider Department Center   4/24/2024 11:30 AM Aníbal Senior DO ELECTRO PHYS [facility]   4/24/2024 11:30 AM SCHEDULE, DEVICE CLINIC 1 [place] ELECTRO PHYS [facility]   6/4/2024  1:00 PM [facility] CHF ROOM 1 [facility] CHF [facility]   6/10/2024  1:00 PM SE[facility] MED ONC FAST TRACK 1 [facility] Med Onc [facility]   6/10/2024  1:15 PM Neisha Amos MD MED ONC [facility]   6/28/2024  1:00 PM SE[facility] INFUSION BAY 2 [facility] INF SER [facility]   8/13/2024  1:40 PM [facility]

## 2024-04-02 NOTE — FLOWSHEET NOTE
04/02/24 1320   Over the past 2 weeks, how often have you been bothered by any of the following problems?   Little interest or pleasure in doing things Not at all   Feeling down, depressed, or hopeless Not at all   Patient Health Questionnaire-2 Score 0

## 2024-04-02 NOTE — PLAN OF CARE
Problem: Chronic Conditions and Co-morbidities  Goal: Patient's chronic conditions and co-morbidity symptoms are monitored and maintained or improved  Flowsheets (Taken 4/2/2024 1310)  Care Plan - Patient's Chronic Conditions and Co-Morbidity Symptoms are Monitored and Maintained or Improved: Monitor and assess patient's chronic conditions and comorbid symptoms for stability, deterioration, or improvement

## 2024-04-06 DIAGNOSIS — K21.9 GASTROESOPHAGEAL REFLUX DISEASE, UNSPECIFIED WHETHER ESOPHAGITIS PRESENT: Chronic | ICD-10-CM

## 2024-04-06 DIAGNOSIS — I25.10 CORONARY ARTERY DISEASE INVOLVING NATIVE CORONARY ARTERY OF NATIVE HEART WITHOUT ANGINA PECTORIS: Chronic | ICD-10-CM

## 2024-04-08 RX ORDER — CLOPIDOGREL BISULFATE 75 MG/1
75 TABLET ORAL DAILY
Qty: 30 TABLET | Refills: 5 | Status: SHIPPED | OUTPATIENT
Start: 2024-04-08

## 2024-04-08 RX ORDER — PANTOPRAZOLE SODIUM 40 MG/1
40 TABLET, DELAYED RELEASE ORAL DAILY
Qty: 30 TABLET | Refills: 5 | Status: SHIPPED | OUTPATIENT
Start: 2024-04-08

## 2024-04-24 ENCOUNTER — OFFICE VISIT (OUTPATIENT)
Dept: NON INVASIVE DIAGNOSTICS | Age: 59
End: 2024-04-24
Payer: MEDICARE

## 2024-04-24 ENCOUNTER — NURSE ONLY (OUTPATIENT)
Dept: NON INVASIVE DIAGNOSTICS | Age: 59
End: 2024-04-24
Payer: MEDICARE

## 2024-04-24 VITALS
BODY MASS INDEX: 35.1 KG/M2 | RESPIRATION RATE: 18 BRPM | HEART RATE: 70 BPM | HEIGHT: 69 IN | SYSTOLIC BLOOD PRESSURE: 102 MMHG | DIASTOLIC BLOOD PRESSURE: 66 MMHG | WEIGHT: 237 LBS

## 2024-04-24 DIAGNOSIS — I47.29 NSVT (NONSUSTAINED VENTRICULAR TACHYCARDIA) (HCC): Primary | Chronic | ICD-10-CM

## 2024-04-24 DIAGNOSIS — Z95.810 DUAL IMPLANTABLE CARDIOVERTER-DEFIBRILLATOR IN SITU: Primary | Chronic | ICD-10-CM

## 2024-04-24 PROCEDURE — 3078F DIAST BP <80 MM HG: CPT | Performed by: STUDENT IN AN ORGANIZED HEALTH CARE EDUCATION/TRAINING PROGRAM

## 2024-04-24 PROCEDURE — 3074F SYST BP LT 130 MM HG: CPT | Performed by: STUDENT IN AN ORGANIZED HEALTH CARE EDUCATION/TRAINING PROGRAM

## 2024-04-24 PROCEDURE — 93000 ELECTROCARDIOGRAM COMPLETE: CPT | Performed by: STUDENT IN AN ORGANIZED HEALTH CARE EDUCATION/TRAINING PROGRAM

## 2024-04-24 PROCEDURE — 99204 OFFICE O/P NEW MOD 45 MIN: CPT | Performed by: STUDENT IN AN ORGANIZED HEALTH CARE EDUCATION/TRAINING PROGRAM

## 2024-04-24 NOTE — PROGRESS NOTES
tablets by mouth daily      Leuprolide Acetate, 3 Month, (ELIGARD) 22.5 MG KIT Inject 22.5 mg into the skin every 3 months       No current facility-administered medications for this visit.        No Known Allergies    ROS:   Constitutional: Negative for fever, activity change and appetite change.   HENT: Negative for epistaxis.   Eyes: Negative for diploplia, blurred vision.   Respiratory: Negative for cough, chest tightness, shortness of breath and wheezing.   Cardiovascular: pertinent positives in HPI  Gastrointestinal: Negative for abdominal pain and blood in stool.   Genitourinary: Negative for hematuria and difficulty urinating.   Musculoskeletal: Negative for myalgias and gait problem.   Skin: Negative for color change and rash.   Neurological: Negative for syncope and light-headedness.   Psychiatric/Behavioral: Negative for confusion and agitation. The patient is not nervous/anxious.  Heme: no bleeding disorders, no melena or hematochezia  All other review of systems are negative     PHYSICAL EXAM:  Constitutional   Vitals:    04/24/24 1158   BP: 102/66   Pulse: 70   Resp: 18   Weight: 107.5 kg (237 lb)   Height: 1.753 m (5' 9\")    Well-developed, no acute distress, well groomed, obese  Eyes: conjunctivae normal, no xanthelasma   Ears, Nose, Throat: oral mucosa moist, no cyanosis   Neck: supple, no JVD, no bruits, no thyromegaly   CV: normal rate, regular rhythm,  no murmurs, rubs, or gallops. PMI is nondisplaced, Peripheral pulses normal including carotid auscultation, no noted aortic bruit, bilateral femoral and pedal pulses are normal in quality  Lungs: clear to auscultation bilaterally, normal respiratory effort without used of accessory muscles, no wheezes  Abdomen: soft, non-tender, bowel sounds present, no masses or hepatomegaly   Extremities: no digital clubbing, no edema   Skin: warm, no rashes   Neuro/Psych: A&O x 3, normal mood and affect  ICD site: Clean, dry, intact; no erythema, edema,

## 2024-04-24 NOTE — PATIENT INSTRUCTIONS
No medication changes at this time.  Continue remote monitoring of ICD every 91 days.  Follow-up with this office in 2 years.

## 2024-05-16 PROCEDURE — 93283 PRGRMG EVAL IMPLANTABLE DFB: CPT | Performed by: STUDENT IN AN ORGANIZED HEALTH CARE EDUCATION/TRAINING PROGRAM

## 2024-05-21 DIAGNOSIS — I50.22 CHRONIC SYSTOLIC CONGESTIVE HEART FAILURE (HCC): ICD-10-CM

## 2024-05-21 RX ORDER — BUMETANIDE 1 MG/1
1.5 TABLET ORAL DAILY
Qty: 135 TABLET | Refills: 1 | Status: SHIPPED
Start: 2024-05-21 | End: 2024-06-13

## 2024-06-04 ENCOUNTER — HOSPITAL ENCOUNTER (OUTPATIENT)
Dept: OTHER | Age: 59
Setting detail: THERAPIES SERIES
Discharge: HOME | End: 2024-06-04
Payer: MEDICARE

## 2024-06-04 VITALS
RESPIRATION RATE: 18 BRPM | HEART RATE: 97 BPM | SYSTOLIC BLOOD PRESSURE: 92 MMHG | WEIGHT: 236 LBS | DIASTOLIC BLOOD PRESSURE: 58 MMHG | OXYGEN SATURATION: 93 % | BODY MASS INDEX: 34.85 KG/M2

## 2024-06-04 LAB
ANION GAP SERPL CALCULATED.3IONS-SCNC: 14 MMOL/L (ref 7–16)
BNP SERPL-MCNC: 253 PG/ML (ref 0–125)
BUN SERPL-MCNC: 38 MG/DL (ref 6–20)
CALCIUM SERPL-MCNC: 9.7 MG/DL (ref 8.6–10.2)
CHLORIDE SERPL-SCNC: 96 MMOL/L (ref 98–107)
CO2 SERPL-SCNC: 24 MMOL/L (ref 22–29)
CREAT SERPL-MCNC: 1.4 MG/DL (ref 0.7–1.2)
GFR, ESTIMATED: 57 ML/MIN/1.73M2
GLUCOSE SERPL-MCNC: 260 MG/DL (ref 74–99)
POTASSIUM SERPL-SCNC: 4.4 MMOL/L (ref 3.5–5)
SODIUM SERPL-SCNC: 134 MMOL/L (ref 132–146)

## 2024-06-04 PROCEDURE — 99214 OFFICE O/P EST MOD 30 MIN: CPT

## 2024-06-04 PROCEDURE — 80048 BASIC METABOLIC PNL TOTAL CA: CPT

## 2024-06-04 PROCEDURE — 83880 ASSAY OF NATRIURETIC PEPTIDE: CPT

## 2024-06-04 NOTE — PROGRESS NOTES
information given   [] [facility] medication assistance program information given   [] Sample medications provided to patient to help bridge gap until affordability N/A    [x] PHQ assessment completed while in CHF clinic (1st visit, every 3 months and PRN)      3/14/2023     1:41 PM 9/12/2022     9:57 AM 9/12/2022     9:51 AM 4/25/2022     1:10 PM 3/30/2021     3:58 PM 3/30/2021     3:47 PM 5/28/2019     2:34 PM   PHQ Scores   PHQ2 Score 6 2 2 2 3 3 2   PHQ9 Score 9 5 2 9 9 3 2     Interpretation of Total Score Depression Severity:   1-4 = Minimal depression  5-9 = Mild depression  10-14 = Moderate depression  15-19 = Moderately severe depression  20-27 = Severe depression   [] Patient provided community resources for counseling services  [] PCP called and PHQ result faxed   [] Behav[facility] health consultant called        Scheduled to follow up in CHF clinic on:   Future Appointments   Date Time Provider Department Center   6/12/2024  9:00 AM [facility] NM DOSE RM [facility] NM [facility] Rad/Car   6/12/2024 11:30 AM [facility] CT SCAN 3 SEYZ CT [facility] Rad[facility]Car   6/12/2024 12:00 PM [facility] NM ECAM [facility] Rad/Car   6/17/2024  1:00 PM Zaina Hilario APRN MED ONC [facility]   6/17/2024  1:00 PM SE[facility] MED ONC FAST TRACK 1 [facility] Med Onc [facility]   6/28/2024  1:00 PM SEY INFUSION BAY 2 [facility] INF SER [facility]   8/6/2024 12:00 PM [facility] CHF ROOM 2 [facility] CHF [facility]   8/13/2024  1:40 PM Mariaelena Gordon DO Fam [place] [facility]

## 2024-06-04 NOTE — PLAN OF CARE
Problem: Chronic Conditions and Co-morbidities  Goal: Patient's chronic conditions and co-morbidity symptoms are monitored and maintained or improved  Flowsheets (Taken 6/4/2024 1240)  Care Plan - Patient's Chronic Conditions and Co-Morbidity Symptoms are Monitored and Maintained or Improved: Monitor and assess patient's chronic conditions and comorbid symptoms for stability, deterioration, or improvement

## 2024-06-05 ENCOUNTER — TELEPHONE (OUTPATIENT)
Dept: CARDIOLOGY CLINIC | Age: 59
End: 2024-06-05

## 2024-06-05 DIAGNOSIS — N28.9 RENAL INSUFFICIENCY: Primary | ICD-10-CM

## 2024-06-05 NOTE — TELEPHONE ENCOUNTER
----- Message from Steven Kolb, DO sent at 6/4/2024  6:05 PM EDT -----  Kidney function has decreased just a little bit.  I would not make any changes but I would repeat a BMP in 1 week.

## 2024-06-11 ENCOUNTER — HOSPITAL ENCOUNTER (OUTPATIENT)
Age: 59
Discharge: HOME | End: 2024-06-11
Payer: MEDICARE

## 2024-06-11 DIAGNOSIS — N28.9 RENAL INSUFFICIENCY: ICD-10-CM

## 2024-06-11 LAB
ANION GAP SERPL CALCULATED.3IONS-SCNC: 18 MMOL/L (ref 7–16)
BUN SERPL-MCNC: 31 MG/DL (ref 6–20)
CALCIUM SERPL-MCNC: 9.9 MG/DL (ref 8.6–10.2)
CHLORIDE SERPL-SCNC: 100 MMOL/L (ref 98–107)
CO2 SERPL-SCNC: 21 MMOL/L (ref 22–29)
CREAT SERPL-MCNC: 1.4 MG/DL (ref 0.7–1.2)
GFR, ESTIMATED: 59 ML/MIN/1.73M2
GLUCOSE SERPL-MCNC: 186 MG/DL (ref 74–99)
POTASSIUM SERPL-SCNC: 4.5 MMOL/L (ref 3.5–5)

## 2024-06-11 PROCEDURE — 80048 BASIC METABOLIC PNL TOTAL CA: CPT

## 2024-06-11 PROCEDURE — 36415 COLL VENOUS BLD VENIPUNCTURE: CPT

## 2024-06-12 ENCOUNTER — HOSPITAL ENCOUNTER (OUTPATIENT)
Dept: CT IMAGING | Age: 59
Discharge: HOME | End: 2024-06-14
Payer: MEDICARE

## 2024-06-12 ENCOUNTER — HOSPITAL ENCOUNTER (OUTPATIENT)
Dept: NUCLEAR MEDICINE | Age: 59
Discharge: HOME | End: 2024-06-14

## 2024-06-12 ENCOUNTER — TELEPHONE (OUTPATIENT)
Dept: CARDIOLOGY CLINIC | Age: 59
End: 2024-06-12

## 2024-06-12 ENCOUNTER — HOSPITAL ENCOUNTER (OUTPATIENT)
Dept: NUCLEAR MEDICINE | Age: 59
Discharge: HOME | End: 2024-06-14
Payer: MEDICARE

## 2024-06-12 DIAGNOSIS — C61 PROSTATE CANCER (HCC): ICD-10-CM

## 2024-06-12 PROCEDURE — A9503 TC99M MEDRONATE: HCPCS | Performed by: RADIOLOGY

## 2024-06-12 PROCEDURE — 78306 BONE IMAGING WHOLE BODY: CPT | Performed by: INTERNAL MEDICINE

## 2024-06-12 PROCEDURE — 74177 CT ABD & PELVIS W/CONTRAST: CPT

## 2024-06-12 PROCEDURE — 71260 CT THORAX DX C+: CPT

## 2024-06-12 PROCEDURE — 3430000000 HC RX DIAGNOSTIC RADIOPHARMACEUTICAL: Performed by: RADIOLOGY

## 2024-06-12 PROCEDURE — 6360000004 HC RX CONTRAST MEDICATION: Performed by: RADIOLOGY

## 2024-06-12 RX ORDER — TC 99M MEDRONATE 20 MG/10ML
25 INJECTION, POWDER, LYOPHILIZED, FOR SOLUTION INTRAVENOUS
Status: COMPLETED | OUTPATIENT
Start: 2024-06-12 | End: 2024-06-12

## 2024-06-12 RX ADMIN — TC 99M MEDRONATE 28 MILLICURIE: 20 INJECTION, POWDER, LYOPHILIZED, FOR SOLUTION INTRAVENOUS at 08:30

## 2024-06-12 RX ADMIN — IOPAMIDOL 75 ML: 755 INJECTION, SOLUTION INTRAVENOUS at 09:03

## 2024-06-12 NOTE — TELEPHONE ENCOUNTER
----- Message from Steven Kolb DO sent at 6/11/2024  6:44 PM EDT -----  He has not come to see me since 2021.  He has mild renal insufficiency.  Please forward the results to his PCP.

## 2024-06-12 NOTE — TELEPHONE ENCOUNTER
Patient notified of lab results and Dr. Kolb's recommendation.  Note routed to Dr. Gordon, she can see labs in EPIC.  Patient agreed to schedule F/U, scheduled for 7/19/24 at 9:40 a.m.

## 2024-06-13 ENCOUNTER — TELEPHONE (OUTPATIENT)
Dept: FAMILY MEDICINE CLINIC | Age: 59
End: 2024-06-13

## 2024-06-13 DIAGNOSIS — I50.22 CHRONIC SYSTOLIC CONGESTIVE HEART FAILURE (HCC): ICD-10-CM

## 2024-06-13 DIAGNOSIS — N17.9 AKI (ACUTE KIDNEY INJURY) (HCC): Primary | ICD-10-CM

## 2024-06-13 RX ORDER — BUMETANIDE 1 MG/1
1 TABLET ORAL DAILY
Qty: 135 TABLET | Refills: 1
Start: 2024-06-13

## 2024-06-13 NOTE — TELEPHONE ENCOUNTER
I called Mr. Lewis.  His recent labs showed an increase in Cr to 1.4 (baseline 0.9 to 1.0) with BUN increase to 31.  He also just had CT with contrast yesterday.  Labs ordered by HF Clinic through Cardiologist Dr. Kolb.  He has been taking Bumex 1.5 mg daily; states he has been adherent.  Weight stable, no new edema, no shortness of breath.  Has been drinking more diet soda and cranberry juice lately.  Otherwise no changes to level of activities, diet/intake, etc.  No recent illness.  Felt constipated; had good BM after CT yesterday.  Sees oncology on Monday.  Has noted soreness of breast area on chest wall and gynecomastia reported on CT; he plans to discuss this with oncologist on Monday.  He made an appointment to follow up with cardiology.  Has no symptoms or concerns otherwise.      Advised drinking more plain water and less diet soda/juices.  He may decrease Bumex to 1 mg daily for now, watching daily weights (and letting us know of any weight gain 3 lb or more), and watching for edema or shortness of breath.  If any changes in symptoms or gain in weight, resume 1.5 mg daily of Bumex and seek care.  Avoid salty foods.  Plan to recheck labs in 4 days when he has oncology appointment.  Questions answered.  Has upcoming appointment in our office.      Thank you!

## 2024-06-17 ENCOUNTER — HOSPITAL ENCOUNTER (OUTPATIENT)
Dept: INFUSION THERAPY | Age: 59
Discharge: HOME | End: 2024-06-17
Payer: MEDICARE

## 2024-06-17 ENCOUNTER — OFFICE VISIT (OUTPATIENT)
Dept: ONCOLOGY | Age: 59
End: 2024-06-17
Payer: MEDICARE

## 2024-06-17 VITALS
BODY MASS INDEX: 35.1 KG/M2 | SYSTOLIC BLOOD PRESSURE: 113 MMHG | OXYGEN SATURATION: 96 % | DIASTOLIC BLOOD PRESSURE: 60 MMHG | TEMPERATURE: 98 F | HEART RATE: 86 BPM | WEIGHT: 237 LBS | HEIGHT: 69 IN

## 2024-06-17 DIAGNOSIS — N62 HYPERTROPHY OF BREAST: ICD-10-CM

## 2024-06-17 DIAGNOSIS — C61 PROSTATE CANCER (HCC): Primary | ICD-10-CM

## 2024-06-17 DIAGNOSIS — C61 PROSTATE CANCER (HCC): ICD-10-CM

## 2024-06-17 LAB
ALBUMIN SERPL-MCNC: 4.6 G/DL (ref 3.5–5.2)
ALP SERPL-CCNC: 86 U/L (ref 40–129)
ALT SERPL-CCNC: 18 U/L (ref 0–40)
ANION GAP SERPL CALCULATED.3IONS-SCNC: 17 MMOL/L (ref 7–16)
AST SERPL-CCNC: 16 U/L (ref 0–39)
BASOPHILS # BLD: 0.08 K/UL (ref 0–0.2)
BASOPHILS NFR BLD: 1 % (ref 0–2)
BILIRUB SERPL-MCNC: 0.5 MG/DL (ref 0–1.2)
BUN SERPL-MCNC: 33 MG/DL (ref 6–20)
CALCIUM SERPL-MCNC: 9.4 MG/DL (ref 8.6–10.2)
CHLORIDE SERPL-SCNC: 97 MMOL/L (ref 98–107)
CO2 SERPL-SCNC: 23 MMOL/L (ref 22–29)
CREAT SERPL-MCNC: 1.4 MG/DL (ref 0.7–1.2)
EOSINOPHIL # BLD: 0.27 K/UL (ref 0.05–0.5)
EOSINOPHILS RELATIVE PERCENT: 3 % (ref 0–6)
ERYTHROCYTE [DISTWIDTH] IN BLOOD BY AUTOMATED COUNT: 13.4 % (ref 11.5–15)
GFR, ESTIMATED: 60 ML/MIN/1.73M2
GLUCOSE SERPL-MCNC: 213 MG/DL (ref 74–99)
HCT VFR BLD AUTO: 48.8 % (ref 37–54)
HGB BLD-MCNC: 16.5 G/DL (ref 12.5–16.5)
IMM GRANULOCYTES # BLD AUTO: 0.09 K/UL (ref 0–0.58)
IMM GRANULOCYTES NFR BLD: 1 % (ref 0–5)
LYMPHOCYTES NFR BLD: 1.44 K/UL (ref 1.5–4)
LYMPHOCYTES RELATIVE PERCENT: 18 % (ref 20–42)
MCH RBC QN AUTO: 29.6 PG (ref 26–35)
MCHC RBC AUTO-ENTMCNC: 33.8 G/DL (ref 32–34.5)
MCV RBC AUTO: 87.6 FL (ref 80–99.9)
MONOCYTES NFR BLD: 0.52 K/UL (ref 0.1–0.95)
MONOCYTES NFR BLD: 6 % (ref 2–12)
NEUTROPHILS NFR BLD: 70 % (ref 43–80)
NEUTS SEG NFR BLD: 5.71 K/UL (ref 1.8–7.3)
PLATELET # BLD AUTO: 194 K/UL (ref 130–450)
PMV BLD AUTO: 10.8 FL (ref 7–12)
POTASSIUM SERPL-SCNC: 4.2 MMOL/L (ref 3.5–5)
PROT SERPL-MCNC: 8.5 G/DL (ref 6.4–8.3)
PSA SERPL-MCNC: 0.03 NG/ML (ref 0–4)
RBC # BLD AUTO: 5.57 M/UL (ref 3.8–5.8)
SODIUM SERPL-SCNC: 137 MMOL/L (ref 132–146)
TESTOST SERPL-MCNC: <12 NG/DL (ref 193–740)
WBC OTHER # BLD: 8.1 K/UL (ref 4.5–11.5)

## 2024-06-17 PROCEDURE — 3074F SYST BP LT 130 MM HG: CPT | Performed by: CLINICAL NURSE SPECIALIST

## 2024-06-17 PROCEDURE — 36415 COLL VENOUS BLD VENIPUNCTURE: CPT

## 2024-06-17 PROCEDURE — 99213 OFFICE O/P EST LOW 20 MIN: CPT | Performed by: CLINICAL NURSE SPECIALIST

## 2024-06-17 PROCEDURE — 85025 COMPLETE CBC W/AUTO DIFF WBC: CPT

## 2024-06-17 PROCEDURE — 3078F DIAST BP <80 MM HG: CPT | Performed by: CLINICAL NURSE SPECIALIST

## 2024-06-17 PROCEDURE — 80053 COMPREHEN METABOLIC PANEL: CPT

## 2024-06-17 PROCEDURE — 99213 OFFICE O/P EST LOW 20 MIN: CPT

## 2024-06-17 PROCEDURE — 84153 ASSAY OF PSA TOTAL: CPT

## 2024-06-17 PROCEDURE — 84403 ASSAY OF TOTAL TESTOSTERONE: CPT

## 2024-06-17 NOTE — PROGRESS NOTES
Patient provided with discharge instructions, patient has MYCHART.  All questions answered.  Patient understands follow up plan of care.     
bone scan almost complete response with mild residual focal activity in the calvarium, this is consistent with very good response to treatment.  --CT CAP with contrast and bone scan every 6 months reviewed 6/12/2024 stable bone scan no new mets  --CBC WNL , CMP elevated creatinine 1.4, PSA 0.03 stable , testosterone <12  --Follow-up every 3 months or earlier should he experience new or worsening symptoms.  -reviewed scans , pt complaining of increased tenderness , likely due to possible low T result not back , pt is very anxious as he states his sister has breast cancer , I did review imagining including chest . Voices he still feels that he has mass or lumps, I did not palpate mass on either side. Will get US limited b/l       #Bone health on Xgeva (no need for Xgeva for CSPC, will resume once castrate resistant, he received multiple doses so I would not obtain DEXA), not on calcium or vitamin D    # He declined genetic referral.  He has no children.    I encouraged patient to ask questions.  I answered his questions and addressed his concerns to his satisfaction.  I spent 60 minutes reviewing his chart and counseling him on above assessment and plan.  He is in agreement.         PAUL Nagy,ACNS-BC,AGACNP-BC  HEMATOLOGY/MEDICAL ONCOLOGY  [facility] PHYSICIANS [place] [facility] MED ONCOLOGY  [facility] [facility] [address]  Dept: [phone]  Loc: [phone]

## 2024-06-18 DIAGNOSIS — E11.22 TYPE 2 DIABETES MELLITUS WITH STAGE 2 CHRONIC KIDNEY DISEASE, WITHOUT LONG-TERM CURRENT USE OF INSULIN (HCC): ICD-10-CM

## 2024-06-18 DIAGNOSIS — N17.9 AKI (ACUTE KIDNEY INJURY) (HCC): Primary | ICD-10-CM

## 2024-06-18 DIAGNOSIS — N18.2 TYPE 2 DIABETES MELLITUS WITH STAGE 2 CHRONIC KIDNEY DISEASE, WITHOUT LONG-TERM CURRENT USE OF INSULIN (HCC): ICD-10-CM

## 2024-06-18 NOTE — TELEPHONE ENCOUNTER
I called him to discuss lab results.  He is feeling well overall. No increase in edema, no weight gain.  Did not decrease his dose of Bumex right away, as his pills were already set for the week, so he was still taking 1.5 mg daily.  Has it planned to use 1 mg daily currently.  Will continue the same and recheck labs in one week, follow BUN and Cr.  Advised on hydration with plain water and avoiding soda/juice.  He understands.  Follow for edema, weight gain, and please call right away with change in symptoms.  He has central air to protect from high temps.  Questions answered.  Will plan to repeat BMP in one week.  Thank you!

## 2024-06-24 ENCOUNTER — NURSE ONLY (OUTPATIENT)
Dept: FAMILY MEDICINE CLINIC | Age: 59
End: 2024-06-24
Payer: MEDICARE

## 2024-06-24 DIAGNOSIS — N17.9 AKI (ACUTE KIDNEY INJURY) (HCC): ICD-10-CM

## 2024-06-24 LAB
ALBUMIN: 4.3 G/DL (ref 3.5–5.2)
ALP BLD-CCNC: 81 U/L (ref 40–129)
ALT SERPL-CCNC: 13 U/L (ref 0–40)
ANION GAP SERPL CALCULATED.3IONS-SCNC: 12 MMOL/L (ref 7–16)
AST SERPL-CCNC: 13 U/L (ref 0–39)
BILIRUB SERPL-MCNC: 0.6 MG/DL (ref 0–1.2)
BUN BLDV-MCNC: 24 MG/DL (ref 6–20)
CALCIUM SERPL-MCNC: 9.7 MG/DL (ref 8.6–10.2)
CHLORIDE BLD-SCNC: 102 MMOL/L (ref 98–107)
CO2: 25 MMOL/L (ref 22–29)
CREAT SERPL-MCNC: 1.3 MG/DL (ref 0.7–1.2)
GFR, ESTIMATED: 63 ML/MIN/1.73M2
GLUCOSE BLD-MCNC: 204 MG/DL (ref 74–99)
POTASSIUM SERPL-SCNC: 4.5 MMOL/L (ref 3.5–5)
SODIUM BLD-SCNC: 139 MMOL/L (ref 132–146)
TOTAL PROTEIN: 7.7 G/DL (ref 6.4–8.3)

## 2024-06-24 PROCEDURE — 36415 COLL VENOUS BLD VENIPUNCTURE: CPT | Performed by: FAMILY MEDICINE

## 2024-06-27 DIAGNOSIS — C79.51 PROSTATE CANCER METASTATIC TO BONE (HCC): Primary | ICD-10-CM

## 2024-06-27 DIAGNOSIS — C61 PROSTATE CANCER METASTATIC TO BONE (HCC): Primary | ICD-10-CM

## 2024-06-28 ENCOUNTER — HOSPITAL ENCOUNTER (OUTPATIENT)
Dept: INFUSION THERAPY | Age: 59
Setting detail: INFUSION SERIES
Discharge: HOME | End: 2024-06-28
Payer: MEDICARE

## 2024-06-28 DIAGNOSIS — C61 PROSTATE CANCER (HCC): Primary | ICD-10-CM

## 2024-06-28 PROCEDURE — 96402 CHEMO HORMON ANTINEOPL SQ/IM: CPT

## 2024-06-28 PROCEDURE — 6360000002 HC RX W HCPCS: Performed by: UROLOGY

## 2024-06-28 RX ADMIN — LEUPROLIDE ACETATE 22.5 MG: KIT at 13:50

## 2024-07-09 DIAGNOSIS — N17.9 AKI (ACUTE KIDNEY INJURY) (HCC): Primary | ICD-10-CM

## 2024-07-09 NOTE — PROGRESS NOTES
Ordering recheck of labs.      Sent [employer] message to patient to review symptoms and advise to have labs drawn in the next few days.     Thank you!

## 2024-07-11 DIAGNOSIS — E11.22 TYPE 2 DIABETES MELLITUS WITH STAGE 2 CHRONIC KIDNEY DISEASE, WITHOUT LONG-TERM CURRENT USE OF INSULIN (HCC): ICD-10-CM

## 2024-07-11 DIAGNOSIS — N18.2 TYPE 2 DIABETES MELLITUS WITH STAGE 2 CHRONIC KIDNEY DISEASE, WITHOUT LONG-TERM CURRENT USE OF INSULIN (HCC): ICD-10-CM

## 2024-07-19 ENCOUNTER — OFFICE VISIT (OUTPATIENT)
Dept: CARDIOLOGY CLINIC | Age: 59
End: 2024-07-19
Payer: MEDICARE

## 2024-07-19 VITALS
BODY MASS INDEX: 35.68 KG/M2 | HEART RATE: 74 BPM | HEIGHT: 69 IN | RESPIRATION RATE: 16 BRPM | DIASTOLIC BLOOD PRESSURE: 81 MMHG | SYSTOLIC BLOOD PRESSURE: 119 MMHG | WEIGHT: 240.9 LBS

## 2024-07-19 DIAGNOSIS — I50.20 HFREF (HEART FAILURE WITH REDUCED EJECTION FRACTION) (HCC): ICD-10-CM

## 2024-07-19 DIAGNOSIS — I10 ESSENTIAL HYPERTENSION: Primary | Chronic | ICD-10-CM

## 2024-07-19 DIAGNOSIS — I25.10 CORONARY ARTERY DISEASE INVOLVING NATIVE CORONARY ARTERY OF NATIVE HEART WITHOUT ANGINA PECTORIS: Chronic | ICD-10-CM

## 2024-07-19 DIAGNOSIS — I50.22 CHRONIC SYSTOLIC CONGESTIVE HEART FAILURE (HCC): ICD-10-CM

## 2024-07-19 DIAGNOSIS — I34.0 NONRHEUMATIC MITRAL VALVE REGURGITATION: ICD-10-CM

## 2024-07-19 DIAGNOSIS — E66.01 SEVERE OBESITY (BMI 35.0-39.9) WITH COMORBIDITY (HCC): ICD-10-CM

## 2024-07-19 DIAGNOSIS — I35.1 NONRHEUMATIC AORTIC VALVE INSUFFICIENCY: Chronic | ICD-10-CM

## 2024-07-19 PROCEDURE — 99214 OFFICE O/P EST MOD 30 MIN: CPT | Performed by: INTERNAL MEDICINE

## 2024-07-19 PROCEDURE — 93000 ELECTROCARDIOGRAM COMPLETE: CPT | Performed by: INTERNAL MEDICINE

## 2024-07-19 PROCEDURE — 3079F DIAST BP 80-89 MM HG: CPT | Performed by: INTERNAL MEDICINE

## 2024-07-19 PROCEDURE — 3074F SYST BP LT 130 MM HG: CPT | Performed by: INTERNAL MEDICINE

## 2024-07-19 RX ORDER — BUMETANIDE 1 MG/1
TABLET ORAL
Qty: 135 TABLET | Refills: 3 | Status: SHIPPED | OUTPATIENT
Start: 2024-07-19

## 2024-07-19 RX ORDER — FLUTICASONE PROPIONATE 50 MCG
2 SPRAY, SUSPENSION (ML) NASAL DAILY
Qty: 48 G | Refills: 1 | Status: SHIPPED | OUTPATIENT
Start: 2024-07-19

## 2024-07-19 RX ORDER — ISOSORBIDE MONONITRATE 30 MG/1
90 TABLET, EXTENDED RELEASE ORAL DAILY
Qty: 90 TABLET | Refills: 5 | Status: SHIPPED | OUTPATIENT
Start: 2024-07-19 | End: 2025-01-15

## 2024-07-19 RX ORDER — BUMETANIDE 1 MG/1
TABLET ORAL
Qty: 135 TABLET | Refills: 3 | Status: SHIPPED
Start: 2024-07-19 | End: 2024-07-19 | Stop reason: SDUPTHER

## 2024-07-19 RX ORDER — ASPIRIN 81 MG/1
81 TABLET ORAL DAILY
Qty: 90 TABLET | Refills: 3 | Status: SHIPPED | OUTPATIENT
Start: 2024-07-19

## 2024-07-22 ENCOUNTER — TELEPHONE (OUTPATIENT)
Dept: CARDIOLOGY | Age: 59
End: 2024-07-22

## 2024-07-24 ENCOUNTER — HOSPITAL ENCOUNTER (OUTPATIENT)
Dept: GENERAL RADIOLOGY | Age: 59
Discharge: HOME | End: 2024-07-26
Payer: MEDICARE

## 2024-07-24 DIAGNOSIS — N62 HYPERTROPHY OF BREAST: ICD-10-CM

## 2024-07-24 DIAGNOSIS — C61 PROSTATE CANCER METASTATIC TO BONE (HCC): ICD-10-CM

## 2024-07-24 DIAGNOSIS — C79.51 PROSTATE CANCER METASTATIC TO BONE (HCC): ICD-10-CM

## 2024-07-24 DIAGNOSIS — C61 PROSTATE CANCER (HCC): ICD-10-CM

## 2024-07-24 PROCEDURE — G0279 TOMOSYNTHESIS, MAMMO: HCPCS

## 2024-07-24 PROCEDURE — 76642 ULTRASOUND BREAST LIMITED: CPT

## 2024-07-25 ENCOUNTER — TELEPHONE (OUTPATIENT)
Dept: INFUSION THERAPY | Age: 59
End: 2024-07-25

## 2024-07-25 NOTE — TELEPHONE ENCOUNTER
Patient's scans ( Mammogram/Breast US) reviewed with Zaina MCFARLAND NP. Patient notified that no solid mass is identified and is benign gynecomastia. This is caused by tx of prostate cancer. Pt states understanding.

## 2024-08-06 ENCOUNTER — HOSPITAL ENCOUNTER (OUTPATIENT)
Dept: OTHER | Age: 59
Setting detail: THERAPIES SERIES
Discharge: HOME | End: 2024-08-06
Payer: MEDICARE

## 2024-08-06 VITALS
HEART RATE: 86 BPM | SYSTOLIC BLOOD PRESSURE: 99 MMHG | OXYGEN SATURATION: 96 % | BODY MASS INDEX: 35.44 KG/M2 | RESPIRATION RATE: 18 BRPM | WEIGHT: 240 LBS | DIASTOLIC BLOOD PRESSURE: 58 MMHG

## 2024-08-06 LAB
ANION GAP SERPL CALCULATED.3IONS-SCNC: 16 MMOL/L (ref 7–16)
BNP SERPL-MCNC: 573 PG/ML (ref 0–125)
BUN SERPL-MCNC: 26 MG/DL (ref 6–20)
CALCIUM SERPL-MCNC: 9.6 MG/DL (ref 8.6–10.2)
CHLORIDE SERPL-SCNC: 100 MMOL/L (ref 98–107)
CO2 SERPL-SCNC: 22 MMOL/L (ref 22–29)
CREAT SERPL-MCNC: 1.2 MG/DL (ref 0.7–1.2)
GFR, ESTIMATED: 72 ML/MIN/1.73M2
GLUCOSE SERPL-MCNC: 274 MG/DL (ref 74–99)
POTASSIUM SERPL-SCNC: 4 MMOL/L (ref 3.5–5)
SODIUM SERPL-SCNC: 138 MMOL/L (ref 132–146)

## 2024-08-06 PROCEDURE — 80048 BASIC METABOLIC PNL TOTAL CA: CPT

## 2024-08-06 PROCEDURE — 99214 OFFICE O/P EST MOD 30 MIN: CPT

## 2024-08-06 PROCEDURE — 83880 ASSAY OF NATRIURETIC PEPTIDE: CPT

## 2024-08-06 ASSESSMENT — PATIENT HEALTH QUESTIONNAIRE - PHQ9
SUM OF ALL RESPONSES TO PHQ9 QUESTIONS 1 & 2: 0
SUM OF ALL RESPONSES TO PHQ QUESTIONS 1-9: 0
1. LITTLE INTEREST OR PLEASURE IN DOING THINGS: NOT AT ALL
2. FEELING DOWN, DEPRESSED OR HOPELESS: NOT AT ALL
SUM OF ALL RESPONSES TO PHQ QUESTIONS 1-9: 0

## 2024-08-06 NOTE — PROGRESS NOTES
Congestive Heart Failure Clinic   [facility]       Referring Provider: FRANCO Connelly NP  Primary Care Physician: Mariaelena Gordon DO   Cardiologist: Dr. Kolb  Nephrologist: None      HISTORY OF PRESENT ILLNESS:     En Lewis is a 58 y.o. (1965) male with a history of HFrEF (EF < 40%), most recent EF:  Lab Results   Component Value Date    LVEF 30 09/01/2021         He presents to the CHF clinic for ongoing evaluation and monitoring of heart failure.    In the CHF clinic today he denies any adverse symptoms except:  [] Dizziness or lightheadedness (with Jardiance)   [] Syncope or near syncope  [] Recent Fall  [] Shortness of breath at rest   [x] Dyspnea with exertion(far walk/steps)  [] Decline in functional capacity (unable to perform activities they had previously been able to do)  [] Fatigue   [] Orthopnea  [] PND  [] Nocturnal cough  [] Hemoptysis  [] Chest pain, pressure, or discomfort  [] Palpitations  [] Abdominal distention  [] Abdominal bloating  [] Early satiety  [] Blood in stool   [] Diarrhea  [] Constipation  [] Nausea/Vomiting  [] Decreased urinary response to oral diuretic   [] Scrotal swelling   [] Lower extremity edema  [] Used PRN doses of oral diuretic   [] Weight gain    Wt Readings from Last 3 Encounters:   08/06/24 108.9 kg (240 lb)   07/19/24 109.3 kg (240 lb 14.4 oz)   06/17/24 107.5 kg (237 lb)           SOCIAL HISTORY:  [x] Denies tobacco, alcohol or illicit drug abuse  [] Tobacco use:  [] ETOH use:  [] Illicit drug use:        MEDICATIONS:    No Known Allergies  Prior to Visit Medications    Medication Sig Taking? Authorizing Provider   fluticasone (FLONASE) 50 MCG/ACT nasal spray 2 sprays by Each Nostril route daily  Mariaelena Gordon DO   aspirin 81 MG EC tablet Take 1 tablet by mouth daily  Mariaelena Gordon DO   isosorbide mononitrate (IMDUR) 30 MG extended release tablet Take 3 tablets by mouth daily  Mariaelena Gordon DO   bumetanide

## 2024-08-06 NOTE — PLAN OF CARE
Problem: Chronic Conditions and Co-morbidities  Goal: Patient's chronic conditions and co-morbidity symptoms are monitored and maintained or improved  Flowsheets (Taken 8/6/2024 1202)  Care Plan - Patient's Chronic Conditions and Co-Morbidity Symptoms are Monitored and Maintained or Improved: Monitor and assess patient's chronic conditions and comorbid symptoms for stability, deterioration, or improvement

## 2024-08-13 ENCOUNTER — OFFICE VISIT (OUTPATIENT)
Dept: FAMILY MEDICINE CLINIC | Age: 59
End: 2024-08-13
Payer: MEDICARE

## 2024-08-13 ENCOUNTER — TELEPHONE (OUTPATIENT)
Dept: PHARMACY | Facility: CLINIC | Age: 59
End: 2024-08-13

## 2024-08-13 VITALS
DIASTOLIC BLOOD PRESSURE: 62 MMHG | TEMPERATURE: 97.4 F | HEART RATE: 82 BPM | SYSTOLIC BLOOD PRESSURE: 107 MMHG | RESPIRATION RATE: 18 BRPM | HEIGHT: 69 IN | WEIGHT: 239 LBS | BODY MASS INDEX: 35.4 KG/M2 | OXYGEN SATURATION: 95 %

## 2024-08-13 DIAGNOSIS — E11.22 TYPE 2 DIABETES MELLITUS WITH STAGE 2 CHRONIC KIDNEY DISEASE, WITHOUT LONG-TERM CURRENT USE OF INSULIN (HCC): ICD-10-CM

## 2024-08-13 DIAGNOSIS — Z00.00 MEDICARE ANNUAL WELLNESS VISIT, SUBSEQUENT: Primary | ICD-10-CM

## 2024-08-13 DIAGNOSIS — N18.2 TYPE 2 DIABETES MELLITUS WITH STAGE 2 CHRONIC KIDNEY DISEASE, WITHOUT LONG-TERM CURRENT USE OF INSULIN (HCC): ICD-10-CM

## 2024-08-13 LAB — HBA1C MFR BLD: 8.2 %

## 2024-08-13 PROCEDURE — G0439 PPPS, SUBSEQ VISIT: HCPCS | Performed by: FAMILY MEDICINE

## 2024-08-13 PROCEDURE — 83036 HEMOGLOBIN GLYCOSYLATED A1C: CPT | Performed by: FAMILY MEDICINE

## 2024-08-13 PROCEDURE — 3078F DIAST BP <80 MM HG: CPT | Performed by: FAMILY MEDICINE

## 2024-08-13 PROCEDURE — 3052F HG A1C>EQUAL 8.0%<EQUAL 9.0%: CPT | Performed by: FAMILY MEDICINE

## 2024-08-13 PROCEDURE — 3074F SYST BP LT 130 MM HG: CPT | Performed by: FAMILY MEDICINE

## 2024-08-13 SDOH — ECONOMIC STABILITY: FOOD INSECURITY: WITHIN THE PAST 12 MONTHS, YOU WORRIED THAT YOUR FOOD WOULD RUN OUT BEFORE YOU GOT MONEY TO BUY MORE.: NEVER TRUE

## 2024-08-13 SDOH — ECONOMIC STABILITY: FOOD INSECURITY: WITHIN THE PAST 12 MONTHS, THE FOOD YOU BOUGHT JUST DIDN'T LAST AND YOU DIDN'T HAVE MONEY TO GET MORE.: NEVER TRUE

## 2024-08-13 SDOH — ECONOMIC STABILITY: INCOME INSECURITY: HOW HARD IS IT FOR YOU TO PAY FOR THE VERY BASICS LIKE FOOD, HOUSING, MEDICAL CARE, AND HEATING?: NOT HARD AT ALL

## 2024-08-13 ASSESSMENT — PATIENT HEALTH QUESTIONNAIRE - PHQ9
SUM OF ALL RESPONSES TO PHQ QUESTIONS 1-9: 0
7. TROUBLE CONCENTRATING ON THINGS, SUCH AS READING THE NEWSPAPER OR WATCHING TELEVISION: NOT AT ALL
SUM OF ALL RESPONSES TO PHQ QUESTIONS 1-9: 0
10. IF YOU CHECKED OFF ANY PROBLEMS, HOW DIFFICULT HAVE THESE PROBLEMS MADE IT FOR YOU TO DO YOUR WORK, TAKE CARE OF THINGS AT HOME, OR GET ALONG WITH OTHER PEOPLE: NOT DIFFICULT AT ALL
SUM OF ALL RESPONSES TO PHQ9 QUESTIONS 1 & 2: 0
8. MOVING OR SPEAKING SO SLOWLY THAT OTHER PEOPLE COULD HAVE NOTICED. OR THE OPPOSITE, BEING SO FIGETY OR RESTLESS THAT YOU HAVE BEEN MOVING AROUND A LOT MORE THAN USUAL: NOT AT ALL
SUM OF ALL RESPONSES TO PHQ QUESTIONS 1-9: 0
2. FEELING DOWN, DEPRESSED OR HOPELESS: NOT AT ALL
3. TROUBLE FALLING OR STAYING ASLEEP: NOT AT ALL
SUM OF ALL RESPONSES TO PHQ QUESTIONS 1-9: 0
5. POOR APPETITE OR OVEREATING: NOT AT ALL
1. LITTLE INTEREST OR PLEASURE IN DOING THINGS: NOT AT ALL
9. THOUGHTS THAT YOU WOULD BE BETTER OFF DEAD, OR OF HURTING YOURSELF: NOT AT ALL
SUM OF ALL RESPONSES TO PHQ9 QUESTIONS 1 & 2: 0
SUM OF ALL RESPONSES TO PHQ QUESTIONS 1-9: 0
1. LITTLE INTEREST OR PLEASURE IN DOING THINGS: NOT AT ALL
SUM OF ALL RESPONSES TO PHQ QUESTIONS 1-9: 0
4. FEELING TIRED OR HAVING LITTLE ENERGY: NOT AT ALL
2. FEELING DOWN, DEPRESSED OR HOPELESS: NOT AT ALL
SUM OF ALL RESPONSES TO PHQ QUESTIONS 1-9: 0
6. FEELING BAD ABOUT YOURSELF - OR THAT YOU ARE A FAILURE OR HAVE LET YOURSELF OR YOUR FAMILY DOWN: NOT AT ALL
SUM OF ALL RESPONSES TO PHQ QUESTIONS 1-9: 0

## 2024-08-13 ASSESSMENT — LIFESTYLE VARIABLES
HOW OFTEN DO YOU HAVE A DRINK CONTAINING ALCOHOL: MONTHLY OR LESS
HOW MANY STANDARD DRINKS CONTAINING ALCOHOL DO YOU HAVE ON A TYPICAL DAY: 1 OR 2

## 2024-08-13 NOTE — TELEPHONE ENCOUNTER
Mariaelena Gordon DO - Statin Therapy Care Gap    Patient has an appointment with you on 24.  Chart reviewed due to insurer-identified care gap.  En Lewis is receiving treatment for diabetes and has not refilled statin therapy in .          Per review of patient chart and [place] claim data:  Patient has not refilled atorvastatin in  per [place] claim data/reports.  Per dispense report, atorvastatin was last refilled on 23 for 90 days at [facility] (appears to no longer be refilling at [facility]).  Prescription sent to [facility] on 10/3/23 has  as it was not filled within 6 months of issued date.  [facility].      Is patient to restart atorvastatin at this time? Patient may need a new atorvastatin prescription sent to his current preferred pharmacy.  Or may consider change to another statin if needed to improve tolerance?      Thank you,  Anna Lopez, PharmD, [facility] Pharmacist  [facility] Clinical Pharmacy  Department, toll free: [phone], option 1   ===============================================================================    [facility] CLINICAL PHARMACY: STATIN THERAPY REVIEW  Identified statin use in persons with diabetes care gap per [place]. Records dated: 24.    Last Office Visit: 3/19/24    Patient also appears to be prescribed the following medications in an adherence measure:  Jardiance, metformin    Patient is prescribed the following for lipid management:  atorvastatin 80 mg nightly    No Known Allergies      ASSESSMENT    DIABETES ADHERENCE  Insurance Records claims through 24  YTD PDC = 100%; Potential Fail Date: 10/2/24:   Jardiance 10 mg last filled for 30 days supply.  Next refill due: 24    Per Reconciled Dispense Report:  2 refill(s) remaining for Jardiance.  JARDIANCE 10 MG TABLET 2024 30 30 each Mariaelena Gordon DO [id number] [place]...

## 2024-08-13 NOTE — PROGRESS NOTES
edema  Musculoskeletal: normal range of motion, no joint swelling, deformity or tenderness  Neurologic: no cranial nerve deficit, gait, coordination and speech normal          No Known Allergies  Prior to Visit Medications    Medication Sig Taking? Authorizing Provider   fluticasone (FLONASE) 50 MCG/ACT nasal spray 2 sprays by Each Nostril route daily  Mariaelena Gordon DO   aspirin 81 MG EC tablet Take 1 tablet by mouth daily  Mariaelena Gordon DO   isosorbide mononitrate (IMDUR) 30 MG extended release tablet Take 3 tablets by mouth daily  Mariaelena Gordon DO   bumetanide (BUMEX) 1 MG tablet Take one tablet daily on Mon, Wed, Fri, Sat, Sun; take one tablet twice daily on Tue, Thur  Steven Kolb DO   empagliflozin (JARDIANCE) 10 MG tablet Take 1 tablet by mouth daily  Mariaelena Gordon DO   metFORMIN (GLUCOPHAGE) 500 MG tablet Take 1 tablet by mouth 2 times daily (with meals)  Mariaelena Gordon DO   clopidogrel (PLAVIX) 75 MG tablet take 1 tablet by mouth once daily  Marielena Desir MD   pantoprazole (PROTONIX) 40 MG tablet take 1 tablet by mouth once daily  Marielena Desir MD   metoprolol succinate (TOPROL XL) 50 MG extended release tablet Take 2 tablets by mouth daily  Mariaelena Gordon DO   tamsulosin (FLOMAX) 0.4 MG capsule Take 1 capsule by mouth daily  Mariaelena Gordon DO   atorvastatin (LIPITOR) 80 MG tablet Take 1 tablet by mouth nightly  Gunnar Caruso MD   potassium chloride (KLOR-CON M) 10 MEQ extended release tablet Take (4) tablets on the first day, then one tablet daily thereafter  Gunnar Caruso MD   spironolactone (ALDACTONE) 50 MG tablet Take 1 tablet by mouth daily  Gunnar Caruso MD   Compression Stockings MISC by Does not apply route  Vane Stern MD   sacubitril-valsartan (ENTRESTO)  MG per tablet Take 1 tablet by mouth 2 times daily  Vane Stern MD   lidocaine (HM LIDOCAINE PATCH) 4 % external patch Place 1 patch onto the skin daily  Patient taking differently: Place 1

## 2024-08-19 NOTE — TELEPHONE ENCOUNTER
Provider response noted - thank you!     Patient had OV on 24; provider discussed statin therapy with patient at OV.  Patient declines statin at this time.    Per provider routed note:   I spoke to him during his appointment yesterday.  He has plenty of atorvastatin at home, he states, and he tolerates this medication.  He does not feel he needs/wants to take it at this time.  I discussed it in depth with him and recommended adherence to the medication.  Will continue to address.     Anna Lopez, PharmD, [facility]  Population Health Pharmacist  [facility] Clinical Pharmacy  Department, toll free: [phone], option 1      For Pharmacy Admin Tracking Only  Program: [employer]  CPA in place:  No  Recommendation Provided To: Provider: 1 via Note to Provider  Intervention Detail: Adherence Monitorin  Intervention Accepted By: Provider: 1  Gap Closed?: No   Time Spent (min): 30

## 2024-08-22 ENCOUNTER — HOSPITAL ENCOUNTER (OUTPATIENT)
Dept: CARDIOLOGY | Age: 59
Discharge: HOME | End: 2024-08-24
Attending: INTERNAL MEDICINE
Payer: MEDICARE

## 2024-08-22 VITALS
HEIGHT: 69 IN | SYSTOLIC BLOOD PRESSURE: 107 MMHG | DIASTOLIC BLOOD PRESSURE: 62 MMHG | BODY MASS INDEX: 35.4 KG/M2 | WEIGHT: 239 LBS

## 2024-08-22 DIAGNOSIS — I34.0 NONRHEUMATIC MITRAL VALVE REGURGITATION: ICD-10-CM

## 2024-08-22 DIAGNOSIS — I35.1 NONRHEUMATIC AORTIC VALVE INSUFFICIENCY: Chronic | ICD-10-CM

## 2024-08-22 LAB
LEFT VENTRICULAR EJECTION FRACTION MODE: NORMAL
LV EF: 40 %

## 2024-08-22 PROCEDURE — C8929 TTE W OR WO FOL WCON,DOPPLER: HCPCS

## 2024-08-22 PROCEDURE — 6360000004 HC RX CONTRAST MEDICATION: Performed by: INTERNAL MEDICINE

## 2024-08-22 PROCEDURE — 2580000003 HC RX 258: Performed by: INTERNAL MEDICINE

## 2024-08-22 RX ORDER — SODIUM CHLORIDE 0.9 % (FLUSH) 0.9 %
10 SYRINGE (ML) INJECTION PRN
Status: DISCONTINUED | OUTPATIENT
Start: 2024-08-22 | End: 2024-08-25 | Stop reason: HOSPADM

## 2024-08-22 RX ADMIN — PERFLUTREN 1.5 ML: 6.52 INJECTION, SUSPENSION INTRAVENOUS at 10:32

## 2024-08-22 RX ADMIN — SODIUM CHLORIDE, PRESERVATIVE FREE 10 ML: 5 INJECTION INTRAVENOUS at 10:35

## 2024-08-23 LAB
ECHO AO ASC DIAM: 2.8 CM
ECHO AO ASCENDING AORTA INDEX: 1.26 CM/M2
ECHO AR MAX VEL PISA: 4.2 M/S
ECHO AV AREA PEAK VELOCITY: 1.9 CM2
ECHO AV AREA VTI: 2.1 CM2
ECHO AV AREA/BSA PEAK VELOCITY: 0.9 CM2/M2
ECHO AV AREA/BSA VTI: 0.9 CM2/M2
ECHO AV CUSP MM: 1.9 CM
ECHO AV MEAN GRADIENT: 5 MMHG
ECHO AV MEAN VELOCITY: 1 M/S
ECHO AV PEAK GRADIENT: 9 MMHG
ECHO AV PEAK VELOCITY: 1.5 M/S
ECHO AV REGURGITANT PHT: 846.1 MILLISECOND
ECHO AV VELOCITY RATIO: 0.53
ECHO AV VTI: 25.4 CM
ECHO BSA: 2.3 M2
ECHO EST RA PRESSURE: 3 MMHG
ECHO LA DIAMETER INDEX: 1.3 CM/M2
ECHO LA DIAMETER: 2.9 CM
ECHO LA VOL A-L A2C: 36 ML (ref 18–58)
ECHO LA VOL A-L A4C: 24 ML (ref 18–58)
ECHO LA VOL MOD A2C: 35 ML (ref 18–58)
ECHO LA VOL MOD A4C: 21 ML (ref 18–58)
ECHO LA VOLUME AREA LENGTH: 30 ML
ECHO LA VOLUME INDEX A-L A2C: 16 ML/M2 (ref 16–34)
ECHO LA VOLUME INDEX A-L A4C: 11 ML/M2 (ref 16–34)
ECHO LA VOLUME INDEX AREA LENGTH: 13 ML/M2 (ref 16–34)
ECHO LA VOLUME INDEX MOD A2C: 16 ML/M2 (ref 16–34)
ECHO LA VOLUME INDEX MOD A4C: 9 ML/M2 (ref 16–34)
ECHO LV EDV A2C: 181 ML
ECHO LV EDV A4C: 230 ML
ECHO LV EDV BP: 207 ML (ref 67–155)
ECHO LV EDV INDEX A4C: 103 ML/M2
ECHO LV EDV INDEX BP: 93 ML/M2
ECHO LV EDV NDEX A2C: 81 ML/M2
ECHO LV EF PHYSICIAN: 40 %
ECHO LV EJECTION FRACTION A2C: 37 %
ECHO LV EJECTION FRACTION A4C: 48 %
ECHO LV EJECTION FRACTION BIPLANE: 43 % (ref 55–100)
ECHO LV ESV A2C: 114 ML
ECHO LV ESV A4C: 119 ML
ECHO LV ESV BP: 119 ML (ref 22–58)
ECHO LV ESV INDEX A2C: 51 ML/M2
ECHO LV ESV INDEX A4C: 53 ML/M2
ECHO LV ESV INDEX BP: 53 ML/M2
ECHO LV FRACTIONAL SHORTENING: 25 % (ref 28–44)
ECHO LV INTERNAL DIMENSION DIASTOLE INDEX: 2.33 CM/M2
ECHO LV INTERNAL DIMENSION DIASTOLIC: 5.2 CM (ref 4.2–5.9)
ECHO LV INTERNAL DIMENSION SYSTOLIC INDEX: 1.75 CM/M2
ECHO LV INTERNAL DIMENSION SYSTOLIC: 3.9 CM
ECHO LV IVSD: 1.4 CM (ref 0.6–1)
ECHO LV IVSS: 1.6 CM
ECHO LV MASS 2D: 293.8 G (ref 88–224)
ECHO LV MASS INDEX 2D: 131.8 G/M2 (ref 49–115)
ECHO LV POSTERIOR WALL DIASTOLIC: 1.3 CM (ref 0.6–1)
ECHO LV POSTERIOR WALL SYSTOLIC: 1.5 CM
ECHO LV RELATIVE WALL THICKNESS RATIO: 0.5
ECHO LVOT AREA: 3.5 CM2
ECHO LVOT AV VTI INDEX: 0.6
ECHO LVOT DIAM: 2.1 CM
ECHO LVOT MEAN GRADIENT: 2 MMHG
ECHO LVOT PEAK GRADIENT: 3 MMHG
ECHO LVOT PEAK VELOCITY: 0.8 M/S
ECHO LVOT STROKE VOLUME INDEX: 23.6 ML/M2
ECHO LVOT SV: 52.6 ML
ECHO LVOT VTI: 15.2 CM
ECHO MV "A" WAVE DURATION: 110.7 MSEC
ECHO MV A VELOCITY: 1.01 M/S
ECHO MV AREA PHT: 3.2 CM2
ECHO MV AREA VTI: 2.2 CM2
ECHO MV E DECELERATION TIME (DT): 198.1 MS
ECHO MV E VELOCITY: 0.61 M/S
ECHO MV E/A RATIO: 0.6
ECHO MV LVOT VTI INDEX: 1.54
ECHO MV MAX VELOCITY: 1.1 M/S
ECHO MV MEAN GRADIENT: 2 MMHG
ECHO MV MEAN VELOCITY: 0.6 M/S
ECHO MV PEAK GRADIENT: 5 MMHG
ECHO MV PRESSURE HALF TIME (PHT): 67.8 MS
ECHO MV VTI: 23.4 CM
ECHO PV MAX VELOCITY: 0.9 M/S
ECHO PV MEAN GRADIENT: 2 MMHG
ECHO PV MEAN VELOCITY: 0.6 M/S
ECHO PV PEAK GRADIENT: 3 MMHG
ECHO PV VTI: 15.1 CM
ECHO PVEIN A DURATION: 120 MS
ECHO PVEIN A VELOCITY: 0.2 M/S
ECHO PVEIN PEAK D VELOCITY: 0.4 M/S
ECHO PVEIN PEAK S VELOCITY: 0.5 M/S
ECHO PVEIN S/D RATIO: 1.3
ECHO RIGHT VENTRICULAR SYSTOLIC PRESSURE (RVSP): 17 MMHG
ECHO RV INTERNAL DIMENSION: 2.7 CM
ECHO TV REGURGITANT MAX VELOCITY: 1.85 M/S
ECHO TV REGURGITANT PEAK GRADIENT: 14 MMHG

## 2024-09-16 DIAGNOSIS — C79.51 PROSTATE CANCER METASTATIC TO BONE (HCC): Primary | ICD-10-CM

## 2024-09-16 DIAGNOSIS — C61 PROSTATE CANCER (HCC): Primary | ICD-10-CM

## 2024-09-16 DIAGNOSIS — C61 PROSTATE CANCER METASTATIC TO BONE (HCC): Primary | ICD-10-CM

## 2024-09-18 ENCOUNTER — OFFICE VISIT (OUTPATIENT)
Dept: ONCOLOGY | Age: 59
End: 2024-09-18
Payer: MEDICARE

## 2024-09-18 ENCOUNTER — HOSPITAL ENCOUNTER (OUTPATIENT)
Dept: INFUSION THERAPY | Age: 59
Discharge: HOME | End: 2024-09-18
Payer: MEDICARE

## 2024-09-18 VITALS
DIASTOLIC BLOOD PRESSURE: 61 MMHG | SYSTOLIC BLOOD PRESSURE: 119 MMHG | TEMPERATURE: 97.4 F | HEART RATE: 78 BPM | WEIGHT: 241.3 LBS | BODY MASS INDEX: 35.63 KG/M2 | OXYGEN SATURATION: 97 %

## 2024-09-18 DIAGNOSIS — C61 PROSTATE CANCER (HCC): ICD-10-CM

## 2024-09-18 DIAGNOSIS — C61 PROSTATE CANCER (HCC): Primary | ICD-10-CM

## 2024-09-18 LAB
ALBUMIN SERPL-MCNC: 4.3 G/DL (ref 3.5–5.2)
ALP SERPL-CCNC: 104 U/L (ref 40–129)
ALT SERPL-CCNC: 16 U/L (ref 0–40)
ANION GAP SERPL CALCULATED.3IONS-SCNC: 14 MMOL/L (ref 7–16)
AST SERPL-CCNC: 14 U/L (ref 0–39)
BASOPHILS # BLD: 0.06 K/UL (ref 0–0.2)
BASOPHILS NFR BLD: 1 % (ref 0–2)
BILIRUB SERPL-MCNC: 0.6 MG/DL (ref 0–1.2)
BUN SERPL-MCNC: 29 MG/DL (ref 6–20)
CALCIUM SERPL-MCNC: 9.7 MG/DL (ref 8.6–10.2)
CHLORIDE SERPL-SCNC: 98 MMOL/L (ref 98–107)
CO2 SERPL-SCNC: 25 MMOL/L (ref 22–29)
CREAT SERPL-MCNC: 1.2 MG/DL (ref 0.7–1.2)
EOSINOPHIL # BLD: 0.38 K/UL (ref 0.05–0.5)
EOSINOPHILS RELATIVE PERCENT: 6 % (ref 0–6)
ERYTHROCYTE [DISTWIDTH] IN BLOOD BY AUTOMATED COUNT: 12.7 % (ref 11.5–15)
GFR, ESTIMATED: 68 ML/MIN/1.73M2
GLUCOSE SERPL-MCNC: 309 MG/DL (ref 74–99)
HCT VFR BLD AUTO: 47.8 % (ref 37–54)
HGB BLD-MCNC: 15.7 G/DL (ref 12.5–16.5)
IMM GRANULOCYTES # BLD AUTO: 0.05 K/UL (ref 0–0.58)
IMM GRANULOCYTES NFR BLD: 1 % (ref 0–5)
LYMPHOCYTES NFR BLD: 1.27 K/UL (ref 1.5–4)
LYMPHOCYTES RELATIVE PERCENT: 19 % (ref 20–42)
MCH RBC QN AUTO: 28.1 PG (ref 26–35)
MCHC RBC AUTO-ENTMCNC: 32.8 G/DL (ref 32–34.5)
MCV RBC AUTO: 85.7 FL (ref 80–99.9)
MONOCYTES NFR BLD: 0.41 K/UL (ref 0.1–0.95)
MONOCYTES NFR BLD: 6 % (ref 2–12)
NEUTROPHILS NFR BLD: 68 % (ref 43–80)
NEUTS SEG NFR BLD: 4.56 K/UL (ref 1.8–7.3)
PLATELET # BLD AUTO: 180 K/UL (ref 130–450)
PMV BLD AUTO: 11.5 FL (ref 7–12)
POTASSIUM SERPL-SCNC: 4.7 MMOL/L (ref 3.5–5)
PROT SERPL-MCNC: 7.5 G/DL (ref 6.4–8.3)
PSA SERPL-MCNC: 0.02 NG/ML (ref 0–4)
RBC # BLD AUTO: 5.58 M/UL (ref 3.8–5.8)
SODIUM SERPL-SCNC: 137 MMOL/L (ref 132–146)
WBC OTHER # BLD: 6.7 K/UL (ref 4.5–11.5)

## 2024-09-18 PROCEDURE — 80053 COMPREHEN METABOLIC PANEL: CPT

## 2024-09-18 PROCEDURE — 36415 COLL VENOUS BLD VENIPUNCTURE: CPT

## 2024-09-18 PROCEDURE — 3074F SYST BP LT 130 MM HG: CPT | Performed by: CLINICAL NURSE SPECIALIST

## 2024-09-18 PROCEDURE — 3078F DIAST BP <80 MM HG: CPT | Performed by: CLINICAL NURSE SPECIALIST

## 2024-09-18 PROCEDURE — 85025 COMPLETE CBC W/AUTO DIFF WBC: CPT

## 2024-09-18 PROCEDURE — 99212 OFFICE O/P EST SF 10 MIN: CPT

## 2024-09-18 PROCEDURE — 84153 ASSAY OF PSA TOTAL: CPT

## 2024-09-18 PROCEDURE — 99213 OFFICE O/P EST LOW 20 MIN: CPT | Performed by: CLINICAL NURSE SPECIALIST

## 2024-09-27 ENCOUNTER — HOSPITAL ENCOUNTER (OUTPATIENT)
Dept: INFUSION THERAPY | Age: 59
Setting detail: INFUSION SERIES
Discharge: HOME | End: 2024-09-27
Payer: MEDICARE

## 2024-09-27 VITALS
HEART RATE: 82 BPM | DIASTOLIC BLOOD PRESSURE: 79 MMHG | SYSTOLIC BLOOD PRESSURE: 129 MMHG | OXYGEN SATURATION: 97 % | RESPIRATION RATE: 18 BRPM | TEMPERATURE: 97 F

## 2024-09-27 DIAGNOSIS — C61 PROSTATE CANCER (HCC): Primary | ICD-10-CM

## 2024-09-27 PROCEDURE — 96402 CHEMO HORMON ANTINEOPL SQ/IM: CPT

## 2024-09-27 PROCEDURE — 6360000002 HC RX W HCPCS: Performed by: UROLOGY

## 2024-09-27 RX ADMIN — LEUPROLIDE ACETATE 22.5 MG: 22.5 INJECTION, SUSPENSION, EXTENDED RELEASE SUBCUTANEOUS at 14:06

## 2024-11-08 ENCOUNTER — TELEPHONE (OUTPATIENT)
Dept: PHARMACY | Facility: CLINIC | Age: 59
End: 2024-11-08

## 2024-11-08 NOTE — TELEPHONE ENCOUNTER
[facility] CLINICAL PHARMACY: ADHERENCE REVIEW  Identified care gap per [place]: fills at [facility]: Diabetes adherence    [id number] - [place], [place] - [address] - P [phone] - F [phone]  [address] [place] [address]  Phone: [phone] Fax: [phone]    [facility] - [place], [place] - [address] - P [phone] - F [phone]  [address] [facility] [address]  Phone: [phone] Fax: [phone]      Patient also appears to be prescribed: Diabetes; pt not taking (declines) statin     Current Outpatient Medications   Medication Instructions    aspirin 81 mg, Oral, DAILY    atorvastatin (LIPITOR) 80 mg, Oral, NIGHTLY    blood glucose monitor strips Test 1 time a day & as needed for symptoms of irregular blood glucose. Dispense sufficient amount for indicated testing frequency plus additional to accommodate PRN testing needs.    bumetanide (BUMEX) 1 MG tablet Take one tablet daily on Mon, Wed, Fri, Sat, Sun; take one tablet twice daily on Tue, Thur    clopidogrel (PLAVIX) 75 mg, Oral, DAILY    Compression Stockings MISC Does not apply    denosumab (XGEVA) 120 mg, SubCUTAneous, ONCE, EVERY 3 MONTHS    diclofenac sodium (VOLTAREN) 2 g, Topical, 4 TIMES DAILY PRN    empagliflozin (JARDIANCE) 10 mg, Oral, DAILY    Enzalutamide (XTANDI) 40 MG TABS 4 tablets, Oral, DAILY    fluticasone (FLONASE) 50 MCG/ACT nasal spray 2 sprays, Each Nostril, DAILY    glucose monitoring kit 1 kit, Does not apply, DAILY, Use to test daily and more often as needed for symptoms of irregular blood glucose.    isosorbide mononitrate (IMDUR) 90 mg, Oral, DAILY    Lancets MISC 1 each, Does not apply, DAILY, . Test one time daily and more often as needed for symptoms of high or low glucose.    Leuprolide Acetate (3 Month) (ELIGARD) 22.5 mg, SubCUTAneous, EVERY 3 MONTHS    lidocaine (HM LIDOCAINE PATCH) 4 % external patch 1 patch, TransDERmal, DAILY

## 2024-11-19 ENCOUNTER — OFFICE VISIT (OUTPATIENT)
Dept: FAMILY MEDICINE CLINIC | Age: 59
End: 2024-11-19

## 2024-11-19 VITALS
TEMPERATURE: 97.1 F | DIASTOLIC BLOOD PRESSURE: 80 MMHG | WEIGHT: 241 LBS | RESPIRATION RATE: 16 BRPM | HEIGHT: 69 IN | SYSTOLIC BLOOD PRESSURE: 126 MMHG | HEART RATE: 77 BPM | OXYGEN SATURATION: 97 % | BODY MASS INDEX: 35.7 KG/M2

## 2024-11-19 DIAGNOSIS — I10 ESSENTIAL HYPERTENSION: Chronic | ICD-10-CM

## 2024-11-19 DIAGNOSIS — Z23 NEED FOR INFLUENZA VACCINATION: ICD-10-CM

## 2024-11-19 DIAGNOSIS — E11.22 TYPE 2 DIABETES MELLITUS WITH STAGE 2 CHRONIC KIDNEY DISEASE, WITHOUT LONG-TERM CURRENT USE OF INSULIN (HCC): Primary | ICD-10-CM

## 2024-11-19 DIAGNOSIS — I50.20 HFREF (HEART FAILURE WITH REDUCED EJECTION FRACTION) (HCC): ICD-10-CM

## 2024-11-19 DIAGNOSIS — N18.2 TYPE 2 DIABETES MELLITUS WITH STAGE 2 CHRONIC KIDNEY DISEASE, WITHOUT LONG-TERM CURRENT USE OF INSULIN (HCC): Primary | ICD-10-CM

## 2024-11-19 DIAGNOSIS — C61 PROSTATE CANCER (HCC): ICD-10-CM

## 2024-11-19 LAB — HBA1C MFR BLD: 9.9 %

## 2024-11-19 NOTE — PROGRESS NOTES
CC:  Follow up DM    HPI:  58 y.o. male presents for follow up.      DM.  Taking metformin only once per day.  Not taking any medications at night.  Has been advised about adherence in the past.  Has no particular barriers to taking medication at night except that he does not wish to take the medications if he is out to dinner.  We discussed ways to make this feasible.  Advised adherence.  Discussed dietary modifications as well.  Checks glucose every few weeks.  Declines CGM.  [place] hypoglycemia once about one month ago, dizzy, sweating at that time.  Had been at a restaurant.  Felt shaky.  Resolved on its own.  No other symptoms since. Forgot meds this morning; maybe also forgets AM medications once or twice per week.  Diet has had more sugar.  Soda, sweet tea.  Four little donuts for breakfast.  On Fridays, drinks about 2 beers and 2 shots with fish or steak dinner.  Less often at home.  Sweet tea.  Mountain Dew. Advised on healthy diet, avoiding sugary beverages, and avoiding sodium.      HTN, not checking at home.  No symptoms.  No HA.      Prostate CA, HFrEF.  Stable. No significant increase in edema.  Weight is stable.  No symptoms at this time.      Patient Active Problem List    Diagnosis Date Noted    Aneurysm of right common iliac artery ([facility]) 12/13/2022    Prostate cancer ([facility]) 09/19/2022    Type 2 diabetes mellitus with chronic kidney disease 12/05/2023    Age-related nuclear cataract, bilateral 01/31/2022    Presbyopia 01/31/2022    Regular astigmatism, bilateral 01/31/2022    Leg swelling 01/23/2022    ALEX (acute kidney injury) ([facility]) 01/23/2022    CHF, acute on chronic ([facility]) 01/21/2022    Elevated PSA, less than 10 ng/ml 04/01/2021    Nonrheumatic aortic valve insufficiency 02/20/2019    Moderate episode of recurrent major depressive disorder ([facility]) 08/27/2018    Obesity (BMI 30.0-34.9)     Ischemic cardiomyopathy     Type 2 diabetes mellitus, without long-term current use of insulin ([facility])     Essential

## 2024-11-20 PROBLEM — I50.20 HFREF (HEART FAILURE WITH REDUCED EJECTION FRACTION) (HCC): Status: ACTIVE | Noted: 2024-11-20

## 2024-12-05 ENCOUNTER — HOSPITAL ENCOUNTER (OUTPATIENT)
Dept: OTHER | Age: 59
Setting detail: THERAPIES SERIES
Discharge: HOME | End: 2024-12-05
Payer: MEDICARE

## 2024-12-05 VITALS
WEIGHT: 241 LBS | OXYGEN SATURATION: 97 % | HEART RATE: 98 BPM | SYSTOLIC BLOOD PRESSURE: 105 MMHG | RESPIRATION RATE: 18 BRPM | DIASTOLIC BLOOD PRESSURE: 64 MMHG | BODY MASS INDEX: 35.59 KG/M2

## 2024-12-05 LAB
ANION GAP SERPL CALCULATED.3IONS-SCNC: 13 MMOL/L (ref 7–16)
BNP SERPL-MCNC: 455 PG/ML (ref 0–125)
BUN SERPL-MCNC: 17 MG/DL (ref 6–23)
CALCIUM SERPL-MCNC: 10.1 MG/DL (ref 8.6–10.2)
CHLORIDE SERPL-SCNC: 97 MMOL/L (ref 98–107)
CO2 SERPL-SCNC: 28 MMOL/L (ref 22–29)
CREAT SERPL-MCNC: 1.1 MG/DL (ref 0.7–1.2)
GFR, ESTIMATED: 74 ML/MIN/1.73M2
GLUCOSE SERPL-MCNC: 426 MG/DL (ref 74–99)
POTASSIUM SERPL-SCNC: 3.9 MMOL/L (ref 3.5–5)
SODIUM SERPL-SCNC: 138 MMOL/L (ref 132–146)

## 2024-12-05 PROCEDURE — 80048 BASIC METABOLIC PNL TOTAL CA: CPT

## 2024-12-05 PROCEDURE — 83880 ASSAY OF NATRIURETIC PEPTIDE: CPT

## 2024-12-05 PROCEDURE — 99214 OFFICE O/P EST MOD 30 MIN: CPT

## 2024-12-05 ASSESSMENT — PATIENT HEALTH QUESTIONNAIRE - PHQ9
SUM OF ALL RESPONSES TO PHQ QUESTIONS 1-9: 0
2. FEELING DOWN, DEPRESSED OR HOPELESS: NOT AT ALL
SUM OF ALL RESPONSES TO PHQ QUESTIONS 1-9: 0
SUM OF ALL RESPONSES TO PHQ9 QUESTIONS 1 & 2: 0
1. LITTLE INTEREST OR PLEASURE IN DOING THINGS: NOT AT ALL

## 2024-12-05 NOTE — PROGRESS NOTES
09/18/2024 15.7     Hematocrit (%)   Date Value   09/18/2024 47.8     Platelets (k/uL)   Date Value   09/18/2024 180     Iron Studies:  Ferritin (ng/mL)   Date Value   03/10/2014 263     Iron (mcg/dL)   Date Value   03/10/2014 88     TIBC (mcg/dL)   Date Value   03/10/2014 345     Hepatic:  AST (U/L)   Date Value   09/18/2024 14     ALT (U/L)   Date Value   09/18/2024 16     Total Bilirubin (mg/dL)   Date Value   09/18/2024 0.6     Alkaline Phosphatase (U/L)   Date Value   09/18/2024 104     INR:  INR (no units)   Date Value   06/29/2017 1.1         Wt Readings from Last 3 Encounters:   12/05/24 109.3 kg (241 lb)   11/19/24 109.3 kg (241 lb)   09/18/24 109.5 kg (241 lb 4.8 oz)           ASSESSMENT/PLAN:    [x] Euvolemic          [] Hypervolemic, with increase from baseline:  [] Shortness of breath/COLLINS(slightly)  [] JVD  [] HJR  [] Abnormal lung assessment:   [] Orthopnea  [] PND  [] Decreased urinary response to oral diuretic   [] Scrotal swelling   [] Lower extremity edema  [] Compression stockings provided  [] Decline in functional capacity (unable to perform activities they had previously been able to do)  [] Weight gain     [] IV diuretics given No  [] Provider notified of recurrent IV diuretic use    Additional Notes:      Patient presents for follow up today. No complaints of sob, LE edema, or abdominal distention/bloating. No JVD on assessment. Follow up scheduled for 3 months, encouraged patient to call the clinic if they need an appointment sooner.     He weighs himself daily and knows to watch for 3 pound weight gain in 1 day or total of 5 pounds in 1 week.      [x]Lab work obtained    [x] Patient/Family Educated On:  [x] HF zones (Green, Yellow, Red) and aware of when to take action   [x] Daily weights  [] Scale provided   [x] Low sodium diet (2000 mg)  Barriers to compliance  [] Refuses to monitor diet  [] Socioeconomic difficulties  [] Unable to cook for self (use of frozen meals, can goods, etc)  []

## 2024-12-05 NOTE — PLAN OF CARE
Problem: Chronic Conditions and Co-morbidities  Goal: Patient's chronic conditions and co-morbidity symptoms are monitored and maintained or improved  Flowsheets (Taken 12/5/2024 1203)  Care Plan - Patient's Chronic Conditions and Co-Morbidity Symptoms are Monitored and Maintained or Improved: Monitor and assess patient's chronic conditions and comorbid symptoms for stability, deterioration, or improvement

## 2024-12-16 ENCOUNTER — OFFICE VISIT (OUTPATIENT)
Dept: FAMILY MEDICINE CLINIC | Age: 59
End: 2024-12-16
Payer: MEDICARE

## 2024-12-16 VITALS
DIASTOLIC BLOOD PRESSURE: 83 MMHG | OXYGEN SATURATION: 96 % | HEIGHT: 69 IN | WEIGHT: 237 LBS | RESPIRATION RATE: 18 BRPM | HEART RATE: 86 BPM | TEMPERATURE: 97 F | SYSTOLIC BLOOD PRESSURE: 137 MMHG | BODY MASS INDEX: 35.1 KG/M2

## 2024-12-16 DIAGNOSIS — R05.9 COUGH, UNSPECIFIED TYPE: Primary | ICD-10-CM

## 2024-12-16 DIAGNOSIS — U07.1 COVID-19: ICD-10-CM

## 2024-12-16 LAB
INFLUENZA VIRUS A RNA: NEGATIVE
INFLUENZA VIRUS B RNA: NEGATIVE
Lab: ABNORMAL
QC PASS/FAIL: ABNORMAL
SARS-COV-2 RDRP RESP QL NAA+PROBE: POSITIVE

## 2024-12-16 PROCEDURE — 87502 INFLUENZA DNA AMP PROBE: CPT

## 2024-12-16 PROCEDURE — 3079F DIAST BP 80-89 MM HG: CPT

## 2024-12-16 PROCEDURE — 99213 OFFICE O/P EST LOW 20 MIN: CPT

## 2024-12-16 PROCEDURE — 87635 SARS-COV-2 COVID-19 AMP PRB: CPT

## 2024-12-16 PROCEDURE — 3075F SYST BP GE 130 - 139MM HG: CPT

## 2024-12-16 NOTE — PATIENT INSTRUCTIONS
Your medications: Lipitor, Plavix, Flomax, and Xtandi can interact with your Paxlovid. Hold these while you take Paxlovid.

## 2024-12-16 NOTE — PROGRESS NOTES
S: 59 y.o. male presents today for Head Congestion and Cough      URI - 1 day; sinus congestion; cough    O: VS: /83 (Site: Right Upper Arm, Position: Sitting, Cuff Size: Large Adult)   Pulse 86   Temp 97 °F (36.1 °C) (Temporal)   Resp 18   Ht 1.753 m (5' 9\")   Wt 107.5 kg (237 lb)   SpO2 96%   BMI 35.00 kg/m²   AAO/NAD, appropriate affect for mood  ENT: ears wnl; boggy nasal terbinates  CV:  RRR, no murmur  Resp: CTAB      Assessment/Plan:   1) URI - poct testing; symptom care    RTO: Return in 2 weeks (on 12/30/2024), or if symptoms worsen or fail to improve.      Attending Physician Statement  I have discussed the case, including pertinent history and exam findings with the resident.  I agree with the documented assessment and plan.      Electronically signed by Margarette Hill MD on 12/16/2024 at 3:53 PM  
M) 10 MEQ extended release tablet Take (4) tablets on the first day, then one tablet daily thereafter 30 tablet 11    spironolactone (ALDACTONE) 50 MG tablet Take 1 tablet by mouth daily 90 tablet 3    Compression Stockings MISC by Does not apply route 1 each 0    sacubitril-valsartan (ENTRESTO)  MG per tablet Take 1 tablet by mouth 2 times daily 60 tablet 5    lidocaine (HM LIDOCAINE PATCH) 4 % external patch Place 1 patch onto the skin daily (Patient taking differently: Place 1 patch onto the skin daily as needed (pain)) 30 patch 2    diclofenac sodium (VOLTAREN) 1 % GEL Apply 2 g topically 4 times daily as needed for Pain 240 g 2    denosumab (XGEVA) 120 MG/1.7ML SOLN SC injection Inject 1.7 mLs into the skin once EVERY 3 MONTHS      Enzalutamide (XTANDI) 40 MG TABS Take 4 tablets by mouth daily      Leuprolide Acetate, 3 Month, (ELIGARD) 22.5 MG KIT Inject 22.5 mg into the skin every 3 months       No current facility-administered medications for this visit.      ______________________________________________________________________    Physical Exam:    Blood pressure 137/83, pulse 86, temperature 97 °F (36.1 °C), temperature source Temporal, resp. rate 18, height 1.753 m (5' 9\"), weight 107.5 kg (237 lb), SpO2 96%.    Physical Exam  HENT:      Head: Normocephalic and atraumatic.      Right Ear: Tympanic membrane, ear canal and external ear normal.      Left Ear: Tympanic membrane, ear canal and external ear normal.      Nose: Congestion and rhinorrhea present.      Mouth/Throat:      Mouth: Mucous membranes are moist.      Pharynx: Oropharynx is clear.      Comments: Post-nasal drainage noted  Eyes:      Conjunctiva/sclera: Conjunctivae normal.      Pupils: Pupils are equal, round, and reactive to light.   Cardiovascular:      Rate and Rhythm: Normal rate and regular rhythm.      Pulses: Normal pulses.      Heart sounds: Normal heart sounds.   Pulmonary:      Effort: Pulmonary effort is normal.

## 2024-12-23 ENCOUNTER — HOSPITAL ENCOUNTER (OUTPATIENT)
Dept: INFUSION THERAPY | Age: 59
End: 2024-12-23

## 2024-12-27 ENCOUNTER — HOSPITAL ENCOUNTER (OUTPATIENT)
Dept: INFUSION THERAPY | Age: 59
Setting detail: INFUSION SERIES
Discharge: HOME | End: 2024-12-27
Payer: MEDICARE

## 2024-12-27 VITALS
RESPIRATION RATE: 16 BRPM | OXYGEN SATURATION: 100 % | TEMPERATURE: 96.7 F | HEART RATE: 65 BPM | SYSTOLIC BLOOD PRESSURE: 151 MMHG | DIASTOLIC BLOOD PRESSURE: 74 MMHG

## 2024-12-27 DIAGNOSIS — C61 PROSTATE CANCER (HCC): Primary | ICD-10-CM

## 2024-12-27 PROCEDURE — 6360000002 HC RX W HCPCS: Performed by: UROLOGY

## 2024-12-27 PROCEDURE — 96402 CHEMO HORMON ANTINEOPL SQ/IM: CPT

## 2024-12-27 RX ADMIN — LEUPROLIDE ACETATE 22.5 MG: 22.5 INJECTION, SUSPENSION, EXTENDED RELEASE SUBCUTANEOUS at 14:18

## 2025-01-13 ENCOUNTER — OFFICE VISIT (OUTPATIENT)
Dept: ONCOLOGY | Age: 60
End: 2025-01-13
Payer: MEDICARE

## 2025-01-13 ENCOUNTER — HOSPITAL ENCOUNTER (OUTPATIENT)
Dept: INFUSION THERAPY | Age: 60
Discharge: HOME | End: 2025-01-13
Payer: MEDICARE

## 2025-01-13 VITALS
TEMPERATURE: 97.8 F | DIASTOLIC BLOOD PRESSURE: 70 MMHG | BODY MASS INDEX: 35.03 KG/M2 | HEIGHT: 69 IN | HEART RATE: 85 BPM | SYSTOLIC BLOOD PRESSURE: 117 MMHG | WEIGHT: 236.5 LBS | OXYGEN SATURATION: 96 %

## 2025-01-13 DIAGNOSIS — C79.51 PROSTATE CANCER METASTATIC TO BONE (HCC): Primary | ICD-10-CM

## 2025-01-13 DIAGNOSIS — C61 PROSTATE CANCER (HCC): ICD-10-CM

## 2025-01-13 DIAGNOSIS — C61 PROSTATE CANCER METASTATIC TO BONE (HCC): ICD-10-CM

## 2025-01-13 DIAGNOSIS — C79.51 PROSTATE CANCER METASTATIC TO BONE (HCC): ICD-10-CM

## 2025-01-13 DIAGNOSIS — C61 PROSTATE CANCER METASTATIC TO BONE (HCC): Primary | ICD-10-CM

## 2025-01-13 LAB
ALBUMIN SERPL-MCNC: 4.3 G/DL (ref 3.5–5.2)
ALP SERPL-CCNC: 170 U/L (ref 40–129)
ALT SERPL-CCNC: 23 U/L (ref 0–40)
ANION GAP SERPL CALCULATED.3IONS-SCNC: 15 MMOL/L (ref 7–16)
AST SERPL-CCNC: 20 U/L (ref 0–39)
BASOPHILS # BLD: 0.04 K/UL (ref 0–0.2)
BASOPHILS NFR BLD: 1 % (ref 0–2)
BILIRUB SERPL-MCNC: 0.5 MG/DL (ref 0–1.2)
BUN SERPL-MCNC: 27 MG/DL (ref 6–23)
CALCIUM SERPL-MCNC: 9.4 MG/DL (ref 8.6–10.2)
CHLORIDE SERPL-SCNC: 96 MMOL/L (ref 98–107)
CO2 SERPL-SCNC: 24 MMOL/L (ref 22–29)
CREAT SERPL-MCNC: 1.1 MG/DL (ref 0.7–1.2)
EOSINOPHIL # BLD: 0.37 K/UL (ref 0.05–0.5)
EOSINOPHILS RELATIVE PERCENT: 6 % (ref 0–6)
ERYTHROCYTE [DISTWIDTH] IN BLOOD BY AUTOMATED COUNT: 13.2 % (ref 11.5–15)
GFR, ESTIMATED: 74 ML/MIN/1.73M2
GLUCOSE SERPL-MCNC: 484 MG/DL (ref 74–99)
HCT VFR BLD AUTO: 45.1 % (ref 37–54)
HGB BLD-MCNC: 15.3 G/DL (ref 12.5–16.5)
IMM GRANULOCYTES # BLD AUTO: 0.03 K/UL (ref 0–0.58)
IMM GRANULOCYTES NFR BLD: 1 % (ref 0–5)
LYMPHOCYTES NFR BLD: 1.45 K/UL (ref 1.5–4)
LYMPHOCYTES RELATIVE PERCENT: 24 % (ref 20–42)
MCH RBC QN AUTO: 28.3 PG (ref 26–35)
MCHC RBC AUTO-ENTMCNC: 33.9 G/DL (ref 32–34.5)
MCV RBC AUTO: 83.5 FL (ref 80–99.9)
MONOCYTES NFR BLD: 0.38 K/UL (ref 0.1–0.95)
MONOCYTES NFR BLD: 6 % (ref 2–12)
NEUTROPHILS NFR BLD: 63 % (ref 43–80)
NEUTS SEG NFR BLD: 3.8 K/UL (ref 1.8–7.3)
PLATELET # BLD AUTO: 170 K/UL (ref 130–450)
PMV BLD AUTO: 11.5 FL (ref 7–12)
POTASSIUM SERPL-SCNC: 4.6 MMOL/L (ref 3.5–5)
PROT SERPL-MCNC: 7.6 G/DL (ref 6.4–8.3)
PSA SERPL-MCNC: 0.03 NG/ML (ref 0–4)
RBC # BLD AUTO: 5.4 M/UL (ref 3.8–5.8)
SODIUM SERPL-SCNC: 135 MMOL/L (ref 132–146)
WBC OTHER # BLD: 6.1 K/UL (ref 4.5–11.5)

## 2025-01-13 PROCEDURE — 85025 COMPLETE CBC W/AUTO DIFF WBC: CPT

## 2025-01-13 PROCEDURE — 36415 COLL VENOUS BLD VENIPUNCTURE: CPT

## 2025-01-13 PROCEDURE — 80053 COMPREHEN METABOLIC PANEL: CPT

## 2025-01-13 PROCEDURE — 84153 ASSAY OF PSA TOTAL: CPT

## 2025-01-13 PROCEDURE — 99213 OFFICE O/P EST LOW 20 MIN: CPT

## 2025-01-13 PROCEDURE — 3074F SYST BP LT 130 MM HG: CPT | Performed by: CLINICAL NURSE SPECIALIST

## 2025-01-13 PROCEDURE — 99213 OFFICE O/P EST LOW 20 MIN: CPT | Performed by: CLINICAL NURSE SPECIALIST

## 2025-01-13 PROCEDURE — 3078F DIAST BP <80 MM HG: CPT | Performed by: CLINICAL NURSE SPECIALIST

## 2025-01-13 NOTE — PROGRESS NOTES
Patient provided with discharge instructions, patient has MYCHART.  All questions answered.  Patient understands follow up plan of care.     
Group:  4   Tissue Cores Involved by Carcinoma: 3 of 3   Percentage of Tissue Involved by Carcinoma: >90%   Perineural Invasion: Not identified     F. Prostate, right apex, biopsy: Prostatic adenocarcinoma (acinar, not otherwise specified) and intraductal carcinoma.   Stratford Score: 4+4 = 8; cribriform pattern present   Grade Group:  4   Tissue Cores Involved by Carcinoma: 3 of 3   Percentage of Tissue Involved by Carcinoma: 80%   Perineural Invasion: Present     Comment: In part A, carcinoma is seen involving fibroadipose tissue.   Extraprostatic extension cannot be excluded.  P63 immunostain is performed on Parts B, D, E, and F and shows the presence of basal cells around the intraductal carcinoma in the absence of basal cells in the   areas of invasive carcinoma.  The percentage of tissue involved listed above reflects the total percentage involved by invasive and intraductal carcinoma.  Intradepartmental consultation is obtained.     NGS testing ([employer]):  MS-Stable: No therapies or clinical trials  TMB 0 Muts/Mb: No therapies or clinical trials  No therapies or clinical trials are associated with the genomic findings for the sample    CT abdomen/pelvis 8/23/2022 enlarged prostate gland with impingement upon the lymph node to bladder base by an irregular portion of the enlarged gland.   Enlarged pelvic and inferior retroperitoneal lymph nodes  Diffuse thickening of the walls of the urinary bladder with surrounding  Small right anterior abdominal wall hernia, which contains fat   Small umbilical hernia, which contains fat.  Small bilateral inguinal hernias, left slightly greater than right and both contain fat.    Bone scan 08/23/2022:  Findings consistent with widespread metastatic disease    Left Femur/Pelvis X-Ray 09/07/2022: unremarkable left hip and femur.    XTANDI was started on 09/02/2022 with fair tolerance so far.   Eligard 22.5 mg was given on 09/14/2022, Dec 2022 March 2023 and June 
[see HPI] : see HPI
[Negative] : Heme/Lymph

## 2025-02-10 DIAGNOSIS — I50.22 CHRONIC SYSTOLIC CONGESTIVE HEART FAILURE (HCC): ICD-10-CM

## 2025-02-10 DIAGNOSIS — I25.10 CORONARY ARTERY DISEASE INVOLVING NATIVE CORONARY ARTERY OF NATIVE HEART WITHOUT ANGINA PECTORIS: Chronic | ICD-10-CM

## 2025-02-10 DIAGNOSIS — I50.20 HFREF (HEART FAILURE WITH REDUCED EJECTION FRACTION) (HCC): ICD-10-CM

## 2025-02-10 RX ORDER — ISOSORBIDE MONONITRATE 30 MG/1
90 TABLET, EXTENDED RELEASE ORAL DAILY
Qty: 270 TABLET | Refills: 1 | Status: SHIPPED | OUTPATIENT
Start: 2025-02-10 | End: 2025-08-09

## 2025-02-10 NOTE — TELEPHONE ENCOUNTER
Name of Medication(s) Requested:  Requested Prescriptions     Pending Prescriptions Disp Refills    isosorbide mononitrate (IMDUR) 30 MG extended release tablet [Pharmacy Med Name: ISOSORBIDE MONONITRATE 30MG ER TABS] 270 tablet      Sig: TAKE 3 TABLETS BY MOUTH DAILY       Medication is on current medication list Yes    Dosage and directions were verified? Yes    Quantity verified: 90 day supply     Pharmacy Verified?  Yes    Last Appointment:  11/19/2024    Future appts:  Future Appointments   Date Time Provider Department Center   2/11/2025  8:00 AM [facility] NM DOSE RM [facility] Rad/Car   2/11/2025  9:00 AM [facility] CT SCAN 3 [facility] CT [facility] Rad/Car   2/11/2025 11:00 AM [facility] NM ECAM [facility] Rad/Car   3/4/2025  2:00 PM Mariaelena Gordon, DO [facility] [place] [facility] DEP   3/6/2025 12:00 PM [facility] CHF ROOM 3 [facility] CHF [facility]   3/27/2025  2:00 PM SE[facility] INFUSION BAY 2 [facility] INF SER [facility]   4/7/2025  1:30 PM SE[facility] MED ONC FAST TRACK 1 [facility] Med Onc [facility]   4/7/2025  1:45 PM Zaina Hilario APRN MED ONC [facility]        (If no appt send self scheduling link. .REFILLAPPT)  Scheduling request sent?     [] Yes  [x] No    Does patient need updated?  [] Yes  [x] No  
no

## 2025-02-11 ENCOUNTER — HOSPITAL ENCOUNTER (OUTPATIENT)
Dept: CT IMAGING | Age: 60
Discharge: HOME | End: 2025-02-13
Payer: MEDICARE

## 2025-02-11 ENCOUNTER — HOSPITAL ENCOUNTER (OUTPATIENT)
Dept: NUCLEAR MEDICINE | Age: 60
Discharge: HOME | End: 2025-02-13
Payer: MEDICARE

## 2025-02-11 DIAGNOSIS — C79.51 PROSTATE CANCER METASTATIC TO BONE (HCC): ICD-10-CM

## 2025-02-11 DIAGNOSIS — C61 PROSTATE CANCER METASTATIC TO BONE (HCC): ICD-10-CM

## 2025-02-11 PROCEDURE — 71260 CT THORAX DX C+: CPT

## 2025-02-11 PROCEDURE — A9503 TC99M MEDRONATE: HCPCS | Performed by: RADIOLOGY

## 2025-02-11 PROCEDURE — 78306 BONE IMAGING WHOLE BODY: CPT | Performed by: CLINICAL NURSE SPECIALIST

## 2025-02-11 PROCEDURE — 6360000004 HC RX CONTRAST MEDICATION: Performed by: RADIOLOGY

## 2025-02-11 PROCEDURE — 74177 CT ABD & PELVIS W/CONTRAST: CPT

## 2025-02-11 PROCEDURE — 3430000000 HC RX DIAGNOSTIC RADIOPHARMACEUTICAL: Performed by: RADIOLOGY

## 2025-02-11 RX ORDER — SODIUM CHLORIDE 0.9 % (FLUSH) 0.9 %
10 SYRINGE (ML) INJECTION
Status: ACTIVE | OUTPATIENT
Start: 2025-02-11 | End: 2025-02-12

## 2025-02-11 RX ORDER — IOPAMIDOL 755 MG/ML
75 INJECTION, SOLUTION INTRAVASCULAR
Status: COMPLETED | OUTPATIENT
Start: 2025-02-11 | End: 2025-02-11

## 2025-02-11 RX ORDER — TC 99M MEDRONATE 20 MG/10ML
25 INJECTION, POWDER, LYOPHILIZED, FOR SOLUTION INTRAVENOUS
Status: COMPLETED | OUTPATIENT
Start: 2025-02-11 | End: 2025-02-11

## 2025-02-11 RX ORDER — IOPAMIDOL 755 MG/ML
18 INJECTION, SOLUTION INTRAVASCULAR
Status: COMPLETED | OUTPATIENT
Start: 2025-02-11 | End: 2025-02-11

## 2025-02-11 RX ADMIN — IOPAMIDOL 75 ML: 755 INJECTION, SOLUTION INTRAVENOUS at 10:00

## 2025-02-11 RX ADMIN — TC 99M MEDRONATE 25 MILLICURIE: 20 INJECTION, POWDER, LYOPHILIZED, FOR SOLUTION INTRAVENOUS at 08:15

## 2025-02-11 RX ADMIN — IOPAMIDOL 18 ML: 755 INJECTION, SOLUTION INTRAVENOUS at 10:00

## 2025-02-21 DIAGNOSIS — I50.20 HFREF (HEART FAILURE WITH REDUCED EJECTION FRACTION) (HCC): ICD-10-CM

## 2025-02-21 DIAGNOSIS — I25.10 CORONARY ARTERY DISEASE INVOLVING NATIVE CORONARY ARTERY OF NATIVE HEART WITHOUT ANGINA PECTORIS: Chronic | ICD-10-CM

## 2025-02-21 DIAGNOSIS — I50.22 CHRONIC SYSTOLIC CONGESTIVE HEART FAILURE (HCC): ICD-10-CM

## 2025-02-21 DIAGNOSIS — K21.9 GASTROESOPHAGEAL REFLUX DISEASE, UNSPECIFIED WHETHER ESOPHAGITIS PRESENT: Chronic | ICD-10-CM

## 2025-02-21 DIAGNOSIS — C61 PROSTATE CANCER (HCC): ICD-10-CM

## 2025-02-21 RX ORDER — FLUTICASONE PROPIONATE 50 MCG
2 SPRAY, SUSPENSION (ML) NASAL DAILY PRN
Qty: 48 G | Refills: 1 | Status: SHIPPED | OUTPATIENT
Start: 2025-02-21

## 2025-02-21 RX ORDER — METOPROLOL SUCCINATE 50 MG/1
100 TABLET, EXTENDED RELEASE ORAL DAILY
Qty: 180 TABLET | Refills: 1 | Status: SHIPPED | OUTPATIENT
Start: 2025-02-21

## 2025-02-21 RX ORDER — BUMETANIDE 1 MG/1
TABLET ORAL
Qty: 135 TABLET | Refills: 3 | OUTPATIENT
Start: 2025-02-21

## 2025-02-21 RX ORDER — ASPIRIN 81 MG/1
81 TABLET ORAL DAILY
Qty: 90 TABLET | Refills: 3 | Status: SHIPPED | OUTPATIENT
Start: 2025-02-21

## 2025-02-21 RX ORDER — TAMSULOSIN HYDROCHLORIDE 0.4 MG/1
0.4 CAPSULE ORAL DAILY
Qty: 90 CAPSULE | Refills: 1 | Status: SHIPPED | OUTPATIENT
Start: 2025-02-21

## 2025-02-21 RX ORDER — POTASSIUM CHLORIDE 750 MG/1
10 TABLET, EXTENDED RELEASE ORAL DAILY
Qty: 90 TABLET | Refills: 0 | Status: SHIPPED | OUTPATIENT
Start: 2025-02-21

## 2025-02-21 RX ORDER — CLOPIDOGREL BISULFATE 75 MG/1
75 TABLET ORAL DAILY
Qty: 90 TABLET | Refills: 1 | Status: SHIPPED | OUTPATIENT
Start: 2025-02-21

## 2025-02-21 RX ORDER — PANTOPRAZOLE SODIUM 40 MG/1
40 TABLET, DELAYED RELEASE ORAL DAILY
Qty: 90 TABLET | Refills: 1 | Status: SHIPPED | OUTPATIENT
Start: 2025-02-21

## 2025-02-21 RX ORDER — SPIRONOLACTONE 50 MG/1
50 TABLET, FILM COATED ORAL DAILY
Qty: 90 TABLET | Refills: 3 | Status: SHIPPED | OUTPATIENT
Start: 2025-02-21

## 2025-02-21 NOTE — TELEPHONE ENCOUNTER
Name of Medication(s) Requested:  Requested Prescriptions     Pending Prescriptions Disp Refills    potassium chloride (KLOR-CON M) 10 MEQ extended release tablet 30 tablet 11     Sig: Take (4) tablets on the first day, then one tablet daily thereafter    spironolactone (ALDACTONE) 50 MG tablet 90 tablet 3     Sig: Take 1 tablet by mouth daily    metoprolol succinate (TOPROL XL) 50 MG extended release tablet 180 tablet 1     Sig: Take 2 tablets by mouth daily    tamsulosin (FLOMAX) 0.4 MG capsule 90 capsule 1     Sig: Take 1 capsule by mouth daily    clopidogrel (PLAVIX) 75 MG tablet 30 tablet 5     Sig: Take 1 tablet by mouth daily    pantoprazole (PROTONIX) 40 MG tablet 30 tablet 5     Sig: Take 1 tablet by mouth daily    fluticasone (FLONASE) 50 MCG/ACT nasal spray 48 g 1     Si sprays by Each Nostril route daily    aspirin 81 MG EC tablet 90 tablet 3     Sig: Take 1 tablet by mouth daily       Medication is on current medication list Yes    Dosage and directions were verified? Yes    Quantity verified: 30 day supply     Pharmacy Verified?  Yes    Last Appointment:  2024    Future appts:  Future Appointments   Date Time Provider Department Center   3/4/2025  2:00 PM Mariaelena Gordon DO [facility] [place] [facility] ECC DEP   3/6/2025 12:00 PM SE CHF ROOM 3 SE[facility]Z CHF [facility]   3/27/2025  2:00 PM SEY INFUSION BAY 2 SEYZ INF SER [facility]   2025  1:30 PM SEYZ MED ONC FAST TRACK 1 SEYZ Med Onc [facility]   2025  1:45 PM Zaina Hilario APRN MED ONC [facility]        (If no appt send self scheduling link. .REFILLAPPT)  Scheduling request sent?     [] Yes  [x] No    Does patient need updated?  [] Yes  [x] No

## 2025-03-04 ENCOUNTER — OFFICE VISIT (OUTPATIENT)
Dept: FAMILY MEDICINE CLINIC | Age: 60
End: 2025-03-04
Payer: MEDICARE

## 2025-03-04 VITALS
TEMPERATURE: 97.4 F | DIASTOLIC BLOOD PRESSURE: 70 MMHG | OXYGEN SATURATION: 97 % | BODY MASS INDEX: 35.1 KG/M2 | RESPIRATION RATE: 18 BRPM | SYSTOLIC BLOOD PRESSURE: 129 MMHG | WEIGHT: 237 LBS | HEIGHT: 69 IN | HEART RATE: 80 BPM

## 2025-03-04 DIAGNOSIS — I47.29 NSVT (NONSUSTAINED VENTRICULAR TACHYCARDIA) (HCC): ICD-10-CM

## 2025-03-04 DIAGNOSIS — E11.22 TYPE 2 DIABETES MELLITUS WITH STAGE 2 CHRONIC KIDNEY DISEASE, WITHOUT LONG-TERM CURRENT USE OF INSULIN (HCC): Primary | ICD-10-CM

## 2025-03-04 DIAGNOSIS — F33.1 MODERATE EPISODE OF RECURRENT MAJOR DEPRESSIVE DISORDER (HCC): ICD-10-CM

## 2025-03-04 DIAGNOSIS — I50.20 HFREF (HEART FAILURE WITH REDUCED EJECTION FRACTION) (HCC): ICD-10-CM

## 2025-03-04 DIAGNOSIS — N18.2 TYPE 2 DIABETES MELLITUS WITH STAGE 2 CHRONIC KIDNEY DISEASE, WITHOUT LONG-TERM CURRENT USE OF INSULIN (HCC): Primary | ICD-10-CM

## 2025-03-04 DIAGNOSIS — J44.9 CHRONIC OBSTRUCTIVE PULMONARY DISEASE, UNSPECIFIED COPD TYPE (HCC): ICD-10-CM

## 2025-03-04 LAB — HBA1C MFR BLD: 12.1 %

## 2025-03-04 PROCEDURE — G2211 COMPLEX E/M VISIT ADD ON: HCPCS | Performed by: FAMILY MEDICINE

## 2025-03-04 PROCEDURE — 83036 HEMOGLOBIN GLYCOSYLATED A1C: CPT | Performed by: FAMILY MEDICINE

## 2025-03-04 PROCEDURE — 99214 OFFICE O/P EST MOD 30 MIN: CPT | Performed by: FAMILY MEDICINE

## 2025-03-04 PROCEDURE — 3078F DIAST BP <80 MM HG: CPT | Performed by: FAMILY MEDICINE

## 2025-03-04 PROCEDURE — 3074F SYST BP LT 130 MM HG: CPT | Performed by: FAMILY MEDICINE

## 2025-03-04 PROCEDURE — 3046F HEMOGLOBIN A1C LEVEL >9.0%: CPT | Performed by: FAMILY MEDICINE

## 2025-03-04 RX ORDER — INSULIN GLARGINE 100 [IU]/ML
10 INJECTION, SOLUTION SUBCUTANEOUS NIGHTLY
Qty: 3 ADJUSTABLE DOSE PRE-FILLED PEN SYRINGE | Refills: 1 | Status: SHIPPED | OUTPATIENT
Start: 2025-03-04

## 2025-03-04 SDOH — ECONOMIC STABILITY: FOOD INSECURITY: WITHIN THE PAST 12 MONTHS, YOU WORRIED THAT YOUR FOOD WOULD RUN OUT BEFORE YOU GOT MONEY TO BUY MORE.: NEVER TRUE

## 2025-03-04 SDOH — ECONOMIC STABILITY: FOOD INSECURITY: WITHIN THE PAST 12 MONTHS, THE FOOD YOU BOUGHT JUST DIDN'T LAST AND YOU DIDN'T HAVE MONEY TO GET MORE.: NEVER TRUE

## 2025-03-04 ASSESSMENT — PATIENT HEALTH QUESTIONNAIRE - PHQ9
2. FEELING DOWN, DEPRESSED OR HOPELESS: MORE THAN HALF THE DAYS
3. TROUBLE FALLING OR STAYING ASLEEP: MORE THAN HALF THE DAYS
5. POOR APPETITE OR OVEREATING: MORE THAN HALF THE DAYS
8. MOVING OR SPEAKING SO SLOWLY THAT OTHER PEOPLE COULD HAVE NOTICED. OR THE OPPOSITE, BEING SO FIGETY OR RESTLESS THAT YOU HAVE BEEN MOVING AROUND A LOT MORE THAN USUAL: MORE THAN HALF THE DAYS
10. IF YOU CHECKED OFF ANY PROBLEMS, HOW DIFFICULT HAVE THESE PROBLEMS MADE IT FOR YOU TO DO YOUR WORK, TAKE CARE OF THINGS AT HOME, OR GET ALONG WITH OTHER PEOPLE: SOMEWHAT DIFFICULT
SUM OF ALL RESPONSES TO PHQ QUESTIONS 1-9: 12
9. THOUGHTS THAT YOU WOULD BE BETTER OFF DEAD, OR OF HURTING YOURSELF: NOT AT ALL
7. TROUBLE CONCENTRATING ON THINGS, SUCH AS READING THE NEWSPAPER OR WATCHING TELEVISION: NOT AT ALL
4. FEELING TIRED OR HAVING LITTLE ENERGY: NEARLY EVERY DAY
SUM OF ALL RESPONSES TO PHQ QUESTIONS 1-9: 12
1. LITTLE INTEREST OR PLEASURE IN DOING THINGS: SEVERAL DAYS
6. FEELING BAD ABOUT YOURSELF - OR THAT YOU ARE A FAILURE OR HAVE LET YOURSELF OR YOUR FAMILY DOWN: NOT AT ALL

## 2025-03-04 NOTE — PROGRESS NOTES
CC:  Follow up DM    HPI:  59 y.o. male presents for follow up DM     DM-2.  No symptoms or concerns.  A1C and glucose increasing.  No new SOB or CP or pressure.  Chronic dyspnea, stable.  Taking metformin usually, not always, and usually once per day.  Lots of carbs lately.  Frozen hot chocolate.  A1C increasing.  Metformin mostly in morning and occasionally at night, not every morning.  5/7 mornings.  Feeling well.  Willing to take insulin again.      HFrEF.  Not taking medications consistently.  Following up with HF clinic.    We discussed medication and dietary adherence in detail.  Advised that he could more easily control all of his medical conditions by taking his medications and choosing healthier food options.  Advised taking medications as prescribed and following with specialists.  Discussed complications and possible adverse effects that can come from not controlling his chronic medical conditions, including MI, CVA, death.  Has NSVT, has ICD in place.  No arrhythmias or palpitations reported.      Metastatic prostate CA.  Not taking medications as prescribed.  Following with urology and heme/onc.  Scans stable, no apparent disease progression.      COPD, no recent symptoms.  Stable.      Depression.  Stable overall.  Mood is down somewhat, as he expressed that he feels he is being told that he is dying from prostate cancer, dying from heart failure, etc. We discussed that all of his conditions are responding to treatment, and his ongoing prognosis depends on his taking his medications and making healthy lifestyle choices.  NO SI or HI expressed.  Had previously chosen to stop medication for depression, and will continue to follow for now.        Patient Active Problem List    Diagnosis Date Noted    Aneurysm of right common iliac artery 12/13/2022    Prostate cancer ([facility]) 09/19/2022    HFrEF (heart failure with reduced ejection fraction) ([facility]) 11/20/2024    Type 2 diabetes mellitus with chronic kidney

## 2025-03-06 ENCOUNTER — HOSPITAL ENCOUNTER (OUTPATIENT)
Dept: OTHER | Age: 60
Setting detail: THERAPIES SERIES
Discharge: HOME | End: 2025-03-06
Payer: MEDICARE

## 2025-03-06 VITALS
OXYGEN SATURATION: 99 % | HEART RATE: 74 BPM | SYSTOLIC BLOOD PRESSURE: 130 MMHG | RESPIRATION RATE: 18 BRPM | BODY MASS INDEX: 35 KG/M2 | DIASTOLIC BLOOD PRESSURE: 66 MMHG | WEIGHT: 237 LBS

## 2025-03-06 LAB
ANION GAP SERPL CALCULATED.3IONS-SCNC: 16 MMOL/L (ref 7–16)
BNP SERPL-MCNC: 846 PG/ML (ref 0–125)
BUN SERPL-MCNC: 16 MG/DL (ref 6–23)
CALCIUM SERPL-MCNC: 9.5 MG/DL (ref 8.6–10.2)
CHLORIDE SERPL-SCNC: 100 MMOL/L (ref 98–107)
CO2 SERPL-SCNC: 24 MMOL/L (ref 22–29)
CREAT SERPL-MCNC: 1 MG/DL (ref 0.7–1.2)
GFR, ESTIMATED: 87 ML/MIN/1.73M2
GLUCOSE SERPL-MCNC: 283 MG/DL (ref 74–99)
POTASSIUM SERPL-SCNC: 4.2 MMOL/L (ref 3.5–5)
SODIUM SERPL-SCNC: 140 MMOL/L (ref 132–146)

## 2025-03-06 PROCEDURE — 80048 BASIC METABOLIC PNL TOTAL CA: CPT

## 2025-03-06 PROCEDURE — 99214 OFFICE O/P EST MOD 30 MIN: CPT

## 2025-03-06 PROCEDURE — 83880 ASSAY OF NATRIURETIC PEPTIDE: CPT

## 2025-03-06 NOTE — PROGRESS NOTES
tablets by mouth daily  Patient taking differently: Take 1 tablet by mouth in the morning and 1 tablet in the evening. Yes Mariaelena Gordon DO   tamsulosin (FLOMAX) 0.4 MG capsule Take 1 capsule by mouth daily Yes Mariaelena Gordon DO   clopidogrel (PLAVIX) 75 MG tablet Take 1 tablet by mouth daily Yes Mariaelena Gordon DO   pantoprazole (PROTONIX) 40 MG tablet Take 1 tablet by mouth daily Yes Mariaelena Gordon DO   fluticasone (FLONASE) 50 MCG/ACT nasal spray 2 sprays by Each Nostril route daily as needed for Rhinitis Yes Mariaelena Gordon DO   aspirin 81 MG EC tablet Take 1 tablet by mouth daily Yes Mariaelena Gordon DO   isosorbide mononitrate (IMDUR) 30 MG extended release tablet Take 3 tablets by mouth daily  Patient taking differently: Take 1 tablet by mouth daily Yes Mariaelena Gordon DO   bumetanide (BUMEX) 1 MG tablet Take one tablet daily on Mon, Wed, Fri, Sat, Sun; take one tablet twice daily on Tue, Thur Yes Steven Kolb DO   empagliflozin (JARDIANCE) 10 MG tablet Take 1 tablet by mouth daily Yes Mariaelena Gordon DO   metFORMIN (GLUCOPHAGE) 500 MG tablet Take 1 tablet by mouth 2 times daily (with meals) Yes Mariaelena Gordon DO   atorvastatin (LIPITOR) 80 MG tablet Take 1 tablet by mouth nightly Yes Gunnar Caruso MD   sacubitril-valsartan (ENTRESTO)  MG per tablet Take 1 tablet by mouth 2 times daily Yes Vane Stern MD   lidocaine (HM LIDOCAINE PATCH) 4 % external patch Place 1 patch onto the skin daily  Patient taking differently: Place 1 patch onto the skin daily as needed (pain) Yes Mariaelena Gordon DO   diclofenac sodium (VOLTAREN) 1 % GEL Apply 2 g topically 4 times daily as needed for Pain Yes Delbert Dahl, APRN - CNP   Enzalutamide (XTANDI) 40 MG TABS Take 4 tablets by mouth daily Yes Tricia Mccray MD   Leuprolide Acetate, 3 Month, (ELIGARD) 22.5 MG KIT Inject 22.5 mg into the skin every 3 months Yes ProviderTricia MD   insulin glargine (LANTUS SOLOSTAR) 100 UNIT/ML

## 2025-03-06 NOTE — PLAN OF CARE
Problem: Chronic Conditions and Co-morbidities  Goal: Patient's chronic conditions and co-morbidity symptoms are monitored and maintained or improved  Flowsheets (Taken 3/6/2025 1224)  Care Plan - Patient's Chronic Conditions and Co-Morbidity Symptoms are Monitored and Maintained or Improved: Monitor and assess patient's chronic conditions and comorbid symptoms for stability, deterioration, or improvement

## 2025-03-07 ENCOUNTER — TELEPHONE (OUTPATIENT)
Dept: CARDIOLOGY CLINIC | Age: 60
End: 2025-03-07

## 2025-03-07 NOTE — TELEPHONE ENCOUNTER
----- Message from Dr. Steven Kolb, DO sent at 3/6/2025  7:51 PM EST -----  He is supposed to take Bumex twice a day only on Tuesdays and Thursdays and once a day on all others.  Tomorrow (Friday) take it twice a day and then go back to his normal routine.

## 2025-03-27 ENCOUNTER — HOSPITAL ENCOUNTER (OUTPATIENT)
Dept: INFUSION THERAPY | Age: 60
Setting detail: INFUSION SERIES
Discharge: HOME | End: 2025-03-27
Payer: MEDICARE

## 2025-03-27 VITALS
DIASTOLIC BLOOD PRESSURE: 82 MMHG | HEART RATE: 78 BPM | SYSTOLIC BLOOD PRESSURE: 171 MMHG | TEMPERATURE: 95.2 F | OXYGEN SATURATION: 100 %

## 2025-03-27 DIAGNOSIS — C61 PROSTATE CANCER (HCC): Primary | ICD-10-CM

## 2025-03-27 PROCEDURE — 6360000002 HC RX W HCPCS: Performed by: UROLOGY

## 2025-03-27 PROCEDURE — 96402 CHEMO HORMON ANTINEOPL SQ/IM: CPT

## 2025-03-27 RX ADMIN — LEUPROLIDE ACETATE 22.5 MG: 22.5 INJECTION, SUSPENSION, EXTENDED RELEASE SUBCUTANEOUS at 14:16

## 2025-03-27 NOTE — FLOWSHEET NOTE
Discharged to home in stable condition, tolerated injection well all questions answered, VS stable, does not want dc instructions.

## 2025-03-31 DIAGNOSIS — C61 PROSTATE CANCER (HCC): Primary | ICD-10-CM

## 2025-04-07 ENCOUNTER — HOSPITAL ENCOUNTER (OUTPATIENT)
Dept: INFUSION THERAPY | Age: 60
Discharge: HOME | End: 2025-04-07
Payer: MEDICARE

## 2025-04-07 ENCOUNTER — OFFICE VISIT (OUTPATIENT)
Dept: ONCOLOGY | Age: 60
End: 2025-04-07
Payer: MEDICARE

## 2025-04-07 VITALS
HEIGHT: 69 IN | SYSTOLIC BLOOD PRESSURE: 117 MMHG | BODY MASS INDEX: 34.35 KG/M2 | TEMPERATURE: 97 F | DIASTOLIC BLOOD PRESSURE: 66 MMHG | HEART RATE: 89 BPM | WEIGHT: 231.9 LBS | OXYGEN SATURATION: 97 %

## 2025-04-07 DIAGNOSIS — C61 PROSTATE CANCER METASTATIC TO BONE (HCC): ICD-10-CM

## 2025-04-07 DIAGNOSIS — C61 PROSTATE CANCER (HCC): ICD-10-CM

## 2025-04-07 DIAGNOSIS — C79.51 PROSTATE CANCER METASTATIC TO BONE (HCC): Primary | ICD-10-CM

## 2025-04-07 DIAGNOSIS — C79.51 PROSTATE CANCER METASTATIC TO BONE (HCC): ICD-10-CM

## 2025-04-07 DIAGNOSIS — C61 PROSTATE CANCER METASTATIC TO BONE (HCC): Primary | ICD-10-CM

## 2025-04-07 LAB
ALBUMIN SERPL-MCNC: 4.4 G/DL (ref 3.5–5.2)
ALP SERPL-CCNC: 187 U/L (ref 40–129)
ALT SERPL-CCNC: 24 U/L (ref 0–40)
ANION GAP SERPL CALCULATED.3IONS-SCNC: 21 MMOL/L (ref 7–16)
AST SERPL-CCNC: 18 U/L (ref 0–39)
BASOPHILS # BLD: 0.06 K/UL (ref 0–0.2)
BASOPHILS NFR BLD: 1 % (ref 0–2)
BILIRUB SERPL-MCNC: 0.6 MG/DL (ref 0–1.2)
BUN SERPL-MCNC: 16 MG/DL (ref 6–23)
CALCIUM SERPL-MCNC: 10.6 MG/DL (ref 8.6–10.2)
CHLORIDE SERPL-SCNC: 95 MMOL/L (ref 98–107)
CO2 SERPL-SCNC: 22 MMOL/L (ref 22–29)
CREAT SERPL-MCNC: 1.1 MG/DL (ref 0.7–1.2)
EOSINOPHIL # BLD: 1.09 K/UL (ref 0.05–0.5)
EOSINOPHILS RELATIVE PERCENT: 12 % (ref 0–6)
ERYTHROCYTE [DISTWIDTH] IN BLOOD BY AUTOMATED COUNT: 13 % (ref 11.5–15)
GFR, ESTIMATED: 78 ML/MIN/1.73M2
GLUCOSE SERPL-MCNC: 462 MG/DL (ref 74–99)
HCT VFR BLD AUTO: 50.5 % (ref 37–54)
HGB BLD-MCNC: 17.2 G/DL (ref 12.5–16.5)
IMM GRANULOCYTES # BLD AUTO: 0.09 K/UL (ref 0–0.58)
IMM GRANULOCYTES NFR BLD: 1 % (ref 0–5)
LYMPHOCYTES NFR BLD: 1.65 K/UL (ref 1.5–4)
LYMPHOCYTES RELATIVE PERCENT: 19 % (ref 20–42)
MCH RBC QN AUTO: 28.3 PG (ref 26–35)
MCHC RBC AUTO-ENTMCNC: 34.1 G/DL (ref 32–34.5)
MCV RBC AUTO: 83.2 FL (ref 80–99.9)
MONOCYTES NFR BLD: 0.61 K/UL (ref 0.1–0.95)
MONOCYTES NFR BLD: 7 % (ref 2–12)
NEUTROPHILS NFR BLD: 60 % (ref 43–80)
NEUTS SEG NFR BLD: 5.33 K/UL (ref 1.8–7.3)
PLATELET # BLD AUTO: 213 K/UL (ref 130–450)
PMV BLD AUTO: 11.7 FL (ref 7–12)
POTASSIUM SERPL-SCNC: 4.4 MMOL/L (ref 3.5–5)
PROT SERPL-MCNC: 8 G/DL (ref 6.4–8.3)
PSA SERPL-MCNC: 0.02 NG/ML (ref 0–4)
RBC # BLD AUTO: 6.07 M/UL (ref 3.8–5.8)
SODIUM SERPL-SCNC: 138 MMOL/L (ref 132–146)
WBC OTHER # BLD: 8.8 K/UL (ref 4.5–11.5)

## 2025-04-07 PROCEDURE — 80053 COMPREHEN METABOLIC PANEL: CPT

## 2025-04-07 PROCEDURE — 3078F DIAST BP <80 MM HG: CPT | Performed by: CLINICAL NURSE SPECIALIST

## 2025-04-07 PROCEDURE — 85025 COMPLETE CBC W/AUTO DIFF WBC: CPT

## 2025-04-07 PROCEDURE — 36415 COLL VENOUS BLD VENIPUNCTURE: CPT

## 2025-04-07 PROCEDURE — 3074F SYST BP LT 130 MM HG: CPT | Performed by: CLINICAL NURSE SPECIALIST

## 2025-04-07 PROCEDURE — 99213 OFFICE O/P EST LOW 20 MIN: CPT | Performed by: CLINICAL NURSE SPECIALIST

## 2025-04-07 PROCEDURE — 84153 ASSAY OF PSA TOTAL: CPT

## 2025-04-07 PROCEDURE — 99212 OFFICE O/P EST SF 10 MIN: CPT

## 2025-04-07 NOTE — PROGRESS NOTES
Department of [facility] Med Oncology   Clinic Note    Reason for Visit: Follow-up on a patient with Prostate Cancer    PCP:  Mariaelena Gordon DO    History of Present Illness:  57-year-old male who was noted to have elevated PSA   Evaluated by Urology team (Dr. Kg Bella)    PSA 9.20 on 03/31/2021  PSA 26.39 on 05/31/2022  PSA 31.83 on 06/20/2022  PSA 0.03 on 1/13/2025    On 08/04/2022:  A. Prostate, left base, biopsy: Prostatic adenocarcinoma (acinar, not otherwise specified).   Andrew Score: 4+3=7; cribriform pattern absent; percentage of pattern 4 is approximately 60%   Grade Group:  3   Tissue Cores Involved by Carcinoma: 2 of 2   Percentage of Tissue Involved by Carcinoma: 10%   Perineural Invasion: Present     B. Prostate, left mid, biopsy: Prostatic adenocarcinoma (acinar, not otherwise specified) and intraductal carcinoma.   Andrew Score: 4+5=9; cribriform pattern present   Grade Group:  5   Tissue Cores Involved by Carcinoma: 2 of 2   Percentage of Tissue Involved by Carcinoma: 80%   Perineural Invasion: Present     C. Prostate, left apex, biopsy: Prostatic adenocarcinoma (acinar, not otherwise specified).   Layland Score: 4+4 = 8; cribriform pattern present   Grade Group:  4   Tissue Cores Involved by Carcinoma: 2 of 2   Percentage of Tissue Involved by Carcinoma: 50%   Perineural Invasion: Present     D. Prostate, right base, biopsy: Prostatic adenocarcinoma (acinar, not otherwise specified) and intraductal carcinoma.   Layland Score: 4+3 = 7; cribriform pattern present; percentage of pattern 4 is approximately 80%   Grade Group:  3   Tissue Cores Involved by Carcinoma: 3 of 3   Percentage of Tissue Involved by Carcinoma: 90%   Perineural Invasion: Present     E. 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   Layland Score: 4+4=8; cribriform pattern present   Grade Group:  4   Tissue Cores Involved by Carcinoma: 3 of 3   Percentage

## 2025-05-12 ENCOUNTER — OFFICE VISIT (OUTPATIENT)
Dept: FAMILY MEDICINE CLINIC | Age: 60
End: 2025-05-12
Payer: MEDICARE

## 2025-05-12 VITALS
RESPIRATION RATE: 18 BRPM | DIASTOLIC BLOOD PRESSURE: 72 MMHG | HEART RATE: 92 BPM | TEMPERATURE: 97.3 F | SYSTOLIC BLOOD PRESSURE: 111 MMHG | BODY MASS INDEX: 34.96 KG/M2 | HEIGHT: 69 IN | OXYGEN SATURATION: 97 % | WEIGHT: 236 LBS

## 2025-05-12 DIAGNOSIS — C61 PROSTATE CANCER (HCC): ICD-10-CM

## 2025-05-12 DIAGNOSIS — I50.20 HFREF (HEART FAILURE WITH REDUCED EJECTION FRACTION) (HCC): ICD-10-CM

## 2025-05-12 DIAGNOSIS — Z79.4 TYPE 2 DIABETES MELLITUS WITH HYPERGLYCEMIA, WITH LONG-TERM CURRENT USE OF INSULIN (HCC): Primary | ICD-10-CM

## 2025-05-12 DIAGNOSIS — E11.22 TYPE 2 DIABETES MELLITUS WITH STAGE 2 CHRONIC KIDNEY DISEASE, WITHOUT LONG-TERM CURRENT USE OF INSULIN (HCC): ICD-10-CM

## 2025-05-12 DIAGNOSIS — N18.2 TYPE 2 DIABETES MELLITUS WITH STAGE 2 CHRONIC KIDNEY DISEASE, WITHOUT LONG-TERM CURRENT USE OF INSULIN (HCC): ICD-10-CM

## 2025-05-12 DIAGNOSIS — E11.65 TYPE 2 DIABETES MELLITUS WITH HYPERGLYCEMIA, WITH LONG-TERM CURRENT USE OF INSULIN (HCC): Primary | ICD-10-CM

## 2025-05-12 PROCEDURE — 3046F HEMOGLOBIN A1C LEVEL >9.0%: CPT | Performed by: FAMILY MEDICINE

## 2025-05-12 PROCEDURE — 99214 OFFICE O/P EST MOD 30 MIN: CPT | Performed by: FAMILY MEDICINE

## 2025-05-12 PROCEDURE — 3074F SYST BP LT 130 MM HG: CPT | Performed by: FAMILY MEDICINE

## 2025-05-12 PROCEDURE — 3078F DIAST BP <80 MM HG: CPT | Performed by: FAMILY MEDICINE

## 2025-05-12 RX ORDER — KETOROLAC TROMETHAMINE 30 MG/ML
1 INJECTION, SOLUTION INTRAMUSCULAR; INTRAVENOUS
Qty: 1 EACH | Refills: 1 | Status: SHIPPED | OUTPATIENT
Start: 2025-05-12

## 2025-05-12 RX ORDER — ACYCLOVIR 800 MG/1
1 TABLET ORAL
Qty: 6 EACH | Refills: 3 | Status: SHIPPED | OUTPATIENT
Start: 2025-05-12

## 2025-05-12 NOTE — PROGRESS NOTES
CC:  Follow up DM.      HPI:  59 y.o. male presents for follow up.      HFrEF. Weight stable overall, maybe up a couple of pounds.  Salty meal yesterday.  Gennaro Pop.  Plans to have less sodium today.  Still eating frequent fast food.  Advised against this.  No increase in swelling or SOB.  No orthopnea.  Feeling well overall.      DM.  Taking Lantus irregularly, once or twice per week.  Not taking medications consistently still. Advised to take medications as directed.  Discussed ways to help take medications consistently, but he declines at this time.  Not taking any nighttime medications.  Discussed trying to take the medications at different times in the day to fit better with his schedule, but he declines at this time.  Glucose was very high on last labs for oncology.  Encouraged adherence.  Discussed dietary modifications.  He has not been checking glucose regularly with fingerstick.  We discussed CGM; while he initially declined, he is willing to consider at this time.      Metastatic prostate CA; following with oncology and urology.  Had recent labs.  Imaging has been stable.  No new symptoms or questions.      Patient Active Problem List    Diagnosis Date Noted    Aneurysm of right common iliac artery 12/13/2022    Prostate cancer ([facility]) 09/19/2022    HFrEF (heart failure with reduced ejection fraction) ([facility]) 11/20/2024    Type 2 diabetes mellitus with chronic kidney disease 12/05/2023    Age-related nuclear cataract, bilateral 01/31/2022    Presbyopia 01/31/2022    Regular astigmatism, bilateral 01/31/2022    Leg swelling 01/23/2022    ALEX (acute kidney injury) 01/23/2022    CHF, acute on chronic ([facility]) 01/21/2022    Elevated PSA, less than 10 ng/ml 04/01/2021    Nonrheumatic aortic valve insufficiency 02/20/2019    Moderate episode of recurrent major depressive disorder ([facility]) 08/27/2018    Obesity (BMI 30.0-34.9)     Ischemic cardiomyopathy     Type 2 diabetes mellitus, without long-term current use of

## 2025-05-22 RX ORDER — POTASSIUM CHLORIDE 750 MG/1
10 TABLET, EXTENDED RELEASE ORAL DAILY
Qty: 90 TABLET | Refills: 0 | Status: SHIPPED | OUTPATIENT
Start: 2025-05-22

## 2025-05-22 NOTE — TELEPHONE ENCOUNTER
Name of Medication(s) Requested:  Requested Prescriptions     Pending Prescriptions Disp Refills    potassium chloride (KLOR-CON M) 10 MEQ extended release tablet [Pharmacy Med Name: POTASSIUM CL MICRO 10MEQ ER TABS] 90 tablet 0     Sig: TAKE 1 TABLET BY MOUTH DAILY       Medication is on current medication list Yes    Dosage and directions were verified? Yes    Quantity verified: 90 day supply     Pharmacy Verified?  Yes    Last Appointment:  5/12/2025    Future appts:  Future Appointments   Date Time Provider Department Center   6/5/2025 12:00 PM [facility] CHF ROOM 3 [facility] CHF [facility]   6/27/2025  2:00 PM SEY INFUSION BAY 2 SEYZ INF SER [facility]   7/9/2025  1:15 PM SEYZ MED ONC FAST TRACK 2 SE[facility] Med Onc [facility]   7/9/2025  1:30 PM Zaina Hilario APRN MED ONC [facility]   8/12/2025  1:20 PM [facility]        (If no appt send self scheduling link. .REFILLAPPT)  Scheduling request sent?     [] Yes  [x] No    Does patient need updated?  [] Yes  [x] No

## 2025-06-05 ENCOUNTER — HOSPITAL ENCOUNTER (OUTPATIENT)
Dept: OTHER | Age: 60
Setting detail: THERAPIES SERIES
Discharge: HOME | End: 2025-06-05
Payer: MEDICARE

## 2025-06-05 VITALS
DIASTOLIC BLOOD PRESSURE: 58 MMHG | HEART RATE: 75 BPM | BODY MASS INDEX: 33.67 KG/M2 | SYSTOLIC BLOOD PRESSURE: 117 MMHG | RESPIRATION RATE: 18 BRPM | WEIGHT: 228 LBS | OXYGEN SATURATION: 97 %

## 2025-06-05 LAB
ANION GAP SERPL CALCULATED.3IONS-SCNC: 19 MMOL/L (ref 7–16)
BNP SERPL-MCNC: 427 PG/ML (ref 0–125)
BUN SERPL-MCNC: 20 MG/DL (ref 6–23)
CALCIUM SERPL-MCNC: 9.8 MG/DL (ref 8.6–10.2)
CHLORIDE SERPL-SCNC: 98 MMOL/L (ref 98–107)
CO2 SERPL-SCNC: 20 MMOL/L (ref 22–29)
CREAT SERPL-MCNC: 1.2 MG/DL (ref 0.7–1.2)
GFR, ESTIMATED: 70 ML/MIN/1.73M2
GLUCOSE SERPL-MCNC: 372 MG/DL (ref 74–99)
POTASSIUM SERPL-SCNC: 4.3 MMOL/L (ref 3.5–5)
SODIUM SERPL-SCNC: 137 MMOL/L (ref 132–146)

## 2025-06-05 PROCEDURE — 80048 BASIC METABOLIC PNL TOTAL CA: CPT

## 2025-06-05 PROCEDURE — 99214 OFFICE O/P EST MOD 30 MIN: CPT

## 2025-06-05 PROCEDURE — 83880 ASSAY OF NATRIURETIC PEPTIDE: CPT

## 2025-06-05 ASSESSMENT — PATIENT HEALTH QUESTIONNAIRE - PHQ9
SUM OF ALL RESPONSES TO PHQ QUESTIONS 1-9: 0
SUM OF ALL RESPONSES TO PHQ QUESTIONS 1-9: 0
1. LITTLE INTEREST OR PLEASURE IN DOING THINGS: NOT AT ALL
SUM OF ALL RESPONSES TO PHQ QUESTIONS 1-9: 0
SUM OF ALL RESPONSES TO PHQ QUESTIONS 1-9: 0
2. FEELING DOWN, DEPRESSED OR HOPELESS: NOT AT ALL

## 2025-06-05 NOTE — PLAN OF CARE
Problem: Chronic Conditions and Co-morbidities  Goal: Patient's chronic conditions and co-morbidity symptoms are monitored and maintained or improved  Flowsheets (Taken 6/5/2025 1228)  Care Plan - Patient's Chronic Conditions and Co-Morbidity Symptoms are Monitored and Maintained or Improved: Monitor and assess patient's chronic conditions and comorbid symptoms for stability, deterioration, or improvement

## 2025-06-05 NOTE — PROGRESS NOTES
recurrent IV diuretic use    Additional Notes:     Patient presents for follow up today. No complaints of sob, LE edema, or abdominal distention/bloating. No JVD on assessment. Follow up scheduled for 5 months, encouraged patient to call the clinic if they need an appointment sooner.     [x]Lab work obtained    [x] Patient/Family Educated On:  [x] HF zones (Green, Yellow, Red) and aware of when to take action   [x] Daily weights  [] Scale provided   [x] Low sodium diet (2000 mg)  Barriers to compliance  [] Refuses to monitor diet  [] Socioeconomic difficulties  [] Unable to cook for self (use of frozen meals, can goods, etc)  [] CHF CHW consulted  [] Low sodium meal delivery options given to patient  [] Dietician consulted   [] Low sodium recipes provided  [] Sodium free seasoning provided   [x] Fluid intake 6-8 cups (around 64 oz)  [x] Reviewed currently prescribed medications with patient, educated on importance of compliance and answered any questions regarding their medication  [] Pill box provided to patient  [] Patient using pill packing pharmacy   [] CPAP/BiPAP use  [] Low impact exercise / cardiac rehab   [] LifeVest use  [x] Patient aware of signs and symptoms of worsening HF, CHF clinic phone number provided and made aware to call clinic for sooner if evaluation if needed     [] Difficulty affording medications  [] CHF CHW consulted  [] Prescription assistance information given   [] [facility] medication assistance program information given   [] Sample medications provided to patient to help bridge gap until affordability N/A    [x] PHQ assessment completed while in CHF clinic (1st visit, every 3 months and PRN)      6/5/2025    12:31 PM 3/4/2025     1:53 PM 12/5/2024    12:06 PM 8/13/2024     1:52 PM 8/13/2024     1:46 PM 8/6/2024    12:17 PM 3/14/2023     1:41 PM   PHQ Scores   PHQ2 Score 0 3 0 0 0 0 6   PHQ9 Score 0 12 0 0 0 0 9     Interpretation of Total Score Depression Severity:   1-4 =

## 2025-06-27 ENCOUNTER — HOSPITAL ENCOUNTER (OUTPATIENT)
Dept: INFUSION THERAPY | Age: 60
Setting detail: INFUSION SERIES
Discharge: HOME | End: 2025-06-27
Payer: MEDICARE

## 2025-06-27 VITALS
OXYGEN SATURATION: 100 % | RESPIRATION RATE: 16 BRPM | SYSTOLIC BLOOD PRESSURE: 107 MMHG | TEMPERATURE: 96.5 F | DIASTOLIC BLOOD PRESSURE: 71 MMHG | HEART RATE: 84 BPM

## 2025-06-27 DIAGNOSIS — C61 PROSTATE CANCER (HCC): Primary | ICD-10-CM

## 2025-06-27 PROCEDURE — 6360000002 HC RX W HCPCS: Performed by: UROLOGY

## 2025-06-27 PROCEDURE — 96402 CHEMO HORMON ANTINEOPL SQ/IM: CPT

## 2025-06-27 RX ADMIN — LEUPROLIDE ACETATE 22.5 MG: 22.5 INJECTION, SUSPENSION, EXTENDED RELEASE SUBCUTANEOUS at 14:18

## 2025-06-27 NOTE — FLOWSHEET NOTE
Patient tolerated injection well. Remained on unit for 10 minutes after treatment. Patient alert and oriented x3. No distress noted. Vital signs stable. Patient denies any new or worsening pain. Educated patient on possible side effects and treatment of medication.     Patient verbalized understanding. Offered patient education and/or discharge material. Patient declined. Patient denies any needs. All questions answered. D/C in stable condition.

## 2025-07-01 DIAGNOSIS — I50.22 CHRONIC SYSTOLIC CONGESTIVE HEART FAILURE (HCC): ICD-10-CM

## 2025-07-01 RX ORDER — BUMETANIDE 1 MG/1
TABLET ORAL
Qty: 135 TABLET | Refills: 3 | Status: SHIPPED | OUTPATIENT
Start: 2025-07-01

## 2025-07-01 RX ORDER — BUMETANIDE 1 MG/1
1.5 TABLET ORAL DAILY
Qty: 135 TABLET | Refills: 3 | OUTPATIENT
Start: 2025-07-01

## 2025-07-09 ENCOUNTER — HOSPITAL ENCOUNTER (OUTPATIENT)
Dept: INFUSION THERAPY | Age: 60
Discharge: HOME | End: 2025-07-09
Payer: MEDICARE

## 2025-07-09 ENCOUNTER — OFFICE VISIT (OUTPATIENT)
Age: 60
End: 2025-07-09
Payer: MEDICARE

## 2025-07-09 VITALS
HEART RATE: 88 BPM | OXYGEN SATURATION: 96 % | TEMPERATURE: 97.4 F | WEIGHT: 226.1 LBS | BODY MASS INDEX: 33.49 KG/M2 | RESPIRATION RATE: 16 BRPM | SYSTOLIC BLOOD PRESSURE: 91 MMHG | DIASTOLIC BLOOD PRESSURE: 61 MMHG | HEIGHT: 69 IN

## 2025-07-09 DIAGNOSIS — C61 PROSTATE CANCER (HCC): ICD-10-CM

## 2025-07-09 DIAGNOSIS — C79.51 PROSTATE CANCER METASTATIC TO BONE (HCC): Primary | ICD-10-CM

## 2025-07-09 DIAGNOSIS — C61 PROSTATE CANCER METASTATIC TO BONE (HCC): ICD-10-CM

## 2025-07-09 DIAGNOSIS — C61 PROSTATE CANCER METASTATIC TO BONE (HCC): Primary | ICD-10-CM

## 2025-07-09 DIAGNOSIS — C79.51 PROSTATE CANCER METASTATIC TO BONE (HCC): ICD-10-CM

## 2025-07-09 LAB
ALBUMIN SERPL-MCNC: 4.5 G/DL (ref 3.5–5.2)
ALP SERPL-CCNC: 171 U/L (ref 40–129)
ALT SERPL-CCNC: 20 U/L (ref 0–50)
ANION GAP SERPL CALCULATED.3IONS-SCNC: 16 MMOL/L (ref 7–16)
AST SERPL-CCNC: 21 U/L (ref 0–50)
BASOPHILS # BLD: 0.07 K/UL (ref 0–0.2)
BASOPHILS NFR BLD: 1 % (ref 0–2)
BILIRUB SERPL-MCNC: 0.6 MG/DL (ref 0–1.2)
BUN SERPL-MCNC: 16 MG/DL (ref 8–23)
CALCIUM SERPL-MCNC: 10.3 MG/DL (ref 8.8–10.2)
CHLORIDE SERPL-SCNC: 97 MMOL/L (ref 98–107)
CO2 SERPL-SCNC: 25 MMOL/L (ref 22–29)
CREAT SERPL-MCNC: 1 MG/DL (ref 0.7–1.2)
EOSINOPHIL # BLD: 0.44 K/UL (ref 0.05–0.5)
EOSINOPHILS RELATIVE PERCENT: 7 % (ref 0–6)
ERYTHROCYTE [DISTWIDTH] IN BLOOD BY AUTOMATED COUNT: 12.3 % (ref 11.5–15)
GFR, ESTIMATED: 86 ML/MIN/1.73M2
GLUCOSE SERPL-MCNC: 405 MG/DL (ref 74–99)
HCT VFR BLD AUTO: 47.1 % (ref 37–54)
HGB BLD-MCNC: 16.3 G/DL (ref 12.5–16.5)
IMM GRANULOCYTES # BLD AUTO: 0.04 K/UL (ref 0–0.58)
IMM GRANULOCYTES NFR BLD: 1 % (ref 0–5)
LYMPHOCYTES NFR BLD: 1.41 K/UL (ref 1.5–4)
LYMPHOCYTES RELATIVE PERCENT: 21 % (ref 20–42)
MCH RBC QN AUTO: 28 PG (ref 26–35)
MCHC RBC AUTO-ENTMCNC: 34.6 G/DL (ref 32–34.5)
MCV RBC AUTO: 80.9 FL (ref 80–99.9)
MONOCYTES NFR BLD: 0.39 K/UL (ref 0.1–0.95)
MONOCYTES NFR BLD: 6 % (ref 2–12)
NEUTROPHILS NFR BLD: 65 % (ref 43–80)
NEUTS SEG NFR BLD: 4.39 K/UL (ref 1.8–7.3)
PLATELET # BLD AUTO: 162 K/UL (ref 130–450)
PMV BLD AUTO: 10.9 FL (ref 7–12)
POTASSIUM SERPL-SCNC: 4.1 MMOL/L (ref 3.5–5.1)
PROT SERPL-MCNC: 7.8 G/DL (ref 6.4–8.3)
PSA SERPL-MCNC: 0.02 NG/ML (ref 0–4)
RBC # BLD AUTO: 5.82 M/UL (ref 3.8–5.8)
SODIUM SERPL-SCNC: 138 MMOL/L (ref 136–145)
WBC OTHER # BLD: 6.7 K/UL (ref 4.5–11.5)

## 2025-07-09 PROCEDURE — 85025 COMPLETE CBC W/AUTO DIFF WBC: CPT

## 2025-07-09 PROCEDURE — 99213 OFFICE O/P EST LOW 20 MIN: CPT | Performed by: CLINICAL NURSE SPECIALIST

## 2025-07-09 PROCEDURE — 36415 COLL VENOUS BLD VENIPUNCTURE: CPT

## 2025-07-09 PROCEDURE — 80053 COMPREHEN METABOLIC PANEL: CPT

## 2025-07-09 PROCEDURE — 3078F DIAST BP <80 MM HG: CPT | Performed by: CLINICAL NURSE SPECIALIST

## 2025-07-09 PROCEDURE — 84153 ASSAY OF PSA TOTAL: CPT

## 2025-07-09 PROCEDURE — 3074F SYST BP LT 130 MM HG: CPT | Performed by: CLINICAL NURSE SPECIALIST

## 2025-07-09 NOTE — PROGRESS NOTES
Department of [facility] Med Oncology   Clinic Note    Reason for Visit: Follow-up on a patient with Prostate Cancer    PCP:  Mariaelena Gordon DO    History of Present Illness:  57-year-old male who was noted to have elevated PSA   Evaluated by Urology team (Dr. Kg Bella)    PSA 9.20 on 03/31/2021  PSA 26.39 on 05/31/2022  PSA 31.83 on 06/20/2022  PSA 0.03 on 1/13/2025    On 08/04/2022:  A. Prostate, left base, biopsy: Prostatic adenocarcinoma (acinar, not otherwise specified).   Andrew Score: 4+3=7; cribriform pattern absent; percentage of pattern 4 is approximately 60%   Grade Group:  3   Tissue Cores Involved by Carcinoma: 2 of 2   Percentage of Tissue Involved by Carcinoma: 10%   Perineural Invasion: Present     B. Prostate, left mid, biopsy: Prostatic adenocarcinoma (acinar, not otherwise specified) and intraductal carcinoma.   Andrew Score: 4+5=9; cribriform pattern present   Grade Group:  5   Tissue Cores Involved by Carcinoma: 2 of 2   Percentage of Tissue Involved by Carcinoma: 80%   Perineural Invasion: Present     C. Prostate, left apex, biopsy: Prostatic adenocarcinoma (acinar, not otherwise specified).   [place] Score: 4+4 = 8; cribriform pattern present   Grade Group:  4   Tissue Cores Involved by Carcinoma: 2 of 2   Percentage of Tissue Involved by Carcinoma: 50%   Perineural Invasion: Present     D. Prostate, right base, biopsy: Prostatic adenocarcinoma (acinar, not otherwise specified) and intraductal carcinoma.   [place] Score: 4+3 = 7; cribriform pattern present; percentage of pattern 4 is approximately 80%   Grade Group:  3   Tissue Cores Involved by Carcinoma: 3 of 3   Percentage of Tissue Involved by Carcinoma: 90%   Perineural Invasion: Present     E. 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   [place] Score: 4+4=8; cribriform pattern present   Grade Group:  4   Tissue Cores Involved by Carcinoma: 3 of 3   Percentage

## 2025-08-04 DIAGNOSIS — E11.9 TYPE 2 DIABETES MELLITUS WITHOUT COMPLICATION, WITHOUT LONG-TERM CURRENT USE OF INSULIN (HCC): ICD-10-CM

## 2025-08-04 DIAGNOSIS — I25.10 CORONARY ARTERY DISEASE INVOLVING NATIVE CORONARY ARTERY OF NATIVE HEART WITHOUT ANGINA PECTORIS: Chronic | ICD-10-CM

## 2025-08-04 RX ORDER — ATORVASTATIN CALCIUM 80 MG/1
80 TABLET, FILM COATED ORAL NIGHTLY
Qty: 90 TABLET | Refills: 3 | Status: SHIPPED | OUTPATIENT
Start: 2025-08-04

## 2025-08-17 PROCEDURE — 93295 DEV INTERROG REMOTE 1/2/MLT: CPT | Performed by: STUDENT IN AN ORGANIZED HEALTH CARE EDUCATION/TRAINING PROGRAM

## 2025-08-17 PROCEDURE — 93296 REM INTERROG EVL PM/IDS: CPT | Performed by: STUDENT IN AN ORGANIZED HEALTH CARE EDUCATION/TRAINING PROGRAM

## 2025-08-25 DIAGNOSIS — C79.51 PROSTATE CANCER METASTATIC TO BONE (HCC): Primary | ICD-10-CM

## 2025-08-25 DIAGNOSIS — C61 PROSTATE CANCER METASTATIC TO BONE (HCC): Primary | ICD-10-CM

## 2025-08-26 ENCOUNTER — OFFICE VISIT (OUTPATIENT)
Dept: FAMILY MEDICINE CLINIC | Age: 60
End: 2025-08-26

## 2025-08-26 VITALS
TEMPERATURE: 98.7 F | BODY MASS INDEX: 33.03 KG/M2 | OXYGEN SATURATION: 98 % | HEIGHT: 69 IN | RESPIRATION RATE: 16 BRPM | WEIGHT: 223 LBS | HEART RATE: 84 BPM | SYSTOLIC BLOOD PRESSURE: 134 MMHG | DIASTOLIC BLOOD PRESSURE: 79 MMHG

## 2025-08-26 DIAGNOSIS — I50.20 HFREF (HEART FAILURE WITH REDUCED EJECTION FRACTION) (HCC): ICD-10-CM

## 2025-08-26 DIAGNOSIS — R10.13 ABDOMINAL WALL PAIN IN EPIGASTRIC REGION: ICD-10-CM

## 2025-08-26 DIAGNOSIS — K21.9 GASTROESOPHAGEAL REFLUX DISEASE, UNSPECIFIED WHETHER ESOPHAGITIS PRESENT: Chronic | ICD-10-CM

## 2025-08-26 DIAGNOSIS — Z79.4 TYPE 2 DIABETES MELLITUS WITH CHRONIC KIDNEY DISEASE, WITH LONG-TERM CURRENT USE OF INSULIN, UNSPECIFIED CKD STAGE (HCC): Primary | ICD-10-CM

## 2025-08-26 DIAGNOSIS — E11.22 TYPE 2 DIABETES MELLITUS WITH CHRONIC KIDNEY DISEASE, WITH LONG-TERM CURRENT USE OF INSULIN, UNSPECIFIED CKD STAGE (HCC): Primary | ICD-10-CM

## 2025-08-26 DIAGNOSIS — C61 PROSTATE CANCER (HCC): ICD-10-CM

## 2025-08-26 LAB — HBA1C MFR BLD: 12.4 %

## 2025-08-27 DIAGNOSIS — E11.22 TYPE 2 DIABETES MELLITUS WITH CHRONIC KIDNEY DISEASE, WITH LONG-TERM CURRENT USE OF INSULIN, UNSPECIFIED CKD STAGE (HCC): ICD-10-CM

## 2025-08-27 DIAGNOSIS — Z79.4 TYPE 2 DIABETES MELLITUS WITH CHRONIC KIDNEY DISEASE, WITH LONG-TERM CURRENT USE OF INSULIN, UNSPECIFIED CKD STAGE (HCC): ICD-10-CM

## 2025-08-27 LAB
ALBUMIN: 4.7 G/DL (ref 3.5–5.2)
ALP BLD-CCNC: 167 U/L (ref 40–129)
ALT SERPL-CCNC: 20 U/L (ref 0–50)
ANION GAP SERPL CALCULATED.3IONS-SCNC: 23 MMOL/L (ref 7–16)
AST SERPL-CCNC: 21 U/L (ref 0–50)
BACTERIA: ABNORMAL
BASOPHILS ABSOLUTE: 0.07 K/UL (ref 0–0.2)
BASOPHILS RELATIVE PERCENT: 1 % (ref 0–2)
BILIRUB SERPL-MCNC: 0.8 MG/DL (ref 0–1.2)
BILIRUBIN, URINE: NEGATIVE
BUN BLDV-MCNC: 29 MG/DL (ref 8–23)
CALCIUM SERPL-MCNC: 10.5 MG/DL (ref 8.8–10.2)
CHLORIDE BLD-SCNC: 97 MMOL/L (ref 98–107)
CHOLESTEROL, TOTAL: 270 MG/DL
CO2: 19 MMOL/L (ref 22–29)
COLOR, UA: YELLOW
CREAT SERPL-MCNC: 1.3 MG/DL (ref 0.7–1.2)
EOSINOPHILS ABSOLUTE: 0.42 K/UL (ref 0.05–0.5)
EOSINOPHILS RELATIVE PERCENT: 5 % (ref 0–6)
GFR, ESTIMATED: 65 ML/MIN/1.73M2
GLUCOSE BLD-MCNC: 312 MG/DL (ref 74–99)
GLUCOSE URINE: 250 MG/DL
HCT VFR BLD CALC: 51.3 % (ref 37–54)
HDLC SERPL-MCNC: 35 MG/DL
HEMOGLOBIN: 17 G/DL (ref 12.5–16.5)
IMMATURE GRANULOCYTES %: 1 % (ref 0–5)
IMMATURE GRANULOCYTES ABSOLUTE: 0.06 K/UL (ref 0–0.58)
KETONES, URINE: 15 MG/DL
LDL CHOLESTEROL: ABNORMAL MG/DL
LEUKOCYTE ESTERASE, URINE: NEGATIVE
LYMPHOCYTES ABSOLUTE: 2.34 K/UL (ref 1.5–4)
LYMPHOCYTES RELATIVE PERCENT: 28 % (ref 20–42)
MCH RBC QN AUTO: 27.7 PG (ref 26–35)
MCHC RBC AUTO-ENTMCNC: 33.1 G/DL (ref 32–34.5)
MCV RBC AUTO: 83.6 FL (ref 80–99.9)
MONOCYTES ABSOLUTE: 0.72 K/UL (ref 0.1–0.95)
MONOCYTES RELATIVE PERCENT: 9 % (ref 2–12)
NEUTROPHILS ABSOLUTE: 4.91 K/UL (ref 1.8–7.3)
NEUTROPHILS RELATIVE PERCENT: 58 % (ref 43–80)
NITRITE, URINE: NEGATIVE
PDW BLD-RTO: 13.5 % (ref 11.5–15)
PH, URINE: 5.5 (ref 5–8)
PLATELET # BLD: 234 K/UL (ref 130–450)
PMV BLD AUTO: 11.6 FL (ref 7–12)
POTASSIUM SERPL-SCNC: 4.7 MMOL/L (ref 3.5–5.1)
PROTEIN UA: NEGATIVE MG/DL
RBC # BLD: 6.14 M/UL (ref 3.8–5.8)
RBC UA: ABNORMAL /HPF
SODIUM BLD-SCNC: 138 MMOL/L (ref 136–145)
SPECIFIC GRAVITY UA: 1.02 (ref 1–1.03)
TOTAL PROTEIN: 8.3 G/DL (ref 6.4–8.3)
TRIGL SERPL-MCNC: 499 MG/DL
TSH SERPL DL<=0.05 MIU/L-ACNC: 2.17 UIU/ML (ref 0.27–4.2)
TURBIDITY: CLEAR
URINE HGB: NEGATIVE
UROBILINOGEN, URINE: 0.2 EU/DL (ref 0–1)
VLDLC SERPL CALC-MCNC: ABNORMAL MG/DL
WBC # BLD: 8.5 K/UL (ref 4.5–11.5)
WBC UA: ABNORMAL /HPF

## 2025-08-28 DIAGNOSIS — R79.89 ELEVATED SERUM CREATININE: Primary | ICD-10-CM

## 2025-08-29 ENCOUNTER — HOSPITAL ENCOUNTER (OUTPATIENT)
Dept: CT IMAGING | Age: 60
Discharge: HOME | End: 2025-08-31
Payer: MEDICARE

## 2025-08-29 ENCOUNTER — HOSPITAL ENCOUNTER (OUTPATIENT)
Dept: NUCLEAR MEDICINE | Age: 60
Discharge: HOME | End: 2025-08-31

## 2025-08-29 ENCOUNTER — HOSPITAL ENCOUNTER (OUTPATIENT)
Dept: NUCLEAR MEDICINE | Age: 60
Discharge: HOME | End: 2025-08-31
Payer: MEDICARE

## 2025-08-29 DIAGNOSIS — C79.51 PROSTATE CANCER METASTATIC TO BONE (HCC): ICD-10-CM

## 2025-08-29 DIAGNOSIS — C61 PROSTATE CANCER METASTATIC TO BONE (HCC): ICD-10-CM

## 2025-08-29 PROCEDURE — 78306 BONE IMAGING WHOLE BODY: CPT | Performed by: CLINICAL NURSE SPECIALIST

## 2025-08-29 PROCEDURE — 3430000000 HC RX DIAGNOSTIC RADIOPHARMACEUTICAL: Performed by: RADIOLOGY

## 2025-08-29 PROCEDURE — 6360000004 HC RX CONTRAST MEDICATION: Performed by: RADIOLOGY

## 2025-08-29 PROCEDURE — 2500000003 HC RX 250 WO HCPCS: Performed by: RADIOLOGY

## 2025-08-29 PROCEDURE — 71260 CT THORAX DX C+: CPT

## 2025-08-29 PROCEDURE — A9503 TC99M MEDRONATE: HCPCS | Performed by: RADIOLOGY

## 2025-08-29 PROCEDURE — 74177 CT ABD & PELVIS W/CONTRAST: CPT

## 2025-08-29 RX ORDER — IOPAMIDOL 755 MG/ML
18 INJECTION, SOLUTION INTRAVASCULAR
Status: COMPLETED | OUTPATIENT
Start: 2025-08-29 | End: 2025-08-29

## 2025-08-29 RX ORDER — TC 99M MEDRONATE 20 MG/10ML
25 INJECTION, POWDER, LYOPHILIZED, FOR SOLUTION INTRAVENOUS ONCE
Status: COMPLETED | OUTPATIENT
Start: 2025-08-29 | End: 2025-08-29

## 2025-08-29 RX ORDER — IOPAMIDOL 755 MG/ML
75 INJECTION, SOLUTION INTRAVASCULAR
Status: COMPLETED | OUTPATIENT
Start: 2025-08-29 | End: 2025-08-29

## 2025-08-29 RX ORDER — SODIUM CHLORIDE 0.9 % (FLUSH) 0.9 %
10 SYRINGE (ML) INJECTION PRN
Status: DISCONTINUED | OUTPATIENT
Start: 2025-08-29 | End: 2025-09-01 | Stop reason: HOSPADM

## 2025-08-29 RX ADMIN — Medication 10 ML: at 09:46

## 2025-08-29 RX ADMIN — IOPAMIDOL 75 ML: 755 INJECTION, SOLUTION INTRAVENOUS at 09:49

## 2025-08-29 RX ADMIN — TC 99M MEDRONATE 25 MILLICURIE: 20 INJECTION, POWDER, LYOPHILIZED, FOR SOLUTION INTRAVENOUS at 08:34

## 2025-08-29 RX ADMIN — IOPAMIDOL 18 ML: 755 INJECTION, SOLUTION INTRAVENOUS at 08:45

## (undated) DEVICE — GLOVE SURG 7.5 PF POLYMER WHT STRL SIGN LTX ESSENTIAL LTX

## (undated) DEVICE — APPLIER CLP M L L11.4IN DIA10MM ENDOSCP ROT MULT FOR LIG

## (undated) DEVICE — CYSTO: Brand: MEDLINE INDUSTRIES, INC.

## (undated) DEVICE — NEEDLE SPNL 20GA LNG YEL HUB DISP

## (undated) DEVICE — TROCAR: Brand: KII FIOS FIRST ENTRY

## (undated) DEVICE — APPLIER CLP M/L SHFT DIA5MM 15 LIG LIGAMAX 5

## (undated) DEVICE — NEEDLE SPNL 22GA L7IN BLK HUB S STL W/ QNCKE PNT W/OUT

## (undated) DEVICE — INSUFFLATION NEEDLE TO ESTABLISH PNEUMOPERITONEUM.: Brand: INSUFFLATION NEEDLE

## (undated) DEVICE — MAX-CORE® DISPOSABLE CORE BIOPSY INSTRUMENT, 18G X 25CM: Brand: MAX-CORE

## (undated) DEVICE — DRESSING N/ADHER STR 2X3 100/BX 24BX/CS

## (undated) DEVICE — WARMER SCP LAP

## (undated) DEVICE — GLOVE ORANGE PI 7 1/2   MSG9075

## (undated) DEVICE — GLOVE SURG SIGNATURE LTX ESS LTX PF 7.5

## (undated) DEVICE — GENERAL LAPAROSCOPY: Brand: MEDLINE INDUSTRIES, INC.

## (undated) DEVICE — ELECTRODE ES 36CM LAP FLAT L HK COAT DISP CLEANCOAT

## (undated) DEVICE — LAPAROSCOPIC SCISSORS: Brand: EPIX LAPAROSCOPIC SCISSORS

## (undated) DEVICE — READY WET SKIN SCRUB TRAY-LF: Brand: MEDLINE INDUSTRIES, INC.

## (undated) DEVICE — TISSUE RETRIEVAL SYSTEM: Brand: INZII RETRIEVAL SYSTEM

## (undated) DEVICE — TROCAR: Brand: KII® SLEEVE

## (undated) DEVICE — PUMP SUC IRR TBNG L10FT W/ HNDPC ASSEMB STRYKEFLOW 2